# Patient Record
Sex: FEMALE | Race: WHITE | NOT HISPANIC OR LATINO | Employment: OTHER | URBAN - NONMETROPOLITAN AREA
[De-identification: names, ages, dates, MRNs, and addresses within clinical notes are randomized per-mention and may not be internally consistent; named-entity substitution may affect disease eponyms.]

---

## 2017-05-05 ENCOUNTER — HOSPITAL ENCOUNTER (EMERGENCY)
Facility: HOSPITAL | Age: 72
Discharge: ANOTHER HOSPITAL | End: 2017-05-06
Attending: INTERNAL MEDICINE | Admitting: INTERNAL MEDICINE
Payer: MEDICARE

## 2017-05-05 DIAGNOSIS — G45.9 TRANSIENT CEREBRAL ISCHEMIA, UNSPECIFIED TYPE: ICD-10-CM

## 2017-05-05 DIAGNOSIS — I67.1 CEREBRAL ANEURYSM, NONRUPTURED: ICD-10-CM

## 2017-05-05 DIAGNOSIS — I65.21 CAROTID OCCLUSION, RIGHT: ICD-10-CM

## 2017-05-05 LAB
ALBUMIN SERPL-MCNC: 3.3 G/DL (ref 3.4–5)
ALP SERPL-CCNC: 91 U/L (ref 40–150)
ALT SERPL W P-5'-P-CCNC: 18 U/L (ref 0–50)
ANION GAP SERPL CALCULATED.3IONS-SCNC: 9 MMOL/L (ref 3–14)
AST SERPL W P-5'-P-CCNC: 8 U/L (ref 0–45)
BASOPHILS # BLD AUTO: 0.1 10E9/L (ref 0–0.2)
BASOPHILS NFR BLD AUTO: 1 %
BILIRUB SERPL-MCNC: 0.2 MG/DL (ref 0.2–1.3)
BUN SERPL-MCNC: 23 MG/DL (ref 7–30)
CALCIUM SERPL-MCNC: 9.4 MG/DL (ref 8.5–10.1)
CHLORIDE SERPL-SCNC: 104 MMOL/L (ref 94–109)
CO2 SERPL-SCNC: 25 MMOL/L (ref 20–32)
CREAT SERPL-MCNC: 0.8 MG/DL (ref 0.52–1.04)
CRP SERPL-MCNC: 6.4 MG/L (ref 0–8)
DIFFERENTIAL METHOD BLD: ABNORMAL
EOSINOPHIL # BLD AUTO: 0.2 10E9/L (ref 0–0.7)
EOSINOPHIL NFR BLD AUTO: 2.3 %
ERYTHROCYTE [DISTWIDTH] IN BLOOD BY AUTOMATED COUNT: 13.1 % (ref 10–15)
ERYTHROCYTE [SEDIMENTATION RATE] IN BLOOD BY WESTERGREN METHOD: 4 MM/H (ref 0–30)
GFR SERPL CREATININE-BSD FRML MDRD: 71 ML/MIN/1.7M2
GLUCOSE BLDC GLUCOMTR-MCNC: 245 MG/DL (ref 70–99)
GLUCOSE SERPL-MCNC: 244 MG/DL (ref 70–99)
HCT VFR BLD AUTO: 45.1 % (ref 35–47)
HGB BLD-MCNC: 14.8 G/DL (ref 11.7–15.7)
IMM GRANULOCYTES # BLD: 0 10E9/L (ref 0–0.4)
IMM GRANULOCYTES NFR BLD: 0.4 %
INR PPP: 0.89 (ref 0.8–1.2)
LYMPHOCYTES # BLD AUTO: 2.4 10E9/L (ref 0.8–5.3)
LYMPHOCYTES NFR BLD AUTO: 29 %
MCH RBC QN AUTO: 27.3 PG (ref 26.5–33)
MCHC RBC AUTO-ENTMCNC: 32.8 G/DL (ref 31.5–36.5)
MCV RBC AUTO: 83 FL (ref 78–100)
MONOCYTES # BLD AUTO: 0.8 10E9/L (ref 0–1.3)
MONOCYTES NFR BLD AUTO: 10 %
NEUTROPHILS # BLD AUTO: 4.8 10E9/L (ref 1.6–8.3)
NEUTROPHILS NFR BLD AUTO: 57.3 %
NRBC # BLD AUTO: 0 10*3/UL
NRBC BLD AUTO-RTO: 0 /100
PLATELET # BLD AUTO: 275 10E9/L (ref 150–450)
POTASSIUM SERPL-SCNC: 4.4 MMOL/L (ref 3.4–5.3)
PROT SERPL-MCNC: 6.6 G/DL (ref 6.8–8.8)
RBC # BLD AUTO: 5.42 10E12/L (ref 3.8–5.2)
SODIUM SERPL-SCNC: 138 MMOL/L (ref 133–144)
TROPONIN I SERPL-MCNC: NORMAL UG/L (ref 0–0.04)
WBC # BLD AUTO: 8.4 10E9/L (ref 4–11)

## 2017-05-05 PROCEDURE — 93005 ELECTROCARDIOGRAM TRACING: CPT

## 2017-05-05 PROCEDURE — 96374 THER/PROPH/DIAG INJ IV PUSH: CPT | Mod: 59

## 2017-05-05 PROCEDURE — 71010 ZZHC CHEST ONE VIEW: CPT | Mod: TC

## 2017-05-05 PROCEDURE — 36415 COLL VENOUS BLD VENIPUNCTURE: CPT | Performed by: INTERNAL MEDICINE

## 2017-05-05 PROCEDURE — 70498 CT ANGIOGRAPHY NECK: CPT | Mod: TC

## 2017-05-05 PROCEDURE — 85610 PROTHROMBIN TIME: CPT | Performed by: INTERNAL MEDICINE

## 2017-05-05 PROCEDURE — 86140 C-REACTIVE PROTEIN: CPT | Performed by: INTERNAL MEDICINE

## 2017-05-05 PROCEDURE — 96375 TX/PRO/DX INJ NEW DRUG ADDON: CPT | Mod: 59

## 2017-05-05 PROCEDURE — 80053 COMPREHEN METABOLIC PANEL: CPT | Performed by: INTERNAL MEDICINE

## 2017-05-05 PROCEDURE — 70496 CT ANGIOGRAPHY HEAD: CPT | Mod: TC

## 2017-05-05 PROCEDURE — 25000128 H RX IP 250 OP 636: Performed by: INTERNAL MEDICINE

## 2017-05-05 PROCEDURE — 00000146 ZZHCL STATISTIC GLUCOSE BY METER IP

## 2017-05-05 PROCEDURE — 84484 ASSAY OF TROPONIN QUANT: CPT | Performed by: INTERNAL MEDICINE

## 2017-05-05 PROCEDURE — 85025 COMPLETE CBC W/AUTO DIFF WBC: CPT | Performed by: INTERNAL MEDICINE

## 2017-05-05 PROCEDURE — 93010 ELECTROCARDIOGRAM REPORT: CPT | Performed by: INTERNAL MEDICINE

## 2017-05-05 PROCEDURE — 85652 RBC SED RATE AUTOMATED: CPT | Performed by: INTERNAL MEDICINE

## 2017-05-05 PROCEDURE — 99285 EMERGENCY DEPT VISIT HI MDM: CPT | Performed by: INTERNAL MEDICINE

## 2017-05-05 PROCEDURE — 40000275 ZZH STATISTIC RCP TIME EA 10 MIN

## 2017-05-05 PROCEDURE — 99285 EMERGENCY DEPT VISIT HI MDM: CPT | Mod: 25

## 2017-05-05 RX ORDER — LABETALOL HYDROCHLORIDE 5 MG/ML
20 INJECTION, SOLUTION INTRAVENOUS ONCE
Status: COMPLETED | OUTPATIENT
Start: 2017-05-05 | End: 2017-05-05

## 2017-05-05 RX ORDER — LORAZEPAM 2 MG/ML
1 INJECTION INTRAMUSCULAR ONCE
Status: COMPLETED | OUTPATIENT
Start: 2017-05-05 | End: 2017-05-05

## 2017-05-05 RX ORDER — IOPAMIDOL 612 MG/ML
100 INJECTION, SOLUTION INTRAVASCULAR ONCE
Status: COMPLETED | OUTPATIENT
Start: 2017-05-05 | End: 2017-05-05

## 2017-05-05 RX ADMIN — IOPAMIDOL 100 ML: 612 INJECTION, SOLUTION INTRAVASCULAR at 22:33

## 2017-05-05 RX ADMIN — LABETALOL HYDROCHLORIDE 20 MG: 5 INJECTION, SOLUTION INTRAVENOUS at 23:21

## 2017-05-05 RX ADMIN — LORAZEPAM 1 MG: 2 INJECTION, SOLUTION INTRAMUSCULAR; INTRAVENOUS at 22:15

## 2017-05-05 ASSESSMENT — VISUAL ACUITY
OS: 20/200
OD: 20/100
OS: 20/70

## 2017-05-06 ENCOUNTER — TRANSFERRED RECORDS (OUTPATIENT)
Dept: HEALTH INFORMATION MANAGEMENT | Facility: HOSPITAL | Age: 72
End: 2017-05-06

## 2017-05-06 VITALS
OXYGEN SATURATION: 94 % | RESPIRATION RATE: 21 BRPM | WEIGHT: 260 LBS | SYSTOLIC BLOOD PRESSURE: 129 MMHG | DIASTOLIC BLOOD PRESSURE: 67 MMHG | BODY MASS INDEX: 43.32 KG/M2 | HEIGHT: 65 IN | TEMPERATURE: 96.9 F

## 2017-05-06 LAB
CHOLEST SERPL-MCNC: 164 MG/DL (ref 114–200)
HBA1C MFR BLD: 10.7 % (ref 4–6)
HDLC SERPL-MCNC: 52 MG/DL (ref 40–60)
LDLC SERPL CALC-MCNC: 99 MG/DL
TRIGL SERPL-MCNC: 65 MG/DL (ref 10–200)

## 2017-05-06 ASSESSMENT — ENCOUNTER SYMPTOMS
NECK PAIN: 0
NECK STIFFNESS: 0
COUGH: 0
PALPITATIONS: 0
EYE REDNESS: 0
WEAKNESS: 1
HEMATURIA: 0
BLOOD IN STOOL: 0
SPEECH DIFFICULTY: 0
SHORTNESS OF BREATH: 0
LIGHT-HEADEDNESS: 0
SEIZURES: 0
EYE ITCHING: 0
FEVER: 0
BACK PAIN: 0
DIAPHORESIS: 0
EYE DISCHARGE: 0
EYE PAIN: 0
CONFUSION: 0
FACIAL ASYMMETRY: 0
ABDOMINAL PAIN: 0
ARTHRALGIAS: 0
NUMBNESS: 0
CHEST TIGHTNESS: 0
COLOR CHANGE: 0
VOMITING: 0
DIZZINESS: 0
CHILLS: 0
ABDOMINAL DISTENTION: 0
NAUSEA: 0
FLANK PAIN: 0
ANAL BLEEDING: 0
MYALGIAS: 0
PHOTOPHOBIA: 0
DYSURIA: 0
WHEEZING: 0
VOICE CHANGE: 0
HEADACHES: 1
WOUND: 0

## 2017-05-06 NOTE — ED NOTES
Face to face report given with opportunity to observe patient.    Report given to Jessi CLARK   5/5/2017  11:29 PM

## 2017-05-06 NOTE — DISCHARGE INSTRUCTIONS
What to expect when you have contrast    During your exam, we will inject  contrast  into your vein or artery. (Contrast is a clear liquid with iodine in it. It shows up on X-rays.)    You may feel warm or hot. You may have a metal taste in your mouth and a slight upset stomach. You may also feel pressure near the kidneys and bladder. These effects will last about 1 to 3 minutes.    Please tell us if you have:    Sneezing     Itching    Hives     Swelling in the face    A hoarse voice    Breathing problems    Other new symptoms    Serious problems are rare.  They may include:    Irregular heartbeat     Seizures    Kidney failure              Tissue damage    Shock      Death    If you have any problems during the exam, we  will treat them right away.    When you get home    Call your hospital if you have any new symptoms in the next 2 days, like hives or swelling. (Phone numbers are at the bottom of this page.) Or call your family doctor.     If you have wheezing or trouble breathing, call 911.    Self-care  -Drink at least 4 extra glasses of water today.   This reduces the stress on your kidneys.  -Keep taking your regular medicines.    The contrast will pass out of your body in your  Urine(pee). This will happen in the next 24 hours. You  will not feel this. Your urine will not  change color.    If you have kidney problems or take metformin    Drink 4 to 8 large glasses of water for the next  2 days, if you are not on a fluid restriction.    ?If you take metformin (Glucophage or Glucovance) for diabetes, keep taking it.      ?Your kidney function tests are abnormal.  If you take Metformin, do not take it for 48 hours. Please go to your clinic for a blood test within 3 days after your exam before the restarting this medicine.     (Note to provider:please give patient prescription for lab tests.)    ?Special instructions: Drink at least 4 extra glasses of water today.   This reduces the stress on your kidneys.    I  have read and understand the above information.    Patient Sign Here:______________________________________Date:________Time:______    Staff Sign Here:________________________________________Date:_______Time:______      Radiology Departments:     ?[facility]: [phone] ?[place]: [phone]     ?[place]: [phone] ?[facility]:[phone]      ?[place]: [phone]  ?[place]: [phone]  ?[place]:[phone]    ?[facility] [place]:[phone]  ?[facility] [place]:[phone]

## 2017-05-06 NOTE — ED NOTES
"Patient presents to emergency room with complaints of headache and visual disturbance in right eye. Last known well time was 8:40pm. \"it started with a headache over right eye and then I noticed gray spots/images when I was trying to see with my right eye. \"my right eye is my good eye.\" negative fast exam. Dr. Dela Cruz into see pt. Denies headache at this time. \"I took ibuprofen and called my daughter to bring me in.\" states on her way to the hospital stop lights looked like fireworks. Awake alert GCS 15.   "

## 2017-05-06 NOTE — ED NOTES
Repeat BP after labetalol 160/87 at 2330, and current /67. Awaiting acceptance at [facility]. When pt looks straight ahead her vision is okay. When she looks to the right she sees yellow and purple spots. Family at bedside. GCS 15. Denies headache. On [facility] monitors. Dr. Dela Cruz updated on last BP and is okay with it.

## 2017-05-06 NOTE — ED NOTES
"Pt states today around 4:30pm her left arm felt weaker \"wasn't working right\" for approximately 10 minutes and then went back to normal.   "

## 2017-05-06 NOTE — ED NOTES
"Attempted multiple times to have pt \" enter Ct scanner, continued to resist allowing staff to place in CT-scanner.\" attempted multiple times, refuses to allow staff to place wash cloth over eyes. Encouraged deep breathing.  called, made aware, see orders.   "

## 2017-05-06 NOTE — ED PROVIDER NOTES
History     Chief Complaint   Patient presents with     Eye Problem     notes rt eye visual disturbance for approx 1 hr, notes can only see partial objects. c/o h/a earlier denies at present     Patient is a 72 year old female presenting with eye pain. The history is provided by the patient.   Eye Problem   This is a new problem. The current episode started 1 to 2 hours ago. The problem occurs constantly. The problem has not changed since onset.Associated symptoms include headaches. Pertinent negatives include no chest pain, no abdominal pain and no shortness of breath.     Rosalie JANE Friend is a 72 year old female who came for vision problem for about 1 hour, she can only see half of objects. This happened about between 8-9 pm and and started with severe above the eye headache. Headache already resolved.  She also had left arm weakness at 4pm  That already has resolved.     I have reviewed the Medications, Allergies, Past Medical and Surgical History, and Social History in the Epic system.    Review of Systems   Constitutional: Negative for chills, diaphoresis and fever.   HENT: Negative for voice change.    Eyes: Positive for visual disturbance. Negative for photophobia, pain, discharge, redness and itching.   Respiratory: Negative for cough, chest tightness, shortness of breath and wheezing.    Cardiovascular: Negative for chest pain, palpitations and leg swelling.   Gastrointestinal: Negative for abdominal distention, abdominal pain, anal bleeding, blood in stool, nausea and vomiting.   Genitourinary: Negative for decreased urine volume, dysuria, flank pain and hematuria.   Musculoskeletal: Negative for arthralgias, back pain, gait problem, myalgias, neck pain and neck stiffness.   Skin: Negative for color change, pallor, rash and wound.   Neurological: Positive for weakness and headaches. Negative for dizziness, seizures, syncope, facial asymmetry, speech difficulty, light-headedness and numbness.  "  Psychiatric/Behavioral: Negative for confusion and suicidal ideas.       Physical Exam   BP: 171/82  Heart Rate: 91  Temp: 96.9  F (36.1  C)  Resp: 16  Height: 165.1 cm (5' 5\")  Weight: 117.9 kg (260 lb)  SpO2: 97 %  Physical Exam   Constitutional: She is oriented to person, place, and time. She appears well-developed and well-nourished.   HENT:   Head: Normocephalic and atraumatic.   Mouth/Throat: No oropharyngeal exudate.   Eyes: Conjunctivae are normal. Pupils are equal, round, and reactive to light. Right conjunctiva is not injected. No scleral icterus. Right eye exhibits normal extraocular motion. Right pupil is reactive.   Fundoscopic exam:       The right eye shows no hemorrhage and no papilledema. The right eye shows red reflex.   Neck: Normal range of motion. Neck supple. No JVD present. No tracheal deviation present. No thyromegaly present.   Cardiovascular: Normal rate, regular rhythm, normal heart sounds and intact distal pulses.  Exam reveals no gallop and no friction rub.    No murmur heard.  Pulmonary/Chest: Effort normal and breath sounds normal. No stridor. No respiratory distress. She has no wheezes. She has no rales. She exhibits no tenderness.   Abdominal: Soft. Bowel sounds are normal. She exhibits no distension and no mass. There is no tenderness. There is no rebound and no guarding.   Musculoskeletal: Normal range of motion. She exhibits no edema or tenderness.   Lymphadenopathy:     She has no cervical adenopathy.   Neurological: She is alert and oriented to person, place, and time. She displays no atrophy and no tremor. No cranial nerve deficit or sensory deficit. She exhibits normal muscle tone. She displays no seizure activity. Coordination and gait normal. GCS eye subscore is 4. GCS verbal subscore is 5. GCS motor subscore is 6. She displays no Babinski's sign on the right side. She displays no Babinski's sign on the left side.   Skin: Skin is warm and dry. No rash noted. No erythema. " No pallor.   Psychiatric: Her behavior is normal.   Nursing note and vitals reviewed.      ED Course     ED Course     Procedures        [employer] Stroke Scale  Exam Interval: Baseline   Score    Level of consciousness: (0)   Alert, keenly responsive    LOC questions: (0)   Answers both questions correctly    LOC commands: (0)   Performs both tasks correctly    Best gaze: (0)   Normal    Visual: (1)   Partial hemianopia    Facial palsy: (0)   Normal symmetrical movements    Motor arm (left): (0)   No drift    Motor arm (right): (0)   No drift    Motor leg (left): (0)   No drift    Motor leg (right): (0)   No drift    Limb ataxia: (0)   Absent    Sensory: (0)   Normal- no sensory loss    Best language: (0)   Normal- no aphasia    Dysarthria: (0)   Normal    Extinction and inattention: (0)   No abnormality        Total Score:  1          Labs Ordered and Resulted from Time of ED Arrival Up to the Time of Departure from the ED   CBC WITH PLATELETS DIFFERENTIAL - Abnormal; Notable for the following:        Result Value    RBC Count 5.42 (*)     All other components within normal limits   COMPREHENSIVE METABOLIC PANEL - Abnormal; Notable for the following:     Glucose 244 (*)     Albumin 3.3 (*)     Protein Total 6.6 (*)     All other components within normal limits   GLUCOSE BY METER - Abnormal; Notable for the following:     Glucose 245 (*)     All other components within normal limits   TROPONIN I   CRP INFLAMMATION   ERYTHROCYTE SEDIMENTATION RATE AUTO   INR   CARDIAC CONTINUOUS MONITORING       Assessments & Plan (with Medical Decision Making)   Came for for loss of R eye vision, she sees thing only partially  R sided headache, already resolved  Left arm weakness, already resolved  Code stroke activated , I spoke to Dr Bangura at 10:20 PM, stroke neurologist in [facility], she did not recommend TPA as symptoms started at 4 Pm and is already out of window for TPA, especially Pt with R hemisphere  stroke have hemineglegt and she probabley negleted her left sided weakness , she advised CT head and CTA head/neck  CT reported: occlusion of R internal carotid artery, 9mm left middle cerebral and 7 mm Right middle cerebral anuerism  I spoke to Dr gomez , she recommended on intervention at this time but transfer to neurology ICU, Dr Rivas accepted the patient    I have reviewed the nursing notes.    I have reviewed the findings, diagnosis, plan and need for follow up with the patient.    Discharge Medication List as of 5/6/2017 12:42 AM          Final diagnoses:   Cerebral aneurysm, nonruptured   Carotid occlusion, right   Transient cerebral ischemia, unspecified type       5/5/2017   [facility]     Ollie Dela Cruz MD  05/06/17 0615

## 2017-05-06 NOTE — ED NOTES
CT results given to Dr. Dela Cruz via [employer]. Report given to [employer].

## 2017-05-06 NOTE — ED NOTES
Pt transferred to [facility] via [facility] at this time to [facility]. Personal belongings sent with family.

## 2017-05-06 NOTE — ED NOTES
"Reports she is no longer \"seeing purple spots, vision is improving, able to see writer's face.\"   "

## 2017-05-08 ENCOUNTER — TRANSFERRED RECORDS (OUTPATIENT)
Dept: HEALTH INFORMATION MANAGEMENT | Facility: HOSPITAL | Age: 72
End: 2017-05-08

## 2017-05-09 ENCOUNTER — OFFICE VISIT (OUTPATIENT)
Dept: INTERNAL MEDICINE | Facility: OTHER | Age: 72
End: 2017-05-09
Attending: INTERNAL MEDICINE
Payer: MEDICARE

## 2017-05-09 VITALS
WEIGHT: 256 LBS | DIASTOLIC BLOOD PRESSURE: 68 MMHG | OXYGEN SATURATION: 92 % | HEIGHT: 64 IN | SYSTOLIC BLOOD PRESSURE: 134 MMHG | TEMPERATURE: 98.6 F | HEART RATE: 88 BPM | BODY MASS INDEX: 43.71 KG/M2

## 2017-05-09 DIAGNOSIS — Z71.89 ACP (ADVANCE CARE PLANNING): Chronic | ICD-10-CM

## 2017-05-09 DIAGNOSIS — I65.21 CAROTID OCCLUSION, RIGHT: ICD-10-CM

## 2017-05-09 DIAGNOSIS — E11.59 TYPE 2 DIABETES MELLITUS WITH OTHER CIRCULATORY COMPLICATION, WITHOUT LONG-TERM CURRENT USE OF INSULIN (H): ICD-10-CM

## 2017-05-09 DIAGNOSIS — Z86.73 HISTORY OF CVA (CEREBROVASCULAR ACCIDENT): Primary | ICD-10-CM

## 2017-05-09 DIAGNOSIS — I67.1 CEREBRAL ANEURYSM, NONRUPTURED: ICD-10-CM

## 2017-05-09 DIAGNOSIS — I10 BENIGN ESSENTIAL HYPERTENSION: ICD-10-CM

## 2017-05-09 DIAGNOSIS — E78.5 HYPERLIPIDEMIA WITH TARGET LDL LESS THAN 70: ICD-10-CM

## 2017-05-09 PROCEDURE — 99213 OFFICE O/P EST LOW 20 MIN: CPT

## 2017-05-09 PROCEDURE — 93005 ELECTROCARDIOGRAM TRACING: CPT

## 2017-05-09 PROCEDURE — 93010 ELECTROCARDIOGRAM REPORT: CPT | Performed by: INTERNAL MEDICINE

## 2017-05-09 PROCEDURE — 99205 OFFICE O/P NEW HI 60 MIN: CPT | Performed by: INTERNAL MEDICINE

## 2017-05-09 RX ORDER — GLUCOSAMINE HCL/CHONDROITIN SU 500-400 MG
CAPSULE ORAL
COMMUNITY
Start: 2017-05-07 | End: 2017-05-30

## 2017-05-09 RX ORDER — HYDROCHLOROTHIAZIDE 12.5 MG/1
12.5 TABLET ORAL
COMMUNITY
Start: 2017-05-08 | End: 2017-05-30

## 2017-05-09 RX ORDER — ATORVASTATIN CALCIUM 80 MG/1
80 TABLET, FILM COATED ORAL
COMMUNITY
Start: 2017-05-08 | End: 2017-05-30

## 2017-05-09 RX ORDER — AMOXICILLIN 250 MG
CAPSULE ORAL
COMMUNITY
Start: 2017-05-08 | End: 2017-05-30

## 2017-05-09 RX ORDER — LANCETS 33 GAUGE
EACH MISCELLANEOUS
COMMUNITY
Start: 2017-05-07 | End: 2021-05-31

## 2017-05-09 RX ORDER — ASPIRIN 325 MG
325 TABLET ORAL
COMMUNITY
Start: 2017-05-08 | End: 2017-05-30

## 2017-05-09 RX ORDER — PEN NEEDLE, DIABETIC 30 GX3/16"
NEEDLE, DISPOSABLE MISCELLANEOUS
COMMUNITY
Start: 2017-05-07 | End: 2017-05-30

## 2017-05-09 ASSESSMENT — PAIN SCALES - GENERAL: PAINLEVEL: NO PAIN (0)

## 2017-05-09 NOTE — PROGRESS NOTES
"SUBJECTIVE:    Chief Complaint   Patient presents with     Hospital F/U     5/6/17 pt states had a stroke- was transferred to [facility]- pt states she feels \"tired\" no other side effects     Establish Care     pt would like to establish care with you today- pt saw Dr. Baker and has not been seen since he has retired        Rosalie JANE Friend is a 72 year old female with a recent hx of right sided CVA with left sided deficits/visual loss(all resolved) along with a hx of Tobacco use, HTN and a new diagnosis of DM type 2.  On 5/6/17 Rosalie presents to the ER with left eye visual loss and blurring.  Stroke team was activated and it was noted she had considerable carotid occlusion on the right with b/l carotic aneurysms.  She was outside of the window for TPA and was transferred to [facility] where she was hospitalized and discharged on 5/8/17.  Currently she is without any symptoms regarding her CVA.  She denies any chest pain or SOB.  She denies any weakness or visual changes currently.  She was a smoker but states she now quit. She smoked about 1PPD. Her new diagnosis of DM was with an A1C of 10.7.  She is on 12 units of Lantus and her lowest BG values have been in the low 200s.  No previous DM diagnosis.  She remains on HCTZ, Statin and Asa also since discharge.  She is awaiting word as to [facility] regarding her Carotid disease and aneurysms.          Past Medical History:   Diagnosis Date     Cerebral infarction (H)     05/06/2017         Past Surgical History:   Procedure Laterality Date     ABDOMEN SURGERY      bladder repair     GYN SURGERY      hysterectomy         Allergies   Allergen Reactions     Penicillins        Medications:  Current Outpatient Prescriptions   Medication Sig Dispense Refill     aspirin 325 MG tablet Take 325 mg by mouth       atorvastatin (LIPITOR) 80 MG tablet Take 80 mg by mouth       Glucose Blood (BLOOD GLUCOSE TEST STRIPS) STRP four times a " "day.       hydrochlorothiazide 12.5 MG TABS tablet Take 12.5 mg by mouth       insulin glargine (LANTUS) 100 UNIT/ML injection Inject 12 Units Subcutaneous       Insulin Pen Needle (PEN NEEDLES) 32G X 4 MM MISC        metFORMIN (GLUCOPHAGE) 500 MG tablet Take 500 mg by mouth       ONETOUCH DELICA LANCETS 33G MISC four times a day.       senna-docusate (SENOKOT-S;PERICOLACE) 8.6-50 MG per tablet        IBUPROFEN PO          Family History   Problem Relation Age of Onset     Aneurysm Mother      Other Cancer Father      DIABETES Brother      Hypertension Brother      Other Cancer Brother      Other Cancer Sister      Aneurysm Sister        Social History   Substance Use Topics     Smoking status: Former Smoker     Packs/day: 1.00     Quit date: 5/5/2017     Smokeless tobacco: Not on file     Alcohol use Yes      Comment: occasional      Social History     Social History Narrative        Review Of Systems  Constitutional: Negative  Eyes: as above, left eye vision loss resolved   Ears/Nose/Throat: negative  Cardiovascular: negative  Respiratory: No shortness of breath, dyspnea on exertion, cough, or hemoptysis  Gastrointestinal: negative  Genitourinary: negative  Musculoskeletal: negative  Skin: negative  Neurologic: as above  Endocrine: as above  Hematologic/Lymphatic/Immunologic: negative  Psychiatric: negative    OBJECTIVE:  /68 (BP Location: Right arm, Patient Position: Chair, Cuff Size: Adult Large)  Pulse 88  Temp 98.6  F (37  C) (Tympanic)  Ht 5' 4\" (1.626 m)  Wt 256 lb (116.1 kg)  SpO2 92%  Breastfeeding? No  BMI 43.94 kg/m2  Body mass index is 43.94 kg/(m^2).   Gen: Well-developed, well-nourished and in no apparent distress  HEENT:  Normocephalic, atraumatic, PERRL, no scleral icterus, TMs visualized bilaterally without effusion/erythema, external auditory canals clear.  Cranial nerves grossly intact.  Neck: supple, no LAD, no thyromegaly  CV: regular rate and rhythm, normal S1 S2, no S3 or S4 and " no murmur, click, or rub  Resp: Clear to ausculation bilaterally, normal respiratory effort  Abd: Bowel sounds present, soft NT/ND,  no masses or hepatosplenomegaly  Ext: Trace LE  edema.  5/5 strength in upper and LEs.    Neuro:  No focal deficits noted, however amblyopia is noted in left eye.    Skin: warm and dry  Psych: normal mood/affect, appropriately oriented    Results for orders placed or performed during the hospital encounter of 05/05/17   XR Chest Port 1 View    Narrative    CHEST FRONTAL VIEW    HISTORY:  Weakness.    COMPARISON:  Today's study is compared to a prior examination which is  dated January 16, 2016.    FINDINGS:  Frontal view of the chest demonstrates the cardiac  silhouette is stable in size.  The pulmonary vasculature is normal and  the lungs are clear.  Prominence of the right gio is likely  accentuated due to rotation.    IMPRESSION:  NO ACUTE CARDIOPULMONARY DISEASE.  Exam Date: May 05, 2017 11:01:00 PM  Author: DAMIEN FERNANDO  This report is final and signed     CTA Angiogram Head    Narrative    CT HEAD AND NECK ANGIOGRAM    HISTORY:  Left hand and arm weakness, headache, visual changes.    COMPARISON:  None.    TECHNIQUE:  Noncontrast CT images of the head were obtained.  CT  angiogram images of the head and neck were obtained with sagittal and  coronal and volume-rendered reconstructed images reviewed.  Additional  post contrast images of the brain were also obtained.    FINDINGS:  Head; there is no evidence of acute intracranial hemorrhage  or abnormal extraaxial fluid.  The ventricles are normal in size and  configuration.  There are no findings of acute ischemia.  No mass  effect or midline shift.  A left MCA aneurysm with calcification is  noted.  The bones are intact.  On the 5-minute delayed post contrast  images, there is no enhancing intracranial mass.    CT angiogram head; there is an aneurysm arising at the junction of the  left middle cerebral and internal carotid  artery, measuring 10 mm AP  by 8 mm transverse by 10 mm craniocaudal.  This is partially  calcified.  There is a second aneurysm of the right middle cerebral  artery, measuring approximately 6 mm in diameter.  This arises from  the bifurcation at the origin of the M:2 segment.  The anterior  cerebral arteries are normal in course and caliber.  There is no  stenosis or occlusion in the middle cerebral arteries.  The posterior  cerebral arteries are normal in course and caliber.  Basilar and  distal vertebral arteries are normal in course and caliber.    CT angiogram neck; the right internal carotid artery is occluded at  its origin.  There is reconstitution of flow in the brain to the  anterior and middle cerebral artery branches.  There is calcified  plaque at the origin of the left internal carotid artery, but no  significant stenosis.  Vertebral arteries are patent in the neck.  Continued on page 2...        IMPRESSION:  1.  BILATERAL MIDDLE CEREBRAL ARTERY ANEURYSMS, LEFT GREATER THAN  RIGHT.  NO STENOSIS OR OCCLUSION IN THE HEAD.    2.  OCCLUDED RIGHT COMMON INTERNAL CAROTID ARTERY JUST BEYOND ITS  ORIGIN.  THERE IS RECONSTITUTION OF FLOW IN THE BRAIN.    This is in agreement with the preliminary report provided by [facility].  Exam Date: May 05, 2017 10:48:55 PM  Author: DAMIEN FERNANDO  This report is final and signed     CTA Angiogram Neck    Narrative    CT HEAD AND NECK ANGIOGRAM    HISTORY:  Left hand and arm weakness, headache, visual changes.    COMPARISON:  None.    TECHNIQUE:  Noncontrast CT images of the head were obtained.  CT  angiogram images of the head and neck were obtained with sagittal and  coronal and volume-rendered reconstructed images reviewed.  Additional  post contrast images of the brain were also obtained.    FINDINGS:  Head; there is no evidence of acute intracranial hemorrhage  or abnormal extraaxial fluid.  The ventricles are normal in size and  configuration.  There are no  findings of acute ischemia.  No mass  effect or midline shift.  A left MCA aneurysm with calcification is  noted.  The bones are intact.  On the 5-minute delayed post contrast  images, there is no enhancing intracranial mass.    CT angiogram head; there is an aneurysm arising at the junction of the  left middle cerebral and internal carotid artery, measuring 10 mm AP  by 8 mm transverse by 10 mm craniocaudal.  This is partially  calcified.  There is a second aneurysm of the right middle cerebral  artery, measuring approximately 6 mm in diameter.  This arises from  the bifurcation at the origin of the M:2 segment.  The anterior  cerebral arteries are normal in course and caliber.  There is no  stenosis or occlusion in the middle cerebral arteries.  The posterior  cerebral arteries are normal in course and caliber.  Basilar and  distal vertebral arteries are normal in course and caliber.    CT angiogram neck; the right internal carotid artery is occluded at  its origin.  There is reconstitution of flow in the brain to the  anterior and middle cerebral artery branches.  There is calcified  plaque at the origin of the left internal carotid artery, but no  significant stenosis.  Vertebral arteries are patent in the neck.  Continued on page 2...        IMPRESSION:  1.  BILATERAL MIDDLE CEREBRAL ARTERY ANEURYSMS, LEFT GREATER THAN  RIGHT.  NO STENOSIS OR OCCLUSION IN THE HEAD.    2.  OCCLUDED RIGHT COMMON INTERNAL CAROTID ARTERY JUST BEYOND ITS  ORIGIN.  THERE IS RECONSTITUTION OF FLOW IN THE BRAIN.    This is in agreement with the preliminary report provided by [facility].  Exam Date: May 05, 2017 10:48:53 PM  Author: DAMIEN FERNANDO  This report is final and signed     CBC with platelets differential   Result Value Ref Range    WBC 8.4 4.0 - 11.0 10e9/L    RBC Count 5.42 (H) 3.8 - 5.2 10e12/L    Hemoglobin 14.8 11.7 - 15.7 g/dL    Hematocrit 45.1 35.0 - 47.0 %    MCV 83 78 - 100 fl    MCH 27.3 26.5 - 33.0 pg     MCHC 32.8 31.5 - 36.5 g/dL    RDW 13.1 10.0 - 15.0 %    Platelet Count 275 150 - 450 10e9/L    Diff Method Automated Method     % Neutrophils 57.3 %    % Lymphocytes 29.0 %    % Monocytes 10.0 %    % Eosinophils 2.3 %    % Basophils 1.0 %    % Immature Granulocytes 0.4 %    Nucleated RBCs 0 0 /100    Absolute Neutrophil 4.8 1.6 - 8.3 10e9/L    Absolute Lymphocytes 2.4 0.8 - 5.3 10e9/L    Absolute Monocytes 0.8 0.0 - 1.3 10e9/L    Absolute Eosinophils 0.2 0.0 - 0.7 10e9/L    Absolute Basophils 0.1 0.0 - 0.2 10e9/L    Abs Immature Granulocytes 0.0 0 - 0.4 10e9/L    Absolute Nucleated RBC 0.0    Comprehensive metabolic panel   Result Value Ref Range    Sodium 138 133 - 144 mmol/L    Potassium 4.4 3.4 - 5.3 mmol/L    Chloride 104 94 - 109 mmol/L    Carbon Dioxide 25 20 - 32 mmol/L    Anion Gap 9 3 - 14 mmol/L    Glucose 244 (H) 70 - 99 mg/dL    Urea Nitrogen 23 7 - 30 mg/dL    Creatinine 0.80 0.52 - 1.04 mg/dL    GFR Estimate 71 >60 mL/min/1.7m2    GFR Estimate If Black 85 >60 mL/min/1.7m2    Calcium 9.4 8.5 - 10.1 mg/dL    Bilirubin Total 0.2 0.2 - 1.3 mg/dL    Albumin 3.3 (L) 3.4 - 5.0 g/dL    Protein Total 6.6 (L) 6.8 - 8.8 g/dL    Alkaline Phosphatase 91 40 - 150 U/L    ALT 18 0 - 50 U/L    AST 8 0 - 45 U/L   Troponin I   Result Value Ref Range    Troponin I ES  0.000 - 0.045 ug/L     <0.015  The 99th percentile for upper reference range is 0.045 ug/L.  Troponin values in   the range of 0.045 - 0.120 ug/L may be associated with risks of adverse   clinical events.     CRP inflammation   Result Value Ref Range    CRP Inflammation 6.4 0.0 - 8.0 mg/L   Erythrocyte sedimentation rate auto   Result Value Ref Range    Sed Rate 4 0 - 30 mm/h   Glucose by meter   Result Value Ref Range    Glucose 245 (H) 70 - 99 mg/dL   INR   Result Value Ref Range    INR 0.89 0.80 - 1.20     EKG today indicates sinus rhythm      ASSESSMENT/PLAN:  Pt is a 72 year old female here to establish care    Rosalie was seen today for hospital f/u  and establish care.    Diagnoses and all orders for this visit:    History of CVA (cerebrovascular accident)  -     EKG 12-lead complete w/read - (Clinic Performed)  -     DIABETES EDUCATION REFERRAL ([place])  -     NEUROSURGERY REFERRAL  -     NEUROLOGY ADULT REFERRAL    ACP (advance care planning)    Type 2 diabetes mellitus with other circulatory complication, without long-term current use of insulin (H)  -     EKG 12-lead complete w/read - (Clinic Performed)  -     DIABETES EDUCATION REFERRAL ([place])  -     NEUROSURGERY REFERRAL    Benign essential hypertension  -     EKG 12-lead complete w/read - (Clinic Performed)    Hyperlipidemia with target LDL less than 70    Carotid occlusion, right  -     NEUROLOGY ADULT REFERRAL    Cerebral aneurysm, nonruptured  -     NEUROSURGERY REFERRAL  -     NEUROLOGY ADULT REFERRAL      60 minutes was spenton this patient's care today.  Greater than 50% of the time was spent face to face with the patient.  Patient was educated as to returning ot the ED if symptoms recur.  In addition she was educated on her DM, and her current medications and as to why she is on these medications.  Questions were answered and patient agrees to the plan as above. Additional time was spent reviewing extensive records and tests from ER and [facility].       Return to clinic in 3 weeks          Yash Cuellar D.O.

## 2017-05-09 NOTE — PATIENT INSTRUCTIONS
Neurology and Neurosurgeon [facility]    Follow up 3 weeks with me   Continue all medications  Diabetic Education referral   Increase morning Lantus to 15 units SQ    Clinic and Lab Hours:    I have office hours on Monday's and Wednesday's at the [facility].  I have office hours Tuesdays and Fridays at the [facility] in [facility] site.    OfficeHours:  8:00am- 4:00 pm    Following your visit, when your labs and diagnostic testing have returned and I have reviewed them, you will be contacted by my nurse.  If you are on My Chart on Epic, you can also view results there, and call or message me with questions and or concerns.    For refills, notify your pharmacy regarding what you need and the pharmacy will generate a refill request. Please plan ahead and allow 3-5 days for refill requests.    You will generally receive a reminder call the day prior to your appointment.  If you cannot attend your appointment, please cancel within days prior.  If there is a pattern of failure to present for your appointments, I cannot provide consistent, meaningful, ongoing care for you. It is very important to me that you come in for your care, so we can best assist you with your health care needs.    IMPORTANT:  Please note that it is my standard of practice to NOT participate in prescribing ongoing requested Narcotic Analgesic therapy, and/or participate in the prescribing of other controlled substances.  My nurse and I am happy to assist you with the process of referral for alternative pain management as needed, and other treatment modalities including but not limited to:  Physical Therapy, Physical Medicine and Rehab, Counseling, Chiropractic Care, Orthopedic Care, and non-narcotic medication management.     I am out of the office on Thursday's. If you have labs that return while I am out,   our office will contact you when I return on my next scheduled clinic day.  If there is a concerning lab, you  will be contacted.  Medication refills will generally be addressed upon my return the next day.     In the event that you need to be seen for emergent concerns and I am out of office,  please see one of my colleagues for acute concerns.  You may also present to UC or ER.    Thank you for allowing me to participate in your care.  I look forward to addressing your  Internal Medicine Level health care needs, and assisting you to achieve optimal health.     Your note and results will be copied to your referral source, and any other specialists involved in your care, as well as any providers you are referred to for additional care.        Sincerely,  Dr. Yash Cuellar

## 2017-05-09 NOTE — NURSING NOTE
"Chief Complaint   Patient presents with     Hospital F/U     5/6/17 pt states had a stroke- was transferred to [facility]- pt states she feels \"tired\" no other side effects     Establish Care     pt would like to establish care with you today- pt saw Dr. Baker and has not been seen since he has retired        Initial /68 (BP Location: Right arm, Patient Position: Chair, Cuff Size: Adult Large)  Pulse 88  Temp 98.6  F (37  C) (Tympanic)  Ht 5' 4\" (1.626 m)  Wt 256 lb (116.1 kg)  SpO2 92%  Breastfeeding? No  BMI 43.94 kg/m2 Estimated body mass index is 43.94 kg/(m^2) as calculated from the following:    Height as of this encounter: 5' 4\" (1.626 m).    Weight as of this encounter: 256 lb (116.1 kg).  Medication Reconciliation: complete      Kalee Busby LPN    "

## 2017-05-09 NOTE — MR AVS SNAPSHOT
After Visit Summary   5/9/2017    Rosalie Seals    MRN: [id number]           Patient Information     Date Of Birth          1945        Visit Information        Provider Department      5/9/2017 2:00 PM Yash Cuellar, DO [facility]        Today's Diagnoses     History of CVA (cerebrovascular accident)    -  1    ACP (advance care planning)        Type 2 diabetes mellitus with other circulatory complication, without long-term current use of insulin (H)        Benign essential hypertension        Hyperlipidemia with target LDL less than 70        Carotid occlusion, right        Cerebral aneurysm, nonruptured          Care Instructions    Neurology and Neurosurgeon [facility]    Follow up 3 weeks with me   Continue all medications  Diabetic Education referral   Increase morning Lantus to 15 units SQ    Clinic and Lab Hours:    I have office hours on Monday's and Wednesday's at the [facility] and the [facility].  I have office hours Tuesdays and Fridays at the [facility] in [facility] site.    OfficeHours:  8:00am- 4:00 pm    Following your visit, when your labs and diagnostic testing have returned and I have reviewed them, you will be contacted by my nurse.  If you are on My Chart on Epic, you can also view results there, and call or message me with questions and or concerns.    For refills, notify your pharmacy regarding what you need and the pharmacy will generate a refill request. Please plan ahead and allow 3-5 days for refill requests.    You will generally receive a reminder call the day prior to your appointment.  If you cannot attend your appointment, please cancel within days prior.  If there is a pattern of failure to present for your appointments, I cannot provide consistent, meaningful, ongoing care for you. It is very important to me that you come in for your care, so we can best assist you with your health care needs.    IMPORTANT:  Please note that  it is my standard of practice to NOT participate in prescribing ongoing requested Narcotic Analgesic therapy, and/or participate in the prescribing of other controlled substances.  My nurse and I am happy to assist you with the process of referral for alternative pain management as needed, and other treatment modalities including but not limited to:  Physical Therapy, Physical Medicine and Rehab, Counseling, Chiropractic Care, Orthopedic Care, and non-narcotic medication management.     I am out of the office on Thursday's. If you have labs that return while I am out,   our office will contact you when I return on my next scheduled clinic day.  If there is a concerning lab, you will be contacted.  Medication refills will generally be addressed upon my return the next day.     In the event that you need to be seen for emergent concerns and I am out of office,  please see one of my colleagues for acute concerns.  You may also present to  or ER.    Thank you for allowing me to participate in your care.  I look forward to addressing your  Internal Medicine Level health care needs, and assisting you to achieve optimal health.     Your note and results will be copied to your referral source, and any other specialists involved in your care, as well as any providers you are referred to for additional care.        Sincerely,  Dr. Yash Cuellar            Follow-ups after your visit        Additional Services     DIABETES EDUCATION REFERRAL ([place])       DIABETES SELF-MANAGEMENT TRAINING (DSMT)  Type of training and number of hours requested:  Initial Group DMST; 10    (Medicare will cover:10 hours initial DSMT in 12-month period, plus 2 hours follow-up DSMT annually)    Please add if the patient has special educational need: Vision - Degree of Deficit: Mild  (Patients with special needs requiring individual DSMT)    Please include the following DMST content: All ten content areas, as appropriate, Insulin  start/management, Medications, Monitoring diabetes and Nutritional management                Patient has the following:Hypertension and Stroke    Please begin Medical Nutritional Therapy.  Initial Medical Nutritional Therapy (MNT)  ([employer] allows 3 hours initial MNT in the first calendar year, plus two hours follow up MNT annually.  Additional MNT hours are available for change in medical condition treatment and/or diagnosis)    Additional Services Provided:  >>A1c will be completed upon referral and completion of program unless completed in clinic.  >>Influenza vaccination assessment (form #N228) as applicable.  >>Order for diabetes supplies will be faxed to patient's pharmacy.  >>If on insulin: Insulin dose adjustment per staged Diabetes Mgmt. Protocols    DIABETES RESOURCE CENTER  [facility]  Telephone:  [phone]   Fax:  [phone]            NEUROLOGY ADULT REFERRAL       Your provider has referred you to: Dr Mcgraw      Reason for Referral: Consult    Please be aware that coverage of these services is subject to the terms and limitations of your health insurance plan.  Call member services at your health plan with any benefit or coverage questions.      Please bring the following with you to your appointment:    (1) Any X-Rays, CTs or MRIs which have been performed.  Contact the facility where they were done to arrange for  prior to your scheduled appointment.    (2) List of current medications  (3) This referral request   (4) Any documents/labs given to you for this referral            NEUROSURGERY REFERRAL       Your provider has referred you to: [facility]   Dr Sorenson       Please be aware that coverage of these services is subject to the terms and limitations of your health insurance plan.  Call member services at your health plan with any benefit or coverage questions.      Please bring the following with you to your appointment:    (1) Any X-Rays, CTs or MRIs which  "have been performed.  Contact the facility where they were done to arrange for  prior to your scheduled appointment.   (2) List of current medications  (3) This referral request   (4) Any documents/labs given to you for this referral                  Your next 10 appointments already scheduled     May 30, 2017  1:00 PM CDT   (Arrive by 12:45 PM)   Office Visit with Yash Cuellar,    [facility])    [address]  [facility] [address]   [phone]           Bring a current list of meds and any records pertaining to this visit.  For Physicals, please bring immunization records and any forms needing to be filled out.  Please arrive 10 minutes early to complete paperwork.              Who to contact     If you have questions or need follow up information about today's clinic visit or your schedule please contact [facility] directly at [phone].  Normal or non-critical lab and imaging results will be communicated to you by MyChart, letter or phone within 4 business days after the clinic has received the results. If you do not hear from us within 7 days, please contact the clinic through [employer] or phone. If you have a critical or abnormal lab result, we will notify you by phone as soon as possible.  Submit refill requests through [employer] or call your pharmacy and they will forward the refill request to us. Please allow 3 business days for your refill to be completed.          Additional Information About Your Visit        [employer] Information     [employer] lets you send messages to your doctor, view your test results, renew your prescriptions, schedule appointments and more. To sign up, go to [email or web address] . Click on \"Log in\" on the left side of the screen, which will take you to the Welcome page. Then click on \"Sign up Now\" on the right side of the page.     You will be asked to enter the access code listed below, as well as some personal " "information. Please follow the directions to create your username and password.     Your access code is: [email or web address]  Expires: 2017  2:56 PM     Your access code will  in 90 days. If you need help or a new code, please call your [facility] clinic or [phone].        Care EveryWhere ID     This is your Care EveryWhere ID. This could be used by other organizations to access your [facility] medical records  [id number]        Your Vitals Were     Pulse Temperature Height Pulse Oximetry Breastfeeding? BMI (Body Mass Index)    88 98.6  F (37  C) (Tympanic) 1.626 m (5' 4\") 92% No 43.94 kg/m2       Blood Pressure from Last 3 Encounters:   17 134/68   17 129/67   10/09/16 180/87    Weight from Last 3 Encounters:   17 116.1 kg (256 lb)   17 117.9 kg (260 lb)              We Performed the Following     DIABETES EDUCATION REFERRAL ([place])     EKG 12-lead complete w/read - (Clinic Performed)     NEUROLOGY ADULT REFERRAL     NEUROSURGERY REFERRAL          Today's Medication Changes          These changes are accurate as of: 17  3:03 PM.  If you have any questions, ask your nurse or doctor.               Stop taking these medicines if you haven't already. Please contact your care team if you have questions.     albuterol 108 (90 BASE) MCG/ACT Inhaler   Commonly known as:  albuterol   Stopped by:  Yash Cuellar, DO                    Primary Care Provider    None       No address on file        Thank you!     [facility]. Our goal is always to provide you with excellent care. Hearing back from our patients is one way we can continue to improve our services. Please take a few minutes to complete the written survey that you may receive in the mail after your visit with us. Thank you!             Your Updated Medication List - Protect others around you: Learn how to safely use, store and throw away your medicines at " www.disposemymeds.org.          This list is accurate as of: 5/9/17  3:03 PM.  Always use your most recent med list.                   Brand Name Dispense Instructions for use    aspirin 325 MG tablet      Take 325 mg by mouth       atorvastatin 80 MG tablet    LIPITOR     Take 80 mg by mouth       BLOOD GLUCOSE TEST STRIPS Strp      four times a day.       hydrochlorothiazide 12.5 MG Tabs tablet      Take 12.5 mg by mouth       IBUPROFEN PO          insulin glargine 100 UNIT/ML injection    LANTUS     Inject 12 Units Subcutaneous       metFORMIN 500 MG tablet    GLUCOPHAGE     Take 500 mg by mouth       ONETOUCH DELICA LANCETS 33G Misc      four times a day.       Pen Needles 32G X 4 MM Misc          senna-docusate 8.6-50 MG per tablet    SENOKOT-S;PERICOLACE

## 2017-05-18 ENCOUNTER — TELEPHONE (OUTPATIENT)
Dept: EDUCATION SERVICES | Facility: HOSPITAL | Age: 72
End: 2017-05-18

## 2017-05-18 ENCOUNTER — HOSPITAL ENCOUNTER (OUTPATIENT)
Dept: EDUCATION SERVICES | Facility: HOSPITAL | Age: 72
Discharge: HOME | End: 2017-05-18
Attending: INTERNAL MEDICINE | Admitting: INTERNAL MEDICINE
Payer: MEDICARE

## 2017-05-18 VITALS
BODY MASS INDEX: 43.17 KG/M2 | HEIGHT: 64 IN | WEIGHT: 252.9 LBS | SYSTOLIC BLOOD PRESSURE: 135 MMHG | OXYGEN SATURATION: 93 % | DIASTOLIC BLOOD PRESSURE: 63 MMHG | HEART RATE: 76 BPM

## 2017-05-18 DIAGNOSIS — E11.65 TYPE 2 DIABETES MELLITUS WITH HYPERGLYCEMIA, WITH LONG-TERM CURRENT USE OF INSULIN (H): Primary | ICD-10-CM

## 2017-05-18 DIAGNOSIS — Z79.4 TYPE 2 DIABETES MELLITUS WITH HYPERGLYCEMIA, WITH LONG-TERM CURRENT USE OF INSULIN (H): Primary | ICD-10-CM

## 2017-05-18 PROCEDURE — 97802 MEDICAL NUTRITION INDIV IN: CPT | Performed by: DIETITIAN, REGISTERED

## 2017-05-18 ASSESSMENT — ANXIETY QUESTIONNAIRES
6. BECOMING EASILY ANNOYED OR IRRITABLE: NOT AT ALL
GAD7 TOTAL SCORE: 0
2. NOT BEING ABLE TO STOP OR CONTROL WORRYING: NOT AT ALL
5. BEING SO RESTLESS THAT IT IS HARD TO SIT STILL: NOT AT ALL
2. NOT BEING ABLE TO STOP OR CONTROL WORRYING: NOT AT ALL
1. FEELING NERVOUS, ANXIOUS, OR ON EDGE: NOT AT ALL
3. WORRYING TOO MUCH ABOUT DIFFERENT THINGS: NOT AT ALL
3. WORRYING TOO MUCH ABOUT DIFFERENT THINGS: NOT AT ALL
7. FEELING AFRAID AS IF SOMETHING AWFUL MIGHT HAPPEN: NOT AT ALL
6. BECOMING EASILY ANNOYED OR IRRITABLE: NOT AT ALL
GAD7 TOTAL SCORE: 0
5. BEING SO RESTLESS THAT IT IS HARD TO SIT STILL: NOT AT ALL
1. FEELING NERVOUS, ANXIOUS, OR ON EDGE: NOT AT ALL
7. FEELING AFRAID AS IF SOMETHING AWFUL MIGHT HAPPEN: NOT AT ALL

## 2017-05-18 ASSESSMENT — PATIENT HEALTH QUESTIONNAIRE - PHQ9
5. POOR APPETITE OR OVEREATING: NOT AT ALL
5. POOR APPETITE OR OVEREATING: NOT AT ALL

## 2017-05-18 ASSESSMENT — PAIN SCALES - GENERAL: PAINLEVEL: NO PAIN (0)

## 2017-05-18 NOTE — NURSING NOTE
"Chief Complaint   Patient presents with     Diabetes     new       Initial /63 (BP Location: Right arm, Patient Position: Chair, Cuff Size: Adult Regular)  Pulse 76  Ht 1.626 m (5' 4\")  Wt 114.7 kg (252 lb 14.4 oz)  SpO2 93%  BMI 43.41 kg/m2 Estimated body mass index is 43.41 kg/(m^2) as calculated from the following:    Height as of this encounter: 1.626 m (5' 4\").    Weight as of this encounter: 114.7 kg (252 lb 14.4 oz).  Medication Reconciliation: complete   Giovanna Milner      "

## 2017-05-18 NOTE — PROGRESS NOTES
"Pt here for Session 1.  She is here with her daughter who appears very supportive.  Pt was dx during recent hospitalization for CVA.  Fasting glucose 248.  A1c 10.7%.      BG readings as follows:   Xqlzipa-104-340  Before qfhgi-382-714  Before zgcmrv-219-007  Before bed-130-261  Levels do seem to be trending down per review.      Blood pressure 135/63, pulse 76, height 1.626 m (5' 4\"), weight 114.7 kg (252 lb 14.4 oz), SpO2 93 %, not currently breastfeeding.  No weight change reported.      Current diabetes medications: Lantus 15 units am, Metformin 500 mg bid.      Readiness to learn: willing.     Barriers to learning: none.      Daughter is preparing meals for patient and pt directs carbohydrate counting to her.  Pt eats three meals/day with occasional hs snack.  She has been trying to limit carbohydrate and sodium since being d/c'd from the hospital but is confused about limits and both had many questions.  Diet does not appear to be excessive in carbohydrates.  No high sugar drinks.  Dines out 1x/week.  Pt was told not to exercise at this time.      Topics covered: diabetes pathophysiology, symptoms, diagnosis, treatments, medication actions, BG self-monitoring, HgbA1c, targets (blood sugar/A1c), sharps disposal, complications and risk factors,  food planning/carbohydrate counting meal plan, portion control, label reading and benefits of physical activity.    Pt actively participate and demonstrated adequate understanding of topics discussed.    Pt has One Touch Verio meter.  She did not bring it today but denies any issues with use.      Plan: Follow carbohydrate counting meal plan provided.  Continue to make efforts to limit sodium also.  Okay to test 3x/day - fasting, before supper and 2 hours after supper.  Message sent to provider to request Metformin dose be titrated to maximum dose.      Follow up: Session 2.     Total time spent with patient: 80 minutes.     VALENTINE Merino, CDE  "

## 2017-05-18 NOTE — IP AVS SNAPSHOT
[facility]    [address] [place] [address]    Phone:  [phone]    Fax:  [phone]                                       After Visit Summary   5/18/2017    Rosalie Seals    MRN: [id number]           After Visit Summary Signature Page     I have received my discharge instructions, and my questions have been answered. I have discussed any challenges I see with this plan with the nurse or doctor.    ..........................................................................................................................................  Patient/Patient Representative Signature      ..........................................................................................................................................  Patient Representative Print Name and Relationship to Patient    ..................................................               ................................................  Date                                            Time    ..........................................................................................................................................  Reviewed by Signature/Title    ...................................................              ..............................................  Date                                                            Time

## 2017-05-18 NOTE — IP AVS SNAPSHOT
MRN:[id number]                      After Visit Summary   5/18/2017    Rosalie JANE Friend    MRN: [id number]           Thank you!     Thank you for choosing [facility] for your care. Our goal is always to provide you with excellent care. Hearing back from our patients is one way we can continue to improve our services. Please take a few minutes to complete the written survey that you may receive in the mail after you visit with us. Thank you!        Patient Information     Date Of Birth          1945        About your hospital stay     You were admitted on:  May 18, 2017 You last received care in the:  [facility]    You were discharged on:  May 18, 2017       Who to Call     For medical emergencies, please call 911.  For non-urgent questions about your medical care, please call your primary care provider or clinic, [phone]          Attending Provider     Provider Specialty    Yash Cuellar DO Internal Medicine       Primary Care Provider Office Phone # Fax #    Yash Cuellar [phone]       [facility] [address]  [place] [address]        Your next 10 appointments already scheduled     May 30, 2017  1:00 PM CDT   (Arrive by 12:45 PM)   Office Visit with Yash Cuellar DO   [facility] ([facility])    [address]  [place] [address]   [phone]           Bring a current list of meds and any records pertaining to this visit.  For Physicals, please bring immunization records and any forms needing to be filled out.  Please arrive 10 minutes early to complete paperwork.              Further instructions from your care team       -Follow carbohydrate counting meal provided.   -Continue current glucose testing schedule.   -Target levels are fasting and before meals , 2 hours after meal less than 180.   -Continue current insulin dose.    -Increase Metformin to 500 mg in am and 1000 mg in  "evening.  If after one week you have no side effects then increase to 1000 mg am and evening.   -Bring your book and glucose log to follow up visit.   -Follow up in 1 month.   -Call with any questions - Xochitl VALENTINE Daniels, [phone]    Pending Results     No orders found from 2017 to 2017.            Admission Information     Date & Time Provider Department Dept. Phone    2017 Yash Cuellar, DO [facility] [phone]      Your Vitals Were     Blood Pressure Pulse Height Weight Pulse Oximetry BMI (Body Mass Index)    135/63 (BP Location: Right arm, Patient Position: Chair, Cuff Size: Adult Regular) 76 1.626 m (5' 4\") 114.7 kg (252 lb 14.4 oz) 93% 43.41 kg/m2      [employer] Information     [employer] lets you send messages to your doctor, view your test results, renew your prescriptions, schedule appointments and more. To sign up, go to [email or web address] . Click on \"Log in\" on the left side of the screen, which will take you to the Welcome page. Then click on \"Sign up Now\" on the right side of the page.     You will be asked to enter the access code listed below, as well as some personal information. Please follow the directions to create your username and password.     Your access code is: [email or web address]  Expires: 2017  2:56 PM     Your access code will  in 90 days. If you need help or a new code, please call your [facility] clinic or [phone].        Care EveryWhere ID     This is your Care EveryWhere ID. This could be used by other organizations to access your [facility] medical records  [id number]           Review of your medicines      UNREVIEWED medicines. Ask your doctor about these medicines        Dose / Directions    aspirin 325 MG tablet        Dose:  325 mg   Take 325 mg by mouth   Refills:  0       atorvastatin 80 MG tablet   Commonly known as:  LIPITOR        Dose:  80 mg   Take 80 mg by mouth   Refills:  0       hydrochlorothiazide 12.5 MG Tabs " tablet        Dose:  12.5 mg   Take 12.5 mg by mouth   Refills:  0       IBUPROFEN PO        Refills:  0       insulin glargine 100 UNIT/ML injection   Commonly known as:  LANTUS        Dose:  12 Units   Inject 12 Units Subcutaneous   Refills:  0       metFORMIN 500 MG tablet   Commonly known as:  GLUCOPHAGE        Dose:  500 mg   Take 500 mg by mouth   Refills:  0       senna-docusate 8.6-50 MG per tablet   Commonly known as:  SENOKOT-S;PERICOLACE        Refills:  0         CONTINUE these medicines which have NOT CHANGED        Dose / Directions    BLOOD GLUCOSE TEST STRIPS Strp        four times a day.   Refills:  0       ONETOUCH DELICA LANCETS 33G Misc        four times a day.   Refills:  0       Pen Needles 32G X 4 MM Misc        Refills:  0                Protect others around you: Learn how to safely use, store and throw away your medicines at www.disposemymeds.org.             Medication List: This is a list of all your medications and when to take them. Check marks below indicate your daily home schedule. Keep this list as a reference.      Medications           Morning Afternoon Evening Bedtime As Needed    aspirin 325 MG tablet   Take 325 mg by mouth                                atorvastatin 80 MG tablet   Commonly known as:  LIPITOR   Take 80 mg by mouth                                BLOOD GLUCOSE TEST STRIPS Strp   four times a day.                                hydrochlorothiazide 12.5 MG Tabs tablet   Take 12.5 mg by mouth                                IBUPROFEN PO                                insulin glargine 100 UNIT/ML injection   Commonly known as:  LANTUS   Inject 12 Units Subcutaneous                                metFORMIN 500 MG tablet   Commonly known as:  GLUCOPHAGE   Take 500 mg by mouth                                ONETOUCH DELICA LANCETS 33G Misc   four times a day.                                Pen Needles 32G X 4 MM Misc                                senna-docusate 8.6-50 MG  per tablet   Commonly known as:  SENOKOT-S;PERICOLACE

## 2017-05-18 NOTE — DISCHARGE INSTRUCTIONS
-Follow carbohydrate counting meal provided.   -Continue current glucose testing schedule.   -Target levels are fasting and before meals , 2 hours after meal less than 180.   -Continue current insulin dose.    -Increase Metformin to 500 mg in am and 1000 mg in evening.  If after one week you have no side effects then increase to 1000 mg am and evening.   -Bring your book and glucose log to follow up visit.   -Follow up in 1 month.   -Call with any questions - VALENTINE Merino, E [phone]

## 2017-05-19 ASSESSMENT — ANXIETY QUESTIONNAIRES: GAD7 TOTAL SCORE: 0

## 2017-05-22 ENCOUNTER — TELEPHONE (OUTPATIENT)
Dept: INTERNAL MEDICINE | Facility: OTHER | Age: 72
End: 2017-05-22

## 2017-05-22 NOTE — TELEPHONE ENCOUNTER
Called patient back and offered her appointment times and told her we ware in [facility] today, she said she did not want to drive over to [facility] and will just to go Urgent care. Kalee Busby LPN

## 2017-05-22 NOTE — TELEPHONE ENCOUNTER
8:06 AM    Reason for Call: OVERBOOK    Patient is having the following symptoms: coughing for 3 days.    The patient is requesting an appointment for TODAY with Dr. Miramontes.    Was an appointment offered for this call? Yes    Preferred method for responding to this message: Telephone Call    If we cannot reach you directly, may we leave a detailed response at the number you provided? Yes    Can this message wait until your PCP/provider returns, if unavailable today? Not applicable, provider is in today    Santa Villatoro

## 2017-05-23 ENCOUNTER — HOSPITAL ENCOUNTER (EMERGENCY)
Facility: HOSPITAL | Age: 72
Discharge: HOME | End: 2017-05-23
Attending: PHYSICIAN ASSISTANT | Admitting: PHYSICIAN ASSISTANT
Payer: MEDICARE

## 2017-05-23 VITALS — HEART RATE: 78 BPM | OXYGEN SATURATION: 96 % | DIASTOLIC BLOOD PRESSURE: 72 MMHG | SYSTOLIC BLOOD PRESSURE: 161 MMHG

## 2017-05-23 DIAGNOSIS — J06.9 UPPER RESPIRATORY TRACT INFECTION, UNSPECIFIED TYPE: ICD-10-CM

## 2017-05-23 PROCEDURE — 99213 OFFICE O/P EST LOW 20 MIN: CPT | Performed by: PHYSICIAN ASSISTANT

## 2017-05-23 PROCEDURE — 99213 OFFICE O/P EST LOW 20 MIN: CPT

## 2017-05-23 RX ORDER — CODEINE PHOSPHATE AND GUAIFENESIN 10; 100 MG/5ML; MG/5ML
1 SOLUTION ORAL EVERY 4 HOURS PRN
Qty: 120 ML | Refills: 0 | Status: SHIPPED | OUTPATIENT
Start: 2017-05-23 | End: 2017-05-30

## 2017-05-23 ASSESSMENT — ENCOUNTER SYMPTOMS
NAUSEA: 0
COUGH: 1
EYE REDNESS: 0
NECK PAIN: 0
TROUBLE SWALLOWING: 0
FEVER: 0
FATIGUE: 0
VOMITING: 0
HEADACHES: 0
DIZZINESS: 0
SORE THROAT: 0
EYE DISCHARGE: 0
APPETITE CHANGE: 0
LIGHT-HEADEDNESS: 0
VOICE CHANGE: 0
NECK STIFFNESS: 0
ABDOMINAL PAIN: 0
PSYCHIATRIC NEGATIVE: 1
CARDIOVASCULAR NEGATIVE: 1
SINUS PRESSURE: 0
DIARRHEA: 0

## 2017-05-23 NOTE — ED AVS SNAPSHOT
[facility]    [address] [address]    Phone:  [phone]                                       Rosalie JANE Friend   MRN: [id number]    Department:  [facility]   Date of Visit:  5/23/2017           After Visit Summary Signature Page     I have received my discharge instructions, and my questions have been answered. I have discussed any challenges I see with this plan with the nurse or doctor.    ..........................................................................................................................................  Patient/Patient Representative Signature      ..........................................................................................................................................  Patient Representative Print Name and Relationship to Patient    ..................................................               ................................................  Date                                            Time    ..........................................................................................................................................  Reviewed by Signature/Title    ...................................................              ..............................................  Date                                                            Time

## 2017-05-23 NOTE — ED AVS SNAPSHOT
[facility]    [address] [address]    Phone:  [phone]                                       Rosalie JANE Friend   MRN: [id number]    Department:  [facility]   Date of Visit:  5/23/2017           Patient Information     Date Of Birth          1945        Your diagnoses for this visit were:     Upper respiratory tract infection, unspecified type        You were seen by Meron Petty PA.      Follow-up Information     Follow up with Yash Cuellar DO.    Specialty:  Internal Medicine    Why:  If symptoms worsen    Contact information:    [facility]  [address]  [place] [address] [phone]          Follow up with [facility].    Specialty:  EMERGENCY MEDICINE    Why:  If further concerns develop    Contact information:    [address] [address] [phone]    Additional information:    From [facility]: Take [address]. Turn left at [address]. Turn left at the first stoplight on [address] [place] [address]. At the first stop sign, take a right onto [address]. Take a left into the parking lot and continue through until you reach the North enterance of the building.       From [place]: Take [address]. Take the [address] ramp towards [place]. Turn left onto [address]. Take a slight right onto [address]. Turn left at the first stoplight on [address]. At the first stop sign, take a right onto [address]. Take a left into the parking lot and continue through until you reach the North enterance of the building.       From [place]: Take [address]. Take a right at [address]. At the first stop sign, take a right onto [address]. Take a left into the parking lot and continue through until you reach the North enterance of the building.       Discharge References/Attachments     URI, VIRAL, NO ABX (ADULT) (ENGLISH)       Future Appointments        Provider Department Dept Phone Center    5/30/2017 1:00 PM Yash Cuellar DO [facility] [phone] [facility] [place]    6/15/2017 11:30 AM X[address]itl Daniels RD [facility] [phone] [facility]         Review of your medicines      START taking        Dose / Directions Last dose taken    guaiFENesin-codeine 100-10 MG/5ML Soln solution   Commonly known as:  ROBITUSSIN AC   Dose:  1 tsp.   Quantity:  120 mL        Take 5 mLs by mouth every 4 hours as needed for cough   Refills:  0          Our records show that you are taking the medicines listed below. If these are incorrect, please call your family doctor or clinic.        Dose / Directions Last dose taken    aspirin 325 MG tablet   Dose:  325 mg        Take 325 mg by mouth   Refills:  0        atorvastatin 80 MG tablet   Commonly known as:  LIPITOR   Dose:  80 mg        Take 80 mg by mouth   Refills:  0        * BLOOD GLUCOSE TEST STRIPS Strp        four times a day.   Refills:  0        * blood glucose monitoring test strip   Commonly known as:  ONE TOUCH VERIO IQ   Quantity:  100 each        Use to test blood sugar 3 times daily.   Refills:  11        hydrochlorothiazide 12.5 MG Tabs tablet   Dose:  12.5 mg        Take 12.5 mg by mouth   Refills:  0        IBUPROFEN PO        Refills:  0        insulin glargine 100 UNIT/ML injection   Commonly known as:  LANTUS   Dose:  12 Units        Inject 12 Units Subcutaneous   Refills:  0        metFORMIN 500 MG tablet   Commonly known as:  GLUCOPHAGE   Dose:  500 mg        Take 500 mg by mouth   Refills:  0        * ONETOUCH DELICA LANCETS 33G Misc        four times a day.   Refills:  0        * blood glucose monitoring lancets   Quantity:  1 Box        Use to test blood sugar 3 times daily.   Refills:  11        Pen Needles 32G X 4 MM Misc        Refills:  0        senna-docusate 8.6-50 MG per tablet   Commonly known as:  SENOKOT-S;PERICOLACE        Refills:  0     "    * Notice:  This list has 4 medication(s) that are the same as other medications prescribed for you. Read the directions carefully, and ask your doctor or other care provider to review them with you.      STOP taking        Dose Reason for stopping Comments    DELSYM COUGH/CHEST CONGEST DM PO                      Prescriptions were sent or printed at these locations (1 Prescription)                   [facility] [address], [place] - [address]    [address] , [place] [address]    Telephone:  [phone]   Fax:  [phone]   Hours:                  Printed at Department/Unit printer (1 of 1)         guaiFENesin-codeine (ROBITUSSIN AC) 100-10 MG/5ML SOLN solution                Orders Needing Specimen Collection     None      Pending Results     No orders found from 2017 to 2017.            Pending Culture Results     No orders found from 2017 to 2017.            Thank you for choosing [facility]       Thank you for choosing [facility] for your care. Our goal is always to provide you with excellent care. Hearing back from our patients is one way we can continue to improve our services. Please take a few minutes to complete the written survey that you may receive in the mail after you visit with us. Thank you!        [employer] Information     [employer] lets you send messages to your doctor, view your test results, renew your prescriptions, schedule appointments and more. To sign up, go to [email or web address] . Click on \"Log in\" on the left side of the screen, which will take you to the Welcome page. Then click on \"Sign up Now\" on the right side of the page.     You will be asked to enter the access code listed below, as well as some personal information. Please follow the directions to create your username and password.     Your access code is: [email or web address]  Expires: 2017  2:56 PM     Your access code will  in 90 days. If you need help or a new code, please call your [facility] " [facility] or [phone].        Care EveryWhere ID     This is your Care EveryWhere ID. This could be used by other organizations to access your [facility] medical records  [id number]        After Visit Summary       This is your record. Keep this with you and show to your community pharmacist(s) and doctor(s) at your next visit.

## 2017-05-24 DIAGNOSIS — E11.65 TYPE 2 DIABETES MELLITUS WITH HYPERGLYCEMIA, WITH LONG-TERM CURRENT USE OF INSULIN (H): Primary | ICD-10-CM

## 2017-05-24 DIAGNOSIS — Z79.4 TYPE 2 DIABETES MELLITUS WITH HYPERGLYCEMIA, WITH LONG-TERM CURRENT USE OF INSULIN (H): Primary | ICD-10-CM

## 2017-05-24 NOTE — ED NOTES
Pt presents today with her daughter for c/o a cough for about 4 days and has an itchy throat, she has 2 aneurysms and wants it taken care of.

## 2017-05-24 NOTE — TELEPHONE ENCOUNTER
Yash Cuellar, DO   Kalee Busby LPN 9 minutes ago (11:07 AM)                   She can just start 1000mg BID.     John (Routing comment)

## 2017-05-24 NOTE — TELEPHONE ENCOUNTER
Reason for call:  Medication    1. Medication Name? Metformin 1000mg / 1x morning / 1x evening  2. Is this request for a refill? Yes  3. What Pharmacy do you use? [facility]  4. Have you contacted your pharmacy? Yes    5. If yes, when? Pharmacy has no record of medications, due to [facility] filled them before discharge from hospital  (Please note that the turn-around-time for prescriptions is 72 business hours; I am sending your request at this time. SEND TO  [facility]  )  Description: Patient was prescribed 500mg but DM had increased it to 1000mg at night for a week and then to start on 5-25-17, 1000mg in morning also.  Was an appointment offered for this a call? No   Preferred method for responding to this messageTelephone Call [phone], daughter, Lakeisha, calling for patient  If we cannot reach you directly, may we leave a detailed response at the number you provided? Yes  Can this message wait until your PCP/Provider returns if not available today? Yes

## 2017-05-24 NOTE — TELEPHONE ENCOUNTER
Vinnie see below. Last office visit 05/09/17.  Telephone call to you on 05/18/17 asks if it is okay for patient to titrate up to Metformin 1000mg BID. Please advise.

## 2017-05-24 NOTE — TELEPHONE ENCOUNTER
Yash Cuellar, Kalee Garcia, LPN 58 minutes ago (10:01 AM)                   Yes, that is fine     John (Routing comment)

## 2017-05-24 NOTE — ED PROVIDER NOTES
History     Chief Complaint   Patient presents with     Cough     x4 days. states she has tried otc cough medication without relief.      The history is provided by the patient. No  was used.     Rosalie JANE Friend is a 72 year old female who has 4 days of cough/congestion. OTC cough meds have not been helping. Pt and her daughter are worried because pt was just dx with cerebral aneurisms earlier this month.  They are worried the coughing could increase the pressure.     Allergies as of 05/23/2017 - Hao as Reviewed 05/23/2017   Allergen Reaction Noted     Penicillins  12/24/2013     Discharge Medication List as of 5/23/2017  8:05 PM      START taking these medications    Details   guaiFENesin-codeine (ROBITUSSIN AC) 100-10 MG/5ML SOLN solution Take 5 mLs by mouth every 4 hours as needed for cough, Disp-120 mL, R-0, Local Print         CONTINUE these medications which have NOT CHANGED    Details   aspirin 325 MG tablet Take 325 mg by mouth, Historical      atorvastatin (LIPITOR) 80 MG tablet Take 80 mg by mouth, Historical      hydrochlorothiazide 12.5 MG TABS tablet Take 12.5 mg by mouth, Historical      insulin glargine (LANTUS) 100 UNIT/ML injection Inject 12 Units Subcutaneous, Historical      metFORMIN (GLUCOPHAGE) 500 MG tablet Take 500 mg by mouth, Historical      senna-docusate (SENOKOT-S;PERICOLACE) 8.6-50 MG per tablet Historical      !! blood glucose monitoring (ONE TOUCH DELICA) lancets Use to test blood sugar 3 times daily., Disp-1 Box, R-11, E-Prescribe      !! blood glucose monitoring (ONE TOUCH VERIO IQ) test strip Use to test blood sugar 3 times daily., Disp-100 each, R-11, E-Prescribe      !! Glucose Blood (BLOOD GLUCOSE TEST STRIPS) STRP four times a day., Historical      Insulin Pen Needle (PEN NEEDLES) 32G X 4 MM MISC Historical      !! ONETOUCH DELICA LANCETS 33G MISC four times a day., Historical      IBUPROFEN PO Historical       !! - Potential duplicate medications found.  Please discuss with provider.      STOP taking these medications       Dextromethorphan-Guaifenesin (DELSYM COUGH/CHEST CONGEST DM PO) Comments:   Reason for Stopping:             Past Medical History:   Diagnosis Date     Cerebral infarction (H)     05/06/2017     Past Surgical History:   Procedure Laterality Date     ABDOMEN SURGERY      bladder repair     GYN SURGERY      hysterectomy      Family History   Problem Relation Age of Onset     Aneurysm Mother      Other Cancer Father      DIABETES Brother      Hypertension Brother      Other Cancer Brother      Other Cancer Sister      Aneurysm Sister      Social History     Social History     Marital status:      Spouse name: N/A     Number of children: N/A     Years of education: N/A     Social History Main Topics     Smoking status: Former Smoker     Packs/day: 1.00     Quit date: 5/5/2017     Smokeless tobacco: None     Alcohol use Yes      Comment: occasional      Drug use: No     Sexual activity: Not Asked     Other Topics Concern     None     Social History Narrative         I have reviewed the Medications, Allergies, Past Medical and Surgical History, and Social History in the Epic system.    Review of Systems   Constitutional: Negative for appetite change, fatigue and fever.   HENT: Positive for congestion. Negative for ear pain, sinus pressure, sore throat, trouble swallowing and voice change.    Eyes: Negative for discharge and redness.   Respiratory: Positive for cough.    Cardiovascular: Negative.    Gastrointestinal: Negative for abdominal pain, diarrhea, nausea and vomiting.   Genitourinary: Negative.    Musculoskeletal: Negative for neck pain and neck stiffness.   Skin: Negative for rash.   Neurological: Negative for dizziness, light-headedness and headaches.   Psychiatric/Behavioral: Negative.        Physical Exam   BP: 161/72  Pulse: 78  SpO2: 96 %  Physical Exam   Constitutional: She is oriented to person, place, and time. She appears  well-developed and well-nourished. No distress.   HENT:   Head: Normocephalic and atraumatic.   Right Ear: External ear normal.   Left Ear: External ear normal.   Mouth/Throat: Oropharynx is clear and moist.   Bilateral TMs/canals clear/wnl  No sinus TTP     Eyes: Conjunctivae and EOM are normal. Right eye exhibits no discharge. Left eye exhibits no discharge.   Neck: Normal range of motion. Neck supple.   Cardiovascular: Normal rate, regular rhythm and normal heart sounds.    Pulmonary/Chest: Effort normal and breath sounds normal. No respiratory distress.   Abdominal: Soft. Bowel sounds are normal. She exhibits no distension. There is no tenderness.   Neurological: She is alert and oriented to person, place, and time.   Skin: Skin is warm and dry. No rash noted. She is not diaphoretic.   Psychiatric: She has a normal mood and affect.   Nursing note and vitals reviewed.      ED Course     ED Course     Procedures        Assessments & Plan (with Medical Decision Making)     I have reviewed the nursing notes.    I have reviewed the findings, diagnosis, plan and need for follow up with the patient.    Discharge Medication List as of 5/23/2017  8:05 PM      START taking these medications    Details   guaiFENesin-codeine (ROBITUSSIN AC) 100-10 MG/5ML SOLN solution Take 5 mLs by mouth every 4 hours as needed for cough, Disp-120 mL, R-0, Local Print             Final diagnoses:   Upper respiratory tract infection, unspecified type           Patient verbally educated and given appropriate education sheets for each of the diagnoses and has no questions.  Take OTC motrin or tylenol as directed on the bottle as needed.  Take prescription medications as directed.  Increase fluids, wash hands often.  Sleep in a recliner or with multiple pillows until this has resolved.  Follow up with your provider if symptoms increase or if concerns develop, return to the ER.  Meron Petty Certified   Physician Assistant  5/23/2017  10:59  PM  URGENT CARE CLINIC    5/23/2017   [facility]     Meron Petty PA  05/23/17 1711

## 2017-05-30 ENCOUNTER — OFFICE VISIT (OUTPATIENT)
Dept: INTERNAL MEDICINE | Facility: OTHER | Age: 72
End: 2017-05-30
Attending: INTERNAL MEDICINE
Payer: MEDICARE

## 2017-05-30 VITALS
WEIGHT: 252 LBS | BODY MASS INDEX: 43.02 KG/M2 | HEIGHT: 64 IN | TEMPERATURE: 97.4 F | OXYGEN SATURATION: 95 % | HEART RATE: 76 BPM | SYSTOLIC BLOOD PRESSURE: 126 MMHG | DIASTOLIC BLOOD PRESSURE: 70 MMHG

## 2017-05-30 DIAGNOSIS — I10 BENIGN ESSENTIAL HYPERTENSION: ICD-10-CM

## 2017-05-30 DIAGNOSIS — E78.5 HYPERLIPIDEMIA LDL GOAL <100: ICD-10-CM

## 2017-05-30 DIAGNOSIS — Z79.4 TYPE 2 DIABETES MELLITUS WITH HYPERGLYCEMIA, WITH LONG-TERM CURRENT USE OF INSULIN (H): ICD-10-CM

## 2017-05-30 DIAGNOSIS — K59.00 CONSTIPATION, UNSPECIFIED CONSTIPATION TYPE: ICD-10-CM

## 2017-05-30 DIAGNOSIS — E11.65 TYPE 2 DIABETES MELLITUS WITH HYPERGLYCEMIA, WITH LONG-TERM CURRENT USE OF INSULIN (H): ICD-10-CM

## 2017-05-30 DIAGNOSIS — Z86.73 HISTORY OF CVA (CEREBROVASCULAR ACCIDENT): ICD-10-CM

## 2017-05-30 DIAGNOSIS — Z12.11 SPECIAL SCREENING FOR MALIGNANT NEOPLASMS, COLON: ICD-10-CM

## 2017-05-30 DIAGNOSIS — Z13.9 SCREENING FOR CONDITION: ICD-10-CM

## 2017-05-30 DIAGNOSIS — E11.8 TYPE 2 DIABETES MELLITUS WITH COMPLICATION, WITHOUT LONG-TERM CURRENT USE OF INSULIN (H): Primary | ICD-10-CM

## 2017-05-30 PROCEDURE — 99212 OFFICE O/P EST SF 10 MIN: CPT

## 2017-05-30 PROCEDURE — 99214 OFFICE O/P EST MOD 30 MIN: CPT | Performed by: INTERNAL MEDICINE

## 2017-05-30 RX ORDER — ASPIRIN 325 MG
325 TABLET ORAL DAILY
Qty: 120 TABLET | Refills: 3 | Status: SHIPPED | OUTPATIENT
Start: 2017-05-30 | End: 2017-06-21

## 2017-05-30 RX ORDER — AMOXICILLIN 250 MG
2 CAPSULE ORAL DAILY PRN
Qty: 100 TABLET | Refills: 11 | Status: SHIPPED | OUTPATIENT
Start: 2017-05-30 | End: 2017-06-21

## 2017-05-30 RX ORDER — GLUCOSAMINE HCL/CHONDROITIN SU 500-400 MG
CAPSULE ORAL
Qty: 100 EACH | Refills: 11 | Status: SHIPPED | OUTPATIENT
Start: 2017-05-30 | End: 2017-06-21

## 2017-05-30 RX ORDER — GLUCOSAMINE HCL/CHONDROITIN SU 500-400 MG
CAPSULE ORAL
Qty: 1 EACH | Refills: 11 | Status: SHIPPED | OUTPATIENT
Start: 2017-05-30 | End: 2017-05-30

## 2017-05-30 RX ORDER — ATORVASTATIN CALCIUM 80 MG/1
80 TABLET, FILM COATED ORAL DAILY
Qty: 90 TABLET | Refills: 3 | Status: SHIPPED | OUTPATIENT
Start: 2017-05-30 | End: 2017-06-21

## 2017-05-30 RX ORDER — HYDROCHLOROTHIAZIDE 12.5 MG/1
12.5 TABLET ORAL DAILY
Qty: 90 TABLET | Refills: 3 | Status: SHIPPED | OUTPATIENT
Start: 2017-05-30 | End: 2017-06-21

## 2017-05-30 RX ORDER — PEN NEEDLE, DIABETIC 30 GX3/16"
1 NEEDLE, DISPOSABLE MISCELLANEOUS 4 TIMES DAILY
Qty: 100 EACH | Refills: 11 | Status: SHIPPED | OUTPATIENT
Start: 2017-05-30 | End: 2017-06-21

## 2017-05-30 ASSESSMENT — PAIN SCALES - GENERAL: PAINLEVEL: NO PAIN (0)

## 2017-05-30 NOTE — PROGRESS NOTES
Internal Medicine:    Chief Complaint   Patient presents with     RECHECK     pt was in ER 5/23 cough- pt states since stroke she has quit smoking- was coughing up green flem- no longer cough medication- feeling good but tired - pt states blood sugar low as well      Rosalie presents today for follow up.  She was recently seen in Urgent care for a cough and at this time cough has almost completely resolved.  She has quit smoking.  She also has upcoming Neurology and I believe Neurosurgery appointments at [facility] for her Carotid occlusion on the right and cerebral aneurysms.  Rosalie otherwise is doing well.  She is watching her diet.  Her daughter does report one glucose measurement of 97 after eating.  She does report feeling weak and tired when sugars get near 100.  She denies any chest pain or SOB.  She denies any recurrent CVA symptoms.            Patient Active Problem List   Diagnosis     ACP (advance care planning)            Past Medical History:   Diagnosis Date     Cerebral infarction (H)     05/06/2017     Diabetes type 2, uncontrolled (H)      Hyperlipemia             Past Surgical History:   Procedure Laterality Date     ABDOMEN SURGERY      bladder repair     GYN SURGERY      hysterectomy             Social History   Substance Use Topics     Smoking status: Former Smoker     Packs/day: 1.00     Quit date: 5/5/2017     Smokeless tobacco: Not on file     Alcohol use Yes      Comment: occasional             Family History   Problem Relation Age of Onset     Aneurysm Mother      Other Cancer Father      DIABETES Brother      Hypertension Brother      Other Cancer Brother      Other Cancer Sister      Aneurysm Sister                Allergies   Allergen Reactions     Penicillins             Current Outpatient Prescriptions   Medication Sig Dispense Refill     aspirin 325 MG tablet Take 1 tablet (325 mg) by mouth daily 120 tablet 3     atorvastatin (LIPITOR) 80 MG tablet Take 1 tablet (80 mg) by mouth daily 90  "tablet 3     hydrochlorothiazide 12.5 MG TABS tablet Take 1 tablet (12.5 mg) by mouth daily 90 tablet 3     insulin glargine (LANTUS) 100 UNIT/ML injection Inject 12 Units Subcutaneous every morning 12 mL 11     Insulin Pen Needle (PEN NEEDLES) 32G X 4 MM MISC 1 Box 4 times daily 100 each 11     senna-docusate (SENOKOT-S;PERICOLACE) 8.6-50 MG per tablet Take 2 tablets by mouth daily as needed for constipation 100 tablet 11     Glucose Blood (BLOOD GLUCOSE TEST STRIPS) STRP four times a day. 100 each 11     blood glucose monitoring (ONE TOUCH DELICA) lancets Use to test blood sugar 3 times daily. 1 Box 11     metFORMIN (GLUCOPHAGE) 500 MG tablet Take 2 tablets (1,000 mg) by mouth 2 times daily (with meals) 120 tablet 1     blood glucose monitoring (ONE TOUCH VERIO IQ) test strip Use to test blood sugar 3 times daily. 100 each 11     ONETOUCH DELICA LANCETS 33G MISC four times a day.       IBUPROFEN PO        [DISCONTINUED] atorvastatin (LIPITOR) 80 MG tablet Take 80 mg by mouth       [DISCONTINUED] hydrochlorothiazide 12.5 MG TABS tablet Take 12.5 mg by mouth       [DISCONTINUED] insulin glargine (LANTUS) 100 UNIT/ML injection Inject 12 Units Subcutaneous         Review Of Systems:    Skin: negative  Eyes: negative  Ears/Nose/Throat: negative  Respiratory:  Cough- resolving   Cardiovascular: negative  Gastrointestinal: negative  Genitourinary: negative  Musculoskeletal: negative  Neurologic: as above-no recurrent symptoms of CVA.    Psychiatric: negative  Hematologic/Lymphatic/Immunologic: negative  Endocrine: diabetes    Objective:   /70 (BP Location: Right arm, Patient Position: Supine, Cuff Size: Adult Regular)  Pulse 76  Temp 97.4  F (36.3  C) (Tympanic)  Ht 5' 4\" (1.626 m)  Wt 252 lb (114.3 kg)  SpO2 95%  BMI 43.26 kg/m2  EXAM:  Constitutional: alert and no distress   Cardiovascular:RRR. No murmurs, clicks gallops or rub  Respiratory:  Lungs clear  Psychiatric: mentation appears normal and affect " normal/bright  Head: Normocephalic. No masses, lesions, tenderness or abnormalities  Abdomen: Obese-Abdomen soft, non-tender. BS normal. No masses, organomegaly  NEURO: No focal deficits noted.    SKIN: no suspicious lesions or rashes  LYMPH: No LE edema.    JOINT/EXTREMITIES: No LE edema.  Sensation intact in both feet bilaterally with monofilament testing.         Orders placed or performed in visit on 05/30/17     aspirin 325 MG tablet     atorvastatin (LIPITOR) 80 MG tablet     hydrochlorothiazide 12.5 MG TABS tablet     insulin glargine (LANTUS) 100 UNIT/ML injection     Glucose Blood (BLOOD GLUCOSE TEST STRIPS) STRP *Discontinued*     Insulin Pen Needle (PEN NEEDLES) 32G X 4 MM MISC     senna-docusate (SENOKOT-S;PERICOLACE) 8.6-50 MG per tablet     Glucose Blood (BLOOD GLUCOSE TEST STRIPS) STRP     blood glucose monitoring (ONE TOUCH DELICA) lancets       Assessment and Plan:    (E11.8) Type 2 diabetes mellitus with complication, without long-term current use of insulin (H)  (primary encounter diagnosis)  Comment:  BG 97 after eating.  Some fatigue.  Question some hypoglycemia.    Plan: Hemoglobin A1c, Comprehensive metabolic panel,         Albumin Random Urine Quantitative, insulin         glargine (LANTUS) 100 UNIT/ML injection,         Insulin Pen Needle (PEN NEEDLES) 32G X 4 MM         MISC, Glucose Blood (BLOOD GLUCOSE TEST STRIPS)        STRP, Lipid Profile (Chol, Trig, HDL, LDL         calc), DISCONTINUED: Glucose Blood (BLOOD         GLUCOSE TEST STRIPS) STRP        - reduce Lantus to 12 units SQ every morning.          -  Follow up with Diabetic education      (E78.5) Hyperlipidemia LDL goal <100  Comment:   Plan: CBC with platelets and differential,         Comprehensive metabolic panel, TSH with free T4        reflex, atorvastatin (LIPITOR) 80 MG tablet,         Lipid Profile (Chol, Trig, HDL, LDL calc)    (Z86.73) History of CVA (cerebrovascular accident)  Comment: No recurrent symptoms secondary  to CVA.  Will follow up at [facility] due to right carotid occlusion and aneurysms.    Plan: Comprehensive metabolic panel, aspirin 325 MG         tablet    (Z12.11) Special screening for malignant neoplasms, colon  Comment: Colon cancer screening    Plan: Immunos occult blood    (Z13.9) Screening for condition  Comment:  Plan: Hepatitis C Screen Reflex to HCV RNA Quant and         Genotype    (I10) Benign essential hypertension  Comment: BP stable today    Plan: hydrochlorothiazide 12.5 MG TABS tablet    (K59.00) Constipation, unspecified constipation type  Comment:  Plan: senna-docusate (SENOKOT-S;PERICOLACE) 8.6-50 MG        per tablet    (E11.65,  Z79.4) Type 2 diabetes mellitus with hyperglycemia, with long-term current use of insulin (H)  Comment:  Last A1C was 10.7   Repeat in mid July.    Plan: blood glucose monitoring (ONE TOUCH DELICA)         lancets    Patient is scheduled to see Neurology and Neurosurgery at [facility] regarding two cerebral artery aneurysms and occlusion of right carotid artery.           Yash Cuellar, DO

## 2017-05-30 NOTE — NURSING NOTE
"Chief Complaint   Patient presents with     RECHECK     pt was in ER 5/23 cough- pt states since stroke she has quit smoking- was coughing up green flem- no longer cough medication- feeling good but tired - pt states blood sugar low as well        Initial /70 (BP Location: Right arm, Patient Position: Supine, Cuff Size: Adult Regular)  Pulse 76  Temp 97.4  F (36.3  C) (Tympanic)  Ht 5' 4\" (1.626 m)  Wt 252 lb (114.3 kg)  SpO2 95%  BMI 43.26 kg/m2 Estimated body mass index is 43.26 kg/(m^2) as calculated from the following:    Height as of this encounter: 5' 4\" (1.626 m).    Weight as of this encounter: 252 lb (114.3 kg).  Medication Reconciliation: complete   Kalee Busby LPN      "

## 2017-05-30 NOTE — MR AVS SNAPSHOT
After Visit Summary   5/30/2017    Rosalie Seals    MRN: [id number]           Patient Information     Date Of Birth          1945        Visit Information        Provider Department      5/30/2017 1:00 PM Yash Cuellar,  [facility]        Today's Diagnoses     Type 2 diabetes mellitus with complication, without long-term current use of insulin (H)    -  1    Hyperlipidemia LDL goal <100        History of CVA (cerebrovascular accident)        Special screening for malignant neoplasms, colon        Screening for condition        Benign essential hypertension        Constipation, unspecified constipation type        Type 2 diabetes mellitus with hyperglycemia, with long-term current use of insulin (H)          Care Instructions    Decrease Lantus to 12 units SQ every morning  Labs in Mid July 2017              Follow-ups after your visit        Your next 10 appointments already scheduled     Shilo 15, 2017 11:30 AM CDT   (Arrive by 11:15 AM)   Return Visit with Xochitl Daniels RD   [facility] ([facility] )    [address] [place] [id number] [phone]              Future tests that were ordered for you today     Open Future Orders        Priority Expected Expires Ordered    Albumin Random Urine Quantitative Routine  5/30/2018 5/30/2017    TSH with free T4 reflex Routine  5/30/2018 5/30/2017    Comprehensive metabolic panel Routine  5/30/2018 5/30/2017    Hepatitis C Screen Reflex to HCV RNA Quant and Genotype Routine  5/30/2018 5/30/2017    CBC with platelets and differential Routine  5/30/2018 5/30/2017    Hemoglobin A1c Routine  5/30/2018 5/30/2017    Immunos occult blood Routine  5/30/2018 5/30/2017            Who to contact     If you have questions or need follow up information about today's clinic visit or your schedule please contact [facility] directly at [phone].  Normal or non-critical lab and imaging results  "will be communicated to you by MyChart, letter or phone within 4 business days after the clinic has received the results. If you do not hear from us within 7 days, please contact the clinic through [employer] or phone. If you have a critical or abnormal lab result, we will notify you by phone as soon as possible.  Submit refill requests through [employer] or call your pharmacy and they will forward the refill request to us. Please allow 3 business days for your refill to be completed.          Additional Information About Your Visit        [employer] Information     [employer] lets you send messages to your doctor, view your test results, renew your prescriptions, schedule appointments and more. To sign up, go to [email or web address] . Click on \"Log in\" on the left side of the screen, which will take you to the Welcome page. Then click on \"Sign up Now\" on the right side of the page.     You will be asked to enter the access code listed below, as well as some personal information. Please follow the directions to create your username and password.     Your access code is: [email or web address]  Expires: 2017  2:56 PM     Your access code will  in 90 days. If you need help or a new code, please call your [place] clinic or [phone].        Care EveryWhere ID     This is your Care EveryWhere ID. This could be used by other organizations to access your [place] medical records  [id number]        Your Vitals Were     Pulse Temperature Height Pulse Oximetry BMI (Body Mass Index)       76 97.4  F (36.3  C) (Tympanic) 5' 4\" (1.626 m) 95% 43.26 kg/m2        Blood Pressure from Last 3 Encounters:   17 126/70   17 161/72   17 135/63    Weight from Last 3 Encounters:   17 252 lb (114.3 kg)   17 252 lb 14.4 oz (114.7 kg)   17 256 lb (116.1 kg)                 Today's Medication Changes          These changes are accurate as of: 17  1:41 PM.  If you have any questions, ask your nurse or " doctor.               These medicines have changed or have updated prescriptions.        Dose/Directions    aspirin 325 MG tablet   This may have changed:  when to take this   Used for:  History of CVA (cerebrovascular accident)   Changed by:  Yash Cuellar DO        Dose:  325 mg   Take 1 tablet (325 mg) by mouth daily   Quantity:  120 tablet   Refills:  3       atorvastatin 80 MG tablet   Commonly known as:  LIPITOR   This may have changed:  when to take this   Used for:  Hyperlipidemia LDL goal <100   Changed by:  Yash Cuellar DO        Dose:  80 mg   Take 1 tablet (80 mg) by mouth daily   Quantity:  90 tablet   Refills:  3       * blood glucose monitoring test strip   Commonly known as:  ONE TOUCH VERIO IQ   This may have changed:  Another medication with the same name was added. Make sure you understand how and when to take each.   Used for:  Type 2 diabetes mellitus with hyperglycemia, with long-term current use of insulin (H)   Changed by:  Xochitl Daniels RD        Use to test blood sugar 3 times daily.   Quantity:  100 each   Refills:  11       * BLOOD GLUCOSE TEST STRIPS Strp   This may have changed:  You were already taking a medication with the same name, and this prescription was added. Make sure you understand how and when to take each.   Used for:  Type 2 diabetes mellitus with complication, without long-term current use of insulin (H)   Changed by:  Yash Cuellar DO        four times a day.   Quantity:  100 each   Refills:  11       hydrochlorothiazide 12.5 MG Tabs tablet   This may have changed:  when to take this   Used for:  Benign essential hypertension   Changed by:  Yash Cuellar DO        Dose:  12.5 mg   Take 1 tablet (12.5 mg) by mouth daily   Quantity:  90 tablet   Refills:  3       insulin glargine 100 UNIT/ML injection   Commonly known as:  LANTUS   This may have changed:  when to take this   Used for:  Type 2 diabetes mellitus with complication,  without long-term current use of insulin (H)   Changed by:  Yash Cuellar DO        Dose:  12 Units   Inject 12 Units Subcutaneous every morning   Quantity:  12 mL   Refills:  11       Pen Needles 32G X 4 MM Misc   This may have changed:    - how much to take  - when to take this   Used for:  Type 2 diabetes mellitus with complication, without long-term current use of insulin (H)   Changed by:  Yash Ceullar DO        Dose:  1 Box   1 Box 4 times daily   Quantity:  100 each   Refills:  11       senna-docusate 8.6-50 MG per tablet   Commonly known as:  SENOKOT-S;PERICOLACE   This may have changed:    - how much to take  - when to take this  - reasons to take this   Used for:  Constipation, unspecified constipation type   Changed by:  Yash Cuellar DO        Dose:  2 tablet   Take 2 tablets by mouth daily as needed for constipation   Quantity:  100 tablet   Refills:  11       * Notice:  This list has 2 medication(s) that are the same as other medications prescribed for you. Read the directions carefully, and ask your doctor or other care provider to review them with you.         Where to get your medicines      These medications were sent to [id number] - [place], [place] - [address]  [address] [facility] [address], [place] [address]     Phone:  [phone]     aspirin 325 MG tablet    atorvastatin 80 MG tablet    blood glucose monitoring lancets    BLOOD GLUCOSE TEST STRIPS Strp    hydrochlorothiazide 12.5 MG Tabs tablet    insulin glargine 100 UNIT/ML injection    Pen Needles 32G X 4 MM Misc    senna-docusate 8.6-50 MG per tablet                Primary Care Provider Office Phone # Fax #    Yash Cuellar [phone]       [facility] [address] [facility] [facility] [address] [facility]  [place] [address]        Thank you!     Thank you for choosing [facility]  for your care. Our goal is always to provide you with excellent care. Hearing back from our patients is one way we can  continue to improve our services. Please take a few minutes to complete the written survey that you may receive in the mail after your visit with us. Thank you!             Your Updated Medication List - Protect others around you: Learn how to safely use, store and throw away your medicines at www.disposemymeds.org.          This list is accurate as of: 5/30/17  1:41 PM.  Always use your most recent med list.                   Brand Name Dispense Instructions for use    aspirin 325 MG tablet     120 tablet    Take 1 tablet (325 mg) by mouth daily       atorvastatin 80 MG tablet    LIPITOR    90 tablet    Take 1 tablet (80 mg) by mouth daily       * blood glucose monitoring test strip    ONE TOUCH VERIO IQ    100 each    Use to test blood sugar 3 times daily.       * BLOOD GLUCOSE TEST STRIPS Strp     100 each    four times a day.       hydrochlorothiazide 12.5 MG Tabs tablet     90 tablet    Take 1 tablet (12.5 mg) by mouth daily       IBUPROFEN PO          insulin glargine 100 UNIT/ML injection    LANTUS    12 mL    Inject 12 Units Subcutaneous every morning       metFORMIN 500 MG tablet    GLUCOPHAGE    120 tablet    Take 2 tablets (1,000 mg) by mouth 2 times daily (with meals)       * ONETOUCH DELICA LANCETS 33G Misc      four times a day.       * blood glucose monitoring lancets     1 Box    Use to test blood sugar 3 times daily.       Pen Needles 32G X 4 MM Misc     100 each    1 Box 4 times daily       senna-docusate 8.6-50 MG per tablet    SENOKOT-S;PERICOLACE    100 tablet    Take 2 tablets by mouth daily as needed for constipation       * Notice:  This list has 4 medication(s) that are the same as other medications prescribed for you. Read the directions carefully, and ask your doctor or other care provider to review them with you.

## 2017-06-13 ENCOUNTER — OFFICE VISIT (OUTPATIENT)
Dept: INTERNAL MEDICINE | Facility: OTHER | Age: 72
End: 2017-06-13
Attending: INTERNAL MEDICINE
Payer: MEDICARE

## 2017-06-13 VITALS
TEMPERATURE: 98.1 F | HEIGHT: 64 IN | WEIGHT: 240 LBS | DIASTOLIC BLOOD PRESSURE: 62 MMHG | OXYGEN SATURATION: 92 % | HEART RATE: 89 BPM | BODY MASS INDEX: 40.97 KG/M2 | SYSTOLIC BLOOD PRESSURE: 124 MMHG

## 2017-06-13 DIAGNOSIS — Z01.818 PREOP GENERAL PHYSICAL EXAM: ICD-10-CM

## 2017-06-13 DIAGNOSIS — Z01.818 PRE-OP EXAM: Primary | ICD-10-CM

## 2017-06-13 DIAGNOSIS — Z86.73 HISTORY OF CVA (CEREBROVASCULAR ACCIDENT): ICD-10-CM

## 2017-06-13 DIAGNOSIS — E78.5 HYPERLIPIDEMIA LDL GOAL <100: ICD-10-CM

## 2017-06-13 DIAGNOSIS — E11.8 TYPE 2 DIABETES MELLITUS WITH COMPLICATION, WITHOUT LONG-TERM CURRENT USE OF INSULIN (H): ICD-10-CM

## 2017-06-13 DIAGNOSIS — Z13.9 SCREENING FOR CONDITION: ICD-10-CM

## 2017-06-13 LAB
ALBUMIN SERPL-MCNC: 3.4 G/DL (ref 3.4–5)
ALP SERPL-CCNC: 71 U/L (ref 40–150)
ALT SERPL W P-5'-P-CCNC: 30 U/L (ref 0–50)
ANION GAP SERPL CALCULATED.3IONS-SCNC: 8 MMOL/L (ref 3–14)
AST SERPL W P-5'-P-CCNC: 17 U/L (ref 0–45)
BASOPHILS # BLD AUTO: 0.1 10E9/L (ref 0–0.2)
BASOPHILS NFR BLD AUTO: 1.2 %
BILIRUB SERPL-MCNC: 0.5 MG/DL (ref 0.2–1.3)
BUN SERPL-MCNC: 15 MG/DL (ref 7–30)
CALCIUM SERPL-MCNC: 9.4 MG/DL (ref 8.5–10.1)
CHLORIDE SERPL-SCNC: 103 MMOL/L (ref 94–109)
CHOLEST SERPL-MCNC: 115 MG/DL
CO2 SERPL-SCNC: 27 MMOL/L (ref 20–32)
CREAT SERPL-MCNC: 0.68 MG/DL (ref 0.52–1.04)
CREAT UR-MCNC: 100 MG/DL
DIFFERENTIAL METHOD BLD: ABNORMAL
EOSINOPHIL # BLD AUTO: 0.4 10E9/L (ref 0–0.7)
EOSINOPHIL NFR BLD AUTO: 5.4 %
ERYTHROCYTE [DISTWIDTH] IN BLOOD BY AUTOMATED COUNT: 12.9 % (ref 10–15)
EST. AVERAGE GLUCOSE BLD GHB EST-MCNC: 194 MG/DL
GFR SERPL CREATININE-BSD FRML MDRD: 86 ML/MIN/1.7M2
GLUCOSE SERPL-MCNC: 135 MG/DL (ref 70–99)
HBA1C MFR BLD: 8.4 % (ref 4.3–6)
HCT VFR BLD AUTO: 43.9 % (ref 35–47)
HCV AB SERPL QL IA: NORMAL
HDLC SERPL-MCNC: 55 MG/DL
HGB BLD-MCNC: 14.6 G/DL (ref 11.7–15.7)
IMM GRANULOCYTES # BLD: 0 10E9/L (ref 0–0.4)
IMM GRANULOCYTES NFR BLD: 0.3 %
LDLC SERPL CALC-MCNC: 41 MG/DL
LYMPHOCYTES # BLD AUTO: 1.6 10E9/L (ref 0.8–5.3)
LYMPHOCYTES NFR BLD AUTO: 23.2 %
MCH RBC QN AUTO: 27.3 PG (ref 26.5–33)
MCHC RBC AUTO-ENTMCNC: 33.3 G/DL (ref 31.5–36.5)
MCV RBC AUTO: 82 FL (ref 78–100)
MICROALBUMIN UR-MCNC: 14 MG/L
MICROALBUMIN/CREAT UR: 14.11 MG/G CR (ref 0–25)
MONOCYTES # BLD AUTO: 0.5 10E9/L (ref 0–1.3)
MONOCYTES NFR BLD AUTO: 7.9 %
NEUTROPHILS # BLD AUTO: 4.2 10E9/L (ref 1.6–8.3)
NEUTROPHILS NFR BLD AUTO: 62 %
NONHDLC SERPL-MCNC: 60 MG/DL
NRBC # BLD AUTO: 0 10*3/UL
NRBC BLD AUTO-RTO: 0 /100
PLATELET # BLD AUTO: 289 10E9/L (ref 150–450)
POTASSIUM SERPL-SCNC: 4.1 MMOL/L (ref 3.4–5.3)
PROT SERPL-MCNC: 7.1 G/DL (ref 6.8–8.8)
RBC # BLD AUTO: 5.34 10E12/L (ref 3.8–5.2)
SODIUM SERPL-SCNC: 138 MMOL/L (ref 133–144)
TRIGL SERPL-MCNC: 93 MG/DL
TSH SERPL DL<=0.005 MIU/L-ACNC: 1.3 MU/L (ref 0.4–4)
WBC # BLD AUTO: 6.7 10E9/L (ref 4–11)

## 2017-06-13 PROCEDURE — 80061 LIPID PANEL: CPT | Mod: ZL | Performed by: INTERNAL MEDICINE

## 2017-06-13 PROCEDURE — 99213 OFFICE O/P EST LOW 20 MIN: CPT | Mod: 25

## 2017-06-13 PROCEDURE — 93005 ELECTROCARDIOGRAM TRACING: CPT | Mod: GZ

## 2017-06-13 PROCEDURE — 84443 ASSAY THYROID STIM HORMONE: CPT | Mod: ZL | Performed by: INTERNAL MEDICINE

## 2017-06-13 PROCEDURE — 36415 COLL VENOUS BLD VENIPUNCTURE: CPT | Mod: ZL | Performed by: INTERNAL MEDICINE

## 2017-06-13 PROCEDURE — 71020 ZZHC CHEST TWO VIEWS, FRONT/LAT: CPT | Mod: TC

## 2017-06-13 PROCEDURE — 80053 COMPREHEN METABOLIC PANEL: CPT | Mod: ZL | Performed by: INTERNAL MEDICINE

## 2017-06-13 PROCEDURE — 40000788 ZZHCL STATISTIC ESTIMATED AVERAGE GLUCOSE: Mod: ZL | Performed by: INTERNAL MEDICINE

## 2017-06-13 PROCEDURE — 86803 HEPATITIS C AB TEST: CPT | Mod: ZL | Performed by: INTERNAL MEDICINE

## 2017-06-13 PROCEDURE — 93010 ELECTROCARDIOGRAM REPORT: CPT | Performed by: INTERNAL MEDICINE

## 2017-06-13 PROCEDURE — 99215 OFFICE O/P EST HI 40 MIN: CPT | Mod: 25 | Performed by: INTERNAL MEDICINE

## 2017-06-13 PROCEDURE — 83036 HEMOGLOBIN GLYCOSYLATED A1C: CPT | Mod: ZL | Performed by: INTERNAL MEDICINE

## 2017-06-13 PROCEDURE — 85025 COMPLETE CBC W/AUTO DIFF WBC: CPT | Mod: ZL | Performed by: INTERNAL MEDICINE

## 2017-06-13 PROCEDURE — 82043 UR ALBUMIN QUANTITATIVE: CPT | Mod: ZL | Performed by: INTERNAL MEDICINE

## 2017-06-13 ASSESSMENT — PAIN SCALES - GENERAL: PAINLEVEL: NO PAIN (0)

## 2017-06-13 NOTE — MR AVS SNAPSHOT
After Visit Summary   6/13/2017    Rosalie Seals    MRN: [id number]           Patient Information     Date Of Birth          1945        Visit Information        Provider Department      6/13/2017 8:15 AM Yash Cuellar, DO [facility] [place]        Today's Diagnoses     Pre-op exam    -  1    Preop general physical exam        Type 2 diabetes mellitus with complication, without long-term current use of insulin (H)        Hyperlipidemia LDL goal <100        Screening for condition        History of CVA (cerebrovascular accident)          Care Instructions    Hold Lantus  Hold Metformin day of and two days after  Asa 81 mg              Before Your Surgery      Call your surgeon if there is any change in your health. This includes signs of a cold or flu (such as a sore throat, runny nose, cough, rash or fever).    Do not smoke, drink alcohol or take over the counter medicine (unless your surgeon or primary care doctor tells you to) for the 24 hours before and after surgery.    If you take prescribed drugs: Follow your doctor s orders about which medicines to take and which to stop until after surgery.    Eating and drinking prior to surgery: follow the instructions from your surgeon    Take a shower or bath the night before surgery. Use the soap your surgeon gave you to gently clean your skin. If you do not have soap from your surgeon, use your regular soap. Do not shave or scrub the surgery site.  Wear clean pajamas and have clean sheets on your bed.           Follow-ups after your visit        Who to contact     If you have questions or need follow up information about today's clinic visit or your schedule please contact [facility] [place] directly at [phone].  Normal or non-critical lab and imaging results will be communicated to you by MyChart, letter or phone within 4 business days after the clinic has received the results. If you do not hear from us within 7 days,  "please contact the clinic through [employer] or phone. If you have a critical or abnormal lab result, we will notify you by phone as soon as possible.  Submit refill requests through [employer] or call your pharmacy and they will forward the refill request to us. Please allow 3 business days for your refill to be completed.          Additional Information About Your Visit        [employer] Information     [employer] lets you send messages to your doctor, view your test results, renew your prescriptions, schedule appointments and more. To sign up, go to [email or web address] . Click on \"Log in\" on the left side of the screen, which will take you to the Welcome page. Then click on \"Sign up Now\" on the right side of the page.     You will be asked to enter the access code listed below, as well as some personal information. Please follow the directions to create your username and password.     Your access code is: [email or web address]  Expires: 2017  2:56 PM     Your access code will  in 90 days. If you need help or a new code, please call your [place] clinic or [phone].        Care EveryWhere ID     This is your Care EveryWhere ID. This could be used by other organizations to access your [place] medical records  [id number]        Your Vitals Were     Pulse Temperature Height Pulse Oximetry BMI (Body Mass Index)       89 98.1  F (36.7  C) (Tympanic) 5' 4\" (1.626 m) 92% 41.2 kg/m2        Blood Pressure from Last 3 Encounters:   17 124/62   17 126/70   17 161/72    Weight from Last 3 Encounters:   17 240 lb (108.9 kg)   17 252 lb (114.3 kg)   17 252 lb 14.4 oz (114.7 kg)              We Performed the Following     Albumin Random Urine Quantitative     CBC with platelets and differential     Comprehensive metabolic panel     EKG 12-lead complete w/read - (Clinic Performed)     Hemoglobin A1c     Hepatitis C Screen Reflex to HCV RNA Quant and Genotype     Lipid Profile (Chol, Trig, HDL, " LDL calc)     TSH with free T4 reflex     XR CHEST 2 VW (Clinic Performed)        Primary Care Provider Office Phone # Fax #    Yash BOOGIE CarenDO walter [phone]       [facility] [address] [place] [address]        Thank you!     Thank you for choosing [facility] [place]  for your care. Our goal is always to provide you with excellent care. Hearing back from our patients is one way we can continue to improve our services. Please take a few minutes to complete the written survey that you may receive in the mail after your visit with us. Thank you!             Your Updated Medication List - Protect others around you: Learn how to safely use, store and throw away your medicines at www.disposemymeds.org.          This list is accurate as of: 6/13/17  8:52 AM.  Always use your most recent med list.                   Brand Name Dispense Instructions for use    aspirin 325 MG tablet     120 tablet    Take 1 tablet (325 mg) by mouth daily       atorvastatin 80 MG tablet    LIPITOR    90 tablet    Take 1 tablet (80 mg) by mouth daily       * blood glucose monitoring test strip    ONE TOUCH VERIO IQ    100 each    Use to test blood sugar 3 times daily.       * BLOOD GLUCOSE TEST STRIPS Strp     100 each    four times a day.       hydrochlorothiazide 12.5 MG Tabs tablet     90 tablet    Take 1 tablet (12.5 mg) by mouth daily       IBUPROFEN PO          insulin glargine 100 UNIT/ML injection    LANTUS    12 mL    Inject 12 Units Subcutaneous every morning       metFORMIN 500 MG tablet    GLUCOPHAGE    120 tablet    Take 2 tablets (1,000 mg) by mouth 2 times daily (with meals)       * ONETOUCH DELICA LANCETS 33G Misc      four times a day.       * blood glucose monitoring lancets     1 Box    Use to test blood sugar 3 times daily.       Pen Needles 32G X 4 MM Misc     100 each    1 Box 4 times daily       senna-docusate 8.6-50 MG per tablet    SENOKOT-S;PERICOLACE    100 tablet    Take  2 tablets by mouth daily as needed for constipation       * Notice:  This list has 4 medication(s) that are the same as other medications prescribed for you. Read the directions carefully, and ask your doctor or other care provider to review them with you.

## 2017-06-13 NOTE — PATIENT INSTRUCTIONS
Hold Lantus  Hold Metformin day of and two days after  Asa 81 mg              Before Your Surgery      Call your surgeon if there is any change in your health. This includes signs of a cold or flu (such as a sore throat, runny nose, cough, rash or fever).    Do not smoke, drink alcohol or take over the counter medicine (unless your surgeon or primary care doctor tells you to) for the 24 hours before and after surgery.    If you take prescribed drugs: Follow your doctor s orders about which medicines to take and which to stop until after surgery.    Eating and drinking prior to surgery: follow the instructions from your surgeon    Take a shower or bath the night before surgery. Use the soap your surgeon gave you to gently clean your skin. If you do not have soap from your surgeon, use your regular soap. Do not shave or scrub the surgery site.  Wear clean pajamas and have clean sheets on your bed.

## 2017-06-13 NOTE — NURSING NOTE
"Chief Complaint   Patient presents with     Pre-Op Exam     6/15/17 coil embolization, Dr. Noriega, [facility]        Initial /62 (BP Location: Right arm, Patient Position: Supine, Cuff Size: Adult Large)  Pulse 89  Temp 98.1  F (36.7  C) (Tympanic)  Ht 5' 4\" (1.626 m)  Wt 240 lb (108.9 kg)  SpO2 92%  BMI 41.2 kg/m2 Estimated body mass index is 41.2 kg/(m^2) as calculated from the following:    Height as of this encounter: 5' 4\" (1.626 m).    Weight as of this encounter: 240 lb (108.9 kg).  Medication Reconciliation: complete   Kalee Busby LPN      "

## 2017-06-13 NOTE — PROGRESS NOTES
"  [facility] [place]  [address]  [place] [address]  [phone]  Dept: [phone]    PRE-OP EVALUATION:  Today's date: 2017    Rosalie Seals (: 1945) presents for pre-operative evaluation assessment as requested by Dr. Noriega.  She requires evaluation and anesthesia risk assessment prior to undergoing surgery/procedure for treatment of coil embolization .  Proposed procedure: coil embolization    Date of Surgery/ Procedure: 6/15/17  Time of Surgery/ Procedure: [facility]/Surgical Facility: [facility]   Fax number for surgical facility: [phone]  Primary Physician: Yash Cuellar  Type of Anesthesia Anticipated: to be determined    Patient has a Health Care Directive or Living Will:  NO    1. YES - DO YOU HAVE A HISTORY OF HEART ATTACK, STROKE, STENT, BYPASS OR SURGERY ON AN ARTERY IN THE HEAD, NECK, HEART OR LEG? Stroke- 17  2. NO - Do you ever have any pain or discomfort in your chest?  3. NO - Do you have a history of  Heart Failure?  4. NO - Are you troubled by shortness of breath when: walking on the level, up a slight hill or at night?  5. NO - Do you currently have a cold, bronchitis or other respiratory infection?  6. NO - Do you have a cough, shortness of breath or wheezing?  7. YES - DO YOU SOMETIMES GET PAINS IN THE CALVES OF YOUR LEGS WHEN YOU WALK? \"not since the stroke\"   8. NO - Do you or anyone in your family have previous history of blood clots?  9. NO - Do you or does anyone in your family have a serious bleeding problem such as prolonged bleeding following surgeries or cuts?  10. NO - Have you ever had problems with anemia or been told to take iron pills?  11. NO - Have you had any abnormal blood loss such as black, tarry or bloody stools, or abnormal vaginal bleeding?  12. NO - Have you ever had a blood transfusion?  13. NO - Have you or any of your relatives ever had problems with anesthesia?  14. NO - Do you have sleep apnea, " excessive snoring or daytime drowsiness?  15. NO - Do you have any prosthetic heart valves?  16. NO - Do you have prosthetic joints?  17. NO - Is there any chance that you may be pregnant?      HPI:                                                      Brief HPI related to upcoming procedure: Coil embolization-aneurysm        DIABETES - Patient has a longstanding history of DiabetesType Type II . Patient is being treated with diet, oral agents and insulin injections and denies significant side effects. Control has been fair. Complicating factors include but are not limited to: high cholesterol and cerebrovascular disease.                                                                                                             .  HYPERTENSION - Patient has longstanding history of mod-severe HTN , currently denies any symptoms referable to elevated blood pressure. Specifically denies chest pain, palpitations, dyspnea, orthopnea, PND or peripheral edema. Blood pressure readings have been in normal range. Current medication regimen is as listed below. Patient denies any side effects of medication.                                                                                                                                                                                          .  HYPERLIPIDEMIA - Patient has a long history of significant Hyperlipidemia requiring medication for treatment with recent fair control. Patient reports no problems or side effects with the medication.                                                                                                                                                       .    MEDICAL HISTORY:                                                      Patient Active Problem List    Diagnosis Date Noted     ACP (advance care planning) 05/09/2017     Priority: Medium     Advance Care Planning 5/9/2017: ACP Review of Chart / Resources Provided:  Reviewed chart for advance  care plan.  Rosalie JANE Friend has been provided information and resources to begin or update their advance care plan.  Added by Kalee Busby              Past Medical History:   Diagnosis Date     Cerebral infarction (H)     05/06/2017     Diabetes type 2, uncontrolled (H)      Hyperlipemia      Past Surgical History:   Procedure Laterality Date     ABDOMEN SURGERY      bladder repair     GYN SURGERY      hysterectomy      Current Outpatient Prescriptions   Medication Sig Dispense Refill     aspirin 325 MG tablet Take 1 tablet (325 mg) by mouth daily 120 tablet 3     atorvastatin (LIPITOR) 80 MG tablet Take 1 tablet (80 mg) by mouth daily 90 tablet 3     hydrochlorothiazide 12.5 MG TABS tablet Take 1 tablet (12.5 mg) by mouth daily 90 tablet 3     insulin glargine (LANTUS) 100 UNIT/ML injection Inject 12 Units Subcutaneous every morning 12 mL 11     Insulin Pen Needle (PEN NEEDLES) 32G X 4 MM MISC 1 Box 4 times daily 100 each 11     senna-docusate (SENOKOT-S;PERICOLACE) 8.6-50 MG per tablet Take 2 tablets by mouth daily as needed for constipation 100 tablet 11     Glucose Blood (BLOOD GLUCOSE TEST STRIPS) STRP four times a day. 100 each 11     blood glucose monitoring (ONE TOUCH DELICA) lancets Use to test blood sugar 3 times daily. 1 Box 11     metFORMIN (GLUCOPHAGE) 500 MG tablet Take 2 tablets (1,000 mg) by mouth 2 times daily (with meals) 120 tablet 1     blood glucose monitoring (ONE TOUCH VERIO IQ) test strip Use to test blood sugar 3 times daily. 100 each 11     ONETOUCH DELICA LANCETS 33G MISC four times a day.       IBUPROFEN PO        OTC products: None, except as noted above    Allergies   Allergen Reactions     Penicillins       Latex Allergy: NO    Social History   Substance Use Topics     Smoking status: Former Smoker     Packs/day: 1.00     Quit date: 5/5/2017     Smokeless tobacco: Not on file     Alcohol use No     History   Drug Use No       REVIEW OF SYSTEMS:                                        "             Constitutional, neuro, ENT, endocrine, pulmonary, cardiac, gastrointestinal, genitourinary, musculoskeletal, integument and psychiatric systems are negative, except as otherwise noted.    EXAM:                                                    /62 (BP Location: Right arm, Patient Position: Supine, Cuff Size: Adult Large)  Pulse 89  Temp 98.1  F (36.7  C) (Tympanic)  Ht 5' 4\" (1.626 m)  Wt 240 lb (108.9 kg)  SpO2 92%  BMI 41.2 kg/m2    GENERAL APPEARANCE: healthy, alert and no distress     EYES: EOMI, PERRL     HENT: ear canals and TM's normal and nose and mouth without ulcers or lesions     NECK: no adenopathy, no asymmetry, masses, or scars and thyroid normal to palpation     RESP: lungs clear to auscultation - no rales, rhonchi or wheezes     CV: regular rates and rhythm, normal S1 S2, no S3 or S4 and no murmur, click or rub     ABDOMEN:  soft, nontender, no HSM or masses and bowel sounds normal     MS: extremities normal- no gross deformities noted, no evidence of inflammation in joints, FROM in all extremities.     SKIN: no suspicious lesions or rashes     NEURO: Normal strength and tone, sensory exam grossly normal, mentation intact and speech normal     PSYCH: mentation appears normal. and affect normal/bright     LYMPHATICS: No axillary, cervical, or supraclavicular nodes    DIAGNOSTICS:                                                      EKG: appears normal, NSR, unchanged from previous tracings  Chest XRay:  Clear per my read   Labs Resulted Today:   Results for orders placed or performed in visit on 06/13/17   CBC with platelets and differential   Result Value Ref Range    WBC 6.7 4.0 - 11.0 10e9/L    RBC Count 5.34 (H) 3.8 - 5.2 10e12/L    Hemoglobin 14.6 11.7 - 15.7 g/dL    Hematocrit 43.9 35.0 - 47.0 %    MCV 82 78 - 100 fl    MCH 27.3 26.5 - 33.0 pg    MCHC 33.3 31.5 - 36.5 g/dL    RDW 12.9 10.0 - 15.0 %    Platelet Count 289 150 - 450 10e9/L    Diff Method Automated Method  "    % Neutrophils 62.0 %    % Lymphocytes 23.2 %    % Monocytes 7.9 %    % Eosinophils 5.4 %    % Basophils 1.2 %    % Immature Granulocytes 0.3 %    Nucleated RBCs 0 0 /100    Absolute Neutrophil 4.2 1.6 - 8.3 10e9/L    Absolute Lymphocytes 1.6 0.8 - 5.3 10e9/L    Absolute Monocytes 0.5 0.0 - 1.3 10e9/L    Absolute Eosinophils 0.4 0.0 - 0.7 10e9/L    Absolute Basophils 0.1 0.0 - 0.2 10e9/L    Abs Immature Granulocytes 0.0 0 - 0.4 10e9/L    Absolute Nucleated RBC 0.0        Recent Labs   Lab Test  05/05/17   2201  02/28/16   1415   HGB  14.8  15.2   PLT  275  314   INR  0.89  0.97   NA  138  138   POTASSIUM  4.4  4.3   CR  0.80  0.75      Results for orders placed or performed in visit on 06/13/17   Hemoglobin A1c   Result Value Ref Range    Hemoglobin A1C 8.4 (H) 4.3 - 6.0 %   CBC with platelets and differential   Result Value Ref Range    WBC 6.7 4.0 - 11.0 10e9/L    RBC Count 5.34 (H) 3.8 - 5.2 10e12/L    Hemoglobin 14.6 11.7 - 15.7 g/dL    Hematocrit 43.9 35.0 - 47.0 %    MCV 82 78 - 100 fl    MCH 27.3 26.5 - 33.0 pg    MCHC 33.3 31.5 - 36.5 g/dL    RDW 12.9 10.0 - 15.0 %    Platelet Count 289 150 - 450 10e9/L    Diff Method Automated Method     % Neutrophils 62.0 %    % Lymphocytes 23.2 %    % Monocytes 7.9 %    % Eosinophils 5.4 %    % Basophils 1.2 %    % Immature Granulocytes 0.3 %    Nucleated RBCs 0 0 /100    Absolute Neutrophil 4.2 1.6 - 8.3 10e9/L    Absolute Lymphocytes 1.6 0.8 - 5.3 10e9/L    Absolute Monocytes 0.5 0.0 - 1.3 10e9/L    Absolute Eosinophils 0.4 0.0 - 0.7 10e9/L    Absolute Basophils 0.1 0.0 - 0.2 10e9/L    Abs Immature Granulocytes 0.0 0 - 0.4 10e9/L    Absolute Nucleated RBC 0.0    Comprehensive metabolic panel   Result Value Ref Range    Sodium 138 133 - 144 mmol/L    Potassium 4.1 3.4 - 5.3 mmol/L    Chloride 103 94 - 109 mmol/L    Carbon Dioxide 27 20 - 32 mmol/L    Anion Gap 8 3 - 14 mmol/L    Glucose 135 (H) 70 - 99 mg/dL    Urea Nitrogen 15 7 - 30 mg/dL    Creatinine 0.68 0.52 -  1.04 mg/dL    GFR Estimate 86 >60 mL/min/1.7m2    GFR Estimate If Black >90   GFR Calc   >60 mL/min/1.7m2    Calcium 9.4 8.5 - 10.1 mg/dL    Bilirubin Total 0.5 0.2 - 1.3 mg/dL    Albumin 3.4 3.4 - 5.0 g/dL    Protein Total 7.1 6.8 - 8.8 g/dL    Alkaline Phosphatase 71 40 - 150 U/L    ALT 30 0 - 50 U/L    AST 17 0 - 45 U/L   TSH with free T4 reflex   Result Value Ref Range    TSH 1.30 0.40 - 4.00 mU/L   Lipid Profile (Chol, Trig, HDL, LDL calc)   Result Value Ref Range    Cholesterol 115 <200 mg/dL    Triglycerides 93 <150 mg/dL    HDL Cholesterol 55 >49 mg/dL    LDL Cholesterol Calculated 41 <100 mg/dL    Non HDL Cholesterol 60 <130 mg/dL     IMPRESSION:                                                    Reason for surgery/procedure: aneurysms      The proposed surgical procedure is considered LOW risk.    REVISED CARDIAC RISK INDEX  The patient has the following serious cardiovascular risks for perioperative complications such as (MI, PE, VFib and 3  AV Block):  Cerebrovascular Disease (TIA or CVA)  Diabetes Mellitus (on Insulin)  INTERPRETATION: 1 risks: Class II (low risk - 0.9% complication rate)    The patient has the following additional risks for perioperative complications:  No identified additional risks  The ASCVD Risk score (Islandia DELBERT Jr, et al., 2013) failed to calculate for the following reasons:    The patient has a prior MCI or stroke diagnosis      ICD-10-CM    1. Pre-op exam Z01.818 XR CHEST 2 VW (Clinic Performed)     EKG 12-lead complete w/read - (Clinic Performed)     XR CHEST 2 VW (Clinic Performed)       RECOMMENDATIONS:                                                      --Consult hospital rounder / IM to assist post-op medical management    --Patient instructions:  Hold Lantus  Hold Metformin day of and two days after  Asa 81 mg      APPROVAL GIVEN to proceed with proposed procedure, without further diagnostic evaluation       Signed Electronically by: Yash Cuellar,  DO    Copy of this evaluation report is provided to requesting physician.    [place] Preop Guidelines

## 2017-06-14 ENCOUNTER — TELEPHONE (OUTPATIENT)
Dept: INTERNAL MEDICINE | Facility: OTHER | Age: 72
End: 2017-06-14

## 2017-06-14 NOTE — TELEPHONE ENCOUNTER
Faxed H&P, labs, chest x-ray and EKG to [facility] by Dr Noriega for a procedure on 6/15/17.  Fax [phone]

## 2017-06-15 ENCOUNTER — TRANSFERRED RECORDS (OUTPATIENT)
Dept: HEALTH INFORMATION MANAGEMENT | Facility: HOSPITAL | Age: 72
End: 2017-06-15

## 2017-06-18 ENCOUNTER — HOSPITAL ENCOUNTER (EMERGENCY)
Facility: HOSPITAL | Age: 72
Discharge: HOME | End: 2017-06-18
Payer: MEDICARE

## 2017-06-18 VITALS
OXYGEN SATURATION: 96 % | SYSTOLIC BLOOD PRESSURE: 147 MMHG | TEMPERATURE: 97.9 F | DIASTOLIC BLOOD PRESSURE: 70 MMHG | RESPIRATION RATE: 20 BRPM

## 2017-06-18 DIAGNOSIS — R06.00 DYSPNEA, UNSPECIFIED TYPE: ICD-10-CM

## 2017-06-18 LAB
ALBUMIN SERPL-MCNC: 3.1 G/DL (ref 3.4–5)
ALP SERPL-CCNC: 72 U/L (ref 40–150)
ALT SERPL W P-5'-P-CCNC: 20 U/L (ref 0–50)
ANION GAP SERPL CALCULATED.3IONS-SCNC: 8 MMOL/L (ref 3–14)
AST SERPL W P-5'-P-CCNC: 13 U/L (ref 0–45)
BASOPHILS # BLD AUTO: 0.1 10E9/L (ref 0–0.2)
BASOPHILS NFR BLD AUTO: 0.9 %
BILIRUB SERPL-MCNC: 0.5 MG/DL (ref 0.2–1.3)
BUN SERPL-MCNC: 10 MG/DL (ref 7–30)
CALCIUM SERPL-MCNC: 8.7 MG/DL (ref 8.5–10.1)
CHLORIDE SERPL-SCNC: 102 MMOL/L (ref 94–109)
CO2 SERPL-SCNC: 26 MMOL/L (ref 20–32)
CREAT SERPL-MCNC: 0.67 MG/DL (ref 0.52–1.04)
CRP SERPL-MCNC: 41.1 MG/L (ref 0–8)
D DIMER PPP DDU-MCNC: <200 NG/ML D-DU (ref 0–300)
DIFFERENTIAL METHOD BLD: NORMAL
EOSINOPHIL # BLD AUTO: 0.3 10E9/L (ref 0–0.7)
EOSINOPHIL NFR BLD AUTO: 4.2 %
ERYTHROCYTE [DISTWIDTH] IN BLOOD BY AUTOMATED COUNT: 12.9 % (ref 10–15)
GFR SERPL CREATININE-BSD FRML MDRD: 87 ML/MIN/1.7M2
GLUCOSE SERPL-MCNC: 148 MG/DL (ref 70–99)
HCT VFR BLD AUTO: 35.8 % (ref 35–47)
HGB BLD-MCNC: 11.9 G/DL (ref 11.7–15.7)
IMM GRANULOCYTES # BLD: 0 10E9/L (ref 0–0.4)
IMM GRANULOCYTES NFR BLD: 0.5 %
LYMPHOCYTES # BLD AUTO: 1.5 10E9/L (ref 0.8–5.3)
LYMPHOCYTES NFR BLD AUTO: 18.7 %
MCH RBC QN AUTO: 27.2 PG (ref 26.5–33)
MCHC RBC AUTO-ENTMCNC: 33.2 G/DL (ref 31.5–36.5)
MCV RBC AUTO: 82 FL (ref 78–100)
MONOCYTES # BLD AUTO: 0.7 10E9/L (ref 0–1.3)
MONOCYTES NFR BLD AUTO: 8.7 %
NEUTROPHILS # BLD AUTO: 5.5 10E9/L (ref 1.6–8.3)
NEUTROPHILS NFR BLD AUTO: 67 %
NRBC # BLD AUTO: 0 10*3/UL
NRBC BLD AUTO-RTO: 0 /100
PLATELET # BLD AUTO: 279 10E9/L (ref 150–450)
POTASSIUM SERPL-SCNC: 3.9 MMOL/L (ref 3.4–5.3)
PROT SERPL-MCNC: 6.7 G/DL (ref 6.8–8.8)
RBC # BLD AUTO: 4.37 10E12/L (ref 3.8–5.2)
SODIUM SERPL-SCNC: 136 MMOL/L (ref 133–144)
TROPONIN I SERPL-MCNC: NORMAL UG/L (ref 0–0.04)
WBC # BLD AUTO: 8.2 10E9/L (ref 4–11)

## 2017-06-18 PROCEDURE — 36415 COLL VENOUS BLD VENIPUNCTURE: CPT

## 2017-06-18 PROCEDURE — 86140 C-REACTIVE PROTEIN: CPT

## 2017-06-18 PROCEDURE — 99285 EMERGENCY DEPT VISIT HI MDM: CPT | Mod: 25

## 2017-06-18 PROCEDURE — 85379 FIBRIN DEGRADATION QUANT: CPT

## 2017-06-18 PROCEDURE — 71020 ZZHC CHEST TWO VIEWS, FRONT/LAT: CPT | Mod: TC

## 2017-06-18 PROCEDURE — 84484 ASSAY OF TROPONIN QUANT: CPT

## 2017-06-18 PROCEDURE — 99285 EMERGENCY DEPT VISIT HI MDM: CPT

## 2017-06-18 PROCEDURE — 85025 COMPLETE CBC W/AUTO DIFF WBC: CPT

## 2017-06-18 PROCEDURE — 93005 ELECTROCARDIOGRAM TRACING: CPT

## 2017-06-18 PROCEDURE — 80053 COMPREHEN METABOLIC PANEL: CPT

## 2017-06-18 PROCEDURE — 93010 ELECTROCARDIOGRAM REPORT: CPT | Performed by: INTERNAL MEDICINE

## 2017-06-18 ASSESSMENT — ENCOUNTER SYMPTOMS
CONFUSION: 0
EYE REDNESS: 0
HEADACHES: 0
COLOR CHANGE: 0
WOUND: 1
NECK STIFFNESS: 0
DIFFICULTY URINATING: 0
CHEST TIGHTNESS: 1
ABDOMINAL PAIN: 0
FEVER: 0
COUGH: 1
SHORTNESS OF BREATH: 1
ARTHRALGIAS: 0

## 2017-06-18 NOTE — ED PROVIDER NOTES
"  History     Chief Complaint   Patient presents with     Shortness of Breath     pt states \"all I want is a cxr if anything else is wrong I am going to [facility] where I am safe\".     HPI  Rosalie JANE Friend is a 72 year old female, 1 month s/p CVA, 3 days s/p brain coil embolization in [place], presents to ED via private car for evaluation of shortness of breath. Onset of sx 2 days ago, progressing today. No fever, mild non productive cough, denies cp, no n/v/d.     I have reviewed the Medications, Allergies, Past Medical and Surgical History, and Social History in the Epic system.    Allergies:   Allergies   Allergen Reactions     Penicillins          No current facility-administered medications on file prior to encounter.   Current Outpatient Prescriptions on File Prior to Encounter:  aspirin 325 MG tablet Take 1 tablet (325 mg) by mouth daily (Patient taking differently: Take 81 mg by mouth daily )   atorvastatin (LIPITOR) 80 MG tablet Take 1 tablet (80 mg) by mouth daily   hydrochlorothiazide 12.5 MG TABS tablet Take 1 tablet (12.5 mg) by mouth daily   insulin glargine (LANTUS) 100 UNIT/ML injection Inject 12 Units Subcutaneous every morning   Insulin Pen Needle (PEN NEEDLES) 32G X 4 MM MISC 1 Box 4 times daily   senna-docusate (SENOKOT-S;PERICOLACE) 8.6-50 MG per tablet Take 2 tablets by mouth daily as needed for constipation   blood glucose monitoring (ONE TOUCH DELICA) lancets Use to test blood sugar 3 times daily.   metFORMIN (GLUCOPHAGE) 500 MG tablet Take 2 tablets (1,000 mg) by mouth 2 times daily (with meals)   Glucose Blood (BLOOD GLUCOSE TEST STRIPS) STRP four times a day.   ONETOUCH DELICA LANCETS 33G MISC four times a day.   IBUPROFEN PO        Patient Active Problem List   Diagnosis     ACP (advance care planning)       Past Surgical History:   Procedure Laterality Date     ABDOMEN SURGERY      bladder repair     GYN SURGERY      hysterectomy        Social History   Substance Use Topics     Smoking " "status: Former Smoker     Packs/day: 1.00     Quit date: 5/5/2017     Smokeless tobacco: Not on file     Alcohol use No         There is no immunization history on file for this patient.    BMI: Estimated body mass index is 41.2 kg/(m^2) as calculated from the following:    Height as of 6/13/17: 1.626 m (5' 4\").    Weight as of 6/13/17: 108.9 kg (240 lb).      Review of Systems   Constitutional: Negative for fever.   HENT: Negative for congestion.    Eyes: Negative for redness.   Respiratory: Positive for cough, chest tightness and shortness of breath.    Cardiovascular: Negative for chest pain.   Gastrointestinal: Negative for abdominal pain.   Genitourinary: Negative for difficulty urinating.   Musculoskeletal: Negative for arthralgias and neck stiffness.   Skin: Positive for wound. Negative for color change.        Ecchymosis right groin s/p cath   Neurological: Negative for headaches.   Psychiatric/Behavioral: Negative for confusion.       Physical Exam   BP: 155/69  Heart Rate: 85  Temp: 97.9  F (36.6  C)  SpO2: 97 %  Physical Exam   Constitutional: She is oriented to person, place, and time. No distress.   HENT:   Head: Normocephalic and atraumatic.   Eyes: Conjunctivae are normal.   Neck: Normal range of motion. No thyromegaly present.   Cardiovascular: Normal rate and regular rhythm.    No murmur heard.  Pulmonary/Chest: Effort normal and breath sounds normal. No respiratory distress.   Abdominal: Soft. There is no tenderness.   Musculoskeletal: Normal range of motion.   Neurological: She is alert and oriented to person, place, and time.   Skin: She is not diaphoretic.   Nursing note and vitals reviewed.      ED Course   Procedures             EKG Interpretation:      Interpreted by Amie Lomas  Time reviewed: 1500  Symptoms at time of EKG: dyspnea   Rhythm: normal sinus   Rate: 90  Axis: Normal  Ectopy: premature ventricular contractions (infrequent)  Conduction: normal  ST Segments/ T Waves: No ST-T " wave changes  Q Waves: none  Comparison to prior: Unchanged from 6/13/17    Clinical Impression: no acute changes      Labs Ordered and Resulted from Time of ED Arrival Up to the Time of Departure from the ED   COMPREHENSIVE METABOLIC PANEL - Abnormal; Notable for the following:        Result Value    Glucose 148 (*)     Albumin 3.1 (*)     Protein Total 6.7 (*)     All other components within normal limits   CRP INFLAMMATION - Abnormal; Notable for the following:     CRP Inflammation 41.1 (*)     All other components within normal limits   CBC WITH PLATELETS DIFFERENTIAL   D-DIMER (FV RANGE)   TROPONIN I     FINDINGS:  There are some linear opacities at the left lung base  representing atelectasis or scarring.  The heart and pulmonary vessels  are within normal limits.  Pulmonary vasculature is normal in  distribution.    IMPRESSION:  LEFT BASILAR ATELECTASIS OR SCARRING.  Exam Date: Jun 18, 2017 03:20:00 PM  Author: ISH KILLIAN    Assessments & Plan (with Medical Decision Making)   Pt presents with sensation of shortness of breath 3 days s/p brain coil. VSS, NAD, sao2 97% on RA. ECG no acute changes. Labs with normal troponin, d dimer. CXR with scarring.   Unsure of dyspnea sensation, no acute or emergent issues identified. Pt reassured feels comfortable with discharge, continue care. Pt verbalizes understanding and agrees with plan.  Epic discharge instructions given. Pt discharged in stable condition.     I have reviewed the nursing notes.    I have reviewed the findings, diagnosis, plan and need for follow up with the patient.    Discharge Medication List as of 6/18/2017  3:57 PM          Final diagnoses:   Dyspnea, unspecified type     See attached for home care  Your labs, xray and ecg look good today  Try prilosec as discussed  F/U with PCP if not improving or back to [place] line in 1 week  Return to ED/UC with worsening symptoms.       6/18/2017   [facility]     Amie Lomas APRN  [facility]  06/19/17 1434

## 2017-06-18 NOTE — ED AVS SNAPSHOT
[facility]    [address] [address]    Phone:  [phone]                                       Rosalie JANE Friend   MRN: [id number]    Department:  [facility]   Date of Visit:  6/18/2017           After Visit Summary Signature Page     I have received my discharge instructions, and my questions have been answered. I have discussed any challenges I see with this plan with the nurse or doctor.    ..........................................................................................................................................  Patient/Patient Representative Signature      ..........................................................................................................................................  Patient Representative Print Name and Relationship to Patient    ..................................................               ................................................  Date                                            Time    ..........................................................................................................................................  Reviewed by Signature/Title    ...................................................              ..............................................  Date                                                            Time

## 2017-06-18 NOTE — ED AVS SNAPSHOT
[facility]    [address] [address]    Phone:  [phone]                                       Rosalie JANE Friend   MRN: [id number]    Department:  [facility]   Date of Visit:  6/18/2017           Patient Information     Date Of Birth          1945        Your diagnoses for this visit were:     Dyspnea, unspecified type        You were seen by Amie Lomas APRN FNP.      Follow-up Information     Follow up with Yash Cuellar DO In 1 week.    Specialty:  Internal Medicine    Why:  if not improving or back to baseline    Contact information:    [facility]  [address]  [place] [address] [phone]          Follow up with [facility].    Specialty:  EMERGENCY MEDICINE    Why:  As needed, If symptoms worsen    Contact information:    [address] [address] [phone]    Additional information:    From [facility]: Take [address]. Turn left at [address]. Turn left at the first stoplight on [address]. At the first stop sign, take a right onto [address]. Take a left into the parking lot and continue through until you reach the North enterance of the building.       From [place]: Take [address]. Take the [address] ramp towards [place]. Turn left onto [address]. Take a slight right onto [address]. Turn left at the first stoplight on [address]. At the first stop sign, take a right onto [address]. Take a left into the parking lot and continue through until you reach the North enterance of the building.       From [place]: Take [address]. Take a right at [address]. At the first stop sign, take a right onto [address]. Take a left into the parking lot and continue through until you reach the North enterance of the building.         Discharge Instructions         See attached for home care  Your labs,  xray and ecg look good today  Try prilosec as discussed  F/U with PCP if not improving or back to base line in 1 week  Return to ED/UC with worsening symptoms.             Shortness of Breath (Dyspnea)  Shortness of breath is the feeling that you can't catch your breath or get enough air. It is also known as dyspnea.  Dyspnea can be caused by many different conditions. They include:    Acute asthma attack.    Worsening of chronic lung diseases such as chronic bronchitis and emphysema.    Heart failure. This is when weak heart muscle allows extra fluid to collect in the lungs.    Panic attacks or anxiety. Fear can cause rapid breathing (hyperventilation).    Pneumonia, or an infection in the lung tissue.    Exposure to toxic substances, fumes, smoke, or certain medicines.    Blood clot in the lung (pulmonary embolism). This is often from a piece of blood clot in a deep vein of the leg (deep vein thrombosis) that breaks off and travels to the lungs.    Heart attack or heart-related chest pain (angina).    Anemia.    Collapsed lung (pneumothorax).    Dehydration.    Pregnancy.  Based on your visit today, the exact cause of your shortness of breath is not certain. Your tests don t show any of the serious causes of dyspnea. You may need other tests to find out if you have a serious problem. It s important to watch for any new symptoms or symptoms that get worse. Follow up with your healthcare provider as directed.  Home care  Follow these tips to take care of yourself at home:    When your symptoms are better, go back to your usual activities.    If you smoke, you should stop. Join a quit-smoking program or ask your healthcare provider for help.    Eat a healthy diet and get plenty of sleep.    Get regular exercise. Talk with your healthcare provider before starting to exercise, especially if you have other medical problems.    Cut down on the amount of caffeine and stimulants you consume.  Follow-up care  Follow up with  your healthcare provider, or as advised.  If tests were done, you will be told if your treatment needs to be changed. You can call as directed for the results.  (Note: If an X-ray was taken, a specialist will review it. You will be notified of any new findings that may affect your care.)  Call 911 or get immediate medical care  Shortness of breath may be a sign of a serious medical problem. For example, it may be a problem with your heart or lungs. Call 911 if you have worsening shortness of breath or trouble breathing, especially with any of the symptoms below:    You are confused or it s difficult to wake you.    You faint or lose consciousness.    You have a fast heartbeat, or your heartbeat is irregular.    You are coughing up blood.    You have pain in your chest, arm, shoulder, neck, or upper back.    You break out in a sweat.  When to seek medical advice  Call your healthcare provider right away if any of these occur:    Slight shortness of breath or wheezing    Redness, pain or swelling in your leg, arm, or other body area    Swelling in both legs or ankles    Fast weight gain    Dizziness or weakness    Fever of 100.4 F (38 C) or higher, or as directed by your healthcare provider  Date Last Reviewed: 9/13/2015 2000-2017 [employer]. [address] [place] [address], [place] [address]. All rights reserved. This information is not intended as a substitute for professional medical care. Always follow your healthcare professional's instructions.             Review of your medicines      Our records show that you are taking the medicines listed below. If these are incorrect, please call your family doctor or clinic.        Dose / Directions Last dose taken    aspirin 325 MG tablet   Dose:  325 mg   Quantity:  120 tablet        Take 1 tablet (325 mg) by mouth daily   Refills:  3        atorvastatin 80 MG tablet   Commonly known as:  LIPITOR   Dose:  80 mg   Quantity:  90 tablet        Take 1 tablet (80 mg)  by mouth daily   Refills:  3        BLOOD GLUCOSE TEST STRIPS Strp   Quantity:  100 each        four times a day.   Refills:  11        hydrochlorothiazide 12.5 MG Tabs tablet   Dose:  12.5 mg   Quantity:  90 tablet        Take 1 tablet (12.5 mg) by mouth daily   Refills:  3        IBUPROFEN PO        Refills:  0        insulin glargine 100 UNIT/ML injection   Commonly known as:  LANTUS   Dose:  12 Units   Quantity:  12 mL        Inject 12 Units Subcutaneous every morning   Refills:  11        metFORMIN 500 MG tablet   Commonly known as:  GLUCOPHAGE   Dose:  1000 mg   Quantity:  120 tablet        Take 2 tablets (1,000 mg) by mouth 2 times daily (with meals)   Refills:  1        * ONETOUCH DELICA LANCETS 33G Misc        four times a day.   Refills:  0        * blood glucose monitoring lancets   Quantity:  1 Box        Use to test blood sugar 3 times daily.   Refills:  11        OXYCODONE HCL PO   Dose:  5 mg        Take 5 mg by mouth   Refills:  0        Pen Needles 32G X 4 MM Misc   Dose:  1 Box   Quantity:  100 each        1 Box 4 times daily   Refills:  11        senna-docusate 8.6-50 MG per tablet   Commonly known as:  SENOKOT-S;PERICOLACE   Dose:  2 tablet   Quantity:  100 tablet        Take 2 tablets by mouth daily as needed for constipation   Refills:  11        * Notice:  This list has 2 medication(s) that are the same as other medications prescribed for you. Read the directions carefully, and ask your doctor or other care provider to review them with you.            Procedures and tests performed during your visit     CBC with platelets differential    CRP inflammation    Comprehensive metabolic panel    D-Dimer (FV Range)    EKG 12-lead, tracing only    Troponin I    XR Chest 2 Views      Orders Needing Specimen Collection     Ordered          06/18/17 1435  UA reflex to Microscopic and Culture - STAT, [facility]: STAT, Needs to be Collected     Scheduled Task Status   06/18/17 1436 Collect UA reflex to  "Microscopic and Culture Open   Order Class:  PCU Collect                  Pending Results     Date and Time Order Name Status Description    2017 1428 XR Chest 2 Views In process             Pending Culture Results     No orders found from 2017 to 2017.            Thank you for choosing [facility]       Thank you for choosing [facility] for your care. Our goal is always to provide you with excellent care. Hearing back from our patients is one way we can continue to improve our services. Please take a few minutes to complete the written survey that you may receive in the mail after you visit with us. Thank you!        [employer] Information     [employer] lets you send messages to your doctor, view your test results, renew your prescriptions, schedule appointments and more. To sign up, go to [email or web address] . Click on \"Log in\" on the left side of the screen, which will take you to the Welcome page. Then click on \"Sign up Now\" on the right side of the page.     You will be asked to enter the access code listed below, as well as some personal information. Please follow the directions to create your username and password.     Your access code is: [email or web address]  Expires: 2017  2:56 PM     Your access code will  in 90 days. If you need help or a new code, please call your [facility] clinic or [phone].        Care EveryWhere ID     This is your Care EveryWhere ID. This could be used by other organizations to access your [facility] medical records  [id number]        After Visit Summary       This is your record. Keep this with you and show to your community pharmacist(s) and doctor(s) at your next visit.                  "

## 2017-06-18 NOTE — DISCHARGE INSTRUCTIONS
See attached for home care  Your labs, xray and ecg look good today  Try prilosec as discussed  F/U with PCP if not improving or back to base line in 1 week  Return to ED/UC with worsening symptoms.             Shortness of Breath (Dyspnea)  Shortness of breath is the feeling that you can't catch your breath or get enough air. It is also known as dyspnea.  Dyspnea can be caused by many different conditions. They include:    Acute asthma attack.    Worsening of chronic lung diseases such as chronic bronchitis and emphysema.    Heart failure. This is when weak heart muscle allows extra fluid to collect in the lungs.    Panic attacks or anxiety. Fear can cause rapid breathing (hyperventilation).    Pneumonia, or an infection in the lung tissue.    Exposure to toxic substances, fumes, smoke, or certain medicines.    Blood clot in the lung (pulmonary embolism). This is often from a piece of blood clot in a deep vein of the leg (deep vein thrombosis) that breaks off and travels to the lungs.    Heart attack or heart-related chest pain (angina).    Anemia.    Collapsed lung (pneumothorax).    Dehydration.    Pregnancy.  Based on your visit today, the exact cause of your shortness of breath is not certain. Your tests don t show any of the serious causes of dyspnea. You may need other tests to find out if you have a serious problem. It s important to watch for any new symptoms or symptoms that get worse. Follow up with your healthcare provider as directed.  Home care  Follow these tips to take care of yourself at home:    When your symptoms are better, go back to your usual activities.    If you smoke, you should stop. Join a quit-smoking program or ask your healthcare provider for help.    Eat a healthy diet and get plenty of sleep.    Get regular exercise. Talk with your healthcare provider before starting to exercise, especially if you have other medical problems.    Cut down on the amount of caffeine and stimulants you  consume.  Follow-up care  Follow up with your healthcare provider, or as advised.  If tests were done, you will be told if your treatment needs to be changed. You can call as directed for the results.  (Note: If an X-ray was taken, a specialist will review it. You will be notified of any new findings that may affect your care.)  Call 911 or get immediate medical care  Shortness of breath may be a sign of a serious medical problem. For example, it may be a problem with your heart or lungs. Call 911 if you have worsening shortness of breath or trouble breathing, especially with any of the symptoms below:    You are confused or it s difficult to wake you.    You faint or lose consciousness.    You have a fast heartbeat, or your heartbeat is irregular.    You are coughing up blood.    You have pain in your chest, arm, shoulder, neck, or upper back.    You break out in a sweat.  When to seek medical advice  Call your healthcare provider right away if any of these occur:    Slight shortness of breath or wheezing    Redness, pain or swelling in your leg, arm, or other body area    Swelling in both legs or ankles    Fast weight gain    Dizziness or weakness    Fever of 100.4 F (38 C) or higher, or as directed by your healthcare provider  Date Last Reviewed: 9/13/2015 2000-2017 [employer]. [address] [place] [address], [place] [address]. All rights reserved. This information is not intended as a substitute for professional medical care. Always follow your healthcare professional's instructions.

## 2017-06-19 NOTE — PROGRESS NOTES
Chest XR report routed to PCP, Dr. DAKSHA Cuellar.  Impression - left basilar atelectasis or scarring. Advised to F/U with PCP if not improving or back to base line in 1 week.

## 2017-06-21 DIAGNOSIS — K59.00 CONSTIPATION, UNSPECIFIED CONSTIPATION TYPE: ICD-10-CM

## 2017-06-21 DIAGNOSIS — E11.65 TYPE 2 DIABETES MELLITUS WITH HYPERGLYCEMIA, WITH LONG-TERM CURRENT USE OF INSULIN (H): ICD-10-CM

## 2017-06-21 DIAGNOSIS — Z86.73 HISTORY OF CVA (CEREBROVASCULAR ACCIDENT): ICD-10-CM

## 2017-06-21 DIAGNOSIS — E11.8 TYPE 2 DIABETES MELLITUS WITH COMPLICATION, WITHOUT LONG-TERM CURRENT USE OF INSULIN (H): ICD-10-CM

## 2017-06-21 DIAGNOSIS — I10 BENIGN ESSENTIAL HYPERTENSION: ICD-10-CM

## 2017-06-21 DIAGNOSIS — Z79.4 TYPE 2 DIABETES MELLITUS WITH HYPERGLYCEMIA, WITH LONG-TERM CURRENT USE OF INSULIN (H): ICD-10-CM

## 2017-06-21 DIAGNOSIS — E78.5 HYPERLIPIDEMIA LDL GOAL <100: ICD-10-CM

## 2017-06-21 RX ORDER — PEN NEEDLE, DIABETIC 30 GX3/16"
1 NEEDLE, DISPOSABLE MISCELLANEOUS 4 TIMES DAILY
Qty: 400 EACH | Refills: 3 | Status: SHIPPED | OUTPATIENT
Start: 2017-06-21 | End: 2017-12-30

## 2017-06-21 RX ORDER — ATORVASTATIN CALCIUM 80 MG/1
80 TABLET, FILM COATED ORAL DAILY
Qty: 90 TABLET | Refills: 3 | Status: SHIPPED | OUTPATIENT
Start: 2017-06-21 | End: 2018-03-13

## 2017-06-21 RX ORDER — ASPIRIN 325 MG
325 TABLET ORAL DAILY
Qty: 120 TABLET | Refills: 3 | Status: SHIPPED | OUTPATIENT
Start: 2017-06-21 | End: 2017-11-20

## 2017-06-21 RX ORDER — GLUCOSAMINE HCL/CHONDROITIN SU 500-400 MG
CAPSULE ORAL
Qty: 360 EACH | Refills: 3 | Status: SHIPPED | OUTPATIENT
Start: 2017-06-21 | End: 2018-05-06

## 2017-06-21 RX ORDER — AMOXICILLIN 250 MG
2 CAPSULE ORAL DAILY PRN
Qty: 180 TABLET | Refills: 3 | Status: SHIPPED | OUTPATIENT
Start: 2017-06-21 | End: 2017-11-20

## 2017-06-21 RX ORDER — HYDROCHLOROTHIAZIDE 12.5 MG/1
12.5 TABLET ORAL DAILY
Qty: 90 TABLET | Refills: 3 | Status: SHIPPED | OUTPATIENT
Start: 2017-06-21 | End: 2017-11-21

## 2017-06-21 NOTE — TELEPHONE ENCOUNTER
Patient would like 90 day supply of all medications please sign them. Thank you. Kalee Busby LPN

## 2017-06-27 ENCOUNTER — APPOINTMENT (OUTPATIENT)
Dept: LAB | Facility: OTHER | Age: 72
End: 2017-06-27
Attending: INTERNAL MEDICINE
Payer: MEDICARE

## 2017-06-27 ENCOUNTER — OFFICE VISIT (OUTPATIENT)
Dept: SLEEP MEDICINE | Facility: HOSPITAL | Age: 72
End: 2017-06-27
Attending: INTERNAL MEDICINE
Payer: MEDICARE

## 2017-06-27 VITALS
OXYGEN SATURATION: 96 % | HEART RATE: 88 BPM | BODY MASS INDEX: 40.5 KG/M2 | HEIGHT: 66 IN | WEIGHT: 252 LBS | DIASTOLIC BLOOD PRESSURE: 68 MMHG | RESPIRATION RATE: 12 BRPM | SYSTOLIC BLOOD PRESSURE: 140 MMHG

## 2017-06-27 DIAGNOSIS — R06.00 DYSPNEA, UNSPECIFIED TYPE: Primary | ICD-10-CM

## 2017-06-27 PROCEDURE — 99212 OFFICE O/P EST SF 10 MIN: CPT | Performed by: INTERNAL MEDICINE

## 2017-06-27 PROCEDURE — 99211 OFF/OP EST MAY X REQ PHY/QHP: CPT

## 2017-06-27 NOTE — PROGRESS NOTES
"73 y/o referred for dyspnea. Pt smoked for 61 y, quit a month ago after a mild CVA. No hx of chronic dyspnea, but some recurrent 'bronchitis' episodes. After a CVA a mo ago which briefly affected her vision, she had a coil for aneurysm in [place]. A few weeks later developed subacute dyspnea without cough fever of chest pain. Even in ER here neg signs of CAD, Cxr showed some mild atelectasis, she was given and incentive spirometer. She gradually improved and no longer feels dyspneic.     PMH as above, basically has been quite healthy    SH here with her daughter who I saw last week for SOFIE    PE  /68  Pulse 88  Resp 12  Ht 5' 6\" (1.676 m)  Wt 252 lb (114.3 kg)  SpO2 96%  BMI 40.67 kg/m2             Resp clear                       Cor rrr o mgr    A/ Poss mild COPD with intermittent exacerbations, now asymptomatic and don't feel we need to further work up at this point.Follow up prn.  "

## 2017-06-27 NOTE — MR AVS SNAPSHOT
"              After Visit Summary   6/27/2017    Rosalie Seals    MRN: [id number]           Patient Information     Date Of Birth          1945        Visit Information        Provider Department      6/27/2017 9:30 AM Loy Covarrubias MD [facility]        Today's Diagnoses     Dyspnea, unspecified type    -  1       Follow-ups after your visit        Your next 10 appointments already scheduled     Jul 11, 2017  1:00 PM CDT   (Arrive by 12:45 PM)   Office Visit with Yash Cuellar,    [facility] [place] ([facility] [place] )    [address]  [place] [address]   [phone]           Bring a current list of meds and any records pertaining to this visit.  For Physicals, please bring immunization records and any forms needing to be filled out.  Please arrive 10 minutes early to complete paperwork.              Who to contact     If you have questions or need follow up information about today's clinic visit or your schedule please contact [facility] directly at [phone].  Normal or non-critical lab and imaging results will be communicated to you by [employer], letter or phone within 4 business days after the clinic has received the results. If you do not hear from us within 7 days, please contact the clinic through [employer] or phone. If you have a critical or abnormal lab result, we will notify you by phone as soon as possible.  Submit refill requests through [facility] or call your pharmacy and they will forward the refill request to us. Please allow 3 business days for your refill to be completed.          Additional Information About Your Visit        [employer] Information     [facility] lets you send messages to your doctor, view your test results, renew your prescriptions, schedule appointments and more. To sign up, go to [email or web address] . Click on \"Log in\" on the left side of the screen, which will take you to the Welcome page. Then click on \"Sign up Now\" on the " "right side of the page.     You will be asked to enter the access code listed below, as well as some personal information. Please follow the directions to create your username and password.     Your access code is: [email or web address]  Expires: 2017  2:56 PM     Your access code will  in 90 days. If you need help or a new code, please call your [facility] clinic or [phone].        Care EveryWhere ID     This is your Care EveryWhere ID. This could be used by other organizations to access your [facility] medical records  [id number]        Your Vitals Were     Pulse Respirations Height Pulse Oximetry BMI (Body Mass Index)       88 12 5' 6\" (1.676 m) 96% 40.67 kg/m2        Blood Pressure from Last 3 Encounters:   17 140/68   17 147/70   17 124/62    Weight from Last 3 Encounters:   17 252 lb (114.3 kg)   17 240 lb (108.9 kg)   17 252 lb (114.3 kg)              Today, you had the following     No orders found for display       Primary Care Provider Office Phone # Fax #    Yash Cuellar,  [phone]       [facility] [address] [facility] [address]        Equal Access to Services     MICHELLE BOWEN : Hadii aad ku hadasho Soomaali, waaxda luqadaha, qaybta kaalmada adeegyada, nery ontiveros . So [facility] [phone].    ATENCIÓN: Si habla español, tiene a blackmon disposición servicios gratuitos de asistencia lingüística. Llame al [phone].    We comply with applicable federal civil rights laws and [place] laws. We do not discriminate on the basis of race, color, national origin, age, disability sex, sexual orientation or gender identity.            Thank you!     Thank you for choosing [facility]  for your care. Our goal is always to provide you with excellent care. Hearing back from our patients is one way we can continue to improve our services. Please take a few minutes to complete the written survey that you may " receive in the mail after your visit with us. Thank you!             Your Updated Medication List - Protect others around you: Learn how to safely use, store and throw away your medicines at www.disposemymeds.org.          This list is accurate as of: 6/27/17  3:35 PM.  Always use your most recent med list.                   Brand Name Dispense Instructions for use Diagnosis    aspirin 325 MG tablet     120 tablet    Take 1 tablet (325 mg) by mouth daily    History of CVA (cerebrovascular accident)       atorvastatin 80 MG tablet    LIPITOR    90 tablet    Take 1 tablet (80 mg) by mouth daily    Hyperlipidemia LDL goal <100       BLOOD GLUCOSE TEST STRIPS Strp     360 each    four times a day.    Type 2 diabetes mellitus with complication, without long-term current use of insulin (H)       hydrochlorothiazide 12.5 MG Tabs tablet     90 tablet    Take 1 tablet (12.5 mg) by mouth daily    Benign essential hypertension       IBUPROFEN PO           insulin glargine 100 UNIT/ML injection    LANTUS    12 mL    Inject 12 Units Subcutaneous every morning    Type 2 diabetes mellitus with complication, without long-term current use of insulin (H)       metFORMIN 500 MG tablet    GLUCOPHAGE    180 tablet    Take 2 tablets (1,000 mg) by mouth 2 times daily (with meals)    Type 2 diabetes mellitus with hyperglycemia, with long-term current use of insulin (H)       * ONETOUCH DELICA LANCETS 33G Misc      four times a day.        * blood glucose monitoring lancets     360 each    Use to test blood sugar 3 times daily.    Type 2 diabetes mellitus with hyperglycemia, with long-term current use of insulin (H)       OXYCODONE HCL PO      Take 5 mg by mouth        Pen Needles 32G X 4 MM Misc     400 each    1 Box 4 times daily    Type 2 diabetes mellitus with complication, without long-term current use of insulin (H)       senna-docusate 8.6-50 MG per tablet    SENOKOT-S;PERICOLACE    180 tablet    Take 2 tablets by mouth daily as  needed for constipation    Constipation, unspecified constipation type       * Notice:  This list has 2 medication(s) that are the same as other medications prescribed for you. Read the directions carefully, and ask your doctor or other care provider to review them with you.

## 2017-06-28 ENCOUNTER — TRANSFERRED RECORDS (OUTPATIENT)
Dept: HEALTH INFORMATION MANAGEMENT | Facility: HOSPITAL | Age: 72
End: 2017-06-28

## 2017-07-03 DIAGNOSIS — Z12.11 SPECIAL SCREENING FOR MALIGNANT NEOPLASMS, COLON: ICD-10-CM

## 2017-07-03 LAB — HEMOCCULT SP1 STL QL: NEGATIVE

## 2017-07-03 PROCEDURE — 82274 ASSAY TEST FOR BLOOD FECAL: CPT | Mod: ZL | Performed by: INTERNAL MEDICINE

## 2017-07-11 ENCOUNTER — OFFICE VISIT (OUTPATIENT)
Dept: INTERNAL MEDICINE | Facility: OTHER | Age: 72
End: 2017-07-11
Attending: INTERNAL MEDICINE
Payer: MEDICARE

## 2017-07-11 VITALS
OXYGEN SATURATION: 96 % | WEIGHT: 239 LBS | HEART RATE: 77 BPM | HEIGHT: 66 IN | DIASTOLIC BLOOD PRESSURE: 60 MMHG | TEMPERATURE: 98.5 F | BODY MASS INDEX: 38.41 KG/M2 | SYSTOLIC BLOOD PRESSURE: 120 MMHG

## 2017-07-11 DIAGNOSIS — E11.59 TYPE 2 DIABETES MELLITUS WITH OTHER CIRCULATORY COMPLICATION: ICD-10-CM

## 2017-07-11 DIAGNOSIS — I67.1 CEREBRAL ANEURYSM, NONRUPTURED: Primary | ICD-10-CM

## 2017-07-11 DIAGNOSIS — Z72.0 TOBACCO ABUSE DISORDER: ICD-10-CM

## 2017-07-11 PROCEDURE — 99212 OFFICE O/P EST SF 10 MIN: CPT

## 2017-07-11 PROCEDURE — 99214 OFFICE O/P EST MOD 30 MIN: CPT | Performed by: INTERNAL MEDICINE

## 2017-07-11 ASSESSMENT — PAIN SCALES - GENERAL: PAINLEVEL: NO PAIN (0)

## 2017-07-11 NOTE — PROGRESS NOTES
Internal Medicine:    Chief Complaint   Patient presents with     RECHECK     pt here for a follow up today      Recheck Medication     pt feels she does not need alot of her medications. - does not take oxycodone and not testing blood sugars 4 times daily      Rosalie presents today for follow up after her recent interventional coiling procedure for aneurysms.  Procedure went well without complication.  She did have a bit of SOB a few days following the procedure but that has resolved.  She is feeling well now with no complaints other than the fact she wants to stop all her medications.  Time was taken to educate and explain why they were all necessary-but we did elect to d/c HCTZ.  She denies any chest pain or SOB.  She denies any recurrent CVA symptoms.  She did quit smoking.  She requests referral for genetic testing.           Patient Active Problem List   Diagnosis     ACP (advance care planning)            Past Medical History:   Diagnosis Date     Cerebral infarction (H)     05/06/2017     Diabetes type 2, uncontrolled (H)      Hyperlipemia             Past Surgical History:   Procedure Laterality Date     ABDOMEN SURGERY      bladder repair     GYN SURGERY      hysterectomy             Social History   Substance Use Topics     Smoking status: Former Smoker     Packs/day: 1.00     Quit date: 5/5/2017     Smokeless tobacco: Not on file     Alcohol use No            Family History   Problem Relation Age of Onset     Aneurysm Mother      Other Cancer Father      DIABETES Brother      Hypertension Brother      Other Cancer Brother      Other Cancer Sister      Aneurysm Sister                Allergies   Allergen Reactions     Penicillins             Current Outpatient Prescriptions   Medication Sig Dispense Refill     aspirin 325 MG tablet Take 1 tablet (325 mg) by mouth daily 120 tablet 3     atorvastatin (LIPITOR) 80 MG tablet Take 1 tablet (80 mg) by mouth daily 90 tablet 3     blood glucose monitoring (ONE  "TOUCH DELICA) lancets Use to test blood sugar 3 times daily. 360 each 3     Glucose Blood (BLOOD GLUCOSE TEST STRIPS) STRP four times a day. 360 each 3     hydrochlorothiazide 12.5 MG TABS tablet Take 1 tablet (12.5 mg) by mouth daily 90 tablet 3     insulin glargine (LANTUS) 100 UNIT/ML injection Inject 12 Units Subcutaneous every morning 12 mL 11     Insulin Pen Needle (PEN NEEDLES) 32G X 4 MM MISC 1 Box 4 times daily 400 each 3     metFORMIN (GLUCOPHAGE) 500 MG tablet Take 2 tablets (1,000 mg) by mouth 2 times daily (with meals) 180 tablet 3     senna-docusate (SENOKOT-S;PERICOLACE) 8.6-50 MG per tablet Take 2 tablets by mouth daily as needed for constipation 180 tablet 3     ONETOUCH DELICA LANCETS 33G MISC four times a day.       IBUPROFEN PO          Review Of Systems:    Skin: negative  Respiratory: No shortness of breath, dyspnea on exertion, cough, or hemoptysis  Cardiovascular: negative  Gastrointestinal: negative  Genitourinary: negative  Musculoskeletal: negative  Neurologic: negative  Psychiatric: negative  Hematologic/Lymphatic/Immunologic: negative  Endocrine: Dm    Objective:   /60 (BP Location: Left arm, Patient Position: Supine, Cuff Size: Adult Large)  Pulse 77  Temp 98.5  F (36.9  C) (Tympanic)  Ht 5' 6\" (1.676 m)  Wt 239 lb (108.4 kg)  SpO2 96%  BMI 38.58 kg/m2  EXAM:  Constitutional: alert and no distress   Cardiovascular:  RRR. No murmurs, clicks gallops or rub  Respiratory:  Lungs clear  Psychiatric: mentation appears normal and affect normal/bright  Head/Neuro: Ptosis of left eye with ectropion noted and is baseline.         Orders placed or performed in visit on 06/21/17     aspirin 325 MG tablet     atorvastatin (LIPITOR) 80 MG tablet     blood glucose monitoring (ONE TOUCH DELICA) lancets     Glucose Blood (BLOOD GLUCOSE TEST STRIPS) STRP     hydrochlorothiazide 12.5 MG TABS tablet     insulin glargine (LANTUS) 100 UNIT/ML injection     Insulin Pen Needle (PEN NEEDLES) 32G X 4 " MM MISC     metFORMIN (GLUCOPHAGE) 500 MG tablet     senna-docusate (SENOKOT-S;PERICOLACE) 8.6-50 MG per tablet       Assessment and Plan:    (I67.1) Cerebral aneurysm, nonruptured  (primary encounter diagnosis)  Comment:  Daughter will go through screening as previously recommended for aneurysms per her neurosurgeon-otherwise after lengthy conversation no further genetic testing will be done.    Plan: CANCELED: GENETICS REFERRAL    (Z72.0) Tobacco abuse disorder  Comment: Patient has successfully quit smoking   Plan: As above     (E11.59) Type 2 diabetes mellitus with other circulatory complication (H)  Comment: Repeat A1C in 2-3 months.      Instructions:  Plan: Discussed ongoing need for diabetic medications at length.    Ok to stop HCTZ    Follow in 2-3 Months and labs will be done at that time  30 minutes was spent with the patient and her daughter today.  Greater than 50% of the time was spent face to face with the patient and discussing her concerns.  Patient was educated on need for most of her medications however I agreed to discontinuing HCTZ.  In addition patient wanted to have genetic testing done-we discussed the pros and cons in doing this.  After long discussion she elected for forgo the consult.  She will return in 3 months for follow up-LABs will be done after the visit and not prior.        Yash Cuellar, DO

## 2017-07-11 NOTE — MR AVS SNAPSHOT
"              After Visit Summary   2017    Rosalie JANE Friend    MRN: [id number]           Patient Information     Date Of Birth          1945        Visit Information        Provider Department      2017 1:00 PM Yash Cuellar, DO [facility]        Today's Diagnoses     Cerebral aneurysm, nonruptured    -  1      Care Instructions    Ok to stop HCTZ  Follow in 2-3 Months and labs will be done at that time            Follow-ups after your visit        Who to contact     If you have questions or need follow up information about today's clinic visit or your schedule please contact [facility] directly at [phone].  Normal or non-critical lab and imaging results will be communicated to you by MyChart, letter or phone within 4 business days after the clinic has received the results. If you do not hear from us within 7 days, please contact the clinic through [employer] or phone. If you have a critical or abnormal lab result, we will notify you by phone as soon as possible.  Submit refill requests through [employer] or call your pharmacy and they will forward the refill request to us. Please allow 3 business days for your refill to be completed.          Additional Information About Your Visit        MyChart Information     [employer] lets you send messages to your doctor, view your test results, renew your prescriptions, schedule appointments and more. To sign up, go to [email or web address] . Click on \"Log in\" on the left side of the screen, which will take you to the Welcome page. Then click on \"Sign up Now\" on the right side of the page.     You will be asked to enter the access code listed below, as well as some personal information. Please follow the directions to create your username and password.     Your access code is: [email or web address]  Expires: 2017  2:56 PM     Your access code will  in 90 days. If you need help or a new code, please call your [place] clinic " "or [phone].        Care EveryWhere ID     This is your Care EveryWhere ID. This could be used by other organizations to access your [facility] medical records  [id number]        Your Vitals Were     Pulse Temperature Height Pulse Oximetry BMI (Body Mass Index)       77 98.5  F (36.9  C) (Tympanic) 5' 6\" (1.676 m) 96% 38.58 kg/m2        Blood Pressure from Last 3 Encounters:   07/11/17 120/60   06/27/17 140/68   06/18/17 147/70    Weight from Last 3 Encounters:   07/11/17 239 lb (108.4 kg)   06/27/17 252 lb (114.3 kg)   06/13/17 240 lb (108.9 kg)              Today, you had the following     No orders found for display       Primary Care Provider Office Phone # Fax #    Yash Cuellar [phone]       [facility] [place] [address]  [facility] [address]        Equal Access to Services     GRICELDA BOWEN : Hadii aad ku hadasho Soomaali, waaxda luqadaha, qaybta kaalmada adeegyada, waxay idiin haychuckn keith ontiveros . So [facility] [phone].    ATENCIÓN: Si habla español, tiene a blackmon disposición servicios gratuitos de asistencia lingüística. Llame al [phone].    We comply with applicable federal civil rights laws and [place] laws. We do not discriminate on the basis of race, color, national origin, age, disability sex, sexual orientation or gender identity.            Thank you!     Thank you for choosing [facility] [place]  for your care. Our goal is always to provide you with excellent care. Hearing back from our patients is one way we can continue to improve our services. Please take a few minutes to complete the written survey that you may receive in the mail after your visit with us. Thank you!             Your Updated Medication List - Protect others around you: Learn how to safely use, store and throw away your medicines at www.disposemymeds.org.          This list is accurate as of: 7/11/17  1:37 PM.  Always use your most recent med list.                   " Brand Name Dispense Instructions for use Diagnosis    aspirin 325 MG tablet     120 tablet    Take 1 tablet (325 mg) by mouth daily    History of CVA (cerebrovascular accident)       atorvastatin 80 MG tablet    LIPITOR    90 tablet    Take 1 tablet (80 mg) by mouth daily    Hyperlipidemia LDL goal <100       BLOOD GLUCOSE TEST STRIPS Strp     360 each    four times a day.    Type 2 diabetes mellitus with complication, without long-term current use of insulin (H)       hydrochlorothiazide 12.5 MG Tabs tablet     90 tablet    Take 1 tablet (12.5 mg) by mouth daily    Benign essential hypertension       IBUPROFEN PO           insulin glargine 100 UNIT/ML injection    LANTUS    12 mL    Inject 12 Units Subcutaneous every morning    Type 2 diabetes mellitus with complication, without long-term current use of insulin (H)       metFORMIN 500 MG tablet    GLUCOPHAGE    180 tablet    Take 2 tablets (1,000 mg) by mouth 2 times daily (with meals)    Type 2 diabetes mellitus with hyperglycemia, with long-term current use of insulin (H)       * ONETOUCH DELICA LANCETS 33G Misc      four times a day.        * blood glucose monitoring lancets     360 each    Use to test blood sugar 3 times daily.    Type 2 diabetes mellitus with hyperglycemia, with long-term current use of insulin (H)       Pen Needles 32G X 4 MM Misc     400 each    1 Box 4 times daily    Type 2 diabetes mellitus with complication, without long-term current use of insulin (H)       senna-docusate 8.6-50 MG per tablet    SENOKOT-S;PERICOLACE    180 tablet    Take 2 tablets by mouth daily as needed for constipation    Constipation, unspecified constipation type       * Notice:  This list has 2 medication(s) that are the same as other medications prescribed for you. Read the directions carefully, and ask your doctor or other care provider to review them with you.

## 2017-07-12 ENCOUNTER — TRANSFERRED RECORDS (OUTPATIENT)
Dept: HEALTH INFORMATION MANAGEMENT | Facility: HOSPITAL | Age: 72
End: 2017-07-12

## 2017-07-18 ENCOUNTER — TELEPHONE (OUTPATIENT)
Dept: INTERNAL MEDICINE | Facility: OTHER | Age: 72
End: 2017-07-18

## 2017-07-18 NOTE — TELEPHONE ENCOUNTER
"Reason for call:  Medication    1. Medication Name? metFORMIN (GLUCOPHAGE) 500 MG tablet  2. Is this request for a refill? No  3. What Pharmacy do you use? [facility]  4. Have you contacted your pharmacy? No    5. If yes, when?  (Please note that the turn-around-time for prescriptions is 72 business hours; I am sending your request at this time. SEND TO  [facility]  )  Description: Pt states she has a piece of paper that says to take \"1 tablet by mouth twice daily\", but the bottle says take 2 tablets by mouth twice daily\". Patient states she has been taking them as listed on the bottle (2 x 2 daily). She would like call back to make sure that is the correct dosage.  Was an appointment offered for this a call? No   Preferred method for responding to this messageTelephone Call -[phone]   If we cannot reach you directly, may we leave a detailed response at the number you provided? Yes  Can this message wait until your PCP/Provider returns if not available today? N/a provider is in today  "

## 2017-08-08 ENCOUNTER — TELEPHONE (OUTPATIENT)
Dept: INTERNAL MEDICINE | Facility: OTHER | Age: 72
End: 2017-08-08

## 2017-08-08 ENCOUNTER — OFFICE VISIT (OUTPATIENT)
Dept: INTERNAL MEDICINE | Facility: OTHER | Age: 72
End: 2017-08-08
Attending: INTERNAL MEDICINE
Payer: MEDICARE

## 2017-08-08 VITALS
HEART RATE: 66 BPM | WEIGHT: 239 LBS | DIASTOLIC BLOOD PRESSURE: 72 MMHG | SYSTOLIC BLOOD PRESSURE: 122 MMHG | BODY MASS INDEX: 38.41 KG/M2 | TEMPERATURE: 97.5 F | OXYGEN SATURATION: 95 % | HEIGHT: 66 IN

## 2017-08-08 DIAGNOSIS — K02.9 INFECTED DENTAL CARRIES: ICD-10-CM

## 2017-08-08 DIAGNOSIS — M79.622 PAIN OF LEFT UPPER ARM: ICD-10-CM

## 2017-08-08 DIAGNOSIS — K04.7 INFECTED DENTAL CARRIES: ICD-10-CM

## 2017-08-08 DIAGNOSIS — R68.84 JAW PAIN: Primary | ICD-10-CM

## 2017-08-08 DIAGNOSIS — R51.9 ACUTE NONINTRACTABLE HEADACHE, UNSPECIFIED HEADACHE TYPE: ICD-10-CM

## 2017-08-08 PROCEDURE — 99214 OFFICE O/P EST MOD 30 MIN: CPT | Performed by: INTERNAL MEDICINE

## 2017-08-08 PROCEDURE — 99212 OFFICE O/P EST SF 10 MIN: CPT

## 2017-08-08 PROCEDURE — 93010 ELECTROCARDIOGRAM REPORT: CPT | Performed by: INTERNAL MEDICINE

## 2017-08-08 PROCEDURE — 93005 ELECTROCARDIOGRAM TRACING: CPT

## 2017-08-08 RX ORDER — CLINDAMYCIN HCL 150 MG
150 CAPSULE ORAL 3 TIMES DAILY
Qty: 30 CAPSULE | Refills: 0 | Status: SHIPPED | OUTPATIENT
Start: 2017-08-08 | End: 2017-10-06

## 2017-08-08 RX ORDER — HYDROCODONE BITARTRATE AND ACETAMINOPHEN 5; 325 MG/1; MG/1
1 TABLET ORAL 2 TIMES DAILY PRN
Qty: 20 TABLET | Refills: 0 | Status: SHIPPED | OUTPATIENT
Start: 2017-08-08 | End: 2017-11-07

## 2017-08-08 RX ORDER — CLINDAMYCIN HCL 150 MG
150 CAPSULE ORAL 3 TIMES DAILY
Qty: 30 CAPSULE | Refills: 0 | Status: SHIPPED | OUTPATIENT
Start: 2017-08-08 | End: 2017-08-08

## 2017-08-08 NOTE — NURSING NOTE
"Chief Complaint   Patient presents with     Dental Pain     both sides tooth pain- having pain both sides      Musculoskeletal Problem     left arm feels \"anxious\"      Headache     naggin headaches for over a week        Initial /72 (BP Location: Right arm, Patient Position: Supine, Cuff Size: Adult Large)  Pulse 66  Temp 97.5  F (36.4  C) (Tympanic)  Ht 5' 6\" (1.676 m)  Wt 239 lb (108.4 kg)  SpO2 95%  BMI 38.58 kg/m2 Estimated body mass index is 38.58 kg/(m^2) as calculated from the following:    Height as of this encounter: 5' 6\" (1.676 m).    Weight as of this encounter: 239 lb (108.4 kg).  Medication Reconciliation: complete   Kalee Busby LPN      "

## 2017-08-08 NOTE — PROGRESS NOTES
"Internal Medicine:    Chief Complaint   Patient presents with     Dental Pain     both sides tooth pain- having pain both sides      Musculoskeletal Problem     left arm feels \"anxious\"      Headache     naggin headaches for over a week      Rosalie presents today with several complaints.  First she states she is having pain in her teeth, approximately tooth #21 and 28 on he bottom.  She does have an appointment in [place] at the end of the month.  She denies any drainage or fevers.  Dentition is poor.  Associated with this she reports some relatively mild headaches as of late.  It is felt these are related to her dental pain but she does describe some striking pain at times.  No report of weakness or visual changes.  She does report left arm feeling \"anxious\" but denies arm pain and denies any weakness in the left arm. Denies any similar sensation in her arm compared to previous CVA.  She denies any chest pain or SOB/DONATO.           Patient Active Problem List   Diagnosis     ACP (advance care planning)            Past Medical History:   Diagnosis Date     Cerebral infarction (H)     05/06/2017     Diabetes type 2, uncontrolled (H)      Hyperlipemia             Past Surgical History:   Procedure Laterality Date     ABDOMEN SURGERY      bladder repair     GYN SURGERY      hysterectomy             Social History   Substance Use Topics     Smoking status: Former Smoker     Packs/day: 1.00     Quit date: 5/5/2017     Smokeless tobacco: Never Used     Alcohol use No            Family History   Problem Relation Age of Onset     Aneurysm Mother      Other Cancer Father      DIABETES Brother      Hypertension Brother      Other Cancer Brother      Other Cancer Sister      Aneurysm Sister                Allergies   Allergen Reactions     Penicillins             Current Outpatient Prescriptions   Medication Sig Dispense Refill     clindamycin (CLEOCIN) 150 MG capsule Take 1 capsule (150 mg) by mouth 3 times daily 30 capsule 0 " "    HYDROcodone-acetaminophen (NORCO) 5-325 MG per tablet Take 1 tablet by mouth 2 times daily as needed for moderate to severe pain maximum 2 tablet(s) per day 20 tablet 0     aspirin 325 MG tablet Take 1 tablet (325 mg) by mouth daily 120 tablet 3     atorvastatin (LIPITOR) 80 MG tablet Take 1 tablet (80 mg) by mouth daily 90 tablet 3     blood glucose monitoring (ONE TOUCH DELICA) lancets Use to test blood sugar 3 times daily. 360 each 3     Glucose Blood (BLOOD GLUCOSE TEST STRIPS) STRP four times a day. 360 each 3     hydrochlorothiazide 12.5 MG TABS tablet Take 1 tablet (12.5 mg) by mouth daily 90 tablet 3     insulin glargine (LANTUS) 100 UNIT/ML injection Inject 12 Units Subcutaneous every morning 12 mL 11     Insulin Pen Needle (PEN NEEDLES) 32G X 4 MM MISC 1 Box 4 times daily 400 each 3     metFORMIN (GLUCOPHAGE) 500 MG tablet Take 2 tablets (1,000 mg) by mouth 2 times daily (with meals) 180 tablet 3     senna-docusate (SENOKOT-S;PERICOLACE) 8.6-50 MG per tablet Take 2 tablets by mouth daily as needed for constipation 180 tablet 3     ONETOUCH DELICA LANCETS 33G MISC four times a day.       IBUPROFEN PO          Review Of Systems:    Skin: tight sensation in left arm and leg   Eyes: negative  Ears/Nose/Throat: As above   Respiratory: No shortness of breath, dyspnea on exertion, cough, or hemoptysis  Cardiovascular: negative  Genitourinary: negative  Musculoskeletal: as above  Neurologic: as above  Psychiatric: negative  Endocrine: BG has been a bit elevated lately     Objective:   /72 (BP Location: Right arm, Patient Position: Supine, Cuff Size: Adult Large)  Pulse 66  Temp 97.5  F (36.4  C) (Tympanic)  Ht 5' 6\" (1.676 m)  Wt 239 lb (108.4 kg)  SpO2 95%  BMI 38.58 kg/m2  EXAM:  Constitutional: alert and no distress   Cardiovascular:  RRR. No murmurs, clicks gallops or rub  Respiratory: Percussion normal. Good diaphragmatic excursion. Lungs clear  Psychiatric: mentation appears normal and affect " "normal/bright  ENT: Poor dentition throughout.  Most of the posterior lower teeth are missing.  No abscess identitified with palpation of gum line.    Abdomen: Abdomen soft, non-tender. BS normal. No masses, organomegaly  NEURO: NO weakness was demonstrated in upper extremities with hand  abduction and adduction of both arms.  Strength was equal and symmetric on exam.  Extro pion of left eye is baseline.  Cranial nerves 2-12 were intact bilaterally.  Ambulation was normal.         Orders placed or performed in visit on 08/08/17     clindamycin (CLEOCIN) 150 MG capsule     HYDROcodone-acetaminophen (NORCO) 5-325 MG per tablet     EKG: NSR.    Assessment and Plan:    (R68.84) Jaw pain  (primary encounter diagnosis)  Comment: Appears consistent with dental pain and poor dentition and possibly new dental carries at #21 and #28.    Plan: EKG 12-lead complete w/read - (Clinic         Performed).  Will prescribe Clindamycin in addition to some prn Lortab      (R51) Acute nonintractable headache, unspecified headache type  Comment: Recent history of CVA and noted to have aneurysms s/p [place].  Now with variable mild headaches over the last week or so.  No neurological deficits.    Plan: CT Head w/o Contrast    (K02.9,  K04.7) Infected dental carries  Comment: As above.  Dentist appointment not until end of this month.   Plan: clindamycin (CLEOCIN) 150 MG capsule,         HYDROcodone-acetaminophen (NORCO) 5-325 MG per         tablet    (M79.622) Pain of left upper arm  Comment: Patient doesn't describe pain but describes left arm as being \"anxious\".  Not necessarily weak nor pain.    Plan: EKG NSR.  NO indication as to ACS.      Patient and daughter instructed to present to ED if any symptoms worsen in next 24 hours.  Also patient was scheduled for CT head today scheduled for tomorrow at 1:30 pm.        Yash Cuellar, DO  "

## 2017-08-08 NOTE — MR AVS SNAPSHOT
"              After Visit Summary   8/8/2017    Rosalie JANE Friend    MRN: [id number]           Patient Information     Date Of Birth          1945        Visit Information        Provider Department      8/8/2017 10:45 AM Yash Cuellar, DO [facility]        Today's Diagnoses     Jaw pain    -  1    Acute nonintractable headache, unspecified headache type        Infected dental carries        Pain of left upper arm           Follow-ups after your visit        Your next 10 appointments already scheduled     Aug 09, 2017  1:30 PM CDT   Radiology with HI CT SCAN   HI CT Scan ([facility] )    [address] [place] [id number] [phone]              Who to contact     If you have questions or need follow up information about today's clinic visit or your schedule please contact [facility] directly at [phone].  Normal or non-critical lab and imaging results will be communicated to you by uVorehart, letter or phone within 4 business days after the clinic has received the results. If you do not hear from us within 7 days, please contact the clinic through uVorehart or phone. If you have a critical or abnormal lab result, we will notify you by phone as soon as possible.  Submit refill requests through [employer] or call your pharmacy and they will forward the refill request to us. Please allow 3 business days for your refill to be completed.          Additional Information About Your Visit        MyChart Information     [employer] lets you send messages to your doctor, view your test results, renew your prescriptions, schedule appointments and more. To sign up, go to [email or web address] . Click on \"Log in\" on the left side of the screen, which will take you to the Welcome page. Then click on \"Sign up Now\" on the right side of the page.     You will be asked to enter the access code listed below, as well as some personal information. Please follow the " "directions to create your username and password.     Your access code is: [email or web address]  Expires: 2017 11:13 AM     Your access code will  in 90 days. If you need help or a new code, please call your [facility] or [phone].        Care EveryWhere ID     This is your Care EveryWhere ID. This could be used by other organizations to access your [place] medical records  [id number]        Your Vitals Were     Pulse Temperature Height Pulse Oximetry BMI (Body Mass Index)       66 97.5  F (36.4  C) (Tympanic) 5' 6\" (1.676 m) 95% 38.58 kg/m2        Blood Pressure from Last 3 Encounters:   17 122/72   17 120/60   17 140/68    Weight from Last 3 Encounters:   17 239 lb (108.4 kg)   17 239 lb (108.4 kg)   17 252 lb (114.3 kg)                 Today's Medication Changes          These changes are accurate as of: 17 11:13 AM.  If you have any questions, ask your nurse or doctor.               Start taking these medicines.        Dose/Directions    clindamycin 150 MG capsule   Commonly known as:  CLEOCIN   Used for:  Infected dental carries   Started by:  Yash Cuellar DO        Dose:  150 mg   Take 1 capsule (150 mg) by mouth 3 times daily   Quantity:  30 capsule   Refills:  0       HYDROcodone-acetaminophen 5-325 MG per tablet   Commonly known as:  NORCO   Used for:  Infected dental carries   Started by:  Yash Cuellar DO        Dose:  1 tablet   Take 1 tablet by mouth 2 times daily as needed for moderate to severe pain maximum 2 tablet(s) per day   Quantity:  20 tablet   Refills:  0            Where to get your medicines      These medications were sent to [id number] [place] [address] [address]     Phone:  [phone]     clindamycin 150 MG capsule         Some of these will need a paper prescription and others can be bought over the counter.  Ask your nurse if you have questions.     Bring a paper " prescription for each of these medications     HYDROcodone-acetaminophen 5-325 MG per tablet                Primary Care Provider Office Phone # Fax #    Yash BOOGIE Kehindeandrew  [phone]       [facility] [address] [place] [address]        Equal Access to Services     MICHELLE BOWEN : Hadii aad ku hadasho Soomaali, waaxda luqadaha, qaybta kaalmada adeegyada, waxay idiin hayaan adeezekiel swartzignaciojose laharriet neri. So [facility] [phone].    ATENCIÓN: Si habla español, tiene a blackmon disposición servicios gratuitos de asistencia lingüística. Hu al [phone].    We comply with applicable federal civil rights laws and [place] laws. We do not discriminate on the basis of race, color, national origin, age, disability sex, sexual orientation or gender identity.            Thank you!     Thank you for choosing [facility]  for your care. Our goal is always to provide you with excellent care. Hearing back from our patients is one way we can continue to improve our services. Please take a few minutes to complete the written survey that you may receive in the mail after your visit with us. Thank you!             Your Updated Medication List - Protect others around you: Learn how to safely use, store and throw away your medicines at www.disposemymeds.org.          This list is accurate as of: 8/8/17 11:13 AM.  Always use your most recent med list.                   Brand Name Dispense Instructions for use Diagnosis    aspirin 325 MG tablet     120 tablet    Take 1 tablet (325 mg) by mouth daily    History of CVA (cerebrovascular accident)       atorvastatin 80 MG tablet    LIPITOR    90 tablet    Take 1 tablet (80 mg) by mouth daily    Hyperlipidemia LDL goal <100       BLOOD GLUCOSE TEST STRIPS Strp     360 each    four times a day.    Type 2 diabetes mellitus with complication, without long-term current use of insulin (H)       clindamycin 150 MG capsule    CLEOCIN    30 capsule    Take  1 capsule (150 mg) by mouth 3 times daily    Infected dental carries       hydrochlorothiazide 12.5 MG Tabs tablet     90 tablet    Take 1 tablet (12.5 mg) by mouth daily    Benign essential hypertension       HYDROcodone-acetaminophen 5-325 MG per tablet    NORCO    20 tablet    Take 1 tablet by mouth 2 times daily as needed for moderate to severe pain maximum 2 tablet(s) per day    Infected dental carries       IBUPROFEN PO           insulin glargine 100 UNIT/ML injection    LANTUS    12 mL    Inject 12 Units Subcutaneous every morning    Type 2 diabetes mellitus with complication, without long-term current use of insulin (H)       metFORMIN 500 MG tablet    GLUCOPHAGE    180 tablet    Take 2 tablets (1,000 mg) by mouth 2 times daily (with meals)    Type 2 diabetes mellitus with hyperglycemia, with long-term current use of insulin (H)       * ONETOUCH DELICA LANCETS 33G Misc      four times a day.        * blood glucose monitoring lancets     360 each    Use to test blood sugar 3 times daily.    Type 2 diabetes mellitus with hyperglycemia, with long-term current use of insulin (H)       Pen Needles 32G X 4 MM Misc     400 each    1 Box 4 times daily    Type 2 diabetes mellitus with complication, without long-term current use of insulin (H)       senna-docusate 8.6-50 MG per tablet    SENOKOT-S;PERICOLACE    180 tablet    Take 2 tablets by mouth daily as needed for constipation    Constipation, unspecified constipation type       * Notice:  This list has 2 medication(s) that are the same as other medications prescribed for you. Read the directions carefully, and ask your doctor or other care provider to review them with you.

## 2017-08-09 ENCOUNTER — HOSPITAL ENCOUNTER (OUTPATIENT)
Dept: CT IMAGING | Facility: HOSPITAL | Age: 72
Discharge: HOME | End: 2017-08-09
Attending: INTERNAL MEDICINE | Admitting: INTERNAL MEDICINE
Payer: MEDICARE

## 2017-08-09 PROCEDURE — 70450 CT HEAD/BRAIN W/O DYE: CPT | Mod: TC

## 2017-08-11 ENCOUNTER — TELEPHONE (OUTPATIENT)
Dept: INTERNAL MEDICINE | Facility: OTHER | Age: 72
End: 2017-08-11

## 2017-08-11 NOTE — TELEPHONE ENCOUNTER
Pt states the medication that was used in her eyes twice is tropicamide phenylephrine - she would like to add this to her allergy list. Thanks. Kalee Busby LPN

## 2017-08-11 NOTE — TELEPHONE ENCOUNTER
----- Message from Yash Cuellar DO sent at 8/11/2017  7:43 AM CDT -----  CT of head appears stable.  Old stroke and coils visualized.  How are her symptoms?    John

## 2017-08-11 NOTE — TELEPHONE ENCOUNTER
Pt states he feels better- states stroke doctor from [place] called for a follow up appointment- asked if she should still keep the appointment- advised her she should. She will call to set up that appointment again. Also states when she got here eye exam last she states the drop they put in her eyes gave her headaches for a while and that happened before- I advised her to call the eye doctor and asked what drug was put in her eyes and to bring that info to the stroke doctor. Pt states she will call [facility] and ask. Kalee Busby LPN

## 2017-08-14 ENCOUNTER — TELEPHONE (OUTPATIENT)
Dept: INTERNAL MEDICINE | Facility: OTHER | Age: 72
End: 2017-08-14

## 2017-08-14 DIAGNOSIS — Z53.9 ERRONEOUS ENCOUNTER--DISREGARD: Primary | ICD-10-CM

## 2017-08-16 ENCOUNTER — TELEPHONE (OUTPATIENT)
Dept: INTERNAL MEDICINE | Facility: OTHER | Age: 72
End: 2017-08-16

## 2017-08-16 NOTE — TELEPHONE ENCOUNTER
Pt states not sure if she took her insulin today or not- was advised to test blood sugars more frequently today and be more aware of symptoms. If blood sugars go above 200 remain regimen. Kalee Busby LPN

## 2017-08-16 NOTE — TELEPHONE ENCOUNTER
Who turned it up?  She does not appear to have seen Xochitl in awhile.  I do not recall doing anything with her insulin either.  We can ask Xochitl but not sure what Shonda is referring to.

## 2017-08-31 ENCOUNTER — TELEPHONE (OUTPATIENT)
Dept: EDUCATION SERVICES | Facility: HOSPITAL | Age: 72
End: 2017-08-31

## 2017-08-31 NOTE — TELEPHONE ENCOUNTER
Called patient to schedule her appointment in diabetes with Xochitl Daniels patient said she has her numbers down low and she is doing just find and she doesn't want to come in.

## 2017-10-05 ENCOUNTER — HOSPITAL ENCOUNTER (EMERGENCY)
Facility: HOSPITAL | Age: 72
Discharge: LEFT BEFORE DISCHARGE | End: 2017-10-05
Admitting: NURSE PRACTITIONER
Payer: MEDICARE

## 2017-10-05 ENCOUNTER — TELEPHONE (OUTPATIENT)
Dept: INTERNAL MEDICINE | Facility: OTHER | Age: 72
End: 2017-10-05

## 2017-10-05 VITALS
OXYGEN SATURATION: 96 % | RESPIRATION RATE: 16 BRPM | SYSTOLIC BLOOD PRESSURE: 189 MMHG | DIASTOLIC BLOOD PRESSURE: 91 MMHG | TEMPERATURE: 98 F

## 2017-10-05 LAB — GLUCOSE BLDC GLUCOMTR-MCNC: 150 MG/DL (ref 70–99)

## 2017-10-05 PROCEDURE — 00000146 ZZHCL STATISTIC GLUCOSE BY METER IP

## 2017-10-05 PROCEDURE — 40000268 ZZH STATISTIC NO CHARGES

## 2017-10-05 NOTE — TELEPHONE ENCOUNTER
Called pt back- states her meter was reading high and she was concerned so she went to the ER. Offered apt tomorrow to get meter calibrated. Scheduled with Xochitl on 10/6/17. Kalee Busby LPN

## 2017-10-05 NOTE — TELEPHONE ENCOUNTER
Patient is having trouble with her blood glucose meter she is getting high readings and the patient [facility] and she is willing to wait until Friday when Dr Cuellar and his nurse are back in the office.

## 2017-10-06 ENCOUNTER — HOSPITAL ENCOUNTER (OUTPATIENT)
Dept: EDUCATION SERVICES | Facility: HOSPITAL | Age: 72
Discharge: HOME | End: 2017-10-06
Attending: INTERNAL MEDICINE | Admitting: INTERNAL MEDICINE
Payer: MEDICARE

## 2017-10-06 VITALS
BODY MASS INDEX: 40.85 KG/M2 | HEIGHT: 65 IN | SYSTOLIC BLOOD PRESSURE: 143 MMHG | HEART RATE: 105 BPM | OXYGEN SATURATION: 92 % | WEIGHT: 245.2 LBS | DIASTOLIC BLOOD PRESSURE: 78 MMHG

## 2017-10-06 DIAGNOSIS — E11.9 TYPE 2 DIABETES MELLITUS WITHOUT COMPLICATION, WITH LONG-TERM CURRENT USE OF INSULIN (H): Primary | ICD-10-CM

## 2017-10-06 DIAGNOSIS — Z79.4 TYPE 2 DIABETES MELLITUS WITHOUT COMPLICATION, WITH LONG-TERM CURRENT USE OF INSULIN (H): Primary | ICD-10-CM

## 2017-10-06 LAB — HBA1C MFR BLD: 6.2 % (ref 0–5.7)

## 2017-10-06 PROCEDURE — G0108 DIAB MANAGE TRN  PER INDIV: HCPCS | Performed by: DIETITIAN, REGISTERED

## 2017-10-06 PROCEDURE — 83036 HEMOGLOBIN GLYCOSYLATED A1C: CPT | Performed by: DIETITIAN, REGISTERED

## 2017-10-06 PROCEDURE — 36416 COLLJ CAPILLARY BLOOD SPEC: CPT | Performed by: DIETITIAN, REGISTERED

## 2017-10-06 ASSESSMENT — PAIN SCALES - GENERAL: PAINLEVEL: NO PAIN (0)

## 2017-10-06 NOTE — IP AVS SNAPSHOT
[facility]    [address] [place] [id number]    Phone:  [phone]    Fax:  [phone]                                       After Visit Summary   10/6/2017    Rosalie Seals    MRN: [id number]           After Visit Summary Signature Page     I have received my discharge instructions, and my questions have been answered. I have discussed any challenges I see with this plan with the nurse or doctor.    ..........................................................................................................................................  Patient/Patient Representative Signature      ..........................................................................................................................................  Patient Representative Print Name and Relationship to Patient    ..................................................               ................................................  Date                                            Time    ..........................................................................................................................................  Reviewed by Signature/Title    ...................................................              ..............................................  Date                                                            Time

## 2017-10-06 NOTE — IP AVS SNAPSHOT
MRN:[id number]                      After Visit Summary   10/6/2017    Rosalie JANE Friend    MRN: [id number]           Thank you!     Thank you for choosing [place] for your care. Our goal is always to provide you with excellent care. Hearing back from our patients is one way we can continue to improve our services. Please take a few minutes to complete the written survey that you may receive in the mail after you visit with us. Thank you!        Patient Information     Date Of Birth          1945        About your hospital stay     You were admitted on:  October 6, 2017 You last received care in the:  [facility]    You were discharged on:  October 6, 2017       Who to Call     For medical emergencies, please call 911.  For non-urgent questions about your medical care, please call your primary care provider or clinic, [phone]          Attending Provider     Provider Specialty    Yash Cuellar, DO Internal Medicine       Primary Care Provider Office Phone # Fax #    Yash Cuellar [phone]      Further instructions from your care team       -Try to limit carbohydrates in your diet.  Eat smaller portions.  Limit sweets.    -Try to get some exercise on most days of the week as able.   -Test your glucose 1-2x/day - fasting and 2 hours after supper.   -Target levels are fasting , 2 hours after meals less than 180.   -A1c today was 6.2% which is down from 8.4% - great job!!  -Weight today was 245.2# - down 8# since May 2017 - great job!!  -Call with any questions - VALENTINE Merino, [phone].     Pending Results     No orders found from 10/4/2017 to 10/7/2017.            Admission Information     Date & Time Provider Department Dept. Phone    10/6/2017 Yash Cuellar, DO [facility] [phone]      Your Vitals Were     Blood Pressure Pulse Height Weight Pulse Oximetry BMI (Body Mass Index)    143/78 (BP  "Location: Right arm, Patient Position: Chair, Cuff Size: Adult Large) 105 1.651 m (5' 5\") 111.2 kg (245 lb 3.2 oz) 92% 40.8 kg/m2      [employer] Information     [employer] lets you send messages to your doctor, view your test results, renew your prescriptions, schedule appointments and more. To sign up, go to [email or web address] . Click on \"Log in\" on the left side of the screen, which will take you to the Welcome page. Then click on \"Sign up Now\" on the right side of the page.     You will be asked to enter the access code listed below, as well as some personal information. Please follow the directions to create your username and password.     Your access code is: [email or web address]  Expires: 2017 11:13 AM     Your access code will  in 90 days. If you need help or a new code, please call your [place] clinic or [phone].        Care EveryWhere ID     This is your Care EveryWhere ID. This could be used by other organizations to access your [place] medical records  [id number]        Equal Access to Services     MICHELLE BOWEN AH: Hadii amos Lemus, waoanhda carlyle, qaybta kaalmada luciana, nery neri. So [facility] [phone].    ATENCIÓN: Si habla español, tiene a blackmon disposición servicios gratuitos de asistencia lingüística. Hu al [phone].    We comply with applicable federal civil rights laws and [place] laws. We do not discriminate on the basis of race, color, national origin, age, disability, sex, sexual orientation, or gender identity.               Review of your medicines      UNREVIEWED medicines. Ask your doctor about these medicines        Dose / Directions    aspirin 325 MG tablet   Used for:  History of CVA (cerebrovascular accident)        Dose:  325 mg   Take 1 tablet (325 mg) by mouth daily   Quantity:  120 tablet   Refills:  3       atorvastatin 80 MG tablet   Commonly known as:  LIPITOR   Used for:  Hyperlipidemia LDL goal <100        Dose:  " 80 mg   Take 1 tablet (80 mg) by mouth daily   Quantity:  90 tablet   Refills:  3       hydrochlorothiazide 12.5 MG Tabs tablet   Used for:  Benign essential hypertension        Dose:  12.5 mg   Take 1 tablet (12.5 mg) by mouth daily   Quantity:  90 tablet   Refills:  3       HYDROcodone-acetaminophen 5-325 MG per tablet   Commonly known as:  NORCO   Used for:  Infected dental carries        Dose:  1 tablet   Take 1 tablet by mouth 2 times daily as needed for moderate to severe pain maximum 2 tablet(s) per day   Quantity:  20 tablet   Refills:  0       IBUPROFEN PO        Refills:  0       insulin glargine 100 UNIT/ML injection   Commonly known as:  LANTUS   Used for:  Type 2 diabetes mellitus with complication, without long-term current use of insulin (H)        Dose:  12 Units   Inject 12 Units Subcutaneous every morning   Quantity:  12 mL   Refills:  11       metFORMIN 500 MG tablet   Commonly known as:  GLUCOPHAGE   Used for:  Type 2 diabetes mellitus with hyperglycemia, with long-term current use of insulin (H)        Dose:  1000 mg   Take 2 tablets (1,000 mg) by mouth 2 times daily (with meals)   Quantity:  180 tablet   Refills:  3       senna-docusate 8.6-50 MG per tablet   Commonly known as:  SENOKOT-S;PERICOLACE   Used for:  Constipation, unspecified constipation type        Dose:  2 tablet   Take 2 tablets by mouth daily as needed for constipation   Quantity:  180 tablet   Refills:  3         CONTINUE these medicines which have NOT CHANGED        Dose / Directions    BLOOD GLUCOSE TEST STRIPS Strp   Used for:  Type 2 diabetes mellitus with complication, without long-term current use of insulin (H)        four times a day.   Quantity:  360 each   Refills:  3       * ONETOUCH DELICA LANCETS 33G Misc        four times a day.   Refills:  0       * blood glucose monitoring lancets   Used for:  Type 2 diabetes mellitus with hyperglycemia, with long-term current use of insulin (H)        Use to test blood sugar 3  times daily.   Quantity:  360 each   Refills:  3       Pen Needles 32G X 4 MM Misc   Used for:  Type 2 diabetes mellitus with complication, without long-term current use of insulin (H)        Dose:  1 Box   1 Box 4 times daily   Quantity:  400 each   Refills:  3       * Notice:  This list has 2 medication(s) that are the same as other medications prescribed for you. Read the directions carefully, and ask your doctor or other care provider to review them with you.             Protect others around you: Learn how to safely use, store and throw away your medicines at www.disposemymeds.org.             Medication List: This is a list of all your medications and when to take them. Check marks below indicate your daily home schedule. Keep this list as a reference.      Medications           Morning Afternoon Evening Bedtime As Needed    aspirin 325 MG tablet   Take 1 tablet (325 mg) by mouth daily                                atorvastatin 80 MG tablet   Commonly known as:  LIPITOR   Take 1 tablet (80 mg) by mouth daily                                BLOOD GLUCOSE TEST STRIPS Strp   four times a day.                                hydrochlorothiazide 12.5 MG Tabs tablet   Take 1 tablet (12.5 mg) by mouth daily                                HYDROcodone-acetaminophen 5-325 MG per tablet   Commonly known as:  NORCO   Take 1 tablet by mouth 2 times daily as needed for moderate to severe pain maximum 2 tablet(s) per day                                IBUPROFEN PO                                insulin glargine 100 UNIT/ML injection   Commonly known as:  LANTUS   Inject 12 Units Subcutaneous every morning                                metFORMIN 500 MG tablet   Commonly known as:  GLUCOPHAGE   Take 2 tablets (1,000 mg) by mouth 2 times daily (with meals)                                * ONETOUCH DELICA LANCETS 33G Misc   four times a day.                                * blood glucose monitoring lancets   Use to test blood  sugar 3 times daily.                                Pen Needles 32G X 4 MM Misc   1 Box 4 times daily                                senna-docusate 8.6-50 MG per tablet   Commonly known as:  SENOKOT-S;PERICOLACE   Take 2 tablets by mouth daily as needed for constipation                                * Notice:  This list has 2 medication(s) that are the same as other medications prescribed for you. Read the directions carefully, and ask your doctor or other care provider to review them with you.

## 2017-10-06 NOTE — PROGRESS NOTES
"Pt is here today because she feels her meter is not working correctly.  Pt came to session 1 in May and did not return for follow up.      /78 (BP Location: Right arm, Patient Position: Chair, Cuff Size: Adult Large)  Pulse 105  Ht 1.651 m (5' 5\")  Wt 111.2 kg (245 lb 3.2 oz)  SpO2 92%  BMI 40.8 kg/m2  Weight is down 7.7# since visit in May.     Current diabetes medications:  Lantus 12 units am, Metformin 1000 mg bid.      Lab Results   Component Value Date    A1C 6.2 10/06/2017    A1C 8.4 06/13/2017    A1C 10.7 05/06/2017     A1c has trended down nicely and is now in target.  Pt states she is working hard as she wants to \"get off of insulin.\"      Tested pt's One Touch Verio using control solution.  Control was not in target range indicating that meter was not working correctly.  Replaced pt's meter with a new One Touch Verio.  Reviewed testing times with pt along with target glucose levels.      Follow up: as needed.      Total time spent with pt was 30 minutes.    "

## 2017-10-06 NOTE — NURSING NOTE
"Chief Complaint   Patient presents with     Diabetes     meter download.        Initial /78 (BP Location: Right arm, Patient Position: Chair, Cuff Size: Adult Large)  Pulse 105  Ht 1.651 m (5' 5\")  Wt 111.2 kg (245 lb 3.2 oz)  SpO2 92%  BMI 40.8 kg/m2 Estimated body mass index is 40.8 kg/(m^2) as calculated from the following:    Height as of this encounter: 1.651 m (5' 5\").    Weight as of this encounter: 111.2 kg (245 lb 3.2 oz).  Medication Reconciliation: complete   Nuha Mistry LPN      "

## 2017-10-06 NOTE — DISCHARGE INSTRUCTIONS
-Try to limit carbohydrates in your diet.  Eat smaller portions.  Limit sweets.    -Try to get some exercise on most days of the week as able.   -Test your glucose 1-2x/day - fasting and 2 hours after supper.   -Target levels are fasting , 2 hours after meals less than 180.   -A1c today was 6.2% which is down from 8.4% - great job!!  -Weight today was 245.2# - down 8# since May 2017 - great job!!  -Call with any questions - VALENTINE Merino, [phone].

## 2017-11-07 ENCOUNTER — OFFICE VISIT (OUTPATIENT)
Dept: INTERNAL MEDICINE | Facility: OTHER | Age: 72
End: 2017-11-07
Attending: INTERNAL MEDICINE
Payer: MEDICARE

## 2017-11-07 ENCOUNTER — RADIANT APPOINTMENT (OUTPATIENT)
Dept: GENERAL RADIOLOGY | Facility: OTHER | Age: 72
End: 2017-11-07
Attending: INTERNAL MEDICINE
Payer: MEDICARE

## 2017-11-07 VITALS
OXYGEN SATURATION: 95 % | BODY MASS INDEX: 41.32 KG/M2 | WEIGHT: 248 LBS | HEIGHT: 65 IN | DIASTOLIC BLOOD PRESSURE: 70 MMHG | TEMPERATURE: 97.5 F | HEART RATE: 92 BPM | SYSTOLIC BLOOD PRESSURE: 138 MMHG

## 2017-11-07 DIAGNOSIS — I63.039 CEREBROVASCULAR ACCIDENT (CVA) DUE TO THROMBOSIS OF CAROTID ARTERY, UNSPECIFIED BLOOD VESSEL LATERALITY (H): Primary | ICD-10-CM

## 2017-11-07 DIAGNOSIS — E11.8 DM TYPE 2, CONTROLLED, WITH COMPLICATION (H): ICD-10-CM

## 2017-11-07 DIAGNOSIS — M25.521 PAIN IN JOINT INVOLVING UPPER ARM, RIGHT: Primary | ICD-10-CM

## 2017-11-07 DIAGNOSIS — H93.13 TINNITUS, BILATERAL: ICD-10-CM

## 2017-11-07 DIAGNOSIS — M25.521 RIGHT ELBOW PAIN: ICD-10-CM

## 2017-11-07 DIAGNOSIS — I10 ESSENTIAL HYPERTENSION: ICD-10-CM

## 2017-11-07 LAB
ANION GAP SERPL CALCULATED.3IONS-SCNC: 4 MMOL/L (ref 3–14)
BUN SERPL-MCNC: 9 MG/DL (ref 7–30)
CALCIUM SERPL-MCNC: 9.4 MG/DL (ref 8.5–10.1)
CHLORIDE SERPL-SCNC: 104 MMOL/L (ref 94–109)
CO2 SERPL-SCNC: 29 MMOL/L (ref 20–32)
CREAT SERPL-MCNC: 0.74 MG/DL (ref 0.52–1.04)
GFR SERPL CREATININE-BSD FRML MDRD: 78 ML/MIN/1.7M2
GLUCOSE SERPL-MCNC: 105 MG/DL (ref 70–99)
POTASSIUM SERPL-SCNC: 4.8 MMOL/L (ref 3.4–5.3)
SODIUM SERPL-SCNC: 137 MMOL/L (ref 133–144)

## 2017-11-07 PROCEDURE — 80048 BASIC METABOLIC PNL TOTAL CA: CPT | Mod: ZL | Performed by: INTERNAL MEDICINE

## 2017-11-07 PROCEDURE — 36415 COLL VENOUS BLD VENIPUNCTURE: CPT | Mod: ZL | Performed by: INTERNAL MEDICINE

## 2017-11-07 PROCEDURE — 99214 OFFICE O/P EST MOD 30 MIN: CPT | Performed by: INTERNAL MEDICINE

## 2017-11-07 PROCEDURE — 99212 OFFICE O/P EST SF 10 MIN: CPT

## 2017-11-07 PROCEDURE — 73070 X-RAY EXAM OF ELBOW: CPT | Mod: TC,RT

## 2017-11-07 ASSESSMENT — PAIN SCALES - GENERAL: PAINLEVEL: NO PAIN (0)

## 2017-11-07 ASSESSMENT — ANXIETY QUESTIONNAIRES
3. WORRYING TOO MUCH ABOUT DIFFERENT THINGS: NOT AT ALL
5. BEING SO RESTLESS THAT IT IS HARD TO SIT STILL: NOT AT ALL
7. FEELING AFRAID AS IF SOMETHING AWFUL MIGHT HAPPEN: NOT AT ALL
GAD7 TOTAL SCORE: 0
6. BECOMING EASILY ANNOYED OR IRRITABLE: NOT AT ALL
2. NOT BEING ABLE TO STOP OR CONTROL WORRYING: NOT AT ALL
1. FEELING NERVOUS, ANXIOUS, OR ON EDGE: NOT AT ALL

## 2017-11-07 ASSESSMENT — PATIENT HEALTH QUESTIONNAIRE - PHQ9
5. POOR APPETITE OR OVEREATING: NOT AT ALL
SUM OF ALL RESPONSES TO PHQ QUESTIONS 1-9: 0

## 2017-11-07 NOTE — MR AVS SNAPSHOT
"              After Visit Summary   11/7/2017    Rosalie JANE Friend    MRN: [id number]           Patient Information     Date Of Birth          1945        Visit Information        Provider Department      11/7/2017 10:15 AM Yash Cuellar DO [facility]        Today's Diagnoses     Cerebrovascular accident (CVA) due to thrombosis of carotid artery, unspecified blood vessel laterality (H)    -  1    Right elbow pain        Tinnitus, bilateral        Essential hypertension        DM type 2, controlled, with complication (H)           Follow-ups after your visit        Your next 10 appointments already scheduled     Feb 06, 2018 10:15 AM CST   (Arrive by 10:00 AM)   SHORT with Yash Cuellar DO   [facility] ([facility]    [address]  [place] [address]   [phone]              Who to contact     If you have questions or need follow up information about today's clinic visit or your schedule please contact [facility] directly at [phone].  Normal or non-critical lab and imaging results will be communicated to you by Negotianthart, letter or phone within 4 business days after the clinic has received the results. If you do not hear from us within 7 days, please contact the clinic through amaysimt or phone. If you have a critical or abnormal lab result, we will notify you by phone as soon as possible.  Submit refill requests through iMOSPHERE or call your pharmacy and they will forward the refill request to us. Please allow 3 business days for your refill to be completed.          Additional Information About Your Visit        [employer] Information     iMOSPHERE lets you send messages to your doctor, view your test results, renew your prescriptions, schedule appointments and more. To sign up, go to [email or web address] . Click on \"Log in\" on the left side of the screen, which will take you to the Welcome page. Then click on \"Sign up " "Now\" on the right side of the page.     You will be asked to enter the access code listed below, as well as some personal information. Please follow the directions to create your username and password.     Your access code is: [email or web address]  Expires: 2018 11:20 AM     Your access code will  in 90 days. If you need help or a new code, please call your [place] clinic or [phone].        Care EveryWhere ID     This is your Care EveryWhere ID. This could be used by other organizations to access your [place] medical records  [id number]        Your Vitals Were     Pulse Temperature Height Pulse Oximetry BMI (Body Mass Index)       92 97.5  F (36.4  C) (Tympanic) 5' 5\" (1.651 m) 95% 41.27 kg/m2        Blood Pressure from Last 3 Encounters:   17 138/70   10/06/17 143/78   10/05/17 (!) 189/91    Weight from Last 3 Encounters:   17 248 lb (112.5 kg)   10/06/17 245 lb 3.2 oz (111.2 kg)   17 239 lb (108.4 kg)              We Performed the Following     Basic metabolic panel          Today's Medication Changes          These changes are accurate as of: 17 11:20 AM.  If you have any questions, ask your nurse or doctor.               These medicines have changed or have updated prescriptions.        Dose/Directions    * aspirin 325 MG tablet   This may have changed:  Another medication with the same name was added. Make sure you understand how and when to take each.   Used for:  History of CVA (cerebrovascular accident)   Changed by:  Yash Cuellar DO        Dose:  325 mg   Take 1 tablet (325 mg) by mouth daily   Quantity:  120 tablet   Refills:  3       * aspirin 81 MG tablet   This may have changed:  You were already taking a medication with the same name, and this prescription was added. Make sure you understand how and when to take each.   Used for:  Cerebrovascular accident (CVA) due to thrombosis of carotid artery, unspecified blood vessel laterality (H)   Changed by:  " Yash Cuellar DO        Dose:  162 mg   Take 2 tablets (162 mg) by mouth daily   Quantity:  180 tablet   Refills:  3       senna-docusate 8.6-50 MG per tablet   Commonly known as:  SENOKOT-S;PERICOLACE   This may have changed:  additional instructions   Used for:  Constipation, unspecified constipation type        Dose:  2 tablet   Take 2 tablets by mouth daily as needed for constipation   Quantity:  180 tablet   Refills:  3       * Notice:  This list has 2 medication(s) that are the same as other medications prescribed for you. Read the directions carefully, and ask your doctor or other care provider to review them with you.         Where to get your medicines      These medications were sent to [facility] - [address] [address]     Phone:  [phone]     aspirin 81 MG tablet                Primary Care Provider Office Phone # Fax #    Yash Cuellar [phone]       [address] [address]        Equal Access to Services     [facility]: Hadii amos ku hadasho Soomaali, waaxda luqadaha, qaybta kaalmada adeegyada, waxay idiin haychuckn keith ontiveros . So [facility] [phone].    ATENCIÓN: Si habla español, tiene a blackmon disposición servicios gratuitos de asistencia lingüística. [facility] [phone].    We comply with applicable federal civil rights laws and [place] laws. We do not discriminate on the basis of race, color, national origin, age, disability, sex, sexual orientation, or gender identity.            Thank you!     Thank you for choosing [facility]  for your care. Our goal is always to provide you with excellent care. Hearing back from our patients is one way we can continue to improve our services. Please take a few minutes to complete the written survey that you may receive in the mail after your visit with us. Thank you!              Your Updated Medication List - Protect others around you: Learn how to safely use, store and throw away your medicines at www.disposemymeds.org.          This list is accurate as of: 11/7/17 11:20 AM.  Always use your most recent med list.                   Brand Name Dispense Instructions for use Diagnosis    * aspirin 325 MG tablet     120 tablet    Take 1 tablet (325 mg) by mouth daily    History of CVA (cerebrovascular accident)       * aspirin 81 MG tablet     180 tablet    Take 2 tablets (162 mg) by mouth daily    Cerebrovascular accident (CVA) due to thrombosis of carotid artery, unspecified blood vessel laterality (H)       atorvastatin 80 MG tablet    LIPITOR    90 tablet    Take 1 tablet (80 mg) by mouth daily    Hyperlipidemia LDL goal <100       BLOOD GLUCOSE TEST STRIPS Strp     360 each    four times a day.    Type 2 diabetes mellitus with complication, without long-term current use of insulin (H)       hydrochlorothiazide 12.5 MG Tabs tablet     90 tablet    Take 1 tablet (12.5 mg) by mouth daily    Benign essential hypertension       IBUPROFEN PO           insulin glargine 100 UNIT/ML injection    LANTUS    12 mL    Inject 12 Units Subcutaneous every morning    Type 2 diabetes mellitus with complication, without long-term current use of insulin (H)       metFORMIN 500 MG tablet    GLUCOPHAGE    180 tablet    Take 2 tablets (1,000 mg) by mouth 2 times daily (with meals)    Type 2 diabetes mellitus with hyperglycemia, with long-term current use of insulin (H)       * ONETOUCH DELICA LANCETS 33G Misc      four times a day.        * blood glucose monitoring lancets     360 each    Use to test blood sugar 3 times daily.    Type 2 diabetes mellitus with hyperglycemia, with long-term current use of insulin (H)       Pen Needles 32G X 4 MM Misc     400 each    1 Box 4 times daily    Type 2 diabetes mellitus with complication, without long-term current use of insulin (H)       senna-docusate 8.6-50 MG per  tablet    SENOKOT-S;PERICOLACE    180 tablet    Take 2 tablets by mouth daily as needed for constipation    Constipation, unspecified constipation type       * Notice:  This list has 4 medication(s) that are the same as other medications prescribed for you. Read the directions carefully, and ask your doctor or other care provider to review them with you.

## 2017-11-07 NOTE — PROGRESS NOTES
Internal Medicine:    Chief Complaint   Patient presents with     Diabetes     checking b/s bid - no readings under 70 a couple over 200- no complaints of shakey, dizzy,confused,lethargic- no open sores or frequent infections left ball of foot burning sensation every once in a while - pt is fasting today      Derm Problem     pt had anirysm coil and states top of head is tender - wondering if normal      Ear Problem     ringing in ears      Recheck Medication     pt would like to lower her does of aspirin to 81mg daily      Musculoskeletal Problem     pt fell on right arm - pt states was seen at [facility] was told to get MRI- was not able to get once she drove down to [place]      Rosalie presents today for routine follow up for her history of CVA and Diabetes.  He most recent A1C was 6.2 and she reports no highs and no lows.  She denies any recurrent symptoms of CVA-NO weakness, no Numbness.  She did report a fall awhile back while traveling.  She states her right elbow and forearm are tender but no loss of strength.  She remarks that there may be slight swelling of the elbow also.  She also reports tinnitus today and asks what could be causing that for her.           Patient Active Problem List   Diagnosis     ACP (advance care planning)            Past Medical History:   Diagnosis Date     Cerebral infarction (H)     05/06/2017     Diabetes type 2, uncontrolled (H)      Hyperlipemia             Past Surgical History:   Procedure Laterality Date     ABDOMEN SURGERY      bladder repair     GYN SURGERY      hysterectomy             Social History   Substance Use Topics     Smoking status: Current Every Day Smoker     Packs/day: 1.00     Types: Cigarettes     Last attempt to quit: 5/5/2017     Smokeless tobacco: Never Used      Comment: limits self to about 4-5 daily      Alcohol use No            Family History   Problem Relation Age of Onset     Aneurysm Mother      Other Cancer Father       DIABETES Brother      Hypertension Brother      Other Cancer Brother      Other Cancer Sister      Aneurysm Sister                Allergies   Allergen Reactions     Penicillins      Phenylephrine      Head aches     Tropicamide      Head aches              Current Outpatient Prescriptions   Medication Sig Dispense Refill     aspirin 81 MG tablet Take 2 tablets (162 mg) by mouth daily 180 tablet 3     aspirin 325 MG tablet Take 1 tablet (325 mg) by mouth daily 120 tablet 3     atorvastatin (LIPITOR) 80 MG tablet Take 1 tablet (80 mg) by mouth daily 90 tablet 3     blood glucose monitoring (ONE TOUCH DELICA) lancets Use to test blood sugar 3 times daily. 360 each 3     Glucose Blood (BLOOD GLUCOSE TEST STRIPS) STRP four times a day. 360 each 3     hydrochlorothiazide 12.5 MG TABS tablet Take 1 tablet (12.5 mg) by mouth daily 90 tablet 3     insulin glargine (LANTUS) 100 UNIT/ML injection Inject 12 Units Subcutaneous every morning 12 mL 11     Insulin Pen Needle (PEN NEEDLES) 32G X 4 MM MISC 1 Box 4 times daily 400 each 3     metFORMIN (GLUCOPHAGE) 500 MG tablet Take 2 tablets (1,000 mg) by mouth 2 times daily (with meals) 180 tablet 3     senna-docusate (SENOKOT-S;PERICOLACE) 8.6-50 MG per tablet Take 2 tablets by mouth daily as needed for constipation (Patient taking differently: Take 2 tablets by mouth daily as needed for constipation Pt states increased dose- unsure of dosage) 180 tablet 3     ONETOUCH DELICA LANCETS 33G MISC four times a day.       IBUPROFEN PO          Review Of Systems:    Skin: negative  Eyes: negative  Ears/Nose/Throat: negative  Respiratory: No shortness of breath, dyspnea on exertion, cough, or hemoptysis  Cardiovascular: negative  Gastrointestinal: negative  Genitourinary: negative  Musculoskeletal: as above  Neurologic: as above  Psychiatric: negative  Hematologic/Lymphatic/Immunologic: negative  Endocrine: diabetes    Objective:   /70 (BP Location: Right arm, Patient Position:  "Supine, Cuff Size: Adult Large)  Pulse 92  Temp 97.5  F (36.4  C) (Tympanic)  Ht 5' 5\" (1.651 m)  Wt 248 lb (112.5 kg)  SpO2 95%  BMI 41.27 kg/m2  EXAM:  Constitutional: alert and no distress   Cardiovascular:  RRR. No murmurs, clicks gallops or rub  Respiratory: Lungs clear  Psychiatric: mentation appears normal and affect normal/bright  Abdomen: Abdomen soft, non-tender. BS normal. No masses, organomegaly  NEURO: Ptosis of left eye--baseline.  Otherwise no focal deficits.    SKIN: no suspicious lesions or rashes  JOINT/EXTREMITIES: NO LE edema. Right forearm possibly with some fulness, otherwise full ROM and 5/5 right hand  strenght.          Orders placed or performed in visit on 11/07/17     aspirin 81 MG tablet     Results for orders placed or performed during the hospital encounter of 10/06/17   Hemoglobin A1c   Result Value Ref Range    Hemoglobin A1C 6.2 4.3 - 6.0 %       Assessment and Plan:    (I63.039) Cerebrovascular accident (CVA) due to thrombosis of carotid artery, unspecified blood vessel laterality (H)  (primary encounter diagnosis)  Comment: Remains on Asa 325 mg PO daily but complains of severe tinnitis.  Plan: Will reduce to 162 mg PO daily as 325 mg PO daily unlikely has significant benefit over 162 mg PO daily.      (M25.521) Right elbow pain  Comment:   Plan: XR ELBOW RT 2 VW (Clinic Performed)    (H93.13) Tinnitus, bilateral  Comment: Due to tinnitus will reduce Asa to 162 mg PO daily.    Plan: As above      (I10) Essential hypertension  Comment: BP at goal    Plan: Basic metabolic panel    (E11.8) DM type 2, controlled, with complication (H)  Comment: A1C 6.2  Plan:  Follow up in 3-4 months for repeat A1C          Yash Cuellar DO  "

## 2017-11-07 NOTE — NURSING NOTE
"Chief Complaint   Patient presents with     Diabetes     checking b/s bid - no readings under 70 a couple over 200- no complaints of shakey, dizzy,confused,lethargic- no open sores or frequent infections left ball of foot burning sensation every once in a while - pt is fasting today      Derm Problem     pt had anirysm coil and states top of head is tender - wondering if normal      Ear Problem     ringing in ears      Recheck Medication     pt would like to lower her does of aspirin to 81mg daily        Initial /70 (BP Location: Right arm, Patient Position: Supine, Cuff Size: Adult Large)  Pulse 92  Temp 97.5  F (36.4  C) (Tympanic)  Ht 5' 5\" (1.651 m)  Wt 248 lb (112.5 kg)  SpO2 95%  BMI 41.27 kg/m2 Estimated body mass index is 41.27 kg/(m^2) as calculated from the following:    Height as of this encounter: 5' 5\" (1.651 m).    Weight as of this encounter: 248 lb (112.5 kg).  Medication Reconciliation: complete   Kalee Busby LPN      "

## 2017-11-08 ASSESSMENT — ANXIETY QUESTIONNAIRES: GAD7 TOTAL SCORE: 0

## 2017-11-19 DIAGNOSIS — E11.65 TYPE 2 DIABETES MELLITUS WITH HYPERGLYCEMIA, WITH LONG-TERM CURRENT USE OF INSULIN (H): ICD-10-CM

## 2017-11-19 DIAGNOSIS — Z79.4 TYPE 2 DIABETES MELLITUS WITH HYPERGLYCEMIA, WITH LONG-TERM CURRENT USE OF INSULIN (H): ICD-10-CM

## 2017-11-20 ENCOUNTER — OFFICE VISIT (OUTPATIENT)
Dept: ORTHOPEDICS | Facility: OTHER | Age: 72
End: 2017-11-20
Attending: INTERNAL MEDICINE
Payer: MEDICARE

## 2017-11-20 VITALS
BODY MASS INDEX: 41.27 KG/M2 | HEART RATE: 80 BPM | OXYGEN SATURATION: 97 % | DIASTOLIC BLOOD PRESSURE: 60 MMHG | WEIGHT: 248 LBS | SYSTOLIC BLOOD PRESSURE: 140 MMHG | TEMPERATURE: 98.1 F

## 2017-11-20 DIAGNOSIS — M25.521 PAIN IN JOINT INVOLVING UPPER ARM, RIGHT: ICD-10-CM

## 2017-11-20 PROCEDURE — 99203 OFFICE O/P NEW LOW 30 MIN: CPT | Performed by: ORTHOPAEDIC SURGERY

## 2017-11-20 PROCEDURE — 99213 OFFICE O/P EST LOW 20 MIN: CPT

## 2017-11-20 ASSESSMENT — PAIN SCALES - GENERAL: PAINLEVEL: NO PAIN (0)

## 2017-11-20 NOTE — PROGRESS NOTES
Patient presents today with chief complaint of pain and swelling over the volar aspect of her right forearm, this been present for approximately 3 weeks now it is almost completely resolved.  She had fallen hard against the door jam and it caught herself and sustained a direct blow to the antecubital fossa.  There was some concern that she may have ruptured her biceps tendon however she never lost the ability to flex her elbow or supinate her forearm, she states her discomfort now is minimal, and she is having no problems working around it.    Past medical history is significant for recent CVA in May, and surgery for repair of the intracranial aneurysm in June.  She has done extremely well following these procedures.  She's also on anticoagulant.  Patient has history of insulin-dependent diabetes that is well controlled as well.    Review of systems: Patient has had no recent changes in her health, has been improving steadily since her aneurysm operation.  HEENT: Patient has always had difficulty with vision in her left eye since she was small child.  Cranial nerves II through XII are otherwise intact.  Midline trachea, full range of motion cervical spine.  Chest: No recent changes.  Cardiovascular: History of carotid artery stenosis.  Abdomen: No changes in bowel or bladder habits neurologic: No changes.    Physical exam: Patient is an alert, happy cheerful elderly female in no obvious distress.    HEENT: Full range of motion cervical spine, midline trachea    Chest: Normal excursion and symmetry    Abdomen: Appears normal.    Cardiovascular: Regular rate and rhythm, brisk peripheral pulses    Extremity exam: Examination of her right upper extremity demonstrates intact skin, no obvious swelling or deformity.  She has full range of motion at the elbow, and 5 over 5 motor strength is supination and pronation of the forearm, as well as flexion.  The parents of the biceps appears normal there is some mild to moderate  tenderness to deep palpation over the biceps tendon.  X-rays are reviewed and these demonstrate some degenerative changes but no obvious other abnormalities.    Assessment and plan: Patient had a deep contusion and probably some tenosynovitis of her biceps tendon and is now in advanced stages of resolving.  She should continue to monitor the area, use as tolerated, and follow-up if her symptoms worsen./60 (BP Location: Left arm, Patient Position: Sitting, Cuff Size: Adult Large)  Pulse 80  Temp 98.1  F (36.7  C) (Tympanic)  Wt 248 lb (112.5 kg)  SpO2 97%  BMI 41.27 kg/m2

## 2017-11-20 NOTE — NURSING NOTE
"Chief Complaint   Patient presents with     Musculoskeletal Problem     NPT Right Arm pain x2 months       Initial /60 (BP Location: Left arm, Patient Position: Sitting, Cuff Size: Adult Large)  Pulse 80  Temp 98.1  F (36.7  C) (Tympanic)  Wt 248 lb (112.5 kg)  SpO2 97%  BMI 41.27 kg/m2 Estimated body mass index is 41.27 kg/(m^2) as calculated from the following:    Height as of 11/7/17: 5' 5\" (1.651 m).    Weight as of this encounter: 248 lb (112.5 kg).  Medication Reconciliation: complete   Love Cowart      "

## 2017-11-20 NOTE — MR AVS SNAPSHOT
"              After Visit Summary   11/20/2017    Rosalie Seals    MRN: [id number]           Patient Information     Date Of Birth          1945        Visit Information        Provider Department      11/20/2017 1:00 PM Raheem Contreras, DO  ORTHOPEDICS        Today's Diagnoses     Pain in joint involving upper arm, right           Follow-ups after your visit        Your next 10 appointments already scheduled     Nov 21, 2017 10:00 AM CST   (Arrive by 9:45 AM)   Return Visit with Xochitl Daniels RD   [facility] ([facility] )    [address] [place] [id number] [phone]            Feb 06, 2018 10:15 AM CST   (Arrive by 10:00 AM)   SHORT with Yash Cuellar,    [facility] ([facility] )    [address] [place] [address] [phone]              Who to contact     If you have questions or need follow up information about today's clinic visit or your schedule please contact  ORTHOPEDICS directly at [phone].  Normal or non-critical lab and imaging results will be communicated to you by Aquafadashart, letter or phone within 4 business days after the clinic has received the results. If you do not hear from us within 7 days, please contact the clinic through Aquafadashart or phone. If you have a critical or abnormal lab result, we will notify you by phone as soon as possible.  Submit refill requests through Yandex or call your pharmacy and they will forward the refill request to us. Please allow 3 business days for your refill to be completed.          Additional Information About Your Visit        MyChart Information     Yandex lets you send messages to your doctor, view your test results, renew your prescriptions, schedule appointments and more. To sign up, go to [email or web address] . Click on \"Log in\" on the left side of the screen, which will take you to the Welcome page. Then click on \"Sign up Now\" on the right side of " the page.     You will be asked to enter the access code listed below, as well as some personal information. Please follow the directions to create your username and password.     Your access code is: [email or web address]  Expires: 2018 11:20 AM     Your access code will  in 90 days. If you need help or a new code, please call your [facility] clinic or [phone].        Care EveryWhere ID     This is your Care EveryWhere ID. This could be used by other organizations to access your [facility] medical records  [id number]        Your Vitals Were     Pulse Temperature Pulse Oximetry BMI (Body Mass Index)          80 98.1  F (36.7  C) (Tympanic) 97% 41.27 kg/m2         Blood Pressure from Last 3 Encounters:   17 140/60   17 138/70   10/06/17 143/78    Weight from Last 3 Encounters:   17 248 lb (112.5 kg)   17 248 lb (112.5 kg)   10/06/17 245 lb 3.2 oz (111.2 kg)              Today, you had the following     No orders found for display         Today's Medication Changes          These changes are accurate as of: 17  1:37 PM.  If you have any questions, ask your nurse or doctor.               These medicines have changed or have updated prescriptions.        Dose/Directions    aspirin 81 MG tablet   This may have changed:  Another medication with the same name was removed. Continue taking this medication, and follow the directions you see here.   Used for:  Cerebrovascular accident (CVA) due to thrombosis of carotid artery, unspecified blood vessel laterality (H)   Changed by:  Yash Cuellar DO        Dose:  162 mg   Take 2 tablets (162 mg) by mouth daily   Quantity:  180 tablet   Refills:  3         Stop taking these medicines if you haven't already. Please contact your care team if you have questions.     IBUPROFEN PO   Stopped by:  Raheem Contreras DO           senna-docusate 8.6-50 MG per tablet   Commonly known as:  SENOKOT-S;PERICOLACE   Stopped by:  Raheem Contreras DO                     Primary Care Provider Office Phone # Fax #    Yash Cuellar [phone]       [facility] [address] [place] [address]        Equal Access to Services     MICHELLE BOWEN : Richard hernandez deepao Soglenali, waaxda luqadaha, qaybta kaalmada adeegyada, nery bairesn keith queen weston neri. So [facility] [phone].    ATENCIÓN: Si habla español, tiene a blackmon disposición servicios gratuitos de asistencia lingüística. Llame al [phone].    We comply with applicable federal civil rights laws and [place] laws. We do not discriminate on the basis of race, color, national origin, age, disability, sex, sexual orientation, or gender identity.            Thank you!     Thank you for choosing  ORTHOPEDICS  for your care. Our goal is always to provide you with excellent care. Hearing back from our patients is one way we can continue to improve our services. Please take a few minutes to complete the written survey that you may receive in the mail after your visit with us. Thank you!             Your Updated Medication List - Protect others around you: Learn how to safely use, store and throw away your medicines at www.disposemymeds.org.          This list is accurate as of: 11/20/17  1:37 PM.  Always use your most recent med list.                   Brand Name Dispense Instructions for use Diagnosis    aspirin 81 MG tablet     180 tablet    Take 2 tablets (162 mg) by mouth daily    Cerebrovascular accident (CVA) due to thrombosis of carotid artery, unspecified blood vessel laterality (H)       atorvastatin 80 MG tablet    LIPITOR    90 tablet    Take 1 tablet (80 mg) by mouth daily    Hyperlipidemia LDL goal <100       BLOOD GLUCOSE TEST STRIPS Strp     360 each    four times a day.    Type 2 diabetes mellitus with complication, without long-term current use of insulin (H)       hydrochlorothiazide 12.5 MG Tabs tablet     90 tablet    Take 1 tablet (12.5 mg) by mouth  daily    Benign essential hypertension       insulin glargine 100 UNIT/ML injection    LANTUS    12 mL    Inject 12 Units Subcutaneous every morning    Type 2 diabetes mellitus with complication, without long-term current use of insulin (H)       metFORMIN 500 MG tablet    GLUCOPHAGE    180 tablet    TAKE 2 TABLETS TWICE A DAY WITH MEALS    Type 2 diabetes mellitus with hyperglycemia, with long-term current use of insulin (H)       * ONETOUCH DELICA LANCETS 33G Misc      four times a day.        * blood glucose monitoring lancets     360 each    Use to test blood sugar 3 times daily.    Type 2 diabetes mellitus with hyperglycemia, with long-term current use of insulin (H)       Pen Needles 32G X 4 MM Misc     400 each    1 Box 4 times daily    Type 2 diabetes mellitus with complication, without long-term current use of insulin (H)       * Notice:  This list has 2 medication(s) that are the same as other medications prescribed for you. Read the directions carefully, and ask your doctor or other care provider to review them with you.

## 2017-11-21 ENCOUNTER — HOSPITAL ENCOUNTER (OUTPATIENT)
Dept: EDUCATION SERVICES | Facility: HOSPITAL | Age: 72
Discharge: HOME | End: 2017-11-21
Attending: INTERNAL MEDICINE | Admitting: INTERNAL MEDICINE
Payer: MEDICARE

## 2017-11-21 VITALS
WEIGHT: 241.1 LBS | DIASTOLIC BLOOD PRESSURE: 82 MMHG | SYSTOLIC BLOOD PRESSURE: 136 MMHG | HEART RATE: 85 BPM | BODY MASS INDEX: 40.17 KG/M2 | OXYGEN SATURATION: 96 % | HEIGHT: 65 IN

## 2017-11-21 DIAGNOSIS — Z79.4 TYPE 2 DIABETES MELLITUS WITHOUT COMPLICATION, WITH LONG-TERM CURRENT USE OF INSULIN (H): Primary | ICD-10-CM

## 2017-11-21 DIAGNOSIS — E11.9 TYPE 2 DIABETES MELLITUS WITHOUT COMPLICATION, WITH LONG-TERM CURRENT USE OF INSULIN (H): Primary | ICD-10-CM

## 2017-11-21 PROCEDURE — G0108 DIAB MANAGE TRN  PER INDIV: HCPCS | Performed by: DIETITIAN, REGISTERED

## 2017-11-21 ASSESSMENT — PAIN SCALES - GENERAL: PAINLEVEL: NO PAIN (0)

## 2017-11-21 NOTE — IP AVS SNAPSHOT
MRN:[id number]                      After Visit Summary   11/21/2017    Rosalie JANE Friend    MRN: [id number]           Thank you!     Thank you for choosing [facility] for your care. Our goal is always to provide you with excellent care. Hearing back from our patients is one way we can continue to improve our services. Please take a few minutes to complete the written survey that you may receive in the mail after you visit with us. Thank you!        Patient Information     Date Of Birth          1945        About your hospital stay     You were admitted on:  November 21, 2017 You last received care in the:  [facility]    You were discharged on:  November 21, 2017       Who to Call     For medical emergencies, please call 911.  For non-urgent questions about your medical care, please call your primary care provider or clinic, [phone]          Attending Provider     Provider Specialty    Yash Cuellar,  Internal Medicine       Primary Care Provider Office Phone # Fax #    Yash Cuellar [phone]      Your next 10 appointments already scheduled     Feb 06, 2018 10:15 AM CST   (Arrive by 10:00 AM)   SHORT with Yash Cuellar DO   [facility] [place] ([facility] [place] )    [address]  [place] [address]   [phone]              Further instructions from your care team       -Continue to try to limit foods high in carbohydrates in your diet.    -Be as active as you can.    -Test your glucose 2x/day - fasting and 2 hours after supper.   -Target levels are fasting  and 2 hours after supper less than 180.    -Stop taking your Lantus.  -Take your Metformin 1000 mg with breakfast and 1000 mg with supper.   -Call me if your glucose levels are consistently above target.  -Follow up in 2 weeks.   -Bring your glucose meter.   -VALENTINE Merino, [phone].     Pending Results     No orders found from  "2017 to 2017.            Admission Information     Date & Time Provider Department Dept. Phone    2017 Yash Cuellar,  [facility] [phone]      Your Vitals Were     Blood Pressure Pulse Height Weight Pulse Oximetry BMI (Body Mass Index)    136/82 85 1.651 m (5' 5\") 109.4 kg (241 lb 1.6 oz) 96% 40.12 kg/m2      [employer] MobileharPagerDuty Information     Bloomspot lets you send messages to your doctor, view your test results, renew your prescriptions, schedule appointments and more. To sign up, go to [email or web address] . Click on \"Log in\" on the left side of the screen, which will take you to the Welcome page. Then click on \"Sign up Now\" on the right side of the page.     You will be asked to enter the access code listed below, as well as some personal information. Please follow the directions to create your username and password.     Your access code is: [email or web address]  Expires: 2018 11:20 AM     Your access code will  in 90 days. If you need help or a new code, please call your [facility] clinic or [phone].        Care EveryWhere ID     This is your Care EveryWhere ID. This could be used by other organizations to access your [facility] medical records  [id number]        Equal Access to Services     MICHELLE BOWEN AH: Hadii amos hernandez hadasho Soleonid, waaxda luqadaha, qaybta kaalmada xeniada, nery ontiveros . So [facility] [phone].    ATENCIÓN: Si habla español, tiene a blackmon disposición servicios gratuitos de asistencia lingüística. Hu al [phone].    We comply with applicable federal civil rights laws and [place] laws. We do not discriminate on the basis of race, color, national origin, age, disability, sex, sexual orientation, or gender identity.               Review of your medicines      UNREVIEWED medicines. Ask your doctor about these medicines        Dose / Directions    aspirin 81 MG tablet   Used for:  Cerebrovascular accident (CVA) due to " thrombosis of carotid artery, unspecified blood vessel laterality (H)        Dose:  162 mg   Take 2 tablets (162 mg) by mouth daily   Quantity:  180 tablet   Refills:  3       atorvastatin 80 MG tablet   Commonly known as:  LIPITOR   Used for:  Hyperlipidemia LDL goal <100        Dose:  80 mg   Take 1 tablet (80 mg) by mouth daily   Quantity:  90 tablet   Refills:  3       insulin glargine 100 UNIT/ML injection   Commonly known as:  LANTUS   Used for:  Type 2 diabetes mellitus with complication, without long-term current use of insulin (H)        Dose:  12 Units   Inject 12 Units Subcutaneous every morning   Quantity:  12 mL   Refills:  11       metFORMIN 500 MG tablet   Commonly known as:  GLUCOPHAGE   Used for:  Type 2 diabetes mellitus with hyperglycemia, with long-term current use of insulin (H)        TAKE 2 TABLETS TWICE A DAY WITH MEALS   Quantity:  180 tablet   Refills:  1         CONTINUE these medicines which have NOT CHANGED        Dose / Directions    BLOOD GLUCOSE TEST STRIPS Strp   Used for:  Type 2 diabetes mellitus with complication, without long-term current use of insulin (H)        four times a day.   Quantity:  360 each   Refills:  3       * ONETOUCH DELICA LANCETS 33G Misc        four times a day.   Refills:  0       * blood glucose monitoring lancets   Used for:  Type 2 diabetes mellitus with hyperglycemia, with long-term current use of insulin (H)        Use to test blood sugar 3 times daily.   Quantity:  360 each   Refills:  3       Pen Needles 32G X 4 MM Misc   Used for:  Type 2 diabetes mellitus with complication, without long-term current use of insulin (H)        Dose:  1 Box   1 Box 4 times daily   Quantity:  400 each   Refills:  3       * Notice:  This list has 2 medication(s) that are the same as other medications prescribed for you. Read the directions carefully, and ask your doctor or other care provider to review them with you.             Protect others around you: Learn how to  safely use, store and throw away your medicines at www.disposemymeds.org.             Medication List: This is a list of all your medications and when to take them. Check marks below indicate your daily home schedule. Keep this list as a reference.      Medications           Morning Afternoon Evening Bedtime As Needed    aspirin 81 MG tablet   Take 2 tablets (162 mg) by mouth daily                                atorvastatin 80 MG tablet   Commonly known as:  LIPITOR   Take 1 tablet (80 mg) by mouth daily                                BLOOD GLUCOSE TEST STRIPS Strp   four times a day.                                insulin glargine 100 UNIT/ML injection   Commonly known as:  LANTUS   Inject 12 Units Subcutaneous every morning                                metFORMIN 500 MG tablet   Commonly known as:  GLUCOPHAGE   TAKE 2 TABLETS TWICE A DAY WITH MEALS                                * ONETOUCH DELICA LANCETS 33G Misc   four times a day.                                * blood glucose monitoring lancets   Use to test blood sugar 3 times daily.                                Pen Needles 32G X 4 MM Misc   1 Box 4 times daily                                * Notice:  This list has 2 medication(s) that are the same as other medications prescribed for you. Read the directions carefully, and ask your doctor or other care provider to review them with you.

## 2017-11-21 NOTE — IP AVS SNAPSHOT
[facility]    [address] [place] [id number]    Phone:  [phone]    Fax:  [phone]                                       After Visit Summary   11/21/2017    Rosalie Seals    MRN: [id number]           After Visit Summary Signature Page     I have received my discharge instructions, and my questions have been answered. I have discussed any challenges I see with this plan with the nurse or doctor.    ..........................................................................................................................................  Patient/Patient Representative Signature      ..........................................................................................................................................  Patient Representative Print Name and Relationship to Patient    ..................................................               ................................................  Date                                            Time    ..........................................................................................................................................  Reviewed by Signature/Title    ...................................................              ..............................................  Date                                                            Time

## 2017-11-21 NOTE — DISCHARGE INSTRUCTIONS
-Continue to try to limit foods high in carbohydrates in your diet.    -Be as active as you can.    -Test your glucose 2x/day - fasting and 2 hours after supper.   -Target levels are fasting  and 2 hours after supper less than 180.    -Stop taking your Lantus.  -Take your Metformin 1000 mg with breakfast and 1000 mg with supper.   -Call me if your glucose levels are consistently above target.  -Follow up in 2 weeks.   -Bring your glucose meter.   -VALENTINE Merino, -[phone].

## 2017-11-21 NOTE — NURSING NOTE
"Chief Complaint   Patient presents with     Diabetes     wants to cut back on medication       Initial /82  Pulse 85  Ht 1.651 m (5' 5\")  Wt 109.4 kg (241 lb 1.6 oz)  SpO2 96%  BMI 40.12 kg/m2 Estimated body mass index is 40.12 kg/(m^2) as calculated from the following:    Height as of this encounter: 1.651 m (5' 5\").    Weight as of this encounter: 109.4 kg (241 lb 1.6 oz).  Medication Reconciliation: complete   Giovanna Milner      "

## 2017-11-21 NOTE — PROGRESS NOTES
"Pt is here today for diabetes visit.  She would like to cut back on her diabetes medication if possible.     /82  Pulse 85  Ht 1.651 m (5' 5\")  Wt 109.4 kg (241 lb 1.6 oz)  SpO2 96%  BMI 40.12 kg/m2  Weight is down 4# since the beginning of October and down 12# from initial visit in May 2017.     Current diabetes medications:  Metformin 1000 mg bid, Lantus 12 units daily.     Current glucose levels:  Pt did not bring her meter but she reports fasting levels  and levels 2 hours post supper are less than 150.      She does no scheduled exercise and states she has one bad knee.  She does do her own household chores.  Verbal diet recall obtained.  Pt feels her appetite is less than in the past.  Breakfast-2 toast with butter and cinnamon or peanut butter, coffee and water  Lunch-sandwich or 2 slices thin crust pizza, coffee and water  Supper-meat, potatoes, vegi, coffee and water  Pt eats 10 atomic fireballs daily which is 60 grams of but it is spread out throughout the day.      Spoke with provider and he is willing to allow pt to try off of Lantus.  Discussed this with pt and she is aware that if glucose levels cannot remain in target then she will need to resume Lantus or alternate medication.     PLAN:  Test glucose 2x/day - fasting and 2 hours post supper.  Target levels discussed and pt will call if levels are consistently above targets.  Bring meter to follow up session.      Follow up:  2 weeks.     Total time spent with pt was 35 minutes.        "

## 2017-12-04 ENCOUNTER — HOSPITAL ENCOUNTER (OUTPATIENT)
Dept: EDUCATION SERVICES | Facility: HOSPITAL | Age: 72
Discharge: HOME | End: 2017-12-04
Attending: NURSE PRACTITIONER | Admitting: NURSE PRACTITIONER
Payer: MEDICARE

## 2017-12-04 VITALS
BODY MASS INDEX: 39.77 KG/M2 | SYSTOLIC BLOOD PRESSURE: 144 MMHG | OXYGEN SATURATION: 93 % | HEART RATE: 92 BPM | WEIGHT: 238.7 LBS | HEIGHT: 65 IN | DIASTOLIC BLOOD PRESSURE: 80 MMHG

## 2017-12-04 DIAGNOSIS — E11.9 TYPE 2 DIABETES MELLITUS WITHOUT COMPLICATION, WITHOUT LONG-TERM CURRENT USE OF INSULIN (H): Primary | ICD-10-CM

## 2017-12-04 PROCEDURE — G0108 DIAB MANAGE TRN  PER INDIV: HCPCS | Performed by: DIETITIAN, REGISTERED

## 2017-12-04 ASSESSMENT — PAIN SCALES - GENERAL: PAINLEVEL: NO PAIN (0)

## 2017-12-04 NOTE — NURSING NOTE
"Chief Complaint   Patient presents with     Diabetes     follow up Blood Glucose       Initial /90  Pulse 92  Ht 1.651 m (5' 5\")  Wt 108.3 kg (238 lb 11.2 oz)  SpO2 93%  BMI 39.72 kg/m2 Estimated body mass index is 39.72 kg/(m^2) as calculated from the following:    Height as of this encounter: 1.651 m (5' 5\").    Weight as of this encounter: 108.3 kg (238 lb 11.2 oz).  Medication Reconciliation: complete   Giovanna Milner      "

## 2017-12-04 NOTE — DISCHARGE INSTRUCTIONS
-Continue to limit foods high in carbohydrates in your diet.    -Try eating a snack of just protein before bed to see if it helps you have a lower fasting level.   -Test your glucose levels fasting and 2 hours after supper meal.   -Target levels are fasting , 2 hours after supper less than 180.   -Keep taking your Metformin 1000 mg 2x/day.   -Come in for download of your meter on December 21st - afternoon.   -I will review it and call you.  -Call me if you see your levels are trending high - VALENTINE Merino, -[phone].

## 2017-12-04 NOTE — PROGRESS NOTES
"Pt is here today for diabetes follow up visit - glucose review.       /80  Pulse 92  Ht 1.651 m (5' 5\")  Wt 108.3 kg (238 lb 11.2 oz)  SpO2 93%  BMI 39.72 kg/m2  Weight is down 2.4# from our last visit 2 weeks ago and down 14.4# since May.     Current diabetes medications:  Metformin 1000 mg bid.  Pt stopped taking her 12 units Lantus 2 weeks ago and would like to be able to remain off of it.      Current glucose levels:  [id number]  2 hours post pfycoy- with a 230/233 on Thanksgiving  Overall average is 135.     Pt states she is keeping close watch on her food intake and limiting foods high in carbohydrates.  No routine exercise.  Pt would like to remain off of Lantus if possible.  We reviewed target levels again as currently levels are acceptable.      PLAN:  Pt will continue to take 1000 mg Metformin 2x/day and remain off of Lantus.  She will bring meter for download on December 21st to make sure levels do not trend up.      Follow up: after review of meter download.      Total time spent with pt was 30 minutes.        "

## 2017-12-04 NOTE — IP AVS SNAPSHOT
MRN:[id number]                      After Visit Summary   12/4/2017    Rosalie JANE Friend    MRN: [id number]           Thank you!     Thank you for choosing [facility] for your care. Our goal is always to provide you with excellent care. Hearing back from our patients is one way we can continue to improve our services. Please take a few minutes to complete the written survey that you may receive in the mail after you visit with us. Thank you!        Patient Information     Date Of Birth          1945        About your hospital stay     You were admitted on:  December 4, 2017 You last received care in the:  [facility]    You were discharged on:  December 4, 2017       Who to Call     For medical emergencies, please call 911.  For non-urgent questions about your medical care, please call your primary care provider or clinic, [phone]          Attending Provider     Provider Specialty    Sun Schreiber NP Nurse Practitioner - Family       Primary Care Provider Office Phone # Fax #    Yash Cuellar [phone]      Your next 10 appointments already scheduled     Feb 06, 2018 10:15 AM CST   (Arrive by 10:00 AM)   SHORT with Yash Cuellar DO   [facility] [place] ([facility] [place] )    [address]  [place] [address]   [phone]              Further instructions from your care team       -Continue to limit foods high in carbohydrates in your diet.    -Try eating a snack of just protein before bed to see if it helps you have a lower fasting level.   -Test your glucose levels fasting and 2 hours after supper meal.   -Target levels are fasting , 2 hours after supper less than 180.   -Keep taking your Metformin 1000 mg 2x/day.   -Come in for download of your meter on December 21st - afternoon.   -I will review it and call you.  -Call me if you see your levels are trending high - VALENTINE Merino, [phone].  "    Pending Results     No orders found from 2017 to 2017.            Admission Information     Date & Time Provider Department Dept. Phone    2017 Sun Schreiber NP [facility] [phone]      Your Vitals Were     Blood Pressure Pulse Height Weight Pulse Oximetry BMI (Body Mass Index)    159/90 92 1.651 m (5' 5\") 108.3 kg (238 lb 11.2 oz) 93% 39.72 kg/m2      [employer] Information     [employer] lets you send messages to your doctor, view your test results, renew your prescriptions, schedule appointments and more. To sign up, go to [email or web address] . Click on \"Log in\" on the left side of the screen, which will take you to the Welcome page. Then click on \"Sign up Now\" on the right side of the page.     You will be asked to enter the access code listed below, as well as some personal information. Please follow the directions to create your username and password.     Your access code is: [email or web address]  Expires: 2018 11:20 AM     Your access code will  in 90 days. If you need help or a new code, please call your [facility] clinic or [phone].        Care EveryWhere ID     This is your Care EveryWhere ID. This could be used by other organizations to access your [facility] medical records  [id number]        Equal Access to Services     [facility] AH: Hadii amos ku hadasho Soglenali, waaxda luqadaha, qaybta kaalmada adeegyada, nery ontiveros . So [facility] [phone].    ATENCIÓN: Si habla español, tiene a blackmon disposición servicios gratuitos de asistencia lingüística. Pradeepame al [phone].    We comply with applicable federal civil rights laws and [place] laws. We do not discriminate on the basis of race, color, national origin, age, disability, sex, sexual orientation, or gender identity.               Review of your medicines      UNREVIEWED medicines. Ask your doctor about these medicines        Dose / Directions    aspirin 81 MG tablet   Used for:  " Cerebrovascular accident (CVA) due to thrombosis of carotid artery, unspecified blood vessel laterality (H)        Dose:  162 mg   Take 2 tablets (162 mg) by mouth daily   Quantity:  180 tablet   Refills:  3       atorvastatin 80 MG tablet   Commonly known as:  LIPITOR   Used for:  Hyperlipidemia LDL goal <100        Dose:  80 mg   Take 1 tablet (80 mg) by mouth daily   Quantity:  90 tablet   Refills:  3       insulin glargine 100 UNIT/ML injection   Commonly known as:  LANTUS   Used for:  Type 2 diabetes mellitus with complication, without long-term current use of insulin (H)        Dose:  12 Units   Inject 12 Units Subcutaneous every morning   Quantity:  12 mL   Refills:  11       metFORMIN 500 MG tablet   Commonly known as:  GLUCOPHAGE   Used for:  Type 2 diabetes mellitus with hyperglycemia, with long-term current use of insulin (H)        TAKE 2 TABLETS TWICE A DAY WITH MEALS   Quantity:  180 tablet   Refills:  1         CONTINUE these medicines which have NOT CHANGED        Dose / Directions    BLOOD GLUCOSE TEST STRIPS Strp   Used for:  Type 2 diabetes mellitus with complication, without long-term current use of insulin (H)        four times a day.   Quantity:  360 each   Refills:  3       * ONETOUCH DELICA LANCETS 33G Misc        four times a day.   Refills:  0       * blood glucose monitoring lancets   Used for:  Type 2 diabetes mellitus with hyperglycemia, with long-term current use of insulin (H)        Use to test blood sugar 3 times daily.   Quantity:  360 each   Refills:  3       Pen Needles 32G X 4 MM Misc   Used for:  Type 2 diabetes mellitus with complication, without long-term current use of insulin (H)        Dose:  1 Box   1 Box 4 times daily   Quantity:  400 each   Refills:  3       * Notice:  This list has 2 medication(s) that are the same as other medications prescribed for you. Read the directions carefully, and ask your doctor or other care provider to review them with you.              Protect others around you: Learn how to safely use, store and throw away your medicines at www.disposemymeds.org.             Medication List: This is a list of all your medications and when to take them. Check marks below indicate your daily home schedule. Keep this list as a reference.      Medications           Morning Afternoon Evening Bedtime As Needed    aspirin 81 MG tablet   Take 2 tablets (162 mg) by mouth daily                                atorvastatin 80 MG tablet   Commonly known as:  LIPITOR   Take 1 tablet (80 mg) by mouth daily                                BLOOD GLUCOSE TEST STRIPS Strp   four times a day.                                insulin glargine 100 UNIT/ML injection   Commonly known as:  LANTUS   Inject 12 Units Subcutaneous every morning                                metFORMIN 500 MG tablet   Commonly known as:  GLUCOPHAGE   TAKE 2 TABLETS TWICE A DAY WITH MEALS                                * ONETOUCH DELICA LANCETS 33G Misc   four times a day.                                * blood glucose monitoring lancets   Use to test blood sugar 3 times daily.                                Pen Needles 32G X 4 MM Misc   1 Box 4 times daily                                * Notice:  This list has 2 medication(s) that are the same as other medications prescribed for you. Read the directions carefully, and ask your doctor or other care provider to review them with you.

## 2017-12-04 NOTE — IP AVS SNAPSHOT
[facility]    [address] [place] [address]    Phone:  [phone]    Fax:  [phone]                                       After Visit Summary   12/4/2017    Rosalie Seals    MRN: [id number]           After Visit Summary Signature Page     I have received my discharge instructions, and my questions have been answered. I have discussed any challenges I see with this plan with the nurse or doctor.    ..........................................................................................................................................  Patient/Patient Representative Signature      ..........................................................................................................................................  Patient Representative Print Name and Relationship to Patient    ..................................................               ................................................  Date                                            Time    ..........................................................................................................................................  Reviewed by Signature/Title    ...................................................              ..............................................  Date                                                            Time

## 2017-12-22 ENCOUNTER — ALLIED HEALTH/NURSE VISIT (OUTPATIENT)
Dept: WOUND CARE | Facility: OTHER | Age: 72
End: 2017-12-22
Attending: INTERNAL MEDICINE
Payer: MEDICARE

## 2017-12-22 ENCOUNTER — TELEPHONE (OUTPATIENT)
Dept: EDUCATION SERVICES | Facility: HOSPITAL | Age: 72
End: 2017-12-22

## 2017-12-22 DIAGNOSIS — E11.9 DIABETES MELLITUS (H): Primary | ICD-10-CM

## 2017-12-22 NOTE — TELEPHONE ENCOUNTER
----- Message from Xochitl Daniels RD sent at 12/22/2017  1:26 PM CST -----  Please schedule follow up for glucose review and A1c check in February.   Thanks!

## 2017-12-22 NOTE — TELEPHONE ENCOUNTER
Pt stopped for meter download today.  Current diabetes medications:  Metformin 1000 mg bid.  Pt has been off of her Lantus x 4 + weeks now.  Current glucose levels:  Reruydb-118-174 with a 152 and a 159  2 hours post ziilof- with one at 207  Overall average is 136.    Glucose levels remain controlled off of Lantus.  Will schedule follow up in February for A1c check and glucose review.

## 2017-12-30 ENCOUNTER — APPOINTMENT (OUTPATIENT)
Dept: CT IMAGING | Facility: HOSPITAL | Age: 72
End: 2017-12-30
Attending: FAMILY MEDICINE
Payer: MEDICARE

## 2017-12-30 ENCOUNTER — HOSPITAL ENCOUNTER (EMERGENCY)
Facility: HOSPITAL | Age: 72
Discharge: HOME | End: 2017-12-30
Attending: FAMILY MEDICINE | Admitting: FAMILY MEDICINE
Payer: MEDICARE

## 2017-12-30 VITALS
TEMPERATURE: 97.7 F | RESPIRATION RATE: 18 BRPM | DIASTOLIC BLOOD PRESSURE: 81 MMHG | OXYGEN SATURATION: 96 % | HEART RATE: 77 BPM | SYSTOLIC BLOOD PRESSURE: 174 MMHG

## 2017-12-30 DIAGNOSIS — R51.9 SCALP PAIN: ICD-10-CM

## 2017-12-30 LAB
ALBUMIN SERPL-MCNC: 3.5 G/DL (ref 3.4–5)
ALBUMIN UR-MCNC: NEGATIVE MG/DL
ALP SERPL-CCNC: 70 U/L (ref 40–150)
ALT SERPL W P-5'-P-CCNC: 22 U/L (ref 0–50)
ANION GAP SERPL CALCULATED.3IONS-SCNC: 6 MMOL/L (ref 3–14)
APPEARANCE UR: CLEAR
AST SERPL W P-5'-P-CCNC: 14 U/L (ref 0–45)
BASOPHILS # BLD AUTO: 0.1 10E9/L (ref 0–0.2)
BASOPHILS NFR BLD AUTO: 0.8 %
BILIRUB SERPL-MCNC: 0.3 MG/DL (ref 0.2–1.3)
BILIRUB UR QL STRIP: NEGATIVE
BUN SERPL-MCNC: 15 MG/DL (ref 7–30)
CALCIUM SERPL-MCNC: 9 MG/DL (ref 8.5–10.1)
CHLORIDE SERPL-SCNC: 105 MMOL/L (ref 94–109)
CO2 SERPL-SCNC: 26 MMOL/L (ref 20–32)
COLOR UR AUTO: NORMAL
CREAT SERPL-MCNC: 0.87 MG/DL (ref 0.52–1.04)
DIFFERENTIAL METHOD BLD: NORMAL
EOSINOPHIL # BLD AUTO: 0.1 10E9/L (ref 0–0.7)
EOSINOPHIL NFR BLD AUTO: 1.3 %
ERYTHROCYTE [DISTWIDTH] IN BLOOD BY AUTOMATED COUNT: 14.3 % (ref 10–15)
GFR SERPL CREATININE-BSD FRML MDRD: 64 ML/MIN/1.7M2
GLUCOSE BLDC GLUCOMTR-MCNC: 99 MG/DL (ref 70–99)
GLUCOSE SERPL-MCNC: 103 MG/DL (ref 70–99)
GLUCOSE UR STRIP-MCNC: NEGATIVE MG/DL
HCT VFR BLD AUTO: 42.4 % (ref 35–47)
HGB BLD-MCNC: 13.8 G/DL (ref 11.7–15.7)
HGB UR QL STRIP: NEGATIVE
IMM GRANULOCYTES # BLD: 0 10E9/L (ref 0–0.4)
IMM GRANULOCYTES NFR BLD: 0.4 %
KETONES UR STRIP-MCNC: NEGATIVE MG/DL
LEUKOCYTE ESTERASE UR QL STRIP: NEGATIVE
LYMPHOCYTES # BLD AUTO: 1.8 10E9/L (ref 0.8–5.3)
LYMPHOCYTES NFR BLD AUTO: 22.5 %
MCH RBC QN AUTO: 26.8 PG (ref 26.5–33)
MCHC RBC AUTO-ENTMCNC: 32.5 G/DL (ref 31.5–36.5)
MCV RBC AUTO: 83 FL (ref 78–100)
MONOCYTES # BLD AUTO: 0.6 10E9/L (ref 0–1.3)
MONOCYTES NFR BLD AUTO: 7.8 %
NEUTROPHILS # BLD AUTO: 5.4 10E9/L (ref 1.6–8.3)
NEUTROPHILS NFR BLD AUTO: 67.2 %
NITRATE UR QL: NEGATIVE
NRBC # BLD AUTO: 0 10*3/UL
NRBC BLD AUTO-RTO: 0 /100
PH UR STRIP: 5.5 PH (ref 4.7–8)
PLATELET # BLD AUTO: 313 10E9/L (ref 150–450)
POTASSIUM SERPL-SCNC: 4.4 MMOL/L (ref 3.4–5.3)
PROT SERPL-MCNC: 7.2 G/DL (ref 6.8–8.8)
RBC # BLD AUTO: 5.14 10E12/L (ref 3.8–5.2)
SODIUM SERPL-SCNC: 137 MMOL/L (ref 133–144)
SOURCE: NORMAL
SP GR UR STRIP: 1.01 (ref 1–1.03)
TROPONIN I SERPL-MCNC: <0.015 UG/L (ref 0–0.04)
UROBILINOGEN UR STRIP-MCNC: NORMAL MG/DL (ref 0–2)
WBC # BLD AUTO: 8 10E9/L (ref 4–11)

## 2017-12-30 PROCEDURE — 00000146 ZZHCL STATISTIC GLUCOSE BY METER IP

## 2017-12-30 PROCEDURE — 81003 URINALYSIS AUTO W/O SCOPE: CPT | Performed by: FAMILY MEDICINE

## 2017-12-30 PROCEDURE — 84484 ASSAY OF TROPONIN QUANT: CPT | Performed by: FAMILY MEDICINE

## 2017-12-30 PROCEDURE — 36415 COLL VENOUS BLD VENIPUNCTURE: CPT | Performed by: FAMILY MEDICINE

## 2017-12-30 PROCEDURE — 93010 ELECTROCARDIOGRAM REPORT: CPT | Performed by: INTERNAL MEDICINE

## 2017-12-30 PROCEDURE — 80053 COMPREHEN METABOLIC PANEL: CPT | Performed by: FAMILY MEDICINE

## 2017-12-30 PROCEDURE — 70450 CT HEAD/BRAIN W/O DYE: CPT | Mod: TC

## 2017-12-30 PROCEDURE — 93005 ELECTROCARDIOGRAM TRACING: CPT

## 2017-12-30 PROCEDURE — 99285 EMERGENCY DEPT VISIT HI MDM: CPT | Mod: 25

## 2017-12-30 PROCEDURE — 85025 COMPLETE CBC W/AUTO DIFF WBC: CPT | Performed by: FAMILY MEDICINE

## 2017-12-30 PROCEDURE — 99285 EMERGENCY DEPT VISIT HI MDM: CPT | Performed by: FAMILY MEDICINE

## 2017-12-30 NOTE — ED NOTES
Patient brought directly to the ED for stroke like symptoms and and a neuro evaluation called. Dr. Jennings at the bedside and EKG in progress.

## 2017-12-30 NOTE — ED PROVIDER NOTES
eMERGENCY dEPARTMENT eNCOUnter        CHIEF COMPLAINT    Chief Complaint   Patient presents with     Dizziness     Numbness       HPI    Rosalie JANE Friend is a 72 year old female who presents with a 2-day history of pain in her left scalp and some chest pain today.  Rosalie has a history of cerebral aneurysm with coiling.  She had a lot of stress today, her grandson was being brought in by police after overdosing on vivance, violating parole and would likely be going back to juvenile nursing home.  She has had intermittant pain in her scalp, left side.  No other neurologic symptoms.  Then today had some tightness in her chest when she moved her arm, she arrived here by private vehicle.  No headache      REVIEW OF SYSTEMS    Neurologic: no LOC, No hearing loss  Cardiac: No Chest Pain, no syncope  Respiratory: No cough or difficulty breathing  GI: No Bloody Stool or Diarrhea  : No Dysuria or Hematuria  General: No Fever  All other systems reviewed and are negative.    PAST MEDICAL & SURGICAL HISTORY    Past Medical History:   Diagnosis Date     Cerebral infarction (H)     05/06/2017     Diabetes type 2, uncontrolled (H)      Hyperlipemia      Past Surgical History:   Procedure Laterality Date     ABDOMEN SURGERY      bladder repair     GYN SURGERY      hysterectomy        CURRENT MEDICATIONS    Current Outpatient Rx   Medication Sig Dispense Refill     metFORMIN (GLUCOPHAGE) 500 MG tablet TAKE 2 TABLETS TWICE A DAY WITH MEALS 180 tablet 1     aspirin 81 MG tablet Take 2 tablets (162 mg) by mouth daily 180 tablet 3     atorvastatin (LIPITOR) 80 MG tablet Take 1 tablet (80 mg) by mouth daily 90 tablet 3     blood glucose monitoring (ONE TOUCH DELICA) lancets Use to test blood sugar 3 times daily. 360 each 3     Glucose Blood (BLOOD GLUCOSE TEST STRIPS) STRP four times a day. 360 each 3     ONETOUCH DELICA LANCETS 33G MISC four times a day.         ALLERGIES    Allergies   Allergen Reactions     Penicillins       Phenylephrine      Head aches     Tropicamide      Head aches         SOCIAL & FAMILY HISTORY    Social History     Social History     Marital status:      Spouse name: N/A     Number of children: N/A     Years of education: N/A     Social History Main Topics     Smoking status: Light Tobacco Smoker     Types: Cigarettes     Last attempt to quit: 5/5/2017     Smokeless tobacco: Never Used      Comment: limits self to about 4-5 daily , she is quitting on her own     Alcohol use No     Drug use: No     Sexual activity: Not on file     Other Topics Concern     Not on file     Social History Narrative     Family History   Problem Relation Age of Onset     Aneurysm Mother      Other Cancer Father      DIABETES Brother      Hypertension Brother      Other Cancer Brother      Other Cancer Sister      Aneurysm Sister        PHYSICAL EXAM    VITAL SIGNS: /81  Pulse 84  Temp 97.5  F (36.4  C) (Oral)  Resp 23  SpO2 97%   Constitutional:  Well developed, well nourished, no acute distress   Eyes: Pupils equally round and react to light, sclera nonicteric  HENT:  Atraumatic, no trismus  Neck: supple, no JVD, no posterior neck tenderness  Respiratory:  Lungs Clear, no retractions   Cardiovascular:  regular rate, no murmurs  GI:  Soft, nontender, normal bowel sounds  Musculoskeletal:  No edema, no bruising  Vascular:  all pulses are 2+ equal bilaterally  Integument:  Well hydrated, no petechiae   Neurologic:  Alert & oriented, no slurred speech  Psych: Pleasant affect, no hallucinations    EKG    NSR, no ischemic changes    RADIOLOGY  (read by the radiologist)  Results for orders placed or performed during the hospital encounter of 12/30/17   CT Head w/o Contrast    Narrative    PROCEDURE: CT HEAD W/O CONTRAST 12/30/2017 3:05 PM    HISTORY: headache, history aneurysm;     COMPARISONS: August 9, 2017    Meds/Dose Given:    TECHNIQUE: CT scan of the brain without contrast    FINDINGS: The ventricular system is  normal in size. There is an old  infarcts seen in the centrum semiovale on the right adjacent to the  frontal horn of the right lateral ventricle there is an old posterior  temporal infarct on the right. There is been a coiling of an aneurysm  on the left. There are no masses ventricular shifts or extracerebral  collections. The brainstem and cerebellum appear normal. Cranial vault  is intact. The paranasal sinuses are clear.         Impression    IMPRESSION: No acute brain abnormality.    ISH KILLIAN MD       ED COURSE & MEDICAL DECISION MAKING    Pertinent Labs & Imaging studies reviewed and interpreted. (See chart for details)    See chart for details of medications given during the ED stay.    Vitals:    12/30/17 1530 12/30/17 1545 12/30/17 1600 12/30/17 1615   BP: (!) 134/117 158/88 174/81    Pulse:       Resp: 17 13 23 23   Temp:       TempSrc:       SpO2:               FINAL IMPRESSION    1. Scalp pain        PLAN  Her pain has completely resolved, both in her scalp and left arm.  Does not sound cardiac.  Blood pressure has come down.  Will discharge to home.        Rupinder Jennings MD  12/30/17 1617

## 2017-12-30 NOTE — ED AVS SNAPSHOT
[facility]    [address] [place] [address]    Phone:  [phone]                                       Rosalie JANE Friend   MRN: [id number]    Department:  [facility]   Date of Visit:  12/30/2017           After Visit Summary Signature Page     I have received my discharge instructions, and my questions have been answered. I have discussed any challenges I see with this plan with the nurse or doctor.    ..........................................................................................................................................  Patient/Patient Representative Signature      ..........................................................................................................................................  Patient Representative Print Name and Relationship to Patient    ..................................................               ................................................  Date                                            Time    ..........................................................................................................................................  Reviewed by Signature/Title    ...................................................              ..............................................  Date                                                            Time

## 2017-12-30 NOTE — ED AVS SNAPSHOT
[facility]    [address]    [place] [address]    Phone:  [phone]                                       Rosalie JANE Friend   MRN: [id number]    Department:  [facility]   Date of Visit:  12/30/2017           Patient Information     Date Of Birth          1945        Your diagnoses for this visit were:     Scalp pain        You were seen by Rupinder Jennings MD.        Discharge Instructions          * HEADACHE [unspecified]    The cause of your headache today is not clear, but it does not appear to be the sign of any serious illness.  Under stress, some people tense the muscles of their shoulder, neck and scalp without knowing it. If this condition lasts long enough, a TENSION HEADACHE can occur.  A MIGRAINE HEADACHE is caused by changes in blood flow to the brain. It can be mild or severe. A migraine attack may be triggered by emotional stress, hormone changes during the menstrual cycle, oral contraceptives, alcohol use, certain foods containing tyramine, eye strain, weather changes, missing meals, lack of sleep or oversleeping.  Other causes of headache include a viral illness, sinus, ear or throat infection, dental pain and TMJ (jaw joint) pain.  HOME CARE:    If you were given pain medicine for this headache, do not drive yourself home. Arrange for a ride, instead. When you get home, try to sleep. You should feel much better when you wake up.    If you are having nausea or vomiting, follow a light diet until your headache is relieved.    If you have a migraine type headache, use sunglasses when in the daylight or around bright indoor lighting until symptoms improve. Bright glaring light can worsen this kind of headache.  FOLLOW UP with your doctor if the headache is not better within the next 24 hours. If you have frequent headaches you should discuss a treatment plan with your primary care doctor. By being aware of the earliest signs of headache, and starting  treatment right away, you may be able to stop the pain yourself.  GET PROMPT MEDICAL ATTENTION if any of the following occur:    Worsening of your head pain or no improvement within 24 hours    Repeated vomiting (unable to keep liquids down)    Fever over 101 F (38.3 C)    Stiff neck    Extreme drowsiness, confusion or fainting    Weakness of an arm or leg or one side of the face    Difficulty with speech or vision    9085-3601 [employer] [address] [place] [address], [place] [address]. All rights reserved. This information is not intended as a substitute for professional medical care. Always follow your healthcare professional's instructions.  This information has been modified by your health care provider with permission from the publisher.      Future Appointments        Provider Department Dept Phone Center    2/6/2018 10:15 AM Yash Cuellar, [facility] [phone] Jt Fernandez         Review of your medicines      Our records show that you are taking the medicines listed below. If these are incorrect, please call your family doctor or clinic.        Dose / Directions Last dose taken    aspirin 81 MG tablet   Dose:  162 mg   Quantity:  180 tablet        Take 2 tablets (162 mg) by mouth daily   Refills:  3        atorvastatin 80 MG tablet   Commonly known as:  LIPITOR   Dose:  80 mg   Quantity:  90 tablet        Take 1 tablet (80 mg) by mouth daily   Refills:  3        BLOOD GLUCOSE TEST STRIPS Strp   Quantity:  360 each        four times a day.   Refills:  3        metFORMIN 500 MG tablet   Commonly known as:  GLUCOPHAGE   Quantity:  180 tablet        TAKE 2 TABLETS TWICE A DAY WITH MEALS   Refills:  1        * ONETOUCH DELICA LANCETS 33G Misc        four times a day.   Refills:  0        * blood glucose monitoring lancets   Quantity:  360 each        Use to test blood sugar 3 times daily.   Refills:  3        * Notice:  This list has 2 medication(s) that are the same as  "other medications prescribed for you. Read the directions carefully, and ask your doctor or other care provider to review them with you.            Procedures and tests performed during your visit     CBC with platelets differential    CT Head w/o Contrast    Comprehensive metabolic panel    EKG 12-lead, tracing only    Glucose by meter    Troponin I    UA reflex to Microscopic and Culture      Orders Needing Specimen Collection     None      Pending Results     No orders found from 2017 to 2017.            Pending Culture Results     No orders found from 2017 to 2017.            Thank you for choosing [facility]       Thank you for choosing [facility] for your care. Our goal is always to provide you with excellent care. Hearing back from our patients is one way we can continue to improve our services. Please take a few minutes to complete the written survey that you may receive in the mail after you visit with us. Thank you!        [employer] Information     [employer] lets you send messages to your doctor, view your test results, renew your prescriptions, schedule appointments and more. To sign up, go to [email or web address] . Click on \"Log in\" on the left side of the screen, which will take you to the Welcome page. Then click on \"Sign up Now\" on the right side of the page.     You will be asked to enter the access code listed below, as well as some personal information. Please follow the directions to create your username and password.     Your access code is: [email or web address]  Expires: 2018 11:20 AM     Your access code will  in 90 days. If you need help or a new code, please call your [facility] clinic or [phone].        Care EveryWhere ID     This is your Care EveryWhere ID. This could be used by other organizations to access your [facility] medical records  [id number]        Equal Access to Services     MICHELLE BOWEN : nahed Nicole, elizabeth caceres " nery chowdhury ah. So [facility] [phone].    ATENCIÓN: Si habla español, tiene a blackmon disposición servicios gratuitos de asistencia lingüística. Llame al [phone].    We comply with applicable federal civil rights laws and [place] laws. We do not discriminate on the basis of race, color, national origin, age, disability, sex, sexual orientation, or gender identity.            After Visit Summary       This is your record. Keep this with you and show to your community pharmacist(s) and doctor(s) at your next visit.

## 2017-12-30 NOTE — DISCHARGE INSTRUCTIONS
* HEADACHE [unspecified]    The cause of your headache today is not clear, but it does not appear to be the sign of any serious illness.  Under stress, some people tense the muscles of their shoulder, neck and scalp without knowing it. If this condition lasts long enough, a TENSION HEADACHE can occur.  A MIGRAINE HEADACHE is caused by changes in blood flow to the brain. It can be mild or severe. A migraine attack may be triggered by emotional stress, hormone changes during the menstrual cycle, oral contraceptives, alcohol use, certain foods containing tyramine, eye strain, weather changes, missing meals, lack of sleep or oversleeping.  Other causes of headache include a viral illness, sinus, ear or throat infection, dental pain and TMJ (jaw joint) pain.  HOME CARE:    If you were given pain medicine for this headache, do not drive yourself home. Arrange for a ride, instead. When you get home, try to sleep. You should feel much better when you wake up.    If you are having nausea or vomiting, follow a light diet until your headache is relieved.    If you have a migraine type headache, use sunglasses when in the daylight or around bright indoor lighting until symptoms improve. Bright glaring light can worsen this kind of headache.  FOLLOW UP with your doctor if the headache is not better within the next 24 hours. If you have frequent headaches you should discuss a treatment plan with your primary care doctor. By being aware of the earliest signs of headache, and starting treatment right away, you may be able to stop the pain yourself.  GET PROMPT MEDICAL ATTENTION if any of the following occur:    Worsening of your head pain or no improvement within 24 hours    Repeated vomiting (unable to keep liquids down)    Fever over 101 F (38.3 C)    Stiff neck    Extreme drowsiness, confusion or fainting    Weakness of an arm or leg or one side of the face    Difficulty with speech or vision    8229-9856 [facility]  [employer], [employer]. [address] [place] [address]. All rights reserved. This information is not intended as a substitute for professional medical care. Always follow your healthcare professional's instructions.  This information has been modified by your health care provider with permission from the publisher.

## 2018-01-02 ENCOUNTER — TELEPHONE (OUTPATIENT)
Dept: INTERNAL MEDICINE | Facility: OTHER | Age: 73
End: 2018-01-02

## 2018-01-02 NOTE — TELEPHONE ENCOUNTER
9:35 AM    Reason for Call: Phone Call    Description: Pt would like  to look at her Cat scan from when she was in the ER and if there is any questions or concerns  please give her a call     Was an appointment offered for this call? No  If yes : Appointment type              Date    Preferred method for responding to this message: Telephone Call  What is your phone number ?    If we cannot reach you directly, may we leave a detailed response at the number you provided? Yes    Can this message wait until your PCP/provider returns, if available today? Not applicable, provider is in     Tonya Pereira

## 2018-02-09 ENCOUNTER — OFFICE VISIT (OUTPATIENT)
Dept: INTERNAL MEDICINE | Facility: OTHER | Age: 73
End: 2018-02-09
Attending: INTERNAL MEDICINE
Payer: MEDICARE

## 2018-02-09 VITALS
HEIGHT: 65 IN | SYSTOLIC BLOOD PRESSURE: 124 MMHG | OXYGEN SATURATION: 97 % | DIASTOLIC BLOOD PRESSURE: 84 MMHG | TEMPERATURE: 97.4 F | HEART RATE: 86 BPM | WEIGHT: 230 LBS | BODY MASS INDEX: 38.32 KG/M2

## 2018-02-09 DIAGNOSIS — M79.604 LOWER EXTREMITY PAIN, BILATERAL: ICD-10-CM

## 2018-02-09 DIAGNOSIS — E11.9 WELL CONTROLLED TYPE 2 DIABETES MELLITUS (H): Primary | ICD-10-CM

## 2018-02-09 DIAGNOSIS — R29.898 LEFT HAND WEAKNESS: ICD-10-CM

## 2018-02-09 DIAGNOSIS — I73.9 CLAUDICATION (H): ICD-10-CM

## 2018-02-09 DIAGNOSIS — M79.605 LOWER EXTREMITY PAIN, BILATERAL: ICD-10-CM

## 2018-02-09 LAB
EST. AVERAGE GLUCOSE BLD GHB EST-MCNC: 137 MG/DL
HBA1C MFR BLD: 6.4 % (ref 4.3–6)

## 2018-02-09 PROCEDURE — 40000788 ZZHCL STATISTIC ESTIMATED AVERAGE GLUCOSE: Mod: ZL | Performed by: INTERNAL MEDICINE

## 2018-02-09 PROCEDURE — 36415 COLL VENOUS BLD VENIPUNCTURE: CPT | Mod: ZL | Performed by: INTERNAL MEDICINE

## 2018-02-09 PROCEDURE — G0463 HOSPITAL OUTPT CLINIC VISIT: HCPCS

## 2018-02-09 PROCEDURE — 99214 OFFICE O/P EST MOD 30 MIN: CPT | Performed by: INTERNAL MEDICINE

## 2018-02-09 PROCEDURE — 83036 HEMOGLOBIN GLYCOSYLATED A1C: CPT | Mod: ZL | Performed by: INTERNAL MEDICINE

## 2018-02-09 ASSESSMENT — PAIN SCALES - GENERAL: PAINLEVEL: NO PAIN (0)

## 2018-02-09 NOTE — PROGRESS NOTES
"Internal Medicine:    Chief Complaint   Patient presents with     Musculoskeletal Problem     little over a month- left leg and hand \"loosing control of\"      Diabetes     testing blood sugars twice daily - no readings under 70 or over 200- states feet get cold and then at night they get hot      Rosalie is a 72 year old female with a history of right sided CVA with left sided deficits all of which resolved along with a history of coil embolization to the noted aneurysms.  Since that time she has been stable but presents today due to left hand weakness.  She reports she is afraid to hold things in her left hand due to some vague reports of it \"feeling funny\" and weakness.  She denies any tremors and denies any associated pain in hand or pain in hand at night.  She also reports some vague weakness in her left lower extremity.  He daughter states she was dragging her left foot.  No trauma reported.  No other deficits reported.  These symptoms have been present for weeks or long.  CT head in late December was unchanged.  She also reports her legs and feet being hot during the night and burning and cold during the day.           Patient Active Problem List   Diagnosis     ACP (advance care planning)            Past Medical History:   Diagnosis Date     Cerebral infarction (H)     05/06/2017     Diabetes type 2, uncontrolled (H)      Hyperlipemia             Past Surgical History:   Procedure Laterality Date     ABDOMEN SURGERY      bladder repair     GYN SURGERY      hysterectomy             Social History   Substance Use Topics     Smoking status: Light Tobacco Smoker     Types: Cigarettes     Last attempt to quit: 5/5/2017     Smokeless tobacco: Never Used      Comment: limits self to about 4-5 daily , she is quitting on her own     Alcohol use No            Family History   Problem Relation Age of Onset     Aneurysm Mother      Other Cancer Father      DIABETES Brother      Hypertension Brother      Other Cancer " "Brother      Other Cancer Sister      Aneurysm Sister                Allergies   Allergen Reactions     Penicillins      Phenylephrine      Head aches     Tropicamide      Head aches              Current Outpatient Prescriptions   Medication Sig Dispense Refill     metFORMIN (GLUCOPHAGE) 500 MG tablet TAKE 2 TABLETS TWICE A DAY WITH MEALS 180 tablet 1     aspirin 81 MG tablet Take 2 tablets (162 mg) by mouth daily 180 tablet 3     atorvastatin (LIPITOR) 80 MG tablet Take 1 tablet (80 mg) by mouth daily 90 tablet 3     blood glucose monitoring (ONE TOUCH DELICA) lancets Use to test blood sugar 3 times daily. 360 each 3     Glucose Blood (BLOOD GLUCOSE TEST STRIPS) STRP four times a day. 360 each 3     ONETOUCH DELICA LANCETS 33G MISC four times a day.         Review Of Systems:    Skin: negative  Eyes: negative  Ears/Nose/Throat: negative  Respiratory: No shortness of breath, dyspnea on exertion, cough, or hemoptysis  Cardiovascular: negative  Musculoskeletal: negative  Neurologic: as above   Psychiatric: negative  Hematologic/Lymphatic/Immunologic: negative  Endocrine: diabetes    Objective:   /84 (BP Location: Left arm, Patient Position: Supine, Cuff Size: Adult Large)  Pulse 86  Temp 97.4  F (36.3  C) (Tympanic)  Ht 5' 5\" (1.651 m)  Wt 230 lb (104.3 kg)  SpO2 97%  BMI 38.27 kg/m2  EXAM:  Constitutional: alert and no distress   Cardiovascular: RRR. No murmurs, clicks gallops or rub  Respiratory: Lungs clear  Psychiatric: mentation appears normal and affect normal/bright  NEURO: CN intact but does have amblyopia and exotropia of the left eye which is lifelong. Toe walk was fine.  Ambulation down hallway was without obvious toe drag but she was aware I was watching.  Reflexes in left forearm and patella bilaterally seemed intact.  Grasp strength in left hand same as right.  Dorsi and plantar flexion similar in both feet but may be subtly impaired in left floor.     SKIN: no suspicious lesions or " rashes  JOINT/EXTREMITIES: as above        Orders placed or performed in visit on 11/19/17     metFORMIN (GLUCOPHAGE) 500 MG tablet       Assessment and Plan:    (E11.9) Well controlled type 2 diabetes mellitus (H)  (primary encounter diagnosis)  Comment:   Plan: Hemoglobin A1c    (M79.604,  M79.605) Lower extremity pain, bilateral  Comment: As below      (I73.9) Claudication (H)  Comment:  Neuropathy versus claudication?  Plan: US MICAH Doppler No Exercise    (R29.898) Left hand weakness  Comment: EMG  Plan: NEUROLOGY ADULT REFERRAL          Yash Cuellar, DO

## 2018-02-09 NOTE — NURSING NOTE
"Chief Complaint   Patient presents with     Musculoskeletal Problem     little over a month- left leg and hand \"loosing control of\"      Diabetes     testing blood sugars twice daily - no readings under 70 or over 200- states feet get cold and then at night they get hot        Initial /84 (BP Location: Left arm, Patient Position: Supine, Cuff Size: Adult Large)  Pulse 86  Temp 97.4  F (36.3  C) (Tympanic)  Ht 5' 5\" (1.651 m)  Wt 230 lb (104.3 kg)  SpO2 97%  BMI 38.27 kg/m2 Estimated body mass index is 38.27 kg/(m^2) as calculated from the following:    Height as of this encounter: 5' 5\" (1.651 m).    Weight as of this encounter: 230 lb (104.3 kg).  Medication Reconciliation: complete   Kalee Busby LPN      "

## 2018-02-09 NOTE — MR AVS SNAPSHOT
After Visit Summary   2/9/2018    Rosalie Seals    MRN: [id number]           Patient Information     Date Of Birth          1945        Visit Information        Provider Department      2/9/2018 10:45 AM Yash Cuellar,  [facility]        Today's Diagnoses     Well controlled type 2 diabetes mellitus (H)    -  1    Lower extremity pain, bilateral        Claudication (H)        Left hand weakness          Care Instructions    MICAH for legs  EMG for left hand weakness  A1C today            Follow-ups after your visit        Additional Services     NEUROLOGY ADULT REFERRAL       Your provider has referred you for the following:   EMG   Dr Max      Please be aware that coverage of these services is subject to the terms and limitations of your health insurance plan.  Call member services at your health plan with any benefit or coverage questions.      Please bring the following with you to your appointment:    (1) Any X-Rays, CTs or MRIs which have been performed.  Contact the facility where they were done to arrange for  prior to your scheduled appointment.    (2) List of current medications  (3) This referral request   (4) Any documents/labs given to you for this referral                  Your next 10 appointments already scheduled     Feb 16, 2018  1:00 PM CST   US MICAH DOPPLER NO EXERCISE 1-2 LVLS BILAT with HIUS3   [facility] ULTRASOUND ([facility] )    [address] [place] [address] [phone]           Please bring a list of your medicines (including vitamins, minerals and over-the-counter drugs). Also, tell your doctor about any allergies you may have. Wear comfortable clothes and leave your valuables at home.  No caffeine or tobacco for 1 hour prior to exam.  Please call the Imaging Department at your exam site with any questions.            Mar 06, 2018 10:30 AM CST   (Arrive by 10:15 AM)   Return Visit with Xochitl Daniels RD   [facility] Diabetes  "Education ([facility] )    [address] [place] [address] [id number] [phone]              Future tests that were ordered for you today     Open Future Orders        Priority Expected Expires Ordered    US MICAH Doppler No Exercise Routine  2019            Who to contact     If you have questions or need follow up information about today's clinic visit or your schedule please contact [facility] directly at [phone].  Normal or non-critical lab and imaging results will be communicated to you by [employer], letter or phone within 4 business days after the clinic has received the results. If you do not hear from us within 7 days, please contact the clinic through [employer] or phone. If you have a critical or abnormal lab result, we will notify you by phone as soon as possible.  Submit refill requests through [employer] or call your pharmacy and they will forward the refill request to us. Please allow 3 business days for your refill to be completed.          Additional Information About Your Visit        [employer] Information     [employer] lets you send messages to your doctor, view your test results, renew your prescriptions, schedule appointments and more. To sign up, go to [email or web address] . Click on \"Log in\" on the left side of the screen, which will take you to the Welcome page. Then click on \"Sign up Now\" on the right side of the page.     You will be asked to enter the access code listed below, as well as some personal information. Please follow the directions to create your username and password.     Your access code is: [email or web address]  Expires: 5/10/2018 10:46 AM     Your access code will  in 90 days. If you need help or a new code, please call your [facility] or [phone].        Care EveryWhere ID     This is your Care EveryWhere ID. This could be used by other organizations to access your [facility] medical records  [id number]        Your Vitals Were     " "Pulse Temperature Height Pulse Oximetry BMI (Body Mass Index)       86 97.4  F (36.3  C) (Tympanic) 5' 5\" (1.651 m) 97% 38.27 kg/m2        Blood Pressure from Last 3 Encounters:   02/09/18 124/84   12/30/17 174/81   12/04/17 144/80    Weight from Last 3 Encounters:   02/09/18 230 lb (104.3 kg)   12/04/17 238 lb 11.2 oz (108.3 kg)   11/21/17 241 lb 1.6 oz (109.4 kg)              We Performed the Following     Hemoglobin A1c     NEUROLOGY ADULT REFERRAL        Primary Care Provider Office Phone # Fax #    Yash Cuellar,  [phone]       [facility] [address]  [facility] [address]        Equal Access to Services     MICHELLE BOWEN : Hadii amos hernandez hadasho Soleonid, waaxda luqadaha, qaybta kaalmada adeegyada, nery ontiveros . [facility] [phone].    ATENCIÓN: Si habla español, tiene a blackmon disposición servicios gratuitos de asistencia lingüística. Hu al [phone].    We comply with applicable federal civil rights laws and [place] laws. We do not discriminate on the basis of race, color, national origin, age, disability, sex, sexual orientation, or gender identity.            Thank you!     Thank you for choosing [facility]  for your care. Our goal is always to provide you with excellent care. Hearing back from our patients is one way we can continue to improve our services. Please take a few minutes to complete the written survey that you may receive in the mail after your visit with us. Thank you!             Your Updated Medication List - Protect others around you: Learn how to safely use, store and throw away your medicines at www.disposemymeds.org.          This list is accurate as of 2/9/18 11:01 AM.  Always use your most recent med list.                   Brand Name Dispense Instructions for use Diagnosis    aspirin 81 MG tablet     180 tablet    Take 2 tablets (162 mg) by mouth daily    Cerebrovascular accident (CVA) due to " thrombosis of carotid artery, unspecified blood vessel laterality (H)       atorvastatin 80 MG tablet    LIPITOR    90 tablet    Take 1 tablet (80 mg) by mouth daily    Hyperlipidemia LDL goal <100       BLOOD GLUCOSE TEST STRIPS Strp     360 each    four times a day.    Type 2 diabetes mellitus with complication, without long-term current use of insulin (H)       metFORMIN 500 MG tablet    GLUCOPHAGE    180 tablet    TAKE 2 TABLETS TWICE A DAY WITH MEALS    Type 2 diabetes mellitus with hyperglycemia, with long-term current use of insulin (H)       * ONETOUCH DELICA LANCETS 33G Misc      four times a day.        * blood glucose monitoring lancets     360 each    Use to test blood sugar 3 times daily.    Type 2 diabetes mellitus with hyperglycemia, with long-term current use of insulin (H)       * Notice:  This list has 2 medication(s) that are the same as other medications prescribed for you. Read the directions carefully, and ask your doctor or other care provider to review them with you.

## 2018-02-14 ENCOUNTER — HOSPITAL ENCOUNTER (EMERGENCY)
Facility: HOSPITAL | Age: 73
Discharge: HOME | End: 2018-02-15
Attending: EMERGENCY MEDICINE | Admitting: EMERGENCY MEDICINE
Payer: MEDICARE

## 2018-02-14 ENCOUNTER — APPOINTMENT (OUTPATIENT)
Dept: CT IMAGING | Facility: HOSPITAL | Age: 73
End: 2018-02-14
Attending: EMERGENCY MEDICINE
Payer: MEDICARE

## 2018-02-14 ENCOUNTER — TELEPHONE (OUTPATIENT)
Dept: FAMILY MEDICINE | Facility: OTHER | Age: 73
End: 2018-02-14

## 2018-02-14 DIAGNOSIS — R29.898 LEFT ARM WEAKNESS: ICD-10-CM

## 2018-02-14 DIAGNOSIS — I16.0 HYPERTENSIVE URGENCY: Primary | ICD-10-CM

## 2018-02-14 LAB
ANION GAP SERPL CALCULATED.3IONS-SCNC: 6 MMOL/L (ref 3–14)
APTT PPP: 28 SEC (ref 24–37)
BASOPHILS # BLD AUTO: 0.1 10E9/L (ref 0–0.2)
BASOPHILS NFR BLD AUTO: 0.7 %
BUN SERPL-MCNC: 17 MG/DL (ref 7–30)
CALCIUM SERPL-MCNC: 9.6 MG/DL (ref 8.5–10.1)
CHLORIDE SERPL-SCNC: 105 MMOL/L (ref 94–109)
CO2 SERPL-SCNC: 27 MMOL/L (ref 20–32)
CREAT SERPL-MCNC: 0.71 MG/DL (ref 0.52–1.04)
DIFFERENTIAL METHOD BLD: NORMAL
EOSINOPHIL # BLD AUTO: 0.2 10E9/L (ref 0–0.7)
EOSINOPHIL NFR BLD AUTO: 2.2 %
ERYTHROCYTE [DISTWIDTH] IN BLOOD BY AUTOMATED COUNT: 13.9 % (ref 10–15)
GFR SERPL CREATININE-BSD FRML MDRD: 81 ML/MIN/1.7M2
GLUCOSE SERPL-MCNC: 88 MG/DL (ref 70–99)
HCT VFR BLD AUTO: 42.8 % (ref 35–47)
HGB BLD-MCNC: 13.8 G/DL (ref 11.7–15.7)
IMM GRANULOCYTES # BLD: 0 10E9/L (ref 0–0.4)
IMM GRANULOCYTES NFR BLD: 0.2 %
INR PPP: 0.9 (ref 0.8–1.2)
LYMPHOCYTES # BLD AUTO: 2.6 10E9/L (ref 0.8–5.3)
LYMPHOCYTES NFR BLD AUTO: 31.8 %
MCH RBC QN AUTO: 26.6 PG (ref 26.5–33)
MCHC RBC AUTO-ENTMCNC: 32.2 G/DL (ref 31.5–36.5)
MCV RBC AUTO: 83 FL (ref 78–100)
MONOCYTES # BLD AUTO: 0.7 10E9/L (ref 0–1.3)
MONOCYTES NFR BLD AUTO: 8 %
NEUTROPHILS # BLD AUTO: 4.7 10E9/L (ref 1.6–8.3)
NEUTROPHILS NFR BLD AUTO: 57.1 %
NRBC # BLD AUTO: 0 10*3/UL
NRBC BLD AUTO-RTO: 0 /100
PLATELET # BLD AUTO: 280 10E9/L (ref 150–450)
POTASSIUM SERPL-SCNC: 4.5 MMOL/L (ref 3.4–5.3)
RBC # BLD AUTO: 5.19 10E12/L (ref 3.8–5.2)
SODIUM SERPL-SCNC: 138 MMOL/L (ref 133–144)
TROPONIN I SERPL-MCNC: <0.015 UG/L (ref 0–0.04)
WBC # BLD AUTO: 8.2 10E9/L (ref 4–11)

## 2018-02-14 PROCEDURE — 99285 EMERGENCY DEPT VISIT HI MDM: CPT | Mod: 25

## 2018-02-14 PROCEDURE — 84484 ASSAY OF TROPONIN QUANT: CPT | Performed by: EMERGENCY MEDICINE

## 2018-02-14 PROCEDURE — 85025 COMPLETE CBC W/AUTO DIFF WBC: CPT | Performed by: EMERGENCY MEDICINE

## 2018-02-14 PROCEDURE — 80048 BASIC METABOLIC PNL TOTAL CA: CPT | Performed by: EMERGENCY MEDICINE

## 2018-02-14 PROCEDURE — 25000128 H RX IP 250 OP 636: Performed by: EMERGENCY MEDICINE

## 2018-02-14 PROCEDURE — 96374 THER/PROPH/DIAG INJ IV PUSH: CPT

## 2018-02-14 PROCEDURE — 85610 PROTHROMBIN TIME: CPT | Performed by: EMERGENCY MEDICINE

## 2018-02-14 PROCEDURE — 99285 EMERGENCY DEPT VISIT HI MDM: CPT | Performed by: EMERGENCY MEDICINE

## 2018-02-14 PROCEDURE — 93010 ELECTROCARDIOGRAM REPORT: CPT | Performed by: INTERNAL MEDICINE

## 2018-02-14 PROCEDURE — 36415 COLL VENOUS BLD VENIPUNCTURE: CPT | Performed by: EMERGENCY MEDICINE

## 2018-02-14 PROCEDURE — 93005 ELECTROCARDIOGRAM TRACING: CPT

## 2018-02-14 PROCEDURE — 85730 THROMBOPLASTIN TIME PARTIAL: CPT | Performed by: EMERGENCY MEDICINE

## 2018-02-14 PROCEDURE — 70498 CT ANGIOGRAPHY NECK: CPT | Mod: TC

## 2018-02-14 RX ORDER — LABETALOL HYDROCHLORIDE 5 MG/ML
20 INJECTION, SOLUTION INTRAVENOUS ONCE
Status: COMPLETED | OUTPATIENT
Start: 2018-02-14 | End: 2018-02-14

## 2018-02-14 RX ORDER — IOPAMIDOL 755 MG/ML
75 INJECTION, SOLUTION INTRAVASCULAR ONCE
Status: COMPLETED | OUTPATIENT
Start: 2018-02-14 | End: 2018-02-14

## 2018-02-14 RX ADMIN — LABETALOL HYDROCHLORIDE 20 MG: 5 INJECTION, SOLUTION INTRAVENOUS at 21:53

## 2018-02-14 RX ADMIN — IOPAMIDOL 75 ML: 755 INJECTION, SOLUTION INTRAVENOUS at 22:19

## 2018-02-14 NOTE — TELEPHONE ENCOUNTER
Ok. Thank you. We will have to wait for the PA to come through to know which formularies have changed. Nuha Mistry LPN

## 2018-02-14 NOTE — ED AVS SNAPSHOT
[facility]    [address] [address]    Phone:  [phone]                                       Rosalie JANE Friend   MRN: [id number]    Department:  [facility]   Date of Visit:  2/14/2018           After Visit Summary Signature Page     I have received my discharge instructions, and my questions have been answered. I have discussed any challenges I see with this plan with the nurse or doctor.    ..........................................................................................................................................  Patient/Patient Representative Signature      ..........................................................................................................................................  Patient Representative Print Name and Relationship to Patient    ..................................................               ................................................  Date                                            Time    ..........................................................................................................................................  Reviewed by Signature/Title    ...................................................              ..............................................  Date                                                            Time

## 2018-02-14 NOTE — TELEPHONE ENCOUNTER
Patient called and stated that her insurance company is not covering the most recent order that Dr. Cuellar put in for her diabetic supplies. When looking back I noticed she has been getting the same ones for the past 6 months. Did we maybe order a different one on accident? If not, we'll have to wait for the PA to come in. Just let me know if you see a change in the orders. Thank you. Nuha Mistry LPN

## 2018-02-14 NOTE — ED AVS SNAPSHOT
[facility]    [address] [address]    Phone:  [phone]                                       Rosalie JANE Friend   MRN: [id number]    Department:  [facility]   Date of Visit:  2/14/2018           Patient Information     Date Of Birth          1945        Your diagnoses for this visit were:     Left arm weakness     Hypertensive urgency        You were seen by Dav Sinclair MD.      Discharge References/Attachments     BRAIN ANEURYSM, WHAT IS (ENGLISH)    HIGH BLOOD PRESSURE, YOUR RISK FACTORS (ENGLISH)      Future Appointments        Provider Department Dept Phone Center    2/16/2018 1:00 PM [facility] Ultrasound [facility] ULTR[facility]OUND [phone] [facility]    3/6/2018 10:30 AM Xochitl Daniels RD [facility] Diabetes Education [phone] [facility]         Review of your medicines      START taking        Dose / Directions Last dose taken    lisinopril-hydrochlorothiazide 10-12.5 MG per tablet   Commonly known as:  PRINZIDE/ZESTORETIC   Dose:  1 tablet   Quantity:  90 tablet        Take 1 tablet by mouth daily   Refills:  3          Our records show that you are taking the medicines listed below. If these are incorrect, please call your family doctor or clinic.        Dose / Directions Last dose taken    aspirin 81 MG tablet   Dose:  162 mg   Quantity:  180 tablet        Take 2 tablets (162 mg) by mouth daily   Refills:  3        atorvastatin 80 MG tablet   Commonly known as:  LIPITOR   Dose:  80 mg   Quantity:  90 tablet        Take 1 tablet (80 mg) by mouth daily   Refills:  3        BLOOD GLUCOSE TEST STRIPS Strp   Quantity:  360 each        four times a day.   Refills:  3        metFORMIN 500 MG tablet   Commonly known as:  GLUCOPHAGE   Quantity:  180 tablet        TAKE 2 TABLETS TWICE A DAY WITH MEALS   Refills:  1        * ONETOUCH DELICA LANCETS 33G Misc        four times a day.   Refills:  0        * blood glucose monitoring lancets   Quantity:  360  each        Use to test blood sugar 3 times daily.   Refills:  3        order for DME   Quantity:  1 Device        Equipment being ordered: Glucometer and test strips   Refills:  0        * Notice:  This list has 2 medication(s) that are the same as other medications prescribed for you. Read the directions carefully, and ask your doctor or other care provider to review them with you.            Prescriptions were sent or printed at these locations (1 Prescription)                   [id number] [place], [place] - [address]   [address], [place] [address]    Telephone:  [phone]   Fax:  [phone]   Hours:                  E-Prescribed (1 of 1)         lisinopril-hydrochlorothiazide (PRINZIDE/ZESTORETIC) 10-12.5 MG per tablet                Procedures and tests performed during your visit     Activity: Bedrest    Basic metabolic panel    CBC with platelets differential    CTA Angiogram Head Neck    Dysphagia Screen    EKG 12-lead, tracing only    Glucose monitor nursing POCT    INR    Notify CT that Stroke patient is in ED    Partial thromboplastin time    Pulse oximetry nursing    Troponin I      Orders Needing Specimen Collection     None      Pending Results     Date and Time Order Name Status Description    2/14/2018 2137 CTA Angiogram Head Neck In process             Pending Culture Results     No orders found for last 3 day(s).            Thank you for choosing [facility]       Thank you for choosing [facility] for your care. Our goal is always to provide you with excellent care. Hearing back from our patients is one way we can continue to improve our services. Please take a few minutes to complete the written survey that you may receive in the mail after you visit with us. Thank you!        [employer] Information     [employer] lets you send messages to your doctor, view your test results, renew your prescriptions, schedule appointments and more. To sign up, go to [email or web address] . Click on  "\"Log in\" on the left side of the screen, which will take you to the Welcome page. Then click on \"Sign up Now\" on the right side of the page.     You will be asked to enter the access code listed below, as well as some personal information. Please follow the directions to create your username and password.     Your access code is: [email or web address]  Expires: 5/10/2018 10:46 AM     Your access code will  in 90 days. If you need help or a new code, please call your [place] clinic or [phone].        Care EveryWhere ID     This is your Care EveryWhere ID. This could be used by other organizations to access your [facility] medical records  [id number]        Equal Access to Services     MICHELLE BOWEN : Richard Lemus, wajose tadeo, qabecca kaalmichelle chowdhury, nery neri. [facility] [phone].    ATENCIÓN: Si habla español, tiene a blackmon disposición servicios gratuitos de asistencia lingüística. Llame al [phone].    We comply with applicable federal civil rights laws and [place] laws. We do not discriminate on the basis of race, color, national origin, age, disability, sex, sexual orientation, or gender identity.            After Visit Summary       This is your record. Keep this with you and show to your community pharmacist(s) and doctor(s) at your next visit.                  "

## 2018-02-15 ENCOUNTER — TELEPHONE (OUTPATIENT)
Dept: INTERNAL MEDICINE | Facility: OTHER | Age: 73
End: 2018-02-15

## 2018-02-15 ENCOUNTER — HOSPITAL ENCOUNTER (EMERGENCY)
Facility: HOSPITAL | Age: 73
Discharge: LEFT BEFORE DISCHARGE | End: 2018-02-15
Attending: FAMILY MEDICINE | Admitting: FAMILY MEDICINE
Payer: MEDICARE

## 2018-02-15 VITALS
OXYGEN SATURATION: 96 % | TEMPERATURE: 96.2 F | RESPIRATION RATE: 16 BRPM | DIASTOLIC BLOOD PRESSURE: 68 MMHG | SYSTOLIC BLOOD PRESSURE: 154 MMHG

## 2018-02-15 VITALS
DIASTOLIC BLOOD PRESSURE: 88 MMHG | SYSTOLIC BLOOD PRESSURE: 147 MMHG | RESPIRATION RATE: 11 BRPM | HEART RATE: 78 BPM | OXYGEN SATURATION: 93 % | TEMPERATURE: 97.5 F

## 2018-02-15 DIAGNOSIS — I67.1 CEREBRAL ANEURYSM, NONRUPTURED: ICD-10-CM

## 2018-02-15 PROCEDURE — 99283 EMERGENCY DEPT VISIT LOW MDM: CPT | Performed by: FAMILY MEDICINE

## 2018-02-15 PROCEDURE — 99283 EMERGENCY DEPT VISIT LOW MDM: CPT | Mod: 27

## 2018-02-15 RX ORDER — LISINOPRIL/HYDROCHLOROTHIAZIDE 10-12.5 MG
1 TABLET ORAL DAILY
Qty: 90 TABLET | Refills: 3 | Status: ON HOLD | OUTPATIENT
Start: 2018-02-15 | End: 2018-02-22

## 2018-02-15 NOTE — ED NOTES
Family is upset with the delay.  Attempted to explain that per [facility]'s policy we can not transfer pt until we have a bed assignment.  They said we should've just drove her to [place] ourselves would've been quicker.

## 2018-02-15 NOTE — PROGRESS NOTES
CTA head and neck report routed to Dr Cuellar.   IMPRESSION:   1.  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     2.  Unchanged appearance of 4.5 x 6 mm aneurysm involving the right MCA trifurcation.    3.  Hypoplastic right vertebral artery.    4.  Calcified noncalcified plaque within the left carotid bifurcation. No evidence of significant stenosis.

## 2018-02-15 NOTE — ED NOTES
Spoke with everton at [facility]- she sated that they still do not have a room available for the patient and will call us when one becomes available, they still want us to wait to call for an ambulance until they have a room ready.

## 2018-02-15 NOTE — ED NOTES
Pt given discharge papers.  Verbalizes understanding of d/c dx, follow up as needed and keep scheduled appt, and to  new Rx for BP.  IV out and site is WDL.  Denies pain.  VS as charted. Discharged to home with dtr here to transport.

## 2018-02-15 NOTE — ED NOTES
The patient presents with left arm weakness for over a week, it is worse today than it has ever been, she states she saw her provider on Friday for this and has an EMG scheduled. She is now having some left leg weakness. She fell and hit her head due to her left leg giving out one week ago. Hx of CVA and cerebral aneurysms. Daughter accompanies the patient.

## 2018-02-15 NOTE — TELEPHONE ENCOUNTER
Called patient back.  She would like a call on Friday regarding her very elevated Bp and discuss her current blood pressure medication.

## 2018-02-15 NOTE — ED NOTES
Presents to the ED with dtr with c/o left arm weakness and numbness for one week and left arm weaker tonight.  S/SX for one week.  States she also fell one week ago.  EMG scheduled for Friday.  Hx CVA and aneurysm.  Assessment is complete.  Call light is given.  Monitors on pt.

## 2018-02-15 NOTE — ED NOTES
Overheard daughter calling [facility] they told her it will be at least another 2 hrs b4 they can assign a bed.  Family is going to transfer her down there themselves.  Agreed to sign AMA papers.

## 2018-02-15 NOTE — ED PROVIDER NOTES
History     Chief Complaint   Patient presents with     arm numbness     lt arm numbness and states lt leg feeling screwy, notes hx of stroke in may, notes has been losing control of her lt arm for a long time and scheduled us on friday     HPI  Rosalie JANE Friend is a 72 year old female who over 10 days history of weakness in the left upper limb.  The weakness has progressively worsened.  Patient was seen by PCP on Friday last week with the same complaint of laboratory tests done was reported to be normal.  Patient has significant history of diabetes mellitus type 2, hyperlipidemia, cerebral aneurysms, complete stenosis of the right carotid and prior history of cerebrovascular accident.  The weakness in the left upper limb is mild and patient is able to use that hand.  There is mild a general decline in the weakness over the last 1 week.  She denies any history of trauma, headache, nausea or vomiting.    Problem List:    Patient Active Problem List    Diagnosis Date Noted     ACP (advance care planning) 05/09/2017     Priority: Medium     Advance Care Planning 5/9/2017: ACP Review of Chart / Resources Provided:  Reviewed chart for advance care plan.  Rosalie Seals has been provided information and resources to begin or update their advance care plan.  Added by Kalee Busby                Past Medical History:    Past Medical History:   Diagnosis Date     Cerebral infarction (H)      Diabetes type 2, uncontrolled (H)      Hyperlipemia        Past Surgical History:    Past Surgical History:   Procedure Laterality Date     ABDOMEN SURGERY      bladder repair     GYN SURGERY      hysterectomy        Family History:    Family History   Problem Relation Age of Onset     Aneurysm Mother      Other Cancer Father      DIABETES Brother      Hypertension Brother      Other Cancer Brother      Other Cancer Sister      Aneurysm Sister        Social History:  Marital Status:   [4]  Social History   Substance Use  Topics     Smoking status: Light Tobacco Smoker     Types: Cigarettes     Last attempt to quit: 5/5/2017     Smokeless tobacco: Never Used      Comment: limits self to about 4-5 daily , she is quitting on her own     Alcohol use No        Medications:      lisinopril-hydrochlorothiazide (PRINZIDE/ZESTORETIC) 10-12.5 MG per tablet   metFORMIN (GLUCOPHAGE) 500 MG tablet   aspirin 81 MG tablet   atorvastatin (LIPITOR) 80 MG tablet   blood glucose monitoring (ONE TOUCH DELICA) lancets   Glucose Blood (BLOOD GLUCOSE TEST STRIPS) STRP   order for DME   ONETOUCH DELICA LANCETS 33G MISC         Review of Systems   All other systems reviewed and are negative.      Physical Exam   BP: (!) 249/94  Pulse: 78  Heart Rate: 90  Temp: 96.9  F (36.1  C)  Resp: 18  SpO2: 98 %      Physical Exam   Constitutional: She is oriented to person, place, and time. She appears well-developed and well-nourished. No distress.   HENT:   Head: Atraumatic.   Mouth/Throat: Oropharynx is clear and moist. No oropharyngeal exudate.   Eyes: EOM are normal. Pupils are equal, round, and reactive to light. No scleral icterus.   Neck: Neck supple.   Cardiovascular: Normal rate, regular rhythm, normal heart sounds and intact distal pulses.    Pulmonary/Chest: Breath sounds normal. No respiratory distress.   Abdominal: Soft. Bowel sounds are normal. There is no tenderness.   Musculoskeletal: She exhibits no edema, tenderness or deformity.   Neurological: She is alert and oriented to person, place, and time. No cranial nerve deficit.   Power grade 4/5 in the left upper limb   Skin: Skin is warm. No rash noted. She is not diaphoretic.   Nursing note and vitals reviewed.      ED Course   Labetalol 20 mg IV given for the hypertensive urgency.  Patient evaluated and laboratory tests ordered.  CT scan of the head ordered.    ED Course     Procedures               EKG Interpretation:      Interpreted by Dav Sinclair  Time reviewed: 10:05 PM  Symptoms at time of  EKG: Left arm weakness  Rhythm: normal sinus   Rate: normal  Axis: normal  Ectopy: none  Conduction: normal  ST Segments/ T Waves: No ST-T wave changes  Q Waves: none  Comparison to prior: No old EKG available patient evaluated and laboratory tests ordered.      Clinical Impression: normal EKG               Labs Ordered and Resulted from Time of ED Arrival Up to the Time of Departure from the ED   CBC WITH PLATELETS DIFFERENTIAL   BASIC METABOLIC PANEL   INR   PARTIAL THROMBOPLASTIN TIME   TROPONIN I   ACTIVITY   PULSE OXIMETRY NURSING   GLUCOSE MONITOR NURSING POCT   ASSESSMENT   NOTIFY       Assessments & Plan (with Medical Decision Making)   Hypertensive urgency: Blood pressure improved after labetalol 20 mg IV.  Blood pressure improved from 249/104 mmHg to 165/75 mmHg..  Discharge home on lisinopril with hydrochlorothiazide.  Follow-up with PCP tomorrow.  Left arm weakness: This is a chronic problem that has been there for more than 2 weeks.  Normal CBC, CMP, INR, APTT.  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  Neurosurgeon on-call at [facility] in [place] Dr. Donnelly was consulted.  Dr. Donnelly suggested that patient can be safely discharged home and advised to follow-up with PCP so that patient can be referred for aneurysm clipping at [facility] or [facility].    I have reviewed the nursing notes.    I have reviewed the findings, diagnosis, plan and need for follow up with the patient.  Presents to the emergency department with    Discharge Medication List as of 2/15/2018 12:08 AM      START taking these medications    Details   lisinopril-hydrochlorothiazide (PRINZIDE/ZESTORETIC) 10-12.5 MG per tablet Take 1 tablet by mouth daily, Disp-90 tablet, R-3, E-Prescribe             Final diagnoses:   Left arm weakness   Hypertensive urgency       2/14/2018   [facility]     Dav Sinclair MD  02/15/18 0033

## 2018-02-15 NOTE — ED PROVIDER NOTES
History     Chief Complaint   Patient presents with     Extremity Weakness     lt arm and leg weakness, notes evaluated last night and symptoms worse today     HPI  Rosalie JANE Friend is a 72 year old female who presents with complaint of increasing left sided weakness. Patient denies headache. Patient states increased leg and arm weakness today. Patient had been seen in the ER last night for arm weakness . Patient with a known history of aneurysm . Patient care was discussed with neurosurgery last evening who felt that patient could be discharged and have referral to neurosurgery as outpatient . Patient presents today stating that her left  Leg has started to become weak today as well  Family states that they had talked to neurology at  today and was told to be evaluated in the ER in [place] . Patient has not had slurred speech. NO facial weakness . NO swallowing difficulties     Problem List:    Patient Active Problem List    Diagnosis Date Noted     ACP (advance care planning) 05/09/2017     Priority: Medium     Advance Care Planning 5/9/2017: ACP Review of Chart / Resources Provided:  Reviewed chart for advance care plan.  Rosalie Seals has been provided information and resources to begin or update their advance care plan.  Added by Kalee Busby                Past Medical History:    Past Medical History:   Diagnosis Date     Cerebral infarction (H)      Diabetes type 2, uncontrolled (H)      Hyperlipemia        Past Surgical History:    Past Surgical History:   Procedure Laterality Date     ABDOMEN SURGERY      bladder repair     GYN SURGERY      hysterectomy        Family History:    Family History   Problem Relation Age of Onset     Aneurysm Mother      Other Cancer Father      DIABETES Brother      Hypertension Brother      Other Cancer Brother      Other Cancer Sister      Aneurysm Sister        Social History:  Marital Status:   [4]  Social History   Substance Use Topics     Smoking  status: Light Tobacco Smoker     Types: Cigarettes     Last attempt to quit: 5/5/2017     Smokeless tobacco: Never Used      Comment: limits self to about 4-5 daily , she is quitting on her own     Alcohol use No        Medications:      lisinopril-hydrochlorothiazide (PRINZIDE/ZESTORETIC) 10-12.5 MG per tablet   order for DME   metFORMIN (GLUCOPHAGE) 500 MG tablet   aspirin 81 MG tablet   atorvastatin (LIPITOR) 80 MG tablet   blood glucose monitoring (ONE TOUCH DELICA) lancets   Glucose Blood (BLOOD GLUCOSE TEST STRIPS) STRP   ONETOUCH DELICA LANCETS 33G MISC         Review of Systems   HENT: Negative.    Eyes: Negative.    Respiratory: Negative.    Cardiovascular: Negative.    Gastrointestinal: Negative.    Endocrine: Negative.    Genitourinary: Negative.    Musculoskeletal: Positive for gait problem. Negative for arthralgias, back pain, joint swelling, myalgias, neck pain and neck stiffness.   Neurological: Positive for weakness. Negative for dizziness, tremors, seizures, syncope, facial asymmetry, speech difficulty, light-headedness, numbness and headaches.   Psychiatric/Behavioral: Negative for agitation, behavioral problems, confusion, decreased concentration, dysphoric mood, hallucinations, self-injury, sleep disturbance and suicidal ideas. The patient is not nervous/anxious and is not hyperactive.    All other systems reviewed and are negative.      Physical Exam   BP: 170/81  Heart Rate: 71  Temp: 96.2  F (35.7  C)  Resp: 16  SpO2: 96 %      Physical Exam   Constitutional: She is oriented to person, place, and time. She appears well-developed and well-nourished. No distress.   HENT:   Head: Normocephalic and atraumatic.   Right Ear: External ear normal.   Left Ear: External ear normal.   Mouth/Throat: Oropharynx is clear and moist. No oropharyngeal exudate.   Eyes: Pupils are equal, round, and reactive to light.   Neck: Normal range of motion.   Cardiovascular: Normal rate.    Pulmonary/Chest: Effort  normal.   Abdominal: Soft.   Musculoskeletal:    strength 4/5 left 5/5 right . Lower extremity strength equal bilaterally . Fine finger coordination less precise left . Left heel to shin also less precise than opposite side. Patient also with difficulty drawing left side of face of clock    Neurological: She is alert and oriented to person, place, and time. She has normal reflexes. She displays normal reflexes. No cranial nerve deficit. She exhibits normal muscle tone. Coordination abnormal.   Skin: Skin is warm and dry. She is not diaphoretic.   Psychiatric: She has a normal mood and affect. Her behavior is normal. Judgment and thought content normal.   Nursing note and vitals reviewed.      ED Course     ED Course     Procedures          Patient arrived to ED ambulatory accompanied by family with complaint of increased weakness and gait instability since evaluated in ED last night. Family stated had contacted neurology at [facility] and directed to present to the nearest ER . ON exam patient does exhibit poor coordination on left side compared to the right upper extremity affected  More than the lower. Discussed patient care with neurology at [facility] who did accept patient for further evaluation if accepted by hospitalist DR Ge. Discussed patients case with DR Ge who is willing to accept patient in transfer. Transport by ground is appropriate as patient does not have headache or rapidly changing neurologic symptoms. Prolonged time in getting room assignment from [facility] . Family became frustrated and left facility AMA .        Labs Ordered and Resulted from Time of ED Arrival Up to the Time of Departure from the ED - No data to display    Assessments & Plan (with Medical Decision Making)     I have reviewed the nursing notes.    I have reviewed the findings, diagnosis, plan and need for follow up with the patient.      New Prescriptions    No medications on file       Final diagnoses:   Cerebral  aneurysm, nonruptured       2/15/2018   [facility]     Lissette Owens MD  02/19/18 0906

## 2018-02-15 NOTE — TELEPHONE ENCOUNTER
8:09 AM    Reason for Call: Phone Call    Description: She was at the ER last night and she needs her BP pills checked     Was an appointment offered for this call? No  If yes : Appointment type              Date    Preferred method for responding to this message: Telephone Call  What is your phone number ?    If we cannot reach you directly, may we leave a detailed response at the number you provided? Yes    Can this message wait until your PCP/provider returns, if available today? No, PCP is in     Noemi Acevedo

## 2018-02-16 ASSESSMENT — ENCOUNTER SYMPTOMS
HYPERACTIVE: 0
DYSPHORIC MOOD: 0
NECK PAIN: 0
BACK PAIN: 0
SLEEP DISTURBANCE: 0
JOINT SWELLING: 0
DIZZINESS: 0
ARTHRALGIAS: 0
HALLUCINATIONS: 0
NECK STIFFNESS: 0
ENDOCRINE NEGATIVE: 1
RESPIRATORY NEGATIVE: 1
AGITATION: 0
GASTROINTESTINAL NEGATIVE: 1
LIGHT-HEADEDNESS: 0
NERVOUS/ANXIOUS: 0
FACIAL ASYMMETRY: 0
SPEECH DIFFICULTY: 0
SEIZURES: 0
NUMBNESS: 0
MYALGIAS: 0
EYES NEGATIVE: 1
WEAKNESS: 1
CONFUSION: 0
HEADACHES: 0
DECREASED CONCENTRATION: 0
CARDIOVASCULAR NEGATIVE: 1
TREMORS: 0

## 2018-02-16 NOTE — ED NOTES
Stat Doc called me again and questioned why I hadn't called report. Advised no room assignment had no idea who I should call.  I was to call [address] as pt would be getting a room on that unit.  Previous pt had just left and room would still need to be cleaned.  I told her she needed to call the ER and update them.  Called and spoke to JANIS Tam on [address].

## 2018-02-16 NOTE — ED NOTES
Called [facility] and spoke to Gerhard as pt's daughter stated that is where they were told to go when she called [facility] herself.  Explained situation and they were on there way down via private vehicle and signed out AMA.

## 2018-02-16 NOTE — ED NOTES
Received call from Stat doc on [facility] in [place].  They said they are on divert and do not have a bed available.  Pt signed out of this facility AMA.  Explained that to Stat Doc.  Family refused to wait any longer for a bed.  Primary nurse spoke to [facility] ED after pt left to let them know that pt would be likely be arriving to their ED via private vehicle.

## 2018-02-16 NOTE — ED NOTES
Attempted to explain to family again the process and we just can not send her down there without a bed assignment.  Pt said my family will bring me down.  AMA papers signed.  TIKA left in and coban applied.  To private vehicle via w/c.

## 2018-02-17 ENCOUNTER — HOSPITAL ENCOUNTER (EMERGENCY)
Facility: HOSPITAL | Age: 73
Discharge: ANOTHER HOSPITAL | End: 2018-02-17
Attending: EMERGENCY MEDICINE | Admitting: EMERGENCY MEDICINE
Payer: MEDICARE

## 2018-02-17 ENCOUNTER — APPOINTMENT (OUTPATIENT)
Dept: GENERAL RADIOLOGY | Facility: HOSPITAL | Age: 73
End: 2018-02-17
Attending: EMERGENCY MEDICINE
Payer: MEDICARE

## 2018-02-17 ENCOUNTER — APPOINTMENT (OUTPATIENT)
Dept: CT IMAGING | Facility: HOSPITAL | Age: 73
End: 2018-02-17
Attending: EMERGENCY MEDICINE
Payer: MEDICARE

## 2018-02-17 VITALS
SYSTOLIC BLOOD PRESSURE: 187 MMHG | DIASTOLIC BLOOD PRESSURE: 77 MMHG | TEMPERATURE: 98.2 F | OXYGEN SATURATION: 98 % | RESPIRATION RATE: 18 BRPM

## 2018-02-17 DIAGNOSIS — I67.1 ANEURYSM OF MIDDLE CEREBRAL ARTERY: ICD-10-CM

## 2018-02-17 DIAGNOSIS — R29.898 WEAKNESS OF LEFT UPPER EXTREMITY: Primary | ICD-10-CM

## 2018-02-17 DIAGNOSIS — I65.21 RIGHT INTERNAL CAROTID OCCLUSION: ICD-10-CM

## 2018-02-17 LAB
ALBUMIN SERPL-MCNC: 3.6 G/DL (ref 3.4–5)
ALP SERPL-CCNC: 64 U/L (ref 40–150)
ALT SERPL W P-5'-P-CCNC: 24 U/L (ref 0–50)
ANION GAP SERPL CALCULATED.3IONS-SCNC: 7 MMOL/L (ref 3–14)
APTT PPP: 27 SEC (ref 24–37)
AST SERPL W P-5'-P-CCNC: 10 U/L (ref 0–45)
BASOPHILS # BLD AUTO: 0.1 10E9/L (ref 0–0.2)
BASOPHILS NFR BLD AUTO: 0.8 %
BILIRUB SERPL-MCNC: 0.3 MG/DL (ref 0.2–1.3)
BUN SERPL-MCNC: 15 MG/DL (ref 7–30)
CALCIUM SERPL-MCNC: 9.1 MG/DL (ref 8.5–10.1)
CHLORIDE SERPL-SCNC: 104 MMOL/L (ref 94–109)
CO2 SERPL-SCNC: 26 MMOL/L (ref 20–32)
CREAT SERPL-MCNC: 0.76 MG/DL (ref 0.52–1.04)
DIFFERENTIAL METHOD BLD: ABNORMAL
EOSINOPHIL # BLD AUTO: 0.2 10E9/L (ref 0–0.7)
EOSINOPHIL NFR BLD AUTO: 1.5 %
ERYTHROCYTE [DISTWIDTH] IN BLOOD BY AUTOMATED COUNT: 14 % (ref 10–15)
GFR SERPL CREATININE-BSD FRML MDRD: 74 ML/MIN/1.7M2
GLUCOSE BLDC GLUCOMTR-MCNC: 120 MG/DL (ref 70–99)
GLUCOSE SERPL-MCNC: 122 MG/DL (ref 70–99)
HCT VFR BLD AUTO: 43.9 % (ref 35–47)
HGB BLD-MCNC: 14.3 G/DL (ref 11.7–15.7)
IMM GRANULOCYTES # BLD: 0 10E9/L (ref 0–0.4)
IMM GRANULOCYTES NFR BLD: 0.4 %
INR PPP: 1.04 (ref 0.8–1.2)
LYMPHOCYTES # BLD AUTO: 1.9 10E9/L (ref 0.8–5.3)
LYMPHOCYTES NFR BLD AUTO: 18 %
MCH RBC QN AUTO: 26.7 PG (ref 26.5–33)
MCHC RBC AUTO-ENTMCNC: 32.6 G/DL (ref 31.5–36.5)
MCV RBC AUTO: 82 FL (ref 78–100)
MONOCYTES # BLD AUTO: 0.8 10E9/L (ref 0–1.3)
MONOCYTES NFR BLD AUTO: 7.8 %
NEUTROPHILS # BLD AUTO: 7.5 10E9/L (ref 1.6–8.3)
NEUTROPHILS NFR BLD AUTO: 71.5 %
NRBC # BLD AUTO: 0 10*3/UL
NRBC BLD AUTO-RTO: 0 /100
PLATELET # BLD AUTO: 297 10E9/L (ref 150–450)
POTASSIUM SERPL-SCNC: 4.3 MMOL/L (ref 3.4–5.3)
PROT SERPL-MCNC: 7.1 G/DL (ref 6.8–8.8)
RBC # BLD AUTO: 5.35 10E12/L (ref 3.8–5.2)
SODIUM SERPL-SCNC: 137 MMOL/L (ref 133–144)
TROPONIN I SERPL-MCNC: <0.015 UG/L (ref 0–0.04)
WBC # BLD AUTO: 10.4 10E9/L (ref 4–11)

## 2018-02-17 PROCEDURE — 40000275 ZZH STATISTIC RCP TIME EA 10 MIN

## 2018-02-17 PROCEDURE — 71045 X-RAY EXAM CHEST 1 VIEW: CPT | Mod: TC

## 2018-02-17 PROCEDURE — 99285 EMERGENCY DEPT VISIT HI MDM: CPT | Performed by: EMERGENCY MEDICINE

## 2018-02-17 PROCEDURE — 84484 ASSAY OF TROPONIN QUANT: CPT | Performed by: EMERGENCY MEDICINE

## 2018-02-17 PROCEDURE — 70498 CT ANGIOGRAPHY NECK: CPT | Mod: TC

## 2018-02-17 PROCEDURE — 93010 ELECTROCARDIOGRAM REPORT: CPT | Performed by: INTERNAL MEDICINE

## 2018-02-17 PROCEDURE — 70450 CT HEAD/BRAIN W/O DYE: CPT | Mod: TC,59

## 2018-02-17 PROCEDURE — 99291 CRITICAL CARE FIRST HOUR: CPT | Mod: 25

## 2018-02-17 PROCEDURE — 00000146 ZZHCL STATISTIC GLUCOSE BY METER IP

## 2018-02-17 PROCEDURE — 80053 COMPREHEN METABOLIC PANEL: CPT | Performed by: EMERGENCY MEDICINE

## 2018-02-17 PROCEDURE — 93005 ELECTROCARDIOGRAM TRACING: CPT

## 2018-02-17 PROCEDURE — 85025 COMPLETE CBC W/AUTO DIFF WBC: CPT | Performed by: EMERGENCY MEDICINE

## 2018-02-17 PROCEDURE — 25000128 H RX IP 250 OP 636: Performed by: EMERGENCY MEDICINE

## 2018-02-17 PROCEDURE — 85730 THROMBOPLASTIN TIME PARTIAL: CPT | Performed by: EMERGENCY MEDICINE

## 2018-02-17 PROCEDURE — 85610 PROTHROMBIN TIME: CPT | Performed by: EMERGENCY MEDICINE

## 2018-02-17 PROCEDURE — 36415 COLL VENOUS BLD VENIPUNCTURE: CPT | Performed by: EMERGENCY MEDICINE

## 2018-02-17 RX ORDER — IOPAMIDOL 755 MG/ML
75 INJECTION, SOLUTION INTRAVASCULAR ONCE
Status: COMPLETED | OUTPATIENT
Start: 2018-02-17 | End: 2018-02-17

## 2018-02-17 RX ADMIN — IOPAMIDOL 75 ML: 755 INJECTION, SOLUTION INTRAVENOUS at 22:37

## 2018-02-17 RX ADMIN — SODIUM CHLORIDE 500 ML: 9 INJECTION, SOLUTION INTRAVENOUS at 21:13

## 2018-02-17 ASSESSMENT — ENCOUNTER SYMPTOMS: WEAKNESS: 1

## 2018-02-18 ENCOUNTER — APPOINTMENT (OUTPATIENT)
Dept: PHYSICAL THERAPY | Facility: CLINIC | Age: 73
DRG: 065 | End: 2018-02-18
Attending: PSYCHIATRY & NEUROLOGY
Payer: MEDICARE

## 2018-02-18 ENCOUNTER — HOSPITAL ENCOUNTER (INPATIENT)
Facility: CLINIC | Age: 73
LOS: 4 days | Discharge: NURSING, REHAB OR LONG-TERM CARE | DRG: 065 | End: 2018-02-22
Attending: PSYCHIATRY & NEUROLOGY | Admitting: PSYCHIATRY & NEUROLOGY
Payer: MEDICARE

## 2018-02-18 ENCOUNTER — APPOINTMENT (OUTPATIENT)
Dept: SPEECH THERAPY | Facility: CLINIC | Age: 73
DRG: 065 | End: 2018-02-18
Attending: PSYCHIATRY & NEUROLOGY
Payer: MEDICARE

## 2018-02-18 ENCOUNTER — APPOINTMENT (OUTPATIENT)
Dept: OCCUPATIONAL THERAPY | Facility: CLINIC | Age: 73
DRG: 065 | End: 2018-02-18
Attending: PSYCHIATRY & NEUROLOGY
Payer: MEDICARE

## 2018-02-18 DIAGNOSIS — I10 ESSENTIAL HYPERTENSION: ICD-10-CM

## 2018-02-18 DIAGNOSIS — I63.9 ACUTE ISCHEMIC STROKE (H): Primary | ICD-10-CM

## 2018-02-18 LAB
ANION GAP SERPL CALCULATED.3IONS-SCNC: 8 MMOL/L (ref 3–14)
BUN SERPL-MCNC: 12 MG/DL (ref 7–30)
CALCIUM SERPL-MCNC: 8.8 MG/DL (ref 8.5–10.1)
CHLORIDE SERPL-SCNC: 110 MMOL/L (ref 94–109)
CHOLEST SERPL-MCNC: 104 MG/DL
CO2 SERPL-SCNC: 21 MMOL/L (ref 20–32)
CREAT SERPL-MCNC: 0.66 MG/DL (ref 0.52–1.04)
ERYTHROCYTE [DISTWIDTH] IN BLOOD BY AUTOMATED COUNT: 14.4 % (ref 10–15)
GFR SERPL CREATININE-BSD FRML MDRD: 88 ML/MIN/1.7M2
GLUCOSE BLDC GLUCOMTR-MCNC: 108 MG/DL (ref 70–99)
GLUCOSE BLDC GLUCOMTR-MCNC: 110 MG/DL (ref 70–99)
GLUCOSE BLDC GLUCOMTR-MCNC: 111 MG/DL (ref 70–99)
GLUCOSE BLDC GLUCOMTR-MCNC: 113 MG/DL (ref 70–99)
GLUCOSE BLDC GLUCOMTR-MCNC: 113 MG/DL (ref 70–99)
GLUCOSE BLDC GLUCOMTR-MCNC: 119 MG/DL (ref 70–99)
GLUCOSE BLDC GLUCOMTR-MCNC: 128 MG/DL (ref 70–99)
GLUCOSE SERPL-MCNC: 124 MG/DL (ref 70–99)
HBA1C MFR BLD: 6.7 % (ref 4.3–6)
HCT VFR BLD AUTO: 43 % (ref 35–47)
HDLC SERPL-MCNC: 54 MG/DL
HGB BLD-MCNC: 13.5 G/DL (ref 11.7–15.7)
LDLC SERPL CALC-MCNC: 35 MG/DL
MCH RBC QN AUTO: 26.7 PG (ref 26.5–33)
MCHC RBC AUTO-ENTMCNC: 31.4 G/DL (ref 31.5–36.5)
MCV RBC AUTO: 85 FL (ref 78–100)
NONHDLC SERPL-MCNC: 50 MG/DL
PLATELET # BLD AUTO: 259 10E9/L (ref 150–450)
POTASSIUM SERPL-SCNC: 4 MMOL/L (ref 3.4–5.3)
RBC # BLD AUTO: 5.05 10E12/L (ref 3.8–5.2)
SODIUM SERPL-SCNC: 139 MMOL/L (ref 133–144)
TRIGL SERPL-MCNC: 78 MG/DL
WBC # BLD AUTO: 6.9 10E9/L (ref 4–11)

## 2018-02-18 PROCEDURE — 97530 THERAPEUTIC ACTIVITIES: CPT | Mod: GO | Performed by: OCCUPATIONAL THERAPIST

## 2018-02-18 PROCEDURE — 97535 SELF CARE MNGMENT TRAINING: CPT | Mod: GO | Performed by: OCCUPATIONAL THERAPIST

## 2018-02-18 PROCEDURE — 25000125 ZZHC RX 250: Performed by: STUDENT IN AN ORGANIZED HEALTH CARE EDUCATION/TRAINING PROGRAM

## 2018-02-18 PROCEDURE — 40000225 ZZH STATISTIC SLP WARD VISIT

## 2018-02-18 PROCEDURE — 92610 EVALUATE SWALLOWING FUNCTION: CPT | Mod: GN

## 2018-02-18 PROCEDURE — 40000133 ZZH STATISTIC OT WARD VISIT: Performed by: OCCUPATIONAL THERAPIST

## 2018-02-18 PROCEDURE — 40000193 ZZH STATISTIC PT WARD VISIT

## 2018-02-18 PROCEDURE — 12000008 ZZH R&B INTERMEDIATE UMMC

## 2018-02-18 PROCEDURE — 25000132 ZZH RX MED GY IP 250 OP 250 PS 637: Performed by: STUDENT IN AN ORGANIZED HEALTH CARE EDUCATION/TRAINING PROGRAM

## 2018-02-18 PROCEDURE — 97116 GAIT TRAINING THERAPY: CPT | Mod: GP

## 2018-02-18 PROCEDURE — 85027 COMPLETE CBC AUTOMATED: CPT | Performed by: PSYCHIATRY & NEUROLOGY

## 2018-02-18 PROCEDURE — 97530 THERAPEUTIC ACTIVITIES: CPT | Mod: GP

## 2018-02-18 PROCEDURE — 80048 BASIC METABOLIC PNL TOTAL CA: CPT | Performed by: PSYCHIATRY & NEUROLOGY

## 2018-02-18 PROCEDURE — 40000141 ZZH STATISTIC PERIPHERAL IV START W/O US GUIDANCE

## 2018-02-18 PROCEDURE — 80061 LIPID PANEL: CPT | Performed by: PSYCHIATRY & NEUROLOGY

## 2018-02-18 PROCEDURE — 25000128 H RX IP 250 OP 636: Performed by: STUDENT IN AN ORGANIZED HEALTH CARE EDUCATION/TRAINING PROGRAM

## 2018-02-18 PROCEDURE — 97165 OT EVAL LOW COMPLEX 30 MIN: CPT | Mod: GO | Performed by: OCCUPATIONAL THERAPIST

## 2018-02-18 PROCEDURE — 36415 COLL VENOUS BLD VENIPUNCTURE: CPT | Performed by: PSYCHIATRY & NEUROLOGY

## 2018-02-18 PROCEDURE — 83036 HEMOGLOBIN GLYCOSYLATED A1C: CPT | Performed by: PSYCHIATRY & NEUROLOGY

## 2018-02-18 PROCEDURE — 00000146 ZZHCL STATISTIC GLUCOSE BY METER IP

## 2018-02-18 PROCEDURE — 97161 PT EVAL LOW COMPLEX 20 MIN: CPT | Mod: GP

## 2018-02-18 RX ORDER — CLOPIDOGREL BISULFATE 75 MG/1
300 TABLET ORAL DAILY
Status: DISCONTINUED | OUTPATIENT
Start: 2018-02-18 | End: 2018-02-18

## 2018-02-18 RX ORDER — ASPIRIN 325 MG
325 TABLET ORAL DAILY
Status: DISCONTINUED | OUTPATIENT
Start: 2018-02-19 | End: 2018-02-22 | Stop reason: HOSPADM

## 2018-02-18 RX ORDER — NICOTINE POLACRILEX 4 MG
15-30 LOZENGE BUCCAL
Status: DISCONTINUED | OUTPATIENT
Start: 2018-02-18 | End: 2018-02-22 | Stop reason: HOSPADM

## 2018-02-18 RX ORDER — ASPIRIN 300 MG/1
300 SUPPOSITORY RECTAL ONCE
Status: COMPLETED | OUTPATIENT
Start: 2018-02-18 | End: 2018-02-18

## 2018-02-18 RX ORDER — HYDRALAZINE HYDROCHLORIDE 20 MG/ML
10-20 INJECTION INTRAMUSCULAR; INTRAVENOUS
Status: DISCONTINUED | OUTPATIENT
Start: 2018-02-18 | End: 2018-02-19

## 2018-02-18 RX ORDER — CLOPIDOGREL BISULFATE 75 MG/1
300 TABLET ORAL ONCE
Status: DISCONTINUED | OUTPATIENT
Start: 2018-02-18 | End: 2018-02-18

## 2018-02-18 RX ORDER — CLOPIDOGREL BISULFATE 75 MG/1
75 TABLET ORAL DAILY
Status: DISCONTINUED | OUTPATIENT
Start: 2018-02-19 | End: 2018-02-22 | Stop reason: HOSPADM

## 2018-02-18 RX ORDER — ASPIRIN 81 MG/1
81 TABLET, CHEWABLE ORAL DAILY
Status: DISCONTINUED | OUTPATIENT
Start: 2018-02-18 | End: 2018-02-18

## 2018-02-18 RX ORDER — DEXTROSE MONOHYDRATE 25 G/50ML
25-50 INJECTION, SOLUTION INTRAVENOUS
Status: DISCONTINUED | OUTPATIENT
Start: 2018-02-18 | End: 2018-02-22 | Stop reason: HOSPADM

## 2018-02-18 RX ORDER — LABETALOL HYDROCHLORIDE 5 MG/ML
10-20 INJECTION, SOLUTION INTRAVENOUS EVERY 10 MIN PRN
Status: DISCONTINUED | OUTPATIENT
Start: 2018-02-18 | End: 2018-02-19

## 2018-02-18 RX ORDER — CLOPIDOGREL BISULFATE 75 MG/1
75 TABLET ORAL DAILY
Status: DISCONTINUED | OUTPATIENT
Start: 2018-02-19 | End: 2018-02-18

## 2018-02-18 RX ORDER — SODIUM CHLORIDE 9 MG/ML
INJECTION, SOLUTION INTRAVENOUS CONTINUOUS
Status: DISCONTINUED | OUTPATIENT
Start: 2018-02-18 | End: 2018-02-20

## 2018-02-18 RX ORDER — ATORVASTATIN CALCIUM 80 MG/1
80 TABLET, FILM COATED ORAL
Status: DISCONTINUED | OUTPATIENT
Start: 2018-02-18 | End: 2018-02-22 | Stop reason: HOSPADM

## 2018-02-18 RX ADMIN — SODIUM CHLORIDE: 9 INJECTION, SOLUTION INTRAVENOUS at 10:19

## 2018-02-18 RX ADMIN — SODIUM CHLORIDE: 9 INJECTION, SOLUTION INTRAVENOUS at 20:11

## 2018-02-18 RX ADMIN — SODIUM CHLORIDE: 9 INJECTION, SOLUTION INTRAVENOUS at 01:04

## 2018-02-18 RX ADMIN — PANTOPRAZOLE SODIUM 40 MG: 40 INJECTION, POWDER, FOR SOLUTION INTRAVENOUS at 09:00

## 2018-02-18 RX ADMIN — ATORVASTATIN CALCIUM 80 MG: 80 TABLET, FILM COATED ORAL at 20:08

## 2018-02-18 RX ADMIN — ASPIRIN 300 MG: 300 SUPPOSITORY RECTAL at 02:26

## 2018-02-18 ASSESSMENT — VISUAL ACUITY
OU: NORMAL ACUITY;GLASSES

## 2018-02-18 ASSESSMENT — ACTIVITIES OF DAILY LIVING (ADL)
FALL_HISTORY_WITHIN_LAST_SIX_MONTHS: YES
SWALLOWING: 0-->SWALLOWS FOODS/LIQUIDS WITHOUT DIFFICULTY
RETIRED_EATING: 0-->INDEPENDENT
BATHING: 0-->INDEPENDENT
DRESS: 0-->INDEPENDENT
NUMBER_OF_TIMES_PATIENT_HAS_FALLEN_WITHIN_LAST_SIX_MONTHS: 2
TOILETING: 0-->INDEPENDENT
COGNITION: 0 - NO COGNITION ISSUES REPORTED
PREVIOUS_RESPONSIBILITIES: MEAL PREP;HOUSEKEEPING;LAUNDRY;SHOPPING;YARDWORK;MEDICATION MANAGEMENT;DRIVING;FINANCES
AMBULATION: 0-->INDEPENDENT
TRANSFERRING: 0-->INDEPENDENT
RETIRED_COMMUNICATION: 0-->UNDERSTANDS/COMMUNICATES WITHOUT DIFFICULTY

## 2018-02-18 NOTE — PLAN OF CARE
Problem: Patient Care Overview  Goal: Plan of Care/Patient Progress Review  Outcome: No Change   02/18/18 1456   OTHER   Plan Of Care Reviewed With patient;daughter   Plan of Care Review   Progress no change     /69 (BP Location: Left arm)  Pulse 71  Temp 97.5  F (36.4  C) (Oral)  Resp 16  Wt 101.2 kg (223 lb 1.6 oz)  SpO2 95%  BMI 37.13 kg/m2    A&Ox4. A/VSS on RA. Tele NSR. Neuros intact ex. L facial droop, LUE 2/5, LLE 4/5. SLP completed swallow study, pt cleared for diet. Pt moves with assist of 2 and gaitbelt. Commode at bedside. OT recommending rehab therapy. Plan for pt to have CTA tomorrow am. Continue to monitor and with POC.      Problem: Stroke (Ischemic) (Adult)  Goal: Signs and Symptoms of Listed Potential Problems Will be Absent, Minimized or Managed (Stroke)  Signs and symptoms of listed potential problems will be absent, minimized or managed by discharge/transition of care (reference Stroke (Ischemic) (Adult) CPG).    02/18/18 0830   Stroke (Ischemic)   Problems Assessed (Stroke (Ischemic)/TIA) all   Problems Present (Stroke Ischemic/TIA) muscle tone abnormal;motor/sensory impairment

## 2018-02-18 NOTE — IP AVS SNAPSHOT
"    [address] [facility] [facility]: [phone]                                              INTERAGENCY TRANSFER FORM - PHYSICIAN ORDERS   2018                    Hospital Admission Date: 2018  NADIA JANE FRIEND   : 1945  Sex: Female        Attending Provider: Dwight Miguel MD     Allergies:  Penicillins, Phenylephrine, Tropicamide    Infection:  None   Service:  NEUROLOGY    Ht:  1.651 m (5' 5\")   Wt:  101.2 kg (223 lb 1.6 oz)   Admission Wt:  101.2 kg (223 lb 1.6 oz)    BMI:  37.13 kg/m 2   BSA:  2.15 m 2            Patient PCP Information     Provider PCP Type    Yash Cuellar DO General      ED Clinical Impression     Diagnosis Description Comment Added By Time Added    Acute ischemic stroke (H) [I63.9] Acute ischemic stroke (H) [I63.9]  Leora Roque MD 2018  4:26 PM    Essential hypertension [I10] Essential hypertension [I10]  Leora Roque MD 2018  8:16 AM      Hospital Problems as of 2018              Priority Class Noted POA    Acute ischemic stroke (H) Medium  2018 Yes      Non-Hospital Problems as of 2018              Priority Class Noted    ACP (advance care planning) Medium  2017      Code Status History     Date Active Date Inactive Code Status Order ID Comments User Context    2018  4:36 PM  Full Code [id number]  Leora Roque MD Outpatient    2018 12:27 AM 2018  4:36 PM Full Code [id number]  Jose Garcia MD Inpatient         Medication Review      START taking        Dose / Directions Comments    bisacodyl 10 MG Suppository   Commonly known as:  DULCOLAX        Dose:  10 mg   Place 1 suppository (10 mg) rectally daily as needed for constipation   Quantity:  30 suppository   Refills:  0        magnesium hydroxide 400 MG/5ML suspension   Commonly known as:  MILK OF MAGNESIA        Dose:  30 mL   Take 30 mLs by mouth daily as needed for constipation   Quantity:  105 mL   Refills:  0        " senna-docusate 8.6-50 MG per tablet   Commonly known as:  SENOKOT-S;PERICOLACE        Dose:  2 tablet   Take 2 tablets by mouth At Bedtime   Quantity:  100 tablet   Refills:  0    PRN for constipation         CONTINUE these medications which may have CHANGED, or have new prescriptions. If we are uncertain of the size of tablets/capsules you have at home, strength may be listed as something that might have changed.        Dose / Directions Comments    aspirin 325 MG tablet   This may have changed:    - medication strength  - how much to take        Dose:  325 mg   Take 1 tablet (325 mg) by mouth daily   Quantity:  120 tablet   Refills:  0    For stroke secondary prevention and IC stenosis       metoprolol tartrate 100 MG tablet   Commonly known as:  LOPRESSOR   This may have changed:  medication strength   Used for:  Essential hypertension        Dose:  50 mg   Take 0.5 tablets (50 mg) by mouth 2 times daily   Quantity:  60 tablet   Refills:  0    For hypertension         CONTINUE these medications which have NOT CHANGED        Dose / Directions Comments    atorvastatin 80 MG tablet   Commonly known as:  LIPITOR   Used for:  Hyperlipidemia LDL goal <100        Dose:  80 mg   Take 1 tablet (80 mg) by mouth daily   Quantity:  90 tablet   Refills:  3        BLOOD GLUCOSE TEST STRIPS Strp   Used for:  Type 2 diabetes mellitus with complication, without long-term current use of insulin (H)        four times a day.   Quantity:  360 each   Refills:  3    Verio strips       clopidogrel 75 MG tablet   Commonly known as:  PLAVIX        Dose:  75 mg   Take 1 tablet (75 mg) by mouth daily   Quantity:  30 tablet   Refills:  0    For stroke secondary prevention; carotid artery stenosis       lisinopril-hydrochlorothiazide 10-12.5 MG per tablet   Commonly known as:  PRINZIDE/ZESTORETIC   Used for:  Essential hypertension        Dose:  1 tablet   Take 1 tablet by mouth daily   Quantity:  90 tablet   Refills:  3    For hypertension        metFORMIN 500 MG tablet   Commonly known as:  GLUCOPHAGE   Used for:  Type 2 diabetes mellitus with hyperglycemia, with long-term current use of insulin (H)        TAKE 2 TABLETS TWICE A DAY WITH MEALS   Quantity:  180 tablet   Refills:  1        * ONETOUCH DELICA LANCETS 33G Misc        four times a day.   Refills:  0        * blood glucose monitoring lancets   Used for:  Type 2 diabetes mellitus with hyperglycemia, with long-term current use of insulin (H)        Use to test blood sugar 3 times daily.   Quantity:  360 each   Refills:  3        order for DME   Used for:  Well controlled type 2 diabetes mellitus (H)        Equipment being ordered: Glucometer and test strips   Quantity:  1 Device   Refills:  0        * Notice:  This list has 2 medication(s) that are the same as other medications prescribed for you. Read the directions carefully, and ask your doctor or other care provider to review them with you.            Summary of Visit     Reason for your hospital stay       You were hospitalized for stroke causing weakness on the left side of your face and your left arm and leg. 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 In order to reduce risk for future strokes it is important to take all your medications, quit smoking and work towards goal blood pressure < 140/90.             After Care     Activity - Up ad lonny       Requiring assistance for walking, can advance as appropriate.       Advance Diet as Tolerated       Follow this diet upon discharge: Orders Placed This Encounter      Low Consistent CHO Diet       General info for SNF       Length of Stay Estimate: Short Term Care: Estimated # of Days <30  Condition at Discharge: Improving  Level of care:skilled   Rehabilitation Potential: Excellent  Admission H&P remains valid and up-to-date: Yes  Recent Chemotherapy: N/A  Use Nursing Home Standing Orders: Yes       Mantoux instructions       Give two-step  Krista (PPD) Per Facility Policy Yes             Referrals     Occupational Therapy Adult Consult       Evaluate and treat as clinically indicated.    Reason: Stroke with left sided weakness       Physical Therapy Adult Consult       Evaluate and treat as clinically indicated.    Reason:  Stroke with left sided weakness       Speech Language Path Adult Consult       Evaluate and treat as clinically indicated.    Reason:  Stroke with left sided weakness             Your next 10 appointments already scheduled     Mar 06, 2018 10:30 AM CST   (Arrive by 10:15 AM)   Return Visit with Xochitl Daniels RD   [facility] ([facility] )    [address] [place] [id number] [phone]              Follow-Up Appointment Instructions     Future Labs/Procedures    Follow Up and recommended labs and tests     Comments:    Follow up with primary care provider in 1-2 weeks after discharge from rehab.    Follow-up with Stroke Clinic in 3-6 months.   You will be contacted to schedule a Stroke Clinic appointment. If you have not been contacted to schedule a stroke follow-up appointment within 7 days of discharge, please contact Stroke Neurology Clinic at [phone], pick option #1.   If you have other stroke related questions, please call [phone], pick option #3, and ask to speak to Tomas Remy RN Stroke/Endovascular Care Coordinator.  If you have any urgent stroke related questions after hours, please contact the hospital  at [phone] and ask to be connected to a stroke provider.     Follow-up with your neurosurgeon as scheduled for management of your aneurysm.      Follow-Up Appointment Instructions     Follow Up and recommended labs and tests       Follow up with primary care provider in 1-2 weeks after discharge from rehab.    Follow-up with Stroke Clinic in 3-6 months.   You will be contacted to schedule a Stroke Clinic appointment. If you have not been contacted to schedule a  stroke follow-up appointment within 7 days of discharge, please contact Stroke Neurology Clinic at [phone], pick option #1.   If you have other stroke related questions, please call [phone], pick option #3, and ask to speak to Tomas Remy RN Stroke/Endovascular Care Coordinator.  If you have any urgent stroke related questions after hours, please contact the hospital  at [phone] and ask to be connected to a stroke provider.     Follow-up with your neurosurgeon as scheduled for management of your aneurysm.             Statement of Approval     Ordered          02/22/18 0808  I have reviewed and agree with all the recommendations and orders detailed in this document.  EFFECTIVE NOW     Approved and electronically signed by:  Leora Roque MD

## 2018-02-18 NOTE — PLAN OF CARE
Problem: Patient Care Overview  Goal: Plan of Care/Patient Progress Review  Discharge Planner SLP   Patient plan for discharge: didn't state  Current status: Clinical dysphagia examination completed per MD order. The patient has a mild left facial droop, but lingual/labial strength and coordination were noted to be good. Pharyngeal swallow response strong and timely. The patient consumed PO trials with good swallowing safety and is appropriate to resume a regular diet and thin liquids from an oropharyngeal swallowing standpoint, when indicated per MD. SLP is signing off as swallowing safety is good. Speech is noted to be clear and fluent so formal eval not completed.  Barriers to return to prior living situation: none per SLP  Recommendations for discharge: defer to OT/PT  Rationale for recommendations: WNL swallowing & speech       Entered by: Natalia Dodge 02/18/2018 11:34 AM

## 2018-02-18 NOTE — PROGRESS NOTES
" 02/18/18 1615   Quick Adds   Type of Visit Initial PT Evaluation       Present no   Living Environment   Lives With sibling(s)   Living Arrangements house   Home Accessibility bed and bath on same level;stairs (1 railing present);stairs to enter home   Number of Stairs to Enter Home 4   Number of Stairs Within Home 0   Stair Railings at Home outside, present on left side   Transportation Available family or friend will provide   Living Environment Comment Lives with brother who is able to assist.  Reports daughter can also stay with pt. IND with all ADL's and mobility.  Currently having ramp and walking tub installed in home.  Is from [employer].    Self-Care   Usual Activity Tolerance moderate   Current Activity Tolerance fair   Regular Exercise no   Equipment Currently Used at Home none   Activity/Exercise/Self-Care Comment Pt reports \"I don't do anything more than I absolutely have to\" when asked about current activity level    Functional Level Prior   Ambulation 0-->independent   Transferring 0-->independent   Toileting 0-->independent   Bathing 0-->independent   Dressing 0-->independent   Eating 0-->independent   Communication 0-->understands/communicates without difficulty   Swallowing 0-->swallows foods/liquids without difficulty   Cognition 0 - no cognition issues reported   Fall history within last six months yes   Number of times patient has fallen within last six months 2   Which of the above functional risks had a recent onset or change? ambulation;transferring;toileting;bathing;dressing   Prior Functional Level Comment Previously IND w/ all self cares and mobility    General Information   Onset of Illness/Injury or Date of Surgery - Date 02/18/18   Referring Physician Dwight Miguel MD   Patient/Family Goals Statement Wish to return home ASAP    Pertinent History of Current Problem (include personal factors and/or comorbidities that impact the POC) Rosalie JANE Friend is a " 72 year old female with a history of stroke, type 2 diabetes, hypertension, and hyperlipidemia who is presenting with waxing and waning left upper extremity weakness.  The patient noted for the last 2 days she has had left upper extremity weakness that will worsen to the point where her limb is flaccid and improve to the point where she can move it antigravity.  She initially had left lower leg weakness as well however this has resolved.  She has noted no change in vision, changes speech, difficulty swallowing or any numbness.  The patient was initially evaluated outside hospital and was sent to [place] for further evaluation   Precautions/Limitations fall precautions   Heart Disease Risk Factors Dislipidemia;Diabetes;Age;Overweight   General Info Comments Pt impulsive. Very vocal about wants/needs. Does not like hospital and wishes to go home ASAP.   Cognitive Status Examination   Orientation orientation to person, place and time   Level of Consciousness alert   Follows Commands and Answers Questions 100% of the time   Personal Safety and Judgment intact   Memory intact   Posture    Posture Forward head position;Protracted shoulders;Kyphosis   Range of Motion (ROM)   ROM Comment WFL   Strength   Strength Comments MMT: R hip flexion 5/5, L hip flexion 4/5, R knee extension 5/5, L knee extesnion 4/5, R knee flexion 5/5, L knee flexion 4+/5, B DF 5/5, B PF 5/5, R abd 5/5, L abd 4/5, B add 5/5   Bed Mobility   Bed Mobility Comments min-modAx2 supine>sit, sit>supine IND, CGA EOB    Transfer Skills   Transfer Comments CGAx2 STS    Gait   Gait Comments min-modAx2 w/ FWW    Balance   Balance Comments Defecits notes, further assessment needed    Sensory Examination   Sensory Perception Comments light touch sensation intact    General Therapy Interventions   Planned Therapy Interventions ADL retraining;neuromuscular re-education;gait training;bed mobility training;strengthening;home program guidelines;progressive  "activity/exercise;risk factor education;transfer training   Clinical Impression   Criteria for Skilled Therapeutic Intervention yes, treatment indicated   PT Diagnosis Impaired functional mobility    Influenced by the following impairments balance deficits, LE/UE weakness    Functional limitations due to impairments general mobility, ambulation    Clinical Presentation Stable/Uncomplicated   Clinical Presentation Rationale Medically stable   Clinical Decision Making (Complexity) Low complexity   Therapy Frequency` daily   Predicted Duration of Therapy Intervention (days/wks) 2/25/18   Anticipated Equipment Needs at Discharge bathing equipment;walker;platform walker   Anticipated Discharge Disposition Acute Rehabilitation Facility   Risk & Benefits of therapy have been explained Yes   Patient, Family & other staff in agreement with plan of care Yes   [facility] TM \"6 Clicks\"   2016, Trustees of [facility], under license to [employer].  All rights reserved.   6 Clicks Short Forms Basic Mobility Inpatient Short Form   [facility]  \"6 Clicks\" V.2 Basic Mobility Inpatient Short Form   1. Turning from your back to your side while in a flat bed without using bedrails? 2 - A Lot   2. Moving from lying on your back to sitting on the side of a flat bed without using bedrails? 2 - A Lot   3. Moving to and from a bed to a chair (including a wheelchair)? 2 - A Lot   4. Standing up from a chair using your arms (e.g., wheelchair, or bedside chair)? 3 - A Little   5. To walk in hospital room? 2 - A Lot   6. Climbing 3-5 steps with a railing? 2 - A Lot   Basic Mobility Raw Score (Score out of 24.Lower scores equate to lower levels of function) 13   Total Evaluation Time   Total Evaluation Time (Minutes) 10     "

## 2018-02-18 NOTE — IP AVS SNAPSHOT
"` `           [address] [facility]: [phone]                                              INTERAGENCY TRANSFER FORM - NURSING   2018                    Hospital Admission Date: 2018  NADIA JANE FRIEND   : 1945  Sex: Female        Attending Provider: Dwight Miguel MD     Allergies:  Penicillins, Phenylephrine, Tropicamide    Infection:  None   Service:  NEUROLOGY    Ht:  1.651 m (5' 5\")   Wt:  101.2 kg (223 lb 1.6 oz)   Admission Wt:  101.2 kg (223 lb 1.6 oz)    BMI:  37.13 kg/m 2   BSA:  2.15 m 2            Patient PCP Information     Provider PCP Type    Yash Cuellar DO General      Current Code Status     Date Active Code Status Order ID Comments User Context       Prior      Code Status History     Date Active Date Inactive Code Status Order ID Comments User Context    2018  4:36 PM  Full Code [id number]  Leora Roque MD Outpatient    2018 12:27 AM 2018  4:36 PM Full Code [id number]  Jose Garcia MD Inpatient      Advance Directives        Scanned docmt in ACP Activity?           No scanned doc        Hospital Problems as of 2018              Priority Class Noted POA    Acute ischemic stroke (H) Medium  2018 Yes      Non-Hospital Problems as of 2018              Priority Class Noted    ACP (advance care planning) Medium  2017      Immunizations     None         END      ASSESSMENT     Discharge Profile Flowsheet     EXPECTED DISCHARGE     SKIN      Expected Discharge Date  18 1037   Inspection of bony prominences  Full 18 0112    DISCHARGE NEEDS ASSESSMENT     Full except areas not inspected   Buttock, left;Buttock, right;Sacrum;Coccyx;Hip, right;Hip, left 18 1708    Equipment Currently Used at Home  none 18 1650   Inspection under devices  Full 18 0115    Transportation Available  family or friend will provide 18 1733   Skin WDL  ex 18 0112    GASTROINTESTINAL " "(ADULT,PEDIATRIC,OB)     Skin Temperature  warm 02/22/18 0112    GI WDL  WDL 02/22/18 0112   Skin Moisture  dry 02/22/18 0112    Last Bowel Movement  02/21/18 02/22/18 0112   Skin Integrity  bruise(s) 02/22/18 0112    GI Signs/Symptoms  constipation 02/21/18 1702   SAFETY      Passing flatus  yes 02/22/18 0112   Safety WDL  WDL 02/22/18 0112    COMMUNICATION ASSESSMENT     All Alarms  alarm(s) activated and audible 02/21/18 1021    Patient's communication style  spoken language (English or Bilingual) 02/17/18 2051                      Assessment WDL (Within Defined Limits) Definitions           Safety WDL     Effective: 09/28/15    Row Information: <b>WDL Definition:</b> Bed in low position, wheels locked; call light in reach; upper side rails up x 2; ID band on<br> <font color=\"gray\"><i>Item=AS safety wdl>>List=AS safety wdl>>Version=F14</i></font>      Skin WDL     Effective: 09/28/15    Row Information: <b>WDL Definition:</b> Warm; dry; intact; elastic; without discoloration; pressure points without redness<br> <font color=\"gray\"><i>Item=AS skin wdl>>List=AS skin wdl>>Version=F14</i></font>      Vitals     Vital Signs Flowsheet     COMMENTS     Side Effects Monitoring: Respiratory Depth  N 02/20/18 1149    Comments  PT 02/19/18 1330   Side Effects Monitoring: Sedation Level  2 02/20/18 1149    VITAL SIGNS     HEIGHT AND WEIGHT      Temp  97.7  F (36.5  C) 02/22/18 0752   Weight  101.2 kg (223 lb 1.6 oz) 02/18/18 0024    Temp src  Oral 02/22/18 0752   EKG MONITORING      Resp  18 02/22/18 0752   Cardiac Regularity  Regular 02/17/18 2148    Pulse  84 02/19/18 2017   Cardiac Rhythm  SB 02/17/18 2148    Heart Rate  68 02/22/18 0752   JURGEN COMA SCALE      Pulse/Heart Rate Source  Monitor 02/1945   Best Eye Response  4-->(E4) spontaneous 02/20/18 0131    BP  134/64 02/22/18 0752   Best Motor Response  6-->(M6) obeys commands 02/20/18 0131    BP Location  Right arm 02/1945   Best Verbal Response  5-->(V5) " oriented 02/20/18 0131    OXYGEN THERAPY     Nemesio Coma Scale Score  15 02/20/18 0131    SpO2  98 % 02/22/18 0752   POSITIONING      O2 Device  None (Room air) 02/22/18 0752   Body Position  independently positioning 02/22/18 0112    PAIN/COMFORT     Head of Bed (HOB)  HOB at 20-30 degrees 02/22/18 0112    Patient Currently in Pain  denies 02/22/18 0752   Chair  Upright in chair 02/20/18 0008    Preferred Pain Scale  CAPA (Clinically Aligned Pain Assessment) ([facility] Adults Only) 02/22/18 0114   DAILY CARE      CLINICALLY ALIGNED PAIN ASSESSMENT (CAPA) ([facility] ADULTS ONLY)     Activity Management  activity adjusted per tolerance 02/22/18 0112    Comfort  negligible pain 02/20/18 0009   Activity Assistance Provided  assistance, 1 person;assistance, 2 people 02/22/18 0112    ANALGESIA SIDE EFFECTS MONITORING     Assistive Device Utilized  gait belt;walker 02/22/18 0112    Side Effects Monitoring: Respiratory Quality  R 02/20/18 1149                 Patient Lines/Drains/Airways Status    Active LINES/DRAINS/AIRWAYS     None            Patient Lines/Drains/Airways Status    Active PICC/CVC     None            Intake/Output Detail Report     Date Intake     Output Net    Shift P.O. I.V. IV Piggyback Total Urine Total       Day 02/21/18 0000 - 02/21/18 0659 -- -- -- -- 1000 1000 -1000    Cassy 02/21/18 0700 - 02/21/18 1459 -- -- -- -- 350 350 -350    Noc 02/21/18 1500 - 02/21/18 2359 -- -- -- -- -- -- 0    Day 02/22/18 0000 - 02/22/18 0659 -- -- -- -- 800 800 -800    Cassy 02/22/18 0700 - 02/22/18 1459 240 -- -- 240 -- -- 240      Last Void/BM       Most Recent Value    Urine Occurrence 1 at 02/21/2018 1600    Stool Occurrence       Case Management/Discharge Planning     Case Management/Discharge Planning Flowsheet     REFERRAL INFORMATION     FINAL RESOURCES      Admission Type  inpatient 02/19/18 1646   Equipment Currently Used at Home  none 02/18/18 1650    LIVING ENVIRONMENT      ABUSE RISK SCREEN      Lives With  sibling(s) 02/18/18 1650   QUESTION TO PATIENT:  Has a member of your family or a partner(now or in the past) intimidated, hurt, manipulated, or controlled you in any way?  no 02/18/18 0033    Living Arrangements  house 02/18/18 1650   QUESTION TO PATIENT: Do you feel safe going back to the place where you are living?  yes 02/18/18 0033    COPING/STRESS     OBSERVATION: Is there reason to believe there has been maltreatment of a vulnerable adult (ie. Physical/Sexual/Emotional abuse, self neglect, lack of adequate food, shelter, medical care, or financial exploitation)?  no 02/18/18 0033    Major Change/Loss/Stressor  denies;none 02/18/18 0034   OTHER      EXPECTED DISCHARGE     Are you depressed or being treated for depression?  No 02/18/18 0032    Expected Discharge Date  02/22/18 02/20/18 1037   / CAREGIVER      DISCHARGE PLANNING     Filed Complexity Screen Score  6 02/19/18 1647    Transportation Available  family or friend will provide 02/18/18 8758

## 2018-02-18 NOTE — IP AVS SNAPSHOT
` `     [address] [facility] BANK: [phone]            Medication Administration Report for FriendRosalie as of 02/22/18 0843   Legend:    Given Hold Not Given Due Canceled Entry Other Actions    Time Time (Time) Time  Time-Action       Inactive    Active    Linked        Medications 02/16/18 02/17/18 02/18/18 02/19/18 02/20/18 02/21/18 02/22/18    aspirin tablet 325 mg  Dose: 325 mg  Freq: DAILY Route: PO  Start: 02/19/18 0800       0757 (325 mg)-Given        0808 (325 mg)-Given        0802 (325 mg)-Given        0749 (325 mg)-Given           atorvastatin (LIPITOR) tablet 80 mg  Dose: 80 mg  Freq: DAILY AT 8PM Route: PO  Start: 02/18/18 2000 2008 (80 mg)-Given        2049 (80 mg)-Given        1953 (80 mg)-Given        1940 (80 mg)-Given        [ ] 2000           bisacodyl (DULCOLAX) Suppository 10 mg  Dose: 10 mg  Freq: DAILY PRN Route: RE  PRN Reason: constipation  Start: 02/19/18 1715              clopidogrel (PLAVIX) tablet 75 mg  Dose: 75 mg  Freq: DAILY Route: PO  Start: 02/19/18 0800       0757 (75 mg)-Given        0808 (75 mg)-Given        0802 (75 mg)-Given        0749 (75 mg)-Given           glucose 40 % gel 15-30 g  Dose: 15-30 g  Freq: EVERY 15 MIN PRN Route: PO  PRN Reason: low blood sugar  Start: 02/18/18 0055   Admin Instructions: Give 15 g for BG 51 to 69 mg/dL IF patient is conscious and able to swallow. Give 30 g for BG less than or equal to 50 mg/dL IF patient is conscious and able to swallow. Do NOT give glucose gel via enteral tube.  IF patient has enteral tube: give apple juice 120 mL (4 oz or 15 g of CHO) via enteral tube for BG 51 to 69 mg/dL.  Give apple juice 240 mL (8 oz or 30 g of CHO) via enteral tube for BG less than or equal to 50 mg/dL.    ~Oral gel is preferable for conscious and able to swallow patient.   ~IF gel unavailable or patient refuses may provide apple juice 120 mL (4 oz or 15 g of CHO). Document juice on I and O flowsheet.              Or  dextrose 50 % injection  25-50 mL  Dose: 25-50 mL  Freq: EVERY 15 MIN PRN Route: IV  PRN Reason: low blood sugar  Start: 02/18/18 0055   Admin Instructions: Use if have IV access, BG less than 70 mg/dL and meet dose criteria below:  Dose if conscious and alert (or disorientated) and NPO = 25 mL  Dose if unconscious / not alert = 50 mL  Vesicant. For ordered doses up to 25 mg, give IV Push undiluted. Give each 5g over 1 minute.              Or  glucagon injection 1 mg  Dose: 1 mg  Freq: EVERY 15 MIN PRN Route: SC  PRN Reason: low blood sugar  PRN Comment: May repeat x 1 only  Start: 02/18/18 0055   Admin Instructions: May give SQ or IM. ONLY use glucagon IF patient has NO IV access AND is UNABLE to swallow AND blood glucose is LESS than or EQUAL to 50 mg/dL.  Give IV Push over 1 minute. Reconstitute with 1mL sterile water.               hydrALAZINE (APRESOLINE) injection 10-20 mg  Dose: 10-20 mg  Freq: EVERY 1 HOUR PRN Route: IV  PRN Reason: high blood pressure  PRN Comment: For Systolic Blood Pressure greater than 200 mmHg or Diastolic Blood Pressure greater than 110 mmHg.  Start: 02/19/18 1011   Admin Instructions: Start with a 10 mg dose and increase dose to 20 mg if Blood Pressure response does not meet parameters. Notify provider within 2 hours if Blood Pressure parameters are not met. If heart rate greater than or equal to 60 bpm use labetalol first, if heart rate is less than 60 bpm use hydralazine first  For ordered doses up to 40 mg, give IV Push undiluted over 1 minute.               hydrochlorothiazide (MICROZIDE) capsule 12.5 mg  Dose: 12.5 mg  Freq: DAILY Route: PO  Start: 02/20/18 1315        1635 (12.5 mg)-Given        0802 (12.5 mg)-Given        0749 (12.5 mg)-Given           insulin aspart (NovoLOG) inj (RAPID ACTING)  Dose: 1-6 Units  Freq: 4 TIMES DAILY BEFORE MEALS & NIGHTLY Route: SC  Start: 02/19/18 2200   Admin Instructions: Correction Scale - MEDIUM INSULIN RESISTANCE DOSING     Do Not give Correction Insulin if BG  less than 140.  For  - 189 give 1 unit.  For  - 239 give 2 units.  For  - 289 give 3 units.  For  - 339 give 4 units.  For  - 399 give 5 units.  For BG greater than or equal to 400 give 6 units.  Check blood glucose Q4H and administer based on blood glucose.  Notify provider if glucose greater than or equal to 350 mg/dL after administration of correction dose.  If given at mealtime, must be administered 5 min before meal or immediately after.        (2225)-Not Given [C]        (0826)-Not Given       (1311)-Not Given       (1832)-Not Given       (2204)-Not Given [C]        (0854)-Not Given       (1355)-Not Given       (1813)-Not Given [C]       (2230)-Not Given [C]        (0751)-Not Given       [ ] 1130       [ ] 1630       [ ] 2200           labetalol (NORMODYNE/TRANDATE) injection 10-20 mg  Dose: 10-20 mg  Freq: EVERY 10 MIN PRN Route: IV  PRN Reason: high blood pressure  PRN Comment: For Systolic Blood Pressure greater than 200 mmHg or Diastolic Blood Pressure greater than 110 mmHg.  Start: 02/19/18 1010   Admin Instructions: Give dose over 1-2 minutes. Start with a 10 mg dose, then repeat or double dose if needed (MAX daily dose: 300 mg / 24 hrs) Hold if Heart Rate less than 60 bpm. Notify provider within 2 hours if Blood Pressure parameters are not met. If heart rate greater than or equal to 60 bpm use labetalol first, if heart rate is less than 60 bpm use hydralazine first  For ordered doses up to 80 mg, give IV Push undiluted. Give each 20 mg over 2 minutes.               lisinopril (PRINIVIL/ZESTRIL) tablet 10 mg  Dose: 10 mg  Freq: DAILY Route: PO  Start: 02/19/18 1530       2049 (10 mg)-Given        0809 (10 mg)-Given        0802 (10 mg)-Given        0749 (10 mg)-Given           magnesium hydroxide (MILK OF MAGNESIA) suspension 30 mL  Dose: 30 mL  Freq: DAILY PRN Route: PO  PRN Reason: constipation  Start: 02/19/18 1707   Admin Instructions: Shake well.         1304 (30  mL)-Given        1011 (30 mL)-Given            medication instruction  Freq: CONTINUOUS PRN Route: XX  Start: 02/18/18 0023   Admin Instructions: No oral medications if patient did not pass the swallow screen. Contact provider for alternative routes or NG placement.               metoprolol tartrate (LOPRESSOR) tablet 50 mg  Dose: 50 mg  Freq: 2 TIMES DAILY Route: PO  Start: 02/20/18 2000   Admin Instructions: Hold for HR <60         1953 (50 mg)-Given        0802 (50 mg)-Given       1941 (50 mg)-Given        0749 (50 mg)-Given       [ ] 2000           pantoprazole (PROTONIX) EC tablet 40 mg  Dose: 40 mg  Freq: DAILY WITH BREAKFAST Route: PO  Start: 02/19/18 0800   Admin Instructions: DO NOT CRUSH.        0757 (40 mg)-Given        0809 (40 mg)-Given        0802 (40 mg)-Given        0749 (40 mg)-Given           senna-docusate (SENOKOT-S;PERICOLACE) 8.6-50 MG per tablet 2 tablet  Dose: 2 tablet  Freq: AT BEDTIME Route: PO  Start: 02/19/18 2200       2228 (2 tablet)-Given        2204 (2 tablet)-Given        (2230)-Not Given        [ ] 2200           sodium chloride bacteriostatic 0.9 % flush 12 mL  Dose: 12 mL  Freq: ONCE Route: IV  Start: 02/19/18 1245       (1317)-Not Given             Completed Medications  Medications 02/16/18 02/17/18 02/18/18 02/19/18 02/20/18 02/21/18 02/22/18         Dose: 1,000 mg  Freq: ONCE Route: IV  Start: 02/20/18 1030   End: 02/20/18 1134   Admin Instructions: Give IV Push at a concentration of 100 mg/mL.  Give each 500mg over 1 minute.         1134 (1,000 mg)-Given [C]               Dose: 80 mL  Freq: ONCE Route: IV  Start: 02/20/18 1115   End: 02/20/18 1132        1132 (80 mL)-Given            Discontinued Medications  Medications 02/16/18 02/17/18 02/18/18 02/19/18 02/20/18 02/21/18 02/22/18         Dose: 1,000 mg  Freq: ONCE Route: IV  Start: 02/19/18 1100   End: 02/19/18 1716   Admin Instructions: Give IV Push at a concentration of 100 mg/mL.  Give each 500mg over 1 minute.         1716-Med Discontinued    [ ] 0800               Dose: 5,000 Units  Freq: EVERY 12 HOURS Route: SC  Start: 02/19/18 1200   End: 02/19/18 1707   Admin Instructions: High concentration HEParin. Not for line flush or cath care.  High concentration heparin. Not for line flush or cath care.        (1600)-Not Given [C]       1707-Med Discontinued            Dose: 10-20 mg  Freq: EVERY 1 HOUR PRN Route: IV  PRN Reason: high blood pressure  PRN Comment: For Systolic Blood Pressure greater than 220 mmHg or Diastolic Blood Pressure greater than 120 mmHg.  Start: 02/18/18 0026   End: 02/19/18 1011   Admin Instructions: Start with a 10 mg dose and increase dose to 20 mg if Blood Pressure response does not meet parameters. Notify provider within 2 hours if Blood Pressure parameters are not met.  For ordered doses up to 40 mg, give IV Push undiluted over 1 minute.        1011-Med Discontinued            Dose: 1-6 Units  Freq: EVERY 4 HOURS Route: SC  Start: 02/18/18 0200   End: 02/19/18 1946   Admin Instructions: Correction Scale - MEDIUM INSULIN RESISTANCE DOSING     Do Not give Correction Insulin if BG less than 140.  For  - 189 give 1 unit.  For  - 239 give 2 units.  For  - 289 give 3 units.  For  - 339 give 4 units.  For  - 399 give 5 units.  For BG greater than or equal to 400 give 6 units.  Check blood glucose Q4H and administer based on blood glucose.  Notify provider if glucose greater than or equal to 350 mg/dL after administration of correction dose.  If given at mealtime, must be administered 5 min before meal or immediately after.       (0106)-Not Given [C]       (0430)-Not Given [C]       (0911)-Not Given       (1248)-Not Given       (1831)-Not Given       (2011)-Not Given        (0151)-Not Given       (0530)-Not Given       (0757)-Not Given       (1209)-Not Given       (1600)-Not Given       1946-Med Discontinued            Dose: 10-20 mg  Freq: EVERY 10 MIN PRN Route: IV  PRN Reason:  high blood pressure  PRN Comment: For Systolic Blood Pressure greater than 220 mmHg or Diastolic Blood Pressure greater than 120 mmHg.  Start: 02/18/18 0026   End: 02/19/18 1011   Admin Instructions: Give dose over 1-2 minutes. Start with a 10 mg dose, then repeat or double dose if needed (MAX daily dose: 300 mg / 24 hrs) Hold if Heart Rate less than 60 bpm. Notify provider within 2 hours if Blood Pressure parameters are not met.  For ordered doses up to 80 mg, give IV Push undiluted. Give each 20 mg over 2 minutes.        1011-Med Discontinued            Dose: 1 tablet  Freq: DAILY Route: PO  Start: 02/19/18 1500   End: 02/19/18 1526       1526-Med Discontinued                  Dose: 160 mL  Freq: EVERY 1 HOUR PRN Route: AS INSTRUCTE  PRN Reason: line flush  Start: 02/20/18 1102   End: 02/20/18 2301   Admin Instructions: This entry is for use by Radiology to intermittently used as a flush in patients receiving a CT scan.         1132 (160 mL)-Given       2301-Med Discontinued           Rate: 100 mL/hr   Freq: CONTINUOUS Route: IV  Start: 02/18/18 0100   End: 02/20/18 0922   Admin Instructions: Minimum total IV fluid 30ml/hr       0104 ( )-New Bag       1019 ( )-New Bag       2011 ( )-New Bag        0758 ( )-Rate/Dose Verify       1432 ( )-New Bag        0922-Med Discontinued      Medications 02/16/18 02/17/18 02/18/18 02/19/18 02/20/18 02/21/18 02/22/18

## 2018-02-18 NOTE — ED NOTES
[facility] staff present at this time. Report given to RN and Paramedic. Family remains in room. Awaiting patient return from CT.

## 2018-02-18 NOTE — PROGRESS NOTES
02/18/18 1100   General Information   Onset Date 02/18/18   Start of Care Date 02/18/18   Referring Physician Jose Garcia MD   Patient Profile Review/OT: Additional Occupational Profile Info See Profile for full history and prior level of function   Patient/Family Goals Statement patient hopes to drink H20 ASAP   Swallowing Evaluation Bedside swallow evaluation   Behaviorial Observations WNL (within normal limits)   Mode of current nutrition NPO   Respiratory Status Room air   Comments Rosalie JANE Friend is a 72 year old female with a history of stroke, type 2 diabetes, hypertension, and hyperlipidemia who is presenting with waxing and waning left upper extremity weakness.  The patient noted for the last 2 days she has had left upper extremity weakness that will worsen to the point where her limb is flaccid and improve to the point where she can move it antigravity.  She initially had left lower leg weakness as well however this has resolved.  She has noted no change in vision, changes speech, difficulty swallowing or any numbness.  The patient was initially evaluated outside hospital and was sent to [place] for further evaluation Bedside swallow eval completed by SLP at 10:40 AM on 2/18/18.   Clinical Swallow Evaluation   Oral Musculature anomalies present  (mild left facial droop)   Structural Abnormalities none present   Dentition present and adequate   Secretion Management other (see comments)  (WNL)   Mucosal Quality good   Mandibular Strength and Mobility intact   Oral Labial Strength and Mobility WFL   Lingual Strength and Mobility WFL   Laryngeal Function Cough;Throat clear;Swallow;Voicing initiated;Dry swallow palpated   Oral Musculature Comments mild left facial droop but adequate coordination & strength   Clinical Swallow Eval: Thin Liquid Texture Trial   Mode of Presentation, Thin Liquids spoon;cup;straw;fed by clinician;self-fed   Volume of Liquid or Food Presented 6 oz thin H20    Oral Phase of Swallow WFL   Pharyngeal Phase of Swallow intact   Diagnostic Statement WNL with thin liquid consistency   Clinical Swallow Eval: Puree Solid Texture Trial   Mode of Presentation, Puree spoon;fed by clinician   Volume of Puree Presented 2 bites of pudding   Oral Phase, Puree WFL   Oral Residue, Puree other (see comments)  (none)   Pharyngeal Phase, Puree intact   Diagnostic Statement WNL with puree texture   Clinical Swallow Eval: Solid Food Texture Trial   Mode of Presentation, Solid self-fed   Volume of Solid Food Presented tod crackers   Oral Phase, Solid WFL   Oral Residue, Solid other (see comments)  (none)   Pharyngeal Phase, Solid intact   Diagnostic Statement WNL with regular texture solid   Esophageal Phase of Swallow   Patient reports or presents with symptoms of esophageal dysphagia No   Swallow Eval: Clinical Impressions   Skilled Criteria for Therapy Intervention No problems identified which require skilled intervention   Functional Assessment Scale (FAS) 7   Treatment Diagnosis WFL oropharyngeal swallowing function   Diet texture recommendations Regular diet;Thin liquids   Demonstrates Need for Referral to Another Service occupational therapy;physical therapy   Therapy Frequency other (see comments)  (evaluation only)   Anticipated Discharge Disposition other (see comments)  (defer to OT/PT)   Risks and Benefits of Treatment have been explained. Yes   Patient, family and/or staff in agreement with Plan of Care Yes   Clinical Impression Comments Clinical dysphagia examination completed per MD order. The patient has a mild left facial droop, but lingual/labial strength and coordination were noted to be good. Pharyngeal swallow response strong and timely. The patient consumed PO trials with good swallowing safety and is appropriate to resume a regular diet and thin liquids from an oropharyngeal swallowing standpoint, when indicated per MD. SLP is signing off as swallowing safety is  good. Speech is noted to be clear and fluent so formal eval not completed.   Total Evaluation Time   Total Evaluation Time (Minutes) 16

## 2018-02-18 NOTE — ED PROVIDER NOTES
History     Chief Complaint   Patient presents with     Cerebrovascular Accident     Stroke 2 days ago - was sent to [facility] to L side since 3pm today     HPI  Rosalie Seals is a 72 year old female who presents to the ED accompanied by the daughter.  Patient is complaining of complete weakness of the left upper extremity.  The weakness was noted 2 days ago and patient had CT scan done at the ED.  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  She has transferred to [place] and was discharged from the today at 3 PM.  On getting home heart 5 PM the daughter noted that she could not move the left upper extremity.  This was a complete change from the time she was discharged at the hospital in [place] just 2 hours earlier.  Patient has had weakness in the left upper extremity for a long time but she was able to move it around and the daughter is reporting that now she cannot move it completely.  This is worsening of the weakness.  Patient denies any headache, nausea, vomiting, leg weakness, fever, neck pains.  Significant medical history includes prior CVA, diabetes mellitus type 2, hypertension and hyperlipidemia.  Patient denies any urine or stool incontinence.  No slurring of speech or facial droop.    Problem List:    Patient Active Problem List    Diagnosis Date Noted     ACP (advance care planning) 05/09/2017     Priority: Medium     Advance Care Planning 5/9/2017: ACP Review of Chart / Resources Provided:  Reviewed chart for advance care plan.  Rosalie Seals has been provided information and resources to begin or update their advance care plan.  Added by Kalee Busby                Past Medical History:    Past Medical History:   Diagnosis Date     Cerebral infarction (H)      Diabetes type 2, uncontrolled (H)      Hyperlipemia        Past Surgical History:    Past Surgical History:   Procedure Laterality Date     ABDOMEN SURGERY       bladder repair     GYN SURGERY      hysterectomy        Family History:    Family History   Problem Relation Age of Onset     Aneurysm Mother      Other Cancer Father      DIABETES Brother      Hypertension Brother      Other Cancer Brother      Other Cancer Sister      Aneurysm Sister        Social History:  Marital Status:   [4]  Social History   Substance Use Topics     Smoking status: Light Tobacco Smoker     Types: Cigarettes     Last attempt to quit: 5/5/2017     Smokeless tobacco: Never Used      Comment: limits self to about 4-5 daily , she is quitting on her own     Alcohol use No        Medications:      Clopidogrel Bisulfate (PLAVIX PO)   METOPROLOL TARTRATE PO   lisinopril-hydrochlorothiazide (PRINZIDE/ZESTORETIC) 10-12.5 MG per tablet   order for DME   metFORMIN (GLUCOPHAGE) 500 MG tablet   aspirin 81 MG tablet   atorvastatin (LIPITOR) 80 MG tablet   blood glucose monitoring (ONE TOUCH DELICA) lancets   Glucose Blood (BLOOD GLUCOSE TEST STRIPS) STRP   ONETOUCH DELICA LANCETS 33G MISC         Review of Systems   Neurological: Positive for weakness.        Left upper extremity weakness   All other systems reviewed and are negative.      Physical Exam   BP: 142/82  Heart Rate: 67  Temp: 98.1  F (36.7  C)  Resp: 18  SpO2: 95 %      Physical Exam   Constitutional: She is oriented to person, place, and time. She appears well-developed and well-nourished. No distress.   HENT:   Head: Atraumatic.   Mouth/Throat: Oropharynx is clear and moist. No oropharyngeal exudate.   Eyes: Pupils are equal, round, and reactive to light. No scleral icterus.   Cardiovascular: Normal rate, regular rhythm, normal heart sounds and intact distal pulses.    Pulmonary/Chest: Effort normal and breath sounds normal. No respiratory distress. She has no wheezes. She has no rales.   Abdominal: Soft. Bowel sounds are normal. There is no tenderness.   Musculoskeletal: She exhibits no edema or tenderness.   Neurological: She is  alert and oriented to person, place, and time. No cranial nerve deficit. She exhibits abnormal muscle tone.   Power glide 1/5 in the left upper extremity.  Normal power, reflexes, tone, muscle bulk and sensation in the right upper extremity and both lower extremities.  No cranial nerve deficits.   Skin: Skin is warm. No rash noted. She is not diaphoretic.   Nursing note and vitals reviewed.      ED Course   Patient evaluated and laboratory tests ordered.  Code stroke announced overhead.    CT scan done shows no acute changes compared to CT done 2 days ago.  Consulted the stroke neurologist on-call at [facility] Dr. Russo who suggested to transfer the patient to the higher level of care in the [facility].      ED Course     Procedures         EKG Interpretation:      Interpreted by Dav Sinclair  Time reviewed: 09:10 PM  Symptoms at time of EKG: Left upper extremity weakness  Rhythm: normal sinus   Rate: normal  Axis: normal  Ectopy: none  Conduction: normal  ST Segments/ T Waves: No ST-T wave changes  Q Waves: none  Comparison to prior: No old EKG available    Clinical Impression: normal EKG               Labs Ordered and Resulted from Time of ED Arrival Up to the Time of Departure from the ED   CBC WITH PLATELETS DIFFERENTIAL - Abnormal; Notable for the following:        Result Value    RBC Count 5.35 (*)     All other components within normal limits   COMPREHENSIVE METABOLIC PANEL - Abnormal; Notable for the following:     Glucose 122 (*)     All other components within normal limits   GLUCOSE BY METER - Abnormal; Notable for the following:     Glucose 120 (*)     All other components within normal limits   INR   PARTIAL THROMBOPLASTIN TIME   TROPONIN I   VITAL SIGNS AND NEURO CHECKS   ACTIVITY   PULSE OXIMETRY NURSING   GLUCOSE MONITOR NURSING POCT   ASSESSMENT   NOTIFY       Assessments & Plan (with Medical Decision Making)   Left upper extremity weakness  Aneurysm of the middle cerebral  artery  Right internal carotid artery occlusion  Patient presents to the emergency department with weakness of the left upper limb.  She was discharged from [facility] having been there for 2 days for evaluation for stroke.  MR angiogram of the head and neck done in the ER earlier today showed right internal carotid artery occlusion and aneurysm of the middle cerebral artery size has not changed.  She was discharged home at 3:00 and about getting home at 5 this evening she noted that her left upper extremity was completely weak and she could not move it.  No headache or leg weakness.  CT scan done on arrival at the ED was negative for any acute changes.  Dr. Russo the neurologist on-call for stroke at [facility] in [place] was consulted on the phone and he suggested that patient be transferred to a hospital with higher level of care like [facility].  Dr. Dwight Miguel of [facility] was consulted on the phone and he graciously accepted the patient to be transferred to his care.  Patient will be transferred to [facility] by helicopter.    I have reviewed the nursing notes.    I have reviewed the findings, diagnosis, plan and need for follow up with the patient.    Discharge Medication List as of 2/17/2018 11:02 PM          Final diagnoses:   Weakness of left upper extremity   Aneurysm of middle cerebral artery   Right internal carotid occlusion       2/17/2018   [facility] EMERGENCY [facility]PART[facility]     Dav Sinclair MD  02/17/18 3128

## 2018-02-18 NOTE — ED NOTES
"71 y/o female presents with family with reports of weakness to L side. Patient's family states she was seen in ED 2 days ago and sent to [facility]. Family states she was diagnosed with a growing aneurysm \"which grew from 2 to 7.\" Patient's family states she had an MRI done this morning which showed a stroke. Family reports \"they read the CT wrong here, she didn't have a growing aneurysm.\" Family states patient had active ROM in upper and lower extremities when she discharged from [facility] at 3pm. Weakness to L side new since discharging. L sided droop \"is the same as when she was here 2 days ago\" per family.   Code Stroke called at 2047. Dr Sinclair present on arrival.  "

## 2018-02-18 NOTE — IP AVS SNAPSHOT
` `     [facility]: [phone]                 INTERAGENCY TRANSFER FORM - NOTES (H&P, Discharge Summary, Consults, Procedures, Therapies)   2018                    Hospital Admission Date: 2018  NADIA SMYTH   : 1945  Sex: Female        Patient PCP Information     Provider PCP Type    Yash Cuellar DO General         History & Physicals      H&P by Leora Roque MD at 2018 12:32 AM     Author:  Leora Roque MD Service:  Neuro ICU Author Type:  Resident    Filed:  2018  2:02 PM Date of Service:  2018 12:32 AM Creation Time:  2018 12:31 AM    Status:  Attested :  Leora Roque MD (Resident)    Cosigner:  Dwight Miguel MD at 2018  6:37 AM        Attestation signed by Dwight Miguel MD at 2018  6:37 AM        Attestation:  Physician Attestation   I, Dwight Miguel, saw this patient with the resident and agree with the resident s findings and plan of care as documented in the resident s note.      I personally reviewed vital signs, medications, labs and imaging.    Key findings: 72 f with hx of DMII, HTN, dlp, smoking, RT ICA occlusion + MCA strokes May 2017, aneurysm s/p colining. Presented with recurrent stroke symptoms over last 2 weeks. CTA: Rt ICA occlusion, Rt MCA stenosis. MRI multiple strokes at Rt MCA + watershed areas.   Dx:   1-Recurrent Rt MCA/watershed strokes probably blood flow hemodynamics vs thrombo-embolic.   2- Rt MCA aneurysm ~7mm  Plan:  Optimize medical program (, plavix load then 75)  Atorva 80mg  Bp to drive for now  Diamox challenge CTP  PT/OT    Dwight Miguel  Date of Service (when I saw the patient): 18                               Addendum 18 8:00 AM   No significant events overnight. Some improvement in strength in left arm shoulder 4/5, biceps 2/5, triceps 2/5, wrist flexion 2/5, wrist extension 2/5; 5/5 elsewhere; left facial droop with  remainder of examination normal. Continues to be NIHSS 3 for flattening of nasolabial fold and some effort against gravity in left arm.  Passed speech/swallow evaluation and restarted home medications except for hypertensive medications.     Update to plan:   - Continue permissive hypertension; labetalol PRN for SBP > 220 until perfusion scan completed.  - Passed speech, diet ordered    - Plavix load 300 mg today  - Plavix 75 mg daily   - Resumed Atrovastatin 80 mg daily   - No MRI brain due to patient claustrophobia   - Images from outside hospital to be uploaded   - CT perfusion with diamox to be completed tomorrow   - TTE pending  - PT/OT   - Stroke education   - Depression and apnea screen     # type 2 DM  - Continue to hold PTA metformin   - ISS, hypoglycemia protocol     # HTN:   - Continue to hold PTA metoprolol and lisinopril-HCTZ for permissive hypertension and concern for pressure dependent lesion.     Leora Roque   Neurology PGY 1[SR1.1]     [facility] [place]      Neurology Stroke Admission    Patient Name: Rosalie Seals  : 1945 MRN#: [id number]    STROKE DATA    Stroke Code:[JR1.1]  Stroke code not activated.  Time patient seen:[JR1.2]  2018[JR1.3] 0130  Last known normal (pt's baseline):  2 days ago[JR1.2]     TPA treatment:[JR1.1]  Not given due to outside the time window.[JR1.2]      Stroke Scales[JR1.1]      National Institutes of Health Stroke Scale (at presentation)   NIHSS done at:  time patient seen      Score    Level of consciousness:  (0)   Alert, keenly responsive    LOC questions:  (0)   Answers both questions correctly    LOC commands:  (0)   Performs both tasks correctly    Best gaze:  (0)   Normal    Visual:  (0)   No visual loss    Facial palsy:  (1)   Minor paralysis (flat nasolabial fold, smile asymmetry)    Motor arm (left):  (2)   Some effort against gravity    Motor arm (right):  (0)   No drift    Motor leg (left):  (0)   No drift     Motor leg (right):  (0)   No drift    Limb ataxia:  (0)   Absent    Sensory:  (0)   Normal- no sensory loss    Best language:  (0)   Normal- no aphasia    Dysarthria:  (0)   Normal    Extinction and inattention:  (0)   No abnormality        NIHSS Total Score:  3[JR1.2]          Dysphagia Screen  Time of screening:[JR1.1] 02/18/2018[JR1.2]  Screening results:[JR1.1] Failed due to presence of subtle left facial droop.  Speech to evaluate in the morning[JR1.2]     ASSESSMENT & PLAN BY PROBLEM[JR1.1]     #Symptomatic right MCA stenosis: The patient has had waxing and waning left-sided weakness appears to be perfusion dependent.  Initial plan to start dual antiplatelet therapy however due to subtle left facial droop patient is an automatic failure until speech evaluation and will need to be kept n.p.o. In the meantime will start rectal aspirin.  Will also allow for permissive hypertension despite symptoms lasting more than 24 hours.  Will admit to neurology for stroke workup.     Plan  - Admit to Neurology  - Neurochecks per protocol  - Permissive HTN; labetalol PRN for SBP > 220  - Euthermia, Euglycemia  -Continue PTA dual antiplatelet therapy after SLP evaluation  - Continue PTA atorvastatin after speech evaluation  - MRI brain without contrast  - TTE with Bubble Study  - Telemetry, EKG  - Bedside Glucose Monitoring  - A1c, Lipid Panel  - PT/OT/SLP  - PM&R  - Stroke Education  - Depression Screen  - Apnea Screen[JR1.2]    During initial physical assessment, the plan of care[JR1.1] was discussed and developed with patient.  Plan of care includes: Stroke evaluation[JR1.2].    Patient was admitted via[JR1.1] Transfer from outside hospital[JR1.2].    The patient[JR1.1] will be admitted to the Neuro Critical Care/Stroke team.[JR1.2].     Other Medical Problems:[JR1.1]    #Type 2 diabetes: Hold PTA metformin.  -Sliding scale insulin  -Hypoglycemia protocol    #HTN: Hold PTA metoprolol and lisinopril hydrochlorothiazide in  the setting of permissive hypertension[JR1.2]    Fluids/Electrolytes/Nutrition  0.9% sodium chloride @[JR1.1]100[JR1.2] mL/hr  Avoid hypotonic fluids.    Nutrition:[JR1.1]   Active Diet Order      NPO for Medical/Clinical Reasons Except for: No Exceptions[JR1.4]    Prophylaxis[JR1.1]            For VTE Prevention:  - pneumatic compression device    For Acid Suppression:  - pantoprazole[JR1.2]    Code Status[JR1.1]  Full Code[JR1.2]    HPI  Rosalie JANE Friend is a 72 year old female with[JR1.1] a history of stroke, type 2 diabetes, hypertension, and hyperlipidemia who is presenting with waxing and waning left upper extremity weakness.  The patient noted for the last 2 days she has had left upper extremity weakness that will worsen to the point where her limb is flaccid and improve to the point where she can move it antigravity.  She initially had left lower leg weakness as well however this has resolved.  She has noted no change in vision, changes speech, difficulty swallowing or any numbness.  The patient was initially evaluated outside hospital and was sent to [facility] for further evaluation[JR1.2]    Pertinent Past Medical/Surgical History[JR1.1]  Past Medical History:   Diagnosis Date     Cerebral infarction (H)     05/06/2017     Diabetes type 2, uncontrolled (H)      Hyperlipemia        Past Surgical History:   Procedure Laterality Date     ABDOMEN SURGERY      bladder repair     GYN SURGERY      hysterectomy[JR1.4]        Medications:[JR1.1] I have reviewed this patient's current medications[JR1.2].    Allergies:[JR1.1] All allergies reviewed and addressed[JR1.2].    Family History:[JR1.1] I have reviewed this patient's family history[JR1.2].    Social History:[JR1.1] I have reviewed this patient's social history[JR1.2].    Tobacco use:[JR1.1] Quit 2 weeks ago[JR1.2]    ROS:[JR1.1]  The 10 point Review of Systems is negative other than noted in the HPI or here.[JR1.2]     PHYSICAL EXAMINATION  Vital Signs:  B/P:  127/77,  T: Data Unavailable,  P: Data Unavailable,  R: 18    General:[JR1.1]  patient lying in bed without any acute distress[JR1.2]    HEENT:[JR1.1]  normocephalic/atraumatic[JR1.2]  Cardio:[JR1.1]  RRR[JR1.2]  Pulmonary:[JR1.1]  no respiratory distress[JR1.2]  Abdomen:[JR1.1]  non-distended[JR1.2]  Extremities:[JR1.1]  no edema[JR1.2]  Skin:[JR1.1]  intact[JR1.2]     Neurologic  Mental Status:[JR1.1]  fully alert, attentive and oriented, follows commands, speech clear and fluent[JR1.2]  Cranial Nerves:[JR1.1]  visual fields intact, PERRL, EOMI with normal smooth pursuit, facial sensation intact and symmetric, subtle left facial droop, hearing not formally tested but intact to conversation, palate elevation symmetric and uvula midline, no dysarthria, shoulder shrug strong bilaterally, tongue protrusion midline[JR1.2]  Motor:[JR1.1] Strength 5/5 throughout except only able to left left upper extremity antigravity.[JR1.2]  Sensory:[JR1.1]  intact/symmetric to light touch throughout upper and lower extremities[JR1.2]  Coordination:[JR1.1]  FNF and HS intact without dysmetria[JR1.2]    Labs  Labs and Imaging reviewed and used in developing the plan; pertinent results included.[JR1.1]     Lab Results   Component Value Date     (H) 02/17/2018[JR1.4]       The patient was discussed with the fellow, [JR1.1] Sowmya[JR1.2].  The staff is [JR1.1] [place][JR1.2]    Jose Garcia MD[JR1.4]  Neurology PGY 2[JR1.2]                       Revision History        User Key Date/Time User Provider Type Action    > SR1.1 2/18/2018  2:02 PM Leora Roque MD Resident Sign     JR1.3 2/18/2018  2:59 AM Jose Garcia MD Resident Sign     JR1.2 2/18/2018  2:42 AM Jose Garcia MD Resident      JR1.4 2/18/2018 12:32 AM Jose Garcia MD Resident      JR1.1 2/18/2018 12:31 AM Jose Garcia MD Resident                      Discharge Summaries      Discharge  Summaries by Leora Roque MD at 2/21/2018  2:55 PM     Author:  Leora Roque MD Service:  Neuro ICU Author Type:  Resident    Filed:  2/22/2018  8:06 AM Date of Service:  2/21/2018  2:55 PM Creation Time:  2/21/2018  2:55 PM    Status:  Cosign Needed :  Leora Roque MD (Resident)    Cosign Required:  Yes             [facility], [place]    Neurology Stroke Discharge Summary    Date of Admission: 2/18/2018  Date of Discharge: 02/21/2018    Disposition:[SR1.1] Discharged to rehabilitation facility[SR1.2]  Primary Care Physician: Yash Cuellar      Admission Diagnosis:[SR1.1]   Right side ischemic stroke in watershed distribution[SR1.2]     Discharge Diagnosis:[SR1.1]   Right side ischemic stroke in watershed distribution[SR1.2]     Problem Leading to Hospitalization[SR1.1] and Hospice course[SR1.2]:[SR1.1]   Ms. Seals is a 72 y.o. 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 MRI obtained at OSH found right side stroke in MCA/MISHA watershed territory. She was sent to [place] for further evaluation. Initial concern was for pressure dependent stroke symptoms with occlusion of R IC and blood pressure was allowed to stay elevated early in admission. Initially we tolerated SBP < 220 with PRN treatment >220, which was further restricted to <200 on hospital day 2. Home hypertension medications were resumed gradually with full resumption by 2/20/18. Blood pressure on discharge ranged SBP mid-130s to low 150s with stable/improving neurological examination.[SR1.2] With resumption of metoprolol pulse dropped to mid 40-50s on final day of admission; no change was made, but informed family there may  be additional considerations re: metoprolol versus other hypertensive medications.[SR1.3] She continued to gain strength in her left arm and left leg throughout hospitalization[SR1.2], but she still requires[SR1.4] 2 person assist and walker.[SR1.3]     To further investigation perfusion dependence of stroke symptoms, we obtained a CT perfusion scan demonstrating reasonable cerebral reserve on diamox challenge. At discharge, we recommend long term goal of normotension, which can be further managed at rehab/primary care. Other stroke work-up found LDL 35, A1c 6.7, TTE with EF 60-65%, no PFO and mild left atrial enlargement. She was continued on her atorvastatin 80 mg daily. She received a plavix load of 300 mg, followed by 75 mg daily and her aspirin was increased from 81 mg daily to 325 mg daily. Additionally, we strongly encouraged cessation of smoking.[SR1.2]     Found to have[SR1.1] right sided stroke in watershed distribution[SR1.2]     IV tPA was[SR1.1] not administered, seen at outside hospital.[SR1.2]      Work-up as stated below under Pertinent Investigations.    Etiology is thought to be[SR1.1] due to large vessel thrombus versus hemodynamic.[SR1.2]     Rehab evaluation:[SR1.1] OT, PT and SLP[SR1.2].     Smoking Cessation:[SR1.1] education provided[SR1.2]    BP Long-term Goal:[SR1.1] 140/90 or less[SR1.2]    Antithrombotic/Anticoagulant Agent:[SR1.1] aspirin 325 mg and clopidogrel (Plavix) 75 mg[SR1.2]    Statins:[SR1.1] Continued on atorvasatin 80 mg daily[SR1.2]        Hgb A1C Goal: < 7.0    Complications:[SR1.1] None[SR1.2].     Other problems addressed during this hospitalization:[SR1.1]  #Smoking cessation with smoking education.   # Diabetes mellitus type 2: Held metformin, used sliding scale insulin. Resume metformin on discharge.   # Hypertension as above: Continue lisinopril-HCTZ 10-12.5 mg daily; metoprolol 50 mg BID.   # Right MCA aneurysm: Will follow-up with neurosurgery, Dr. Ramsey.[SR1.2]      PERTINENT INVESTIGATIONS    Labs  Lipid Panel:   Recent Labs   Lab Test  02/18/18   0905   CHOL  104   HDL  54   LDL  35   TRIG  78     A1C:   Lab Results   Component Value Date    A1C 6.7 02/18/2018     INR:   Recent Labs  Lab 02/17/18  2109 02/14/18  2145   INR 1.04 0.90      Coag Panel / Hypercoag Workup:[SR1.1] Not indicated[SR1.2]     Pending test results:[SR1.1] Final results of CT perfusion scan[SR1.2]     Echo:[SR1.1]   Interpretation Summary  No cardiac source for embolus identified. The atrial septum is intact as  assessed by color Doppler and agitated saline bubble study.[SR1.2]    Imaging:[SR1.1]   CTA   IMPRESSION:   1. Occluded right internal carotid artery just distal to the carotid  bulb.  2. Aneurysm coils at the junction of the left anterior and middle  cerebral arteries.  3. Diminutive right vertebral artery.  4. Mild atheromatous calcification without significant stenosis of the  left internal carotid artery in the neck and carotid siphon.    CT head   IMPRESSION:   1. No acute findings.  2. Left MCA aneurysm clip.  3. Areas of old infarction in the periventricular white matter of the  right frontoparietal region and right occipital lobe.    Review of MRI from OSH:   Watershed distribution strokes in the   Endovascular procedure: None     Cardiac Monitoring: Patient had > 24 hrs of cardiac monitor while in hospital.    Findings: No atrial fibrillation was found.    Sleep Apnea Screen:[SR1.2] Positive apnea screen, but she had sleep study after stroke in May 2017. It was negative for sleep apnea.[SR1.5]     PHQ-9 Depression Screen Score:[SR1.2] 3[SR1.5]    Education discussed with: patient, child and family on blood pressure management, cholesterol management, medical management, smoking cessation plan, follow-up recommendations/plan, 911 call if warning signs of stroke and results (perfusion imaging).    During daily rounds, the plan of care was discussed and developed with patient and  family.  Plan of care includes: secondary stroke prevention.    PHYSICAL EXAMINATION  Vital Signs:  B/P: 151/67, T: 98.3, P: 84, R: 16    General:[SR1.2]  patient lying in bed without any acute distress[SR1.4]     HEENT:[SR1.2]  normocephalic/atraumatic[SR1.4]  Cardio:[SR1.2]  RRR[SR1.4]  Pulmonary:[SR1.2]  no respiratory distress[SR1.4]  Abdomen:[SR1.2]  soft[SR1.4]  Extremities:[SR1.2]  no edema[SR1.4]  Skin:[SR1.2]  intact, warm/dry[SR1.4]     Neurologic  Mental Status:[SR1.2]  fully alert, attentive and oriented, follows commands, speech clear and fluent  Cranial nerves: Eyes mildly disconjugate with history of left eye exotropia chronic, PERRL, EOMI, visual fields full, mild flattening left nasolabial fold, facial sensation intact, shoulder shrug strong, palate rise symmetric, tongue/uvula midline, hearing intact to conversation.  Motor: Tone normal. LUE weakness: left arm drift, biceps 4/5, triceps 4/5, wrist flexor 4/5 and extensor 3/5, finger  3/5 and fine finger movements minimal. Left leg drift, right hip flexor 4/5; 5/5 elsewhere. RUE, RLE 55/. No atrophy or twitches.   Sensory: Intact to light touch.   Coordination: FNF no dysmetria that is out of proportion to L arm weakness, left HS mildly slow, clumsy.   Gait: Not assessed due to concern for safety. Requiring 2 person assist at discharge.[SR1.4]     National Institutes of Health Stroke Scale (on day of discharge)  NIHSS Total Score:[SR1.2] 3[SR1.4]    Modified Oleksandr Scale (on day of discharge):[SR1.2] 3-Moderate disability; requiring some help, but able to walk without assistance[SR1.4]    Medications[SR1.2]    Current Discharge Medication List      START taking these medications    Details   bisacodyl (DULCOLAX) 10 MG Suppository Place 1 suppository (10 mg) rectally daily as needed for constipation  Qty: 30 suppository    Associated Diagnoses: Acute ischemic stroke (H)      magnesium hydroxide (MILK OF MAGNESIA) 400 MG/5ML suspension Take 30 mLs  by mouth daily as needed for constipation  Qty: 105 mL    Associated Diagnoses: Acute ischemic stroke (H)      senna-docusate (SENOKOT-S;PERICOLACE) 8.6-50 MG per tablet Take 2 tablets by mouth At Bedtime  Qty: 100 tablet    Associated Diagnoses: Acute ischemic stroke (H)         CONTINUE these medications which have CHANGED    Details   aspirin 325 MG tablet Take 1 tablet (325 mg) by mouth daily  Qty: 120 tablet    Associated Diagnoses: Acute ischemic stroke (H)         CONTINUE these medications which have NOT CHANGED    Details   Clopidogrel Bisulfate (PLAVIX PO) Take 75 mg by mouth daily      METOPROLOL TARTRATE PO Take 50 mg by mouth 2 times daily      lisinopril-hydrochlorothiazide (PRINZIDE/ZESTORETIC) 10-12.5 MG per tablet Take 1 tablet by mouth daily  Qty: 90 tablet, Refills: 3      order for DME Equipment being ordered: Glucometer and test strips  Qty: 1 Device, Refills: 0    Associated Diagnoses: Well controlled type 2 diabetes mellitus (H)      metFORMIN (GLUCOPHAGE) 500 MG tablet TAKE 2 TABLETS TWICE A DAY WITH MEALS  Qty: 180 tablet, Refills: 1    Associated Diagnoses: Type 2 diabetes mellitus with hyperglycemia, with long-term current use of insulin (H)      atorvastatin (LIPITOR) 80 MG tablet Take 1 tablet (80 mg) by mouth daily  Qty: 90 tablet, Refills: 3    Associated Diagnoses: Hyperlipidemia LDL goal <100      !! blood glucose monitoring (ONE TOUCH DELICA) lancets Use to test blood sugar 3 times daily.  Qty: 360 each, Refills: 3    Associated Diagnoses: Type 2 diabetes mellitus with hyperglycemia, with long-term current use of insulin (H)      Glucose Blood (BLOOD GLUCOSE TEST STRIPS) STRP four times a day.  Qty: 360 each, Refills: 3    Comments: Verio strips  Associated Diagnoses: Type 2 diabetes mellitus with complication, without long-term current use of insulin (H)      !! ONETOUCH DELICA LANCETS 33G MISC four times a day.       !! - Potential duplicate medications found. Please discuss with  provider.[SR1.6]        Additional recommendations and follow up:[SR1.1]    General info for SNF   Length of Stay Estimate: Short Term Care: Estimated # of Days <30  Condition at Discharge: Improving  Level of care:skilled   Rehabilitation Potential: Excellent  Admission H&P remains valid and up-to-date: Yes  Recent Chemotherapy: N/A  Use Nursing Home Standing Orders: Yes     Mantoux instructions   Give two-step Mantoux (PPD) Per Facility Policy Yes     Reason for your hospital stay   You were hospitalized for stroke causing weakness on the left side of your face and your left arm and leg. 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 In order to reduce risk for future strokes it is important to take all your medications, quit smoking and work towards goal blood pressure < 140/90.     Follow Up and recommended labs and tests   Follow up with primary care provider in 1-2 weeks after discharge from rehab.    Follow-up with Stroke Clinic in 3-6 months.   You will be contacted to schedule a Stroke Clinic appointment. If you have not been contacted to schedule a stroke follow-up appointment within 7 days of discharge, please contact Stroke Neurology Clinic at [phone], pick option #1.   If you have other stroke related questions, please call [phone], pick option #3, and ask to speak to Tomas Remy RN Stroke/Endovascular Care Coordinator.  If you have any urgent stroke related questions after hours, please contact the hospital  at [phone] and ask to be connected to a stroke provider.     Follow-up with your neurosurgeon as scheduled for management of your aneurysm.     Activity - Up ad lonny   Requiring assistance for walking, can advance as appropriate.     Full Code     Physical Therapy Adult Consult   Evaluate and treat as clinically indicated.    Reason:  Stroke with left sided weakness     Occupational Therapy Adult Consult   Evaluate and treat as  clinically indicated.    Reason: Stroke with left sided weakness     Speech Language Path Adult Consult   Evaluate and treat as clinically indicated.    Reason:  Stroke with left sided weakness     Advance Diet as Tolerated   Follow this diet upon discharge: Orders Placed This Encounter     Low Consistent CHO Diet[SR1.6]       Patient was seen and discussed with the Attending, [SR1.1] Karsten.[SR1.2]     Leora Roque  Pager:[SR1.1] [phone][SR1.2]     Revision History        User Key Date/Time User Provider Type Action    > [N/A] 2/22/2018  8:06 AM Leora Roque MD Resident Sign     SR1.3 2/22/2018  8:05 AM Leora Roque MD Resident Sign     SR1.6 2/22/2018  8:04 AM Leora Roque MD Resident      SR1.4 2/22/2018  7:58 AM Leora Roque MD Resident      SR1.5 2/21/2018  5:31 PM Leora Roque MD Resident      SR1.2 2/21/2018  4:36 PM Leora Roque MD Resident      SR1.1 2/21/2018  2:55 PM Leora Roque MD Resident                      Consult Notes      Consults by Melissa Culver MD at 2/19/2018 11:43 AM     Author:  Melissa Culver MD Service:  Physical Medicine and Rehabilitation Author Type:  Physician    Filed:  2/20/2018  3:08 PM Date of Service:  2/19/2018 11:43 AM Creation Time:  2/19/2018 11:06 AM    Status:  Addendum :  Melissa Culver MD (Physician)         [facility]   PM&R CONSULT     Consulting Provider: Dr Gilliam  Reason for Consult: Assessment of rehabilitation   Location of Patient: 6211  Date of Encounter: 2/19/2018   Date of Admission: 2/18/2018           ASSESSMENT/PLAN:    Ms. Rosalie JANE Friend is a Right handed 72 year old yo female PMH[LM1.1] of DM[FI1.1]2, HTN, HLD, h/o smoking (stopped since admission) h/o right ICA and MCA strokes (5/2017) and right MCA aneurysm s/p coiling with recurrent stroke in right MCA/watershed area thought to be related to blood flow hemodynamics  or thromboembolic with resultant left sided weakness greater in upper extremities than lower extremities, impaired motor planning, delayed processing, fatigue, decreased endurance, impaired mobility, impaired ADL/IADLs.    Patient would benefit from acute inpatient rehabilitation requiring PT and OT for 90 minutes daily for 7 days a week.    Patient needs physical therapy due to decreased mobility, impaired transfers currently at  Mod assist of 1-2 with the goal of modified independence. Currently able to ambulate 25 ft with fww and mod assist of 2 with the expected goal of modified independence needing max assist for lower body dressing and impaired balance with the goal of modified indepdnece.    Estimated length of stays is 10-14 days.    Patient needs occupational therapy due to impaired ADL/IADLs       Patient to be staffed with attending, Dr. Culver.    Amos Cates D.O., PGY-3[LM1.1] 02/19/2018 11:43 AM[LM1.2]     Attending's note   Thank you for allowing us to participate in the care of this patient.   We were asked to see this patient by Dr Miguel on 6A to evaluate rehabilitation needs.   Patient has been seen, examined and evaluated by me independently. I reviewed today's vitals signs, lab values, imaging, medications, and current issues including medical, functional and social history significant to this admission.      I agree with the above note which reflects my direct input and interpretation of progress.     Primary issues/problems today are as follows[FI1.1]  Rosalie[FI1.2] is a right handed, previously independent female who presents with several deficits including left hemiparesis, UE greater then the LE, left facial droop, impaired balance, impulsive, post stroke fatigue secondary to right MCA stroke in the setting of HTN, DM, HL, previous strokes and smoking   She has impaired balance, impaired ability to mobilize and perform ADL's.   She has good support in her daughter who at at her  bedside and was counseled regarding recovery, timeline and function.     My care coordination and counseling activities include counseling patient and family regarding functional prognosis and possible support needed. They were also counseled on rehabilitation needs, and effort needed on patient's part as well as risk factors, post stroke fatigue and depression     Melissa Culver MD[FI1.1]            HPI:[LM1.1]    Patient is a[FI1.1] 72 f with hx of DMII, HTN, dlp, smoking, RT ICA occlusion + MCA strokes May 2017, aneurysm s/p colining. Presented with recurrent stroke symptoms over last 2 weeks. CTA: Rt ICA occlusion, Rt MCA stenosis. MRI multiple strokes at Rt MCA + watershed areas. She was diagnosed with recurrent Rt MCA/watershed strokes probably blood flow hemodynamics vs thrombo-embolic and Rt MCA aneurysm ~7mm.          PREVIOUS LEVEL OF FUNCTION   She was previoulsy indepependent for mobility, ADL/IADL's and swallowing/speech.         CURRENT FUNCTION   She has left upper extreity weakness an dmild dysmetria. She is mod assist x1 for bed mobility. She is able to transfer from bed to chair with mod assist of 1.      LIVING SITUATION/SUPPORT   Patient lived at home with main living areas all on one floor. There are two steps to enter at the front of the house and 4 steps to get up the back. She is having a relative build a ramp for her.    Her daughter lives a couples blocks from the patient's house. The patient would like to go to an ARU in [place].     SOCIAL HISTORY  Social History            Social History     Marital status:        Spouse name: N/A     Number of children: N/A     Years of education: N/A             Social History Main Topics     Smoking status: Light Tobacco Smoker       Types: Cigarettes       Last attempt to quit: 5/5/2017     Smokeless tobacco: Never Used         Comment: limits self to about 4-5 daily , she is quitting on her own     Alcohol use No     Drug use: No     Sexual  activity: Not on file           Other Topics Concern     Not on file      Social History Narrative     Past Medical History:   Past Medical History         Past Medical History:   Diagnosis Date     Cerebral infarction (H)       05/06/2017     Diabetes type 2, uncontrolled (H)       Hyperlipemia            Current Medications:      Current Facility-Administered Medications   Medication     medication instruction     sodium chloride 0.9% infusion     labetalol (NORMODYNE/TRANDATE) injection 10-20 mg     hydrALAZINE (APRESOLINE) injection 10-20 mg     glucose 40 % gel 15-30 g     Or     dextrose 50 % injection 25-50 mL     Or     glucagon injection 1 mg     insulin aspart (NovoLOG) inj (RAPID ACTING)     pantoprazole (PROTONIX) 40 mg IV push injection     aspirin tablet 325 mg     clopidogrel (PLAVIX) tablet 75 mg     atorvastatin (LIPITOR) tablet 80 mg            Review of Systems:  Total of ten systems reviewed, pertinent positives and negatives as follows  Instability with standing and walking.    Feels generally fatigued  Reports weakness in left upper extremity  Denies any tingling or numbness  No problems with bladder.   LBM reportedly 1 week ago (per patient).  No chest pain. No cough or SOB.  No visual changes.   No headache or photophobia.   No nausea, or abdominal pain.  Slept poorly last night  No joint pain, muscle pain or swelling.  Mood is good, denies any symptoms of depression.   Remainder of the review of the systems was negative.              Labs         Lab Results   Component Value Date     WBC 6.9 02/18/2018     HGB 13.5 02/18/2018     HCT 43.0 02/18/2018     MCV 85 02/18/2018      02/18/2018            Lab Results   Component Value Date      02/18/2018     POTASSIUM 4.0 02/18/2018     CHLORIDE 110 (H) 02/18/2018     CO2 21 02/18/2018      (H) 02/18/2018            Lab Results   Component Value Date     GFRESTIMATED 88 02/18/2018     GFRESTBLACK >90 02/18/2018            Lab  Results   Component Value Date     AST 10 02/17/2018     ALT 24 02/17/2018     ALKPHOS 64 02/17/2018     BILITOTAL 0.3 02/17/2018            Lab Results   Component Value Date     INR 1.04 02/17/2018            Lab Results   Component Value Date     BUN 12 02/18/2018     CR 0.66 02/18/2018            ON EXAMINATION:   Vitals           Vitals:     02/18/18 2009 02/18/18 2213 02/19/18 0024 02/19/18 0350   BP: 197/42 191/88 175/70 (!) 186/91   BP Location: Left arm Right arm Right arm Right arm   Pulse:           Resp: 16 16 16 16   Temp: 97.6  F (36.4  C) 97.9  F (36.6  C) 97.5  F (36.4  C) 97.7  F (36.5  C)   TempSrc: Oral Oral Oral Oral   SpO2: 95% 96% 96% 95%   Weight:                    Physical Exam:  Blood pressure (!) 186/91, pulse 70, temperature 97.7  F (36.5  C), temperature source Oral, resp. rate 16, weight 101.2 kg (223 lb 1.6 oz), SpO2 95 %, not currently breastfeeding.     GEN: NAD, seated/lying comfortably in bed  She/He is alert, appropriate, cooperative  Speech is linear, logical, intelligible.  Comprehension is intact to person, place and date  HEENT: NCAT  RESPIRATORY: CTAB, no wheezes/crackles/rales  CARDIAC: RRR, no m/g/r  MSK: full active and passive ROM at all major joints of the bilaterally upper and lower extremities  No muscle atrophy noted  ABD: soft, non tender, pos BS  NEURO:                        CRANIAL NERVES: Discs flat. Pupils equal, round and reactive to light.              Extraocular movements full. Visual fields full. Mild left facial weakness.                   Tongue midline. Hearing intact to finger rubbing.                         Sensation: decreased sensation on left side.                        Strength: 3/5 left shoulder abduction, elbow flexion, 4/5 left elbow extension, 3/5 left wrist extension, long finger flexion and finger abduction, LLE 4/5, 5/5 RUE/RLE. Decreased motor planning, unable to dress self, ambulate, transfer without assistance.                         Gait: normal reciprocal gait                        Cognition: fund of knowledge and train of thought appropriate  SKIN: no rashes or lesions noted.  Patient has IV line.   EXT: edema bilaterally, chronic stasis changes, no ulcers.   PSYCH: normal affect.  Mood is baseline without signs of depression        Thank you for the consult. Please call for any questions. Pager number [phone]      Melissa Culver         Total of 70 min spent in this encounter, more than 50% in counseling and coordination.    [LM1.1]       Revision History        User Key Date/Time User Provider Type Action    > [N/A] 2/20/2018  3:08 PM Melissa Culver MD Physician Addend     [N/A] 2/20/2018  9:22 AM Melissa Culver MD Physician Addend     FI1.2 2/20/2018  9:22 AM Melissa Culver MD Physician Sign     FI1.1 2/20/2018  9:14 AM Melissa Culver MD Physician      LM1.2 2/19/2018 11:43 AM Amos Cates MD Resident Share     LM1.1 2/19/2018 11:06 AM Amos Cates MD Resident                      Progress Notes - Physician (Notes from 02/19/18 through 02/22/18)      Progress Notes by Leora Roque MD at 2/21/2018  7:57 AM     Author:  Leora Roque MD Service:  Neuro ICU Author Type:  Resident    Filed:  2/21/2018  2:51 PM Date of Service:  2/21/2018  7:57 AM Creation Time:  2/21/2018  7:57 AM    Status:  Attested :  Leora Roque MD (Resident)    Cosigner:  Migel Shelley MD at 2/21/2018  6:15 PM        Attestation signed by Migel Shelley MD at 2/21/2018  6:15 PM        Attestation:  Physician Attestation   I, Migel Shelley, saw this patient with the resident and agree with the resident s findings and plan of care as documented in the resident s note.      I personally reviewed vital signs, medications, labs and imaging.    Key findings: stable exam. Planned discharge to ARU.     Migel Grantdine  Date of Service (when I saw the  patient): 18                               NEUROLOGY STROKE PROGRESS NOTE   2018  Rosalie Seals  : 1945 MRN# [id number]      Subjective :     Ms. Seals is a 72 y.o. Woman current smoker (quit 1 wk ago), DM type 2, HTN,  HLD, right ICA occlusion and R MCA strokes 2017, bifurcation ofleft anterior and middle cerebral artery aneurysm s/p coiling presented via OSH with recurrent strokes symptoms over past 2 weeks.       24 hrs:[SR1.1] No significant events overnight; overall she feels stronger in her left arm/hand. Blood pressure management improved with restarting of her home medications.[SR1.2]          Medications:[SR1.1]     Current Facility-Administered Medications:      hydrochlorothiazide (MICROZIDE) capsule 12.5 mg, 12.5 mg, Oral, Daily, Leora Roque MD, 12.5 mg at 18 0802     metoprolol tartrate (LOPRESSOR) tablet 50 mg, 50 mg, Oral, BID, Leora Roque MD, 50 mg at 18 0802     pantoprazole (PROTONIX) EC tablet 40 mg, 40 mg, Oral, Daily with breakfast, Jose Garcia MD, 40 mg at 18 0802     labetalol (NORMODYNE/TRANDATE) injection 10-20 mg, 10-20 mg, Intravenous, Q10 Min PRN, Leora Roque MD     hydrALAZINE (APRESOLINE) injection 10-20 mg, 10-20 mg, Intravenous, Q1H PRN, Leora Roque MD     sodium chloride bacteriostatic 0.9 % flush 12 mL, 12 mL, Intravenous, Once, Dwight Miguel MD     lisinopril (PRINIVIL/ZESTRIL) tablet 10 mg, 10 mg, Oral, Daily, Leora Roque MD, 10 mg at 18 0802     magnesium hydroxide (MILK OF MAGNESIA) suspension 30 mL, 30 mL, Oral, Daily PRN, Leora Roque MD, 30 mL at 18 1011     bisacodyl (DULCOLAX) Suppository 10 mg, 10 mg, Rectal, Daily PRN, Leora Roque MD     senna-docusate (SENOKOT-S;PERICOLACE) 8.6-50 MG per tablet 2 tablet, 2 tablet, Oral, At Bedtime, Leora Roque MD, 2 tablet at 18     insulin aspart (NovoLOG) inj (RAPID ACTING), 1-6 Units,  Subcutaneous, 4x Daily AC & HS, Pawan Berry MD     medication instruction, , Does not apply, Continuous PRN, Jose Garcia MD     glucose 40 % gel 15-30 g, 15-30 g, Oral, Q15 Min PRN **OR** dextrose 50 % injection 25-50 mL, 25-50 mL, Intravenous, Q15 Min PRN **OR** glucagon injection 1 mg, 1 mg, Subcutaneous, Q15 Min PRN, Jose Garcia MD     aspirin tablet 325 mg, 325 mg, Oral, Daily, Leora Roque MD, 325 mg at 02/21/18 0802     clopidogrel (PLAVIX) tablet 75 mg, 75 mg, Oral, Daily, Leora Roque MD, 75 mg at 02/21/18 0802     atorvastatin (LIPITOR) tablet 80 mg, 80 mg, Oral, Daily at 8 pm, Leora Roque MD, 80 mg at 02/20/18 1953     Prescriptions Prior to Admission   Medication Sig Dispense Refill Last Dose     Clopidogrel Bisulfate (PLAVIX PO) Take 75 mg by mouth daily   2/17/2018 at Unknown time     METOPROLOL TARTRATE PO Take 50 mg by mouth 2 times daily   2/17/2018 at Unknown time     lisinopril-hydrochlorothiazide (PRINZIDE/ZESTORETIC) 10-12.5 MG per tablet Take 1 tablet by mouth daily 90 tablet 3 2/17/2018 at Unknown time     order for DME Equipment being ordered: Glucometer and test strips 1 Device 0 2/17/2018 at Unknown time     metFORMIN (GLUCOPHAGE) 500 MG tablet TAKE 2 TABLETS TWICE A DAY WITH MEALS 180 tablet 1 2/17/2018 at Unknown time     aspirin 81 MG tablet Take 2 tablets (162 mg) by mouth daily 180 tablet 3 2/17/2018 at Unknown time     atorvastatin (LIPITOR) 80 MG tablet Take 1 tablet (80 mg) by mouth daily 90 tablet 3 2/17/2018 at Unknown time     blood glucose monitoring (ONE TOUCH DELICA) lancets Use to test blood sugar 3 times daily. 360 each 3 2/17/2018 at Unknown time     Glucose Blood (BLOOD GLUCOSE TEST STRIPS) STRP four times a day. 360 each 3 2/17/2018 at Unknown time     ONETOUCH DELICA LANCETS 33G MISC four times a day.   2/17/2018 at Unknown time[SR1.3]             Physical Exam:   /55 (BP Location: Right arm)  Pulse 84   Temp 98.3  F (36.8  C) (Oral)  Resp 15  Wt 101.2 kg (223 lb 1.6 oz)  SpO2 97%  BMI 37.13 kg/m2   Physical Exam:   General: N[SR1.1]o acute distress.[SR1.2]   HEENT:[SR1.1] Normocephalic atruamatic[SR1.2]   Resp: Breathing comfortably, no respiratory distress[SR1.1].[SR1.2]  Skin: warm and dry  Extremities: No edema    Mental status: Awake, alert, attentive,[SR1.1] and[SR1.2] oriented. Speech is fluent, comprehension and repetition intact. No dysarthria.  Cranial nerves: Eyes[SR1.1] mildly dis[SR1.2]conjugate[SR1.1] with history of left eye exotropia[SR1.2], PERRL, EOMI, visual fields full,[SR1.1] mild flattening left nasolabial fold,[SR1.2] facial sensation intact, shoulder shrug strong, palate rise symmetric, tongue/uvula midline, hearing intact to conversation.  Motor: Tone normal.[SR1.1] RUE weakness: right arm drift, biceps 4/5, triceps 4+/5, wrist flexor and extensor 4/5, finger  3/5 and fine finger movements 0/5. RLE, LUE, LLE 5/5[SR1.2]. No atrophy or twitches.   Sensory: Intact to[SR1.1] light touch.[SR1.2]   Coordination: FNF no dysmetria, HTS normal  Gait:[SR1.1] Not assessed.[SR1.2]          Data:   CBC:  Lab Results   Component Value Date    WBC 6.7 02/20/2018     Lab Results   Component Value Date    HGB 13.5 02/20/2018     Lab Results   Component Value Date    HCT 42.5 02/20/2018     Lab Results   Component Value Date     02/20/2018       Last Basic Metabolic Panel:  Lab Results   Component Value Date     02/20/2018      Lab Results   Component Value Date    POTASSIUM 4.1 02/20/2018     Lab Results   Component Value Date    CHLORIDE 113 02/20/2018     Lab Results   Component Value Date    VIOLA 8.8 02/20/2018     Lab Results   Component Value Date    CO2 21 02/20/2018     Lab Results   Component Value Date    BUN 11 02/20/2018     Lab Results   Component Value Date    CR 0.61 02/20/2018     Lab Results   Component Value Date     02/20/2018     ASSESSMENT/PLAN  MsSingh Bozena is a  72 y.o. Woman current smoker (quit 1 wk ago), DM type 2, HTN,  HLD, right ICA occlusion and R MCA strokes 2017, bifurcation ofleft anterior and middle cerebral artery aneurysm s/p coiling presented via OSH with recurrent strokes symptoms over past 2 weeks. MRI head with MCA territory infarction along with watershed with concern for perfusion dependent ischemia versus embolic phenomena.       # Recurrent strokes in right MCA/watershed territory   Concern for low flow versus thromboembolic etiology.   - CT[SR1.1] perfusion showing reasonable cerebral reserve, awaiting final report.[SR1.2]   -[SR1.1] Resumed home[SR1.2] lisinopril 10 mg daily, HCTZ 12.5 mg daily and metoprolol 50 mg BID[SR1.1] with improvement in BP sys mid 130s -150s.   - BP goal long term is normotension[SR1.2]  - Plavix load 300 completed   - Plavix 75 mg daily   - Continue  mg daily   - Continue atorvastatin 80 mg daily   - No repeat MRI brain due to severe claustrophobia   - TTE: EF 60-65%, no PFO, mild left atrial enlargement   - Stroke education   - Depression and apnea screen       # Right MCA aneurysm   Noted to be 3 mm right MCA bifurcation aneurysm in 6/2017; 6 mm in 10/2017 seen by provider Marisabel.    - Will follow with her outpatient neurosurgereon[SR1.1], Dr. Ramsey[SR1.2]       # Diabetes type II:   - Holding PTA metformin   - ISS, hypoglycemia protocol       # Hypertension   -[SR1.1] Continue[SR1.2] lisinopril-HCTZ 10-12.5 mg daily.   -[SR1.1] Continue[SR1.2] metoprolol 50 mg BID   - Labetalol/hydralazine for SBP > 200       # Smoker, recently quit 1 week ago.   Able to quit for a few months after[SR1.1] first[SR1.2] stroke.   - Smoking cessation consult       Patient discussed with Dr. Shelley, who agrees with the plan.      # FEN    - Diet: Consistent carbohydrate diet   # PPX: PPI, d'cd heparin DVT per patient request and agreement to sit in chair regularly.   # Code Status: Full   #Dispo: PT and OT recommend ARU. Family  would like to go to[SR1.1] close to home[SR1.2] rehab[SR1.1], dispo tomorrow.[SR1.2]      Leora Roque  PGY-1  P: [phone][SR1.1]     Revision History        User Key Date/Time User Provider Type Action    > SR1.3 2018  2:51 PM Leora Roque MD Resident Sign     SR1.2 2018  2:36 PM Leora Roque MD Resident      SR1.1 2018  7:57 AM Leora Roque MD Resident             Progress Notes by Leora Roque MD at 2018  2:40 PM     Author:  Leora Roque MD Service:  Neuro ICU Author Type:  Resident    Filed:  2018  2:50 PM Date of Service:  2018  2:40 PM Creation Time:  2018  2:40 PM    Status:  Attested :  Leora Roque MD (Resident)    Cosigner:  Migel Shelley MD at 2018  6:14 PM        Attestation signed by Migel Shelley MD at 2018  6:14 PM        Attestation:  Physician Attestation   I, Migel Shelley, saw this patient with the resident and agree with the resident s findings and plan of care as documented in the resident s note.      I personally reviewed vital signs, medications, labs and imaging.    Key findings: stable exam, home antihypertensives restarted.     Migel Shelley  Date of Service (when I saw the patient): 18                               NEUROLOGY STROKE PROGRESS NOTE   2018  Rosalie Seals  : 1945 MRN# [id number]      Subjective :        Ms. Seals is a 72 y.o. Woman current smoker (quit 1 wk ago), DM type 2, HTN,  HLD, right ICA occlusion and R MCA strokes 2017, bifurcation ofleft anterior and middle cerebral artery aneurysm s/p coiling presented via OSH with recurrent strokes symptoms over past 2 weeks.      24 hrs: No acute events. Improvement in BP with restarting lisinopril. Plan for CT perfusion with diamox today.           Medications:     Current Facility-Administered Medications:      sodium chloride 0.9 % bag 500mL for CT scan flush use,  160 mL, As instructed, Q1H PRN, Hao Cortez MD, 160 mL at 02/20/18 1132     hydrochlorothiazide (MICROZIDE) capsule 12.5 mg, 12.5 mg, Oral, Daily, Leora Roque MD     metoprolol tartrate (LOPRESSOR) tablet 50 mg, 50 mg, Oral, BID, Leora Roque MD     pantoprazole (PROTONIX) EC tablet 40 mg, 40 mg, Oral, Daily with breakfast, Jose Garcia MD, 40 mg at 02/20/18 0809     labetalol (NORMODYNE/TRANDATE) injection 10-20 mg, 10-20 mg, Intravenous, Q10 Min PRN, Leora Roque MD     hydrALAZINE (APRESOLINE) injection 10-20 mg, 10-20 mg, Intravenous, Q1H PRN, Leora Roque MD     sodium chloride bacteriostatic 0.9 % flush 12 mL, 12 mL, Intravenous, Once, Dwight Miguel MD     lisinopril (PRINIVIL/ZESTRIL) tablet 10 mg, 10 mg, Oral, Daily, Leora Roque MD, 10 mg at 02/20/18 0809     magnesium hydroxide (MILK OF MAGNESIA) suspension 30 mL, 30 mL, Oral, Daily PRN, Leora Roque MD, 30 mL at 02/20/18 1304     bisacodyl (DULCOLAX) Suppository 10 mg, 10 mg, Rectal, Daily PRN, Leora Roque MD     senna-docusate (SENOKOT-S;PERICOLACE) 8.6-50 MG per tablet 2 tablet, 2 tablet, Oral, At Bedtime, Leora Roque MD, 2 tablet at 02/19/18 2228     insulin aspart (NovoLOG) inj (RAPID ACTING), 1-6 Units, Subcutaneous, 4x Daily AC & HS, Pawan Berry MD     medication instruction, , Does not apply, Continuous PRN, Jose Garcia MD     glucose 40 % gel 15-30 g, 15-30 g, Oral, Q15 Min PRN **OR** dextrose 50 % injection 25-50 mL, 25-50 mL, Intravenous, Q15 Min PRN **OR** glucagon injection 1 mg, 1 mg, Subcutaneous, Q15 Min PRN, Jose Garcia MD     aspirin tablet 325 mg, 325 mg, Oral, Daily, Leora Roque MD, 325 mg at 02/20/18 0808     clopidogrel (PLAVIX) tablet 75 mg, 75 mg, Oral, Daily, Leora Roque MD, 75 mg at 02/20/18 0808     atorvastatin (LIPITOR) tablet 80 mg, 80 mg, Oral, Daily at 8 pm, Leora Roque,  MD, 80 mg at 02/19/18 2049     Prescriptions Prior to Admission   Medication Sig Dispense Refill Last Dose     Clopidogrel Bisulfate (PLAVIX PO) Take 75 mg by mouth daily   2/17/2018 at Unknown time     METOPROLOL TARTRATE PO Take 50 mg by mouth 2 times daily   2/17/2018 at Unknown time     lisinopril-hydrochlorothiazide (PRINZIDE/ZESTORETIC) 10-12.5 MG per tablet Take 1 tablet by mouth daily 90 tablet 3 2/17/2018 at Unknown time     order for DME Equipment being ordered: Glucometer and test strips 1 Device 0 2/17/2018 at Unknown time     metFORMIN (GLUCOPHAGE) 500 MG tablet TAKE 2 TABLETS TWICE A DAY WITH MEALS 180 tablet 1 2/17/2018 at Unknown time     aspirin 81 MG tablet Take 2 tablets (162 mg) by mouth daily 180 tablet 3 2/17/2018 at Unknown time     atorvastatin (LIPITOR) 80 MG tablet Take 1 tablet (80 mg) by mouth daily 90 tablet 3 2/17/2018 at Unknown time     blood glucose monitoring (ONE TOUCH DELICA) lancets Use to test blood sugar 3 times daily. 360 each 3 2/17/2018 at Unknown time     Glucose Blood (BLOOD GLUCOSE TEST STRIPS) STRP four times a day. 360 each 3 2/17/2018 at Unknown time     ONETOUCH DELICA LANCETS 33G MISC four times a day.   2/17/2018 at Unknown time             Physical Exam:   /85 (BP Location: Right arm)  Pulse 84  Temp 97.5  F (36.4  C) (Oral)  Resp 14  Wt 101.2 kg (223 lb 1.6 oz)  SpO2 95%  BMI 37.13 kg/m2   Physical Exam:   General: NAD  HEENT: head normocephalic   Resp: Breathing comfortably, no respiratory distress  CVS: RRR  Skin: warm and dry  Extremities: No edema    Mental status: Awake, alert, attentive, oriented. Speech is fluent, comprehension and repetition intact. No dysarthria.  Cranial nerves: Eyes disconjugate with left eye exotropia since childhood, PERRLA, EOMI, visual fields full, mild flattening left nasolabial fold, facial sensation intact, shoulder shrug strong, palate rise symmetric, tongue/uvula midline, hearing intact to conversation.  Motor: Tone  normal. RUE weakness: right arm drift, biceps 4/5, triceps 4+/5, wrist flexor/extensor 4+/5, finger  3/5, and fine finger movement 0/5. RLE, LUE, LLE 5/5.  No atrophy or twitches.   Sensory: Intact to light touch.   Coordination: FNF no dysmetria, HTS normal  Gait: Stance stable. Steps with right foot, but appears unstable with lagging left leg/foot. Stopped further assessment.          Data:   CBC:  Lab Results   Component Value Date    WBC 6.7 02/20/2018     Lab Results   Component Value Date    HGB 13.5 02/20/2018     Lab Results   Component Value Date    HCT 42.5 02/20/2018     Lab Results   Component Value Date     02/20/2018       Last Basic Metabolic Panel:  Lab Results   Component Value Date     02/20/2018      Lab Results   Component Value Date    POTASSIUM 4.1 02/20/2018     Lab Results   Component Value Date    CHLORIDE 113 02/20/2018     Lab Results   Component Value Date    VIOLA 8.8 02/20/2018     Lab Results   Component Value Date    CO2 21 02/20/2018     Lab Results   Component Value Date    BUN 11 02/20/2018     Lab Results   Component Value Date    CR 0.61 02/20/2018     Lab Results   Component Value Date     02/20/2018     TTE 2/19  Interpretation Summary  No cardiac source for embolus identified. The atrial septum is intact as  assessed by color Doppler and agitated saline bubble study. Mild left atrial enlargement. EF 60-65%     ASSESSMENT/PLAN  Ms. Friend is a 72 y.o. Woman current smoker (quit 1 wk ago), DM type 2, HTN,   HLD, right ICA occlusion and R MCA strokes 2017, bifurcation ofleft anterior and middle cerebral artery aneurysm s/p coiling presented via OSH with recurrent strokes symptoms over past 2 weeks. MRI head with MCA territory infarction along with watershed with concern for perfusion dependent ischemia versus embolic phenomena.       # Recurrent strokes in right MCA/watershed territory   Concern for low flow versus thromboembolic etiology.   - CT head  perfusion with diamox today   - Will resume hypertension medications: lisinopril 10 mg daily, HCTZ 12.5 mg daily and metoprolol 50 mg BID   - Plavix load 300 completed   - Plavix 75 mg daily   - Continue  mg daily   - Continue atorvastatin 80 mg daily   - No repeat MRI brain due to severe claustrophobia   - TTE: EF 60-65%, no PFO, mild left atrial enlargement   - Stroke education   - Depression and apnea screen      # Right MCA aneurysm   Noted to be 3 mm right MCA bifurcation aneurysm in 6/2017; 6 mm in 10/2017 seen by provider Marisabel.    - Will follow with her outpatient neurosurgereon      # Diabetes type II:   - Holding PTA metformin   - ISS, hypoglycemia protocol      # Hypertension   - Restart lisinopril-HCTZ 10-12.5 mg daily.   - Restart metoprolol 50 mg BID   - Labetalol/hydralazine for SBP > 200      # Smoker, recently quit 1 week ago.   Able to quit for a few months after initial stroke.   - Smoking cessation consult      Patient discussed with Dr. Shelley, who agrees with the plan.     # FEN    - Diet: Consistent carbohydrate diet   # PPX: PPI, d'cd heparin DVT per patient request and agreement to sit in chair regularly.   # Code Status: Full   #Dispo: PT and OT recommend ARU. Family would like to go to [facility] for rehab, but anticipate 2-3 more days.      Leora Roque  PGY-1  P: [phone][SR1.1]     Revision History        User Key Date/Time User Provider Type Action    > SR1.1 2/20/2018  2:50 PM Leora Roque MD Resident Sign            Progress Notes by Mary Jane Bryan BSW at 2/21/2018  3:59 PM     Author:  Mary Jane Bryan BSW Service:  (none) Author Type:      Filed:  2/21/2018  4:05 PM Date of Service:  2/21/2018  3:59 PM Creation Time:  2/21/2018  3:59 PM    Status:  Signed :  Mary Jane Bryan BSW ()         Social Work Services Discharge Note      Patient Name:  Rosalie Seals     Anticipated Discharge Date:  2/22/18    Discharge  Disposition:   [facility] ARU ([phone])    Following MD:  Facility Assignment     Pre-Admission Screening (PAS) online form has been completed.  The Level of Care (LOC) is:  Determined  Confirmation Code is:  Not required as pt is being admitted to ARU.  Patient/caregiver informed of referral to [facility] Line for Pre-Admission Screening for skilled nursing facility (SNF) placement and to expect a phone call post discharge from SNF.     Additional Services/Equipment Arranged:  Confirmed readiness for discharge (as the Medical Director/Neurologist at the receiving ARU will manage pt's BP) with Dr. Roque.  Confirmed acceptance to [facility] ARU with Admissions (Lizette).  Pt's daughter, Renetta has requested to provide pt's transport and per discussion with pt's floor nurse (Bobbi), pt can be transported by car.  Discharge is anticipated at 9am tomorrow.     Patient / Family response to discharge plan:  Pt and daughter voice understanding of the discharge plan and agreement with the discharge plan.     Persons notified of above discharge plan:  Pt, daughter, 6A nursing and Dr. Roque.    Staff Discharge Instructions:  Please fax discharge orders and signed hard scripts for any controlled substances (SW will complete this task).  Please print a packet and send with patient.     CTS Handoff completed:  YES    Medicare Notice of Rights provided to the patient/family:  YES    CARMELA Conti  Social Work, 6A  Phone:  [phone]  Pager:  [phone]  2/21/2018[TK1.1]           Revision History        User Key Date/Time User Provider Type Action    > TK1.1 2/21/2018  4:05 PM Mary Jane Bryan BSW  Sign            Progress Notes by Mary Jane Bryan BSW at 2/21/2018  3:39 PM     Author:  Mary Jane Bryan BSW Service:  (none) Author Type:      Filed:  2/21/2018  3:59 PM Date of Service:  2/21/2018  3:39 PM Creation Time:  2/21/2018  3:39  PM    Status:  Signed :  Mary Jane Bryan BSW ()         Social Work Services Progress Note    Hospital Day: 4  Date of Initial Social Work Evaluation:  2/19/18  Collaborated with:  Admissions at [facility] ARU (Lizette)[TK1.1], Dr Roque[TK1.2]    Data:[TK1.1]   ARU placement is anticipated upon discharge from acute hospitalization.[TK1.2]      Intervention:[TK1.1]  SW received a call from  Admissions (Lizette) at [facility] ARU indicating that pt had been assessed and approved for admit.  Lizette indicated that their Medical Director is a Neurologist and would be comfortable managing pt's blood pressure and thus, they would be agreeable to accepting pt for admit now.  KEISHA discussed this with Dr. Roque who indicates that pt can discharge tomorrow am based on the fact that the Medical Director at [facility] who is a neurologist is willing to manage pt's blood pressure needs.  Dr. Roque confirms that she can have discharge orders and summary completed by 9am tomorrow.  SW discussed this plan with pt and daughter and both voice satisfaction with plan for pt to discharge tomorrow to [facility].[TK1.2]    Assessment:[TK1.1]  Pt has been assessed and approved for admit to [facility].[TK1.2]    Plan:    Anticipated Disposition:[TK1.1]  ARU placement[TK1.2]    Barriers to d/c plan:[TK1.1]  None identified[TK1.2]    Follow Up:[TK1.1]  SW will coordinate discharge.    CARMELA Conti  Social Work, 6A  Phone:  [phone]  Pager:  [phone]  2/21/2018[TK1.2]           Revision History        User Key Date/Time User Provider Type Action    > TK1.2 2/21/2018  3:59 PM Mary Jane Bryan BSW  Sign     TK1.1 2/21/2018  3:39 PM Mary Jane Bryan BSW              Progress Notes by Mary Jane Bryan BSW at 2/21/2018 10:18 AM     Author:  Mary Jane Bryan BSW Service:  (none) Author Type:       Filed:  2/21/2018 10:39 AM Date of Service:  2/21/2018 10:18 AM Creation Time:  2/21/2018 10:18 AM    Status:  Signed :  Mary Jane Bryan BSW ()         Social Work Services Progress Note[TK1.1]    Hospital Day: 4[TK1.2]  Date of Initial Social Work Evaluation:  2/19/18  Collaborated with:  Dr. Roque, Admissions at [facility] (Rosa Bangura [phone]) and [facility]  (Lizette [phone]), pt, daughter (Renetta)    Data:  Acute Rehab placement is recommended upon discharge from acute hospitalization.    Intervention:  KESIHA spoke with Dr. Roque who continues to anticipate discharge at the end of the week (Friday or Saturday is likely).  KEISHA phoned Admissions at [facility]  (Lizette [phone]).  Lizette indicates that her MD has reviewed faxed assessment materials and she is awaiting a decision from him regarding acceptance.  KEISHA returned a call to Admissions at [facility] (Rosa Bangura [phone])[TK1.1].  Per Rosa, pt is still being assessed.  Rosa has requested that KEISHA fax updated notes on 2/22/18.  Rosa states that they will want to know the results of the CT scan  for a diamox injection completed on 2/20/18.  KEISHA met with pt and daughter and updated.  Pt is having a difficult time being so far away from home.  Pt is anxious to leave the [facility] area.  Pt is having difficulty understanding why continued hospitalization is needed.  SW spoke with pt about the need to remain hospitalized while MD's address her blood pressure. KEISHA spoke with pt about the importance of acute rehab placement following a stroke.[TK1.3]      Assessment:[TK1.1]  Pt is reluctantly agreeable to continued hospitalization and acute rehab placement.  The 6A Charge Nurse will attempt to secure a private room for pt today.  Daughter Renetta is staying with pt.  Daughter Renetta is supportive and supports ARU placement.[TK1.3]    Plan:    Anticipated  Disposition:[TK1.1]  Acute rehab placement[TK1.3]    Barriers to d/c plan:[TK1.1]  Medical readiness[TK1.3]    Follow Up:[TK1.1]  SW will continue to follow for discharge planning.    CARMELA Conti  Social Work, 6A  Phone:  [phone]  Pager:  [phone]  2/21/2018[TK1.3]           Revision History        User Key Date/Time User Provider Type Action    > TK1.3 2/21/2018 10:39 AM Mary Jane Bryan BSW  Sign     TK1.2 2/21/2018 10:21 AM Mary Jane Bryan BSW       TK1.1 2/21/2018 10:18 AM Mary Jane Bryan BSW              Progress Notes by Nikolas Rodriguez RN at 2/20/2018 11:38 AM     Author:  Michael, Nikolas, RN Service:  Interventional Radiology Author Type:  Registered Nurse    Filed:  2/20/2018 11:53 AM Date of Service:  2/20/2018 11:38 AM Creation Time:  2/20/2018 11:38 AM    Status:  Signed :  Nikolas Rodriguez RN (Registered Nurse)         Patient Name: Rosalie Seals  Medical Record Number: [id number]  Today's Date: 2/20/2018    Patient arrives to CT scan unit for a diamox injection at 1115.  Upon assessment by RT kentrell Salamanca, patients peripheral IV line was non-functional[SD1.1], right forearm PIC dc'd[SD1.2].  Call placed to IV team who arrived shortly and placed a PIV in patients left arm.  Patient BP systolic 180's pre procedure with current PRN medication orders to treat systolic blood pressure if it is over 200.    Diamox injection at 7275-7305.  Patient blood pressure steady at systolics 180's.  Patient reports no symptoms or changes since diamox injection.[SD1.1]  CT scan completed 10 minutes after injection at 1145.    Patient transported back to nursing unit via wheelchair post scan.[SD1.3]       Revision History        User Key Date/Time User Provider Type Action    > SD1.2 2/20/2018 11:53 AM Nikolas Rodriguez RN Registered Nurse Sign     SD1.3 2/20/2018 11:47 AM Nikolas Rodriguez, RN Registered Nurse      SD1.1 2/20/2018 11:38 AM Nikolas Rodriguez, RN  Registered Nurse             Progress Notes by Mary Jane Bryan BSW at 2/20/2018 10:37 AM     Author:  Mary Jane Bryan BSW Service:  (none) Author Type:      Filed:  2/20/2018 10:41 AM Date of Service:  2/20/2018 10:37 AM Creation Time:  2/20/2018 10:37 AM    Status:  Signed :  Mary Jane Bryan BSW ()         Social Work Services Progress Note[TK1.1]    Hospital Day: 3[TK1.2]  Date of Initial Social Work Evaluation:  2/19/18  Collaborated with:  ARU Admissions Coordinator's    Data:  Acute rehab placement is anticipated upon discharge from acute hospitalization.    Intervention:  SW phoned Admissions (Rosa [phone]) at [facility] and confirmed receipt of 2/19/18 referral.  SW received a return call from Admissions at [facility] (Amrita [phone]) and they anticipate openings the end of the week.  Amrita indicates that they will only consider a Friday admit if the patient leaves early in the am.  Amrita indicates that they only consider Saturday admit's if they have a MD who is willing to come in on the weekend.  SW referred pt and faxed assessment materials.    Assessment:  Pt is agreeable to acute rehab placement.    Plan:    Anticipated Disposition: Acute rehab placement    Barriers to d/c plan:  Medical readiness    Follow Up:  SW will continue to follow for discharge planning.    CARMELA Conti  Social Work, 6A  Phone:  [phone]  Pager:  [phone]  2/20/2018[TK1.1]           Revision History        User Key Date/Time User Provider Type Action    > TK1.2 2/20/2018 10:41 AM Mary Jane Bryan BSW  Sign     TK1.1 2/20/2018 10:37 AM Mary Jane Bryan BSW              Progress Notes by Addie Smith, RN at 2/19/2018  4:24 PM     Author:  Addie Smith, RN Service:  Care Coordinator Author Type:  Care Coordinator    Filed:  2/19/2018  4:46 PM Date of Service:  2/19/2018  4:24 PM Creation Time:  2/19/2018  4:24 PM     Status:  Addendum :  Addie Smith RN (Care Coordinator)           Care Coordinator Progress Note     Admission Date/Time:  2/18/2018  Attending MD:  Dwight Miguel*     Data[SJ1.1]  Received Care Coordinator consult for stroke--discharge planning.[SJ1.2] Chart reviewed, discussed with interdisciplinary team. In 6A Discharge Rounds Dr Roque reported plan is a perfusion scan; pt may be ready for discharge in 1-2 days. PT & OT recommending acute rehab.     Patient was admitted for:  Recurrent right MCA/watershed strokes; right MCA aneurysm.  Pt transferred from [place] [facility] to [facility] for further evaluation and treatment. Pt was seen in the [place] ER on 2-14 & 2-15. On 2-15 it was recommended she be admitted to [facility] in [place]; pt became frustrated waiting for a bed & left the ER AMA.      Past history includes:  Diabetes type II; HTN; right ICA occlusion; strokes May, 2017; aneurysm s/p coiling; hyperlipidemia.    Coordination of Care and Referrals  Chart reviewed; discussed in 6A Discharge Rounds with interdisciplinary team.         Assessment  Pt s/p stroke; inpatient rehab stay is recommended.      Plan  SW for ARU placement.         Olga Smith RN Care Coordinator  Unit 6A, [facility][SJ1.1]               Revision History        User Key Date/Time User Provider Type Action    > [N/A] 2/19/2018  4:46 PM Addie Smith RN Care Coordinator Addend     SJ1.2 2/19/2018  4:46 PM Addie Smith RN Care Coordinator Sign     SJ1.1 2/19/2018  4:24 PM Addie Smith, RN Care Coordinator             Progress Notes by Leora Roque MD at 2/19/2018 10:32 AM     Author:  Leora Roque MD Service:  Neuro ICU Author Type:  Resident    Filed:  2/19/2018  3:51 PM Date of Service:  2/19/2018 10:32 AM Creation Time:  2/19/2018 10:32 AM    Status:  Attested :  Leora Roque MD (Resident)    Cosigner:  Migel Shelley MD at 2/19/2018  4:18 PM         Attestation signed by Migel Shelley MD at 2018  4:18 PM        Attestation:  Physician Attestation   I, Migel Shelley, saw this patient with the resident and agree with the resident s findings and plan of care as documented in the resident s note.      I personally reviewed vital signs, medications, labs and imaging.    Key findings: Stable arm strength, slowly improving. CT perfusion pending.     Migel Shelley  Date of Service (when I saw the patient): 18                               NEUROLOGY STROKE PROGRESS NOTE   2018  Rosalie Seals  : 1945 MRN# [id number]      Subjective :     Ms. Seals is a 72 y.o. Woman current smoker (quit 1 wk ago), DM type 2, HTN,  HLD, right ICA occlusion and R MCA strokes 2017, bifurcation ofleft anterior and middle cerebral artery aneurysm s/p coiling presented via OSH with recurrent strokes symptoms over past 2 weeks.     24 hrs: No acute events. Improved strength in left arm and less apparent left facial droop. Plan for CT perfusion with diamox[SR1.1] delayed till tomorrow.[SR1.2]          Medications:     Current Facility-Administered Medications:      pantoprazole (PROTONIX) EC tablet 40 mg, 40 mg, Oral, Daily with breakfast, Jose Garcia MD, 40 mg at 18 0757     labetalol (NORMODYNE/TRANDATE) injection 10-20 mg, 10-20 mg, Intravenous, Q10 Min PRN, Leora Roque MD     hydrALAZINE (APRESOLINE) injection 10-20 mg, 10-20 mg, Intravenous, Q1H PRN, Leora Roque MD     medication instruction, , Does not apply, Continuous PRN, Jose Garcia MD     sodium chloride 0.9% infusion, , Intravenous, Continuous, Jose Garcia MD, Last Rate: 100 mL/hr at 18 0758     glucose 40 % gel 15-30 g, 15-30 g, Oral, Q15 Min PRN **OR** dextrose 50 % injection 25-50 mL, 25-50 mL, Intravenous, Q15 Min PRN **OR** glucagon injection 1 mg, 1 mg, Subcutaneous, Q15 Min PRN, Radha  Jose Salas MD     insulin aspart (NovoLOG) inj (RAPID ACTING), 1-6 Units, Subcutaneous, Q4H, Jose Garcia MD     aspirin tablet 325 mg, 325 mg, Oral, Daily, Leora Roque MD, 325 mg at 02/19/18 0757     clopidogrel (PLAVIX) tablet 75 mg, 75 mg, Oral, Daily, Leora Roque MD, 75 mg at 02/19/18 0757     atorvastatin (LIPITOR) tablet 80 mg, 80 mg, Oral, Daily at 8 pm, Leora Roque MD, 80 mg at 02/18/18 2008     Prescriptions Prior to Admission   Medication Sig Dispense Refill Last Dose     Clopidogrel Bisulfate (PLAVIX PO) Take 75 mg by mouth daily   2/17/2018 at Unknown time     METOPROLOL TARTRATE PO Take 50 mg by mouth 2 times daily   2/17/2018 at Unknown time     lisinopril-hydrochlorothiazide (PRINZIDE/ZESTORETIC) 10-12.5 MG per tablet Take 1 tablet by mouth daily 90 tablet 3 2/17/2018 at Unknown time     order for DME Equipment being ordered: Glucometer and test strips 1 Device 0 2/17/2018 at Unknown time     metFORMIN (GLUCOPHAGE) 500 MG tablet TAKE 2 TABLETS TWICE A DAY WITH MEALS 180 tablet 1 2/17/2018 at Unknown time     aspirin 81 MG tablet Take 2 tablets (162 mg) by mouth daily 180 tablet 3 2/17/2018 at Unknown time     atorvastatin (LIPITOR) 80 MG tablet Take 1 tablet (80 mg) by mouth daily 90 tablet 3 2/17/2018 at Unknown time     blood glucose monitoring (ONE TOUCH DELICA) lancets Use to test blood sugar 3 times daily. 360 each 3 2/17/2018 at Unknown time     Glucose Blood (BLOOD GLUCOSE TEST STRIPS) STRP four times a day. 360 each 3 2/17/2018 at Unknown time     ONETOUCH DELICA LANCETS 33G MISC four times a day.   2/17/2018 at Unknown time             Physical Exam:   BP (!) 204/82  Pulse 70  Temp 97.6  F (36.4  C) (Oral)  Resp 16  Wt 101.2 kg (223 lb 1.6 oz)  SpO2 95%  BMI 37.13 kg/m2   Physical Exam:   General: NAD  HEENT: Normocephalic atraumatic   Resp: Breathing comfortably, no respiratory distress  CVS: RRR  Skin: warm and dry  Extremities: No  edema  Psych: Pleasant, cooperative.     Mental status: Awake, alert, attentive, oriented. Speech is fluent, comprehension and repetition intact. No dysarthria.  Cranial nerves: Eyes disconjugate with left eye exotropia since childhood, PERRLA, EOMI, visual fields full, mild flattening left nasolabial fold, facial sensation intact, shoulder shrug strong, palate rise symmetric, tongue/uvula midline, hearing intact to conversation.  Motor: Tone normal. RUE weakness: right arm drift, biceps 4/5, triceps 4+/5, wrist flexor/extensor 4+/5, finger  3/5, and fine finger movement[SR1.1] 0/5[SR1.2]. RLE, LUE, LLE 5/5.  No atrophy or twitches.   Sensory: Intact to light touch.   Coordination: FNF no dysmetria, HTS normal  Gait: Deferred.          Data:   CBC:  Lab Results   Component Value Date    WBC 6.9 02/18/2018     Lab Results   Component Value Date    HGB 13.5 02/18/2018     Lab Results   Component Value Date    HCT 43.0 02/18/2018     Lab Results   Component Value Date     02/18/2018       Last Basic Metabolic Panel:  Lab Results   Component Value Date     02/18/2018      Lab Results   Component Value Date    POTASSIUM 4.0 02/18/2018     Lab Results   Component Value Date    CHLORIDE 110 02/18/2018     Lab Results   Component Value Date    VIOLA 8.8 02/18/2018     Lab Results   Component Value Date    CO2 21 02/18/2018     Lab Results   Component Value Date    BUN 12 02/18/2018     Lab Results   Component Value Date    CR 0.66 02/18/2018     Lab Results   Component Value Date     02/18/2018     Head CT head 2/17/18   IMPRESSION:   1. No acute findings.  2. Left MCA aneurysm clip.  3. Areas of old infarction in the periventricular white matter of the  right frontoparietal region and right occipital lobe.    Head CTA head 2/17/18   IMPRESSION:   1. Occluded right internal carotid artery just distal to the carotid  bulb.  2. Aneurysm coils at the junction of the left anterior and middle  cerebral  arteries.  3. Diminutive right vertebral artery.  4. Mild atheromatous calcification without significant stenosis of the  left internal carotid artery in the neck and carotid siphon.    MRI: reviewed   Outside MRI shows multiple strokes right MCA and watershed areas.     ASSESSMENT/PLAN  Ms. Seals is a 72 y.o. Woman current smoker (quit 1 wk ago), DM type 2, HTN,   HLD, right ICA occlusion and R MCA strokes 2017, bifurcation ofleft anterior and middle cerebral artery aneurysm s/p coiling presented via OSH with recurrent strokes symptoms over past 2 weeks. MRI head with MCA territory infarction along with watershed with concern for perfusion dependent ischemia.      # Recurrent strokes in right MCA/watershed territory   Concern for low flow versus thromboembolic etiology.   - CT head perfusion with diamox.   -[SR1.1] Will resume hypertension medications slow: lisinopril 10 mg daily today[SR1.2]   - Plavix load 300 yesterday  - Plavix 75 mg daily   - Continue  mg daily   - Continue atorvastatin 80 mg daily   - No repeat MRI brain due to severe claustrophobia   - TTE pending   - Stroke education   - Depression and apnea screen     # Right MCA aneurysm[SR1.1]   Noted to be 3 mm right MCA bifurcation aneurysm in 6/2017; 6 mm in 10/2017 seen by provider Marisabel.    -[SR1.3] Will follow with her outpatient neurosurgereon[SR1.2]     # Diabetes type II:   - Holding PTA metformin   - ISS, hypoglycemia protocol     # Hypertension   - Continue to hold PTA metoprolol and HCTZ[SR1.3] in setting of concern for pressure dependent perfusion.   - Restarting lisinopril 10 mg daily[SR1.2]   - Labetalol/hydralazine for SBP > 200[SR1.3]     # Smoker, recently quit 1 week ago.   Able to quit for a few months after initial stroke.   - Smoking cessation consult[SR1.4]     Patient discussed with Dr. Shelley, who agrees with the plan.    # FEN    - Diet:[SR1.1] Consistent carbohydrate diet[SR1.3]   # PPX:[SR1.1] PPI, heparin  "DVT[SR1.3]   # Code Status:[SR1.1] Full[SR1.3]   #Dispo:[SR1.1] PT and OT recommend ARU. Family would like to go to [place] for rehab, but[SR1.3] 3-5 days to slowly restart antihypertensives and monitor for stroke symptoms.[SR1.2]     Leora Roque  PGY-1  P: [phone][SR1.1]       Revision History        User Key Date/Time User Provider Type Action    > SR1.2 2018  3:51 PM Leora Roque MD Resident Sign     SR1.4 2018 12:02 PM Leora Roque MD Resident      SR1.3 2018 11:50 AM Leora Roque MD Resident      SR1.1 2018 10:32 AM Leora Roque MD Resident             Progress Notes by Mary Jane Bryan BSW at 2018  2:50 PM     Author:  Mary Jane Bryan BSW Service:  (none) Author Type:      Filed:  2018  3:10 PM Date of Service:  2018  2:50 PM Creation Time:  2018  2:50 PM    Status:  Signed :  Mary Jane Bryan BSW ()         Social Work: Assessment with Discharge Plan    Patient Name:  Rosalie JANE Friend  :  1945  Age:  72 year old  MRN:  [id number]  Risk/Complexity Score:   4  Completed assessment with:  Pt and daughter (Renetta)    Presenting Information   Reason for Referral:  Assessment and Discharge Planning  Date of Intake:  2018  Referral Source:  Case Finding  Decision Maker:  Patient  Alternate Decision Maker:  Daughter (Renetta)  Health Care Directive:  Provided education  Living Situation:  Pt lives in a rambler style home.    Pt's brother (Jeff) lives with pt.  There are 4 steps to enter the home from the rear entrance and 3 from the front (with handrails).  Pt states that a ramp is being built onto her home this next weekend.  Pt's bed and bath (step in shower) are on the main floor. Pt states that a walk in tub will be installed in her home in the \"very\" near future.  Laundry is on the main floor.  Previous Functional Status:  Prior to hospitalization, pt was indep with all mobility (no " "AD, 2 falls in the past year), adl's, IADL's, driving, medication administration and with financial mgnt..  Pt has a cane, heightened toilet, and a hh shower nozzle.  Pt has a handicapped parking certificate.  Pt was not utilizing add'l community resources.    Patient and family understanding of hospitalization:  CVA  Cultural/Language/Spiritual Considerations:  None per pt[TK1.1].  Pt states that she is part \"[place]\" [place]que.[TK1.2]  Adjustment to Illness:  Appropriate for situation    Physical Health  Reason for Admission:  No diagnosis found.  Services Needed/Recommended:  ARU    Mental Health/Chemical Dependency  Diagnosis:[TK1.1]  None[TK1.2]  Support/Services in Place:[TK1.1]  Not applicable[TK1.2]  Services Needed/Recommended:[TK1.1]  Not applicable[TK1.2]    Support System  Significant relationship at present time:[TK1.1]  Pt has just 1 child, her adult daughter Renetta who lives in [place] (6 blocks from pt).[TK1.2]  Family of origin is available for support:[TK1.1]  Daughter, brother (Jeff), 4 adult grandchildren and 9 adult great grandchildren all of who reside in [place] and assist as needed[TK1.2]  Other support available:[TK1.1]  Friends and Family[TK1.2]  Gaps in support system:[TK1.1]  None identified[TK1.2]  Patient is caregiver to:[TK1.1]  None[TK1.2]     Provider Information   Primary Care Physician:  Yash Cuellar   [phone]   Clinic:  [facility] [address] [facility] [address]      :[TK1.1]  None[TK1.2]    Financial   Income Source:[TK1.1]  Social Security and Pension (pt worked in the [employer])[TK1.2]  Financial Concerns:[TK1.1]  None indicated[TK1.2]  Insurance:    Payor/Plan Subscriber Name Rel Member # Group #   MEDICARE - MEDICARE NADIA SMYTH  [id number]       ATTN CLAIMS, [address]   BCBS - BCBS OUT OF * LIBRANADIA JANE  [id number]       [address] [id number]       Discharge Plan   Patient and family discharge goal:[TK1.1]  Rehab " placement near to home followed by return to home[TK1.2]  Provided education on discharge plan:[TK1.1]  YES[TK1.2]  Patient agreeable to discharge plan:[TK1.1]  YES[TK1.2]  A list of Medicare Certified Facilities was provided to the patient and/or family to encourage patient choice. Patient's choices for facility are:[TK1.1]  Therapy's are recommending acute rehab placement.  Pt's preferences (in order of preference) are:  [facility] ARU, [facility] ARU and [facility] ARU.[TK1.2]  Will NH provide Skilled rehabilitation or complex medical:[TK1.1]  YES[TK1.2]  General information regarding anticipated insurance coverage and possible out of pocket cost was discussed. Patient and patient's family are aware patient may incur the cost of transportation to the facility, pending insurance payment:[TK1.1] YES[TK1.2]  Barriers to discharge:[TK1.1]  Per Dr. Roque, discharge is not anticipated until the end of the week as MD's are working on bringing down pt's BP.[TK1.2]    Discharge Recommendations   Anticipated Disposition:[TK1.1]  ARU Placement[TK1.2]  Transportation Needs:[TK1.1]  Family has requested to provide transport.[TK1.2]        Additional comments[TK1.1]   KEISHA phoned Admissions at the following ARU's:    1.  [facility] and [facility] ([phone]) - the acute rehab unit closed roughly two years ago.    2.   [facility] ([phone]), left a message for Amrita CABRERA To call    3.  [facility] ([phone]), spoke with Rosa in Admissions.  Rosa anticipates openings and will accept Friday and Saturday admits from the [facility].  Referred and faxed (fax [phone]).      KEISHA will follow for discharge planning.    CARMELA Conti  Social Work, 6A  Phone:  [phone]  Pager:  [phone]  2/19/2018[TK1.2]                   Revision History        User Key Date/Time User Provider Type Action    > TK1.2 2/19/2018  3:10 PM Irvin  MAN Castillo  Sign     TK1.1 2/19/2018  2:50 PM Mary Jane Bryan BSW                    Procedure Notes     No notes of this type exist for this encounter.      Progress Notes - Therapies (Notes from 02/19/18 through 02/22/18)     No notes of this type exist for this encounter.

## 2018-02-18 NOTE — IP AVS SNAPSHOT
"    UNIT [address] [facility] BANK: [phone]                                              INTERAGENCY TRANSFER FORM - LAB / IMAGING / EKG / EMG RESULTS   2018                    Hospital Admission Date: 2018  NADIA SMYTH   : 1945  Sex: Female        Attending Provider: Dwight Miguel MD     Allergies:  Penicillins, Phenylephrine, Tropicamide    Infection:  None   Service:  NEUROLOGY    Ht:  1.651 m (5' 5\")   Wt:  101.2 kg (223 lb 1.6 oz)   Admission Wt:  101.2 kg (223 lb 1.6 oz)    BMI:  37.13 kg/m 2   BSA:  2.15 m 2            Patient PCP Information     Provider PCP Type    Yash Cuellar,  General         Lab Results - 3 Days      Glucose by meter [[id number]] (Abnormal)  Resulted: 18 0800, Result status: Final result    Ordering provider: Dwight Miguel MD  18 0708 Resulting lab: POINT OF CARE TEST, GLUCOSE    Specimen Information    Type Source Collected On     18 0708          Components       Value Reference Range Flag Lab   Glucose 118 70 - 99 mg/dL H 170   Comment:  Dr/RN Notified            Glucose by meter [[id number]] (Abnormal)  Resulted: 18 0412, Result status: Final result    Ordering provider: Dwight Miguel MD  18 Resulting lab: POINT OF CARE TEST, GLUCOSE    Specimen Information    Type Source Collected On     18          Components       Value Reference Range Flag Lab   Glucose 107 70 - 99 mg/dL H 170   Comment:  Dr/RN Notified            Glucose by meter [[id number]] (Abnormal)  Resulted: 18 1753, Result status: Final result    Ordering provider: Dwight Miguel MD  18 1740 Resulting lab: POINT OF CARE TEST, GLUCOSE    Specimen Information    Type Source Collected On     18 174          Components       Value Reference Range Flag Lab   Glucose 215 70 - 99 mg/dL H 170            Glucose by meter [[id number]] (Abnormal)  Resulted: 18 1219, Result status: " Final result    Ordering provider: Dwight Miguel MD  02/21/18 1208 Resulting lab: POINT OF CARE TEST, GLUCOSE    Specimen Information    Type Source Collected On     02/21/18 1208          Components       Value Reference Range Flag Lab   Glucose 119 70 - 99 mg/dL H 170            Glucose by meter [[id number]] (Abnormal)  Resulted: 02/21/18 0843, Result status: Final result    Ordering provider: Dwight Miguel MD  02/21/18 0729 Resulting lab: POINT OF CARE TEST, GLUCOSE    Specimen Information    Type Source Collected On     02/21/18 0729          Components       Value Reference Range Flag Lab   Glucose 113 70 - 99 mg/dL H 170   Comment:  /RN Notified            Glucose by meter [[id number]] (Abnormal)  Resulted: 02/20/18 2331, Result status: Final result    Ordering provider: Dwight Miguel MD  02/20/18 2204 Resulting lab: POINT OF CARE TEST, GLUCOSE    Specimen Information    Type Source Collected On     02/20/18 2204          Components       Value Reference Range Flag Lab   Glucose 112 70 - 99 mg/dL H 170            Glucose by meter [[id number]] (Abnormal)  Resulted: 02/20/18 1717, Result status: Final result    Ordering provider: Dwight Miguel MD  02/20/18 1659 Resulting lab: POINT OF CARE TEST, GLUCOSE    Specimen Information    Type Source Collected On     02/20/18 1659          Components       Value Reference Range Flag Lab   Glucose 118 70 - 99 mg/dL H 170            Glucose by meter [[id number]] (Abnormal)  Resulted: 02/20/18 1626, Result status: Final result    Ordering provider: Dwight Miguel MD  02/20/18 1211 Resulting lab: POINT OF CARE TEST, GLUCOSE    Specimen Information    Type Source Collected On     02/20/18 1211          Components       Value Reference Range Flag Lab   Glucose 102 70 - 99 mg/dL H 170            Glucose by meter [[id number]] (Abnormal)  Resulted: 02/20/18 0820, Result status: Final result    Ordering provider:  Dwight Miguel MD  02/20/18 0812 Resulting lab: POINT OF CARE TEST, GLUCOSE    Specimen Information    Type Source Collected On     02/20/18 0812          Components       Value Reference Range Flag Lab   Glucose 112 70 - 99 mg/dL H 170            Basic metabolic panel [[id number]] (Abnormal)  Resulted: 02/20/18 0811, Result status: Final result    Ordering provider: Leora Roque MD  02/20/18 0001 Resulting lab: [facility]    Specimen Information    Type Source Collected On   Blood  02/20/18 0644          Components       Value Reference Range Flag Lab   Sodium 142 133 - 144 mmol/L  51   Potassium 4.1 3.4 - 5.3 mmol/L  51   Chloride 113 94 - 109 mmol/L H 51   Carbon Dioxide 21 20 - 32 mmol/L  51   Anion Gap 8 3 - 14 mmol/L  51   Glucose 116 70 - 99 mg/dL H 51   Urea Nitrogen 11 7 - 30 mg/dL  51   Creatinine 0.61 0.52 - 1.04 mg/dL  51   GFR Estimate >90 >60 mL/min/1.7m2  51   Comment:  Non  GFR Calc   GFR Estimate If Black >90 >60 mL/min/1.7m2  51   Comment:  African American GFR Calc   Calcium 8.8 8.5 - 10.1 mg/dL  51            CBC with platelets [[id number]]  Resulted: 02/20/18 0737, Result status: Final result    Ordering provider: Leora Roque MD  02/20/18 0001 Resulting lab: [facility]    Specimen Information    Type Source Collected On   Blood  02/20/18 0644          Components       Value Reference Range Flag Lab   WBC 6.7 4.0 - 11.0 10e9/L  51   RBC Count 5.03 3.8 - 5.2 10e12/L  51   Hemoglobin 13.5 11.7 - 15.7 g/dL  51   Hematocrit 42.5 35.0 - 47.0 %  51   MCV 85 78 - 100 fl  51   MCH 26.8 26.5 - 33.0 pg  51   MCHC 31.8 31.5 - 36.5 g/dL  51   RDW 14.3 10.0 - 15.0 %  51   Platelet Count 251 150 - 450 10e9/L  51            Glucose by meter [[id number]] (Abnormal)  Resulted: 02/19/18 2221, Result status: Final result    Ordering provider: Dwight Miguel MD  02/19/18 3204 Resulting lab: POINT OF  CARE TEST, GLUCOSE    Specimen Information    Type Source Collected On     02/19/18 2206          Components       Value Reference Range Flag Lab   Glucose 132 70 - 99 mg/dL H 170            Glucose by meter [[id number]]  Resulted: 02/19/18 1510, Result status: Final result    Ordering provider: Dwight Miguel MD  02/19/18 1500 Resulting lab: POINT OF CARE TEST, GLUCOSE    Specimen Information    Type Source Collected On     02/19/18 1500          Components       Value Reference Range Flag Lab   Glucose 92 70 - 99 mg/dL  170            Glucose by meter [[id number]] (Abnormal)  Resulted: 02/19/18 1200, Result status: Final result    Ordering provider: Dwight Miguel MD  02/19/18 1154 Resulting lab: POINT OF CARE TEST, GLUCOSE    Specimen Information    Type Source Collected On     02/19/18 1154          Components       Value Reference Range Flag Lab   Glucose 114 70 - 99 mg/dL H 170            Glucose by meter [[id number]]  Resulted: 02/19/18 0805, Result status: Final result    Ordering provider: Dwight Miguel MD  02/19/18 0754 Resulting lab: POINT OF CARE TEST, GLUCOSE    Specimen Information    Type Source Collected On     02/19/18 0754          Components       Value Reference Range Flag Lab   Glucose 95 70 - 99 mg/dL  170            Glucose by meter [[id number]]  Resulted: 02/19/18 0400, Result status: Final result    Ordering provider: Dwight Miguel MD  02/19/18 0346 Resulting lab: POINT OF CARE TEST, GLUCOSE    Specimen Information    Type Source Collected On     02/19/18 0346          Components       Value Reference Range Flag Lab   Glucose 89 70 - 99 mg/dL  170            Glucose by meter [[id number]] (Abnormal)  Resulted: 02/19/18 0030, Result status: Final result    Ordering provider: Dwight Miguel MD  02/19/18 0021 Resulting lab: POINT OF CARE TEST, GLUCOSE    Specimen Information    Type Source Collected On     02/19/18 0021          Components        Value Reference Range Flag Lab   Glucose 112 70 - 99 mg/dL H 170            Testing Performed By     Lab - Abbreviation Name Director Address Valid Date Range    51 - Unknown [facility] [place] Unknown [address] [facility] [address] 14 1010 - Present    170 - Unknown POINT OF CARE TEST, GLUCOSE Unknown Unknown 10/31/11 1114 - Present            Unresulted Labs     None         ECG/EMG Results      Echo Complete Bubble [[id number]]  Resulted: 18 1214, Result status: Edited Result - FINAL    Ordering provider: Rhona Garcia MD  18 0633 Resulted by: HELENE Mello MD    Performed: 18 1242 - 18 1243 Resulting lab: RADIOLOGY RESULTS    Narrative:       [id number]  [id number]  [id number]  [id number]^KARTHIK^RHONA^UMU           [facility]  Echocardiography Laboratory  [address] [place] [address]     Name: NADIA SMYTH  MRN: [id number]  : 1945  Study Date: 2018 12:14 PM  Age: 72 yrs  Gender: Female  Patient Location: [facility]  Reason For Study: CVA  Ordering Physician: RHONA GARCIA  Referring Physician: SELF, REFERRED  Performed By: Brannon Castañeda RDCS     BSA: 2.1 m2  Height: 66 in  Weight: 223 lb  BP: 179/75 mmHg  _____________________________________________________________________________  __        Procedure  Bubble Echocardiogram with two-dimensional, color and spectral Doppler  performed.  _____________________________________________________________________________  __        Interpretation Summary  No cardiac source for embolus identified. The atrial septum is intact as  assessed by color Doppler and agitated saline bubble study .     Previous study not available for comparison.  _____________________________________________________________________________  __        Left Ventricle  Global and regional left ventricular function is normal with an EF of 60-65%.  Left  ventricular size is normal. Thickening of the anterobasal septum is  present. Left ventricular diastolic function is indeterminate. No regional  wall motion abnormalities are seen.     Right Ventricle  Right ventricular function, chamber size, wall motion, and thickness are  normal.     Atria  The right atria appears normal. Mild left atrial enlargement is present. The  atrial septum is intact as assessed by color Doppler and agitated saline  bubble study .     Mitral Valve  The mitral valve is normal.        Aortic Valve  Aortic valve is normal in structure and function. The aortic valve is  tricuspid.     Tricuspid Valve  The tricuspid valve is normal. The peak velocity of the tricuspid regurgitant  jet is not obtainable.     Pulmonic Valve  The pulmonic valve is normal.     Vessels  The aorta root is normal. The inferior vena cava was normal in size with  preserved respiratory variability.     Pericardium  No pericardial effusion is present.        Compared to Previous Study  Previous study not available for comparison.     Attestation  I have personally viewed the imaging and agree with the interpretation and  report as documented by the fellow, Sha Way, and/or edited by me.  _____________________________________________________________________________  __     MMode/2D Measurements & Calculations  RVDd: 3.9 cm  IVSd: 1.1 cm  LVIDd: 4.8 cm  LVIDs: 3.5 cm  LVPWd: 0.70 cm  FS: 28.3 %  EDV(Teich): 108.2 ml  ESV(Teich): 49.2 ml  LV mass(C)d: 143.9 grams  LV mass(C)dI: 68.7 grams/m2  asc Aorta Diam: 3.5 cm  LVOT diam: 2.0 cm  LVOT area: 3.0 cm2  LA Volume (BP): 84.4 ml  RWT: 0.29        Doppler Measurements & Calculations  MV E max luis miguel: 67.1 cm/sec  MV A max luis miguel: 74.8 cm/sec  MV E/A: 0.90  MV dec slope: 269.3 cm/sec2  MV dec time: 0.25 sec  E/E' av.9     Lateral E/e': 6.7  Medial E/e': 11.1     _____________________________________________________________________________  __           Report approved by: MICHELLE  Scout Albright 02/19/2018 01:08 PM       1    Type Source Collected On     02/19/18 1214          View Image (below)        Echocardiogram Complete [[id number]]  Resulted: 02/19/18 1242, Result status: In process    Ordering provider: Jose Garcia MD  02/18/18 0633 Performed: 02/19/18 1242 - 02/19/18 1242    Resulting lab: [facility]IANT                   Encounter-Level Documents:     There are no encounter-level documents.      Order-Level Documents:     There are no order-level documents.

## 2018-02-18 NOTE — LETTER
Transition Communication Hand-off for Care Transitions to Next Level of Care Provider    Name: Rosalie Seals  MRN #: [id number]  Primary Care Provider: Yash Cuellar     Primary Clinic: [facility] [address] [place] [address]     Reason for Hospitalization:  stroke  Acute ischemic stroke (H)  Admit Date/Time: 2/18/2018 12:18 AM  Discharge Date: 2/22/18  Payor Source: Payor: MEDICARE / Plan: MEDICARE / Product Type: Medicare /              Reason for Communication Hand-off Referral:     Discharge Plan:     Social Work Services Discharge Note     Patient Name:  Rosalie Seals     Anticipated Discharge Date:  2/22/18     Discharge Disposition:   [facility] ARU ([phone])     Following MD:  Facility Assignment     Pre-Admission Screening (PAS) online form has been completed.  The Level of Care (LOC) is:  Determined  Confirmation Code is:  Not required as pt is being admitted to ARU.  Patient/caregiver informed of referral to [facility] Line for Pre-Admission Screening for skilled nursing facility (SNF) placement and to expect a phone call post discharge from SNF.     Additional Services/Equipment Arranged:  Confirmed readiness for discharge (as the Medical Director/Neurologist at the receiving ARU will manage pt's BP) with Dr. Roque.  Confirmed acceptance to [facility] ARU with Admissions (Lizette).  Pt's daughterRenetta has requested to provide pt's transport and per discussion with pt's floor nurse (Bobbi), pt can be transported by car.  Discharge is anticipated at 9am tomorrow.     Patient / Family response to discharge plan:  Pt and daughter voice understanding of the discharge plan and agreement with the discharge plan.     Persons notified of above discharge plan:  Pt, daughter, 6A nursing and Dr. Roque.     Staff Discharge Instructions:  Please fax discharge orders and signed hard scripts for any controlled substances (SW will  complete this task).  Please print a packet and send with patient.      CTS Handoff completed:  YES     Medicare Notice of Rights provided to the patient/family:  YES     CARMELA Conti  Social Work, 6A  Phone:  [phone]  Pager:  [phone]  2/21/2018

## 2018-02-18 NOTE — H&P
Addendum 18 8:00 AM   No significant events overnight. Some improvement in strength in left arm shoulder 4/5, biceps 2/5, triceps 2/5, wrist flexion 2/5, wrist extension 2/5; 5/5 elsewhere; left facial droop with remainder of examination normal. Continues to be NIHSS 3 for flattening of nasolabial fold and some effort against gravity in left arm.  Passed speech/swallow evaluation and restarted home medications except for hypertensive medications.     Update to plan:   - Continue permissive hypertension; labetalol PRN for SBP > 220 until perfusion scan completed.  - Passed speech, diet ordered    - Plavix load 300 mg today  - Plavix 75 mg daily   - Resumed Atrovastatin 80 mg daily   - No MRI brain due to patient claustrophobia   - Images from outside hospital to be uploaded   - CT perfusion with diamox to be completed tomorrow   - TTE pending  - PT/OT   - Stroke education   - Depression and apnea screen     # type 2 DM  - Continue to hold PTA metformin   - ISS, hypoglycemia protocol     # HTN:   - Continue to hold PTA metoprolol and lisinopril-HCTZ for permissive hypertension and concern for pressure dependent lesion.     Leora Roque   Neurology PGY 1     [facility] acute [facility], [place]      Neurology Stroke Admission    Patient Name: Rosalie Seals  : 1945 MRN#: [id number]    STROKE DATA    Stroke Code:  Stroke code not activated.  Time patient seen:  2018 0130  Last known normal (pt's baseline):  2 days ago     TPA treatment:  Not given due to outside the time window.      Stroke Scales      National Institutes of Health Stroke Scale (at presentation)   NIHSS done at:  time patient seen      Score    Level of consciousness:  (0)   Alert, keenly responsive    LOC questions:  (0)   Answers both questions correctly    LOC commands:  (0)   Performs both tasks correctly    Best gaze:  (0)   Normal    Visual:  (0)   No visual loss    Facial palsy:  (1)   Minor paralysis (flat  nasolabial fold, smile asymmetry)    Motor arm (left):  (2)   Some effort against gravity    Motor arm (right):  (0)   No drift    Motor leg (left):  (0)   No drift    Motor leg (right):  (0)   No drift    Limb ataxia:  (0)   Absent    Sensory:  (0)   Normal- no sensory loss    Best language:  (0)   Normal- no aphasia    Dysarthria:  (0)   Normal    Extinction and inattention:  (0)   No abnormality        NIHSS Total Score:  3          Dysphagia Screen  Time of screenin2018  Screening results: Failed due to presence of subtle left facial droop.  Speech to evaluate in the morning     ASSESSMENT & PLAN BY PROBLEM     #Symptomatic right MCA stenosis: The patient has had waxing and waning left-sided weakness appears to be perfusion dependent.  Initial plan to start dual antiplatelet therapy however due to subtle left facial droop patient is an automatic failure until speech evaluation and will need to be kept n.p.o. In the meantime will start rectal aspirin.  Will also allow for permissive hypertension despite symptoms lasting more than 24 hours.  Will admit to neurology for stroke workup.     Plan  - Admit to Neurology  - Neurochecks per protocol  - Permissive HTN; labetalol PRN for SBP > 220  - Euthermia, Euglycemia  -Continue PTA dual antiplatelet therapy after SLP evaluation  - Continue PTA atorvastatin after speech evaluation  - MRI brain without contrast  - TTE with Bubble Study  - Telemetry, EKG  - Bedside Glucose Monitoring  - A1c, Lipid Panel  - PT/OT/SLP  - PM&R  - Stroke Education  - Depression Screen  - Apnea Screen    During initial physical assessment, the plan of care was discussed and developed with patient.  Plan of care includes: Stroke evaluation.    Patient was admitted via Transfer from outside hospital.    The patient will be admitted to the Neuro Critical Care/Stroke team..     Other Medical Problems:    #Type 2 diabetes: Hold PTA metformin.  -Sliding scale insulin  -Hypoglycemia  protocol    #HTN: Hold PTA metoprolol and lisinopril hydrochlorothiazide in the setting of permissive hypertension    Fluids/Electrolytes/Nutrition  0.9% sodium chloride @100 mL/hr  Avoid hypotonic fluids.    Nutrition:   Active Diet Order      NPO for Medical/Clinical Reasons Except for: No Exceptions    Prophylaxis            For VTE Prevention:  - pneumatic compression device    For Acid Suppression:  - pantoprazole    Code Status  Full Code    HPI  Rosalie JANE Friend is a 72 year old female with a history of stroke, type 2 diabetes, hypertension, and hyperlipidemia who is presenting with waxing and waning left upper extremity weakness.  The patient noted for the last 2 days she has had left upper extremity weakness that will worsen to the point where her limb is flaccid and improve to the point where she can move it antigravity.  She initially had left lower leg weakness as well however this has resolved.  She has noted no change in vision, changes speech, difficulty swallowing or any numbness.  The patient was initially evaluated outside hospital and was sent to [place] for further evaluation    Pertinent Past Medical/Surgical History  Past Medical History:   Diagnosis Date     Cerebral infarction (H)     05/06/2017     Diabetes type 2, uncontrolled (H)      Hyperlipemia        Past Surgical History:   Procedure Laterality Date     ABDOMEN SURGERY      bladder repair     GYN SURGERY      hysterectomy        Medications: I have reviewed this patient's current medications.    Allergies: All allergies reviewed and addressed.    Family History: I have reviewed this patient's family history.    Social History: I have reviewed this patient's social history.    Tobacco use: Quit 2 weeks ago    ROS:  The 10 point Review of Systems is negative other than noted in the HPI or here.     PHYSICAL EXAMINATION  Vital Signs:  B/P: 127/77,  T: Data Unavailable,  P: Data Unavailable,  R: 18    General:  patient lying in bed  without any acute distress    HEENT:  normocephalic/atraumatic  Cardio:  RRR  Pulmonary:  no respiratory distress  Abdomen:  non-distended  Extremities:  no edema  Skin:  intact     Neurologic  Mental Status:  fully alert, attentive and oriented, follows commands, speech clear and fluent  Cranial Nerves:  visual fields intact, PERRL, EOMI with normal smooth pursuit, facial sensation intact and symmetric, subtle left facial droop, hearing not formally tested but intact to conversation, palate elevation symmetric and uvula midline, no dysarthria, shoulder shrug strong bilaterally, tongue protrusion midline  Motor: Strength 5/5 throughout except only able to left left upper extremity antigravity.  Sensory:  intact/symmetric to light touch throughout upper and lower extremities  Coordination:  FNF and HS intact without dysmetria    Labs  Labs and Imaging reviewed and used in developing the plan; pertinent results included.     Lab Results   Component Value Date     (H) 02/17/2018       The patient was discussed with the fellow, Dr. Deng.  The staff is Dr. Lamont Garcia MD  Neurology PGY 2

## 2018-02-18 NOTE — PROGRESS NOTES
Code stroke called. Assisted with pt transfer to CT scan. No further RT intervention or assistance needed at this time

## 2018-02-18 NOTE — PLAN OF CARE
Problem: Patient Care Overview  Goal: Plan of Care/Patient Progress Review  Outcome: No Change  Pt new admit around midnight from [facility] with stroke s/s. Pt is A&Ox4. VSS; allowing permissive HTN. RUE/RLE 5/5. L facial droop present. LUE 1-2/5 with absent-weak grasp. LLE 4/5 with moderate dorsi/plantar. Smooth finger to nose with RUE, unable to perform with LUE. PERRLA. Denies N/T. Given rectal 300mg aspirin; unable to give PO aspirin and Plavix d/t facial droop. BG checks q4 hrs, no SS insulin needed. Kept NPO d/t L facial droop. PIV infusing at 100 ml/hr. Up with assist of 2 and GB to pivot. Voiding via bedside commode. Daughter at bedside and supportive in cares. Stroke booklet given at bedside. Plan for stroke work up today. Pt and daughter state that pt s primary neurologist is located at [facility]. They would like pt to be transferred there if possible; neuro resident notified. Continue to monitor and follow POC.

## 2018-02-18 NOTE — IP AVS SNAPSHOT
` ` Patient Information     Patient Name Sex     Friend, Rosalie JANE ([id number]) Female 1945       Room Bed    11 6211-02      Patient Demographics     Address Phone    [address] [place] [address] [phone] (Home)  [phone] (Mobile)      Patient Ethnicity & Race     Ethnic Group Patient Race    American White      Emergency Contact(s)     Name Relation Home Work Mobile    JEANNIE NATHAN Daughter [phone]    NO SECONDARY CONTACT Other none        Documents on File        Status Date Received Description       Documents for the Patient    Consent for Services - Hospital/Clinic-ESign Received () 13     [facility] Privacy Notice-ESign Received 13     Insurance Card Received 13     Patient ID Received () 13     Consent for EHR Access-Received-ESign Received 13     Consent for EHR Access-Decline-ESign       HIM BRIJESH Authorization  14 [facility]    Consent for Services - Hospital/Clinic-ESign Received () 16     Consent for Services/Privacy Notice - Hospital/Clinic-Esign Received () 16     Privacy Notice - [facility] Received 16     Patient ID Received 10/09/16     Consent for Services/Privacy Notice - Hospital/Clinic Received 17     HIM BRIJESH Authorization - File Only  05/10/17 AUTHORIZATION FOR RELEASE OF PHI    HIM BRIJESH Authorization  17     HIM BRIJESH Authorization  17     HIM BRIJESH Authorization  10/10/17 [facility]    Care Everywhere Prospective Auth Received 17     Consent for EHR Access       External Medication Information Consent       [facility] Specified Other       Insurance Card Received 18 BCBS    Patient Photo   Photo of Patient       Documents for the Encounter    CMS IM for Patient Signature Received 18       Admission Information     Attending Provider Admitting Provider Admission Type Admission Date/Time    Dwight Miguel MD Alkuwaiti, Mohammed Hamad, MD Urgent  02/18/18  0018    Discharge Date Hospital Service Auth/Cert Status Service Area     Neurology Incomplete [facility]    Unit Room/Bed Admission Status       UU U6A 6211/6211-02 Admission (Confirmed)       Admission     Complaint    stroke, Acute ischemic stroke (H)      Hospital Account     Name Acct ID Class Status Primary Coverage    BozenaRosalie [id number] Inpatient Open MEDICARE - MEDICARE            Guarantor Account (for Hospital Account #[id number])     Name Relation to Pt Service Area Active? Acct Type    Rosalie Seals Self FCS Yes Personal/Family    Address Phone          [address] [place] [address] [phone](H)              Coverage Information (for Hospital Account #[id number])     1. MEDICARE/MEDICARE     F/O Payor/Plan Precert #    MEDICARE/MEDICARE     Subscriber Subscriber #    Rosalie Seals [id number]    Address Phone    [employer]  [address]  [place] [address] [phone]          2. BCBS/BCBS OUT OF STATE     F/O Payor/Plan Precert #    BCBS/BCBS OUT OF STATE     Subscriber Subscriber #    Rosalie Seals [id number]    Address Phone    [address] [address]  [place] [address] [phone]

## 2018-02-18 NOTE — PLAN OF CARE
Problem: Patient Care Overview  Goal: Plan of Care/Patient Progress Review  Discharge Planner OT  6A Stroke Evaluation  Patient plan for discharge: Firmly stating she will go home on Tuesday. Refusing any inpatient rehab and doesn't want to remain in the cities beyond Tuesday.  Refusing TCU at home.  Feels she can drive into outpatient therapies every day.  Current status: Pt currently needing assist for bed mobility. Poor balance while seated, tipping to the left, needing assist to keep from falling back or to the L.  Min/mod A x1 STS with gait belt and Min A transfer to chair with many cues for safety.  Educated family and pt re dc rec for IP rehab and stroke recovery.  Pt feels she will rehab at home just fine with daily outpatient therapies and that  family can help carry her around (as they did the day prior to seeking medical care). Pt needing max A for lower body dressing.  Pt self feeding using R dominant hand, noted that pt was using fingers vs utensil for messy non finger foods.  Scored BNL on STM screen with recall of 2/5 items on SBT.  Ox3.  Barriers to return to prior living situation: high fall risk, L UE LE weakness and sensory changes, cognitive deficits  Recommendations for discharge: ARU  Rationale for recommendations: Pt presents with new deficits including L UE and LE weakness, cognitive deficits (unsure of baseline), sensory deficits on L, facial droop, decreased motor planning leading to decreased function and safety. Pt unable to dress, transfer or ambulate with out Ax1-2. Needs to achieve Mod I with cane and 4 stairs to return home. Pt will benefit from intensive, interdisciplinary ARU to address these deficits and to provide caregiver training to facilitate a safe dc to home.            Entered by: Swetha Laguerre 02/18/2018 3:41 PM

## 2018-02-18 NOTE — IP AVS SNAPSHOT
MRN:[id number]                      After Visit Summary   2/18/2018    Rosalie JANE Friend    MRN: [id number]           Thank you!     Thank you for choosing [facility] for your care. Our goal is always to provide you with excellent care. Hearing back from our patients is one way we can continue to improve our services. Please take a few minutes to complete the written survey that you may receive in the mail after you visit with us. Thank you!        Patient Information     Date Of Birth          1945        Designated Caregiver       Most Recent Value    Caregiver    Will someone help with your care after discharge? yes    Name of designated caregiver Renetta Leggett    Phone number of caregiver [phone]    Caregiver address [place]      About your hospital stay     You were admitted on:  February 18, 2018 You last received care in the:  [address] [facility]    You were discharged on:  February 22, 2018        Reason for your hospital stay       You were hospitalized for stroke causing weakness on the left side of your face and your left arm and leg. 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 In order to reduce risk for future strokes it is important to take all your medications, quit smoking and work towards goal blood pressure < 140/90.                  Who to Call     For medical emergencies, please call 911.  For non-urgent questions about your medical care, please call your primary care provider or clinic, [phone]          Attending Provider     Provider Specialty    Dwight Miguel MD Neurology       Primary Care Provider Office Phone # Fax #    Yash Cuellar [phone]      After Care Instructions     Activity - Up ad lonny       Requiring assistance for walking, can advance as appropriate.            Advance Diet as Tolerated       Follow this diet upon discharge: Orders Placed This Encounter       Low Consistent CHO Diet            General info for SNF       Length of Stay Estimate: Short Term Care: Estimated # of Days <30  Condition at Discharge: Improving  Level of care:skilled   Rehabilitation Potential: Excellent  Admission H&P remains valid and up-to-date: Yes  Recent Chemotherapy: N/A  Use Nursing Home Standing Orders: Yes            Mantoux instructions       Give two-step Mantoux (PPD) Per Facility Policy Yes                  Follow-up Appointments     Follow Up and recommended labs and tests       Follow up with primary care provider in 1-2 weeks after discharge from rehab.    Follow-up with Stroke Clinic in 3-6 months.   You will be contacted to schedule a Stroke Clinic appointment. If you have not been contacted to schedule a stroke follow-up appointment within 7 days of discharge, please contact Stroke Neurology Clinic at [phone], pick option #1.   If you have other stroke related questions, please call [phone], pick option #3, and ask to speak to Tomas Remy RN Stroke/Endovascular Care Coordinator.  If you have any urgent stroke related questions after hours, please contact the hospital  at [phone] and ask to be connected to a stroke provider.     Follow-up with your neurosurgeon as scheduled for management of your aneurysm.                  Your next 10 appointments already scheduled     Mar 06, 2018 10:30 AM CST   (Arrive by 10:15 AM)   Return Visit with Xochitl Daniels RD   [facility] ([facility] )    [address] [place] [address] [phone]              Additional Services     Occupational Therapy Adult Consult       Evaluate and treat as clinically indicated.    Reason: Stroke with left sided weakness            Physical Therapy Adult Consult       Evaluate and treat as clinically indicated.    Reason:  Stroke with left sided weakness            Speech Language Path Adult Consult       Evaluate and treat as clinically  "indicated.    Reason:  Stroke with left sided weakness                  Pending Results     Date and Time Order Name Status Description    2018 0948 CTA Angiogram Head Perfusion w [facility] In process     2018 210 EKG CARDIAC - HIM SCAN Preliminary             Statement of Approval     Ordered          18 0808  I have reviewed and agree with all the recommendations and orders detailed in this document.  EFFECTIVE NOW     Approved and electronically signed by:  Leora Roque MD             Admission Information     Date & Time Department Dept. Phone    2018 [address] [facility] [phone]      Your Vitals Were     Blood Pressure Pulse Temperature Respirations Weight Pulse Oximetry    134/64 84 97.7  F (36.5  C) (Oral) 18 101.2 kg (223 lb 1.6 oz) 98%    BMI (Body Mass Index)                   37.13 kg/m2           MyChart Information     [employer] lets you send messages to your doctor, view your test results, renew your prescriptions, schedule appointments and more. To sign up, go to [email or web address] . Click on \"Log in\" on the left side of the screen, which will take you to the Welcome page. Then click on \"Sign up Now\" on the right side of the page.     You will be asked to enter the access code listed below, as well as some personal information. Please follow the directions to create your username and password.     Your access code is: [email or web address]  Expires: 5/10/2018 10:46 AM     Your access code will  in 90 days. If you need help or a new code, please call your [facility] clinic or [phone].        Care EveryWhere ID     This is your Care EveryWhere ID. This could be used by other organizations to access your [facility] medical records  [id number]        Equal Access to Services     [facility] ANDRÉS : Hadii amos Lemus, waoanhda carlyle, qaybta kaalmichelle chowdhury, nery neri. So [facility] [phone].    ATENCIÓN: Si martha white " blackmon disposición servicios gratuitos de asistencia lingüística. Hu gallegos [phone].    We comply with applicable federal civil rights laws and [place] laws. We do not discriminate on the basis of race, color, national origin, age, disability, sex, sexual orientation, or gender identity.               Review of your medicines      START taking        Dose / Directions    bisacodyl 10 MG Suppository   Commonly known as:  DULCOLAX   Used for:  Acute ischemic stroke (H)        Dose:  10 mg   Place 1 suppository (10 mg) rectally daily as needed for constipation   Quantity:  30 suppository   Refills:  0       magnesium hydroxide 400 MG/5ML suspension   Commonly known as:  MILK OF MAGNESIA   Used for:  Acute ischemic stroke (H)        Dose:  30 mL   Take 30 mLs by mouth daily as needed for constipation   Quantity:  105 mL   Refills:  0       senna-docusate 8.6-50 MG per tablet   Commonly known as:  SENOKOT-S;PERICOLACE   Used for:  Acute ischemic stroke (H)        Dose:  2 tablet   Take 2 tablets by mouth At Bedtime   Quantity:  100 tablet   Refills:  0         CONTINUE these medicines which may have CHANGED, or have new prescriptions. If we are uncertain of the size of tablets/capsules you have at home, strength may be listed as something that might have changed.        Dose / Directions    aspirin 325 MG tablet   This may have changed:    - medication strength  - how much to take   Used for:  Acute ischemic stroke (H)        Dose:  325 mg   Take 1 tablet (325 mg) by mouth daily   Quantity:  120 tablet   Refills:  0       metoprolol tartrate 100 MG tablet   Commonly known as:  LOPRESSOR   This may have changed:  medication strength   Used for:  Essential hypertension        Dose:  50 mg   Take 0.5 tablets (50 mg) by mouth 2 times daily   Quantity:  60 tablet   Refills:  0         CONTINUE these medicines which have NOT CHANGED        Dose / Directions    atorvastatin 80 MG tablet   Commonly known as:  LIPITOR   Used  for:  Hyperlipidemia LDL goal <100        Dose:  80 mg   Take 1 tablet (80 mg) by mouth daily   Quantity:  90 tablet   Refills:  3       BLOOD GLUCOSE TEST STRIPS Strp   Used for:  Type 2 diabetes mellitus with complication, without long-term current use of insulin (H)        four times a day.   Quantity:  360 each   Refills:  3       clopidogrel 75 MG tablet   Commonly known as:  PLAVIX   Used for:  Acute ischemic stroke (H)        Dose:  75 mg   Take 1 tablet (75 mg) by mouth daily   Quantity:  30 tablet   Refills:  0       lisinopril-hydrochlorothiazide 10-12.5 MG per tablet   Commonly known as:  PRINZIDE/ZESTORETIC   Used for:  Acute ischemic stroke (H), Essential hypertension        Dose:  1 tablet   Take 1 tablet by mouth daily   Quantity:  90 tablet   Refills:  3       metFORMIN 500 MG tablet   Commonly known as:  GLUCOPHAGE   Used for:  Type 2 diabetes mellitus with hyperglycemia, with long-term current use of insulin (H)        TAKE 2 TABLETS TWICE A DAY WITH MEALS   Quantity:  180 tablet   Refills:  1       * ONETOUCH DELICA LANCETS 33G Misc        four times a day.   Refills:  0       * blood glucose monitoring lancets   Used for:  Type 2 diabetes mellitus with hyperglycemia, with long-term current use of insulin (H)        Use to test blood sugar 3 times daily.   Quantity:  360 each   Refills:  3       order for DME   Used for:  Well controlled type 2 diabetes mellitus (H)        Equipment being ordered: Glucometer and test strips   Quantity:  1 Device   Refills:  0       * Notice:  This list has 2 medication(s) that are the same as other medications prescribed for you. Read the directions carefully, and ask your doctor or other care provider to review them with you.         Where to get your medicines      Some of these will need a paper prescription and others can be bought over the counter. Ask your nurse if you have questions.     You don't need a prescription for these medications     aspirin 325 MG  tablet    bisacodyl 10 MG Suppository    clopidogrel 75 MG tablet    lisinopril-hydrochlorothiazide 10-12.5 MG per tablet    magnesium hydroxide 400 MG/5ML suspension    metoprolol tartrate 100 MG tablet    senna-docusate 8.6-50 MG per tablet                Protect others around you: Learn how to safely use, store and throw away your medicines at www.disposemymeds.org.             Medication List: This is a list of all your medications and when to take them. Check marks below indicate your daily home schedule. Keep this list as a reference.      Medications           Morning Afternoon Evening Bedtime As Needed    aspirin 325 MG tablet   Take 1 tablet (325 mg) by mouth daily   Last time this was given:  325 mg on 2/22/2018  7:49 AM                                atorvastatin 80 MG tablet   Commonly known as:  LIPITOR   Take 1 tablet (80 mg) by mouth daily   Last time this was given:  80 mg on 2/21/2018  7:40 PM                                bisacodyl 10 MG Suppository   Commonly known as:  DULCOLAX   Place 1 suppository (10 mg) rectally daily as needed for constipation                                BLOOD GLUCOSE TEST STRIPS Strp   four times a day.                                clopidogrel 75 MG tablet   Commonly known as:  PLAVIX   Take 1 tablet (75 mg) by mouth daily   Last time this was given:  75 mg on 2/22/2018  7:49 AM                                lisinopril-hydrochlorothiazide 10-12.5 MG per tablet   Commonly known as:  PRINZIDE/ZESTORETIC   Take 1 tablet by mouth daily                                magnesium hydroxide 400 MG/5ML suspension   Commonly known as:  MILK OF MAGNESIA   Take 30 mLs by mouth daily as needed for constipation   Last time this was given:  30 mLs on 2/21/2018 10:11 AM                                metFORMIN 500 MG tablet   Commonly known as:  GLUCOPHAGE   TAKE 2 TABLETS TWICE A DAY WITH MEALS                                metoprolol tartrate 100 MG tablet   Commonly known as:   LOPRESSOR   Take 0.5 tablets (50 mg) by mouth 2 times daily   Last time this was given:  50 mg on 2/22/2018  7:49 AM                                * ONETOUCH DELICA LANCETS 33G Misc   four times a day.                                * blood glucose monitoring lancets   Use to test blood sugar 3 times daily.                                order for DME   Equipment being ordered: Glucometer and test strips                                senna-docusate 8.6-50 MG per tablet   Commonly known as:  SENOKOT-S;PERICOLACE   Take 2 tablets by mouth At Bedtime   Last time this was given:  2 tablets on 2/20/2018 10:04 PM                                * Notice:  This list has 2 medication(s) that are the same as other medications prescribed for you. Read the directions carefully, and ask your doctor or other care provider to review them with you.

## 2018-02-18 NOTE — DISCHARGE INSTRUCTIONS
What to expect when you have contrast    During your exam, we will inject  contrast  into your vein or artery. (Contrast is a clear liquid with iodine in it. It shows up on X-rays.)    You may feel warm or hot. You may have a metal taste in your mouth and a slight upset stomach. You may also feel pressure near the kidneys and bladder. These effects will last about 1 to 3 minutes.    Please tell us if you have:    Sneezing     Itching    Hives     Swelling in the face    A hoarse voice    Breathing problems    Other new symptoms    Serious problems are rare.  They may include:    Irregular heartbeat     Seizures    Kidney failure              Tissue damage    Shock      Death    If you have any problems during the exam, we  will treat them right away.    When you get home    Call your hospital if you have any new symptoms in the next 2 days, like hives or swelling. (Phone numbers are at the bottom of this page.) Or call your family doctor.     If you have wheezing or trouble breathing, call 911.    Self-care  -Drink at least 4 extra glasses of water today.   This reduces the stress on your kidneys.  -Keep taking your regular medicines.    The contrast will pass out of your body in your  Urine(pee). This will happen in the next 24 hours. You  will not feel this. Your urine will not  change color.    If you have kidney problems or take metformin    Drink 4 to 8 large glasses of water for the next  2 days, if you are not on a fluid restriction.    ?If you take metformin (Glucophage or Glucovance) for diabetes, keep taking it.      ?Your kidney function tests are abnormal.  If you take Metformin, do not take it for 48 hours. Please go to your clinic for a blood test within 3 days after your exam before the restarting this medicine.     (Note to provider:please give patient prescription for lab tests.)    ?Special instructions: Drink an extra 4 glasses of water to flush out the contrast.     I have read and understand the  above information.    Patient Sign Here:______________________________________Date:________Time:______    Staff Sign Here:________________________________________Date:_______Time:______      Radiology Departments:     ?[facility]: [phone] ?[facility]: [phone]     ?[facility]: [phone] ?[facility]:[phone]      ?[facility]: [phone]  ?[facility]: [phone]  ?[facility]:[phone]    ?[facility]:[phone]  ?[facility]:[phone]

## 2018-02-18 NOTE — PROGRESS NOTES
02/18/18 1339   Quick Adds   Type of Visit Initial Occupational Therapy Evaluation   Living Environment   Lives With sibling(s)   Number of Stairs to Enter Home 4   Number of Stairs Within Home 0   Living Environment Comment Lives with brother, but has famiy near that can stay with her.  Laundry on main floor.     Self-Care   Dominant Hand right   Usual Activity Tolerance good   Current Activity Tolerance fair   Regular Exercise no   Activity/Exercise/Self-Care Comment Social activites, no formal exercise.    Functional Level Prior   Ambulation 0-->independent   Transferring 0-->independent   Toileting 1-->assistive equipment   Bathing 0-->independent   Dressing 0-->independent   Eating 0-->independent   Communication 0-->understands/communicates without difficulty   Cognition 0 - no cognition issues reported   Fall history within last six months yes   Number of times patient has fallen within last six months 2   Prior Functional Level Comment IND prior   General Information   Onset of Illness/Injury or Date of Surgery - Date 02/18/18   Patient/Family Goals Statement firmly refusing inpatient rehab   Additional Occupational Profile Info/Pertinent History of Current Problem 72 year old female with a history of stroke, type 2 diabetes, hypertension, and hyperlipidemia who is presenting with waxing and waning left upper extremity weakness.  The patient noted for the last 2 days she has had left upper extremity weakness that will worsen to the point where her limb is flaccid and improve to the point where she can move it antigravity.  She initially had left lower leg weakness which was reported to be resolved although not resolved at eval improvement reported.    Precautions/Limitations fall precautions   General Observations pt and daughter in room, firmly stating she is leaving the [facility] by Tuesday and refuses inpatient rehab. Will have her family help her walk.   General Info Comments activity: up w A   Cognitive  Status Examination   Orientation orientation to person, place and time   Level of Consciousness alert   Able to Follow Commands mild impairment   Personal Safety (Cognitive) mild impairment   Memory impaired   Cognitive Comment recall 2/5 STM delayed recall, unsure where baseline is for judgement and insight but deficits noted today   Visual Perception   Visual Perception Comments reports intact, not formaly assessed   Sensory Examination   Sensory Comments decreased sensation on the L   Pain Assessment   Patient Currently in Pain No   Integumentary/Edema   Integumentary/Edema no deficits were identifed   Posture   Posture Comments due to weakness poor posture sitting EOB   Range of Motion (ROM)   ROM Comment deficits with AROM on the L, R UE WFL   Strength   Strength Comments R UE intact, L UE  2-/5   Hand Strength   Hand Strength Comments intact R hand, L poor , not full ROM finger flexion   Coordination   Fine Motor Coordination poor L hand   Mobility   Bed Mobility Comments min to mod A to EOB, poor balance tipping to the left due to poor awareness   Transfer Skills   Transfer Comments Min to mod A x1   Transfer Skill: Bed to Chair/Chair to Bed   Level of [place]: Bed to Chair moderate assist (50% patients effort)   Physical Assist/Nonphysical Assist: Bed to Chair 1 person assist   Transfer Skill: Sit to Stand   Level of [place]: Sit/Stand minimum assist (75% patients effort)   Physical Assist/Nonphysical Assist: Sit/Stand 1 person assist   Toilet Transfer   Toilet Transfer Comments OG, using BSC pivot   Balance   Balance Comments poor sitting balance at EOB, tipping to the L, poor standing balance due to LLE weakness and poor awareness   Lower Body Dressing   Level of [place]: Dress Lower Body maximum assist (25% patients effort)   Grooming   Level of [place]: Grooming minimum assist (75% patients effort)   Eating/Self Feeding   Level of [place]: Eating minimum assist (75% patients  "effort)  (unsure of baseline, pt using fingers to eat lunch)   Physical Assist/Nonphysical Assist: Eating (food was not finger food)   Instrumental Activities of Daily Living (IADL)   Previous Responsibilities meal prep;housekeeping;laundry;shopping;yardwork;medication management;driving;finances   IADL Comments pt insistant she is IND   Activities of Daily Living Analysis   Impairments Contributing to Impaired Activities of Daily Living balance impaired;cognition impaired;fear and anxiety;ROM decreased;strength decreased;sensation decreased;motor control impaired;muscle tone abnormal   General Therapy Interventions   Planned Therapy Interventions ADL retraining;IADL retraining;strengthening;ROM;neuromuscular re-education   Clinical Impression   Criteria for Skilled Therapeutic Interventions Met yes, treatment indicated   OT Diagnosis stroke   Influenced by the following impairments L UE LE weakness, cognitive decline (STM insight judgement)   Assessment of Occupational Performance 5 or more Performance Deficits   Identified Performance Deficits all ADLs and IADls are impacted   Clinical Decision Making (Complexity) Low complexity   Therapy Frequency daily   Predicted Duration of Therapy Intervention (days/wks) 3 weeks   Anticipated Discharge Disposition Acute Rehabilitation Facility   Risks and Benefits of Treatment have been explained. Yes   Patient, Family & other staff in agreement with plan of care Yes   Clinical Impression Comments Pt firmly refusing ARU, pt unsafe with transfers, poor balance, tipping over while sitting EOB. Reports during the 2 days prior to seeking medical care she had her family help her walk due to LLE weakness and plans to do the safe after dc from the hospital    [facility] AM-PAC TM \"6 Clicks\"   2016, Trustees of [facility], under license to [employer].  All rights reserved.   6 Clicks Short Forms Daily Activity Inpatient Short Form   [facility] AM-PAC  \"6 " "Clicks\" Daily Activity Inpatient Short Form   1. Putting on and taking off regular lower body clothing? 2 - A Lot   2. Bathing (including washing, rinsing, drying)? 3 - A Little   3. Toileting, which includes using toilet, bedpan or urinal? 3 - A Little   4. Putting on and taking off regular upper body clothing? 3 - A Little   5. Taking care of personal grooming such as brushing teeth? 3 - A Little   6. Eating meals? 3 - A Little   Daily Activity Raw Score (Score out of 24.Lower scores equate to lower levels of function) 17   Total Evaluation Time   Total Evaluation Time (Minutes) 5     "

## 2018-02-19 ENCOUNTER — APPOINTMENT (OUTPATIENT)
Dept: CARDIOLOGY | Facility: CLINIC | Age: 73
DRG: 065 | End: 2018-02-19
Attending: STUDENT IN AN ORGANIZED HEALTH CARE EDUCATION/TRAINING PROGRAM
Payer: MEDICARE

## 2018-02-19 ENCOUNTER — APPOINTMENT (OUTPATIENT)
Dept: PHYSICAL THERAPY | Facility: CLINIC | Age: 73
DRG: 065 | End: 2018-02-19
Attending: PSYCHIATRY & NEUROLOGY
Payer: MEDICARE

## 2018-02-19 ENCOUNTER — APPOINTMENT (OUTPATIENT)
Dept: OCCUPATIONAL THERAPY | Facility: CLINIC | Age: 73
DRG: 065 | End: 2018-02-19
Attending: PSYCHIATRY & NEUROLOGY
Payer: MEDICARE

## 2018-02-19 LAB
GLUCOSE BLDC GLUCOMTR-MCNC: 112 MG/DL (ref 70–99)
GLUCOSE BLDC GLUCOMTR-MCNC: 114 MG/DL (ref 70–99)
GLUCOSE BLDC GLUCOMTR-MCNC: 132 MG/DL (ref 70–99)
GLUCOSE BLDC GLUCOMTR-MCNC: 89 MG/DL (ref 70–99)
GLUCOSE BLDC GLUCOMTR-MCNC: 92 MG/DL (ref 70–99)
GLUCOSE BLDC GLUCOMTR-MCNC: 95 MG/DL (ref 70–99)

## 2018-02-19 PROCEDURE — A9270 NON-COVERED ITEM OR SERVICE: HCPCS | Mod: GY | Performed by: STUDENT IN AN ORGANIZED HEALTH CARE EDUCATION/TRAINING PROGRAM

## 2018-02-19 PROCEDURE — 25000132 ZZH RX MED GY IP 250 OP 250 PS 637: Performed by: STUDENT IN AN ORGANIZED HEALTH CARE EDUCATION/TRAINING PROGRAM

## 2018-02-19 PROCEDURE — 00000146 ZZHCL STATISTIC GLUCOSE BY METER IP

## 2018-02-19 PROCEDURE — 40000133 ZZH STATISTIC OT WARD VISIT

## 2018-02-19 PROCEDURE — 97112 NEUROMUSCULAR REEDUCATION: CPT | Mod: GO

## 2018-02-19 PROCEDURE — 40000556 ZZH STATISTIC PERIPHERAL IV START W US GUIDANCE

## 2018-02-19 PROCEDURE — 25000132 ZZH RX MED GY IP 250 OP 250 PS 637: Mod: GY | Performed by: STUDENT IN AN ORGANIZED HEALTH CARE EDUCATION/TRAINING PROGRAM

## 2018-02-19 PROCEDURE — 93306 TTE W/DOPPLER COMPLETE: CPT | Mod: 26 | Performed by: INTERNAL MEDICINE

## 2018-02-19 PROCEDURE — 97116 GAIT TRAINING THERAPY: CPT | Mod: GP

## 2018-02-19 PROCEDURE — 97112 NEUROMUSCULAR REEDUCATION: CPT | Mod: GP

## 2018-02-19 PROCEDURE — 25000128 H RX IP 250 OP 636: Performed by: STUDENT IN AN ORGANIZED HEALTH CARE EDUCATION/TRAINING PROGRAM

## 2018-02-19 PROCEDURE — 40000264 ECHO COMPLETE BUBBLE

## 2018-02-19 PROCEDURE — 12000008 ZZH R&B INTERMEDIATE UMMC

## 2018-02-19 PROCEDURE — 97530 THERAPEUTIC ACTIVITIES: CPT | Mod: GP

## 2018-02-19 PROCEDURE — 40000193 ZZH STATISTIC PT WARD VISIT

## 2018-02-19 RX ORDER — LISINOPRIL 10 MG/1
10 TABLET ORAL DAILY
Status: DISCONTINUED | OUTPATIENT
Start: 2018-02-19 | End: 2018-02-22 | Stop reason: HOSPADM

## 2018-02-19 RX ORDER — HEPARIN SODIUM 5000 [USP'U]/.5ML
5000 INJECTION, SOLUTION INTRAVENOUS; SUBCUTANEOUS EVERY 12 HOURS
Status: DISCONTINUED | OUTPATIENT
Start: 2018-02-19 | End: 2018-02-19

## 2018-02-19 RX ORDER — LISINOPRIL/HYDROCHLOROTHIAZIDE 10-12.5 MG
1 TABLET ORAL DAILY
Status: DISCONTINUED | OUTPATIENT
Start: 2018-02-19 | End: 2018-02-19

## 2018-02-19 RX ORDER — ACYCLOVIR 200 MG/1
12 CAPSULE ORAL ONCE
Status: DISCONTINUED | OUTPATIENT
Start: 2018-02-19 | End: 2018-02-22 | Stop reason: HOSPADM

## 2018-02-19 RX ORDER — LABETALOL HYDROCHLORIDE 5 MG/ML
10-20 INJECTION, SOLUTION INTRAVENOUS EVERY 10 MIN PRN
Status: DISCONTINUED | OUTPATIENT
Start: 2018-02-19 | End: 2018-02-22 | Stop reason: HOSPADM

## 2018-02-19 RX ORDER — AMOXICILLIN 250 MG
2 CAPSULE ORAL AT BEDTIME
Status: DISCONTINUED | OUTPATIENT
Start: 2018-02-19 | End: 2018-02-22 | Stop reason: HOSPADM

## 2018-02-19 RX ORDER — BISACODYL 10 MG
10 SUPPOSITORY, RECTAL RECTAL DAILY PRN
Status: DISCONTINUED | OUTPATIENT
Start: 2018-02-19 | End: 2018-02-22 | Stop reason: HOSPADM

## 2018-02-19 RX ORDER — PANTOPRAZOLE SODIUM 40 MG/1
40 TABLET, DELAYED RELEASE ORAL
Status: DISCONTINUED | OUTPATIENT
Start: 2018-02-19 | End: 2018-02-22 | Stop reason: HOSPADM

## 2018-02-19 RX ORDER — ACETAZOLAMIDE 500 MG/5ML
1000 INJECTION, POWDER, LYOPHILIZED, FOR SOLUTION INTRAVENOUS ONCE
Status: DISCONTINUED | OUTPATIENT
Start: 2018-02-19 | End: 2018-02-19

## 2018-02-19 RX ORDER — HYDRALAZINE HYDROCHLORIDE 20 MG/ML
10-20 INJECTION INTRAMUSCULAR; INTRAVENOUS
Status: DISCONTINUED | OUTPATIENT
Start: 2018-02-19 | End: 2018-02-22 | Stop reason: HOSPADM

## 2018-02-19 RX ADMIN — ATORVASTATIN CALCIUM 80 MG: 80 TABLET, FILM COATED ORAL at 20:49

## 2018-02-19 RX ADMIN — SENNOSIDES AND DOCUSATE SODIUM 2 TABLET: 8.6; 5 TABLET ORAL at 22:28

## 2018-02-19 RX ADMIN — SODIUM CHLORIDE: 9 INJECTION, SOLUTION INTRAVENOUS at 14:32

## 2018-02-19 RX ADMIN — PANTOPRAZOLE SODIUM 40 MG: 40 TABLET, DELAYED RELEASE ORAL at 07:57

## 2018-02-19 RX ADMIN — CLOPIDOGREL 75 MG: 75 TABLET, FILM COATED ORAL at 07:57

## 2018-02-19 RX ADMIN — LISINOPRIL 10 MG: 10 TABLET ORAL at 20:49

## 2018-02-19 RX ADMIN — ASPIRIN 325 MG ORAL TABLET 325 MG: 325 PILL ORAL at 07:57

## 2018-02-19 ASSESSMENT — VISUAL ACUITY
OU: NORMAL ACUITY;GLASSES

## 2018-02-19 NOTE — PLAN OF CARE
Problem: Patient Care Overview  Goal: Plan of Care/Patient Progress Review  Discharge Planner OT   Patient plan for discharge: ARU  Current status: Patient seen for AAROM. Patient demos improved shoulder activation, also demo compensatory use of accessory muscles to bring arm overhead. OT providing education on normal pattern of movement and encouraged patient to slow down in attempts to isolate correct muscles. Patient tolerates [id number] reps in all planes to promote neuromuscular re-education. Session ended early with other providers.  Barriers to return to prior living situation: LUE strength/grasp deficits, altered balance, impulsivity  Recommendations for discharge: ARU  Rationale for recommendations: Patient would benefit from intensive rehab to address neurologic deficits impacting safety and IND in ADLs and IADLs.       Entered by: Barbara Welch 02/19/2018 2:49 PM

## 2018-02-19 NOTE — PLAN OF CARE
Problem: Patient Care Overview  Goal: Plan of Care/Patient Progress Review  Outcome: Improving  A&Ox4. AVSS on RA with permissive HTTN. Neuros intact except L facial droop, LUE 2/5 and LLE 4/5. Regular diet good PO. Up to the commode overnight with assist of 1 and gaitbelt. Commode at bedside. Plan for pt to have CTA today and D/C to TCU when medically stable. Will continue to monitor and follow with POC.

## 2018-02-19 NOTE — PLAN OF CARE
Problem: Patient Care Overview  Goal: Plan of Care/Patient Progress Review  Discharge Planner PT   Patient plan for discharge: AR[place] in [place]   Current status: Ambulated 15, 150, and 165 ft w/ FWW, CGA-minAx1 and wc follow. Highly impulsive, frequent cues for safety to decrease speed and obstacle avoidance. IND rolling in bed, CGA-minAx1 sidelying > sit, IND sit > supine. STS CGAx1 w/ FWW. Static standing balance activities.   Barriers to return to prior living situation: balance, decreased safety awareness, L UE/LE weakness  Recommendations for discharge: ARU  Rationale for recommendations: Pt motivated to return to PLOF w/ great familial support. Would benefit from intensive therapy to address deficits in mobility, transfers, balance, and ambulation.        Entered by: Gemini Vera 02/19/2018 3:22 PM

## 2018-02-19 NOTE — PROGRESS NOTES
NEUROLOGY STROKE PROGRESS NOTE   2018  Rosalie Seals  : 1945 MRN# [id number]      Subjective :     Ms. Seals is a 72 y.o. Woman current smoker (quit 1 wk ago), DM type 2, HTN,  HLD, right ICA occlusion and R MCA strokes 2017, bifurcation ofleft anterior and middle cerebral artery aneurysm s/p coiling presented via OSH with recurrent strokes symptoms over past 2 weeks.     24 hrs: No acute events. Improved strength in left arm and less apparent left facial droop. Plan for CT perfusion with diamox delayed till tomorrow.          Medications:     Current Facility-Administered Medications:      pantoprazole (PROTONIX) EC tablet 40 mg, 40 mg, Oral, Daily with breakfast, Jose Garcia MD, 40 mg at 18 075     labetalol (NORMODYNE/TRANDATE) injection 10-20 mg, 10-20 mg, Intravenous, Q10 Min PRN, Leora Roque MD     hydrALAZINE (APRESOLINE) injection 10-20 mg, 10-20 mg, Intravenous, Q1H PRN, Leora Roque MD     medication instruction, , Does not apply, Continuous PRN, Jose Garcia MD     sodium chloride 0.9% infusion, , Intravenous, Continuous, Jose Garcia MD, Last Rate: 100 mL/hr at 18 0758     glucose 40 % gel 15-30 g, 15-30 g, Oral, Q15 Min PRN **OR** dextrose 50 % injection 25-50 mL, 25-50 mL, Intravenous, Q15 Min PRN **OR** glucagon injection 1 mg, 1 mg, Subcutaneous, Q15 Min PRN, Jose Garcia MD     insulin aspart (NovoLOG) inj (RAPID ACTING), 1-6 Units, Subcutaneous, Q4H, Jose Garcia MD     aspirin tablet 325 mg, 325 mg, Oral, Daily, Leora Roque MD, 325 mg at 18 0757     clopidogrel (PLAVIX) tablet 75 mg, 75 mg, Oral, Daily, Leora Roque MD, 75 mg at 18 075     atorvastatin (LIPITOR) tablet 80 mg, 80 mg, Oral, Daily at 8 pm, Leora Roque MD, 80 mg at 18     Prescriptions Prior to Admission   Medication Sig Dispense Refill Last Dose     Clopidogrel  Bisulfate (PLAVIX PO) Take 75 mg by mouth daily   2/17/2018 at Unknown time     METOPROLOL TARTRATE PO Take 50 mg by mouth 2 times daily   2/17/2018 at Unknown time     lisinopril-hydrochlorothiazide (PRINZIDE/ZESTORETIC) 10-12.5 MG per tablet Take 1 tablet by mouth daily 90 tablet 3 2/17/2018 at Unknown time     order for DME Equipment being ordered: Glucometer and test strips 1 Device 0 2/17/2018 at Unknown time     metFORMIN (GLUCOPHAGE) 500 MG tablet TAKE 2 TABLETS TWICE A DAY WITH MEALS 180 tablet 1 2/17/2018 at Unknown time     aspirin 81 MG tablet Take 2 tablets (162 mg) by mouth daily 180 tablet 3 2/17/2018 at Unknown time     atorvastatin (LIPITOR) 80 MG tablet Take 1 tablet (80 mg) by mouth daily 90 tablet 3 2/17/2018 at Unknown time     blood glucose monitoring (ONE TOUCH DELICA) lancets Use to test blood sugar 3 times daily. 360 each 3 2/17/2018 at Unknown time     Glucose Blood (BLOOD GLUCOSE TEST STRIPS) STRP four times a day. 360 each 3 2/17/2018 at Unknown time     ONETOUCH DELICA LANCETS 33G MISC four times a day.   2/17/2018 at Unknown time             Physical Exam:   BP (!) 204/82  Pulse 70  Temp 97.6  F (36.4  C) (Oral)  Resp 16  Wt 101.2 kg (223 lb 1.6 oz)  SpO2 95%  BMI 37.13 kg/m2   Physical Exam:   General: NAD  HEENT: Normocephalic atraumatic   Resp: Breathing comfortably, no respiratory distress  CVS: RRR  Skin: warm and dry  Extremities: No edema  Psych: Pleasant, cooperative.     Mental status: Awake, alert, attentive, oriented. Speech is fluent, comprehension and repetition intact. No dysarthria.  Cranial nerves: Eyes disconjugate with left eye exotropia since childhood, PERRLA, EOMI, visual fields full, mild flattening left nasolabial fold, facial sensation intact, shoulder shrug strong, palate rise symmetric, tongue/uvula midline, hearing intact to conversation.  Motor: Tone normal. RUE weakness: right arm drift, biceps 4/5, triceps 4+/5, wrist flexor/extensor 4+/5, finger   3/5, and fine finger movement 0/5. RLE, LUE, LLE 5/5.  No atrophy or twitches.   Sensory: Intact to light touch.   Coordination: FNF no dysmetria, HTS normal  Gait: Deferred.          Data:   CBC:  Lab Results   Component Value Date    WBC 6.9 02/18/2018     Lab Results   Component Value Date    HGB 13.5 02/18/2018     Lab Results   Component Value Date    HCT 43.0 02/18/2018     Lab Results   Component Value Date     02/18/2018       Last Basic Metabolic Panel:  Lab Results   Component Value Date     02/18/2018      Lab Results   Component Value Date    POTASSIUM 4.0 02/18/2018     Lab Results   Component Value Date    CHLORIDE 110 02/18/2018     Lab Results   Component Value Date    VIOLA 8.8 02/18/2018     Lab Results   Component Value Date    CO2 21 02/18/2018     Lab Results   Component Value Date    BUN 12 02/18/2018     Lab Results   Component Value Date    CR 0.66 02/18/2018     Lab Results   Component Value Date     02/18/2018     Head CT head 2/17/18   IMPRESSION:   1. No acute findings.  2. Left MCA aneurysm clip.  3. Areas of old infarction in the periventricular white matter of the  right frontoparietal region and right occipital lobe.    Head CTA head 2/17/18   IMPRESSION:   1. Occluded right internal carotid artery just distal to the carotid  bulb.  2. Aneurysm coils at the junction of the left anterior and middle  cerebral arteries.  3. Diminutive right vertebral artery.  4. Mild atheromatous calcification without significant stenosis of the  left internal carotid artery in the neck and carotid siphon.    MRI: reviewed   Outside MRI shows multiple strokes right MCA and watershed areas.     ASSESSMENT/PLAN  Ms. Friend is a 72 y.o. Woman current smoker (quit 1 wk ago), DM type 2, HTN,   HLD, right ICA occlusion and R MCA strokes 2017, bifurcation ofleft anterior and middle cerebral artery aneurysm s/p coiling presented via OSH with recurrent strokes symptoms over past 2 weeks. MRI  head with MCA territory infarction along with watershed with concern for perfusion dependent ischemia.      # Recurrent strokes in right MCA/watershed territory   Concern for low flow versus thromboembolic etiology.   - CT head perfusion with diamox.   - Will resume hypertension medications slow: lisinopril 10 mg daily today   - Plavix load 300 yesterday  - Plavix 75 mg daily   - Continue  mg daily   - Continue atorvastatin 80 mg daily   - No repeat MRI brain due to severe claustrophobia   - TTE pending   - Stroke education   - Depression and apnea screen     # Right MCA aneurysm   Noted to be 3 mm right MCA bifurcation aneurysm in 6/2017; 6 mm in 10/2017 seen by provider Marisabel.    - Will follow with her outpatient neurosurgereon     # Diabetes type II:   - Holding PTA metformin   - ISS, hypoglycemia protocol     # Hypertension   - Continue to hold PTA metoprolol and HCTZ in setting of concern for pressure dependent perfusion.   - Restarting lisinopril 10 mg daily   - Labetalol/hydralazine for SBP > 200     # Smoker, recently quit 1 week ago.   Able to quit for a few months after initial stroke.   - Smoking cessation consult     Patient discussed with Dr. Shelley, who agrees with the plan.    # FEN    - Diet: Consistent carbohydrate diet   # PPX: PPI, heparin DVT   # Code Status: Full   #Dispo: PT and OT recommend ARU. Family would like to go to [place] for rehab, but 3-5 days to slowly restart antihypertensives and monitor for stroke symptoms.     Leora Roque  PGY-1  P: [phone]

## 2018-02-19 NOTE — PROGRESS NOTES
Care Coordinator Progress Note     Admission Date/Time:  2/18/2018  Attending MD:  Dwight Miguel*     Data  Received Care Coordinator consult for stroke--discharge planning. Chart reviewed, discussed with interdisciplinary team. In 6A Discharge Rounds Dr Roque reported plan is a perfusion scan; pt may be ready for discharge in 1-2 days. PT & OT recommending acute rehab.     Patient was admitted for:  Recurrent right MCA/watershed strokes; right MCA aneurysm.  Pt transferred from [place] [facility] to [facility] for further evaluation and treatment. Pt was seen in the [place] ER on 2-14 & 2-15. On 2-15 it was recommended she be admitted to [facility] in [place]; pt became frustrated waiting for a bed & left the ER AMA.      Past history includes:  Diabetes type II; HTN; right ICA occlusion; strokes May, 2017; aneurysm s/p coiling; hyperlipidemia.    Coordination of Care and Referrals  Chart reviewed; discussed in 6A Discharge Rounds with interdisciplinary team.         Assessment  Pt s/p stroke; inpatient rehab stay is recommended.      Plan  SW for ARU placement.         Olga Smith RN Care Coordinator  Unit 6A, [facility]

## 2018-02-19 NOTE — CONSULTS
[facility]   PM&R CONSULT     Consulting Provider: Dr Gilliam  Reason for Consult: Assessment of rehabilitation   Location of Patient: 6211  Date of Encounter: 2/19/2018   Date of Admission: 2/18/2018           ASSESSMENT/PLAN:    Ms. Rosalie JANE Friend is a Right handed 72 year old yo female PMH of DM2, HTN, HLD, h/o smoking (stopped since admission) h/o right ICA and MCA strokes (5/2017) and right MCA aneurysm s/p coiling with recurrent stroke in right MCA/watershed area thought to be related to blood flow hemodynamics or thromboembolic with resultant left sided weakness greater in upper extremities than lower extremities, impaired motor planning, delayed processing, fatigue, decreased endurance, impaired mobility, impaired ADL/IADLs.    Patient would benefit from acute inpatient rehabilitation requiring PT and OT for 90 minutes daily for 7 days a week.    Patient needs physical therapy due to decreased mobility, impaired transfers currently at  Mod assist of 1-2 with the goal of modified independence. Currently able to ambulate 25 ft with fww and mod assist of 2 with the expected goal of modified independence needing max assist for lower body dressing and impaired balance with the goal of modified indepdnece.    Estimated length of stays is 10-14 days.    Patient needs occupational therapy due to impaired ADL/IADLs       Patient to be staffed with attending, Dr. Culver.    Amso Cates D.O., PGY-3 02/19/2018 11:43 AM     Attending's note   Thank you for allowing us to participate in the care of this patient.   We were asked to see this patient by Dr Miguel on 6A to evaluate rehabilitation needs.   Patient has been seen, examined and evaluated by me independently. I reviewed today's vitals signs, lab values, imaging, medications, and current issues including medical, functional and social history significant to this admission.      I agree with the above note which  reflects my direct input and interpretation of progress.     Primary issues/problems today are as follows  Rosalie is a right handed, previously independent female who presents with several deficits including left hemiparesis, UE greater then the LE, left facial droop, impaired balance, impulsive, post stroke fatigue secondary to right MCA stroke in the setting of HTN, DM, HL, previous strokes and smoking   She has impaired balance, impaired ability to mobilize and perform ADL's.   She has good support in her daughter who at at her bedside and was counseled regarding recovery, timeline and function.     My care coordination and counseling activities include counseling patient and family regarding functional prognosis and possible support needed. They were also counseled on rehabilitation needs, and effort needed on patient's part as well as risk factors, post stroke fatigue and depression     Melissa Culver MD            HPI:    Patient is a 72 f with hx of DMII, HTN, dlp, smoking, RT ICA occlusion + MCA strokes May 2017, aneurysm s/p colining. Presented with recurrent stroke symptoms over last 2 weeks. CTA: Rt ICA occlusion, Rt MCA stenosis. MRI multiple strokes at Rt MCA + watershed areas. She was diagnosed with recurrent Rt MCA/watershed strokes probably blood flow hemodynamics vs thrombo-embolic and Rt MCA aneurysm ~7mm.          PREVIOUS LEVEL OF FUNCTION   She was previoulsy indepependent for mobility, ADL/IADL's and swallowing/speech.         CURRENT FUNCTION   She has left upper extreity weakness an dmild dysmetria. She is mod assist x1 for bed mobility. She is able to transfer from bed to chair with mod assist of 1.      LIVING SITUATION/SUPPORT   Patient lived at home with main living areas all on one floor. There are two steps to enter at the front of the house and 4 steps to get up the back. She is having a relative build a ramp for her.    Her daughter lives a couples blocks from the patient's house.  The patient would like to go to an ARU in [place].     SOCIAL HISTORY  Social History            Social History     Marital status:        Spouse name: N/A     Number of children: N/A     Years of education: N/A             Social History Main Topics     Smoking status: Light Tobacco Smoker       Types: Cigarettes       Last attempt to quit: 5/5/2017     Smokeless tobacco: Never Used         Comment: limits self to about 4-5 daily , she is quitting on her own     Alcohol use No     Drug use: No     Sexual activity: Not on file           Other Topics Concern     Not on file      Social History Narrative     Past Medical History:   Past Medical History         Past Medical History:   Diagnosis Date     Cerebral infarction (H)       05/06/2017     Diabetes type 2, uncontrolled (H)       Hyperlipemia            Current Medications:      Current Facility-Administered Medications   Medication     medication instruction     sodium chloride 0.9% infusion     labetalol (NORMODYNE/TRANDATE) injection 10-20 mg     hydrALAZINE (APRESOLINE) injection 10-20 mg     glucose 40 % gel 15-30 g     Or     dextrose 50 % injection 25-50 mL     Or     glucagon injection 1 mg     insulin aspart (NovoLOG) inj (RAPID ACTING)     pantoprazole (PROTONIX) 40 mg IV push injection     aspirin tablet 325 mg     clopidogrel (PLAVIX) tablet 75 mg     atorvastatin (LIPITOR) tablet 80 mg            Review of Systems:  Total of ten systems reviewed, pertinent positives and negatives as follows  Instability with standing and walking.    Feels generally fatigued  Reports weakness in left upper extremity  Denies any tingling or numbness  No problems with bladder.   LBM reportedly 1 week ago (per patient).  No chest pain. No cough or SOB.  No visual changes.   No headache or photophobia.   No nausea, or abdominal pain.  Slept poorly last night  No joint pain, muscle pain or swelling.  Mood is good, denies any symptoms of depression.   Remainder of  the review of the systems was negative.              Labs         Lab Results   Component Value Date     WBC 6.9 02/18/2018     HGB 13.5 02/18/2018     HCT 43.0 02/18/2018     MCV 85 02/18/2018      02/18/2018            Lab Results   Component Value Date      02/18/2018     POTASSIUM 4.0 02/18/2018     CHLORIDE 110 (H) 02/18/2018     CO2 21 02/18/2018      (H) 02/18/2018            Lab Results   Component Value Date     GFRESTIMATED 88 02/18/2018     GFRESTBLACK >90 02/18/2018            Lab Results   Component Value Date     AST 10 02/17/2018     ALT 24 02/17/2018     ALKPHOS 64 02/17/2018     BILITOTAL 0.3 02/17/2018            Lab Results   Component Value Date     INR 1.04 02/17/2018            Lab Results   Component Value Date     BUN 12 02/18/2018     CR 0.66 02/18/2018            ON EXAMINATION:   Vitals           Vitals:     02/18/18 2009 02/18/18 2213 02/19/18 0024 02/19/18 0350   BP: 197/42 191/88 175/70 (!) 186/91   BP Location: Left arm Right arm Right arm Right arm   Pulse:           Resp: 16 16 16 16   Temp: 97.6  F (36.4  C) 97.9  F (36.6  C) 97.5  F (36.4  C) 97.7  F (36.5  C)   TempSrc: Oral Oral Oral Oral   SpO2: 95% 96% 96% 95%   Weight:                    Physical Exam:  Blood pressure (!) 186/91, pulse 70, temperature 97.7  F (36.5  C), temperature source Oral, resp. rate 16, weight 101.2 kg (223 lb 1.6 oz), SpO2 95 %, not currently breastfeeding.     GEN: NAD, seated/lying comfortably in bed  She/He is alert, appropriate, cooperative  Speech is linear, logical, intelligible.  Comprehension is intact to person, place and date  HEENT: NCAT  RESPIRATORY: CTAB, no wheezes/crackles/rales  CARDIAC: RRR, no m/g/r  MSK: full active and passive ROM at all major joints of the bilaterally upper and lower extremities  No muscle atrophy noted  ABD: soft, non tender, pos BS  NEURO:                        CRANIAL NERVES: Discs flat. Pupils equal, round and reactive to light.               Extraocular movements full. Visual fields full. Mild left facial weakness.                   Tongue midline. Hearing intact to finger rubbing.                         Sensation: decreased sensation on left side.                        Strength: 3/5 left shoulder abduction, elbow flexion, 4/5 left elbow extension, 3/5 left wrist extension, long finger flexion and finger abduction, LLE 4/5, 5/5 RUE/RLE. Decreased motor planning, unable to dress self, ambulate, transfer without assistance.                        Gait: normal reciprocal gait                        Cognition: fund of knowledge and train of thought appropriate  SKIN: no rashes or lesions noted.  Patient has IV line.   EXT: edema bilaterally, chronic stasis changes, no ulcers.   PSYCH: normal affect.  Mood is baseline without signs of depression        Thank you for the consult. Please call for any questions. Pager number [phone]      Melissa Culver         Total of 70 min spent in this encounter, more than 50% in counseling and coordination.

## 2018-02-19 NOTE — PROGRESS NOTES
"Social Work: Assessment with Discharge Plan    Patient Name:  Rosalie JANE Friend  :  1945  Age:  72 year old  MRN:  [id number]  Risk/Complexity Score:   4  Completed assessment with:  Pt and daughter (Renetta)    Presenting Information   Reason for Referral:  Assessment and Discharge Planning  Date of Intake:  2018  Referral Source:  Case Finding  Decision Maker:  Patient  Alternate Decision Maker:  Daughter (Renetta)  Health Care Directive:  Provided education  Living Situation:  Pt lives in a rambler style home.    Pt's brother (Jeff) lives with pt.  There are 4 steps to enter the home from the rear entrance and 3 from the front (with handrails).  Pt states that a ramp is being built onto her home this next weekend.  Pt's bed and bath (step in shower) are on the main floor. Pt states that a walk in tub will be installed in her home in the \"very\" near future.  Laundry is on the main floor.  Previous Functional Status:  Prior to hospitalization, pt was indep with all mobility (no AD, 2 falls in the past year), adl's, IADL's, driving, medication administration and with financial mgnt..  Pt has a cane, heightened toilet, and a hh shower nozzle.  Pt has a handicapped parking certificate.  Pt was not utilizing add'l community resources.    Patient and family understanding of hospitalization:  CVA  Cultural/Language/Spiritual Considerations:  None per pt.  Pt states that she is part \"[place]\" [place].  Adjustment to Illness:  Appropriate for situation    Physical Health  Reason for Admission:  No diagnosis found.  Services Needed/Recommended:  ARU    Mental Health/Chemical Dependency  Diagnosis:  None  Support/Services in Place:  Not applicable  Services Needed/Recommended:  Not applicable    Support System  Significant relationship at present time:  Pt has just 1 child, her adult daughter Renetta who lives in [place] (6 blocks from pt).  Family of origin is available for support:  Daughter, brother (Jeff), " 4 adult grandchildren and 9 adult great grandchildren all of who reside in [place] and assist as needed  Other support available:  Friends and Family  Gaps in support system:  None identified  Patient is caregiver to:  None     Provider Information   Primary Care Physician:  Yash Cuellar   [phone]   Clinic:  [facility] [address] / [place] [address]      :  None    Financial   Income Source:  Social Security and Pension ([place]  Financial Concerns:  None indicated  Insurance:    Payor/Plan Subscriber Name Rel Member # Group #   MEDICARE - MEDICARE NADIA SMYTH  [id number]       ATTN CLAIMS, [address]   BCBS - BCBS OUT OF ST* NADIA SMYTH  [id number]      [address] [address]       Discharge Plan   Patient and family discharge goal:  Rehab placement near to home followed by return to home  Provided education on discharge plan:  YES  Patient agreeable to discharge plan:  YES  A list of Medicare Certified Facilities was provided to the patient and/or family to encourage patient choice. Patient's choices for facility are:  Therapy's are recommending acute rehab placement.  Pt's preferences (in order of preference) are:  [facility] ARU, [facility] ARU and [facility] ARU.  Will NH provide Skilled rehabilitation or complex medical:  YES  General information regarding anticipated insurance coverage and possible out of pocket cost was discussed. Patient and patient's family are aware patient may incur the cost of transportation to the facility, pending insurance payment: YES  Barriers to discharge:  Per Dr. Roque, discharge is not anticipated until the end of the week as MD's are working on bringing down pt's BP.    Discharge Recommendations   Anticipated Disposition:  ARU Placement  Transportation Needs:  Family has requested to provide transport.        Additional comments   SW phoned  Admissions at the following ARU's:    1.  [facility] ([phone]) - the acute rehab unit closed roughly two years ago.    2.   [facility] ([phone]), left a message for Amrita CABRERA To call    3.  [facility] ([phone]), spoke with Rosa in Admissions.  Rosa anticipates openings and will accept Friday and Saturday admits from the [facility].  Referred and faxed (fax [phone]).      KEISHA will follow for discharge planning.    CARMELA Conti  Social Work, 6A  Phone:  [phone]  Pager:  [phone]  2/19/2018

## 2018-02-19 NOTE — PLAN OF CARE
Problem: Stroke (Ischemic) (Adult)  Goal: Signs and Symptoms of Listed Potential Problems Will be Absent, Minimized or Managed (Stroke)  Signs and symptoms of listed potential problems will be absent, minimized or managed by discharge/transition of care (reference Stroke (Ischemic) (Adult) CPG).   Outcome: Improving  Htn, within parameters. Denies pain. Pt has slight left facial droop. LUE stronger compared to yesterday 3/5. LLE 4/5. Good po intake. Voiding spontaneously, pt feels constopated (MDs aware to order meds). Pt had echo done today, will have CTA done tomorrow (should be NPO in the am). Worked with physical therapy, up with assist of 1-2 and walker. Can pivot with 1. Daughter at bedside and helpful with cares. Plan is for stroke teaching at 1100 tomorrow.     Please give 1500 BP med when comes from pharmacy.

## 2018-02-19 NOTE — PLAN OF CARE
Problem: Patient Care Overview  Goal: Plan of Care/Patient Progress Review  Outcome: No Change  Cared for 1500 - 1900.   No major neuro changes from previous shift report. PT and OT recommending ARU for L hemiplegia. Med rec completed. BG WNL. Continue to monitor and follow POC.

## 2018-02-20 ENCOUNTER — APPOINTMENT (OUTPATIENT)
Dept: OCCUPATIONAL THERAPY | Facility: CLINIC | Age: 73
DRG: 065 | End: 2018-02-20
Attending: PSYCHIATRY & NEUROLOGY
Payer: MEDICARE

## 2018-02-20 ENCOUNTER — APPOINTMENT (OUTPATIENT)
Dept: CT IMAGING | Facility: CLINIC | Age: 73
DRG: 065 | End: 2018-02-20
Attending: STUDENT IN AN ORGANIZED HEALTH CARE EDUCATION/TRAINING PROGRAM
Payer: MEDICARE

## 2018-02-20 LAB
ANION GAP SERPL CALCULATED.3IONS-SCNC: 8 MMOL/L (ref 3–14)
BUN SERPL-MCNC: 11 MG/DL (ref 7–30)
CALCIUM SERPL-MCNC: 8.8 MG/DL (ref 8.5–10.1)
CHLORIDE SERPL-SCNC: 113 MMOL/L (ref 94–109)
CO2 SERPL-SCNC: 21 MMOL/L (ref 20–32)
CREAT SERPL-MCNC: 0.61 MG/DL (ref 0.52–1.04)
ERYTHROCYTE [DISTWIDTH] IN BLOOD BY AUTOMATED COUNT: 14.3 % (ref 10–15)
GFR SERPL CREATININE-BSD FRML MDRD: >90 ML/MIN/1.7M2
GLUCOSE BLDC GLUCOMTR-MCNC: 102 MG/DL (ref 70–99)
GLUCOSE BLDC GLUCOMTR-MCNC: 112 MG/DL (ref 70–99)
GLUCOSE BLDC GLUCOMTR-MCNC: 112 MG/DL (ref 70–99)
GLUCOSE BLDC GLUCOMTR-MCNC: 118 MG/DL (ref 70–99)
GLUCOSE SERPL-MCNC: 116 MG/DL (ref 70–99)
HCT VFR BLD AUTO: 42.5 % (ref 35–47)
HGB BLD-MCNC: 13.5 G/DL (ref 11.7–15.7)
MCH RBC QN AUTO: 26.8 PG (ref 26.5–33)
MCHC RBC AUTO-ENTMCNC: 31.8 G/DL (ref 31.5–36.5)
MCV RBC AUTO: 85 FL (ref 78–100)
PLATELET # BLD AUTO: 251 10E9/L (ref 150–450)
POTASSIUM SERPL-SCNC: 4.1 MMOL/L (ref 3.4–5.3)
RBC # BLD AUTO: 5.03 10E12/L (ref 3.8–5.2)
SODIUM SERPL-SCNC: 142 MMOL/L (ref 133–144)
WBC # BLD AUTO: 6.7 10E9/L (ref 4–11)

## 2018-02-20 PROCEDURE — 70496 CT ANGIOGRAPHY HEAD: CPT

## 2018-02-20 PROCEDURE — 25000128 H RX IP 250 OP 636: Performed by: PSYCHIATRY & NEUROLOGY

## 2018-02-20 PROCEDURE — 40000141 ZZH STATISTIC PERIPHERAL IV START W/O US GUIDANCE

## 2018-02-20 PROCEDURE — 80048 BASIC METABOLIC PNL TOTAL CA: CPT | Performed by: STUDENT IN AN ORGANIZED HEALTH CARE EDUCATION/TRAINING PROGRAM

## 2018-02-20 PROCEDURE — 40000133 ZZH STATISTIC OT WARD VISIT

## 2018-02-20 PROCEDURE — 25000132 ZZH RX MED GY IP 250 OP 250 PS 637: Mod: GY | Performed by: STUDENT IN AN ORGANIZED HEALTH CARE EDUCATION/TRAINING PROGRAM

## 2018-02-20 PROCEDURE — A9270 NON-COVERED ITEM OR SERVICE: HCPCS | Mod: GY | Performed by: STUDENT IN AN ORGANIZED HEALTH CARE EDUCATION/TRAINING PROGRAM

## 2018-02-20 PROCEDURE — 85027 COMPLETE CBC AUTOMATED: CPT | Performed by: STUDENT IN AN ORGANIZED HEALTH CARE EDUCATION/TRAINING PROGRAM

## 2018-02-20 PROCEDURE — 25000128 H RX IP 250 OP 636: Performed by: RADIOLOGY

## 2018-02-20 PROCEDURE — 00000146 ZZHCL STATISTIC GLUCOSE BY METER IP

## 2018-02-20 PROCEDURE — 97535 SELF CARE MNGMENT TRAINING: CPT | Mod: GO

## 2018-02-20 PROCEDURE — 36415 COLL VENOUS BLD VENIPUNCTURE: CPT | Performed by: STUDENT IN AN ORGANIZED HEALTH CARE EDUCATION/TRAINING PROGRAM

## 2018-02-20 PROCEDURE — 25000125 ZZHC RX 250: Performed by: RADIOLOGY

## 2018-02-20 PROCEDURE — 12000001 ZZH R&B MED SURG/OB UMMC

## 2018-02-20 RX ORDER — HYDROCHLOROTHIAZIDE 12.5 MG/1
12.5 CAPSULE ORAL DAILY
Status: DISCONTINUED | OUTPATIENT
Start: 2018-02-20 | End: 2018-02-22 | Stop reason: HOSPADM

## 2018-02-20 RX ORDER — METOPROLOL TARTRATE 50 MG
50 TABLET ORAL 2 TIMES DAILY
Status: DISCONTINUED | OUTPATIENT
Start: 2018-02-20 | End: 2018-02-22 | Stop reason: HOSPADM

## 2018-02-20 RX ORDER — ACETAZOLAMIDE 500 MG/5ML
1000 INJECTION, POWDER, LYOPHILIZED, FOR SOLUTION INTRAVENOUS ONCE
Status: COMPLETED | OUTPATIENT
Start: 2018-02-20 | End: 2018-02-20

## 2018-02-20 RX ORDER — IOPAMIDOL 755 MG/ML
80 INJECTION, SOLUTION INTRAVASCULAR ONCE
Status: COMPLETED | OUTPATIENT
Start: 2018-02-20 | End: 2018-02-20

## 2018-02-20 RX ADMIN — CLOPIDOGREL 75 MG: 75 TABLET, FILM COATED ORAL at 08:08

## 2018-02-20 RX ADMIN — LISINOPRIL 10 MG: 10 TABLET ORAL at 08:09

## 2018-02-20 RX ADMIN — METOPROLOL TARTRATE 50 MG: 50 TABLET ORAL at 19:53

## 2018-02-20 RX ADMIN — HYDROCHLOROTHIAZIDE 12.5 MG: 12.5 CAPSULE ORAL at 16:35

## 2018-02-20 RX ADMIN — SODIUM CHLORIDE, PRESERVATIVE FREE 160 ML: 5 INJECTION INTRAVENOUS at 11:32

## 2018-02-20 RX ADMIN — MAGNESIUM HYDROXIDE 30 ML: 400 SUSPENSION ORAL at 13:04

## 2018-02-20 RX ADMIN — ATORVASTATIN CALCIUM 80 MG: 80 TABLET, FILM COATED ORAL at 19:53

## 2018-02-20 RX ADMIN — SENNOSIDES AND DOCUSATE SODIUM 2 TABLET: 8.6; 5 TABLET ORAL at 22:04

## 2018-02-20 RX ADMIN — PANTOPRAZOLE SODIUM 40 MG: 40 TABLET, DELAYED RELEASE ORAL at 08:09

## 2018-02-20 RX ADMIN — IOPAMIDOL 80 ML: 755 INJECTION, SOLUTION INTRAVENOUS at 11:32

## 2018-02-20 RX ADMIN — ACETAZOLAMIDE 1000 MG: 500 INJECTION, POWDER, LYOPHILIZED, FOR SOLUTION INTRAVENOUS at 11:34

## 2018-02-20 RX ADMIN — ASPIRIN 325 MG ORAL TABLET 325 MG: 325 PILL ORAL at 08:08

## 2018-02-20 ASSESSMENT — VISUAL ACUITY
OU: NORMAL ACUITY;GLASSES

## 2018-02-20 NOTE — PROGRESS NOTES
NEUROLOGY STROKE PROGRESS NOTE   2018  Rosalie Seals  : 1945 MRN# [id number]      Subjective :        Ms. Seals is a 72 y.o. Woman current smoker (quit 1 wk ago), DM type 2, HTN,  HLD, right ICA occlusion and R MCA strokes 2017, bifurcation ofleft anterior and middle cerebral artery aneurysm s/p coiling presented via OSH with recurrent strokes symptoms over past 2 weeks.      24 hrs: No acute events. Improvement in BP with restarting lisinopril. Plan for CT perfusion with diamox today.           Medications:     Current Facility-Administered Medications:      sodium chloride 0.9 % bag 500mL for CT scan flush use, 160 mL, As instructed, Q1H PRN, Hao Cortez MD, 160 mL at 18 1132     hydrochlorothiazide (MICROZIDE) capsule 12.5 mg, 12.5 mg, Oral, Daily, Leora Roque MD     metoprolol tartrate (LOPRESSOR) tablet 50 mg, 50 mg, Oral, BID, Leora Roque MD     pantoprazole (PROTONIX) EC tablet 40 mg, 40 mg, Oral, Daily with breakfast, Jose Garcia MD, 40 mg at 18 0809     labetalol (NORMODYNE/TRANDATE) injection 10-20 mg, 10-20 mg, Intravenous, Q10 Min PRN, Leora Roque MD     hydrALAZINE (APRESOLINE) injection 10-20 mg, 10-20 mg, Intravenous, Q1H PRN, Leora Roque MD     sodium chloride bacteriostatic 0.9 % flush 12 mL, 12 mL, Intravenous, Once, Dwight Miguel MD     lisinopril (PRINIVIL/ZESTRIL) tablet 10 mg, 10 mg, Oral, Daily, Leora Roque MD, 10 mg at 18 0809     magnesium hydroxide (MILK OF MAGNESIA) suspension 30 mL, 30 mL, Oral, Daily PRN, Leora Roque MD, 30 mL at 18 1304     bisacodyl (DULCOLAX) Suppository 10 mg, 10 mg, Rectal, Daily PRN, Leora Roque MD     senna-docusate (SENOKOT-S;PERICOLACE) 8.6-50 MG per tablet 2 tablet, 2 tablet, Oral, At Bedtime, Leora Roque MD, 2 tablet at 18 2228     insulin aspart (NovoLOG) inj (RAPID ACTING), 1-6 Units, Subcutaneous, 4x Daily AC &  HS, Pawan Berry MD     medication instruction, , Does not apply, Continuous PRN, Jose Garcia MD     glucose 40 % gel 15-30 g, 15-30 g, Oral, Q15 Min PRN **OR** dextrose 50 % injection 25-50 mL, 25-50 mL, Intravenous, Q15 Min PRN **OR** glucagon injection 1 mg, 1 mg, Subcutaneous, Q15 Min PRN, Jose Garcia MD     aspirin tablet 325 mg, 325 mg, Oral, Daily, Leora Roque MD, 325 mg at 02/20/18 0808     clopidogrel (PLAVIX) tablet 75 mg, 75 mg, Oral, Daily, Leora Roque MD, 75 mg at 02/20/18 0808     atorvastatin (LIPITOR) tablet 80 mg, 80 mg, Oral, Daily at 8 pm, Leora Roque MD, 80 mg at 02/19/18 2049     Prescriptions Prior to Admission   Medication Sig Dispense Refill Last Dose     Clopidogrel Bisulfate (PLAVIX PO) Take 75 mg by mouth daily   2/17/2018 at Unknown time     METOPROLOL TARTRATE PO Take 50 mg by mouth 2 times daily   2/17/2018 at Unknown time     lisinopril-hydrochlorothiazide (PRINZIDE/ZESTORETIC) 10-12.5 MG per tablet Take 1 tablet by mouth daily 90 tablet 3 2/17/2018 at Unknown time     order for DME Equipment being ordered: Glucometer and test strips 1 Device 0 2/17/2018 at Unknown time     metFORMIN (GLUCOPHAGE) 500 MG tablet TAKE 2 TABLETS TWICE A DAY WITH MEALS 180 tablet 1 2/17/2018 at Unknown time     aspirin 81 MG tablet Take 2 tablets (162 mg) by mouth daily 180 tablet 3 2/17/2018 at Unknown time     atorvastatin (LIPITOR) 80 MG tablet Take 1 tablet (80 mg) by mouth daily 90 tablet 3 2/17/2018 at Unknown time     blood glucose monitoring (ONE TOUCH DELICA) lancets Use to test blood sugar 3 times daily. 360 each 3 2/17/2018 at Unknown time     Glucose Blood (BLOOD GLUCOSE TEST STRIPS) STRP four times a day. 360 each 3 2/17/2018 at Unknown time     ONETOUCH DELICA LANCETS 33G MISC four times a day.   2/17/2018 at Unknown time             Physical Exam:   /85 (BP Location: Right arm)  Pulse 84  Temp 97.5  F (36.4  C) (Oral)   Resp 14  Wt 101.2 kg (223 lb 1.6 oz)  SpO2 95%  BMI 37.13 kg/m2   Physical Exam:   General: NAD  HEENT: head normocephalic   Resp: Breathing comfortably, no respiratory distress  CVS: RRR  Skin: warm and dry  Extremities: No edema    Mental status: Awake, alert, attentive, oriented. Speech is fluent, comprehension and repetition intact. No dysarthria.  Cranial nerves: Eyes disconjugate with left eye exotropia since childhood, PERRLA, EOMI, visual fields full, mild flattening left nasolabial fold, facial sensation intact, shoulder shrug strong, palate rise symmetric, tongue/uvula midline, hearing intact to conversation.  Motor: Tone normal. RUE weakness: right arm drift, biceps 4/5, triceps 4+/5, wrist flexor/extensor 4+/5, finger  3/5, and fine finger movement 0/5. RLE, LUE, LLE 5/5.  No atrophy or twitches.   Sensory: Intact to light touch.   Coordination: FNF no dysmetria, HTS normal  Gait: Stance stable. Steps with right foot, but appears unstable with lagging left leg/foot. Stopped further assessment.          Data:   CBC:  Lab Results   Component Value Date    WBC 6.7 02/20/2018     Lab Results   Component Value Date    HGB 13.5 02/20/2018     Lab Results   Component Value Date    HCT 42.5 02/20/2018     Lab Results   Component Value Date     02/20/2018       Last Basic Metabolic Panel:  Lab Results   Component Value Date     02/20/2018      Lab Results   Component Value Date    POTASSIUM 4.1 02/20/2018     Lab Results   Component Value Date    CHLORIDE 113 02/20/2018     Lab Results   Component Value Date    VIOLA 8.8 02/20/2018     Lab Results   Component Value Date    CO2 21 02/20/2018     Lab Results   Component Value Date    BUN 11 02/20/2018     Lab Results   Component Value Date    CR 0.61 02/20/2018     Lab Results   Component Value Date     02/20/2018     TTE 2/19  Interpretation Summary  No cardiac source for embolus identified. The atrial septum is intact as  assessed by  color Doppler and agitated saline bubble study. Mild left atrial enlargement. EF 60-65%     ASSESSMENT/PLAN  Ms. Friend is a 72 y.o. Woman current smoker (quit 1 wk ago), DM type 2, HTN,   HLD, right ICA occlusion and R MCA strokes 2017, bifurcation ofleft anterior and middle cerebral artery aneurysm s/p coiling presented via OSH with recurrent strokes symptoms over past 2 weeks. MRI head with MCA territory infarction along with watershed with concern for perfusion dependent ischemia versus embolic phenomena.       # Recurrent strokes in right MCA/watershed territory   Concern for low flow versus thromboembolic etiology.   - CT head perfusion with diamox today   - Will resume hypertension medications: lisinopril 10 mg daily, HCTZ 12.5 mg daily and metoprolol 50 mg BID   - Plavix load 300 completed   - Plavix 75 mg daily   - Continue  mg daily   - Continue atorvastatin 80 mg daily   - No repeat MRI brain due to severe claustrophobia   - TTE: EF 60-65%, no PFO, mild left atrial enlargement   - Stroke education   - Depression and apnea screen      # Right MCA aneurysm   Noted to be 3 mm right MCA bifurcation aneurysm in 6/2017; 6 mm in 10/2017 seen by provider Marisabel.    - Will follow with her outpatient neurosurgereon      # Diabetes type II:   - Holding PTA metformin   - ISS, hypoglycemia protocol      # Hypertension   - Restart lisinopril-HCTZ 10-12.5 mg daily.   - Restart metoprolol 50 mg BID   - Labetalol/hydralazine for SBP > 200      # Smoker, recently quit 1 week ago.   Able to quit for a few months after initial stroke.   - Smoking cessation consult      Patient discussed with Dr. Shelley, who agrees with the plan.     # FEN    - Diet: Consistent carbohydrate diet   # PPX: PPI, d'cd heparin DVT per patient request and agreement to sit in chair regularly.   # Code Status: Full   #Dispo: PT and OT recommend ARU. Family would like to go to [place] for rehab, but anticipate 2-3 more days.      Leora  Mya  PGY-1  P: [phone]

## 2018-02-20 NOTE — PLAN OF CARE
Problem: Patient Care Overview  Goal: Plan of Care/Patient Progress Review  PT/6A: cancel. Pt sleeping upon arrival and family in room requesting for pt to sleep. PT unable to check back later in day and rescheduled for 2/21

## 2018-02-20 NOTE — PLAN OF CARE
Problem: Patient Care Overview  Goal: Plan of Care/Patient Progress Review  Outcome: No Change  VSS ex HTN within parameters. A&Ox4. Neuros include LUE 3/5, LLE 4/5, L drift and slight L facial droop. Denies pain. Pt has been NPO for CTA this AM. Pt reports no BM for 5 days, BM med given yesterday. Per pt request, would like MOM, after CTA this AM. Voiding spont. MIVF infusing at 100ml/hr. up with assist of 1-2 and walker. Pivot with 1 to BSC. Daughter at bedside and supportive of cares. Plan for stroke teaching today at 1100. Continuous cardiac monitoring overnight, with Sinus krupa. CCM stopped this AM.   Continue to monitor and follow POC.

## 2018-02-20 NOTE — PROGRESS NOTES
Patient Name: Rosalie Seals  Medical Record Number: [id number]  Today's Date: 2/20/2018    Patient arrives to CT scan unit for a diamox injection at 1115.  Upon assessment by RT kentrell Salamanca, patients peripheral IV line was non-functional, right forearm PIC dc'd.  Call placed to IV team who arrived shortly and placed a PIV in patients left arm.  Patient BP systolic 180's pre procedure with current PRN medication orders to treat systolic blood pressure if it is over 200.    Diamox injection at 4532-2358.  Patient blood pressure steady at systolics 180's.  Patient reports no symptoms or changes since diamox injection.  CT scan completed 10 minutes after injection at 1145.    Patient transported back to nursing unit via wheelchair post scan.

## 2018-02-20 NOTE — PROGRESS NOTES
Social Work Services Progress Note    Hospital Day: 3  Date of Initial Social Work Evaluation:  2/19/18  Collaborated with:  ARU Admissions Coordinator's    Data:  Acute rehab placement is anticipated upon discharge from acute hospitalization.    Intervention:  KEISHA phoned Admissions (Rosa [phone]) at [facility] and confirmed receipt of 2/19/18 referral.  SW received a return call from Admissions at [facility] (Amrita [phone]) and they anticipate openings the end of the week.  Amrita indicates that they will only consider a Friday admit if the patient leaves early in the am.  Amrita indicates that they only consider Saturday admit's if they have a MD who is willing to come in on the weekend.  SW referred pt and faxed assessment materials.    Assessment:  Pt is agreeable to acute rehab placement.    Plan:    Anticipated Disposition: Acute rehab placement    Barriers to d/c plan:  Medical readiness    Follow Up:  SW will continue to follow for discharge planning.    CARMELA Conti  Social Work, 6A  Phone:  [phone]  Pager:  [phone]  2/20/2018

## 2018-02-20 NOTE — PLAN OF CARE
Problem: Patient Care Overview  Goal: Plan of Care/Patient Progress Review  Discharge Planner OT   Patient plan for discharge: ARU  Current status: Patient seen for showering task. Patient requires min A for ambulation and max A to maintain grasp with L hand on walker. Patient showers with mod A to incorporate LUE into functional task. Patient requires max A to wash back and feet. Patient requires mod A to transfer in<>out of shower. Patient performs grooming task with min A to incorporate into oral cares. Patient requires min A to vicki brief, max A to vicki socks. Patient fatigues with activity  Barriers to return to prior living situation: LUE/LLE hemiparesis, impulsivity  Recommendations for discharge: ARU  Rationale for recommendations: Intensive rehab to address neurologic deficits impacting performance in ADLs and IADLs.       Entered by: Barbara Welch 02/20/2018 9:36 AM

## 2018-02-20 NOTE — PLAN OF CARE
Problem: Patient Care Overview  Goal: Plan of Care/Patient Progress Review  AVSS except htn within parameters.  SaO2 96% on room air.  On continuous cardiac monitor.  Denied pain.  Neuros: A&O x 4, slight left facial droop, LUE 3/5, LLE 4/5, right side 5/5, left drift, left finger to nose ataxic but deliberate.  Good po intake.  Voided spontaneously.  Bowel meds ordered but patient wants to wait to take them until AM.  Up to chair and commode with assist of 1.  SCD's on when in bed.  Daughter at bedside and helpful with cares.  NPO after midnight for CTA tomorrow.  Plan for stroke teaching at 1100 tomorrow.  Continue to monitor as ordered and follow POC.

## 2018-02-20 NOTE — PLAN OF CARE
Last ov 1/23/18 Problem: Patient Care Overview  Goal: Plan of Care/Patient Progress Review  Discharge Planner PT   Patient plan for discharge: ARU  Current status: Ambulated 25 and 25 ft w/ FWW and min-modAx2. Min-modAx2 supine>sit, IND sit>supine. CGA-minAx2 STS. Impulsive and decreased safety awareness. Educated on importance and benefits of intensive rehab post-stroke, pt and family on board following extensive education from PT/OT.   Barriers to return to prior living situation: Balance, medical status, L UE/LE weakness   Recommendations for discharge: ARU   Rationale for recommendations: Pt well below baseline in terms of functional mobility and gait. Extensive loss of LUE function. Family and pt would like pt to be IND as possible return to home. Would benefit form intensive therapy to address deficits with balance, bed mobility, transfers, and gait.        Entered by: Cristian Zhang 02/18/2018 5:41 PM

## 2018-02-21 ENCOUNTER — APPOINTMENT (OUTPATIENT)
Dept: PHYSICAL THERAPY | Facility: CLINIC | Age: 73
DRG: 065 | End: 2018-02-21
Attending: PSYCHIATRY & NEUROLOGY
Payer: MEDICARE

## 2018-02-21 ENCOUNTER — APPOINTMENT (OUTPATIENT)
Dept: OCCUPATIONAL THERAPY | Facility: CLINIC | Age: 73
DRG: 065 | End: 2018-02-21
Attending: PSYCHIATRY & NEUROLOGY
Payer: MEDICARE

## 2018-02-21 LAB
GLUCOSE BLDC GLUCOMTR-MCNC: 113 MG/DL (ref 70–99)
GLUCOSE BLDC GLUCOMTR-MCNC: 119 MG/DL (ref 70–99)
GLUCOSE BLDC GLUCOMTR-MCNC: 215 MG/DL (ref 70–99)

## 2018-02-21 PROCEDURE — 40000133 ZZH STATISTIC OT WARD VISIT

## 2018-02-21 PROCEDURE — 97110 THERAPEUTIC EXERCISES: CPT | Mod: GO

## 2018-02-21 PROCEDURE — 25000132 ZZH RX MED GY IP 250 OP 250 PS 637: Mod: GY | Performed by: STUDENT IN AN ORGANIZED HEALTH CARE EDUCATION/TRAINING PROGRAM

## 2018-02-21 PROCEDURE — 40000193 ZZH STATISTIC PT WARD VISIT: Performed by: PHYSICAL THERAPIST

## 2018-02-21 PROCEDURE — A9270 NON-COVERED ITEM OR SERVICE: HCPCS | Mod: GY | Performed by: STUDENT IN AN ORGANIZED HEALTH CARE EDUCATION/TRAINING PROGRAM

## 2018-02-21 PROCEDURE — 97112 NEUROMUSCULAR REEDUCATION: CPT | Mod: GP | Performed by: PHYSICAL THERAPIST

## 2018-02-21 PROCEDURE — 97116 GAIT TRAINING THERAPY: CPT | Mod: GP | Performed by: PHYSICAL THERAPIST

## 2018-02-21 PROCEDURE — 97535 SELF CARE MNGMENT TRAINING: CPT | Mod: GO

## 2018-02-21 PROCEDURE — 12000001 ZZH R&B MED SURG/OB UMMC

## 2018-02-21 PROCEDURE — 00000146 ZZHCL STATISTIC GLUCOSE BY METER IP

## 2018-02-21 PROCEDURE — 97530 THERAPEUTIC ACTIVITIES: CPT | Mod: GP | Performed by: PHYSICAL THERAPIST

## 2018-02-21 RX ORDER — AMOXICILLIN 250 MG
2 CAPSULE ORAL AT BEDTIME
Qty: 100 TABLET | DISCHARGE
Start: 2018-02-21 | End: 2018-02-22

## 2018-02-21 RX ORDER — BISACODYL 10 MG
10 SUPPOSITORY, RECTAL RECTAL DAILY PRN
Qty: 30 SUPPOSITORY | DISCHARGE
Start: 2018-02-21 | End: 2018-03-13

## 2018-02-21 RX ORDER — ASPIRIN 325 MG
325 TABLET ORAL DAILY
Qty: 120 TABLET | DISCHARGE
Start: 2018-02-22 | End: 2018-02-22

## 2018-02-21 RX ADMIN — ATORVASTATIN CALCIUM 80 MG: 80 TABLET, FILM COATED ORAL at 19:40

## 2018-02-21 RX ADMIN — METOPROLOL TARTRATE 50 MG: 50 TABLET ORAL at 08:02

## 2018-02-21 RX ADMIN — PANTOPRAZOLE SODIUM 40 MG: 40 TABLET, DELAYED RELEASE ORAL at 08:02

## 2018-02-21 RX ADMIN — METOPROLOL TARTRATE 50 MG: 50 TABLET ORAL at 19:41

## 2018-02-21 RX ADMIN — ASPIRIN 325 MG ORAL TABLET 325 MG: 325 PILL ORAL at 08:02

## 2018-02-21 RX ADMIN — HYDROCHLOROTHIAZIDE 12.5 MG: 12.5 CAPSULE ORAL at 08:02

## 2018-02-21 RX ADMIN — MAGNESIUM HYDROXIDE 30 ML: 400 SUSPENSION ORAL at 10:11

## 2018-02-21 RX ADMIN — CLOPIDOGREL 75 MG: 75 TABLET, FILM COATED ORAL at 08:02

## 2018-02-21 RX ADMIN — LISINOPRIL 10 MG: 10 TABLET ORAL at 08:02

## 2018-02-21 ASSESSMENT — VISUAL ACUITY
OU: NORMAL ACUITY;GLASSES

## 2018-02-21 NOTE — PLAN OF CARE
Problem: Patient Care Overview  Goal: Plan of Care/Patient Progress Review  Outcome: Improving  Pt on 6A s/p ischemic stroke. VSS on RA; some bradycardia normal per pt and HTN within parameters. A&Ox4. Neuros unchanged with L sided weakness (LUE 3/5, LLE 4/5), L drift, and slight L facial droop. No c/o pain. Some bruises from blood draws. Low CHO diet with BG all below 140. PIV SL. Voiding spont via bedside commode. BS+ but no BM; MOM given today with no effect. 1A+GB/Walker pivot; 2A for walks. Daughter at bedside and supportive. CT completed this AM. Continue to monitor and follow POC.

## 2018-02-21 NOTE — PROGRESS NOTES
Social Work Services Progress Note    Hospital Day: 4  Date of Initial Social Work Evaluation:  2/19/18  Collaborated with:  Dr. Roque, Admissions at [facility] (Rosa Bangura [phone]) and [facility]  (Lizette [phone]), pt, daughter (Renetta)    Data:  Acute Rehab placement is recommended upon discharge from acute hospitalization.    Intervention:  KEISHA spoke with Dr. Roque who continues to anticipate discharge at the end of the week (Friday or Saturday is likely).  KEISHA phoned Admissions at [facility]  (Lizette [phone]).  Lziette indicates that her MD has reviewed faxed assessment materials and she is awaiting a decision from him regarding acceptance.  KEISHA returned a call to Admissions at [facility] (Rosa Bangura [phone]).  Per Rosa, pt is still being assessed.  Rosa has requested that KEISHA fax updated notes on 2/22/18.  Rosa states that they will want to know the results of the CT scan  for a diamox injection completed on 2/20/18.  KEISHA met with pt and daughter and updated.  Pt is having a difficult time being so far away from home.  Pt is anxious to leave the [facility] area.  Pt is having difficulty understanding why continued hospitalization is needed.  KEISHA spoke with pt about the need to remain hospitalized while MD's address her blood pressure. KEISHA spoke with pt about the importance of acute rehab placement following a stroke.      Assessment:  Pt is reluctantly agreeable to continued hospitalization and acute rehab placement.  The 6A Charge Nurse will attempt to secure a private room for pt today.  Daughter Renetta is staying with pt.  Daughter Renetta is supportive and supports ARU placement.    Plan:    Anticipated Disposition:  Acute rehab placement    Barriers to d/c plan:  Medical readiness    Follow Up:  SW will continue to follow for discharge planning.    CARMELA Conti  Social Work, 6A  Phone:  [phone]  Pager:   [phone]  2/21/2018

## 2018-02-21 NOTE — PROGRESS NOTES
Social Work Services Discharge Note      Patient Name:  Rosalie Seals     Anticipated Discharge Date:  2/22/18    Discharge Disposition:   [facility] ARU ([phone])    Following MD:  Facility Assignment     Pre-Admission Screening (PAS) online form has been completed.  The Level of Care (LOC) is:  Determined  Confirmation Code is:  Not required as pt is being admitted to ARU.  Patient/caregiver informed of referral to [facility] for Pre-Admission Screening for skilled nursing facility (SNF) placement and to expect a phone call post discharge from SNF.     Additional Services/Equipment Arranged:  Confirmed readiness for discharge (as the Medical Director/Neurologist at the receiving ARU will manage pt's BP) with Dr. Roque.  Confirmed acceptance to [facility] ARU with Admissions (Lizette).  Pt's daughterRenetta has requested to provide pt's transport and per discussion with pt's floor nurse (Bobbi), pt can be transported by car.  Discharge is anticipated at 9am tomorrow.     Patient / Family response to discharge plan:  Pt and daughter voice understanding of the discharge plan and agreement with the discharge plan.     Persons notified of above discharge plan:  Pt, daughter, 6A nursing and Dr. Roque.    Staff Discharge Instructions:  Please fax discharge orders and signed hard scripts for any controlled substances (SW will complete this task).  Please print a packet and send with patient.     CTS Handoff completed:  YES    Medicare Notice of Rights provided to the patient/family:  YES    CARMELA Conti  Social Work, 6A  Phone:  [phone]  Pager:  [phone]  2/21/2018

## 2018-02-21 NOTE — PROGRESS NOTES
NEUROLOGY STROKE PROGRESS NOTE   2018  Rosalie Seals  : 1945 MRN# [id number]      Subjective :     Ms. Seals is a 72 y.o. Woman current smoker (quit 1 wk ago), DM type 2, HTN,  HLD, right ICA occlusion and R MCA strokes 2017, bifurcation ofleft anterior and middle cerebral artery aneurysm s/p coiling presented via OSH with recurrent strokes symptoms over past 2 weeks.       24 hrs: No significant events overnight; overall she feels stronger in her left arm/hand. Blood pressure management improved with restarting of her home medications.          Medications:     Current Facility-Administered Medications:      hydrochlorothiazide (MICROZIDE) capsule 12.5 mg, 12.5 mg, Oral, Daily, Leora Roque MD, 12.5 mg at 18 0802     metoprolol tartrate (LOPRESSOR) tablet 50 mg, 50 mg, Oral, BID, Leora Roque MD, 50 mg at 18 0802     pantoprazole (PROTONIX) EC tablet 40 mg, 40 mg, Oral, Daily with breakfast, Jose Garcia MD, 40 mg at 18 0802     labetalol (NORMODYNE/TRANDATE) injection 10-20 mg, 10-20 mg, Intravenous, Q10 Min PRN, Leora Roque MD     hydrALAZINE (APRESOLINE) injection 10-20 mg, 10-20 mg, Intravenous, Q1H PRN, Leora Roque MD     sodium chloride bacteriostatic 0.9 % flush 12 mL, 12 mL, Intravenous, Once, Dwight Miguel MD     lisinopril (PRINIVIL/ZESTRIL) tablet 10 mg, 10 mg, Oral, Daily, Leora Roque MD, 10 mg at 18 0802     magnesium hydroxide (MILK OF MAGNESIA) suspension 30 mL, 30 mL, Oral, Daily PRN, Leora Roque MD, 30 mL at 18 1011     bisacodyl (DULCOLAX) Suppository 10 mg, 10 mg, Rectal, Daily PRN, Leora Roque MD     senna-docusate (SENOKOT-S;PERICOLACE) 8.6-50 MG per tablet 2 tablet, 2 tablet, Oral, At Bedtime, Leora Roque MD, 2 tablet at 18     insulin aspart (NovoLOG) inj (RAPID ACTING), 1-6 Units, Subcutaneous, 4x Daily AC & HS, Kristi, Pawan Gentile MD     medication  instruction, , Does not apply, Continuous PRN, Jose Garcia MD     glucose 40 % gel 15-30 g, 15-30 g, Oral, Q15 Min PRN **OR** dextrose 50 % injection 25-50 mL, 25-50 mL, Intravenous, Q15 Min PRN **OR** glucagon injection 1 mg, 1 mg, Subcutaneous, Q15 Min PRN, Jose Garcia MD     aspirin tablet 325 mg, 325 mg, Oral, Daily, Leora Roque MD, 325 mg at 02/21/18 0802     clopidogrel (PLAVIX) tablet 75 mg, 75 mg, Oral, Daily, Leora Roque MD, 75 mg at 02/21/18 0802     atorvastatin (LIPITOR) tablet 80 mg, 80 mg, Oral, Daily at 8 pm, Leora Roque MD, 80 mg at 02/20/18 1953     Prescriptions Prior to Admission   Medication Sig Dispense Refill Last Dose     Clopidogrel Bisulfate (PLAVIX PO) Take 75 mg by mouth daily   2/17/2018 at Unknown time     METOPROLOL TARTRATE PO Take 50 mg by mouth 2 times daily   2/17/2018 at Unknown time     lisinopril-hydrochlorothiazide (PRINZIDE/ZESTORETIC) 10-12.5 MG per tablet Take 1 tablet by mouth daily 90 tablet 3 2/17/2018 at Unknown time     order for DME Equipment being ordered: Glucometer and test strips 1 Device 0 2/17/2018 at Unknown time     metFORMIN (GLUCOPHAGE) 500 MG tablet TAKE 2 TABLETS TWICE A DAY WITH MEALS 180 tablet 1 2/17/2018 at Unknown time     aspirin 81 MG tablet Take 2 tablets (162 mg) by mouth daily 180 tablet 3 2/17/2018 at Unknown time     atorvastatin (LIPITOR) 80 MG tablet Take 1 tablet (80 mg) by mouth daily 90 tablet 3 2/17/2018 at Unknown time     blood glucose monitoring (ONE TOUCH DELICA) lancets Use to test blood sugar 3 times daily. 360 each 3 2/17/2018 at Unknown time     Glucose Blood (BLOOD GLUCOSE TEST STRIPS) STRP four times a day. 360 each 3 2/17/2018 at Unknown time     ONETOUCH DELICA LANCETS 33G MISC four times a day.   2/17/2018 at Unknown time             Physical Exam:   /55 (BP Location: Right arm)  Pulse 84  Temp 98.3  F (36.8  C) (Oral)  Resp 15  Wt 101.2 kg (223 lb 1.6 oz)  SpO2  97%  BMI 37.13 kg/m2   Physical Exam:   General: No acute distress.   HEENT: Normocephalic atruamatic   Resp: Breathing comfortably, no respiratory distress.  Skin: warm and dry  Extremities: No edema    Mental status: Awake, alert, attentive, and oriented. Speech is fluent, comprehension and repetition intact. No dysarthria.  Cranial nerves: Eyes mildly disconjugate with history of left eye exotropia, PERRL, EOMI, visual fields full, mild flattening left nasolabial fold, facial sensation intact, shoulder shrug strong, palate rise symmetric, tongue/uvula midline, hearing intact to conversation.  Motor: Tone normal. RUE weakness: right arm drift, biceps 4/5, triceps 4+/5, wrist flexor and extensor 4/5, finger  3/5 and fine finger movements 0/5. RLE, LUE, LLE 5/5. No atrophy or twitches.   Sensory: Intact to light touch.   Coordination: FNF no dysmetria, HTS normal  Gait: Not assessed.          Data:   CBC:  Lab Results   Component Value Date    WBC 6.7 02/20/2018     Lab Results   Component Value Date    HGB 13.5 02/20/2018     Lab Results   Component Value Date    HCT 42.5 02/20/2018     Lab Results   Component Value Date     02/20/2018       Last Basic Metabolic Panel:  Lab Results   Component Value Date     02/20/2018      Lab Results   Component Value Date    POTASSIUM 4.1 02/20/2018     Lab Results   Component Value Date    CHLORIDE 113 02/20/2018     Lab Results   Component Value Date    VIOLA 8.8 02/20/2018     Lab Results   Component Value Date    CO2 21 02/20/2018     Lab Results   Component Value Date    BUN 11 02/20/2018     Lab Results   Component Value Date    CR 0.61 02/20/2018     Lab Results   Component Value Date     02/20/2018     ASSESSMENT/PLAN  Ms. Seals is a 72 y.o. Woman current smoker (quit 1 wk ago), DM type 2, HTN,  HLD, right ICA occlusion and R MCA strokes 2017, bifurcation ofleft anterior and middle cerebral artery aneurysm s/p coiling presented via OSH with  recurrent strokes symptoms over past 2 weeks. MRI head with MCA territory infarction along with watershed with concern for perfusion dependent ischemia versus embolic phenomena.       # Recurrent strokes in right MCA/watershed territory   Concern for low flow versus thromboembolic etiology.   - CT perfusion showing reasonable cerebral reserve, awaiting final report.   - Resumed home lisinopril 10 mg daily, HCTZ 12.5 mg daily and metoprolol 50 mg BID with improvement in BP sys mid 130s -150s.   - BP goal long term is normotension  - Plavix load 300 completed   - Plavix 75 mg daily   - Continue  mg daily   - Continue atorvastatin 80 mg daily   - No repeat MRI brain due to severe claustrophobia   - TTE: EF 60-65%, no PFO, mild left atrial enlargement   - Stroke education   - Depression and apnea screen       # Right MCA aneurysm   Noted to be 3 mm right MCA bifurcation aneurysm in 6/2017; 6 mm in 10/2017 seen by provider Marisabel.    - Will follow with her outpatient neurosurgereon, Dr. Ramsey       # Diabetes type II:   - Holding PTA metformin   - ISS, hypoglycemia protocol       # Hypertension   - Continue lisinopril-HCTZ 10-12.5 mg daily.   - Continue metoprolol 50 mg BID   - Labetalol/hydralazine for SBP > 200       # Smoker, recently quit 1 week ago.   Able to quit for a few months after first stroke.   - Smoking cessation consult       Patient discussed with Dr. Shelley, who agrees with the plan.      # FEN    - Diet: Consistent carbohydrate diet   # PPX: PPI, d'cd heparin DVT per patient request and agreement to sit in chair regularly.   # Code Status: Full   #Dispo: PT and OT recommend ARU. Family would like to go to close to home rehab, dispo tomorrow.      Leora Roque  PGY-1  P: [phone]

## 2018-02-21 NOTE — PLAN OF CARE
Problem: Patient Care Overview  Goal: Plan of Care/Patient Progress Review  Outcome: No Change  VSS. A&Ox4. Neuros include LUE 3/5, LLE 4/5, L drift and slight L facial droop. Denies pain. Voiding spont. No BM, BS +. PIV SL. Up with assist of 1-2 and walker. Pivot with 1 to BSC. Daughter and son-in law at bedside and supportive of cares. Plan to d/c to to rehab once bed available. Continue to monitor and follow POC.

## 2018-02-21 NOTE — PROGRESS NOTES
Social Work Services Progress Note    Hospital Day: 4  Date of Initial Social Work Evaluation:  2/19/18  Collaborated with:  Admissions at [facility] ARU (Lizette), Dr Roque    Data:   ARU placement is anticipated upon discharge from acute hospitalization.      Intervention:  SW [facility]  [facility] and approved for [facility].  Lizette indicated that their Medical Director is a Neurologist and would be comfortable managing pt's blood pressure and thus, they would be agreeable to accepting pt for admit now.  KEISHA discussed this with Dr. Roque who indicates that pt can discharge tomorrow am based on the fact that the Medical Director at [facility] who is a neurologist is willing to manage pt's blood pressure needs.  Dr. Roque confirms that she can have discharge orders and summary completed by 9am tomorrow.  KEISHA discussed this plan with pt and daughter and both voice satisfaction with [facility]    Assessment:  [facility]    Plan:    Anticipated Disposition:  ARU placement    Barriers to d/c plan:  None identified    Follow Up:  SW will coordinate discharge.    CARMELA Conti  Social Work, 6A  Phone:  [phone]  Pager:  [phone]  2/21/2018

## 2018-02-21 NOTE — PLAN OF CARE
Problem: Patient Care Overview  Goal: Plan of Care/Patient Progress Review  Discharge Planner OT   Patient plan for discharge: ARU  Current status: Min A bed mobility supine > sit with HOB raised. CGA for sit <> stand with FWW, CGA/min A with FWW and mod A for assist with L hand grasp to walk to bathroom. CGA while standing at sink to complete grooming/hygiene tasks, x 1 minor LOB able to correct with CGA. Facilitated BUE exercises with dowel and foam block for increased strength/functional use of LUE to complete ADL tasks, pt able to complete with min/mod A and VCs.  Barriers to return to prior living situation: L sided weakness, increased dependence for ADLs, impaired mobility, decreased strength/activity tolerance, balance deficits  Recommendations for discharge: ARU  Rationale for recommendations: Pt is currently below functional baseline for ADLs, transfers, and functional mobility, would benefit from continued therapy to address above deficits and maximize strength/independence in order to return to PLOF.       Entered by: Otilia Goss 02/21/2018 11:32 AM

## 2018-02-21 NOTE — DISCHARGE SUMMARY
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 Seals is a 72 y.o. 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 MRI obtained at OSH found right side stroke in MCA/MISHA watershed territory. She was sent to [place] for further evaluation. Initial concern was for pressure dependent stroke symptoms with occlusion of R IC and blood pressure was allowed to stay elevated early in admission. Initially we tolerated SBP < 220 with PRN treatment >220, which was further restricted to <200 on hospital day 2. Home hypertension medications were resumed gradually with full resumption by 2/20/18. Blood pressure on discharge ranged SBP mid-130s to low 150s with stable/improving neurological examination. With resumption of metoprolol pulse dropped to mid 40-50s on final day of admission; no change was made, but informed family there may be additional considerations re: metoprolol versus other hypertensive medications. She continued to gain strength in her left arm and left leg throughout hospitalization, but she still requires 2 person assist and walker.     To further investigation perfusion dependence of stroke symptoms, we obtained a CT perfusion scan demonstrating reasonable cerebral reserve on diamox challenge. At discharge, we  recommend long term goal of normotension, which can be further managed at rehab/primary care. Other stroke work-up found LDL 35, A1c 6.7, TTE with EF 60-65%, no PFO and mild left atrial enlargement. She was continued on her atorvastatin 80 mg daily. She received a plavix load of 300 mg, followed by 75 mg daily and her aspirin was increased from 81 mg daily to 325 mg daily. Additionally, we strongly encouraged cessation of smoking.     Found to have right sided stroke in watershed distribution     IV tPA was not administered, seen at outside hospital.      Work-up as stated below under Pertinent Investigations.    Etiology is thought to be due to large vessel thrombus versus hemodynamic.     Rehab evaluation: OT, PT and SLP.     Smoking Cessation: education provided    BP Long-term Goal: 140/90 or less    Antithrombotic/Anticoagulant Agent: aspirin 325 mg and clopidogrel (Plavix) 75 mg    Statins: Continued on atorvasatin 80 mg daily        Hgb A1C Goal: < 7.0    Complications: None.     Other problems addressed during this hospitalization:  #Smoking cessation with smoking education.   # Diabetes mellitus type 2: Held metformin, used sliding scale insulin. Resume metformin on discharge.   # Hypertension as above: Continue lisinopril-HCTZ 10-12.5 mg daily; metoprolol 50 mg BID.   # Right MCA aneurysm: Will follow-up with neurosurgery, Dr. Ramsey.     PERTINENT INVESTIGATIONS    Labs  Lipid Panel:   Recent Labs   Lab Test  02/18/18   0905   CHOL  104   HDL  54   LDL  35   TRIG  78     A1C:   Lab Results   Component Value Date    A1C 6.7 02/18/2018     INR:   Recent Labs  Lab 02/17/18  2109 02/14/18  2145   INR 1.04 0.90      Coag Panel / Hypercoag Workup: Not indicated     Pending test results: Final results of CT perfusion scan     Echo:   Interpretation Summary  No cardiac source for embolus identified. The atrial septum is intact as  assessed by color Doppler and agitated saline bubble study.    Imaging:   CTA    IMPRESSION:   1. Occluded right internal carotid artery just distal to the carotid  bulb.  2. Aneurysm coils at the junction of the left anterior and middle  cerebral arteries.  3. Diminutive right vertebral artery.  4. Mild atheromatous calcification without significant stenosis of the  left internal carotid artery in the neck and carotid siphon.    CT head   IMPRESSION:   1. No acute findings.  2. Left MCA aneurysm clip.  3. Areas of old infarction in the periventricular white matter of the  right frontoparietal region and right occipital lobe.    Review of MRI from OSH:   Watershed distribution strokes in the   Endovascular procedure: None     Cardiac Monitoring: Patient had > 24 hrs of cardiac monitor while in hospital.    Findings: No atrial fibrillation was found.    Sleep Apnea Screen: Positive apnea screen, but she had sleep study after stroke in May 2017. It was negative for sleep apnea.     PHQ-9 Depression Screen Score: 3    Education discussed with: patient, child and family on blood pressure management, cholesterol management, medical management, smoking cessation plan, follow-up recommendations/plan, 911 call if warning signs of stroke and results (perfusion imaging).    During daily rounds, the plan of care was discussed and developed with patient and family.  Plan of care includes: secondary stroke prevention.    PHYSICAL EXAMINATION  Vital Signs:  B/P: 151/67, T: 98.3, P: 84, R: 16    General:  patient lying in bed without any acute distress     HEENT:  normocephalic/atraumatic  Cardio:  RRR  Pulmonary:  no respiratory distress  Abdomen:  soft  Extremities:  no edema  Skin:  intact, warm/dry     Neurologic  Mental Status:  fully alert, attentive and oriented, follows commands, speech clear and fluent  Cranial nerves: Eyes mildly disconjugate with history of left eye exotropia chronic, PERRL, EOMI, visual fields full, mild flattening left nasolabial fold, facial sensation intact, shoulder shrug  strong, palate rise symmetric, tongue/uvula midline, hearing intact to conversation.  Motor: Tone normal. LUE weakness: left arm drift, biceps 4/5, triceps 4/5, wrist flexor 4/5 and extensor 3/5, finger  3/5 and fine finger movements minimal. Left leg drift, right hip flexor 4/5; 5/5 elsewhere. RUE, RLE 55/. No atrophy or twitches.   Sensory: Intact to light touch.   Coordination: FNF no dysmetria that is out of proportion to L arm weakness, left HS mildly slow, clumsy.   Gait: Not assessed due to concern for safety. Requiring 2 person assist at discharge.     National Institutes of Health Stroke Scale (on day of discharge)  NIHSS Total Score: 3    Modified Cody Scale (on day of discharge): 3-Moderate disability; requiring some help, but able to walk without assistance    Medications    Current Discharge Medication List      START taking these medications    Details   bisacodyl (DULCOLAX) 10 MG Suppository Place 1 suppository (10 mg) rectally daily as needed for constipation  Qty: 30 suppository    Associated Diagnoses: Acute ischemic stroke (H)      magnesium hydroxide (MILK OF MAGNESIA) 400 MG/5ML suspension Take 30 mLs by mouth daily as needed for constipation  Qty: 105 mL    Associated Diagnoses: Acute ischemic stroke (H)      senna-docusate (SENOKOT-S;PERICOLACE) 8.6-50 MG per tablet Take 2 tablets by mouth At Bedtime  Qty: 100 tablet    Associated Diagnoses: Acute ischemic stroke (H)         CONTINUE these medications which have CHANGED    Details   aspirin 325 MG tablet Take 1 tablet (325 mg) by mouth daily  Qty: 120 tablet    Associated Diagnoses: Acute ischemic stroke (H)         CONTINUE these medications which have NOT CHANGED    Details   Clopidogrel Bisulfate (PLAVIX PO) Take 75 mg by mouth daily      METOPROLOL TARTRATE PO Take 50 mg by mouth 2 times daily      lisinopril-hydrochlorothiazide (PRINZIDE/ZESTORETIC) 10-12.5 MG per tablet Take 1 tablet by mouth daily  Qty: 90 tablet, Refills: 3       order for DME Equipment being ordered: Glucometer and test strips  Qty: 1 Device, Refills: 0    Associated Diagnoses: Well controlled type 2 diabetes mellitus (H)      metFORMIN (GLUCOPHAGE) 500 MG tablet TAKE 2 TABLETS TWICE A DAY WITH MEALS  Qty: 180 tablet, Refills: 1    Associated Diagnoses: Type 2 diabetes mellitus with hyperglycemia, with long-term current use of insulin (H)      atorvastatin (LIPITOR) 80 MG tablet Take 1 tablet (80 mg) by mouth daily  Qty: 90 tablet, Refills: 3    Associated Diagnoses: Hyperlipidemia LDL goal <100      !! blood glucose monitoring (ONE TOUCH DELICA) lancets Use to test blood sugar 3 times daily.  Qty: 360 each, Refills: 3    Associated Diagnoses: Type 2 diabetes mellitus with hyperglycemia, with long-term current use of insulin (H)      Glucose Blood (BLOOD GLUCOSE TEST STRIPS) STRP four times a day.  Qty: 360 each, Refills: 3    Comments: Verio strips  Associated Diagnoses: Type 2 diabetes mellitus with complication, without long-term current use of insulin (H)      !! ONETOUCH DELICA LANCETS 33G MISC four times a day.       !! - Potential duplicate medications found. Please discuss with provider.        Additional recommendations and follow up:    General info for SNF   Length of Stay Estimate: Short Term Care: Estimated # of Days <30  Condition at Discharge: Improving  Level of care:skilled   Rehabilitation Potential: Excellent  Admission H&P remains valid and up-to-date: Yes  Recent Chemotherapy: N/A  Use Nursing Home Standing Orders: Yes     Mantoux instructions   Give two-step Mantoux (PPD) Per Facility Policy Yes     Reason for your hospital stay   You were hospitalized for stroke causing weakness on the left side of your face and your left arm and leg. 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 In order to reduce risk for future strokes it is important to take all your medications, quit smoking and work towards goal  blood pressure < 140/90.     Follow Up and recommended labs and tests   Follow up with primary care provider in 1-2 weeks after discharge from rehab.    Follow-up with Stroke Clinic in 3-6 months.   You will be contacted to schedule a Stroke Clinic appointment. If you have not been contacted to schedule a stroke follow-up appointment within 7 days of discharge, please contact Stroke Neurology Clinic at [phone], pick option #1.   If you have other stroke related questions, please call [phone], pick option #3, and ask to speak to Tomas Remy RN Stroke/Endovascular Care Coordinator.  If you have any urgent stroke related questions after hours, please contact the hospital  at [phone] and ask to be connected to a stroke provider.     Follow-up with your neurosurgeon as scheduled for management of your aneurysm.     Activity - Up ad lonny   Requiring assistance for walking, can advance as appropriate.     Full Code     Physical Therapy Adult Consult   Evaluate and treat as clinically indicated.    Reason:  Stroke with left sided weakness     Occupational Therapy Adult Consult   Evaluate and treat as clinically indicated.    Reason: Stroke with left sided weakness     Speech Language Path Adult Consult   Evaluate and treat as clinically indicated.    Reason:  Stroke with left sided weakness     Advance Diet as Tolerated   Follow this diet upon discharge: Orders Placed This Encounter     Low Consistent CHO Diet       Patient was seen and discussed with the Attending, Dr. Shelley.     Leora Roque  Pager: [phone]

## 2018-02-22 ENCOUNTER — TRANSFERRED RECORDS (OUTPATIENT)
Dept: HEALTH INFORMATION MANAGEMENT | Facility: HOSPITAL | Age: 73
End: 2018-02-22

## 2018-02-22 VITALS
TEMPERATURE: 97.7 F | OXYGEN SATURATION: 98 % | WEIGHT: 223.1 LBS | BODY MASS INDEX: 37.13 KG/M2 | SYSTOLIC BLOOD PRESSURE: 134 MMHG | RESPIRATION RATE: 18 BRPM | DIASTOLIC BLOOD PRESSURE: 64 MMHG | HEART RATE: 84 BPM

## 2018-02-22 DIAGNOSIS — Z79.4 TYPE 2 DIABETES MELLITUS WITH HYPERGLYCEMIA, WITH LONG-TERM CURRENT USE OF INSULIN (H): ICD-10-CM

## 2018-02-22 DIAGNOSIS — E11.65 TYPE 2 DIABETES MELLITUS WITH HYPERGLYCEMIA, WITH LONG-TERM CURRENT USE OF INSULIN (H): ICD-10-CM

## 2018-02-22 LAB
GLUCOSE BLDC GLUCOMTR-MCNC: 107 MG/DL (ref 70–99)
GLUCOSE BLDC GLUCOMTR-MCNC: 118 MG/DL (ref 70–99)

## 2018-02-22 PROCEDURE — 00000146 ZZHCL STATISTIC GLUCOSE BY METER IP

## 2018-02-22 PROCEDURE — A9270 NON-COVERED ITEM OR SERVICE: HCPCS | Mod: GY | Performed by: STUDENT IN AN ORGANIZED HEALTH CARE EDUCATION/TRAINING PROGRAM

## 2018-02-22 PROCEDURE — 25000132 ZZH RX MED GY IP 250 OP 250 PS 637: Mod: GY | Performed by: STUDENT IN AN ORGANIZED HEALTH CARE EDUCATION/TRAINING PROGRAM

## 2018-02-22 RX ORDER — ASPIRIN 325 MG
325 TABLET ORAL DAILY
Qty: 120 TABLET | DISCHARGE
Start: 2018-02-22 | End: 2018-03-13

## 2018-02-22 RX ORDER — CLOPIDOGREL BISULFATE 75 MG/1
75 TABLET ORAL DAILY
Qty: 30 TABLET | DISCHARGE
Start: 2018-02-22 | End: 2018-03-13

## 2018-02-22 RX ORDER — LISINOPRIL/HYDROCHLOROTHIAZIDE 10-12.5 MG
1 TABLET ORAL DAILY
Qty: 90 TABLET | Refills: 3 | DISCHARGE
Start: 2018-02-22 | End: 2018-03-13

## 2018-02-22 RX ORDER — AMOXICILLIN 250 MG
2 CAPSULE ORAL AT BEDTIME
Qty: 100 TABLET | DISCHARGE
Start: 2018-02-22 | End: 2018-03-13

## 2018-02-22 RX ORDER — METOPROLOL TARTRATE 100 MG
50 TABLET ORAL 2 TIMES DAILY
Qty: 60 TABLET | DISCHARGE
Start: 2018-02-22 | End: 2018-03-13

## 2018-02-22 RX ADMIN — ASPIRIN 325 MG ORAL TABLET 325 MG: 325 PILL ORAL at 07:49

## 2018-02-22 RX ADMIN — CLOPIDOGREL 75 MG: 75 TABLET, FILM COATED ORAL at 07:49

## 2018-02-22 RX ADMIN — PANTOPRAZOLE SODIUM 40 MG: 40 TABLET, DELAYED RELEASE ORAL at 07:49

## 2018-02-22 RX ADMIN — LISINOPRIL 10 MG: 10 TABLET ORAL at 07:49

## 2018-02-22 RX ADMIN — METOPROLOL TARTRATE 50 MG: 50 TABLET ORAL at 07:49

## 2018-02-22 RX ADMIN — HYDROCHLOROTHIAZIDE 12.5 MG: 12.5 CAPSULE ORAL at 07:49

## 2018-02-22 ASSESSMENT — VISUAL ACUITY: OU: NORMAL ACUITY;GLASSES

## 2018-02-22 ASSESSMENT — PATIENT HEALTH QUESTIONNAIRE - PHQ9: SUM OF ALL RESPONSES TO PHQ QUESTIONS 1-9: 3

## 2018-02-22 NOTE — PLAN OF CARE
Problem: Patient Care Overview  Goal: Plan of Care/Patient Progress Review  Discharge Planner PT   Patient plan for discharge: ARU  Current status: Pt needs min A x 2 to stand, cues for safety and min A for LUE placement on FWW. Pt ambulated 20 ft x 1, 150 ft x 1 with FWW, min A x 2. Max cues for continuous stepping, slow joey, path navigation for safety. Completed standing balance activities with min A.   Barriers to return to prior living situation: impaired balance, UE/LE weakness, poor safety awareness  Recommendations for discharge: ARU  Rationale for recommendations: intensive therapy to improve strength, balance and independence with functional mobility.        Entered by: Sole Bland 02/21/2018 5:58 PM

## 2018-02-22 NOTE — PLAN OF CARE
Problem: Stroke (Ischemic) (Adult)  Goal: Signs and Symptoms of Listed Potential Problems Will be Absent, Minimized or Managed (Stroke)  Signs and symptoms of listed potential problems will be absent, minimized or managed by discharge/transition of care (reference Stroke (Ischemic) (Adult) CPG).   Outcome: Adequate for Discharge Date Met: 02/22/18  VSS. Denies pain. Neuros unchanged; slight left facial droop, LUE 3/5, LLE 4/5. Up with assist of 1-2 and walker. Good po intake. Voiding spontaneously. Had BM yesterday. Pt is being discharge to [facility] ARU, report given. Medication list was also faxed. Pt daughter is providing transportation at this time. Hospital transportation brought pt and family to front door.

## 2018-02-22 NOTE — PLAN OF CARE
Problem: Patient Care Overview  Goal: Plan of Care/Patient Progress Review  Outcome: No Change  VSS ex bradycardic, per pt this is baseline. A&Ox4. Neuros include LUE 3/5, LLE 4/5, L drift and slight L facial droop. Denies pain. Voiding spont. BM+. PIV SL. Low CHO diet. Daughter at bedside and supportive of cares. Up with assist of 1-2 GB and walker for ambulation. Pivot to [facility] with assist 1. Plan to d/c to [facility] ARU today at 9AM. Continue to monitor and follow POC.

## 2018-02-22 NOTE — PLAN OF CARE
Problem: Stroke (Ischemic) (Adult)  Goal: Signs and Symptoms of Listed Potential Problems Will be Absent, Minimized or Managed (Stroke)  Signs and symptoms of listed potential problems will be absent, minimized or managed by discharge/transition of care (reference Stroke (Ischemic) (Adult) CPG).   Outcome: No Change  Pt on 6A s/p ischemic stroke. VSS on RA; some bradycardia normal per pt and HTN within parameters. A&Ox4. Neuros unchanged with L sided weakness (LUE 3/5, LLE 4/5), L drift, and slight L facial droop. No c/o pain. Some bruises from blood draws. Low CHO diet. PIV SL. Voiding spont via bedside commode; ambulated to BR with 2A earlier today. BS+ with very small BM after MOM. 1A+GB/Walker pivot; 2A for walks. Daughter at bedside and supportive. Plan to D/C to ARU near [place] at 0900 tomorrow. Continue to monitor and follow POC.

## 2018-02-23 ENCOUNTER — CARE COORDINATION (OUTPATIENT)
Dept: CARE COORDINATION | Facility: CLINIC | Age: 73
End: 2018-02-23

## 2018-02-27 ENCOUNTER — TELEPHONE (OUTPATIENT)
Dept: INTERNAL MEDICINE | Facility: OTHER | Age: 73
End: 2018-02-27

## 2018-02-27 NOTE — TELEPHONE ENCOUNTER
11:38 AM    Reason for Call: Phone Call    Description: Renetta (daughter) called and state pt is at the [facility]/Rehab. Wondering if provider would be able to go see her there? Also has questions regarding meds. Please call her back at [phone]    Was an appointment offered for this call? No  If yes : Appointment type              Date    Preferred method for responding to this message: Telephone Call  What is your phone number ?    If we cannot reach you directly, may we leave a detailed response at the number you provided? Yes    Can this message wait until your PCP/provider returns, if available today? Yes, aware provider out today    Christine Cardenas

## 2018-02-27 NOTE — TELEPHONE ENCOUNTER
Contacted daughter- states mother had blood in depend and is worried about that as well as medication changes. Was notified to tell the nurse at [facility] where she is and to keep on them until they get answers. Daughter understood. Also notified them to make a hospital follow up with Dr. Cuellar when she gets out. Kalee Busby LPN

## 2018-03-01 DIAGNOSIS — E11.8 TYPE 2 DIABETES MELLITUS WITH COMPLICATION, WITHOUT LONG-TERM CURRENT USE OF INSULIN (H): ICD-10-CM

## 2018-03-01 RX ORDER — INSULIN GLARGINE 100 [IU]/ML
INJECTION, SOLUTION SUBCUTANEOUS
Qty: 15 ML | Refills: 8 | Status: SHIPPED | OUTPATIENT
Start: 2018-03-01 | End: 2018-04-09

## 2018-03-01 NOTE — TELEPHONE ENCOUNTER
Please advise on Lantus.  Not on current medication list.  Several recent hospitalizations.  Medication discontinued on 12/30/17 for reason none.  Please advise.  Thank you.

## 2018-03-02 ENCOUNTER — TELEPHONE (OUTPATIENT)
Dept: INTERNAL MEDICINE | Facility: OTHER | Age: 73
End: 2018-03-02

## 2018-03-02 NOTE — TELEPHONE ENCOUNTER
12:36 PM    Reason for Call: Phone Call    Description: Renetta paulino) called and stated pt is in inpatient rehab at [facility]. She is concerned that pt may have sleep apnea. Wondering if a sleep study could be done there while she is inpatient? Please call her back at [phone]    Was an appointment offered for this call? No  If yes : Appointment type              Date    Preferred method for responding to this message: Telephone Call  What is your phone number ?    If we cannot reach you directly, may we leave a detailed response at the number you provided? Yes    Can this message wait until your PCP/provider returns, if available today? Not applicable    Christine Cardenas

## 2018-03-02 NOTE — TELEPHONE ENCOUNTER
Daughter updated that MD can do this after she is out of rehab or they can ask th [facility]hab MD.

## 2018-03-02 NOTE — TELEPHONE ENCOUNTER
Called daughter Renetta.She has been staying with her mother at Inpatient Rehab. She is tired and she wakes up and takes a deep breath during the night.O2 95-96%. Request for sleep study while staying there.Please advise.Thank you

## 2018-03-05 NOTE — TELEPHONE ENCOUNTER
Patient states wants the take home sleep study that [facility] does not do. Would like this done at [place]. Was advised to let us know when her discharge is and we will set up a hospital f/u appointment. Pt understood. Kalee Busby LPN

## 2018-03-05 NOTE — TELEPHONE ENCOUNTER
Renetta (daughter) called about sleep study again. Wondering if you could print off referral and they could pick it up at ? Stated pt is still having trouble with breathing while sleeping. Please call her back at [phone]

## 2018-03-13 ENCOUNTER — RADIANT APPOINTMENT (OUTPATIENT)
Dept: GENERAL RADIOLOGY | Facility: OTHER | Age: 73
End: 2018-03-13
Attending: INTERNAL MEDICINE
Payer: MEDICARE

## 2018-03-13 ENCOUNTER — OFFICE VISIT (OUTPATIENT)
Dept: INTERNAL MEDICINE | Facility: OTHER | Age: 73
End: 2018-03-13
Attending: INTERNAL MEDICINE
Payer: MEDICARE

## 2018-03-13 VITALS
OXYGEN SATURATION: 98 % | DIASTOLIC BLOOD PRESSURE: 60 MMHG | TEMPERATURE: 98.2 F | SYSTOLIC BLOOD PRESSURE: 122 MMHG | HEART RATE: 50 BPM

## 2018-03-13 DIAGNOSIS — K59.00 CONSTIPATION, UNSPECIFIED CONSTIPATION TYPE: ICD-10-CM

## 2018-03-13 DIAGNOSIS — N17.9 ACUTE RENAL FAILURE, UNSPECIFIED ACUTE RENAL FAILURE TYPE (H): ICD-10-CM
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
## 2018-03-13 DIAGNOSIS — E11.8 TYPE 2 DIABETES MELLITUS WITH COMPLICATION, UNSPECIFIED LONG TERM INSULIN USE STATUS: ICD-10-CM

## 2018-03-13 DIAGNOSIS — J20.9 ACUTE BRONCHITIS, UNSPECIFIED ORGANISM: ICD-10-CM

## 2018-03-13 DIAGNOSIS — R05.9 COUGH: Primary | ICD-10-CM

## 2018-03-13 DIAGNOSIS — M25.512 ACUTE PAIN OF LEFT SHOULDER: ICD-10-CM

## 2018-03-13 DIAGNOSIS — R05.9 COUGH: ICD-10-CM

## 2018-03-13 DIAGNOSIS — I63.9 ACUTE ISCHEMIC STROKE (H): ICD-10-CM

## 2018-03-13 LAB
BASOPHILS # BLD AUTO: 0 10E9/L (ref 0–0.2)
BASOPHILS NFR BLD AUTO: 0.6 %
DIFFERENTIAL METHOD BLD: NORMAL
EOSINOPHIL # BLD AUTO: 0.2 10E9/L (ref 0–0.7)
EOSINOPHIL NFR BLD AUTO: 2.7 %
ERYTHROCYTE [DISTWIDTH] IN BLOOD BY AUTOMATED COUNT: 14.4 % (ref 10–15)
HCT VFR BLD AUTO: 40 % (ref 35–47)
HGB BLD-MCNC: 13.1 G/DL (ref 11.7–15.7)
LYMPHOCYTES # BLD AUTO: 1.4 10E9/L (ref 0.8–5.3)
LYMPHOCYTES NFR BLD AUTO: 20.5 %
MCH RBC QN AUTO: 27.2 PG (ref 26.5–33)
MCHC RBC AUTO-ENTMCNC: 32.8 G/DL (ref 31.5–36.5)
MCV RBC AUTO: 83 FL (ref 78–100)
MONOCYTES # BLD AUTO: 0.6 10E9/L (ref 0–1.3)
MONOCYTES NFR BLD AUTO: 9.4 %
NEUTROPHILS # BLD AUTO: 4.4 10E9/L (ref 1.6–8.3)
NEUTROPHILS NFR BLD AUTO: 66.8 %
PLATELET # BLD AUTO: 216 10E9/L (ref 150–450)
RBC # BLD AUTO: 4.82 10E12/L (ref 3.8–5.2)
WBC # BLD AUTO: 6.6 10E9/L (ref 4–11)

## 2018-03-13 PROCEDURE — G0463 HOSPITAL OUTPT CLINIC VISIT: HCPCS

## 2018-03-13 PROCEDURE — 80053 COMPREHEN METABOLIC PANEL: CPT | Mod: ZL | Performed by: INTERNAL MEDICINE

## 2018-03-13 PROCEDURE — 36415 COLL VENOUS BLD VENIPUNCTURE: CPT | Mod: ZL | Performed by: INTERNAL MEDICINE

## 2018-03-13 PROCEDURE — 73030 X-RAY EXAM OF SHOULDER: CPT | Mod: TC,LT,FY

## 2018-03-13 PROCEDURE — 85025 COMPLETE CBC W/AUTO DIFF WBC: CPT | Mod: ZL | Performed by: INTERNAL MEDICINE

## 2018-03-13 PROCEDURE — 99215 OFFICE O/P EST HI 40 MIN: CPT | Performed by: INTERNAL MEDICINE

## 2018-03-13 PROCEDURE — G0463 HOSPITAL OUTPT CLINIC VISIT: HCPCS | Mod: 25

## 2018-03-13 PROCEDURE — 71046 X-RAY EXAM CHEST 2 VIEWS: CPT | Mod: TC,FY

## 2018-03-13 RX ORDER — BISACODYL 10 MG
10 SUPPOSITORY, RECTAL RECTAL
COMMUNITY
End: 2018-04-03

## 2018-03-13 RX ORDER — METOPROLOL TARTRATE 50 MG
50 TABLET ORAL
COMMUNITY
Start: 2018-03-08 | End: 2018-04-03

## 2018-03-13 RX ORDER — PANTOPRAZOLE SODIUM 40 MG/1
40 TABLET, DELAYED RELEASE ORAL
COMMUNITY
Start: 2018-03-09 | End: 2018-04-04

## 2018-03-13 RX ORDER — POLYETHYLENE GLYCOL 3350 17 G/17G
1 POWDER, FOR SOLUTION ORAL DAILY
Qty: 510 G | Refills: 1 | Status: SHIPPED | OUTPATIENT
Start: 2018-03-13 | End: 2018-05-22

## 2018-03-13 RX ORDER — CLOPIDOGREL BISULFATE 75 MG/1
75 TABLET ORAL
COMMUNITY
Start: 2018-03-08 | End: 2018-04-26

## 2018-03-13 RX ORDER — ATORVASTATIN CALCIUM 80 MG/1
80 TABLET, FILM COATED ORAL
COMMUNITY
Start: 2018-03-08 | End: 2018-06-15

## 2018-03-13 RX ORDER — LISINOPRIL 10 MG/1
10 TABLET ORAL
COMMUNITY
Start: 2018-03-08 | End: 2018-04-03

## 2018-03-13 RX ORDER — ALBUTEROL SULFATE 90 UG/1
1-2 AEROSOL, METERED RESPIRATORY (INHALATION) EVERY 4 HOURS PRN
Qty: 1 INHALER | Refills: 3 | Status: SHIPPED | OUTPATIENT
Start: 2018-03-13 | End: 2018-05-02

## 2018-03-13 RX ORDER — ASPIRIN 325 MG
325 TABLET ORAL
COMMUNITY

## 2018-03-13 RX ORDER — AZITHROMYCIN 250 MG/1
TABLET, FILM COATED ORAL
Qty: 11 TABLET | Refills: 0 | Status: SHIPPED | OUTPATIENT
Start: 2018-03-13 | End: 2018-04-03

## 2018-03-13 ASSESSMENT — PAIN SCALES - GENERAL: PAINLEVEL: NO PAIN (0)

## 2018-03-13 NOTE — PATIENT INSTRUCTIONS
Start Lantus 5-6 units SQ daily-If glucose is not consistently in the  range increase by 5-6 additional units  Continue to hold Metformin

## 2018-03-13 NOTE — PROGRESS NOTES
Internal Medicine:    Chief Complaint   Patient presents with     Hospital F/U     [facility] and [facility]      Medication Request     mirolax     Recheck Medication     insulin and metformin      Breathing Problem     pt states she feels like she is not getting enough air- productive cough white/green mucus        Rosalie Friend is a 72 year old female with history of CVA in 5/17 followed by Aneurysm coiling who now presents with recurrent stuttering CVA symptoms resulting in in several ED visits and one hospitalization at [facility] and then again another hospitalization shortly after that [facility] stay at [facility].  She was discharged from [facility] and recently completed rehab In [facility].  She reports now having worsening weakness in Left extremities as compared to the start of rehab.  In addition she reports a fall on her left shoulder along with increasing cough with sputum production.  See additional details please refer to documentation below.        On May, 2017 after not seeing a doctor for a few years, presented to [place] ED for sudden onset right vision loss and left side weakness, which resolved. CT of head and CT angiogram showed R ICA occlusion. L distal ICA, and R MCA 8mm 6mm aneurysms. She was transferred to [facility] and placed on  mg and lipitor 80 mg. She was found to have elevated HgA1c at 10, lantus and metformin were started. She recovered nicely without residual deficit. She was referred to [facility] for her bilateral ICA aneurysms.  She had diagnostic cerebral angiogram with endovascular coil embolization of the larger left ICA aneurysm. The right MCA aneurysm will be monitored with serial imaging.     On January, 2018 she noted L hand clumsiness. This progressed and she noted some L leg symptoms. On 2/15//18 she went to ED [place]. It sounds like she had CTA of head, given lisinopril for high blood pressure and sent home. She returned the following day for persistent symptoms.  This time ED requested transfer. While waiting for a bed to become available the family became impatient and signed themselves out AMA from the ED and went to [facility]. BP was 160/72. She underwent an MR angiogram of the head and neck and MR of the brain (under anesthesia due to her claustrophobia), which I reviewed, revealed an acute infarct in the right periventricular centrum semiovale.  Plavix was added. She tolerated regular diet.      She was discharged from [facility] on 2/17/18. She got home at 5 pm and promptly noted that her left upper extremity was completely weak and she could not move it. She went to [facility] ED who contacted [facility] who in turn recommended that she be transferred to [facility], to where she was transported by helicopter. There she was noted to be impulsive and unsafe to go home. Blood pressure was noted to be labile. JUHI showed no embolic source. CT perfusion reportedly showed adequate cerebral reserve.         Patient Active Problem List   Diagnosis     ACP (advance care planning)     Acute ischemic stroke (H)            Past Medical History:   Diagnosis Date     Cerebral infarction (H)     05/06/2017     Diabetes type 2, uncontrolled (H)      Hyperlipemia             Past Surgical History:   Procedure Laterality Date     ABDOMEN SURGERY      bladder repair     GYN SURGERY      hysterectomy             Social History   Substance Use Topics     Smoking status: Former Smoker     Types: Cigarettes     Quit date: 5/5/2017     Smokeless tobacco: Never Used      Comment: limits self to about 4-5 daily , she is quitting on her own     Alcohol use No            Family History   Problem Relation Age of Onset     Aneurysm Mother      Other Cancer Father      DIABETES Brother      Hypertension Brother      Other Cancer Brother      Other Cancer Sister      Aneurysm Sister                Allergies   Allergen Reactions     Penicillins      Phenylephrine      Head aches     Tropicamide      Head  aches              Current Outpatient Prescriptions   Medication Sig Dispense Refill     lisinopril (PRINIVIL/ZESTRIL) 10 MG tablet Take 10 mg by mouth       magnesium oxide (MAG-OX) 400 (241.3 MG) MG tablet Take 400 mg by mouth       pantoprazole (PROTONIX) 40 MG EC tablet Take 40 mg by mouth       aspirin 325 MG tablet Take 325 mg by mouth       atorvastatin (LIPITOR) 80 MG tablet Take 80 mg by mouth       clopidogrel (PLAVIX) 75 MG tablet Take 75 mg by mouth       bisacodyl (DULCOLAX) 10 MG Suppository Place 10 mg rectally       metoprolol tartrate (LOPRESSOR) 50 MG tablet Take 50 mg by mouth       magnesium hydroxide (MILK OF MAGNESIA) 400 MG/5ML suspension Take 30 mLs by mouth       LANTUS SOLOSTAR 100 UNIT/ML soln INJECT 12 UNITS UNDER THE SKIN EVERY MORNING 15 mL 8     order for DME Equipment being ordered: Glucometer and test strips 1 Device 0     blood glucose monitoring (ONE TOUCH DELICA) lancets Use to test blood sugar 3 times daily. 360 each 3     Glucose Blood (BLOOD GLUCOSE TEST STRIPS) STRP four times a day. 360 each 3     ONETOUCH DELICA LANCETS 33G MISC four times a day.       [DISCONTINUED] metoprolol tartrate (LOPRESSOR) 100 MG tablet Take 0.5 tablets (50 mg) by mouth 2 times daily 60 tablet      [DISCONTINUED] clopidogrel (PLAVIX) 75 MG tablet Take 1 tablet (75 mg) by mouth daily 30 tablet      [DISCONTINUED] atorvastatin (LIPITOR) 80 MG tablet Take 1 tablet (80 mg) by mouth daily 90 tablet 3       Review Of Systems:    Skin: bruising  Eyes: negative  Ears/Nose/Throat: negative  Respiratory: Cough- with sputum production  Cardiovascular: negative  Gastrointestinal: negative  Genitourinary: negative  Musculoskeletal: as above  Neurologic: as above  Psychiatric: depression  Hematologic/Lymphatic/Immunologic: negative  Endocrine: diabetes    Objective:   /60 (BP Location: Right arm, Patient Position: Supine, Cuff Size: Adult Regular)  Pulse 50  Temp 98.2  F (36.8  C) (Tympanic)  SpO2  98%  EXAM:  Constitutional: alert and no distress   Cardiovascular:  RRR. No murmurs, clicks gallops or rub  Respiratory: Lungs clear  Psychiatric: mentation appears normal and affect normal/bright  Head: Normocephalic. No masses, lesions, tenderness or abnormalities  NEURO: 2/5 strength in Left upper and Lower extremity  Left eye droop amblyopia at baseline.    JOINT/EXTREMITIES: Bruising of left shoulder noted.         Orders placed or performed in visit on 03/13/18     lisinopril (PRINIVIL/ZESTRIL) 10 MG tablet     magnesium oxide (MAG-OX) 400 (241.3 MG) MG tablet     pantoprazole (PROTONIX) 40 MG EC tablet     aspirin 325 MG tablet     atorvastatin (LIPITOR) 80 MG tablet     clopidogrel (PLAVIX) 75 MG tablet     bisacodyl (DULCOLAX) 10 MG Suppository     metoprolol tartrate (LOPRESSOR) 50 MG tablet     magnesium hydroxide (MILK OF MAGNESIA) 400 MG/5ML suspension        On May, 2017 after not seeing a doctor for a few years, presented to [place] ED for sudden onset right vision loss and left side weakness, which resolved. CT of head and CT angiogram showed R ICA occlusion. L distal ICA, and R MCA 8mm 6mm aneurysms. She was transferred to [facility] and placed on  mg and lipitor 80 mg. She was found to have elevated HgA1c at 10, lantus and metformin were started. She recovered nicely without residual deficit. She was referred to [facility] for her bilateral ICA aneurysms.  She had diagnostic cerebral angiogram with endovascular coil embolization of the larger left ICA aneurysm. The right MCA aneurysm will be monitored with serial imaging.     On January, 2018 she noted L hand clumsiness. This progressed and she noted some L leg symptoms. On 2/15//18 she went to ED [place]. It sounds like she had CTA of head, given lisinopril for high blood pressure and sent home. She returned the following day for persistent symptoms. This time ED requested transfer. While waiting for a bed to become available the family  became impatient and signed themselves out AMA from the ED and went to [facility]. BP was 160/72. She underwent an MR angiogram of the head and neck and MR of the brain (under anesthesia due to her claustrophobia), which I reviewed, revealed an acute infarct in the right periventricular centrum semiovale.  Plavix was added. She tolerated regular diet.      She was discharged from [facility] on 2/17/18. She got home at 5 pm and promptly noted that her left upper extremity was completely weak and she could not move it. She went to [facility] ED who contacted [facility] who in turn recommended that she be transferred to [facility], to where she was transported by helicopter. There she was noted to be impulsive and unsafe to go home. Blood pressure was noted to be labile. JUHI showed no embolic source. CT perfusion reportedly showed adequate cerebral reserve.  XR SHOULDER LT G/E 3 VW     HISTORY: 72 yearsFemale ; Fall, subsequent encounter     TECHNIQUE: 4 views left shoulder     COMPARISON: None     FINDINGS: There is glenohumeral osteoarthritic change. There is  acromioclavicular osteoarthritic change. There is osteopenia. There is  no evidence of fracture or dislocation         IMPRESSION: Mild-to-moderate glenohumeral osteoarthritis and moderate  acromioclavicular osteoarthritis. There is no evidence of fracture or  dislocation of the left shoulder.     RENETTA HANSEN MD  XR CHEST 2 VW     HISTORY: 72 years Female ; Cough     COMPARISON: 2/17/2018     TECHNIQUE: 2 views of the chest were obtained.     FINDINGS: Two views of the chest were obtained. Heart size and  pulmonary vascularity are within normal limits, lungs are clear both  views. No consolidating air space opacities are present.               IMPRESSION: Clear chest.     RENETTA HANSEN MD    CTA ANGIOGRAM HEAD PERFUSION WITH DIAMOX 2/20/2018 12:08 PM     CT of the Head without contrast  CT Brain Perfusion Study pre and post Diamox  CT of the Head with  contrast     History:  R MCA stenosis with variable presentation over 2 weeks, now  with significant left arm weakness and facial droop. Concern for  pressure dependent lesion.;      Additional history from the EMR: DM type 2, HTN, ?HLD, right ICA  occlusion and R MCA strokes 2017, bifurcation of left anterior and  middle cerebral artery aneurysm s/p coiling presented via OSH with  recurrent strokes symptoms over past 2 weeks.       Comparison: Head CT/CTA 2/17/2018      Technique:   HEAD CT: Helically acquired axial thin section CT images of the head  were obtained from the base of the skull to the vertex without  intravenous contrast and reviewed in brain, bone, and subdural  windows.      CT BRAIN PERFUSION: Dynamic perfusion CT of the brain was performed at  multiple levels with 10 mm slice thickness; perfusion was measured and  imaged during 2 separate rapid bolus intravenous injections of  nonionic iodinated contrast medium, 1) centered at the level of  the  basal ganglia and 2) centered at the level of the top of the lateral  ventricles. Each injection required approximately 40 cc of nonionic  contrast.  Images were reconstructed and reviewed on the [employer] 3D  workstation. 1 gram of Diamox was then administered intravenously.  Thereafter, dynamic perfusion CT was again performed as described  above in order to evaluate response to Diamox.  Images were reviewed  on the [employer] 3D workstation.     Contrast: 80 cc of isovue 370     Findings:   Head CT: Redemonstration of left MCA aneurysm coils and associated  streak artifacts. Anterior border zone infarcts along the right  periventricular region unchanged compared to previous CT from  2/17/2018. Stable old infarct in the right  posterior temporal region.  No new loss of gray-white matter differentiation loss suggesting acute  infarct. No intracranial hemorrhage. No ventriculomegaly. Paranasal  sinuses are clear. No fracture identified. Orbital structures  are  unremarkable.      CT Perfusion pre-Diamox: In this patient with occluded right internal  carotid artery, there is diffusely decreased cerebral blood flow and  increased mean transient time throughout the right middle cerebral  artery territory. In addition the cerebral blood flow is slight the  decrease in the right anterior cerebral artery territory as well  especially in the orbital frontal regions. For example the cerebral  blood flow varies between 15-25 in selected regions while in the  normal left MCA territory, the cerebral blood flow varies between  30-40 (cc/100 g/minutes).      CT Perfusion post-Diamox: Following administration of Diamox, and in  normal left hemisphere, the CBF values increased while mean transit  time were relatively stable. The CBF values varied between 40 and 60  cc/100 g/minutes. On the other hand, the CBF values remained similar  to pre-Diamox study on the right side with minimal delay in the mean  transit time at both MCA and MISHA territories. The CBF values were  noted again between 15-25 cc/100 g/minutes.         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     I have personally reviewed the examination and initial interpretation  and I agree with the findings.     CAROLYN JORGE MD    Results for orders placed or performed in visit on 03/13/18   CBC with platelets and differential   Result Value Ref Range    WBC 6.6 4.0 - 11.0 10e9/L    RBC Count 4.82 3.8 - 5.2 10e12/L    Hemoglobin 13.1 11.7 - 15.7 g/dL    Hematocrit 40.0 35.0 - 47.0 %    MCV 83 78 - 100 fl    MCH 27.2 26.5 - 33.0 pg    MCHC 32.8 31.5 - 36.5 g/dL    RDW 14.4 10.0 - 15.0 %    Platelet Count 216 150 - 450 10e9/L    Diff Method Automated Method     % Neutrophils 66.8 %    % Lymphocytes 20.5 %    % Monocytes 9.4 %    %  Eosinophils 2.7 %    % Basophils 0.6 %    Absolute Neutrophil 4.4 1.6 - 8.3 10e9/L    Absolute Lymphocytes 1.4 0.8 - 5.3 10e9/L    Absolute Monocytes 0.6 0.0 - 1.3 10e9/L    Absolute Eosinophils 0.2 0.0 - 0.7 10e9/L    Absolute Basophils 0.0 0.0 - 0.2 10e9/L     Assessment and Plan:    (R05) Cough  (primary encounter diagnosis)  Comment: CXR clear but still with rhonchi on exam.  Possible bronchitis.    Plan: XR CHEST 2 VW (Clinic Performed), CBC with         platelets and differential    (W19.XXXD) Fall, subsequent encounter  Comment: No fracture identified.    Plan: XR Shoulder Left G/E 3 Views (Clinic Performed)    (E11.8) Type 2 diabetes mellitus with complication, unspecified long term insulin use status (H)  Comment: Most recent A1C was 6.7    Plan: Comprehensive metabolic panel (BMP + Alb, Alk         Phos, ALT, AST, Total. Bili, TP)    (I63.9) Acute ischemic stroke (H)  Comment: As above-we will continue to try and keep her BP in a moderate range as to avoid any highs or lows in the context of her watershed findings.    Plan: As above.      (N17.9) Acute renal failure, unspecified acute renal failure type (H)  Comment: I do not see mention of this but we will take a look as her Cr appears to have been stable during her stay at [facility].    Plan: Labs      Start Lantus 5-6 units SQ daily-If glucose is not consistently in the  range increase by 5-6 additional units  Continue to hold Metformin     A total of 50 minutes were spent face to face with the patient/daughter during this encounter and over half of that time was spent on counseling and coordination of care. We discussed the above disease process, side effects, recommendations and education. We also reviewed her clinical course and the imaging studies from today. Patient and her daughter were educated on the Insulin management also.    Coordination of care consisted of scheduling follow up appointments, scheduling of diagnostic tests, and establishing  a treatment plan.    Yash Cuellar, DO

## 2018-03-13 NOTE — MR AVS SNAPSHOT
"              After Visit Summary   3/13/2018    Rosalie Seals    MRN: [id number]           Patient Information     Date Of Birth          1945        Visit Information        Provider Department      3/13/2018 2:00 PM Yash Cuellar DO [facility]        Today's Diagnoses     Cough    -  1    Fall, subsequent encounter        Type 2 diabetes mellitus with complication, unspecified long term insulin use status (H)        Acute pain of left shoulder        Acute ischemic stroke (H)        Acute renal failure, unspecified acute renal failure type (H)        Acute bronchitis, unspecified organism        Constipation, unspecified constipation type          Care Instructions    Start Lantus 5-6 units SQ daily-If glucose is not consistently in the  range increase by 5-6 additional units  Continue to hold Metformin              Follow-ups after your visit        Who to contact     If you have questions or need follow up information about today's clinic visit or your schedule please contact [facility] directly at [phone].  Normal or non-critical lab and imaging results will be communicated to you by [employer], letter or phone within 4 business days after the clinic has received the results. If you do not hear from us within 7 days, please contact the clinic through [employer] or phone. If you have a critical or abnormal lab result, we will notify you by phone as soon as possible.  Submit refill requests through [employer] or call your pharmacy and they will forward the refill request to us. Please allow 3 business days for your refill to be completed.          Additional Information About Your Visit        Media [employer]unch! Information     [employer] lets you send messages to your doctor, view your test results, renew your prescriptions, schedule appointments and more. To sign up, go to [email or web address] . Click on \"Log in\" on the left side of the screen, which will take you to the " "Welcome page. Then click on \"Sign up Now\" on the right side of the page.     You will be asked to enter the access code listed below, as well as some personal information. Please follow the directions to create your username and password.     Your access code is: [email or web address]  Expires: 5/10/2018 11:46 AM     Your access code will  in 90 days. If you need help or a new code, please call your [place] clinic or [phone].        Care EveryWhere ID     This is your Care EveryWhere ID. This could be used by other organizations to access your [place] medical records  [id number]        Your Vitals Were     Pulse Temperature Pulse Oximetry             50 98.2  F (36.8  C) (Tympanic) 98%          Blood Pressure from Last 3 Encounters:   18 122/60   18 134/64   18 (!) 187/77    Weight from Last 3 Encounters:   18 223 lb 1.6 oz (101.2 kg)   18 230 lb (104.3 kg)   17 238 lb 11.2 oz (108.3 kg)              We Performed the Following     CBC with platelets and differential     Comprehensive metabolic panel (BMP + Alb, Alk Phos, ALT, AST, Total. Bili, TP)          Today's Medication Changes          These changes are accurate as of 3/13/18  3:02 PM.  If you have any questions, ask your nurse or doctor.               Start taking these medicines.        Dose/Directions    albuterol 108 (90 BASE) MCG/ACT Inhaler   Commonly known as:  PROAIR HFA/PROVENTIL HFA/VENTOLIN HFA   Used for:  Cough, Acute bronchitis, unspecified organism   Started by:  Yash Cuellar, DO        Dose:  1-2 puff   Inhale 1-2 puffs into the lungs every 4 hours as needed for shortness of breath / dyspnea or wheezing As needed.   Quantity:  1 Inhaler   Refills:  3       azithromycin 250 MG tablet   Commonly known as:  ZITHROMAX   Used for:  Acute bronchitis, unspecified organism   Started by:  Yash Cuellar DO        Two tablets first day, then one tablet daily for 9 days.   Quantity:  11 tablet "   Refills:  0       polyethylene glycol powder   Commonly known as:  MIRALAX   Used for:  Constipation, unspecified constipation type   Started by:  Yash Cuellar DO        Dose:  1 capful   Take 17 g (1 capful) by mouth daily   Quantity:  510 g   Refills:  1         These medicines have changed or have updated prescriptions.        Dose/Directions    aspirin 325 MG tablet   This may have changed:  Another medication with the same name was removed. Continue taking this medication, and follow the directions you see here.   Changed by:  Yash Cuellar DO        Dose:  325 mg   Take 325 mg by mouth   Refills:  0       atorvastatin 80 MG tablet   Commonly known as:  LIPITOR   This may have changed:  Another medication with the same name was removed. Continue taking this medication, and follow the directions you see here.   Changed by:  Yash Cuellar DO        Dose:  80 mg   Take 80 mg by mouth   Refills:  0       bisacodyl 10 MG Suppository   Commonly known as:  DULCOLAX   This may have changed:  Another medication with the same name was removed. Continue taking this medication, and follow the directions you see here.   Changed by:  Yash Cuellar DO        Dose:  10 mg   Place 10 mg rectally   Refills:  0       clopidogrel 75 MG tablet   Commonly known as:  PLAVIX   This may have changed:  Another medication with the same name was removed. Continue taking this medication, and follow the directions you see here.   Changed by:  Yash Cuellar DO        Dose:  75 mg   Take 75 mg by mouth   Refills:  0       magnesium hydroxide 400 MG/5ML suspension   Commonly known as:  MILK OF MAGNESIA   This may have changed:  Another medication with the same name was removed. Continue taking this medication, and follow the directions you see here.   Changed by:  Yash Cuellar DO        Dose:  30 mL   Take 30 mLs by mouth   Refills:  0       metoprolol tartrate 50 MG tablet    Commonly known as:  LOPRESSOR   This may have changed:  Another medication with the same name was removed. Continue taking this medication, and follow the directions you see here.   Changed by:  Yash Cuellar DO        Dose:  50 mg   Take 50 mg by mouth   Refills:  0         Stop taking these medicines if you haven't already. Please contact your care team if you have questions.     lisinopril-hydrochlorothiazide 10-12.5 MG per tablet   Commonly known as:  PRINZIDE/ZESTORETIC   Stopped by:  Yash Cuellar DO           metFORMIN 500 MG tablet   Commonly known as:  GLUCOPHAGE   Stopped by:  Yash Cuellar DO           senna-docusate 8.6-50 MG per tablet   Commonly known as:  SENOKOT-S;PERICOLACE   Stopped by:  Yash Cuellar DO                Where to get your medicines      These medications were sent to [facility] - [place], [place] - [address]   [address] , [place] [address]     Phone:  [phone]     albuterol 108 (90 BASE) MCG/ACT Inhaler    azithromycin 250 MG tablet    polyethylene glycol powder                Primary Care Provider Office Phone # Fax #    Yash Cuellar [phone]       [facility] [address] [facility]  [place] [address]        Equal Access to Services     [facility] ANDRÉS AH: Hadii amos ku hadasho Soomaali, waaxda luqadaha, qaybta kaalmada adeegyada, waxay idiin haychuckn keith ontiveros . So [facility] [phone].    ATENCIÓN: Si habla español, tiene a blackmon disposición servicios gratuitos de asistencia lingüística. Llame al [phone].    We comply with applicable federal civil rights laws and [place] laws. We do not discriminate on the basis of race, color, national origin, age, disability, sex, sexual orientation, or gender identity.            Thank you!     Thank you for choosing [facility] (formerly [facility]  for your care. Our goal is always to provide you with excellent care. Hearing back from our patients is one  way we can continue to improve our services. Please take a few minutes to complete the written survey that you may receive in the mail after your visit with us. Thank you!             Your Updated Medication List - Protect others around you: Learn how to safely use, store and throw away your medicines at www.disposemymeds.org.          This list is accurate as of 3/13/18  3:02 PM.  Always use your most recent med list.                   Brand Name Dispense Instructions for use Diagnosis    albuterol 108 (90 BASE) MCG/ACT Inhaler    PROAIR HFA/PROVENTIL HFA/VENTOLIN HFA    1 Inhaler    Inhale 1-2 puffs into the lungs every 4 hours as needed for shortness of breath / dyspnea or wheezing As needed.    Cough, Acute bronchitis, unspecified organism       aspirin 325 MG tablet      Take 325 mg by mouth        atorvastatin 80 MG tablet    LIPITOR     Take 80 mg by mouth        azithromycin 250 MG tablet    ZITHROMAX    11 tablet    Two tablets first day, then one tablet daily for 9 days.    Acute bronchitis, unspecified organism       bisacodyl 10 MG Suppository    DULCOLAX     Place 10 mg rectally        BLOOD GLUCOSE TEST STRIPS Strp     360 each    four times a day.    Type 2 diabetes mellitus with complication, without long-term current use of insulin (H)       clopidogrel 75 MG tablet    PLAVIX     Take 75 mg by mouth        LANTUS SOLOSTAR 100 UNIT/ML injection   Generic drug:  insulin glargine     15 mL    INJECT 12 UNITS UNDER THE SKIN EVERY MORNING    Type 2 diabetes mellitus with complication, without long-term current use of insulin (H)       lisinopril 10 MG tablet    PRINIVIL/ZESTRIL     Take 10 mg by mouth        magnesium hydroxide 400 MG/5ML suspension    MILK OF MAGNESIA     Take 30 mLs by mouth        magnesium oxide 400 (241.3 MG) MG tablet    MAG-OX     Take 400 mg by mouth        metoprolol tartrate 50 MG tablet    LOPRESSOR     Take 50 mg by mouth        * ONETOUCH DELICA LANCETS 33G [facility]      four  times a day.        * blood glucose monitoring lancets     360 each    Use to test blood sugar 3 times daily.    Type 2 diabetes mellitus with hyperglycemia, with long-term current use of insulin (H)       order for DME     1 Device    Equipment being ordered: Glucometer and test strips    Well controlled type 2 diabetes mellitus (H)       pantoprazole 40 MG EC tablet    PROTONIX     Take 40 mg by mouth        polyethylene glycol powder    MIRALAX    510 g    Take 17 g (1 capful) by mouth daily    Constipation, unspecified constipation type       * Notice:  This list has 2 medication(s) that are the same as other medications prescribed for you. Read the directions carefully, and ask your doctor or other care provider to review them with you.

## 2018-03-13 NOTE — NURSING NOTE
"Chief Complaint   Patient presents with     Hospital F/U     [facility]        Initial /60 (BP Location: Right arm, Patient Position: Supine, Cuff Size: Adult Regular)  Pulse 50  Temp 98.2  F (36.8  C) (Tympanic)  SpO2 98% Estimated body mass index is 37.13 kg/(m^2) as calculated from the following:    Height as of 2/9/18: 5' 5\" (1.651 m).    Weight as of 2/18/18: 223 lb 1.6 oz (101.2 kg).  Medication Reconciliation: complete   Kalee Busby LPN      "

## 2018-03-14 ENCOUNTER — TELEPHONE (OUTPATIENT)
Dept: INTERNAL MEDICINE | Facility: OTHER | Age: 73
End: 2018-03-14

## 2018-03-14 DIAGNOSIS — Z53.9 PERSONS ENCOUNTERING HEALTH SERVICES FOR SPECIFIC PROCEDURES, NOT CARRIED OUT: Primary | ICD-10-CM

## 2018-03-14 LAB
ALBUMIN SERPL-MCNC: 3.3 G/DL (ref 3.4–5)
ALP SERPL-CCNC: 69 U/L (ref 40–150)
ALT SERPL W P-5'-P-CCNC: 22 U/L (ref 0–50)
ANION GAP SERPL CALCULATED.3IONS-SCNC: 4 MMOL/L (ref 3–14)
AST SERPL W P-5'-P-CCNC: 14 U/L (ref 0–45)
BILIRUB SERPL-MCNC: 0.3 MG/DL (ref 0.2–1.3)
BUN SERPL-MCNC: 13 MG/DL (ref 7–30)
CALCIUM SERPL-MCNC: 9.3 MG/DL (ref 8.5–10.1)
CHLORIDE SERPL-SCNC: 110 MMOL/L (ref 94–109)
CO2 SERPL-SCNC: 27 MMOL/L (ref 20–32)
CREAT SERPL-MCNC: 0.87 MG/DL (ref 0.52–1.04)
GFR SERPL CREATININE-BSD FRML MDRD: 64 ML/MIN/1.7M2
GLUCOSE SERPL-MCNC: 134 MG/DL (ref 70–99)
POTASSIUM SERPL-SCNC: 4.1 MMOL/L (ref 3.4–5.3)
PROT SERPL-MCNC: 6.7 G/DL (ref 6.8–8.8)
SODIUM SERPL-SCNC: 141 MMOL/L (ref 133–144)

## 2018-03-14 PROCEDURE — G0180 MD CERTIFICATION HHA PATIENT: HCPCS | Performed by: INTERNAL MEDICINE

## 2018-03-14 NOTE — TELEPHONE ENCOUNTER
Jessi from [facility] calling to get a verbal order to start physical therapy this week with Rosalie. States all faxes went to [place] and have yet to receive them back. Was notified we faxed them this morning, gave verbal per Dr. Cuellar to start PT this week. Kalee Busby LPN

## 2018-03-20 ENCOUNTER — TELEPHONE (OUTPATIENT)
Dept: INTERNAL MEDICINE | Facility: OTHER | Age: 73
End: 2018-03-20

## 2018-03-20 DIAGNOSIS — T17.908A ASPIRATION INTO AIRWAY: ICD-10-CM

## 2018-03-20 DIAGNOSIS — Z86.73 HISTORY OF CVA (CEREBROVASCULAR ACCIDENT): ICD-10-CM

## 2018-03-20 DIAGNOSIS — J69.0 ASPIRATION PNEUMONITIS (H): Primary | ICD-10-CM

## 2018-03-20 NOTE — TELEPHONE ENCOUNTER
12:44 PM    Reason for Call: Phone Call    Description: Pts daughter called and states that she was put on zpac last week and she is not getting any better and she would like a call back regarding this.    Was an appointment offered for this call? No  If yes : Appointment type              Date    Preferred method for responding to this message: Telephone Call  What is your phone number ?[phone]    If we cannot reach you directly, may we leave a detailed response at the number you provided? Yes    Can this message wait until your PCP/provider returns, if available today? Not applicable[facility]

## 2018-03-20 NOTE — TELEPHONE ENCOUNTER
Called daughter back.She continues to be SOB and has had 7 doses of Azithromycin. She has been using her inhaler.Per daughter the therapist today at the house said that patient should not be SOB,like this.Is having SOB with activity. No worsening of SOB since last appointment and they deny that she needs to be seen in [facility].Move FU appointment forward?Please advise.

## 2018-03-20 NOTE — TELEPHONE ENCOUNTER
Pt has taken 7 doses of antibiotic, still feeling short of breath and has a little bit of a wheeze and a rattle to her breathing. Cough has gotten better. Is using inhaler with minor relief. No fever or chills. Please advise. Kalee Busby LPN

## 2018-03-20 NOTE — TELEPHONE ENCOUNTER
Pts daughter called again and states that she has not heard anything back from anyone yet. [phone]

## 2018-03-21 ENCOUNTER — TELEPHONE (OUTPATIENT)
Dept: GENERAL RADIOLOGY | Facility: HOSPITAL | Age: 73
End: 2018-03-21

## 2018-03-21 NOTE — TELEPHONE ENCOUNTER
[facility] sees a referral in for them, and is questioning if you are wanting a Modified Esophagram? If so, the imaging portion of the order will also need to be ordered. Please place [id number]. Thank you.

## 2018-03-22 ENCOUNTER — TELEPHONE (OUTPATIENT)
Dept: INTERNAL MEDICINE | Facility: OTHER | Age: 73
End: 2018-03-22

## 2018-03-22 DIAGNOSIS — E83.42 HYPOMAGNESEMIA: Primary | ICD-10-CM

## 2018-03-22 NOTE — TELEPHONE ENCOUNTER
10:49 AM    Reason for Call: Phone Call    Description: Rneetta (daughter) called and was wondering if pt is to continue with the magnesium? Please call her back at [phone]    Was an appointment offered for this call? No  If yes : Appointment type              Date    Preferred method for responding to this message: Telephone Call  What is your phone number ?    If we cannot reach you directly, may we leave a detailed response at the number you provided? Yes    Can this message wait until your PCP/provider returns, if available today? Not applicable    Christine Cardenas

## 2018-03-27 ENCOUNTER — TELEPHONE (OUTPATIENT)
Dept: INTERNAL MEDICINE | Facility: OTHER | Age: 73
End: 2018-03-27

## 2018-03-27 DIAGNOSIS — R13.19 OTHER DYSPHAGIA: Primary | ICD-10-CM

## 2018-03-27 DIAGNOSIS — M25.512 ACUTE PAIN OF LEFT SHOULDER: Primary | ICD-10-CM

## 2018-03-27 DIAGNOSIS — Z86.73 HISTORY OF CVA (CEREBROVASCULAR ACCIDENT): ICD-10-CM

## 2018-03-27 NOTE — TELEPHONE ENCOUNTER
Patient daughter called and left voicemail that patient has swallow study tomorrow and her shoulder is still hurting.Requests a CT of shoulder.Did not specify what shoulder.Left message for patient to call back.Will clarify.

## 2018-03-27 NOTE — TELEPHONE ENCOUNTER
Pt would like to see ortho in [place]- Left shoulder pain. Please sign referral.  Kalee Busby LPN

## 2018-03-28 ENCOUNTER — HOSPITAL ENCOUNTER (OUTPATIENT)
Dept: SPEECH THERAPY | Facility: HOSPITAL | Age: 73
Setting detail: THERAPIES SERIES
End: 2018-03-28
Attending: INTERNAL MEDICINE
Payer: MEDICARE

## 2018-03-28 ENCOUNTER — HOSPITAL ENCOUNTER (OUTPATIENT)
Dept: GENERAL RADIOLOGY | Facility: HOSPITAL | Age: 73
Discharge: HOME | End: 2018-03-28
Attending: INTERNAL MEDICINE | Admitting: INTERNAL MEDICINE
Payer: MEDICARE

## 2018-03-28 DIAGNOSIS — R13.19 OTHER DYSPHAGIA: ICD-10-CM

## 2018-03-28 DIAGNOSIS — Z86.73 HISTORY OF CVA (CEREBROVASCULAR ACCIDENT): ICD-10-CM

## 2018-03-28 PROCEDURE — 74230 X-RAY XM SWLNG FUNCJ C+: CPT | Mod: TC

## 2018-03-28 PROCEDURE — 40000211 ZZHC STATISTIC SLP  DEPARTMENT VISIT

## 2018-03-28 PROCEDURE — 40000268 ZZH STATISTIC NO CHARGES

## 2018-03-28 NOTE — PROGRESS NOTES
03/28/18 1000   General Information   Type Of Visit Initial   Start Of Care Date 03/28/18   Referring Physician Dr. Cuellar    Orders Evaluate And Treat   Orders Comment MBSS   Medical Diagnosis CVA   Onset Of Illness/injury Or Date Of Surgery 12/28/17   Precautions/limitations Fall Precautions   Hearing WFL   Respiratory Status Room air  (patient does become short of breath )   Patient Role/employment History Retired   [facility]   Home/community Accessibility Comments (flowsheet Row) Patient lives with daughter.    Patient/family Goals Patient wants to know the cause of her shortness of breath.    Fall Risk Screen   Fall screen completed by SLP   Have you fallen 2 or more times in the past year? Yes   Have you fallen and had an injury in the past year? Yes   Is patient a fall risk? Yes   Fall screen comments Patient is getting home care PT.    Clinical Swallow Evaluation   Oral Musculature generally intact   Structural Abnormalities none present   Dentition present and adequate   Mucosal Quality good   Mandibular Strength and Mobility intact   Oral Labial Strength and Mobility WFL   Lingual Strength and Mobility WFL   Velar Elevation intact   Buccal Strength and Mobility intact   Laryngeal Function Cough;Throat clear;Swallow;Voicing initiated   VFSS Evaluation   VFSS Additional Documentation Yes   VFSS Eval: Radiology   Radiologist Dr. Caldwell    Views Taken right lateral   VFSS Eval: Thin Liquid Texture Trial   Mode of Presentation, Thin Liquid cup   Order of Presentation 1, 2   Preparatory Phase WFL   Oral Phase, Thin Liquid WFL   Pharyngeal Phase, Thin Liquid WFL   Rosenbek's Penetration Aspiration Scale: Thin Liquid Trial Results 1 - no aspiration, contrast does not enter airway   VFSS Eval: Puree Solid Texture Trial   Mode of Presentation, Puree spoon   Order of Presentation 3   Preparatory Phase WFL   Oral Phase, Puree WFL   Pharyngeal Phase, Puree WFL   Rosenbek's  Penetration Aspiration Scale: Puree Food Trial Results 1 - no aspiration, contrast does not enter airway   VFSS Eval: Solid Food Texture Trial   Mode of Presentation, Solid spoon   Order of Presentation 4   Preparatory Phase WFL   Oral Phase, Solid WFL   Pharyngeal Phase, Solid WFL   Rosenbek's Penetration Aspiration Scale: Solid Food Trial Results 1 - no aspiration, contrast does not enter airway   Swallow Eval: Clinical Impressions   Skilled Criteria for Therapy Intervention No problems identified which require skilled intervention   Dysphagia Outcome Severity Scale (ZANA) Level 7 - ZANA   Demonstrates Need for Referral to Another Service other (see comments)  (pulmonologist)   Predicted Duration of Therapy Intervention (days/wks) eval only    Anticipated Discharge Disposition home w/ assist   Risks and Benefits of Treatment have been explained. Yes   Patient, family and/or staff in agreement with Plan of Care Yes   Total Session Time   Total Session Time 25   Total Evaluation Time 25   Therapy Certification   Certification date from 03/28/18   Certification date to 03/28/18

## 2018-04-02 ENCOUNTER — TELEPHONE (OUTPATIENT)
Dept: INTERNAL MEDICINE | Facility: OTHER | Age: 73
End: 2018-04-02

## 2018-04-02 NOTE — TELEPHONE ENCOUNTER
Pt notified to hold metoprolol due to SOB and active heart rates in 50's per Dr. Cuellar. Pt states she will take her blood pressure and pulse daily and report the results to us next week. Kalee Busby LPN

## 2018-04-02 NOTE — TELEPHONE ENCOUNTER
"Patient daughter called and updated that PT has been at house and patient's HR has been 49-51 even with exercise.BP \"good\".Patient takes Metoprolol 50mg daily and other hypertensives.They are aware that MD is out of office at this time.Please advise.   "

## 2018-04-03 ENCOUNTER — TELEPHONE (OUTPATIENT)
Dept: FAMILY MEDICINE | Facility: OTHER | Age: 73
End: 2018-04-03

## 2018-04-03 ENCOUNTER — HOSPITAL ENCOUNTER (EMERGENCY)
Facility: HOSPITAL | Age: 73
Discharge: HOME | End: 2018-04-03
Attending: FAMILY MEDICINE | Admitting: FAMILY MEDICINE
Payer: MEDICARE

## 2018-04-03 ENCOUNTER — TELEPHONE (OUTPATIENT)
Dept: INTERNAL MEDICINE | Facility: OTHER | Age: 73
End: 2018-04-03

## 2018-04-03 VITALS
BODY MASS INDEX: 35.11 KG/M2 | RESPIRATION RATE: 20 BRPM | TEMPERATURE: 97.9 F | SYSTOLIC BLOOD PRESSURE: 162 MMHG | WEIGHT: 211 LBS | DIASTOLIC BLOOD PRESSURE: 76 MMHG | OXYGEN SATURATION: 96 % | HEART RATE: 70 BPM

## 2018-04-03 DIAGNOSIS — I10 ESSENTIAL HYPERTENSION, BENIGN: ICD-10-CM

## 2018-04-03 PROCEDURE — 25000132 ZZH RX MED GY IP 250 OP 250 PS 637: Mod: GY | Performed by: FAMILY MEDICINE

## 2018-04-03 PROCEDURE — 99283 EMERGENCY DEPT VISIT LOW MDM: CPT

## 2018-04-03 PROCEDURE — 99284 EMERGENCY DEPT VISIT MOD MDM: CPT | Performed by: FAMILY MEDICINE

## 2018-04-03 PROCEDURE — A9270 NON-COVERED ITEM OR SERVICE: HCPCS | Mod: GY | Performed by: FAMILY MEDICINE

## 2018-04-03 RX ORDER — LISINOPRIL 5 MG/1
10 TABLET ORAL ONCE
Status: COMPLETED | OUTPATIENT
Start: 2018-04-03 | End: 2018-04-03

## 2018-04-03 RX ORDER — METOPROLOL TARTRATE 50 MG
25 TABLET ORAL 2 TIMES DAILY
Qty: 60 TABLET | Refills: 0 | Status: SHIPPED | OUTPATIENT
Start: 2018-04-03 | End: 2018-04-04

## 2018-04-03 RX ORDER — LISINOPRIL 10 MG/1
20 TABLET ORAL DAILY
Qty: 30 TABLET | Refills: 0 | Status: SHIPPED | OUTPATIENT
Start: 2018-04-03 | End: 2018-04-04

## 2018-04-03 RX ADMIN — LISINOPRIL 10 MG: 5 TABLET ORAL at 13:05

## 2018-04-03 ASSESSMENT — ENCOUNTER SYMPTOMS
FEVER: 0
ACTIVITY CHANGE: 0
ABDOMINAL PAIN: 0
SPEECH DIFFICULTY: 0
LIGHT-HEADEDNESS: 0
NUMBNESS: 0
COUGH: 1
DYSPHORIC MOOD: 1
BACK PAIN: 1
NERVOUS/ANXIOUS: 1
SHORTNESS OF BREATH: 1
FATIGUE: 0
WEAKNESS: 0

## 2018-04-03 NOTE — ED AVS SNAPSHOT
[facility]    [address] [address]    Phone:  [phone]                                       Rosalie JANE Friend   MRN: [id number]    Department:  [facility]   Date of Visit:  4/3/2018           Patient Information     Date Of Birth          1945        Your diagnoses for this visit were:     Essential hypertension, benign        You were seen by Paola Morales MD.      Follow-up Information     Follow up with Yash Cuellar DO In 1 week.    Specialty:  Internal Medicine    Why:  Follow up ED visit    Contact information:    [address] [address] [phone]        Discharge References/Attachments     HIGH BLOOD PRESSURE, CONTROLLING (ENGLISH)      Your next 10 appointments already scheduled     Apr 04, 2018  2:30 PM CDT   (Arrive by 2:15 PM)   Return Visit with Xochitl Daniels RD   [facility] ([facility] )    [address] [place] [address] [phone]            Apr 09, 2018  1:40 PM CDT   (Arrive by 1:20 PM)   New Visit with Raheem Contreras DO    ORTHOPEDICS ([facility] )    [address] [address] [phone]            Apr 12, 2018 11:15 AM CDT   (Arrive by 11:00 AM)   Office Visit with Yash Cuellar DO   [facility] ([facility] )    [address] [place] [address] [phone]           Bring a current list of meds and any records pertaining to this visit. For Physicals, please bring immunization records and any forms needing to be filled out. Please arrive 10 minutes early to complete paperwork.                 Review of your medicines      Our records show that you are taking the medicines listed below. If these are incorrect, please call your family doctor or clinic.        Dose / Directions Last dose taken    albuterol 108 (90 BASE) MCG/ACT Inhaler   Commonly known  as:  PROAIR HFA/PROVENTIL HFA/VENTOLIN HFA   Dose:  1-2 puff   Quantity:  1 Inhaler        Inhale 1-2 puffs into the lungs every 4 hours as needed for shortness of breath / dyspnea or wheezing As needed.   Refills:  3        aspirin 325 MG tablet   Dose:  325 mg        Take 325 mg by mouth   Refills:  0        atorvastatin 80 MG tablet   Commonly known as:  LIPITOR   Dose:  80 mg        Take 80 mg by mouth   Refills:  0        BLOOD GLUCOSE TEST STRIPS Strp   Quantity:  360 each        four times a day.   Refills:  3        clopidogrel 75 MG tablet   Commonly known as:  PLAVIX   Dose:  75 mg        Take 75 mg by mouth   Refills:  0        LANTUS SOLOSTAR 100 UNIT/ML injection   Quantity:  15 mL   Generic drug:  insulin glargine        INJECT 12 UNITS UNDER THE SKIN EVERY MORNING   Refills:  8        lisinopril 10 MG tablet   Commonly known as:  PRINIVIL/ZESTRIL   Dose:  10 mg        Take 10 mg by mouth   Refills:  0        magnesium hydroxide 400 MG/5ML suspension   Commonly known as:  MILK OF MAGNESIA   Dose:  30 mL        Take 30 mLs by mouth   Refills:  0        magnesium oxide 400 (241.3 MG) MG tablet   Commonly known as:  MAG-OX   Dose:  400 mg   Quantity:  90 tablet        Take 1 tablet (400 mg) by mouth 2 times daily (with meals)   Refills:  3        metoprolol tartrate 50 MG tablet   Commonly known as:  LOPRESSOR   Dose:  50 mg        Take 50 mg by mouth   Refills:  0        * ONETOUCH DELICA LANCETS 33G Misc        four times a day.   Refills:  0        * blood glucose monitoring lancets   Quantity:  360 each        Use to test blood sugar 3 times daily.   Refills:  3        order for DME   Quantity:  1 Device        Equipment being ordered: Glucometer and test strips   Refills:  0        pantoprazole 40 MG EC tablet   Commonly known as:  PROTONIX   Dose:  40 mg        Take 40 mg by mouth   Refills:  0        polyethylene glycol powder   Commonly known as:  MIRALAX   Dose:  1 capful   Quantity:  510 g      "   Take 17 g (1 capful) by mouth daily   Refills:  1        * Notice:  This list has 2 medication(s) that are the same as other medications prescribed for you. Read the directions carefully, and ask your doctor or other care provider to review them with you.            Orders Needing Specimen Collection     None      Pending Results     No orders found from 2018 to 2018.            Pending Culture Results     No orders found from 2018 to 2018.            Thank you for choosing [facility]       Thank you for choosing [facility] for your care. Our goal is always to provide you with excellent care. Hearing back from our patients is one way we can continue to improve our services. Please take a few minutes to complete the written survey that you may receive in the mail after you visit with us. Thank you!        [email or web address] Information     [employer] lets you send messages to your doctor, view your test results, renew your prescriptions, schedule appointments and more. To sign up, go to [email or web address] . Click on \"Log in\" on the left side of the screen, which will take you to the Welcome page. Then click on \"Sign up Now\" on the right side of the page.     You will be asked to enter the access code listed below, as well as some personal information. Please follow the directions to create your username and password.     Your access code is: [email or web address]  Expires: 5/10/2018 11:46 AM     Your access code will  in 90 days. If you need help or a new code, please call your [facility] clinic or [phone].        Care EveryWhere ID     This is your Care EveryWhere ID. This could be used by other organizations to access your [facility] medical records  [id number]        Equal Access to Services     MICHELLE BOWEN : nahed Nicole, nery best. So [facility] [phone].    ATENCIÓN: Si habla español, tiene a blackmon disposición " servicios gratuitos de asistencia lingüística. Hu gallegos [phone].    We comply with applicable federal civil rights laws and [place] laws. We do not discriminate on the basis of race, color, national origin, age, disability, sex, sexual orientation, or gender identity.            After Visit Summary       This is your record. Keep this with you and show to your community pharmacist(s) and doctor(s) at your next visit.

## 2018-04-03 NOTE — ED PROVIDER NOTES
"  History     Chief Complaint   Patient presents with     Hypertension     \"hx of strokes\"      HPI  Rosalie JANE Friend is a 72 year old female who comes to the ER with her daughter complaining of hypertension.  She has a history of watershed strokes, has been having issues with controlling her blood pressure, but also having trouble with her breathing.  Her daughter read that there was a connection between metoprolol and dyspnea, has been discussing this with Dr. Cuellar and his staff.  The main issue is that blood pressure should be kept at 150 or so due to the  previous strokes, and the daughter tends to get very excited about blood pressure.  She has been taking it 3 times a day and has been calling frequently to let the office know that there is an issue.  Blood pressure this morning was over 200 so they presented to the emergency room.    Problem List:    Patient Active Problem List    Diagnosis Date Noted     Acute ischemic stroke (H) 02/18/2018     Priority: Medium     ACP (advance care planning) 05/09/2017     Priority: Medium     Advance Care Planning 5/9/2017: ACP Review of Chart / Resources Provided:  Reviewed chart for advance care plan.  Rosalie JANE Friend has been provided information and resources to begin or update their advance care plan.  Added by Kalee Busby                Past Medical History:    Past Medical History:   Diagnosis Date     Cerebral infarction (H)      Diabetes type 2, uncontrolled (H)      Hyperlipemia        Past Surgical History:    Past Surgical History:   Procedure Laterality Date     ABDOMEN SURGERY      bladder repair     GYN SURGERY      hysterectomy        Family History:    Family History   Problem Relation Age of Onset     Aneurysm Mother      Other Cancer Father      DIABETES Brother      Hypertension Brother      Other Cancer Brother      Other Cancer Sister      Aneurysm Sister        Social History:  Marital Status:   [4]  Social History   Substance Use " Topics     Smoking status: Former Smoker     Types: Cigarettes     Quit date: 5/5/2017     Smokeless tobacco: Never Used      Comment: limits self to about 4-5 daily , she is quitting on her own     Alcohol use No        Medications:      magnesium oxide (MAG-OX) 400 (241.3 MG) MG tablet   lisinopril (PRINIVIL/ZESTRIL) 10 MG tablet   pantoprazole (PROTONIX) 40 MG EC tablet   aspirin 325 MG tablet   atorvastatin (LIPITOR) 80 MG tablet   clopidogrel (PLAVIX) 75 MG tablet   metoprolol tartrate (LOPRESSOR) 50 MG tablet   albuterol (PROAIR HFA/PROVENTIL HFA/VENTOLIN HFA) 108 (90 BASE) MCG/ACT Inhaler   polyethylene glycol (MIRALAX) powder   magnesium hydroxide (MILK OF MAGNESIA) 400 MG/5ML suspension   LANTUS SOLOSTAR 100 UNIT/ML soln   order for DME   blood glucose monitoring (ONE TOUCH DELICA) lancets   Glucose Blood (BLOOD GLUCOSE TEST STRIPS) STRP   ONETOUCH DELICA LANCETS 33G MISC         Review of Systems   Constitutional: Negative for activity change, fatigue and fever.   HENT: Negative.    Respiratory: Positive for cough and shortness of breath.         Chronic, did smoke for 60+ years.   Cardiovascular: Negative for chest pain.   Gastrointestinal: Negative for abdominal pain.   Genitourinary: Negative.    Musculoskeletal: Positive for back pain.        Chronic   Skin: Negative.    Neurological: Negative for speech difficulty, weakness, light-headedness and numbness.   Psychiatric/Behavioral: Positive for dysphoric mood. The patient is nervous/anxious.        Physical Exam   BP: (!) 205/58  Pulse: 62  Heart Rate: 63  Temp: 97.9  F (36.6  C)  Resp: 18  Weight: 95.7 kg (211 lb)  SpO2: 98 %      Physical Exam   Constitutional: She is oriented to person, place, and time. She appears well-developed and well-nourished. No distress.   HENT:   Head: Normocephalic and atraumatic.   Neck: Normal range of motion. Neck supple.   Cardiovascular: Normal rate, regular rhythm, normal heart sounds and intact distal pulses.    No  murmur heard.  Pulmonary/Chest: Effort normal and breath sounds normal. No respiratory distress. She has no wheezes.   Abdominal: Soft. Bowel sounds are normal. She exhibits no distension. There is no tenderness.   Musculoskeletal: Normal range of motion. She exhibits no edema.   Neurological: She is alert and oriented to person, place, and time.   Skin: Skin is warm and dry.   Psychiatric: She has a normal mood and affect.   Nursing note and vitals reviewed.      ED Course     ED Course     Procedures    No results found for this or any previous visit (from the past 24 hour(s)).    Medications   lisinopril (PRINIVIL/ZESTRIL) tablet 10 mg (10 mg Oral Given 4/3/18 9265)       Assessments & Plan (with Medical Decision Making)   Patient and daughter instructed on changes in blood pressure medication.  She will be taking lisinopril 20 mg in the morning with 25 of metoprolol twice daily.  Spoke with Dr. Cuellar, he will follow-up with her and see how this is going.  Daughter instructed to check blood pressure just once a day, random times in the day.  She should record them and bring the list of blood pressures to Dr. Cuellar at her next appointment.  Discussed the fact that taking the blood pressure frequently will often raise the blood pressure because people are worrying about it more.  Follow-up with Dr. Cuellar in 1 week.    I have reviewed the nursing notes.    I have reviewed the findings, diagnosis, plan and need for follow up with the patient.  Discharge Medication List as of 4/3/2018  1:08 PM          Final diagnoses:   Essential hypertension, benign       4/3/2018   [facility]     Paola Morales MD  04/03/18 [id number]

## 2018-04-03 NOTE — TELEPHONE ENCOUNTER
Called daughter back and updated her to resume Metoprolol.She states that patient's breathing is better and would like her to take Metoprolol 25 mg po BID.Please advise.

## 2018-04-03 NOTE — TELEPHONE ENCOUNTER
Called patient daughter to see if concerned they are concerned with BP or SOB.BP had gone down to 169/83 at 10AM and they just took it again and is now 197/79 and 197/110 and patient c/o of lightheadness. Was SOB earlier but is not now. Have not taken Metoprolol.Please advise.

## 2018-04-03 NOTE — ED NOTES
Pt to rm 5 in er via wheelchair, states she was taken off metoprolol bid yesterday because she had c/o sob for past month, this am had sob so daughter took he bp and was high 190's. Denies cp, breath sounds clear after cough, denies dyspnea and sats are 96% on room air. Daughter at bedside, pt lives with daughter at home as she has hx of strokes and doesn't have use of left side. Monitors on and call light in reach. Negative fast, denies headache or blurred vision.

## 2018-04-03 NOTE — TELEPHONE ENCOUNTER
Daughter called and order to hold Metoprolol yesterday. Did have AM dose. Last night /72 with HR of 57. This AM /87 with HR 66. No HA or dizziness.Dose of Metoprolol was 50 mg po BID.Resume at a decreased dose,satrt this AM? Please advise.Thank you.

## 2018-04-03 NOTE — ED AVS SNAPSHOT
[facility]    [address] [address]    Phone:  [phone]                                       Rosalie JANE Friend   MRN: [id number]    Department:  [facility]   Date of Visit:  4/3/2018           After Visit Summary Signature Page     I have received my discharge instructions, and my questions have been answered. I have discussed any challenges I see with this plan with the nurse or doctor.    ..........................................................................................................................................  Patient/Patient Representative Signature      ..........................................................................................................................................  Patient Representative Print Name and Relationship to Patient    ..................................................               ................................................  Date                                            Time    ..........................................................................................................................................  Reviewed by Signature/Title    ...................................................              ..............................................  Date                                                            Time

## 2018-04-04 ENCOUNTER — HOSPITAL ENCOUNTER (OUTPATIENT)
Dept: EDUCATION SERVICES | Facility: HOSPITAL | Age: 73
Discharge: HOME | End: 2018-04-04
Attending: INTERNAL MEDICINE | Admitting: INTERNAL MEDICINE
Payer: MEDICARE

## 2018-04-04 VITALS
DIASTOLIC BLOOD PRESSURE: 67 MMHG | HEART RATE: 67 BPM | OXYGEN SATURATION: 97 % | SYSTOLIC BLOOD PRESSURE: 157 MMHG | HEIGHT: 65 IN

## 2018-04-04 DIAGNOSIS — E11.9 TYPE 2 DIABETES MELLITUS WITHOUT COMPLICATION, WITH LONG-TERM CURRENT USE OF INSULIN (H): Primary | ICD-10-CM

## 2018-04-04 DIAGNOSIS — R69 DIAGNOSIS UNKNOWN: ICD-10-CM

## 2018-04-04 DIAGNOSIS — I10 HYPERTENSION, UNSPECIFIED TYPE: Primary | ICD-10-CM

## 2018-04-04 DIAGNOSIS — Z79.4 TYPE 2 DIABETES MELLITUS WITHOUT COMPLICATION, WITH LONG-TERM CURRENT USE OF INSULIN (H): Primary | ICD-10-CM

## 2018-04-04 PROCEDURE — G0108 DIAB MANAGE TRN  PER INDIV: HCPCS | Performed by: DIETITIAN, REGISTERED

## 2018-04-04 RX ORDER — LISINOPRIL 10 MG/1
20 TABLET ORAL DAILY
Qty: 60 TABLET | Refills: 0 | Status: SHIPPED | OUTPATIENT
Start: 2018-04-04 | End: 2018-05-21

## 2018-04-04 RX ORDER — PANTOPRAZOLE SODIUM 40 MG/1
40 TABLET, DELAYED RELEASE ORAL ONCE
Qty: 30 TABLET | Refills: 0 | Status: SHIPPED | OUTPATIENT
Start: 2018-04-04 | End: 2019-02-21

## 2018-04-04 RX ORDER — METOPROLOL TARTRATE 50 MG
25 TABLET ORAL 2 TIMES DAILY
Qty: 60 TABLET | Refills: 0 | Status: SHIPPED | OUTPATIENT
Start: 2018-04-04 | End: 2018-05-16

## 2018-04-04 ASSESSMENT — PAIN SCALES - GENERAL: PAINLEVEL: NO PAIN (0)

## 2018-04-04 NOTE — PROGRESS NOTES
"Pt is here today for diabetes follow up - glucose review.  Pt is here with her daughter today.  She is now living with her daughter as she recently was hospitalized with another CVA.      /67  Pulse 67  Ht 1.651 m (5' 5\")  SpO2 97%  Pt not weighed today as she was in a wheelchair.      Current diabetes medications:  Metformin 1000 mg bid, Lantus 5 units daily.      Current glucose levels:  Iugybxp-  Post supper 1-2 hours-  Overall average is 121.      Lab Results   Component Value Date    A1C 6.7 02/18/2018    A1C 6.4 02/09/2018    A1C 6.2 10/06/2017    A1C 8.4 06/13/2017    A1C 10.7 05/06/2017     Pt would like to stop taking her Lantus.  She had not been taking her Lantus since the end of November prior to her CVA in February and glucose levels were controlled.  Spoke with providers nurse and will hold Lantus until her appointment with him in one week.  We will download meter at that time to make sure glucose levels remain controlled without Lantus.      Discussed importance of limiting foods high in carbohydrates and target glucose levels with pt and daughter.      PLAN:  Hold Lantus x 1 week until appointment with provider.  Download meter at provider appointment.      Follow up: as needed.      Total time spent with pt was 30 minutes.        "

## 2018-04-04 NOTE — NURSING NOTE
"Chief Complaint   Patient presents with     Diabetes     glucose review       Initial /67  Pulse 67  Ht 1.651 m (5' 5\")  SpO2 97% Estimated body mass index is 35.11 kg/(m^2) as calculated from the following:    Height as of 2/9/18: 1.651 m (5' 5\").    Weight as of 4/3/18: 95.7 kg (211 lb).  Medication Reconciliation: complete   Giovanna Milner      "

## 2018-04-04 NOTE — IP AVS SNAPSHOT
[facility]    [address] [place] [address]    Phone:  [phone]    Fax:  [phone]                                       After Visit Summary   4/4/2018    Rosalie JANE Friend    MRN: [id number]           After Visit Summary Signature Page     I have received my discharge instructions, and my questions have been answered. I have discussed any challenges I see with this plan with the nurse or doctor.    ..........................................................................................................................................  Patient/Patient Representative Signature      ..........................................................................................................................................  Patient Representative Print Name and Relationship to Patient    ..................................................               ................................................  Date                                            Time    ..........................................................................................................................................  Reviewed by Signature/Title    ...................................................              ..............................................  Date                                                            Time

## 2018-04-04 NOTE — IP AVS SNAPSHOT
MRN:[id number]                      After Visit Summary   4/4/2018    Rosalie JANE Friend    MRN: [id number]           Thank you!     Thank you for choosing [facility] for your care. Our goal is always to provide you with excellent care. Hearing back from our patients is one way we can continue to improve our services. Please take a few minutes to complete the written survey that you may receive in the mail after you visit with us. Thank you!        Patient Information     Date Of Birth          1945        About your hospital stay     You were admitted on:  April 4, 2018 You last received care in the:  [facility]    You were discharged on:  April 4, 2018       Who to Call     For medical emergencies, please call 911.  For non-urgent questions about your medical care, please call your primary care provider or clinic, [phone]          Attending Provider     Provider Specialty    Yash Cuellar DO Internal Medicine       Primary Care Provider Office Phone # Fax #    Yash Cuellar [phone]      Your next 10 appointments already scheduled     Apr 09, 2018  1:40 PM CDT   (Arrive by 1:20 PM)   New Visit with Raheem Contreras DO    ORTHOPEDICS ([facility] )    [address]  [facility] [address] [phone]            Apr 12, 2018 11:15 AM CDT   (Arrive by 11:00 AM)   Office Visit with Yash Cuellar DO   [facility] )    [address] [facility] [address]   [phone]           Bring a current list of meds and any records pertaining to this visit. For Physicals, please bring immunization records and any forms needing to be filled out. Please arrive 10 minutes early to complete paperwork.              Further instructions from your care team       -Try to limit foods high in carbohydrates - bread products, potatoes, noodles, rice, crackers, chips, sweets, drinks  "containing sugar.    -Be as active as you are able.    -Test your glucose 2x/day - fasting and 2 hours after supper meal.   -Target levels are fasting , 2 hours after supper less than 180.   -Keep taking 1000 mg Metformin 2x/day.   -Hold Lantus until your appointment with Dr. SEGURA.    -Bring your meter to appointment so we can download it.    -Call with any concerns - VALENTINE Merino, [phone].     Pending Results     No orders found from 2018 to 2018.            Admission Information     Date & Time Provider Department Dept. Phone    2018 Yash Cuellar, DO [facility] [phone]      Your Vitals Were     Blood Pressure Pulse Height Pulse Oximetry          157/67 67 1.651 m (5' 5\") 97%        MyChart Information     [employer] lets you send messages to your doctor, view your test results, renew your prescriptions, schedule appointments and more. To sign up, go to [email or web address] . Click on \"Log in\" on the left side of the screen, which will take you to the Welcome page. Then click on \"Sign up Now\" on the right side of the page.     You will be asked to enter the access code listed below, as well as some personal information. Please follow the directions to create your username and password.     Your access code is: [email or web address]  Expires: 5/10/2018 11:46 AM     Your access code will  in 90 days. If you need help or a new code, please call your [place] clinic or [phone].        Care EveryWhere ID     This is your Care EveryWhere ID. This could be used by other organizations to access your [facility] medical records  [id number]        Equal Access to Services     GRICELDA BOWEN AH: Richard Lemus, nahed tadeo, elizabeth chowdhury, nery neri. So [facility] [phone].    ATENCIÓN: Si habla español, tiene a blackmon disposición servicios gratuitos de asistencia lingüística. Llame al [phone].    We comply with " applicable federal civil rights laws and [place] laws. We do not discriminate on the basis of race, color, national origin, age, disability, sex, sexual orientation, or gender identity.               Review of your medicines      UNREVIEWED medicines. Ask your doctor about these medicines        Dose / Directions    albuterol 108 (90 BASE) MCG/ACT Inhaler   Commonly known as:  PROAIR HFA/PROVENTIL HFA/VENTOLIN HFA   Used for:  Cough, Acute bronchitis, unspecified organism        Dose:  1-2 puff   Inhale 1-2 puffs into the lungs every 4 hours as needed for shortness of breath / dyspnea or wheezing As needed.   Quantity:  1 Inhaler   Refills:  3       aspirin 325 MG tablet        Dose:  325 mg   Take 325 mg by mouth   Refills:  0       atorvastatin 80 MG tablet   Commonly known as:  LIPITOR        Dose:  80 mg   Take 80 mg by mouth   Refills:  0       clopidogrel 75 MG tablet   Commonly known as:  PLAVIX        Dose:  75 mg   Take 75 mg by mouth   Refills:  0       LANTUS SOLOSTAR 100 UNIT/ML injection   Used for:  Type 2 diabetes mellitus with complication, without long-term current use of insulin (H)   Generic drug:  insulin glargine        INJECT 12 UNITS UNDER THE SKIN EVERY MORNING   Quantity:  15 mL   Refills:  8       lisinopril 10 MG tablet   Commonly known as:  PRINIVIL/ZESTRIL   Used for:  Hypertension, unspecified type        Dose:  20 mg   Take 2 tablets (20 mg) by mouth daily   Quantity:  60 tablet   Refills:  0       magnesium hydroxide 400 MG/5ML suspension   Commonly known as:  MILK OF MAGNESIA        Dose:  30 mL   Take 30 mLs by mouth   Refills:  0       magnesium oxide 400 (241.3 MG) MG tablet   Commonly known as:  MAG-OX   Used for:  Hypomagnesemia        Dose:  400 mg   Take 1 tablet (400 mg) by mouth 2 times daily (with meals)   Quantity:  90 tablet   Refills:  3       metoprolol tartrate 50 MG tablet   Commonly known as:  LOPRESSOR   Used for:  Hypertension, unspecified type        Dose:  25  mg   Take 0.5 tablets (25 mg) by mouth 2 times daily   Quantity:  60 tablet   Refills:  0       pantoprazole 40 MG EC tablet   Commonly known as:  PROTONIX   Used for:  Diagnosis unknown        Dose:  40 mg   Take 1 tablet (40 mg) by mouth once for 1 dose   Quantity:  30 tablet   Refills:  0       polyethylene glycol powder   Commonly known as:  MIRALAX   Used for:  Constipation, unspecified constipation type        Dose:  1 capful   Take 17 g (1 capful) by mouth daily   Quantity:  510 g   Refills:  1         CONTINUE these medicines which have NOT CHANGED        Dose / Directions    BLOOD GLUCOSE TEST STRIPS Strp   Used for:  Type 2 diabetes mellitus with complication, without long-term current use of insulin (H)        four times a day.   Quantity:  360 each   Refills:  3       * ONETOUCH DELICA LANCETS 33G Misc        four times a day.   Refills:  0       * blood glucose monitoring lancets   Used for:  Type 2 diabetes mellitus with hyperglycemia, with long-term current use of insulin (H)        Use to test blood sugar 3 times daily.   Quantity:  360 each   Refills:  3       order for DME   Used for:  Well controlled type 2 diabetes mellitus (H)        Equipment being ordered: Glucometer and test strips   Quantity:  1 Device   Refills:  0       * Notice:  This list has 2 medication(s) that are the same as other medications prescribed for you. Read the directions carefully, and ask your doctor or other care provider to review them with you.             Protect others around you: Learn how to safely use, store and throw away your medicines at www.disposemymeds.org.             Medication List: This is a list of all your medications and when to take them. Check marks below indicate your daily home schedule. Keep this list as a reference.      Medications           Morning Afternoon Evening Bedtime As Needed    albuterol 108 (90 BASE) MCG/ACT Inhaler   Commonly known as:  PROAIR HFA/PROVENTIL HFA/VENTOLIN HFA   Inhale  1-2 puffs into the lungs every 4 hours as needed for shortness of breath / dyspnea or wheezing As needed.                                aspirin 325 MG tablet   Take 325 mg by mouth                                atorvastatin 80 MG tablet   Commonly known as:  LIPITOR   Take 80 mg by mouth                                BLOOD GLUCOSE TEST STRIPS Strp   four times a day.                                clopidogrel 75 MG tablet   Commonly known as:  PLAVIX   Take 75 mg by mouth                                LANTUS SOLOSTAR 100 UNIT/ML injection   INJECT 12 UNITS UNDER THE SKIN EVERY MORNING   Generic drug:  insulin glargine                                lisinopril 10 MG tablet   Commonly known as:  PRINIVIL/ZESTRIL   Take 2 tablets (20 mg) by mouth daily                                magnesium hydroxide 400 MG/5ML suspension   Commonly known as:  MILK OF MAGNESIA   Take 30 mLs by mouth                                magnesium oxide 400 (241.3 MG) MG tablet   Commonly known as:  MAG-OX   Take 1 tablet (400 mg) by mouth 2 times daily (with meals)                                metoprolol tartrate 50 MG tablet   Commonly known as:  LOPRESSOR   Take 0.5 tablets (25 mg) by mouth 2 times daily                                * ONETOUCH DELICA LANCETS 33G Misc   four times a day.                                * blood glucose monitoring lancets   Use to test blood sugar 3 times daily.                                order for DME   Equipment being ordered: Glucometer and test strips                                pantoprazole 40 MG EC tablet   Commonly known as:  PROTONIX   Take 1 tablet (40 mg) by mouth once for 1 dose                                polyethylene glycol powder   Commonly known as:  MIRALAX   Take 17 g (1 capful) by mouth daily                                * Notice:  This list has 2 medication(s) that are the same as other medications prescribed for you. Read the directions carefully, and ask your doctor or  other care provider to review them with you.

## 2018-04-04 NOTE — TELEPHONE ENCOUNTER
Daughter called .Wants refills for Lisinopril,Metoprolol and Protonix sent to [facility]. Lisinopril and Metoprolol where sent to [facility] yesterday.Protonix historical in [facility].Updated her that may want to wait to send BP meds to mail order until they know dosing is working. Protonix and BP medications pended.Thank you

## 2018-04-04 NOTE — DISCHARGE INSTRUCTIONS
-Try to limit foods high in carbohydrates - bread products, potatoes, noodles, rice, crackers, chips, sweets, drinks containing sugar.    -Be as active as you are able.    -Test your glucose 2x/day - fasting and 2 hours after supper meal.   -Target levels are fasting , 2 hours after supper less than 180.   -Keep taking 1000 mg Metformin 2x/day.   -Hold Lantus until your appointment with Dr. SEGURA.    -Bring your meter to appointment so we can download it.    -Call with any concerns - VALENTINE Merino, -[phone].

## 2018-04-06 ENCOUNTER — HOSPITAL ENCOUNTER (EMERGENCY)
Facility: HOSPITAL | Age: 73
Discharge: HOME | End: 2018-04-06
Attending: FAMILY MEDICINE | Admitting: FAMILY MEDICINE
Payer: MEDICARE

## 2018-04-06 ENCOUNTER — APPOINTMENT (OUTPATIENT)
Dept: ULTRASOUND IMAGING | Facility: HOSPITAL | Age: 73
End: 2018-04-06
Attending: FAMILY MEDICINE
Payer: MEDICARE

## 2018-04-06 ENCOUNTER — APPOINTMENT (OUTPATIENT)
Dept: GENERAL RADIOLOGY | Facility: HOSPITAL | Age: 73
End: 2018-04-06
Attending: FAMILY MEDICINE
Payer: MEDICARE

## 2018-04-06 VITALS
SYSTOLIC BLOOD PRESSURE: 190 MMHG | RESPIRATION RATE: 16 BRPM | HEART RATE: 54 BPM | DIASTOLIC BLOOD PRESSURE: 63 MMHG | OXYGEN SATURATION: 99 % | TEMPERATURE: 96.6 F

## 2018-04-06 DIAGNOSIS — S96.912A STRAIN OF LEFT FOOT, INITIAL ENCOUNTER: ICD-10-CM

## 2018-04-06 LAB
ALBUMIN SERPL-MCNC: 3 G/DL (ref 3.4–5)
ALP SERPL-CCNC: 60 U/L (ref 40–150)
ALT SERPL W P-5'-P-CCNC: 17 U/L (ref 0–50)
ANION GAP SERPL CALCULATED.3IONS-SCNC: 2 MMOL/L (ref 3–14)
AST SERPL W P-5'-P-CCNC: 9 U/L (ref 0–45)
BASOPHILS # BLD AUTO: 0.1 10E9/L (ref 0–0.2)
BASOPHILS NFR BLD AUTO: 0.8 %
BILIRUB SERPL-MCNC: 0.3 MG/DL (ref 0.2–1.3)
BUN SERPL-MCNC: 15 MG/DL (ref 7–30)
CALCIUM SERPL-MCNC: 9.4 MG/DL (ref 8.5–10.1)
CHLORIDE SERPL-SCNC: 110 MMOL/L (ref 94–109)
CO2 SERPL-SCNC: 28 MMOL/L (ref 20–32)
CREAT SERPL-MCNC: 0.81 MG/DL (ref 0.52–1.04)
CRP SERPL-MCNC: <2.9 MG/L (ref 0–8)
DIFFERENTIAL METHOD BLD: ABNORMAL
EOSINOPHIL # BLD AUTO: 0.1 10E9/L (ref 0–0.7)
EOSINOPHIL NFR BLD AUTO: 2.2 %
ERYTHROCYTE [DISTWIDTH] IN BLOOD BY AUTOMATED COUNT: 14.4 % (ref 10–15)
GFR SERPL CREATININE-BSD FRML MDRD: 69 ML/MIN/1.7M2
GLUCOSE SERPL-MCNC: 128 MG/DL (ref 70–99)
HCT VFR BLD AUTO: 36 % (ref 35–47)
HGB BLD-MCNC: 11.5 G/DL (ref 11.7–15.7)
IMM GRANULOCYTES # BLD: 0 10E9/L (ref 0–0.4)
IMM GRANULOCYTES NFR BLD: 0.3 %
INR PPP: 0.97 (ref 0.8–1.2)
LYMPHOCYTES # BLD AUTO: 1.2 10E9/L (ref 0.8–5.3)
LYMPHOCYTES NFR BLD AUTO: 19.3 %
MCH RBC QN AUTO: 27.1 PG (ref 26.5–33)
MCHC RBC AUTO-ENTMCNC: 31.9 G/DL (ref 31.5–36.5)
MCV RBC AUTO: 85 FL (ref 78–100)
MONOCYTES # BLD AUTO: 0.5 10E9/L (ref 0–1.3)
MONOCYTES NFR BLD AUTO: 7.4 %
NEUTROPHILS # BLD AUTO: 4.4 10E9/L (ref 1.6–8.3)
NEUTROPHILS NFR BLD AUTO: 70 %
NRBC # BLD AUTO: 0 10*3/UL
NRBC BLD AUTO-RTO: 0 /100
PLATELET # BLD AUTO: 273 10E9/L (ref 150–450)
POTASSIUM SERPL-SCNC: 4.3 MMOL/L (ref 3.4–5.3)
PROT SERPL-MCNC: 6.4 G/DL (ref 6.8–8.8)
RBC # BLD AUTO: 4.25 10E12/L (ref 3.8–5.2)
SODIUM SERPL-SCNC: 140 MMOL/L (ref 133–144)
WBC # BLD AUTO: 6.2 10E9/L (ref 4–11)

## 2018-04-06 PROCEDURE — 85025 COMPLETE CBC W/AUTO DIFF WBC: CPT | Performed by: FAMILY MEDICINE

## 2018-04-06 PROCEDURE — 73610 X-RAY EXAM OF ANKLE: CPT | Mod: TC,LT

## 2018-04-06 PROCEDURE — 86140 C-REACTIVE PROTEIN: CPT | Performed by: FAMILY MEDICINE

## 2018-04-06 PROCEDURE — 36415 COLL VENOUS BLD VENIPUNCTURE: CPT | Performed by: FAMILY MEDICINE

## 2018-04-06 PROCEDURE — 73630 X-RAY EXAM OF FOOT: CPT | Mod: TC,LT

## 2018-04-06 PROCEDURE — 99283 EMERGENCY DEPT VISIT LOW MDM: CPT | Mod: Z6 | Performed by: FAMILY MEDICINE

## 2018-04-06 PROCEDURE — 99284 EMERGENCY DEPT VISIT MOD MDM: CPT | Mod: 25

## 2018-04-06 PROCEDURE — 80053 COMPREHEN METABOLIC PANEL: CPT | Performed by: FAMILY MEDICINE

## 2018-04-06 PROCEDURE — 85610 PROTHROMBIN TIME: CPT | Performed by: FAMILY MEDICINE

## 2018-04-06 PROCEDURE — 93971 EXTREMITY STUDY: CPT | Mod: TC,LT

## 2018-04-06 NOTE — ED PROVIDER NOTES
eMERGENCY dEPARTMENT eNCOUnter        CHIEF COMPLAINT    Chief Complaint   Patient presents with     Leg Pain     co pain in her left lower leg for 2 days states painful and warm to the touch. lower lateral leg       HPI    Rosalie JANE Friend is a 72 year old female who presents with some bruising on her left foot and concerns about bilateral leg swelling.  Rosalie had a recent stroke.  She is cared for at home by her daughter.  The daughter noticed the bruising of the foot today and brings her in for evaluation.  She was in the emergency department 3 days ago for check of hypertension.    REVIEW OF SYSTEMS    General: No fever or chills  Cardiac: No chest pain  Pulmonary: No shortness of breath  GI: No vomiting or diarrhea  : No dysuria or hematuria  See HPI for further details.  All other systems reviewed and are negative.    PAST MEDICAL & SURGICAL HISTORY    Past Medical History:   Diagnosis Date     Cerebral infarction (H)     05/06/2017     Diabetes type 2, uncontrolled (H)      Hyperlipemia      Past Surgical History:   Procedure Laterality Date     ABDOMEN SURGERY      bladder repair     GYN SURGERY      hysterectomy        CURRENT MEDICATIONS    Current Outpatient Rx   Medication Sig Dispense Refill     lisinopril (PRINIVIL/ZESTRIL) 10 MG tablet Take 2 tablets (20 mg) by mouth daily 60 tablet 0     metoprolol tartrate (LOPRESSOR) 50 MG tablet Take 0.5 tablets (25 mg) by mouth 2 times daily 60 tablet 0     magnesium oxide (MAG-OX) 400 (241.3 MG) MG tablet Take 1 tablet (400 mg) by mouth 2 times daily (with meals) 90 tablet 3     aspirin 325 MG tablet Take 325 mg by mouth       atorvastatin (LIPITOR) 80 MG tablet Take 80 mg by mouth       clopidogrel (PLAVIX) 75 MG tablet Take 75 mg by mouth       magnesium hydroxide (MILK OF MAGNESIA) 400 MG/5ML suspension Take 30 mLs by mouth       albuterol (PROAIR HFA/PROVENTIL HFA/VENTOLIN HFA) 108 (90 BASE) MCG/ACT Inhaler Inhale 1-2 puffs into the lungs every 4 hours  as needed for shortness of breath / dyspnea or wheezing As needed. 1 Inhaler 3     polyethylene glycol (MIRALAX) powder Take 17 g (1 capful) by mouth daily 510 g 1     LANTUS SOLOSTAR 100 UNIT/ML soln INJECT 12 UNITS UNDER THE SKIN EVERY MORNING (Patient taking differently: 5 units) 15 mL 8     order for DME Equipment being ordered: Glucometer and test strips 1 Device 0     blood glucose monitoring (ONE TOUCH DELICA) lancets Use to test blood sugar 3 times daily. 360 each 3     Glucose Blood (BLOOD GLUCOSE TEST STRIPS) STRP four times a day. 360 each 3     ONETOUCH DELICA LANCETS 33G MISC four times a day.         ALLERGIES    Allergies   Allergen Reactions     Penicillins      Phenylephrine      Head aches     Tropicamide      Head aches         SOCIAL & FAMILY HISTORY    Social History     Social History     Marital status:      Spouse name: N/A     Number of children: N/A     Years of education: N/A     Social History Main Topics     Smoking status: Former Smoker     Types: Cigarettes     Quit date: 5/5/2017     Smokeless tobacco: Never Used      Comment: limits self to about 4-5 daily , she is quitting on her own     Alcohol use No     Drug use: No     Sexual activity: Not Asked     Other Topics Concern     None     Social History Narrative     Family History   Problem Relation Age of Onset     Aneurysm Mother      Other Cancer Father      DIABETES Brother      Hypertension Brother      Other Cancer Brother      Other Cancer Sister      Aneurysm Sister          PHYSICAL EXAM    VITAL SIGNS: BP (!) 190/63  Pulse 54  Temp 96.6  F (35.9  C) (Tympanic)  Resp 16  SpO2 99%  Constitutional:  Well developed, well nourished, no acute distress, non-toxic appearance , Rosalie is alert and pleasant.  Very talkative.  HENT:  Atraumatic, external ears normal, nose normal.  NECK- normal range of motion,  supple   Respiratory:  No respiratory distress, normal breath sounds.   Cardiovascular:  Normal rate, no  murmurs,   Vascular: Normal peripheral pulses  GI:  Soft, normal bowel sounds, nontender   Musculoskeletal: Exam of her left foot shows some bruising over the lateral aspect of the anterior foot.  Area does not appear tender.  Mild swelling of the left leg compared to the right.  Integument:  Well hydrated, no rash  Neurologic:  Awake alert, no slurred speech   Psychiatric: Cooperative, pleasant affect    RADIOLOGY/PROCEDURES    Results for orders placed or performed during the hospital encounter of 04/06/18   Ankle XR, G/E 3 views, left    Narrative    PROCEDURE:  XR ANKLE LT G/E 3 VW    HISTORY: swelling and bruising left foot, history stroke;     COMPARISON:  None.    TECHNIQUE:  3 views of the the ankle were obtained.    FINDINGS:  No fracture or dislocation is identified. The joint spaces  are preserved.   calyceal spurs are seen      Impression    IMPRESSION: No acute fracture.      ISH KILLIAN MD   Foot XR, G/E 3 views, left    Narrative    PROCEDURE:  XR FOOT LT G/E 3 VW    HISTORY: pain and foot bruising left;     COMPARISON:  None.    TECHNIQUE:  The views of the left were obtained.    FINDINGS:  No fracture or dislocation is identified. The joint spaces  are preserved.  There is bony spurs at the insertion of Achilles  tendon and plantar fascia into the calcaneus.       Impression    IMPRESSION: No acute fracture.      ISH KILLIAN MD   US Lower Extremity Venous Duplex Left    Narrative    Exam:US LOWER EXTREMITY VENOUS DUPLEX LEFT    History: Left leg pain and swelling    Comparisons: None    Technique: Venous duplex ultrasonography of the left lower extremity  was performed.     Findings: The common femoral vein, superficial femoral vein and  popliteal vein are fully compressible with spontaneous and augmentable  venous flow.           Impression    Impression: No evidence of deep venous thrombosis within the left  lower extremity.    ISH KILLIAN MD         ED COURSE & MEDICAL DECISION  MAKING    Pertinent Labs & Imaging studies reviewed and interpreted. (See chart for details)    Vitals:    04/06/18 0804   BP: (!) 190/63   Pulse: 54   Resp: 16   Temp: 96.6  F (35.9  C)   TempSrc: Tympanic   SpO2: 99%           FINAL IMPRESSION    1. Strain of left foot, initial encounter         PLAN  No evidence of fracture or DVT.  Discharged to home with symptomatic care.    (Please note that this note was completed with a voice recognition program.  Every attempt was made to edit the dictations, but inevitably there remain words that are mis-transcribed.)       Rupinder Jennings MD  04/06/18 5084

## 2018-04-06 NOTE — ED AVS SNAPSHOT
[facility]    [address]    [place] [address]    Phone:  [phone]                                       Rosalie JANE Friend   MRN: [id number]    Department:  [facility]   Date of Visit:  4/6/2018           Patient Information     Date Of Birth          1945        Your diagnoses for this visit were:     Strain of left foot, initial encounter        You were seen by Rupinder Jennings MD.      Follow-up Information     Follow up with Yash Cuellar DO In 1 week.    Specialty:  Internal Medicine    Contact information:    [facility]  [address] [place] [address]  [phone]          Discharge Instructions         Self-Care for Strains and Sprains  Most minor strains and sprains can be treated with self-care. Recovering from a strain or sprain may take 6 to 8 weeks. Your self-care goal is to reduce pain and immobilize the injury to speed healing.     A sprain injures ligaments (tissue that connects bones to bones).        A strain injures muscles or tendons (tissue that connects muscles to bones).   Support the injured area  Wrapping the injured area provides support for short, necessary activities. Be careful not to wrap the area too tightly. This could cut off the blood supply.    Support a wrist, elbow, or shoulder with a sling.    Wrap an ankle or knee with an elastic bandage.    Tape a finger or toe to the one next to it.  Use cold and heat  Cold reduces swelling. Both cold and heat reduce pain. Heat should not be used in the initial treatment of the injury. When using cold or heat, always place a towel between the pack and your skin.    Apply ice or a cold pack 10 to 15 minutes every hour you re awake for the first 2 days.    After the swelling goes down, use cold or heat to control pain. Don t use heat late in the day, since it can cause swelling when you re not active.  Rest and elevate  Rest and elevation help your injury heal  faster.    Raise the injured area above your heart level.    Keep the injured area from moving.    Limit the use of the joint or limb.  Use medicine    Aspirin reduces pain and swelling. (Note: Don t give aspirin to a child 18 or younger unless prescribed by the doctor.)    Aspirin substitutes, such as ibuprofen, can reduce pain. Some substitutes reduce swelling, too. Ask your pharmacist which substitutes you can use.  Call your doctor if:    The injured joint won t move, or bones make a grating sound when they move.    You can t put weight on the injured area, even after 24 hours.    The injured body part is cold, blue, or numb.    The joint or limb appears bent or crooked.    Pain increases or doesn t improve in 4 days.    When pressing along the injured area, you notice a spot that is especially painful.   Date Last Reviewed: 9/29/2015 2000-2017 [employer]. [address] [place] [address]. All rights reserved. This information is not intended as a substitute for professional medical care. Always follow your healthcare professional's instructions.          Your next 10 appointments already scheduled     Apr 09, 2018  1:40 PM CDT   (Arrive by 1:20 PM)   New Visit with Raheem Contreras,     [facility] ([facility] )    [address] [facility] [address] [phone]            Apr 12, 2018 11:15 AM CDT   (Arrive by 11:00 AM)   Office Visit with Yash Cuellar,    [facility] ([facility] )    [address] [facility] [address]   [phone]           Bring a current list of meds and any records pertaining to this visit. For Physicals, please bring immunization records and any forms needing to be filled out. Please arrive 10 minutes early to complete paperwork.                 Review of your medicines      Our records show that you are taking the medicines listed below. If these are incorrect, please call your family  doctor or clinic.        Dose / Directions Last dose taken    albuterol 108 (90 BASE) MCG/ACT Inhaler   Commonly known as:  PROAIR HFA/PROVENTIL HFA/VENTOLIN HFA   Dose:  1-2 puff   Quantity:  1 Inhaler        Inhale 1-2 puffs into the lungs every 4 hours as needed for shortness of breath / dyspnea or wheezing As needed.   Refills:  3        aspirin 325 MG tablet   Dose:  325 mg        Take 325 mg by mouth   Refills:  0        atorvastatin 80 MG tablet   Commonly known as:  LIPITOR   Dose:  80 mg        Take 80 mg by mouth   Refills:  0        BLOOD GLUCOSE TEST STRIPS Strp   Quantity:  360 each        four times a day.   Refills:  3        clopidogrel 75 MG tablet   Commonly known as:  PLAVIX   Dose:  75 mg        Take 75 mg by mouth   Refills:  0        LANTUS SOLOSTAR 100 UNIT/ML injection   Quantity:  15 mL   Generic drug:  insulin glargine        INJECT 12 UNITS UNDER THE SKIN EVERY MORNING   Refills:  8        lisinopril 10 MG tablet   Commonly known as:  PRINIVIL/ZESTRIL   Dose:  20 mg   Quantity:  60 tablet        Take 2 tablets (20 mg) by mouth daily   Refills:  0        magnesium hydroxide 400 MG/5ML suspension   Commonly known as:  MILK OF MAGNESIA   Dose:  30 mL        Take 30 mLs by mouth   Refills:  0        magnesium oxide 400 (241.3 MG) MG tablet   Commonly known as:  MAG-OX   Dose:  400 mg   Quantity:  90 tablet        Take 1 tablet (400 mg) by mouth 2 times daily (with meals)   Refills:  3        metoprolol tartrate 50 MG tablet   Commonly known as:  LOPRESSOR   Dose:  25 mg   Quantity:  60 tablet        Take 0.5 tablets (25 mg) by mouth 2 times daily   Refills:  0        * ONETOUCH DELICA LANCETS 33G Misc        four times a day.   Refills:  0        * blood glucose monitoring lancets   Quantity:  360 each        Use to test blood sugar 3 times daily.   Refills:  3        order for DME   Quantity:  1 Device        Equipment being ordered: Glucometer and test strips   Refills:  0         "polyethylene glycol powder   Commonly known as:  MIRALAX   Dose:  1 capful   Quantity:  510 g        Take 17 g (1 capful) by mouth daily   Refills:  1        * Notice:  This list has 2 medication(s) that are the same as other medications prescribed for you. Read the directions carefully, and ask your doctor or other care provider to review them with you.            Procedures and tests performed during your visit     Ankle XR, G/E 3 views, left    CBC with platelets differential    CRP inflammation    Comprehensive metabolic panel    Foot XR, G/E 3 views, left    INR    US Lower Extremity Venous Duplex Left      Orders Needing Specimen Collection     None      Pending Results     No orders found from 2018 to 2018.            Pending Culture Results     No orders found from 2018 to 2018.            Thank you for choosing [facility]       Thank you for choosing [facility] for your care. Our goal is always to provide you with excellent care. Hearing back from our patients is one way we can continue to improve our services. Please take a few minutes to complete the written survey that you may receive in the mail after you visit with us. Thank you!        [employer] Information     [employer] lets you send messages to your doctor, view your test results, renew your prescriptions, schedule appointments and more. To sign up, go to [email or web address] . Click on \"Log in\" on the left side of the screen, which will take you to the Welcome page. Then click on \"Sign up Now\" on the right side of the page.     You will be asked to enter the access code listed below, as well as some personal information. Please follow the directions to create your username and password.     Your access code is: [email or web address]  Expires: 5/10/2018 11:46 AM     Your access code will  in 90 days. If you need help or a new code, please call your [facility] clinic or [phone].        Care EveryWhere ID     This is your Care EveryWhere " ID. This could be used by other organizations to access your [facility] medical records  [id number]        Equal Access to Services     MICHELLE BOWEN : Richard Lemus, nahed tadeo, nery best. So [facility] [phone].    ATENCIÓN: Si habla español, tiene a blackmon disposición servicios gratuitos de asistencia lingüística. Llame al [phone].    We comply with applicable federal civil rights laws and [place] laws. We do not discriminate on the basis of race, color, national origin, age, disability, sex, sexual orientation, or gender identity.            After Visit Summary       This is your record. Keep this with you and show to your community pharmacist(s) and doctor(s) at your next visit.

## 2018-04-06 NOTE — DISCHARGE INSTRUCTIONS
Self-Care for Strains and Sprains  Most minor strains and sprains can be treated with self-care. Recovering from a strain or sprain may take 6 to 8 weeks. Your self-care goal is to reduce pain and immobilize the injury to speed healing.     A sprain injures ligaments (tissue that connects bones to bones).        A strain injures muscles or tendons (tissue that connects muscles to bones).   Support the injured area  Wrapping the injured area provides support for short, necessary activities. Be careful not to wrap the area too tightly. This could cut off the blood supply.    Support a wrist, elbow, or shoulder with a sling.    Wrap an ankle or knee with an elastic bandage.    Tape a finger or toe to the one next to it.  Use cold and heat  Cold reduces swelling. Both cold and heat reduce pain. Heat should not be used in the initial treatment of the injury. When using cold or heat, always place a towel between the pack and your skin.    Apply ice or a cold pack 10 to 15 minutes every hour you re awake for the first 2 days.    After the swelling goes down, use cold or heat to control pain. Don t use heat late in the day, since it can cause swelling when you re not active.  Rest and elevate  Rest and elevation help your injury heal faster.    Raise the injured area above your heart level.    Keep the injured area from moving.    Limit the use of the joint or limb.  Use medicine    Aspirin reduces pain and swelling. (Note: Don t give aspirin to a child 18 or younger unless prescribed by the doctor.)    Aspirin substitutes, such as ibuprofen, can reduce pain. Some substitutes reduce swelling, too. Ask your pharmacist which substitutes you can use.  Call your doctor if:    The injured joint won t move, or bones make a grating sound when they move.    You can t put weight on the injured area, even after 24 hours.    The injured body part is cold, blue, or numb.    The joint or limb appears bent or crooked.    Pain increases  or doesn t improve in 4 days.    When pressing along the injured area, you notice a spot that is especially painful.   Date Last Reviewed: 9/29/2015 2000-2017 [employer]. [address] [place] [address], [place] [address]. All rights reserved. This information is not intended as a substitute for professional medical care. Always follow your healthcare professional's instructions.

## 2018-04-06 NOTE — ED AVS SNAPSHOT
[facility]    [address] [address]    Phone:  [phone]                                       Rosalie JANE Friend   MRN: [id number]    Department[facility]  [facility]   Date of Visit:  4/6/2018           After Visit Summary Signature Page     I have received my discharge instructions, and my questions have been answered. I have discussed any challenges I see with this plan with the nurse or doctor.    ..........................................................................................................................................  Patient/Patient Representative Signature      ..........................................................................................................................................  Patient Representative Print Name and Relationship to Patient    ..................................................               ................................................  Date                                            Time    ..........................................................................................................................................  Reviewed by Signature/Title    ...................................................              ..............................................  Date                                                            Time

## 2018-04-06 NOTE — ED NOTES
Discharge instructions reviewed with family member. Pt already gone at this time. Family verbalized understanding.

## 2018-04-09 ENCOUNTER — OFFICE VISIT (OUTPATIENT)
Dept: ORTHOPEDICS | Facility: OTHER | Age: 73
End: 2018-04-09
Attending: INTERNAL MEDICINE
Payer: MEDICARE

## 2018-04-09 VITALS
HEART RATE: 52 BPM | OXYGEN SATURATION: 96 % | TEMPERATURE: 98.2 F | DIASTOLIC BLOOD PRESSURE: 82 MMHG | SYSTOLIC BLOOD PRESSURE: 130 MMHG

## 2018-04-09 DIAGNOSIS — M25.512 ACUTE PAIN OF LEFT SHOULDER: ICD-10-CM

## 2018-04-09 PROCEDURE — G0463 HOSPITAL OUTPT CLINIC VISIT: HCPCS

## 2018-04-09 PROCEDURE — 99213 OFFICE O/P EST LOW 20 MIN: CPT | Performed by: ORTHOPAEDIC SURGERY

## 2018-04-09 ASSESSMENT — PAIN SCALES - GENERAL: PAINLEVEL: EXTREME PAIN (8)

## 2018-04-09 NOTE — PROGRESS NOTES
Patient presents today for evaluation of her left arm.  She's had a series of strokes and has very limited use of her left arm.  She's been worked on by the nurses and physical therapist to keep her passive range of motion in hopes that she will regain some motor function in her left arm.  Her helpers noted a wound over the anteromedial aspect of the arm approximately 2 weeks ago that was initially tender although now the tenderness is resolving.  She presents today for evaluation of this mass.    Physical exam: Skin is intact and there is no ecchymosis.  Passive range of motion of the shoulder, elbow, and wrists is within normal range.  She Is Unable to Actively Flex or Extend the Elbow, or Abduct, Forward Flex, or Extend the Shoulder.  There Is a Tender Palpable Mass over the Medial Aspect of the Arm, This Has the Classic Appearance of a Rupture of the Long Head of the Biceps.    Assessment and Plan: Acute Rupture of the Long Head of the Biceps Tendon.  She Was Reassured That This Is Not Disabling Injury and Should Not Interfere with Her Recovery.  Usually They Remain Tender for Several Weeks and Then the Tenderness Goes Away.  She May Always Have a Palpable Mass in This Area.  If Her Symptoms Change or Worsen, She Should Probably Follow up for Repeat Evaluation.

## 2018-04-09 NOTE — MR AVS SNAPSHOT
"              After Visit Summary   4/9/2018    Rosalie Seals    MRN: [id number]           Patient Information     Date Of Birth          1945        Visit Information        Provider Department      4/9/2018 1:40 PM Raheem Contreras, DO  ORTH[facility]ED[facility]        Today's Diagnoses     Acute pain of left shoulder           Follow-ups after your visit        Your next 10 appointments already scheduled     Apr 12, 2018 11:15 AM CDT   (Arrive by 11:00 AM)   Office Visit with Yash Cuellar,    [facility] [place] ([facility] [place] )    [address]  [place] [address]   [phone]           Bring a current list of meds and any records pertaining to this visit. For Physicals, please bring immunization records and any forms needing to be filled out. Please arrive 10 minutes early to complete paperwork.              Who to contact     If you have questions or need follow up information about today's clinic visit or your schedule please contact  ORTHOPEDICS directly at [phone].  Normal or non-critical lab and imaging results will be communicated to you by [employer], letter or phone within 4 business days after the clinic has received the results. If you do not hear from us within 7 days, please contact the clinic through [employer] or phone. If you have a critical or abnormal lab result, we will notify you by phone as soon as possible.  Submit refill requests through [employer] or call your pharmacy and they will forward the refill request to us. Please allow 3 business days for your refill to be completed.          Additional Information About Your Visit        [employer] Information     [employer] lets you send messages to your doctor, view your test results, renew your prescriptions, schedule appointments and more. To sign up, go to [email or web address] . Click on \"Log in\" on the left side of the screen, which will take you to the Welcome page. Then click on \"Sign up Now\" on the right " side of the page.     You will be asked to enter the access code listed below, as well as some personal information. Please follow the directions to create your username and password.     Your access code is: [email or web address]  Expires: 5/10/2018 11:46 AM     Your access code will  in 90 days. If you need help or a new code, please call your [facility] clinic or [phone].        Care EveryWhere ID     This is your Care EveryWhere ID. This could be used by other organizations to access your [facility] medical records  [id number]        Your Vitals Were     Pulse Temperature Pulse Oximetry             52 98.2  F (36.8  C) (Tympanic) 96%          Blood Pressure from Last 3 Encounters:   18 130/82   18 (!) 190/63   18 157/67    Weight from Last 3 Encounters:   18 211 lb (95.7 kg)   18 223 lb 1.6 oz (101.2 kg)   18 230 lb (104.3 kg)              Today, you had the following     No orders found for display         Today's Medication Changes          These changes are accurate as of 18  2:48 PM.  If you have any questions, ask your nurse or doctor.               Stop taking these medicines if you haven't already. Please contact your care team if you have questions.     LANTUS SOLOSTAR 100 UNIT/ML injection   Generic drug:  insulin glargine   Stopped by:  Raheem Contreras DO                    Primary Care Provider Office Phone # Fax #    Yash Cuellar [phone]       [address] [address]        Equal Access to Services     [facility] : Hadii amos Lemus, waaxda lupaul, qaybta ajitalnery valentino. So [facility] [phone].    ATENCIÓN: Si habla español, tiene a blackmon disposición servicios gratuitos de asistencia lingüística. Llame al [phone].    We comply with applicable federal civil rights laws and [place] laws. We do not discriminate on the basis of race,  color, national origin, age, disability, sex, sexual orientation, or gender identity.            Thank you!     Thank you for choosing  ORTH[facility]EDICS  for your care. Our goal is always to provide you with excellent care. Hearing back from our patients is one way we can continue to improve our services. Please take a few minutes to complete the written survey that you may receive in the mail after your visit with us. Thank you!             Your Updated Medication List - Protect others around you: Learn how to safely use, store and throw away your medicines at www.disposemymeds.org.          This list is accurate as of 4/9/18  2:48 PM.  Always use your most recent med list.                   Brand Name Dispense Instructions for use Diagnosis    albuterol 108 (90 BASE) MCG/ACT Inhaler    PROAIR HFA/PROVENTIL HFA/VENTOLIN HFA    1 Inhaler    Inhale 1-2 puffs into the lungs every 4 hours as needed for shortness of breath / dyspnea or wheezing As needed.    Cough, Acute bronchitis, unspecified organism       aspirin 325 MG tablet      Take 325 mg by mouth        atorvastatin 80 MG tablet    LIPITOR     Take 80 mg by mouth        BLOOD GLUCOSE TEST STRIPS Strp     360 each    four times a day.    Type 2 diabetes mellitus with complication, without long-term current use of insulin (H)       clopidogrel 75 MG tablet    PLAVIX     Take 75 mg by mouth        lisinopril 10 MG tablet    PRINIVIL/ZESTRIL    60 tablet    Take 2 tablets (20 mg) by mouth daily    Hypertension, unspecified type       magnesium hydroxide 400 MG/5ML suspension    MILK OF MAGNESIA     Take 30 mLs by mouth        magnesium oxide 400 (241.3 MG) MG tablet    MAG-OX    90 tablet    Take 1 tablet (400 mg) by mouth 2 times daily (with meals)    Hypomagnesemia       metoprolol tartrate 50 MG tablet    LOPRESSOR    60 tablet    Take 0.5 tablets (25 mg) by mouth 2 times daily    Hypertension, unspecified type       * ONETOUCH DELICA LANCETS 33G [facility]      four  times a day.        * blood glucose monitoring lancets     360 each    Use to test blood sugar 3 times daily.    Type 2 diabetes mellitus with hyperglycemia, with long-term current use of insulin (H)       order for DME     1 Device    Equipment being ordered: Glucometer and test strips    Well controlled type 2 diabetes mellitus (H)       polyethylene glycol powder    MIRALAX    510 g    Take 17 g (1 capful) by mouth daily    Constipation, unspecified constipation type       * Notice:  This list has 2 medication(s) that are the same as other medications prescribed for you. Read the directions carefully, and ask your doctor or other care provider to review them with you.

## 2018-04-09 NOTE — NURSING NOTE
"Chief Complaint   Patient presents with     Musculoskeletal Problem     New Injury Left Arm         Initial /82  Pulse 52  Temp 98.2  F (36.8  C) (Tympanic)  SpO2 96% Estimated body mass index is 35.11 kg/(m^2) as calculated from the following:    Height as of 2/9/18: 5' 5\" (1.651 m).    Weight as of 4/3/18: 211 lb (95.7 kg).  Medication Reconciliation: complete   Love Cowart      "

## 2018-04-12 ENCOUNTER — OFFICE VISIT (OUTPATIENT)
Dept: INTERNAL MEDICINE | Facility: OTHER | Age: 73
End: 2018-04-12
Attending: INTERNAL MEDICINE
Payer: MEDICARE

## 2018-04-12 VITALS
DIASTOLIC BLOOD PRESSURE: 64 MMHG | SYSTOLIC BLOOD PRESSURE: 120 MMHG | HEART RATE: 61 BPM | OXYGEN SATURATION: 100 % | TEMPERATURE: 96.3 F

## 2018-04-12 DIAGNOSIS — I63.9 CEREBROVASCULAR ACCIDENT (CVA), UNSPECIFIED MECHANISM (H): Primary | ICD-10-CM

## 2018-04-12 DIAGNOSIS — M79.89 LEFT LEG SWELLING: ICD-10-CM

## 2018-04-12 DIAGNOSIS — I10 BENIGN ESSENTIAL HYPERTENSION: ICD-10-CM

## 2018-04-12 PROBLEM — E11.49 TYPE 2 DIABETES MELLITUS WITH NEUROLOGIC COMPLICATION, WITHOUT LONG-TERM CURRENT USE OF INSULIN (H): Status: ACTIVE | Noted: 2018-04-12

## 2018-04-12 PROCEDURE — G0463 HOSPITAL OUTPT CLINIC VISIT: HCPCS

## 2018-04-12 PROCEDURE — 99214 OFFICE O/P EST MOD 30 MIN: CPT | Performed by: INTERNAL MEDICINE

## 2018-04-12 ASSESSMENT — PAIN SCALES - GENERAL: PAINLEVEL: NO PAIN (0)

## 2018-04-12 NOTE — MR AVS SNAPSHOT
"              After Visit Summary   4/12/2018    Rosalie JANE Friend    MRN: [id number]           Patient Information     Date Of Birth          1945        Visit Information        Provider Department      4/12/2018 11:15 AM Yash Cuellar DO [facility]        Today's Diagnoses     Cerebrovascular accident (CVA), unspecified mechanism (H)    -  1    Benign essential hypertension        Left leg swelling           Follow-ups after your visit        Additional Services     OCCUPATIONAL THERAPY REFERRAL       [facility]            [facility]                  Future tests that were ordered for you today     Open Future Orders        Priority Expected Expires Ordered    US MICAH Doppler No Exercise Routine  4/12/2019 4/12/2018            Who to contact     If you have questions or need follow up information about today's clinic visit or your schedule please contact [facility] directly at [phone].  Normal or non-critical lab and imaging results will be communicated to you by MyChart, letter or phone within 4 business days after the clinic has received the results. If you do not hear from us within 7 days, please contact the clinic through MyChart or phone. If you have a critical or abnormal lab result, we will notify you by phone as soon as possible.  Submit refill requests through Particle or call your pharmacy and they will forward the refill request to us. Please allow 3 business days for your refill to be completed.          Additional Information About Your Visit        MyChart Information     Particle lets you send messages to your doctor, view your test results, renew your prescriptions, schedule appointments and more. To sign up, go to [email or web address] . Click on \"Log in\" on the left side of the screen, which will take you to the Welcome page. Then click on \"Sign up Now\" on the right side of the page.     You will be asked to enter the " access code listed below, as well as some personal information. Please follow the directions to create your username and password.     Your access code is: [email or web address]  Expires: 5/10/2018 11:46 AM     Your access code will  in 90 days. If you need help or a new code, please call your [facility] or [phone].        Care EveryWhere ID     This is your Care EveryWhere ID. This could be used by other organizations to access your [facility] medical records  [id number]        Your Vitals Were     Pulse Temperature Pulse Oximetry             61 96.3  F (35.7  C) (Tympanic) 100%          Blood Pressure from Last 3 Encounters:   18 120/64   18 130/82   18 (!) 190/63    Weight from Last 3 Encounters:   18 211 lb (95.7 kg)   18 223 lb 1.6 oz (101.2 kg)   18 230 lb (104.3 kg)              We Performed the Following     Basic metabolic panel     OCCUPATIONAL THERAPY REFERRAL     PHYSICAL THERAPY REFERRAL        Primary Care Provider Office Phone # Fax #    Yash Cuellar [phone]       [facility] [address]  [place] [address]        Equal Access to Services     MICHELLE BOWEN : Hadii aad ku hadasho Soomaali, waaxda luqadaha, qaybta kaalmada adeegyada, waxay idiin haychuckn keith ontiveros . So [facility] [phone].    ATENCIÓN: Si habla español, tiene a blackmon disposición servicios gratuitos de asistencia lingüística. Llame al [phone].    We comply with applicable federal civil rights laws and [place] laws. We do not discriminate on the basis of race, color, national origin, age, disability, sex, sexual orientation, or gender identity.            Thank you!     Thank you for choosing [facility]  for your care. Our goal is always to provide you with excellent care. Hearing back from our patients is one way we can continue to improve our services. Please take a few minutes to complete the written survey that you may  receive in the mail after your visit with us. Thank you!             Your Updated Medication List - Protect others around you: Learn how to safely use, store and throw away your medicines at www.disposemymeds.org.          This list is accurate as of 4/12/18 11:24 AM.  Always use your most recent med list.                   Brand Name Dispense Instructions for use Diagnosis    albuterol 108 (90 Base) MCG/ACT Inhaler    PROAIR HFA/PROVENTIL HFA/VENTOLIN HFA    1 Inhaler    Inhale 1-2 puffs into the lungs every 4 hours as needed for shortness of breath / dyspnea or wheezing As needed.    Cough, Acute bronchitis, unspecified organism       aspirin 325 MG tablet      Take 325 mg by mouth        atorvastatin 80 MG tablet    LIPITOR     Take 80 mg by mouth        BLOOD GLUCOSE TEST STRIPS Strp     360 each    four times a day.    Type 2 diabetes mellitus with complication, without long-term current use of insulin (H)       clopidogrel 75 MG tablet    PLAVIX     Take 75 mg by mouth        lisinopril 10 MG tablet    PRINIVIL/ZESTRIL    60 tablet    Take 2 tablets (20 mg) by mouth daily    Hypertension, unspecified type       magnesium hydroxide 400 MG/5ML suspension    MILK OF MAGNESIA     Take 30 mLs by mouth        magnesium oxide 400 (241.3 Mg) MG tablet    MAG-OX    90 tablet    Take 1 tablet (400 mg) by mouth 2 times daily (with meals)    Hypomagnesemia       metoprolol tartrate 50 MG tablet    LOPRESSOR    60 tablet    Take 0.5 tablets (25 mg) by mouth 2 times daily    Hypertension, unspecified type       * ONETOUCH DELICA LANCETS 33G Misc      four times a day.        * blood glucose monitoring lancets     360 each    Use to test blood sugar 3 times daily.    Type 2 diabetes mellitus with hyperglycemia, with long-term current use of insulin (H)       order for DME     1 Device    Equipment being ordered: Glucometer and test strips    Well controlled type 2 diabetes mellitus (H)       polyethylene glycol powder     MIRALAX    510 g    Take 17 g (1 capful) by mouth daily    Constipation, unspecified constipation type       * Notice:  This list has 2 medication(s) that are the same as other medications prescribed for you. Read the directions carefully, and ask your doctor or other care provider to review them with you.

## 2018-04-12 NOTE — PROGRESS NOTES
"  SUBJECTIVE:   Rosalie JANE Friend is a 72 year old female who presents to clinic today for the following health issues:      ED/UC Followup:    Facility:  [facility]  Date of visit: 4/3/2018& 4/6/2018  Reason for visit: hypertension and strain of left foot  Current Status: left foot swollen, painful to touch, b/p has been \"excellent\"     Hypertension Follow-up      Outpatient blood pressures are being checked at home.  Results are \"realy good\".    Low Salt Diet: not monitoring salt    Rosalie presents today for follow up of LLE swelling and HTN.  She was seen in ED for both these issues and I was contacted about these visits.  We elected to increase her Lisinopril to 20 mg PO daily which did dramatically improve her BP.  In regard to LE swelling she denies any significant pain but definitely has some motor deficits in her left leg and left arm from her previous CVA.  Hence uncertain as to her sense of pain.  US and Xrays of LLE were negative for fracture.      Problem list and histories reviewed & adjusted, as indicated.  Additional history: as documented    Patient Active Problem List   Diagnosis     ACP (advance care planning)     Acute ischemic stroke (H)     Benign essential hypertension     Type 2 diabetes mellitus with neurologic complication, without long-term current use of insulin (H)     Past Surgical History:   Procedure Laterality Date     ABDOMEN SURGERY      bladder repair     GYN SURGERY      hysterectomy        Social History   Substance Use Topics     Smoking status: Former Smoker     Types: Cigarettes     Quit date: 5/5/2017     Smokeless tobacco: Never Used      Comment: limits self to about 4-5 daily , she is quitting on her own     Alcohol use No     Family History   Problem Relation Age of Onset     Aneurysm Mother      Other Cancer Father      DIABETES Brother      Hypertension Brother      Other Cancer Brother      Other Cancer Sister      Aneurysm Sister          Current Outpatient " Prescriptions   Medication Sig Dispense Refill     lisinopril (PRINIVIL/ZESTRIL) 10 MG tablet Take 2 tablets (20 mg) by mouth daily 60 tablet 0     metoprolol tartrate (LOPRESSOR) 50 MG tablet Take 0.5 tablets (25 mg) by mouth 2 times daily 60 tablet 0     magnesium oxide (MAG-OX) 400 (241.3 MG) MG tablet Take 1 tablet (400 mg) by mouth 2 times daily (with meals) 90 tablet 3     aspirin 325 MG tablet Take 325 mg by mouth       atorvastatin (LIPITOR) 80 MG tablet Take 80 mg by mouth       clopidogrel (PLAVIX) 75 MG tablet Take 75 mg by mouth       magnesium hydroxide (MILK OF MAGNESIA) 400 MG/5ML suspension Take 30 mLs by mouth       albuterol (PROAIR HFA/PROVENTIL HFA/VENTOLIN HFA) 108 (90 BASE) MCG/ACT Inhaler Inhale 1-2 puffs into the lungs every 4 hours as needed for shortness of breath / dyspnea or wheezing As needed. 1 Inhaler 3     polyethylene glycol (MIRALAX) powder Take 17 g (1 capful) by mouth daily 510 g 1     order for DME Equipment being ordered: Glucometer and test strips 1 Device 0     blood glucose monitoring (ONE TOUCH DELICA) lancets Use to test blood sugar 3 times daily. 360 each 3     Glucose Blood (BLOOD GLUCOSE TEST STRIPS) STRP four times a day. 360 each 3     ONETOUCH DELICA LANCETS 33G MISC four times a day.       Allergies   Allergen Reactions     Penicillins      Phenylephrine      Head aches     Tropicamide      Head aches       Recent Labs   Lab Test  04/06/18   0858  03/13/18   1443   02/18/18   0905  02/17/18   2109   02/09/18   1106  10/06/17   06/13/17   0820 05/06/17   A1C   --    --    --   6.7*   --    --   6.4*   --   6.2   --   8.4*  10.7*   LDL   --    --    --   35   --    --    --    --    --    --   41  99   HDL   --    --    --   54   --    --    --    --    --    --   55  52   TRIG   --    --    --   78   --    --    --    --    --    --   93  65   ALT  17  22   --    --   24   --    --    < >   --    < >  30   --    CR  0.81  0.87   < >  0.66  0.76   < >   --    < >   --     < >  0.68   --    GFRESTIMATED  69  64   < >  88  74   < >   --    < >   --    < >  86   --    GFRESTBLACK  84  77   < >  >90  >90   < >   --    < >   --    < >  >90   GFR Calc     --    POTASSIUM  4.3  4.1   < >  4.0  4.3   < >   --    < >   --    < >  4.1   --    TSH   --    --    --    --    --    --    --    --    --    --   1.30   --     < > = values in this interval not displayed.      BP Readings from Last 3 Encounters:   04/12/18 120/64   04/09/18 130/82   04/06/18 (!) 190/63    Wt Readings from Last 3 Encounters:   04/03/18 211 lb (95.7 kg)   02/18/18 223 lb 1.6 oz (101.2 kg)   02/09/18 230 lb (104.3 kg)                    Reviewed and updated as needed this visit by clinical staff       Reviewed and updated as needed this visit by Provider         ROS:  Constitutional, HEENT, cardiovascular, pulmonary, gi and gu systems are negative, except as otherwise noted.    OBJECTIVE:     /64 (BP Location: Right arm, Patient Position: Supine, Cuff Size: Adult Large)  Pulse 61  Temp 96.3  F (35.7  C) (Tympanic)  SpO2 100%  There is no height or weight on file to calculate BMI.   GENERAL: healthy, alert and no distress  RESP: lungs clear to auscultation - no rales, rhonchi or wheezes  CV: regular rate and rhythm, normal S1 S2, no S3 or S4, no murmur, click or rub, no peripheral edema and peripheral pulses strong  ABDOMEN: soft, non tender.  MS: Left ankle swelling and some bruising consistent with ankle sprain. Only mild ankle tenderness.     SKIN: no suspicious lesions or rashes  NEURO: Amblyopia left eye extropion.  Weakness of left arm and leg.    PSYCH: mentation appears normal, affect normal/bright    Diagnostic Test Results:  Results for orders placed or performed during the hospital encounter of 04/06/18   Ankle XR, G/E 3 views, left    Narrative    PROCEDURE:  XR ANKLE LT G/E 3 VW    HISTORY: swelling and bruising left foot, history stroke;     COMPARISON:  None.    TECHNIQUE:  3  views of the the ankle were obtained.    FINDINGS:  No fracture or dislocation is identified. The joint spaces  are preserved.   calyceal spurs are seen      Impression    IMPRESSION: No acute fracture.      ISH KILLIAN MD   Foot XR, G/E 3 views, left    Narrative    PROCEDURE:  XR FOOT LT G/E 3 VW    HISTORY: pain and foot bruising left;     COMPARISON:  None.    TECHNIQUE:  The views of the left were obtained.    FINDINGS:  No fracture or dislocation is identified. The joint spaces  are preserved.  There is bony spurs at the insertion of Achilles  tendon and plantar fascia into the calcaneus.       Impression    IMPRESSION: No acute fracture.      ISH KILLIAN MD   US Lower Extremity Venous Duplex Left    Narrative    Exam:US LOWER EXTREMITY VENOUS DUPLEX LEFT    History: Left leg pain and swelling    Comparisons: None    Technique: Venous duplex ultrasonography of the left lower extremity  was performed.     Findings: The common femoral vein, superficial femoral vein and  popliteal vein are fully compressible with spontaneous and augmentable  venous flow.           Impression    Impression: No evidence of deep venous thrombosis within the left  lower extremity.    ISH KILLIAN MD   CBC with platelets differential   Result Value Ref Range    WBC 6.2 4.0 - 11.0 10e9/L    RBC Count 4.25 3.8 - 5.2 10e12/L    Hemoglobin 11.5 (L) 11.7 - 15.7 g/dL    Hematocrit 36.0 35.0 - 47.0 %    MCV 85 78 - 100 fl    MCH 27.1 26.5 - 33.0 pg    MCHC 31.9 31.5 - 36.5 g/dL    RDW 14.4 10.0 - 15.0 %    Platelet Count 273 150 - 450 10e9/L    Diff Method Automated Method     % Neutrophils 70.0 %    % Lymphocytes 19.3 %    % Monocytes 7.4 %    % Eosinophils 2.2 %    % Basophils 0.8 %    % Immature Granulocytes 0.3 %    Nucleated RBCs 0 0 /100    Absolute Neutrophil 4.4 1.6 - 8.3 10e9/L    Absolute Lymphocytes 1.2 0.8 - 5.3 10e9/L    Absolute Monocytes 0.5 0.0 - 1.3 10e9/L    Absolute Eosinophils 0.1 0.0 - 0.7 10e9/L     Absolute Basophils 0.1 0.0 - 0.2 10e9/L    Abs Immature Granulocytes 0.0 0 - 0.4 10e9/L    Absolute Nucleated RBC 0.0    Comprehensive metabolic panel   Result Value Ref Range    Sodium 140 133 - 144 mmol/L    Potassium 4.3 3.4 - 5.3 mmol/L    Chloride 110 (H) 94 - 109 mmol/L    Carbon Dioxide 28 20 - 32 mmol/L    Anion Gap 2 (L) 3 - 14 mmol/L    Glucose 128 (H) 70 - 99 mg/dL    Urea Nitrogen 15 7 - 30 mg/dL    Creatinine 0.81 0.52 - 1.04 mg/dL    GFR Estimate 69 >60 mL/min/1.7m2    GFR Estimate If Black 84 >60 mL/min/1.7m2    Calcium 9.4 8.5 - 10.1 mg/dL    Bilirubin Total 0.3 0.2 - 1.3 mg/dL    Albumin 3.0 (L) 3.4 - 5.0 g/dL    Protein Total 6.4 (L) 6.8 - 8.8 g/dL    Alkaline Phosphatase 60 40 - 150 U/L    ALT 17 0 - 50 U/L    AST 9 0 - 45 U/L   CRP inflammation   Result Value Ref Range    CRP Inflammation <2.9 0.0 - 8.0 mg/L   INR   Result Value Ref Range    INR 0.97 0.80 - 1.20       ASSESSMENT/PLAN:     Problem List Items Addressed This Visit     Benign essential hypertension    Relevant Orders    Basic metabolic panel      Other Visit Diagnoses     Cerebrovascular accident (CVA), unspecified mechanism (H)    -  Primary    Relevant Orders    PHYSICAL THERAPY REFERRAL    OCCUPATIONAL THERAPY REFERRAL    Left leg swelling        Relevant Orders    US MICAH Doppler No Exercise         In regard to the Left ankile swelling it appears mostly consistent with high ankle sprain.  Rest and icing were discussed at length today.  However due to her Hx we will also proceed with MICAH of LLE.    HTN-BP currently at goal.    CVA-Left sided weakness-PT/OT at [facility] requested.      25 minutes of time was spent on this patient's care today.  Greater than 50% of the time was spent face ot face with the patient and her daughter.  They were educated as to possibility of ankle sprain and also possibility of PVD and ABIs.  We discussed BP along with the fact her glucose measurements have been very good as of late.  They additionally  had numerous questions regarding her CT angiogram studies which we discussed at length today.      Yash Cuellar, DO  [facility]

## 2018-04-13 ENCOUNTER — HOSPITAL ENCOUNTER (OUTPATIENT)
Dept: ULTRASOUND IMAGING | Facility: HOSPITAL | Age: 73
Discharge: HOME | End: 2018-04-13
Attending: INTERNAL MEDICINE | Admitting: INTERNAL MEDICINE
Payer: MEDICARE

## 2018-04-13 DIAGNOSIS — H43.812 PVD (POSTERIOR VITREOUS DETACHMENT), LEFT: Primary | ICD-10-CM

## 2018-04-13 DIAGNOSIS — M79.89 LEFT LEG SWELLING: ICD-10-CM

## 2018-04-13 PROCEDURE — 93922 UPR/L XTREMITY ART 2 LEVELS: CPT | Mod: TC

## 2018-04-16 ENCOUNTER — TELEPHONE (OUTPATIENT)
Dept: INTERNAL MEDICINE | Facility: OTHER | Age: 73
End: 2018-04-16

## 2018-04-16 DIAGNOSIS — R06.09 DOE (DYSPNEA ON EXERTION): Primary | ICD-10-CM

## 2018-04-16 NOTE — TELEPHONE ENCOUNTER
Per MD patient to be seen in ER. Called and updated daughter to go to ER for foot. OK for test for COPD. Discussed RN care coordination.Declined at this time.Please note.

## 2018-04-16 NOTE — TELEPHONE ENCOUNTER
Call from daughter.Wanted to know MD name for patients leg.Gave name . Today her left foot is cold, and light blue in color.Temp is the same.Color is new.Sensation intact.Please advise.    She also wants the test to check for COPD because she is still SOB.

## 2018-04-23 ENCOUNTER — TELEPHONE (OUTPATIENT)
Dept: INTERNAL MEDICINE | Facility: OTHER | Age: 73
End: 2018-04-23

## 2018-04-23 ENCOUNTER — HOSPITAL ENCOUNTER (OUTPATIENT)
Dept: OCCUPATIONAL THERAPY | Facility: HOSPITAL | Age: 73
Setting detail: THERAPIES SERIES
End: 2018-04-23
Attending: INTERNAL MEDICINE
Payer: MEDICARE

## 2018-04-23 PROCEDURE — G8984 CARRY CURRENT STATUS: HCPCS | Mod: GO,CN

## 2018-04-23 PROCEDURE — 40000118 ZZH STATISTIC OT DEPT VISIT

## 2018-04-23 PROCEDURE — 97110 THERAPEUTIC EXERCISES: CPT | Mod: GO

## 2018-04-23 PROCEDURE — 97166 OT EVAL MOD COMPLEX 45 MIN: CPT | Mod: GO

## 2018-04-23 PROCEDURE — G8985 CARRY GOAL STATUS: HCPCS | Mod: GO,CK

## 2018-04-23 NOTE — PROGRESS NOTES
04/23/18 1430   Quick Adds   Quick Adds Certification   Type of Visit Initial Outpatient Occupational Therapy Evaluation   General Information   Start Of Care Date 04/23/18   Referring Physician Yash Cuellar MD   Orders Other   Other Orders OT referral   Orders Date 04/12/18   Medical Diagnosis CVA x5   Onset of Illness/Injury or Date of Surgery 02/15/18   Precautions/Limitations Fall precautions   Surgical/Medical History Reviewed Yes  (anneurysm 1 repaired with coil, 1 thy're just , diabetes,CVA)   Additional Occupational Profile Info/Pertinent History of Current Problem Pt is a 74 yo female who has had 5 CVA in the last year.  She is only interested in working on her left arm function in out patient therapy.  Pt has been at in pt rehab and home heallth prior to coming here today.   Role/Living Environment   Current Community Support Family/friend caregiver   Patient role/Employment history Retired   Community/Avocational Activities meghan, volunteer at [employer], [employer]   Current Living Environment House   Number of Stairs to Enter Home 0   Number of Stairs Within Home 24  (pt does not use them)   Primary Bathroom Location/Comments main floor   Primary Bathroom Set Up/Equipment Tub grab bar;Shower/tub chair;Toilet grab bar;Raised toilet seat with arms;Hand held shower  (walk-in tub)   Prior Level - Transfers Independent   Prior Level - Ambulation Independent   Prior Level - ADLS Independent   Prior Responsibilities - IADL Driving;Finances;Medication management;Yardwork;Shopping;Laundry;Housekeeping;Meal Preparation   Current Assistive Devices - Mobility Manual wheelchair;Front wheeled walker;Walker;Standard cane;Power wheelchair  (Not walking at home, using w/c)   Pain   Patient currently in pain No   Fall Risk Screen   Fall screen completed by OT   Have you fallen 2 or more times in the past year? No   Is patient a fall risk? Yes   Fall screen comments Looking for PT, don't want to wait a week for PT    Cognitive Status Examination   Orientation Orientation to person, place and time   Level of Consciousness Alert   Cognitive Comment Formal evaluation not completed, not problems noted during therapy and pt and her daughter report no problems with cognition   Visual Perception   Visual Perception Wears glasses   Range of Motion (ROM)   ROM Quick Adds Shoulder, Left;Elbow/Forearm, Left;Wrist, Left;Hand, General   Left Shoulder Flexion ROM   Left Shoulder Flexion AROM - degrees 0   Left Shoulder Extension ROM   Left Shoulder Extension AROM - degrees 22   Left Shoulder ABduction ROM   Left Shoulder ABduction AROM - degrees 45  (full arm and trunk working to accomplish)   Left Elbow Extension/Flexion ROM   Left Elbow Extension/Flexion AROM - degrees -10/110   Left Forearm Supination ROM   Left Forearm Supination AROM - degrees (does not supinate)   Left Wrist Flexion ROM   Left Wrist Flexion AROM - degrees 0   Left Wrist Extension ROM   Left Wrist Extension AROM - degrees 0   Hand, General ROM   Hand, General Left Hand ROM   Left Hand AROM minisiscule finger flexion with full arm working    Hand Strength   Hand Dominance Right   Left Hand  (pounds) 0 pounds   Muscle Tone   Muscle Tone LUE   Muscle Tone Assessment - LUE mildly increased tone   Clinical Impression   Criteria for Skilled Therapeutic Interventions Met Yes, treatment indicated   OT Diagnosis CVA with left hemiparesis   Influenced by the following impairments mild tone in left U/E, minimal function of left U/E,   Assessment of Occupational Performance 5 or more Performance Deficits   Identified Performance Deficits dressing, bathing, homemaking, functional mobility, leisure participation   Clinical Decision Making (Complexity) Moderate complexity   Therapy Frequency 2x/week   Predicted Duration of Therapy Intervention (days/wks) 4 weeks   Risks and Benefits of Treatment have been explained. Yes   Patient, Family & other staff in agreement with plan of  care Yes   Education Assessment   Barriers To Learning Emotional   Preferred Learning Style Demonstration   Therapy Certification   Certification date from 04/23/18   Certification date to 07/22/18   Total Evaluation Time   Total Evaluation Time 45

## 2018-04-23 NOTE — TELEPHONE ENCOUNTER
Daughter called and wanted to update Kalee ortega of MD that pulmonary test will be tomorrow.Thank you

## 2018-04-23 NOTE — TELEPHONE ENCOUNTER
Daughter called to say PFT's have been moved up to tomorrow morning and wondering when results will be available. Was notified if done tomorrow morning should hear back by tomorrow afternoon or wed.  Kalee Busby LPN

## 2018-04-24 ENCOUNTER — HOSPITAL ENCOUNTER (OUTPATIENT)
Dept: RESPIRATORY THERAPY | Facility: HOSPITAL | Age: 73
Discharge: HOME | End: 2018-04-24
Attending: INTERNAL MEDICINE | Admitting: INTERNAL MEDICINE
Payer: MEDICARE

## 2018-04-24 DIAGNOSIS — R06.09 DOE (DYSPNEA ON EXERTION): ICD-10-CM

## 2018-04-24 PROCEDURE — 25000125 ZZHC RX 250: Performed by: INTERNAL MEDICINE

## 2018-04-24 PROCEDURE — 94726 PLETHYSMOGRAPHY LUNG VOLUMES: CPT | Mod: 26 | Performed by: INTERNAL MEDICINE

## 2018-04-24 PROCEDURE — 94729 DIFFUSING CAPACITY: CPT

## 2018-04-24 PROCEDURE — 94726 PLETHYSMOGRAPHY LUNG VOLUMES: CPT

## 2018-04-24 PROCEDURE — 94060 EVALUATION OF WHEEZING: CPT

## 2018-04-24 PROCEDURE — 94060 EVALUATION OF WHEEZING: CPT | Mod: 26 | Performed by: INTERNAL MEDICINE

## 2018-04-24 PROCEDURE — 94729 DIFFUSING CAPACITY: CPT | Mod: 26 | Performed by: INTERNAL MEDICINE

## 2018-04-24 RX ORDER — METFORMIN HCL 500 MG
1000 TABLET, EXTENDED RELEASE 24 HR ORAL 2 TIMES DAILY WITH MEALS
COMMUNITY
End: 2019-09-18

## 2018-04-24 RX ORDER — ALBUTEROL SULFATE 0.83 MG/ML
2.5 SOLUTION RESPIRATORY (INHALATION)
Status: COMPLETED | OUTPATIENT
Start: 2018-04-24 | End: 2018-04-24

## 2018-04-24 RX ADMIN — ALBUTEROL SULFATE 2.5 MG: 2.5 SOLUTION RESPIRATORY (INHALATION) at 08:03

## 2018-04-25 ENCOUNTER — TELEPHONE (OUTPATIENT)
Dept: INTERNAL MEDICINE | Facility: OTHER | Age: 73
End: 2018-04-25

## 2018-04-25 ENCOUNTER — TRANSFERRED RECORDS (OUTPATIENT)
Dept: HEALTH INFORMATION MANAGEMENT | Facility: CLINIC | Age: 73
End: 2018-04-25

## 2018-04-25 NOTE — PROGRESS NOTES
4/25/2018    OT Discharge    Pt's daughter contacted this therapist by phone stating pt will not be returning to this facility for OT as they are going to go elsewhere.  OT is discontinued at this time.  Medicare d/c g-codes will not be completed as pt is not returning to this facility.

## 2018-04-25 NOTE — TELEPHONE ENCOUNTER
Daughter left voicemail.Would like to update MD that patient will be having PT at the [facility] in [facility] from the hospital.Please note.Thank you.

## 2018-04-26 ENCOUNTER — TRANSFERRED RECORDS (OUTPATIENT)
Dept: HEALTH INFORMATION MANAGEMENT | Facility: CLINIC | Age: 73
End: 2018-04-26

## 2018-04-26 DIAGNOSIS — I63.9 ACUTE ISCHEMIC STROKE (H): Primary | ICD-10-CM

## 2018-04-26 RX ORDER — CLOPIDOGREL BISULFATE 75 MG/1
75 TABLET ORAL DAILY
Qty: 30 TABLET | Refills: 0 | Status: SHIPPED | OUTPATIENT
Start: 2018-04-26 | End: 2018-05-02

## 2018-04-26 NOTE — TELEPHONE ENCOUNTER
Patient of .Call from daughter that patient needs Plavix and will not have enough for the weekend. Would like sent to [facility] and then to [facility].HIstorical in Epic.Thank you.

## 2018-05-02 ENCOUNTER — OFFICE VISIT (OUTPATIENT)
Dept: INTERNAL MEDICINE | Facility: OTHER | Age: 73
End: 2018-05-02
Attending: INTERNAL MEDICINE
Payer: MEDICARE

## 2018-05-02 ENCOUNTER — TELEPHONE (OUTPATIENT)
Dept: INTERNAL MEDICINE | Facility: OTHER | Age: 73
End: 2018-05-02

## 2018-05-02 VITALS
TEMPERATURE: 98.2 F | OXYGEN SATURATION: 99 % | HEART RATE: 67 BPM | SYSTOLIC BLOOD PRESSURE: 118 MMHG | DIASTOLIC BLOOD PRESSURE: 82 MMHG

## 2018-05-02 DIAGNOSIS — I73.9 PVD (PERIPHERAL VASCULAR DISEASE) (H): Primary | ICD-10-CM

## 2018-05-02 DIAGNOSIS — R05.9 COUGH: ICD-10-CM

## 2018-05-02 DIAGNOSIS — J43.1 PANLOBULAR EMPHYSEMA (H): ICD-10-CM

## 2018-05-02 DIAGNOSIS — I63.9 ACUTE ISCHEMIC STROKE (H): ICD-10-CM

## 2018-05-02 DIAGNOSIS — I10 BENIGN ESSENTIAL HYPERTENSION: ICD-10-CM

## 2018-05-02 DIAGNOSIS — J20.9 ACUTE BRONCHITIS, UNSPECIFIED ORGANISM: ICD-10-CM

## 2018-05-02 DIAGNOSIS — E11.40 TYPE 2 DIABETES MELLITUS WITH DIABETIC NEUROPATHY, WITHOUT LONG-TERM CURRENT USE OF INSULIN (H): ICD-10-CM

## 2018-05-02 PROCEDURE — G0463 HOSPITAL OUTPT CLINIC VISIT: HCPCS

## 2018-05-02 PROCEDURE — 99214 OFFICE O/P EST MOD 30 MIN: CPT | Performed by: INTERNAL MEDICINE

## 2018-05-02 RX ORDER — CLOPIDOGREL BISULFATE 75 MG/1
75 TABLET ORAL DAILY
Qty: 90 TABLET | Refills: 3 | Status: SHIPPED | OUTPATIENT
Start: 2018-05-02 | End: 2019-04-15

## 2018-05-02 RX ORDER — ALBUTEROL SULFATE 90 UG/1
1-2 AEROSOL, METERED RESPIRATORY (INHALATION) EVERY 4 HOURS PRN
Qty: 1 INHALER | Refills: 3 | Status: SHIPPED | OUTPATIENT
Start: 2018-05-02 | End: 2019-03-28

## 2018-05-02 ASSESSMENT — PAIN SCALES - GENERAL: PAINLEVEL: NO PAIN (0)

## 2018-05-02 NOTE — TELEPHONE ENCOUNTER
Pt is requesting Advair to go to [facility]- states it was over $400 for one inhaler. They contacted her insurance company and it will cost $40 for 3 inhalers at [facility]. Kalee Busby LPN

## 2018-05-02 NOTE — PROGRESS NOTES
SUBJECTIVE:   Rosalie JANE Friend is a 73 year old female who presents to clinic today for the following health issues:      PFT results      Duration: 4/28/18    Description (location/character/radiation): pt here to go over PFT test    Intensity:  n/a    Accompanying signs and symptoms: still having SOB     History (similar episodes/previous evaluation): None    Precipitating or alleviating factors: None    Therapies tried and outcome: has a incentive spirometer - pt feels does not work- also feels proair is not working either       Rosalie presents today for follow up of her SOB.  Recent PFTs support diagnosis of COPD likely secondary to her years of smoking.  She is willing to try an inhaler(s).  She continues PT to improve strength and endurance due to her LEFT extremity weakness in her arm and leg.  We also discussed her visit with vascular surgery today.      Problem list and histories reviewed & adjusted, as indicated.  Additional history: as documented    Patient Active Problem List   Diagnosis     ACP (advance care planning)     Acute ischemic stroke (H)     Benign essential hypertension     Type 2 diabetes mellitus with neurologic complication, without long-term current use of insulin (H)     Past Surgical History:   Procedure Laterality Date     ABDOMEN SURGERY      bladder repair     GYN SURGERY      hysterectomy        Social History   Substance Use Topics     Smoking status: Former Smoker     Types: Cigarettes     Quit date: 5/5/2017     Smokeless tobacco: Never Used      Comment: limits self to about 4-5 daily , she is quitting on her own     Alcohol use No     Family History   Problem Relation Age of Onset     Aneurysm Mother      Other Cancer Father      DIABETES Brother      Hypertension Brother      Other Cancer Brother      Other Cancer Sister      Aneurysm Sister          Current Outpatient Prescriptions   Medication Sig Dispense Refill     albuterol (PROAIR HFA/PROVENTIL HFA/VENTOLIN HFA) 108  (90 Base) MCG/ACT Inhaler Inhale 1-2 puffs into the lungs every 4 hours as needed for shortness of breath / dyspnea or wheezing As needed. 1 Inhaler 3     aspirin 325 MG tablet Take 325 mg by mouth       atorvastatin (LIPITOR) 80 MG tablet Take 80 mg by mouth       blood glucose monitoring (ONE TOUCH DELICA) lancets Use to test blood sugar 3 times daily. 360 each 3     clopidogrel (PLAVIX) 75 MG tablet Take 1 tablet (75 mg) by mouth daily 90 tablet 3     fluticasone-salmeterol (ADVAIR) 250-50 MCG/DOSE diskus inhaler Inhale 1 puff into the lungs 2 times daily 3 Inhaler 0     Glucose Blood (BLOOD GLUCOSE TEST STRIPS) STRP four times a day. 360 each 3     lisinopril (PRINIVIL/ZESTRIL) 10 MG tablet Take 2 tablets (20 mg) by mouth daily 60 tablet 0     magnesium hydroxide (MILK OF MAGNESIA) 400 MG/5ML suspension Take 30 mLs by mouth       magnesium oxide (MAG-OX) 400 (241.3 MG) MG tablet Take 1 tablet (400 mg) by mouth 2 times daily (with meals) 90 tablet 3     metFORMIN (GLUCOPHAGE-XR) 500 MG 24 hr tablet Take 1,000 mg by mouth 2 times daily (with meals)        metoprolol tartrate (LOPRESSOR) 50 MG tablet Take 0.5 tablets (25 mg) by mouth 2 times daily 60 tablet 0     ONETOUCH DELICA LANCETS 33G MISC four times a day.       order for DME Equipment being ordered: Glucometer and test strips 1 Device 0     polyethylene glycol (MIRALAX) powder Take 17 g (1 capful) by mouth daily 510 g 1     [DISCONTINUED] albuterol (PROAIR HFA/PROVENTIL HFA/VENTOLIN HFA) 108 (90 BASE) MCG/ACT Inhaler Inhale 1-2 puffs into the lungs every 4 hours as needed for shortness of breath / dyspnea or wheezing As needed. 1 Inhaler 3     [DISCONTINUED] clopidogrel (PLAVIX) 75 MG tablet Take 1 tablet (75 mg) by mouth daily 30 tablet 0     [DISCONTINUED] fluticasone-salmeterol (ADVAIR) 250-50 MCG/DOSE diskus inhaler Inhale 1 puff into the lungs 2 times daily 3 Inhaler 3     Allergies   Allergen Reactions     Penicillins      Phenylephrine      Head  aches     Tropicamide      Head aches       Recent Labs   Lab Test  04/06/18   0858  03/13/18   1443   02/18/18   0905  02/17/18   2109   02/09/18   1106  10/06/17   06/13/17   0820 05/06/17   A1C   --    --    --   6.7*   --    --   6.4*   --   6.2   --   8.4*  10.7*   LDL   --    --    --   35   --    --    --    --    --    --   41  99   HDL   --    --    --   54   --    --    --    --    --    --   55  52   TRIG   --    --    --   78   --    --    --    --    --    --   93  65   ALT  17  22   --    --   24   --    --    < >   --    < >  30   --    CR  0.81  0.87   < >  0.66  0.76   < >   --    < >   --    < >  0.68   --    GFRESTIMATED  69  64   < >  88  74   < >   --    < >   --    < >  86   --    GFRESTBLACK  84  77   < >  >90  >90   < >   --    < >   --    < >  >90   GFR Calc     --    POTASSIUM  4.3  4.1   < >  4.0  4.3   < >   --    < >   --    < >  4.1   --    TSH   --    --    --    --    --    --    --    --    --    --   1.30   --     < > = values in this interval not displayed.      BP Readings from Last 3 Encounters:   05/02/18 118/82   04/12/18 120/64   04/09/18 130/82    Wt Readings from Last 3 Encounters:   04/03/18 211 lb (95.7 kg)   02/18/18 223 lb 1.6 oz (101.2 kg)   02/09/18 230 lb (104.3 kg)         Reviewed and updated as needed this visit by clinical staff  Tobacco  Allergies  Meds  Med Hx  Surg Hx  Fam Hx  Soc Hx      Reviewed and updated as needed this visit by Provider       ROS:  Constitutional, HEENT, cardiovascular, pulmonary, gi and gu systems are negative, except as otherwise noted.    OBJECTIVE:     /82 (BP Location: Right arm, Patient Position: Supine, Cuff Size: Adult Regular)  Pulse 67  Temp 98.2  F (36.8  C) (Tympanic)  SpO2 99%  There is no height or weight on file to calculate BMI.   GENERAL: Alert and no distress  EYES: Ptosis of left eye.    RESP: lungs clear to auscultation - no rales, rhonchi or wheezes  CV: regular rate and rhythm, normal S1  S2, no S3 or S4, no murmur, click or rub, no peripheral edema and peripheral pulses strong  MS: Weakness in the left extremities.    SKIN: no suspicious lesions or rashes  NEURO: Weakness in left extremities.  Ptosis of left eye.    PSYCH: mentation appears normal, affect normal/bright    PFTs were reviewed at length today.      ASSESSMENT/PLAN:     Problem List Items Addressed This Visit     Acute ischemic stroke (H)    Relevant Medications    clopidogrel (PLAVIX) 75 MG tablet    Benign essential hypertension    Type 2 diabetes mellitus with neurologic complication, without long-term current use of insulin (H)      Other Visit Diagnoses     PVD (peripheral vascular disease) (H)    -  Primary    Relevant Medications    clopidogrel (PLAVIX) 75 MG tablet    Panlobular emphysema (H)        Relevant Medications    albuterol (PROAIR HFA/PROVENTIL HFA/VENTOLIN HFA) 108 (90 Base) MCG/ACT Inhaler    fluticasone-salmeterol (ADVAIR) 250-50 MCG/DOSE diskus inhaler    Cough        Relevant Medications    albuterol (PROAIR HFA/PROVENTIL HFA/VENTOLIN HFA) 108 (90 Base) MCG/ACT Inhaler    fluticasone-salmeterol (ADVAIR) 250-50 MCG/DOSE diskus inhaler    Acute bronchitis, unspecified organism        Relevant Medications    albuterol (PROAIR HFA/PROVENTIL HFA/VENTOLIN HFA) 108 (90 Base) MCG/ACT Inhaler           MEDICATIONS:  Continue current medications without change  BP at Goal  COPD Start Advair  Follow up 1 month for DM  PVD continue Plavix and BP control along with statin.  Vascular surgery notes reviewed today.  Vit D at next visit.    30 minutes was spent on this patient's care today.  Greater than 50% of the time was spent face to face with the patient and her daughter.  PFTs were discussed at length-all questions were answer.  Advair prescribed and explained the pathophysiology of COPD and the medications for this.  We also discussed the PVD assessment.        Yash Cuellar,   [facility]

## 2018-05-02 NOTE — MR AVS SNAPSHOT
"              After Visit Summary   5/2/2018    Rosalie JANE Friend    MRN: [id number]           Patient Information     Date Of Birth          1945        Visit Information        Provider Department      5/2/2018 1:00 PM Yash Cuellar DO [facility]        Today's Diagnoses     PVD (peripheral vascular disease) (H)    -  1    Panlobular emphysema (H)        Type 2 diabetes mellitus with diabetic neuropathy, without long-term current use of insulin (H)        Benign essential hypertension        Cough        Acute bronchitis, unspecified organism        Acute ischemic stroke (H)           Follow-ups after your visit        Your next 10 appointments already scheduled     Shilo 15, 2018  1:30 PM CDT   (Arrive by 1:15 PM)   SHORT with Yash Cuellar DO   [facility] ([facility] [place] )    [address] [place] [address]   [phone]              Who to contact     If you have questions or need follow up information about today's clinic visit or your schedule please contact [facility] directly at [phone].  Normal or non-critical lab and imaging results will be communicated to you by MyChart, letter or phone within 4 business days after the clinic has received the results. If you do not hear from us within 7 days, please contact the clinic through [employer] or phone. If you have a critical or abnormal lab result, we will notify you by phone as soon as possible.  Submit refill requests through [employer] or call your pharmacy and they will forward the refill request to us. Please allow 3 business days for your refill to be completed.          Additional Information About Your Visit        MyChart Information     ERLink lets you send messages to your doctor, view your test results, renew your prescriptions, schedule appointments and more. To sign up, go to [email or web address] . Click on \"Log in\" on the left side of the screen, which will " "take you to the Welcome page. Then click on \"Sign up Now\" on the right side of the page.     You will be asked to enter the access code listed below, as well as some personal information. Please follow the directions to create your username and password.     Your access code is: [email or web address]  Expires: 5/10/2018 11:46 AM     Your access code will  in 90 days. If you need help or a new code, please call your [facility] clinic or [phone].        Care EveryWhere ID     This is your Care EveryWhere ID. This could be used by other organizations to access your [facility] medical records  [id number]        Your Vitals Were     Pulse Temperature Pulse Oximetry             67 98.2  F (36.8  C) (Tympanic) 99%          Blood Pressure from Last 3 Encounters:   18 118/82   18 120/64   18 130/82    Weight from Last 3 Encounters:   18 211 lb (95.7 kg)   18 223 lb 1.6 oz (101.2 kg)   18 230 lb (104.3 kg)              Today, you had the following     No orders found for display         Today's Medication Changes          These changes are accurate as of 18  1:39 PM.  If you have any questions, ask your nurse or doctor.               Start taking these medicines.        Dose/Directions    fluticasone-salmeterol 250-50 MCG/DOSE diskus inhaler   Commonly known as:  ADVAIR   Used for:  Panlobular emphysema (H)   Started by:  Yash Cuellar, DO        Dose:  1 puff   Inhale 1 puff into the lungs 2 times daily   Quantity:  3 Inhaler   Refills:  0            Where to get your medicines      These medications were sent to [facility] - [address]  [address] [facility] [address]     Phone:  [phone]     albuterol 108 (90 Base) MCG/ACT Inhaler    clopidogrel 75 MG tablet         These medications were sent to [id number] [address] [place] [address]  [address] [address]     Phone:  [phone]     " fluticasone-salmeterol 250-50 MCG/DOSE diskus inhaler                Primary Care Provider Office Phone # Fax #    Yash BOOGIE CarenDO walter [phone]       [facility] [address] [place] [address]        Equal Access to Services     MICHELLE BOWEN : Hadii aad ku hadasho Soomaali, waaxda luqadaha, qaybta kaalmada adeegyada, waxay carolin hayaan adeezekiel swartzignaciojose neri. So [facility] [place] [phone].    ATENCIÓN: Si habla español, tiene a blackmon disposición servicios gratuitos de asistencia lingüística. Hu al [phone].    We comply with applicable federal civil rights laws and [place] laws. We do not discriminate on the basis of race, color, national origin, age, disability, sex, sexual orientation, or gender identity.            Thank you!     Thank you for choosing [facility]3]  for your care. Our goal is always to provide you with excellent care. Hearing back from our patients is one way we can continue to improve our services. Please take a few minutes to complete the written survey that you may receive in the mail after your visit with us. Thank you!             Your Updated Medication List - Protect others around you: Learn how to safely use, store and throw away your medicines at www.disposemymeds.org.          This list is accurate as of 5/2/18  1:39 PM.  Always use your most recent med list.                   Brand Name Dispense Instructions for use Diagnosis    albuterol 108 (90 Base) MCG/ACT Inhaler    PROAIR HFA/PROVENTIL HFA/VENTOLIN HFA    1 Inhaler    Inhale 1-2 puffs into the lungs every 4 hours as needed for shortness of breath / dyspnea or wheezing As needed.    Cough, Acute bronchitis, unspecified organism       aspirin 325 MG tablet      Take 325 mg by mouth        atorvastatin 80 MG tablet    LIPITOR     Take 80 mg by mouth        BLOOD GLUCOSE TEST STRIPS Strp     360 each    four times a day.    Type 2 diabetes mellitus with complication, without long-term  current use of insulin (H)       clopidogrel 75 MG tablet    PLAVIX    90 tablet    Take 1 tablet (75 mg) by mouth daily    Acute ischemic stroke (H), PVD (peripheral vascular disease) (H)       fluticasone-salmeterol 250-50 MCG/DOSE diskus inhaler    ADVAIR    3 Inhaler    Inhale 1 puff into the lungs 2 times daily    Panlobular emphysema (H)       lisinopril 10 MG tablet    PRINIVIL/ZESTRIL    60 tablet    Take 2 tablets (20 mg) by mouth daily    Hypertension, unspecified type       magnesium hydroxide 400 MG/5ML suspension    MILK OF MAGNESIA     Take 30 mLs by mouth        magnesium oxide 400 (241.3 Mg) MG tablet    MAG-OX    90 tablet    Take 1 tablet (400 mg) by mouth 2 times daily (with meals)    Hypomagnesemia       metFORMIN 500 MG 24 hr tablet    GLUCOPHAGE-XR     Take 1,000 mg by mouth 2 times daily (with meals)        metoprolol tartrate 50 MG tablet    LOPRESSOR    60 tablet    Take 0.5 tablets (25 mg) by mouth 2 times daily    Hypertension, unspecified type       * ONETOUCH DELICA LANCETS 33G Misc      four times a day.        * blood glucose monitoring lancets     360 each    Use to test blood sugar 3 times daily.    Type 2 diabetes mellitus with hyperglycemia, with long-term current use of insulin (H)       order for DME     1 Device    Equipment being ordered: Glucometer and test strips    Well controlled type 2 diabetes mellitus (H)       polyethylene glycol powder    MIRALAX    510 g    Take 17 g (1 capful) by mouth daily    Constipation, unspecified constipation type       * Notice:  This list has 2 medication(s) that are the same as other medications prescribed for you. Read the directions carefully, and ask your doctor or other care provider to review them with you.

## 2018-05-03 ENCOUNTER — TELEPHONE (OUTPATIENT)
Dept: FAMILY MEDICINE | Facility: OTHER | Age: 73
End: 2018-05-03

## 2018-05-03 NOTE — TELEPHONE ENCOUNTER
[facility] calling d/t allergy to Phenylephrine. First time filing Advair and Ventolin.There is a warning d/t allergy. OK to bypass and fill meds?

## 2018-05-06 DIAGNOSIS — E11.8 TYPE 2 DIABETES MELLITUS WITH COMPLICATION, WITHOUT LONG-TERM CURRENT USE OF INSULIN (H): ICD-10-CM

## 2018-05-07 RX ORDER — BLOOD SUGAR DIAGNOSTIC
STRIP MISCELLANEOUS
Qty: 400 EACH | Refills: 3 | Status: SHIPPED | OUTPATIENT
Start: 2018-05-07

## 2018-05-16 DIAGNOSIS — I10 HYPERTENSION, UNSPECIFIED TYPE: ICD-10-CM

## 2018-05-17 RX ORDER — METOPROLOL TARTRATE 50 MG
TABLET ORAL
Qty: 60 TABLET | Refills: 2 | Status: SHIPPED | OUTPATIENT
Start: 2018-05-17 | End: 2018-11-12

## 2018-05-17 NOTE — TELEPHONE ENCOUNTER
Metoprolol  Last Written Prescription Date: 4/4/18  Last Fill Quantity: 60 # of Refills: 0  Last Office Visit: 5/2/18

## 2018-05-21 DIAGNOSIS — I10 HYPERTENSION, UNSPECIFIED TYPE: ICD-10-CM

## 2018-05-21 RX ORDER — LISINOPRIL 10 MG/1
20 TABLET ORAL DAILY
Qty: 60 TABLET | Refills: 0 | Status: SHIPPED | OUTPATIENT
Start: 2018-05-21 | End: 2018-05-22

## 2018-05-21 NOTE — TELEPHONE ENCOUNTER
Medication    1. Medication Name? Lucinapril  2. Is this request for a refill? Yes  3. What Pharmacy do you use? [facility]   4. Have you contacted your pharmacy? Yes    5. If yes, when?  (Please note that the turn-around-time for prescriptions is 72 business hours; I am sending your request at this time. SEND TO  [facility]  )  Description: Patient is completely out of the rx she [facility] and they had the dosage upped from 1 pill to 2 pills so the rx has not been filled.  Was an appointment offered for this a call? No   Preferred method for responding to this messageTelephone Call   [phone]  If we cannot reach you directly, may we leave a detailed response at the number you provided? Yes  Can this message wait until your PCP/Provider returns if not available today? No

## 2018-05-21 NOTE — TELEPHONE ENCOUNTER
Reason for call:  Medication    1. Medication Name? Lucinapril  2. Is this request for a refill? Yes  3. What Pharmacy do you use? [facility]   4. Have you contacted your pharmacy? Yes    5. If yes, when?  (Please note that the turn-around-time for prescriptions is 72 business hours; I am sending your request at this time. SEND TO  [facility]  )  Description: Patient is completely out of the rx she [facility] and they had the dosage upped from 1 pill to 2 pills so the rx has not been filled.  Was an appointment offered for this a call? No   Preferred method for responding to this messageTelephone Call   [phone]  If we cannot reach you directly, may we leave a detailed response at the number you provided? Yes  Can this message wait until your PCP/Provider returns if not available today? No

## 2018-05-22 ENCOUNTER — TELEPHONE (OUTPATIENT)
Dept: INTERNAL MEDICINE | Facility: OTHER | Age: 73
End: 2018-05-22

## 2018-05-22 DIAGNOSIS — I10 HYPERTENSION, UNSPECIFIED TYPE: ICD-10-CM

## 2018-05-22 DIAGNOSIS — K59.00 CONSTIPATION, UNSPECIFIED CONSTIPATION TYPE: ICD-10-CM

## 2018-05-22 RX ORDER — POLYETHYLENE GLYCOL 3350 17 G/17G
1 POWDER, FOR SOLUTION ORAL DAILY
Qty: 510 G | Refills: 1 | Status: SHIPPED | OUTPATIENT
Start: 2018-05-22 | End: 2019-01-22

## 2018-05-22 RX ORDER — LISINOPRIL 10 MG/1
20 TABLET ORAL DAILY
Qty: 30 TABLET | Refills: 0 | Status: SHIPPED | OUTPATIENT
Start: 2018-05-22 | End: 2018-05-24

## 2018-05-22 NOTE — TELEPHONE ENCOUNTER
Pt would like a script sent to [facility] for lisinopril due to being out and will not get new script from [facility] for about a week. Also would like a new script for Miralax sent to [facility]- pt called [facility] and was notified to contact doctors office for a new prescription. Kalee Busby LPN

## 2018-05-22 NOTE — TELEPHONE ENCOUNTER
Patient would like a call back regarding the last phone message please.  She can be reached [phone].

## 2018-05-23 DIAGNOSIS — E11.51 TYPE II DIABETES MELLITUS WITH PERIPHERAL CIRCULATORY DISORDER (H): Primary | ICD-10-CM

## 2018-05-23 DIAGNOSIS — E78.5 HYPERLIPIDEMIA LDL GOAL <100: ICD-10-CM

## 2018-05-24 DIAGNOSIS — I10 HYPERTENSION, UNSPECIFIED TYPE: ICD-10-CM

## 2018-05-24 RX ORDER — LISINOPRIL 10 MG/1
20 TABLET ORAL DAILY
Qty: 30 TABLET | Refills: 5 | Status: SHIPPED | OUTPATIENT
Start: 2018-05-24 | End: 2018-05-25

## 2018-05-24 NOTE — TELEPHONE ENCOUNTER
Metformin  Last Written Prescription Date:  Unknown -  Historical medication   Last Fill Qty:  Unknown, # Refills:    Last Office Visit:  5/2/18    Lipitor  Last Written Prescription Date:  Unknown - Historical medication  Last Fill Qty:  Unknown, # Refills:    Last Office Visit:  5/2/18

## 2018-05-25 DIAGNOSIS — I10 HYPERTENSION, UNSPECIFIED TYPE: ICD-10-CM

## 2018-05-25 RX ORDER — LISINOPRIL 10 MG/1
20 TABLET ORAL DAILY
Qty: 60 TABLET | Refills: 5 | Status: SHIPPED | OUTPATIENT
Start: 2018-05-25 | End: 2018-06-15

## 2018-05-25 RX ORDER — ATORVASTATIN CALCIUM 80 MG/1
TABLET, FILM COATED ORAL
Qty: 90 TABLET | Refills: 3 | Status: SHIPPED | OUTPATIENT
Start: 2018-05-25 | End: 2019-05-22

## 2018-06-04 ENCOUNTER — TELEPHONE (OUTPATIENT)
Dept: FAMILY MEDICINE | Facility: OTHER | Age: 73
End: 2018-06-04

## 2018-06-04 ENCOUNTER — HEALTH MAINTENANCE LETTER (OUTPATIENT)
Age: 73
End: 2018-06-04

## 2018-06-04 DIAGNOSIS — K21.9 GASTROESOPHAGEAL REFLUX DISEASE WITHOUT ESOPHAGITIS: Primary | ICD-10-CM

## 2018-06-04 RX ORDER — PANTOPRAZOLE SODIUM 40 MG/1
40 TABLET, DELAYED RELEASE ORAL DAILY
Qty: 90 TABLET | Refills: 3 | Status: SHIPPED | OUTPATIENT
Start: 2018-06-04 | End: 2018-09-04

## 2018-06-04 NOTE — TELEPHONE ENCOUNTER
Reason for call:  Medication    1. Medication Name? Pantoproazole sod. 40 mg  2. Is this request for a refill? Yes  3. What Pharmacy do you use? [facility] in [place]  4. Have you contacted your pharmacy? Yes    If yes, when?  Called today they advised it could take up to 10 days to fill  (Please note that the turn-around-time for prescriptions is 72 business hours; I am sending your request at this time. SEND TO  [facility]  )  Description: Please fill   Was an appointment offered for this a call? No   Preferred method for responding to this messageTelephone Call  [phone]  If we cannot reach you directly, may we leave a detailed response at the number you provided? Yes  Can this message wait until your PCP/Provider returns if not available today? Not applicable provider is in.

## 2018-06-14 ENCOUNTER — TELEPHONE (OUTPATIENT)
Dept: INTERNAL MEDICINE | Facility: OTHER | Age: 73
End: 2018-06-14

## 2018-06-14 NOTE — TELEPHONE ENCOUNTER
Would like MD nurse to call back.She has appointment tomorrow and doesn't know if she should come in for it.She was exposed to mono by her granddaughter.Asked if she had drank out of same glass,or in contact with body fluids.She said she may have d/t hugging her.Please call.Thank you.

## 2018-06-14 NOTE — ED NOTES
Pt to CT on cart.    patient is resting in the room waiting for a bed upstairs. patient denies any complaints. vss/nad. will continue to monitor.

## 2018-06-15 ENCOUNTER — OFFICE VISIT (OUTPATIENT)
Dept: INTERNAL MEDICINE | Facility: OTHER | Age: 73
End: 2018-06-15
Attending: INTERNAL MEDICINE
Payer: MEDICARE

## 2018-06-15 VITALS
DIASTOLIC BLOOD PRESSURE: 70 MMHG | OXYGEN SATURATION: 98 % | TEMPERATURE: 96.6 F | SYSTOLIC BLOOD PRESSURE: 124 MMHG | HEART RATE: 65 BPM

## 2018-06-15 DIAGNOSIS — I63.9 ACUTE ISCHEMIC STROKE (H): ICD-10-CM

## 2018-06-15 DIAGNOSIS — D50.0 IRON DEFICIENCY ANEMIA DUE TO CHRONIC BLOOD LOSS: ICD-10-CM

## 2018-06-15 DIAGNOSIS — E11.49 TYPE 2 DIABETES MELLITUS WITH OTHER NEUROLOGIC COMPLICATION, WITHOUT LONG-TERM CURRENT USE OF INSULIN (H): Primary | ICD-10-CM

## 2018-06-15 DIAGNOSIS — I10 BENIGN ESSENTIAL HYPERTENSION: ICD-10-CM

## 2018-06-15 LAB
ANION GAP SERPL CALCULATED.3IONS-SCNC: 6 MMOL/L (ref 3–14)
BASOPHILS # BLD AUTO: 0.1 10E9/L (ref 0–0.2)
BASOPHILS NFR BLD AUTO: 0.9 %
BUN SERPL-MCNC: 21 MG/DL (ref 7–30)
CALCIUM SERPL-MCNC: 9.1 MG/DL (ref 8.5–10.1)
CHLORIDE SERPL-SCNC: 107 MMOL/L (ref 94–109)
CHOLEST SERPL-MCNC: 127 MG/DL
CO2 SERPL-SCNC: 28 MMOL/L (ref 20–32)
CREAT SERPL-MCNC: 0.72 MG/DL (ref 0.52–1.04)
DIFFERENTIAL METHOD BLD: NORMAL
EOSINOPHIL # BLD AUTO: 0.2 10E9/L (ref 0–0.7)
EOSINOPHIL NFR BLD AUTO: 2.8 %
ERYTHROCYTE [DISTWIDTH] IN BLOOD BY AUTOMATED COUNT: 13.6 % (ref 10–15)
EST. AVERAGE GLUCOSE BLD GHB EST-MCNC: 126 MG/DL
GFR SERPL CREATININE-BSD FRML MDRD: 79 ML/MIN/1.7M2
GLUCOSE SERPL-MCNC: 138 MG/DL (ref 70–99)
HBA1C MFR BLD: 6 % (ref 0–5.6)
HCT VFR BLD AUTO: 37.2 % (ref 35–47)
HDLC SERPL-MCNC: 58 MG/DL
HGB BLD-MCNC: 12.1 G/DL (ref 11.7–15.7)
IMM GRANULOCYTES # BLD: 0 10E9/L (ref 0–0.4)
IMM GRANULOCYTES NFR BLD: 0.4 %
LDLC SERPL CALC-MCNC: 48 MG/DL
LYMPHOCYTES # BLD AUTO: 1.3 10E9/L (ref 0.8–5.3)
LYMPHOCYTES NFR BLD AUTO: 22.7 %
MCH RBC QN AUTO: 27.6 PG (ref 26.5–33)
MCHC RBC AUTO-ENTMCNC: 32.5 G/DL (ref 31.5–36.5)
MCV RBC AUTO: 85 FL (ref 78–100)
MONOCYTES # BLD AUTO: 0.5 10E9/L (ref 0–1.3)
MONOCYTES NFR BLD AUTO: 9 %
NEUTROPHILS # BLD AUTO: 3.7 10E9/L (ref 1.6–8.3)
NEUTROPHILS NFR BLD AUTO: 64.2 %
NONHDLC SERPL-MCNC: 69 MG/DL
NRBC # BLD AUTO: 0 10*3/UL
NRBC BLD AUTO-RTO: 0 /100
PLATELET # BLD AUTO: 282 10E9/L (ref 150–450)
POTASSIUM SERPL-SCNC: 4.5 MMOL/L (ref 3.4–5.3)
RBC # BLD AUTO: 4.38 10E12/L (ref 3.8–5.2)
SODIUM SERPL-SCNC: 141 MMOL/L (ref 133–144)
TRIGL SERPL-MCNC: 104 MG/DL
WBC # BLD AUTO: 5.7 10E9/L (ref 4–11)

## 2018-06-15 PROCEDURE — 80061 LIPID PANEL: CPT | Mod: ZL | Performed by: INTERNAL MEDICINE

## 2018-06-15 PROCEDURE — 36415 COLL VENOUS BLD VENIPUNCTURE: CPT | Mod: ZL | Performed by: INTERNAL MEDICINE

## 2018-06-15 PROCEDURE — 83036 HEMOGLOBIN GLYCOSYLATED A1C: CPT | Mod: ZL | Performed by: INTERNAL MEDICINE

## 2018-06-15 PROCEDURE — 80048 BASIC METABOLIC PNL TOTAL CA: CPT | Mod: ZL | Performed by: INTERNAL MEDICINE

## 2018-06-15 PROCEDURE — 99214 OFFICE O/P EST MOD 30 MIN: CPT | Performed by: INTERNAL MEDICINE

## 2018-06-15 PROCEDURE — 40000788 ZZHCL STATISTIC ESTIMATED AVERAGE GLUCOSE: Mod: ZL | Performed by: INTERNAL MEDICINE

## 2018-06-15 PROCEDURE — G0463 HOSPITAL OUTPT CLINIC VISIT: HCPCS

## 2018-06-15 PROCEDURE — 85025 COMPLETE CBC W/AUTO DIFF WBC: CPT | Mod: ZL | Performed by: INTERNAL MEDICINE

## 2018-06-15 ASSESSMENT — PAIN SCALES - GENERAL: PAINLEVEL: NO PAIN (0)

## 2018-06-15 NOTE — PROGRESS NOTES
SUBJECTIVE:   Rosalie JANE Friend is a 73 year old female who presents to clinic today for the following health issues:      Diabetes Follow-up    Patient is checking blood sugars: twice daily.    Blood sugar testing frequency justification: Patient modifying lifestyle changes (diet, exercise) with blood sugars  Results are as follows:         am - 120-135              postprandial after supper- 180's    Diabetic concerns: None     Symptoms of hypoglycemia (low blood sugar): none     Paresthesias (numbness or burning in feet) or sores: Yes big toe      Date of last diabetic eye exam: has one scheduled for July 2018- [facility]    BP Readings from Last 2 Encounters:   05/02/18 118/82   04/12/18 120/64     Hemoglobin A1C (%)   Date Value   02/18/2018 6.7 (H)   02/09/2018 6.4 (H)     LDL Cholesterol Calculated (mg/dL)   Date Value   02/18/2018 35   06/13/2017 41       Amount of exercise or physical activity: 2-3 days/week for an average of 15-30 minutes    Problems taking medications regularly: No    Medication side effects: none    Diet: regular (no restrictions)      Rosalie presents today for follow up of her DM, CVA and HTN.  She continues PT is fairly independent in function now despite the left arm weakness.  She is doing transfers at home alone now.  She otherwise has no acute concerns today.    Problem list and histories reviewed & adjusted, as indicated.  Additional history: as documented    Patient Active Problem List   Diagnosis     ACP (advance care planning)     Acute ischemic stroke (H)     Benign essential hypertension     Type 2 diabetes mellitus with neurologic complication, without long-term current use of insulin (H)     Past Surgical History:   Procedure Laterality Date     ABDOMEN SURGERY      bladder repair     GYN SURGERY      hysterectomy        Social History   Substance Use Topics     Smoking status: Former Smoker     Types: Cigarettes     Start date: 1/1/1956     Quit date: 5/5/2017     Smokeless  tobacco: Never Used      Comment: limits self to about 4-5 daily , she is quitting on her own     Alcohol use No     Family History   Problem Relation Age of Onset     Aneurysm Mother      Other Cancer Father      DIABETES Brother      Hypertension Brother      Other Cancer Brother      Other Cancer Sister      Aneurysm Sister          Current Outpatient Prescriptions   Medication Sig Dispense Refill     albuterol (PROAIR HFA/PROVENTIL HFA/VENTOLIN HFA) 108 (90 Base) MCG/ACT Inhaler Inhale 1-2 puffs into the lungs every 4 hours as needed for shortness of breath / dyspnea or wheezing As needed. 1 Inhaler 3     aspirin 325 MG tablet Take 325 mg by mouth       atorvastatin (LIPITOR) 80 MG tablet TAKE 1 TABLET DAILY 90 tablet 3     blood glucose monitoring (ONE TOUCH DELICA) lancets Use to test blood sugar 3 times daily. 360 each 3     clopidogrel (PLAVIX) 75 MG tablet Take 1 tablet (75 mg) by mouth daily 90 tablet 3     fluticasone-salmeterol (ADVAIR) 250-50 MCG/DOSE diskus inhaler Inhale 1 puff into the lungs 2 times daily 3 Inhaler 0     lisinopril (PRINIVIL/ZESTRIL) 10 MG tablet TAKE 2 TABLETS DAILY 60 tablet 3     magnesium hydroxide (MILK OF MAGNESIA) 400 MG/5ML suspension Take 30 mLs by mouth       magnesium oxide (MAG-OX) 400 (241.3 MG) MG tablet Take 1 tablet (400 mg) by mouth 2 times daily (with meals) 90 tablet 3     metoprolol tartrate (LOPRESSOR) 50 MG tablet TAKE ONE-HALF (1/2) TABLET TWICE A DAY 60 tablet 2     ONETOUCH DELICA LANCETS 33G MISC four times a day.       ONETOUCH VERIO IQ test strip USE FOUR TIMES A  each 3     order for DME Equipment being ordered: Glucometer and test strips 1 Device 0     pantoprazole (PROTONIX) 40 MG EC tablet Take 1 tablet (40 mg) by mouth daily Take 30-60 minutes before a meal. 90 tablet 3     polyethylene glycol (MIRALAX) powder Take 17 g (1 capful) by mouth daily 510 g 1     metFORMIN (GLUCOPHAGE) 500 MG tablet TAKE 2 TABLETS TWICE A DAY WITH MEALS 180 tablet 3      metFORMIN (GLUCOPHAGE-XR) 500 MG 24 hr tablet Take 1,000 mg by mouth 2 times daily (with meals)        [DISCONTINUED] atorvastatin (LIPITOR) 80 MG tablet Take 80 mg by mouth       [DISCONTINUED] lisinopril (PRINIVIL/ZESTRIL) 10 MG tablet Take 2 tablets (20 mg) by mouth daily 60 tablet 5     Allergies   Allergen Reactions     Penicillins      Phenylephrine      Head aches     Tropicamide      Head aches       Recent Labs   Lab Test  06/15/18   1341  04/06/18   0858  03/13/18   1443   02/18/18   0905  02/17/18   2109   02/09/18   1106   06/13/17   0820   A1C  6.0*   --    --    --   6.7*   --    --   6.4*   < >  8.4*   LDL  48   --    --    --   35   --    --    --    --   41   HDL  58   --    --    --   54   --    --    --    --   55   TRIG  104   --    --    --   78   --    --    --    --   93   ALT   --   17  22   --    --   24   --    --    < >  30   CR  0.72  0.81  0.87   < >  0.66  0.76   < >   --    < >  0.68   GFRESTIMATED  79  69  64   < >  88  74   < >   --    < >  86   GFRESTBLACK  >90  84  77   < >  >90  >90   < >   --    < >  >90   GFR Calc     POTASSIUM  4.5  4.3  4.1   < >  4.0  4.3   < >   --    < >  4.1   TSH   --    --    --    --    --    --    --    --    --   1.30    < > = values in this interval not displayed.      BP Readings from Last 3 Encounters:   06/15/18 124/70   05/02/18 118/82   04/12/18 120/64    Wt Readings from Last 3 Encounters:   04/03/18 211 lb (95.7 kg)   02/18/18 223 lb 1.6 oz (101.2 kg)   02/09/18 230 lb (104.3 kg)         Reviewed and updated as needed this visit by clinical staff       Reviewed and updated as needed this visit by Provider       ROS:  Constitutional, HEENT, cardiovascular, pulmonary, gi and gu systems are negative, except as otherwise noted.    OBJECTIVE:     /70 (BP Location: Right arm, Patient Position: Sitting, Cuff Size: Adult Large)  Pulse 65  Temp 96.6  F (35.9  C) (Tympanic)  SpO2 98%  There is no height or weight on file to  calculate BMI.   GENERAL: Alert and no distress, pleasant.    EYES: Extropion left eye-which is baseline for her prior to her CVAs.    RESP: lungs clear to auscultation - no rales, rhonchi or wheezes  CV: regular rate and rhythm, normal S1 S2, no S3 or S4, no murmur, click or rub, no peripheral edema and peripheral pulses strong  ABDOMEN: soft, nontender, positive BS.    MS: +1 LE edema    SKIN: no suspicious lesions or rashes  NEURO: Normal strength and tone, mentation intact and speech normal  NEURO: Left arm and hand weakness 1-2/5 with  and flexion and extension strength.   Monofilament testing with mild distal neuropathy of toes otherwise sensation feet intact.    PSYCH: mentation appears normal, affect normal/bright    Diagnostic Test Results:  Results for orders placed or performed in visit on 06/15/18 (from the past 24 hour(s))   Hemoglobin A1c   Result Value Ref Range    Hemoglobin A1C 6.0 (H) 0 - 5.6 %   CBC with platelets and differential   Result Value Ref Range    WBC 5.7 4.0 - 11.0 10e9/L    RBC Count 4.38 3.8 - 5.2 10e12/L    Hemoglobin 12.1 11.7 - 15.7 g/dL    Hematocrit 37.2 35.0 - 47.0 %    MCV 85 78 - 100 fl    MCH 27.6 26.5 - 33.0 pg    MCHC 32.5 31.5 - 36.5 g/dL    RDW 13.6 10.0 - 15.0 %    Platelet Count 282 150 - 450 10e9/L    Diff Method Automated Method     % Neutrophils 64.2 %    % Lymphocytes 22.7 %    % Monocytes 9.0 %    % Eosinophils 2.8 %    % Basophils 0.9 %    % Immature Granulocytes 0.4 %    Nucleated RBCs 0 0 /100    Absolute Neutrophil 3.7 1.6 - 8.3 10e9/L    Absolute Lymphocytes 1.3 0.8 - 5.3 10e9/L    Absolute Monocytes 0.5 0.0 - 1.3 10e9/L    Absolute Eosinophils 0.2 0.0 - 0.7 10e9/L    Absolute Basophils 0.1 0.0 - 0.2 10e9/L    Abs Immature Granulocytes 0.0 0 - 0.4 10e9/L    Absolute Nucleated RBC 0.0    Lipid Profile   Result Value Ref Range    Cholesterol 127 <200 mg/dL    Triglycerides 104 <150 mg/dL    HDL Cholesterol 58 >49 mg/dL    LDL Cholesterol Calculated 48 <100  mg/dL    Non HDL Cholesterol 69 <130 mg/dL   Basic metabolic panel   Result Value Ref Range    Sodium 141 133 - 144 mmol/L    Potassium 4.5 3.4 - 5.3 mmol/L    Chloride 107 94 - 109 mmol/L    Carbon Dioxide 28 20 - 32 mmol/L    Anion Gap 6 3 - 14 mmol/L    Glucose 138 (H) 70 - 99 mg/dL    Urea Nitrogen 21 7 - 30 mg/dL    Creatinine 0.72 0.52 - 1.04 mg/dL    GFR Estimate 79 >60 mL/min/1.7m2    GFR Estimate If Black >90 >60 mL/min/1.7m2    Calcium 9.1 8.5 - 10.1 mg/dL   Estimated Average Glucose   Result Value Ref Range    Estimated Average Glucose 126 mg/dL     Results for orders placed or performed in visit on 06/15/18   Hemoglobin A1c   Result Value Ref Range    Hemoglobin A1C 6.0 (H) 0 - 5.6 %   CBC with platelets and differential   Result Value Ref Range    WBC 5.7 4.0 - 11.0 10e9/L    RBC Count 4.38 3.8 - 5.2 10e12/L    Hemoglobin 12.1 11.7 - 15.7 g/dL    Hematocrit 37.2 35.0 - 47.0 %    MCV 85 78 - 100 fl    MCH 27.6 26.5 - 33.0 pg    MCHC 32.5 31.5 - 36.5 g/dL    RDW 13.6 10.0 - 15.0 %    Platelet Count 282 150 - 450 10e9/L    Diff Method Automated Method     % Neutrophils 64.2 %    % Lymphocytes 22.7 %    % Monocytes 9.0 %    % Eosinophils 2.8 %    % Basophils 0.9 %    % Immature Granulocytes 0.4 %    Nucleated RBCs 0 0 /100    Absolute Neutrophil 3.7 1.6 - 8.3 10e9/L    Absolute Lymphocytes 1.3 0.8 - 5.3 10e9/L    Absolute Monocytes 0.5 0.0 - 1.3 10e9/L    Absolute Eosinophils 0.2 0.0 - 0.7 10e9/L    Absolute Basophils 0.1 0.0 - 0.2 10e9/L    Abs Immature Granulocytes 0.0 0 - 0.4 10e9/L    Absolute Nucleated RBC 0.0    Lipid Profile   Result Value Ref Range    Cholesterol 127 <200 mg/dL    Triglycerides 104 <150 mg/dL    HDL Cholesterol 58 >49 mg/dL    LDL Cholesterol Calculated 48 <100 mg/dL    Non HDL Cholesterol 69 <130 mg/dL   Basic metabolic panel   Result Value Ref Range    Sodium 141 133 - 144 mmol/L    Potassium 4.5 3.4 - 5.3 mmol/L    Chloride 107 94 - 109 mmol/L    Carbon Dioxide 28 20 - 32 mmol/L     Anion Gap 6 3 - 14 mmol/L    Glucose 138 (H) 70 - 99 mg/dL    Urea Nitrogen 21 7 - 30 mg/dL    Creatinine 0.72 0.52 - 1.04 mg/dL    GFR Estimate 79 >60 mL/min/1.7m2    GFR Estimate If Black >90 >60 mL/min/1.7m2    Calcium 9.1 8.5 - 10.1 mg/dL   Estimated Average Glucose   Result Value Ref Range    Estimated Average Glucose 126 mg/dL       ASSESSMENT/PLAN:     Problem List Items Addressed This Visit     Acute ischemic stroke (H)    Benign essential hypertension    Type 2 diabetes mellitus with neurologic complication, without long-term current use of insulin (H) - Primary    Relevant Orders    Albumin Random Urine Quantitative with Creat Ratio    Hemoglobin A1c (Completed)    Lipid Profile (Completed)    Estimated Average Glucose (Completed)      Other Visit Diagnoses     Iron deficiency anemia due to chronic blood loss        Relevant Orders    CBC with platelets and differential (Completed)         Follow up 3 months or prn.      Yash Cuellar, [facility]

## 2018-06-15 NOTE — MR AVS SNAPSHOT
After Visit Summary   6/15/2018    Rosaile Seals    MRN: [id number]           Patient Information     Date Of Birth          1945        Visit Information        Provider Department      6/15/2018 1:30 PM Yash Cuellar DO [facility]        Today's Diagnoses     Type 2 diabetes mellitus with other neurologic complication, without long-term current use of insulin (H)    -  1    Acute ischemic stroke (H)        Benign essential hypertension        Iron deficiency anemia due to chronic blood loss          Care Instructions    Labs today  Follow up in 3 months            Follow-ups after your visit        Your next 10 appointments already scheduled     Shilo 15, 2018  1:30 PM CDT   (Arrive by 1:15 PM)   SHORT with Yash Cuellar DO   [facility] [place] ([facility] [place] )    [address]  [place] [address]   [phone]              Future tests that were ordered for you today     Open Future Orders        Priority Expected Expires Ordered    Albumin Random Urine Quantitative with Creat Ratio Routine  6/15/2019 6/15/2018            Who to contact     If you have questions or need follow up information about today's clinic visit or your schedule please contact [facility] directly at [phone].  Normal or non-critical lab and imaging results will be communicated to you by MyChart, letter or phone within 4 business days after the clinic has received the results. If you do not hear from us within 7 days, please contact the clinic through MyChart or phone. If you have a critical or abnormal lab result, we will notify you by phone as soon as possible.  Submit refill requests through [employer] or call your pharmacy and they will forward the refill request to us. Please allow 3 business days for your refill to be completed.          Additional Information About Your Visit        Care EveryWhere ID     This is your Care EveryWhere ID.  This could be used by other organizations to access your [facility] medical records  [id number]        Your Vitals Were     Pulse Temperature Pulse Oximetry             65 96.6  F (35.9  C) (Tympanic) 98%          Blood Pressure from Last 3 Encounters:   06/15/18 124/70   05/02/18 118/82   04/12/18 120/64    Weight from Last 3 Encounters:   04/03/18 211 lb (95.7 kg)   02/18/18 223 lb 1.6 oz (101.2 kg)   02/09/18 230 lb (104.3 kg)              We Performed the Following     CBC with platelets and differential     Hemoglobin A1c          Today's Medication Changes          These changes are accurate as of 6/15/18  1:29 PM.  If you have any questions, ask your nurse or doctor.               These medicines have changed or have updated prescriptions.        Dose/Directions    atorvastatin 80 MG tablet   Commonly known as:  LIPITOR   This may have changed:  Another medication with the same name was removed. Continue taking this medication, and follow the directions you see here.   Used for:  Hyperlipidemia LDL goal <100   Changed by:  Yash Cuellar DO        TAKE 1 TABLET DAILY   Quantity:  90 tablet   Refills:  3       lisinopril 10 MG tablet   Commonly known as:  PRINIVIL/ZESTRIL   This may have changed:  Another medication with the same name was removed. Continue taking this medication, and follow the directions you see here.   Used for:  Hypertension, unspecified type   Changed by:  Yash Cuellar DO        TAKE 2 TABLETS DAILY   Quantity:  60 tablet   Refills:  3                Primary Care Provider Office Phone # Fax #    Yash Cuellar [phone]       [facility] [id number]        Equal Access to Services     [facility]: Hadii amos Lemus, waaxda luqadaha, qaybta kaalmada luciana, nery neri. [facility] [phone].    ATENCIÓN: Si habla español, tiene a blackmon disposición servicios gratuitos de asistencia  lingüística. Hu al [phone].    We comply with applicable federal civil rights laws and [place] laws. We do not discriminate on the basis of race, color, national origin, age, disability, sex, sexual orientation, or gender identity.            Thank you!     Thank you for choosing [facility]  for your care. Our goal is always to provide you with excellent care. Hearing back from our patients is one way we can continue to improve our services. Please take a few minutes to complete the written survey that you may receive in the mail after your visit with us. Thank you!             Your Updated Medication List - Protect others around you: Learn how to safely use, store and throw away your medicines at www.disposemymeds.org.          This list is accurate as of 6/15/18  1:29 PM.  Always use your most recent med list.                   Brand Name Dispense Instructions for use Diagnosis    albuterol 108 (90 Base) MCG/ACT Inhaler    PROAIR HFA/PROVENTIL HFA/VENTOLIN HFA    1 Inhaler    Inhale 1-2 puffs into the lungs every 4 hours as needed for shortness of breath / dyspnea or wheezing As needed.    Cough, Acute bronchitis, unspecified organism       aspirin 325 MG tablet      Take 325 mg by mouth        atorvastatin 80 MG tablet    LIPITOR    90 tablet    TAKE 1 TABLET DAILY    Hyperlipidemia LDL goal <100       clopidogrel 75 MG tablet    PLAVIX    90 tablet    Take 1 tablet (75 mg) by mouth daily    Acute ischemic stroke (H), PVD (peripheral vascular disease) (H)       fluticasone-salmeterol 250-50 MCG/DOSE diskus inhaler    ADVAIR    3 Inhaler    Inhale 1 puff into the lungs 2 times daily    Panlobular emphysema (H)       lisinopril 10 MG tablet    PRINIVIL/ZESTRIL    60 tablet    TAKE 2 TABLETS DAILY    Hypertension, unspecified type       magnesium hydroxide 400 MG/5ML suspension    MILK OF MAGNESIA     Take 30 mLs by mouth        magnesium oxide 400 (241.3 Mg) MG tablet    MAG-OX    90 tablet     Take 1 tablet (400 mg) by mouth 2 times daily (with meals)    Hypomagnesemia       * metFORMIN 500 MG 24 hr tablet    GLUCOPHAGE-XR     Take 1,000 mg by mouth 2 times daily (with meals)        * metFORMIN 500 MG tablet    GLUCOPHAGE    180 tablet    TAKE 2 TABLETS TWICE A DAY WITH MEALS    Type II diabetes mellitus with peripheral circulatory disorder (H)       metoprolol tartrate 50 MG tablet    LOPRESSOR    60 tablet    TAKE ONE-HALF (1/2) TABLET TWICE A DAY    Hypertension, unspecified type       * ONETOUCH DELICA LANCETS 33G Misc      four times a day.        * blood glucose monitoring lancets     360 each    Use to test blood sugar 3 times daily.    Type 2 diabetes mellitus with hyperglycemia, with long-term current use of insulin (H)       ONETOUCH VERIO IQ test strip   Generic drug:  blood glucose monitoring     400 each    USE FOUR TIMES A DAY    Type 2 diabetes mellitus with complication, without long-term current use of insulin (H)       order for DME     1 Device    Equipment being ordered: Glucometer and test strips    Well controlled type 2 diabetes mellitus (H)       pantoprazole 40 MG EC tablet    PROTONIX    90 tablet    Take 1 tablet (40 mg) by mouth daily Take 30-60 minutes before a meal.    Gastroesophageal reflux disease without esophagitis       polyethylene glycol powder    MIRALAX    510 g    Take 17 g (1 capful) by mouth daily    Constipation, unspecified constipation type       * Notice:  This list has 4 medication(s) that are the same as other medications prescribed for you. Read the directions carefully, and ask your doctor or other care provider to review them with you.

## 2018-06-25 ENCOUNTER — APPOINTMENT (OUTPATIENT)
Dept: GENERAL RADIOLOGY | Facility: HOSPITAL | Age: 73
End: 2018-06-25
Attending: PHYSICIAN ASSISTANT
Payer: MEDICARE

## 2018-06-25 ENCOUNTER — HOSPITAL ENCOUNTER (EMERGENCY)
Facility: HOSPITAL | Age: 73
Discharge: HOME | End: 2018-06-25
Attending: PHYSICIAN ASSISTANT | Admitting: PHYSICIAN ASSISTANT
Payer: MEDICARE

## 2018-06-25 VITALS
OXYGEN SATURATION: 97 % | DIASTOLIC BLOOD PRESSURE: 81 MMHG | HEART RATE: 80 BPM | TEMPERATURE: 98.1 F | SYSTOLIC BLOOD PRESSURE: 196 MMHG | RESPIRATION RATE: 18 BRPM

## 2018-06-25 DIAGNOSIS — S61.209A OPEN WOUND OF FINGER, INITIAL ENCOUNTER: ICD-10-CM

## 2018-06-25 DIAGNOSIS — S60.00XA CONTUSION OF FINGER WITHOUT DAMAGE TO NAIL, UNSPECIFIED FINGER, INITIAL ENCOUNTER: ICD-10-CM

## 2018-06-25 PROCEDURE — 73140 X-RAY EXAM OF FINGER(S): CPT | Mod: TC,LT

## 2018-06-25 PROCEDURE — 12001 RPR S/N/AX/GEN/TRNK 2.5CM/<: CPT

## 2018-06-25 PROCEDURE — 12001 RPR S/N/AX/GEN/TRNK 2.5CM/<: CPT | Performed by: PHYSICIAN ASSISTANT

## 2018-06-25 ASSESSMENT — ENCOUNTER SYMPTOMS
CONSTITUTIONAL NEGATIVE: 1
NEUROLOGICAL NEGATIVE: 1
CARDIOVASCULAR NEGATIVE: 1
WOUND: 1
PSYCHIATRIC NEGATIVE: 1

## 2018-06-25 NOTE — ED AVS SNAPSHOT
[facility]    [address] [address]    Phone:  [phone]                                       Rosalie JANE Friend   MRN: [id number]    Department:  [facility]   Date of Visit:  6/25/2018           Patient Information     Date Of Birth          1945        Your diagnoses for this visit were:     Open wound of finger, initial encounter     Contusion of finger without damage to nail, unspecified finger, initial encounter        You were seen by Meron Petty PA.      Follow-up Information     Follow up with Yash Cuellar DO.    Specialty:  Internal Medicine    Why:  If symptoms worsen    Contact information:    [address] [facility] [address]  [phone]          Follow up with [facility].    Specialty:  EMERGENCY MEDICINE    Why:  if further concerns develop    Contact information:    [address] [address] [phone]    Additional information:    From [facility]: Take [address]. Turn left at [address]. Turn left at the first stoplight on [address]. At the first stop sign, take a right onto [address]. Take a left into the parking lot and continue through until you reach the North enterance of the building.       From [place]: Take [address]. Take the [address] ramp towards [place]. Turn left onto [address]. Take a slight right onto [address]. Turn left at the first stoplight on [address]. At the first stop sign, take a right onto [address]. Take a left into the parking lot and continue through until you reach the North enterance of the building.       From [place]: Take [address]. Take a right at [address]. At the first stop sign, take a right onto [address]. Take a left into the parking lot and continue through until you reach the North enterance of the building.       Discharge  References/Attachments     SKIN AVULSION (ENGLISH)    BRUISES (CONTUSIONS) (ENGLISH)      Your next 10 appointments already scheduled     Aug 24, 2018 10:45 AM CDT   (Arrive by 10:30 AM)   SHORT with Yash Cuellar,    [facility] [place] ([facility] [place] )    [address]  [place] [address]   [phone]                 Review of your medicines      Our records show that you are taking the medicines listed below. If these are incorrect, please call your family doctor or clinic.        Dose / Directions Last dose taken    albuterol 108 (90 Base) MCG/ACT Inhaler   Commonly known as:  PROAIR HFA/PROVENTIL HFA/VENTOLIN HFA   Dose:  1-2 puff   Quantity:  1 Inhaler        Inhale 1-2 puffs into the lungs every 4 hours as needed for shortness of breath / dyspnea or wheezing As needed.   Refills:  3        aspirin 325 MG tablet   Dose:  325 mg        Take 325 mg by mouth   Refills:  0        atorvastatin 80 MG tablet   Commonly known as:  LIPITOR   Quantity:  90 tablet        TAKE 1 TABLET DAILY   Refills:  3        clopidogrel 75 MG tablet   Commonly known as:  PLAVIX   Dose:  75 mg   Quantity:  90 tablet        Take 1 tablet (75 mg) by mouth daily   Refills:  3        fluticasone-salmeterol 250-50 MCG/DOSE diskus inhaler   Commonly known as:  ADVAIR   Dose:  1 puff   Quantity:  3 Inhaler        Inhale 1 puff into the lungs 2 times daily   Refills:  0        lisinopril 10 MG tablet   Commonly known as:  PRINIVIL/ZESTRIL   Quantity:  60 tablet        TAKE 2 TABLETS DAILY   Refills:  3        magnesium hydroxide 400 MG/5ML suspension   Commonly known as:  MILK OF MAGNESIA   Dose:  30 mL        Take 30 mLs by mouth   Refills:  0        magnesium oxide 400 (241.3 Mg) MG tablet   Commonly known as:  MAG-OX   Dose:  400 mg   Quantity:  90 tablet        Take 1 tablet (400 mg) by mouth 2 times daily (with meals)   Refills:  3        * metFORMIN 500 MG 24 hr tablet   Commonly known as:   GLUCOPHAGE-XR   Dose:  1000 mg        Take 1,000 mg by mouth 2 times daily (with meals)   Refills:  0        * metFORMIN 500 MG tablet   Commonly known as:  GLUCOPHAGE   Quantity:  180 tablet        TAKE 2 TABLETS TWICE A DAY WITH MEALS   Refills:  3        metoprolol tartrate 50 MG tablet   Commonly known as:  LOPRESSOR   Quantity:  60 tablet        TAKE ONE-HALF (1/2) TABLET TWICE A DAY   Refills:  2        * ONETOUCH DELICA LANCETS 33G Misc        four times a day.   Refills:  0        * blood glucose monitoring lancets   Quantity:  360 each        Use to test blood sugar 3 times daily.   Refills:  3        ONETOUCH VERIO IQ test strip   Quantity:  400 each   Generic drug:  blood glucose monitoring        USE FOUR TIMES A DAY   Refills:  3        order for DME   Quantity:  1 Device        Equipment being ordered: Glucometer and test strips   Refills:  0        pantoprazole 40 MG EC tablet   Commonly known as:  PROTONIX   Dose:  40 mg   Quantity:  90 tablet        Take 1 tablet (40 mg) by mouth daily Take 30-60 minutes before a meal.   Refills:  3        polyethylene glycol powder   Commonly known as:  MIRALAX   Dose:  1 capful   Quantity:  510 g        Take 17 g (1 capful) by mouth daily   Refills:  1        * Notice:  This list has 4 medication(s) that are the same as other medications prescribed for you. Read the directions carefully, and ask your doctor or other care provider to review them with you.            Procedures and tests performed during your visit     Fingers XR, 2-3 views, left      Orders Needing Specimen Collection     None      Pending Results     No orders found from 6/23/2018 to 6/26/2018.            Pending Culture Results     No orders found from 6/23/2018 to 6/26/2018.            Thank you for choosing [facility]       Thank you for choosing [facility] for your care. Our goal is always to provide you with excellent care. Hearing back from our patients is one way we can continue to improve our  services. Please take a few minutes to complete the written survey that you may receive in the mail after you visit with us. Thank you!        Care EveryWhere ID     This is your Care EveryWhere ID. This could be used by other organizations to access your [place] medical records  [id number]        Equal Access to Services     MICHELLE BOWEN : Richard Lemus, wajose tadeo, elizabeth fongalmichelle chowdhury, nery neri. So [facility] [phone].    ATENCIÓN: Si habla español, tiene a blackmon disposición servicios gratuitos de asistencia lingüística. Llame al [phone].    We comply with applicable federal civil rights laws and [place] laws. We do not discriminate on the basis of race, color, national origin, age, disability, sex, sexual orientation, or gender identity.            After Visit Summary       This is your record. Keep this with you and show to your community pharmacist(s) and doctor(s) at your next visit.

## 2018-06-25 NOTE — ED PROVIDER NOTES
History     Chief Complaint   Patient presents with     Hand Pain     left middle finger slammed in cardoor, no bleeding at this time     The history is provided by the patient. No  was used.     Rosalie JANE Friend is a 73 year old female who just slammed her left middle finger in a car door. Pt does not have good feeling in her left hand due to past CVA. It has caused swelling and an open wound. Pt denies any other injury at this time.     Problem List:    Patient Active Problem List    Diagnosis Date Noted     Benign essential hypertension 04/12/2018     Priority: Medium     Type 2 diabetes mellitus with neurologic complication, without long-term current use of insulin (H) 04/12/2018     Priority: Medium     Acute ischemic stroke (H) 02/18/2018     Priority: Medium     ACP (advance care planning) 05/09/2017     Priority: Medium     Advance Care Planning 5/9/2017: ACP Review of Chart / Resources Provided:  Reviewed chart for advance care plan.  Rosalie Seals has been provided information and resources to begin or update their advance care plan.  Added by Kalee Busby                Past Medical History:    Past Medical History:   Diagnosis Date     Cerebral infarction (H)      Diabetes type 2, uncontrolled (H)      Hyperlipemia        Past Surgical History:    Past Surgical History:   Procedure Laterality Date     ABDOMEN SURGERY      bladder repair     GYN SURGERY      hysterectomy        Family History:    Family History   Problem Relation Age of Onset     Aneurysm Mother      Other Cancer Father      Diabetes Brother      Hypertension Brother      Other Cancer Brother      Other Cancer Sister      Aneurysm Sister        Social History:  Marital Status:   [4]  Social History   Substance Use Topics     Smoking status: Former Smoker     Types: Cigarettes     Start date: 1/1/1956     Quit date: 5/5/2017     Smokeless tobacco: Never Used      Comment: limits self to about 4-5 daily ,  she is quitting on her own     Alcohol use No        Medications:      albuterol (PROAIR HFA/PROVENTIL HFA/VENTOLIN HFA) 108 (90 Base) MCG/ACT Inhaler   aspirin 325 MG tablet   atorvastatin (LIPITOR) 80 MG tablet   blood glucose monitoring (ONE TOUCH DELICA) lancets   clopidogrel (PLAVIX) 75 MG tablet   fluticasone-salmeterol (ADVAIR) 250-50 MCG/DOSE diskus inhaler   lisinopril (PRINIVIL/ZESTRIL) 10 MG tablet   magnesium hydroxide (MILK OF MAGNESIA) 400 MG/5ML suspension   magnesium oxide (MAG-OX) 400 (241.3 MG) MG tablet   metFORMIN (GLUCOPHAGE) 500 MG tablet   metFORMIN (GLUCOPHAGE-XR) 500 MG 24 hr tablet   metoprolol tartrate (LOPRESSOR) 50 MG tablet   ONETOUCH DELICA LANCETS 33G MISC   ONETOUCH VERIO IQ test strip   order for DME   pantoprazole (PROTONIX) 40 MG EC tablet   polyethylene glycol (MIRALAX) powder         Review of Systems   Constitutional: Negative.    HENT: Negative.    Cardiovascular: Negative.    Skin: Positive for wound.   Neurological: Negative.    Psychiatric/Behavioral: Negative.        Physical Exam   BP: (!) 196/81  Pulse: 80  Temp: 98.1  F (36.7  C)  Resp: 18  SpO2: 97 %      Physical Exam   Constitutional: She is oriented to person, place, and time. She appears well-developed and well-nourished. No distress.   Cardiovascular: Normal rate.    Pulmonary/Chest: Effort normal.   Musculoskeletal:   Left 3rd finger: skin avulsion approx 1 cm, mild bleeding, n/v intact.   Neurological: She is alert and oriented to person, place, and time.   Skin: She is not diaphoretic.   Psychiatric: She has a normal mood and affect.   Nursing note and vitals reviewed.      ED Course     ED Course     Laceration repair  Performed by: BRIA PINEDA  Authorized by: BRIA PINEDA   Consent: Verbal consent obtained.  Consent given by: patient  Patient identity confirmed: verbally with patient and provided demographic data  Body area: upper extremity  Location details: left long finger  Laceration length: 1  cm  Foreign bodies: no foreign bodies  Tendon involvement: none  Nerve involvement: none  Vascular damage: no  Amount of cleaning: standard  Skin closure: glue  Approximation: close  Approximation difficulty: simple  Dressing: 4x4 sterile gauze  Patient tolerance: Patient tolerated the procedure well with no immediate complications                Results for orders placed or performed during the hospital encounter of 06/25/18 (from the past 24 hour(s))   Fingers XR, 2-3 views, left    Narrative    PROCEDURE:  XR FINGER LT G/E 2 VW    HISTORY: pain, swelling, ecchymosis;     COMPARISON:  None.    TECHNIQUE:  2 views of the left third finger were obtained.    FINDINGS:  No fracture or dislocation is identified. The joint spaces  are preserved.        Impression    IMPRESSION: Degenerative arthritic changes of the distal  interphalangeal joint. No acute bony abnormality.      ISH KILLIAN MD           Assessments & Plan (with Medical Decision Making)     I have reviewed the nursing notes.    I have reviewed the findings, diagnosis, plan and need for follow up with the patient.    Final diagnoses:   Open wound of finger, initial encounter   Contusion of finger without damage to nail, unspecified finger, initial encounter           Patient verbally educated and given appropriate education sheets for the diagnoses and has no questions.  Take medications as directed.   Follow up with your Primary Care provider if symptoms increase or if further concerns develop, return to the ER  Meron Petty Certified  Physician Assistant  6/25/2018  5:55 PM  URGENT CARE CLINIC      6/25/2018   [facility]     Meron Petty PA  06/25/18 [id number]

## 2018-06-25 NOTE — ED AVS SNAPSHOT
[facility]    [address] [address]    Phone:  [phone]                                       Rosalie JANE Friend   MRN: [id number]    Department:  [facility]   Date of Visit:  6/25/2018           After Visit Summary Signature Page     I have received my discharge instructions, and my questions have been answered. I have discussed any challenges I see with this plan with the nurse or doctor.    ..........................................................................................................................................  Patient/Patient Representative Signature      ..........................................................................................................................................  Patient Representative Print Name and Relationship to Patient    ..................................................               ................................................  Date                                            Time    ..........................................................................................................................................  Reviewed by Signature/Title    ...................................................              ..............................................  Date                                                            Time

## 2018-07-17 ENCOUNTER — TRANSFERRED RECORDS (OUTPATIENT)
Dept: HEALTH INFORMATION MANAGEMENT | Facility: CLINIC | Age: 73
End: 2018-07-17

## 2018-08-24 ENCOUNTER — OFFICE VISIT (OUTPATIENT)
Dept: INTERNAL MEDICINE | Facility: OTHER | Age: 73
End: 2018-08-24
Attending: INTERNAL MEDICINE
Payer: MEDICARE

## 2018-08-24 VITALS
WEIGHT: 222 LBS | TEMPERATURE: 96.2 F | SYSTOLIC BLOOD PRESSURE: 132 MMHG | DIASTOLIC BLOOD PRESSURE: 60 MMHG | BODY MASS INDEX: 36.94 KG/M2 | HEART RATE: 57 BPM | OXYGEN SATURATION: 96 %

## 2018-08-24 DIAGNOSIS — M21.372 FOOT DROP, LEFT FOOT: ICD-10-CM

## 2018-08-24 DIAGNOSIS — R53.83 OTHER FATIGUE: ICD-10-CM

## 2018-08-24 DIAGNOSIS — Z12.11 SPECIAL SCREENING FOR MALIGNANT NEOPLASMS, COLON: Primary | ICD-10-CM

## 2018-08-24 DIAGNOSIS — E55.9 VITAMIN D DEFICIENCY: ICD-10-CM

## 2018-08-24 LAB
ALBUMIN SERPL-MCNC: 3.5 G/DL (ref 3.4–5)
ALP SERPL-CCNC: 65 U/L (ref 40–150)
ALT SERPL W P-5'-P-CCNC: 21 U/L (ref 0–50)
ANION GAP SERPL CALCULATED.3IONS-SCNC: 8 MMOL/L (ref 3–14)
AST SERPL W P-5'-P-CCNC: 7 U/L (ref 0–45)
BASOPHILS # BLD AUTO: 0.1 10E9/L (ref 0–0.2)
BASOPHILS NFR BLD AUTO: 1.5 %
BILIRUB SERPL-MCNC: 0.3 MG/DL (ref 0.2–1.3)
BUN SERPL-MCNC: 18 MG/DL (ref 7–30)
CALCIUM SERPL-MCNC: 9.3 MG/DL (ref 8.5–10.1)
CHLORIDE SERPL-SCNC: 106 MMOL/L (ref 94–109)
CO2 SERPL-SCNC: 27 MMOL/L (ref 20–32)
CREAT SERPL-MCNC: 0.78 MG/DL (ref 0.52–1.04)
DIFFERENTIAL METHOD BLD: ABNORMAL
EOSINOPHIL # BLD AUTO: 0.1 10E9/L (ref 0–0.7)
EOSINOPHIL NFR BLD AUTO: 2.3 %
ERYTHROCYTE [DISTWIDTH] IN BLOOD BY AUTOMATED COUNT: 13.2 % (ref 10–15)
GFR SERPL CREATININE-BSD FRML MDRD: 73 ML/MIN/1.7M2
GLUCOSE SERPL-MCNC: 121 MG/DL (ref 70–99)
HCT VFR BLD AUTO: 37.9 % (ref 35–47)
HGB BLD-MCNC: 12.1 G/DL (ref 11.7–15.7)
IMM GRANULOCYTES # BLD: 0 10E9/L (ref 0–0.4)
IMM GRANULOCYTES NFR BLD: 0.4 %
LYMPHOCYTES # BLD AUTO: 1.1 10E9/L (ref 0.8–5.3)
LYMPHOCYTES NFR BLD AUTO: 23.8 %
MCH RBC QN AUTO: 26.1 PG (ref 26.5–33)
MCHC RBC AUTO-ENTMCNC: 31.9 G/DL (ref 31.5–36.5)
MCV RBC AUTO: 82 FL (ref 78–100)
MONOCYTES # BLD AUTO: 0.4 10E9/L (ref 0–1.3)
MONOCYTES NFR BLD AUTO: 7.4 %
NEUTROPHILS # BLD AUTO: 3.1 10E9/L (ref 1.6–8.3)
NEUTROPHILS NFR BLD AUTO: 64.6 %
NRBC # BLD AUTO: 0 10*3/UL
NRBC BLD AUTO-RTO: 0 /100
PLATELET # BLD AUTO: 275 10E9/L (ref 150–450)
POTASSIUM SERPL-SCNC: 4.5 MMOL/L (ref 3.4–5.3)
PROT SERPL-MCNC: 6.8 G/DL (ref 6.8–8.8)
RBC # BLD AUTO: 4.64 10E12/L (ref 3.8–5.2)
SODIUM SERPL-SCNC: 141 MMOL/L (ref 133–144)
TSH SERPL DL<=0.005 MIU/L-ACNC: 1.03 MU/L (ref 0.4–4)
WBC # BLD AUTO: 4.7 10E9/L (ref 4–11)

## 2018-08-24 PROCEDURE — 82306 VITAMIN D 25 HYDROXY: CPT | Mod: ZL | Performed by: INTERNAL MEDICINE

## 2018-08-24 PROCEDURE — 36415 COLL VENOUS BLD VENIPUNCTURE: CPT | Mod: ZL | Performed by: INTERNAL MEDICINE

## 2018-08-24 PROCEDURE — 85025 COMPLETE CBC W/AUTO DIFF WBC: CPT | Mod: ZL | Performed by: INTERNAL MEDICINE

## 2018-08-24 PROCEDURE — 99213 OFFICE O/P EST LOW 20 MIN: CPT | Performed by: INTERNAL MEDICINE

## 2018-08-24 PROCEDURE — 84443 ASSAY THYROID STIM HORMONE: CPT | Mod: ZL | Performed by: INTERNAL MEDICINE

## 2018-08-24 PROCEDURE — G0463 HOSPITAL OUTPT CLINIC VISIT: HCPCS

## 2018-08-24 PROCEDURE — 80053 COMPREHEN METABOLIC PANEL: CPT | Mod: ZL | Performed by: INTERNAL MEDICINE

## 2018-08-24 ASSESSMENT — ANXIETY QUESTIONNAIRES
3. WORRYING TOO MUCH ABOUT DIFFERENT THINGS: NOT AT ALL
GAD7 TOTAL SCORE: 0
5. BEING SO RESTLESS THAT IT IS HARD TO SIT STILL: NOT AT ALL
4. TROUBLE RELAXING: NOT AT ALL
7. FEELING AFRAID AS IF SOMETHING AWFUL MIGHT HAPPEN: NOT AT ALL
6. BECOMING EASILY ANNOYED OR IRRITABLE: NOT AT ALL
2. NOT BEING ABLE TO STOP OR CONTROL WORRYING: NOT AT ALL
1. FEELING NERVOUS, ANXIOUS, OR ON EDGE: NOT AT ALL

## 2018-08-24 ASSESSMENT — PAIN SCALES - GENERAL: PAINLEVEL: NO PAIN (0)

## 2018-08-24 NOTE — MR AVS SNAPSHOT
After Visit Summary   8/24/2018    Rosalie Seals    MRN: [id number]           Patient Information     Date Of Birth          1945        Visit Information        Provider Department      8/24/2018 10:45 AM Yash Cuellar, DO [facility]        Today's Diagnoses     Special screening for malignant neoplasms, colon    -  1    Other fatigue        Vitamin D deficiency           Follow-ups after your visit        Future tests that were ordered for you today     Open Future Orders        Priority Expected Expires Ordered    Immunos occult blood Routine  8/24/2019 8/24/2018            Who to contact     If you have questions or need follow up information about today's clinic visit or your schedule please contact [facility] directly at [phone].  Normal or non-critical lab and imaging results will be communicated to you by MyChart, letter or phone within 4 business days after the clinic has received the results. If you do not hear from us within 7 days, please contact the clinic through MyChart or phone. If you have a critical or abnormal lab result, we will notify you by phone as soon as possible.  Submit refill requests through [employer] or call your pharmacy and they will forward the refill request to us. Please allow 3 business days for your refill to be completed.          Additional Information About Your Visit        Care EveryWhere ID     This is your Care EveryWhere ID. This could be used by other organizations to access your [facility] medical records  [id number]        Your Vitals Were     Pulse Temperature Pulse Oximetry BMI (Body Mass Index)          57 96.2  F (35.7  C) (Tympanic) 96% 36.94 kg/m2         Blood Pressure from Last 3 Encounters:   08/24/18 132/60   06/25/18 (!) 196/81   06/15/18 124/70    Weight from Last 3 Encounters:   08/24/18 222 lb (100.7 kg)   04/03/18 211 lb (95.7 kg)   02/18/18 223 lb 1.6 oz (101.2 kg)              We Performed  the Following     CBC with platelets and differential     Comprehensive metabolic panel (BMP + Alb, Alk Phos, ALT, AST, Total. Bili, TP)     TSH with free T4 reflex     Vitamin D Deficiency        Primary Care Provider Office Phone # Fax #    Yash Cuellar [phone] [address] [facility] [address]        Equal Access to Services     [facility] : Hadii aad ku hadasho Soomaali, waaxda luqadaha, qaybta kaalmada adeegyada, waxay idiin hayaan adeeg mirnajose laharriet . So [facility] [phone].    ATENCIÓN: Si habla español, tiene a blackmon disposición servicios gratuitos de asistencia lingüística. Pradeepame al [phone].    We comply with applicable federal civil rights laws and [place] laws. We do not discriminate on the basis of race, color, national origin, age, disability, sex, sexual orientation, or gender identity.            Thank you!     Thank you for choosing [facility]  for your care. Our goal is always to provide you with excellent care. Hearing back from our patients is one way we can continue to improve our services. Please take a few minutes to complete the written survey that you may receive in the mail after your visit with us. Thank you!             Your Updated Medication List - Protect others around you: Learn how to safely use, store and throw away your medicines at www.disposemymeds.org.          This list is accurate as of 8/24/18 11:03 AM.  Always use your most recent med list.                   Brand Name Dispense Instructions for use Diagnosis    albuterol 108 (90 Base) MCG/ACT inhaler    PROAIR HFA/PROVENTIL HFA/VENTOLIN HFA    1 Inhaler    Inhale 1-2 puffs into the lungs every 4 hours as needed for shortness of breath / dyspnea or wheezing As needed.    Cough, Acute bronchitis, unspecified organism       aspirin 325 MG tablet      Take 325 mg by mouth        atorvastatin 80 MG tablet    LIPITOR    90 tablet    TAKE 1 TABLET DAILY     Hyperlipidemia LDL goal <100       clopidogrel 75 MG tablet    PLAVIX    90 tablet    Take 1 tablet (75 mg) by mouth daily    Acute ischemic stroke (H), PVD (peripheral vascular disease) (H)       fluticasone-salmeterol 250-50 MCG/DOSE diskus inhaler    ADVAIR    3 Inhaler    Inhale 1 puff into the lungs 2 times daily    Panlobular emphysema (H)       lisinopril 10 MG tablet    PRINIVIL/ZESTRIL    60 tablet    TAKE 2 TABLETS DAILY    Hypertension, unspecified type       magnesium hydroxide 400 MG/5ML suspension    MILK OF MAGNESIA     Take 30 mLs by mouth        magnesium oxide 400 (241.3 Mg) MG tablet    MAG-OX    90 tablet    Take 1 tablet (400 mg) by mouth 2 times daily (with meals)    Hypomagnesemia       * metFORMIN 500 MG 24 hr tablet    GLUCOPHAGE-XR     Take 1,000 mg by mouth 2 times daily (with meals)        * metFORMIN 500 MG tablet    GLUCOPHAGE    180 tablet    TAKE 2 TABLETS TWICE A DAY WITH MEALS    Type II diabetes mellitus with peripheral circulatory disorder (H)       metoprolol tartrate 50 MG tablet    LOPRESSOR    60 tablet    TAKE ONE-HALF (1/2) TABLET TWICE A DAY    Hypertension, unspecified type       * ONETOUCH DELICA LANCETS 33G Misc      four times a day.        * blood glucose monitoring lancets     360 each    Use to test blood sugar 3 times daily.    Type 2 diabetes mellitus with hyperglycemia, with long-term current use of insulin (H)       ONETOUCH VERIO IQ test strip   Generic drug:  blood glucose monitoring     400 each    USE FOUR TIMES A DAY    Type 2 diabetes mellitus with complication, without long-term current use of insulin (H)       order for DME     1 Device    Equipment being ordered: Glucometer and test strips    Well controlled type 2 diabetes mellitus (H)       pantoprazole 40 MG EC tablet    PROTONIX    90 tablet    Take 1 tablet (40 mg) by mouth daily Take 30-60 minutes before a meal.    Gastroesophageal reflux disease without esophagitis       polyethylene glycol powder     MIRALAX    510 g    Take 17 g (1 capful) by mouth daily    Constipation, unspecified constipation type       * Notice:  This list has 4 medication(s) that are the same as other medications prescribed for you. Read the directions carefully, and ask your doctor or other care provider to review them with you.

## 2018-08-24 NOTE — PROGRESS NOTES
SUBJECTIVE:   Rosalie JANE Friend is a 73 year old female who presents to clinic today for the following health issues:      Fatigue       Duration: about a month    Description (location/character/radiation): feels weak and feels like she does not have much energy    Intensity:  mild    Accompanying signs and symptoms: n/a    History (similar episodes/previous evaluation): None    Precipitating or alleviating factors: None    Therapies tried and outcome: None     Rosalie presents today for fatigue.  She reports this has been more pronounced over the past month.  She feels like sleeping or napping but denies actually napping.  She does report not sleeping well at night.  No chest pain or SOB reported and otherwise she states she feels well.  She continues with PT for her CVAs and left sided weakness.  It was suggested she have a locking LLE brace to assist her to ambulate.      Problem list and histories reviewed & adjusted, as indicated.  Additional history: as documented    Patient Active Problem List   Diagnosis     ACP (advance care planning)     Acute ischemic stroke (H)     Benign essential hypertension     Type 2 diabetes mellitus with neurologic complication, without long-term current use of insulin (H)     Past Surgical History:   Procedure Laterality Date     ABDOMEN SURGERY      bladder repair     GYN SURGERY      hysterectomy        Social History   Substance Use Topics     Smoking status: Former Smoker     Types: Cigarettes     Start date: 1/1/1956     Quit date: 5/5/2017     Smokeless tobacco: Never Used      Comment: limits self to about 4-5 daily , she is quitting on her own     Alcohol use No     Family History   Problem Relation Age of Onset     Aneurysm Mother      Other Cancer Father      Diabetes Brother      Hypertension Brother      Other Cancer Brother      Other Cancer Sister      Aneurysm Sister          Current Outpatient Prescriptions   Medication Sig Dispense Refill     aspirin 325 MG  tablet Take 325 mg by mouth       atorvastatin (LIPITOR) 80 MG tablet TAKE 1 TABLET DAILY 90 tablet 3     blood glucose monitoring (ONE TOUCH DELICA) lancets Use to test blood sugar 3 times daily. 360 each 3     clopidogrel (PLAVIX) 75 MG tablet Take 1 tablet (75 mg) by mouth daily 90 tablet 3     lisinopril (PRINIVIL/ZESTRIL) 10 MG tablet TAKE 2 TABLETS DAILY 60 tablet 3     magnesium hydroxide (MILK OF MAGNESIA) 400 MG/5ML suspension Take 30 mLs by mouth       magnesium oxide (MAG-OX) 400 (241.3 MG) MG tablet Take 1 tablet (400 mg) by mouth 2 times daily (with meals) 90 tablet 3     metFORMIN (GLUCOPHAGE) 500 MG tablet TAKE 2 TABLETS TWICE A DAY WITH MEALS 180 tablet 3     metFORMIN (GLUCOPHAGE-XR) 500 MG 24 hr tablet Take 1,000 mg by mouth 2 times daily (with meals)        metoprolol tartrate (LOPRESSOR) 50 MG tablet TAKE ONE-HALF (1/2) TABLET TWICE A DAY 60 tablet 2     ONETOUCH DELICA LANCETS 33G MISC four times a day.       ONETOUCH VERIO IQ test strip USE FOUR TIMES A  each 3     order for DME Equipment being ordered: Glucometer and test strips 1 Device 0     pantoprazole (PROTONIX) 40 MG EC tablet Take 1 tablet (40 mg) by mouth daily Take 30-60 minutes before a meal. 90 tablet 3     polyethylene glycol (MIRALAX) powder Take 17 g (1 capful) by mouth daily 510 g 1     albuterol (PROAIR HFA/PROVENTIL HFA/VENTOLIN HFA) 108 (90 Base) MCG/ACT Inhaler Inhale 1-2 puffs into the lungs every 4 hours as needed for shortness of breath / dyspnea or wheezing As needed. (Patient not taking: Reported on 8/24/2018) 1 Inhaler 3     fluticasone-salmeterol (ADVAIR) 250-50 MCG/DOSE diskus inhaler Inhale 1 puff into the lungs 2 times daily (Patient not taking: Reported on 8/24/2018) 3 Inhaler 0     Allergies   Allergen Reactions     Penicillins      Phenylephrine      Head aches     Tropicamide      Head aches       Recent Labs   Lab Test  06/15/18   1341  04/06/18   0858  03/13/18   1443   02/18/18   0905  02/17/18   3969    02/09/18   1106   06/13/17   0820   A1C  6.0*   --    --    --   6.7*   --    --   6.4*   < >  8.4*   LDL  48   --    --    --   35   --    --    --    --   41   HDL  58   --    --    --   54   --    --    --    --   55   TRIG  104   --    --    --   78   --    --    --    --   93   ALT   --   17  22   --    --   24   --    --    < >  30   CR  0.72  0.81  0.87   < >  0.66  0.76   < >   --    < >  0.68   GFRESTIMATED  79  69  64   < >  88  74   < >   --    < >  86   GFRESTBLACK  >90  84  77   < >  >90  >90   < >   --    < >  >90   GFR Calc     POTASSIUM  4.5  4.3  4.1   < >  4.0  4.3   < >   --    < >  4.1   TSH   --    --    --    --    --    --    --    --    --   1.30    < > = values in this interval not displayed.      BP Readings from Last 3 Encounters:   08/24/18 132/60   06/25/18 (!) 196/81   06/15/18 124/70    Wt Readings from Last 3 Encounters:   08/24/18 222 lb (100.7 kg)   04/03/18 211 lb (95.7 kg)   02/18/18 223 lb 1.6 oz (101.2 kg)                    Reviewed and updated as needed this visit by clinical staff       Reviewed and updated as needed this visit by Provider         ROS:  Constitutional, HEENT, cardiovascular, pulmonary, gi and gu systems are negative, except as otherwise noted.    OBJECTIVE:     /60 (BP Location: Right arm, Patient Position: Sitting, Cuff Size: Adult Large)  Pulse 57  Temp 96.2  F (35.7  C) (Tympanic)  Wt 222 lb (100.7 kg)  SpO2 96%  BMI 36.94 kg/m2  Body mass index is 36.94 kg/(m^2).   GENERAL: Alert and no distress  RESP: lungs clear to auscultation - no rales, rhonchi or wheezes  CV: regular rate and rhythm, normal S1 S2, no S3 or S4, no murmur, click or rub, no peripheral edema and peripheral pulses strong  MS: no gross musculoskeletal defects noted, no edema  SKIN: no suspicious lesions or rashes  NEURO: Left arm and leg weakness secondary to CVA. Extro pion of left eye with slight ptosis which is her baseline.     PSYCH: mentation appears  normal, affect normal/bright    Diagnostic Test Results:  No results found for this or any previous visit (from the past 24 hour(s)).  Results for orders placed or performed in visit on 07/17/18   Eye Exam - HIM Scan    Narrative    [facility]       ASSESSMENT/PLAN:     Problem List Items Addressed This Visit     None      Visit Diagnoses     Special screening for malignant neoplasms, colon    -  Primary    Relevant Orders    Immunos occult blood    Other fatigue        Relevant Orders    TSH with free T4 reflex (Completed)    CBC with platelets and differential (Completed)    Comprehensive metabolic panel (BMP + Alb, Alk Phos, ALT, AST, Total. Bili, TP) (Completed)    Vitamin D deficiency        Relevant Orders    Vitamin D Deficiency (Completed)    Foot drop, left foot        Relevant Orders    ORTHOTICS REFERRAL:  Evonne Cuellar, [facility]

## 2018-08-24 NOTE — LETTER
My Depression Action Plan  Name: Rosalie Seals   Date of Birth 1945  Date: 8/24/2018    My doctor: Yash Cuellar   My clinic: [facility] [place]  [place] [address]  [phone]          GREEN    ZONE   Good Control    What it looks like:     Things are going generally well. You have normal up s and down s. You may even feel depressed from time to time, but bad moods usually last less than a day.   What you need to do:  1. Continue to care for yourself (see self care plan)  2. Check your depression survival kit and update it as needed  3. Follow your physician s recommendations including any medication.  4. Do not stop taking medication unless you consult with your physician first.           YELLOW         ZONE Getting Worse    What it looks like:     Depression is starting to interfere with your life.     It may be hard to get out of bed; you may be starting to isolate yourself from others.    Symptoms of depression are starting to last most all day and this has happened for several days.     You may have suicidal thoughts but they are not constant.   What you need to do:     1. Call your care team, your response to treatment will improve if you keep your care team informed of your progress. Yellow periods are signs an adjustment may need to be made.     2. Continue your self-care, even if you have to fake it!    3. Talk to someone in your support network    4. Open up your depression survival kit           RED    ZONE Medical Alert - Get Help    What it looks like:     Depression is seriously interfering with your life.     You may experience these or other symptoms: You can t get out of bed most days, can t work or engage in other necessary activities, you have trouble taking care of basic hygiene, or basic responsibilities, thoughts of suicide or death that will not go away, self-injurious behavior.     What you need to do:  1. Call your care team and request  a same-day appointment. If they are not available (weekends or after hours) call your local crisis line, emergency room or 911.            Depression Self Care Plan / Survival Kit    Self-Care for Depression  Here s the deal. Your body and mind are really not as separate as most people think.  What you do and think affects how you feel and how you feel influences what you do and think. This means if you do things that people who feel good do, it will help you feel better.  Sometimes this is all it takes.  There is also a place for medication and therapy depending on how severe your depression is, so be sure to consult with your medical provider and/ or Behavioral Health Consultant if your symptoms are worsening or not improving.     In order to better manage my stress, I will:    Exercise  Get some form of exercise, every day. This will help reduce pain and release endorphins, the  feel good  chemicals in your brain. This is almost as good as taking antidepressants!  This is not the same as joining a gym and then never going! (they count on that by the way ) It can be as simple as just going for a walk or doing some gardening, anything that will get you moving.      Hygiene   Maintain good hygiene (Get out of bed in the morning, Make your bed, Brush your teeth, Take a shower, and Get dressed like you were going to work, even if you are unemployed).  If your clothes don't fit try to get ones that do.    Diet  I will strive to eat foods that are good for me, drink plenty of water, and avoid excessive sugar, caffeine, alcohol, and other mood-altering substances.  Some foods that are helpful in depression are: complex carbohydrates, B vitamins, flaxseed, fish or fish oil, fresh fruits and vegetables.    Psychotherapy  I agree to participate in Individual Therapy (if recommended).    Medication  If prescribed medications, I agree to take them.  Missing doses can result in serious side effects.  I understand that drinking  alcohol, or other illicit drug use, may cause potential side effects.  I will not stop my medication abruptly without first discussing it with my provider.    Staying Connected With Others  I will stay in touch with my friends, family members, and my primary care provider/team.    Use your imagination  Be creative.  We all have a creative side; it doesn t matter if it s oil painting, sand castles, or mud pies! This will also kick up the endorphins.    Witness Beauty  (AKA stop and smell the roses) Take a look outside, even in mid-winter. Notice colors, textures. Watch the squirrels and birds.     Service to others  Be of service to others.  There is always someone else in need.  By helping others we can  get out of ourselves  and remember the really important things.  This also provides opportunities for practicing all the other parts of the program.    Humor  Laugh and be silly!  Adjust your TV habits for less news and crime-drama and more comedy.    Control your stress  Try breathing deep, massage therapy, biofeedback, and meditation. Find time to relax each day.     My support system    Clinic Contact:  Phone number:    Contact 1:  Phone number:    Contact 2:  Phone number:    Rastafari/:  Phone number:    Therapist:  Phone number:    Local crisis center:    Phone number:    Other community support:  Phone number:

## 2018-08-25 ASSESSMENT — ANXIETY QUESTIONNAIRES: GAD7 TOTAL SCORE: 0

## 2018-08-25 ASSESSMENT — PATIENT HEALTH QUESTIONNAIRE - PHQ9: SUM OF ALL RESPONSES TO PHQ QUESTIONS 1-9: 0

## 2018-08-26 DIAGNOSIS — I10 HYPERTENSION, UNSPECIFIED TYPE: ICD-10-CM

## 2018-08-26 LAB — DEPRECATED CALCIDIOL+CALCIFEROL SERPL-MC: 15 UG/L (ref 20–75)

## 2018-08-27 DIAGNOSIS — E55.9 VITAMIN D DEFICIENCY: Primary | ICD-10-CM

## 2018-08-27 RX ORDER — ERGOCALCIFEROL 1.25 MG/1
50000 CAPSULE, LIQUID FILLED ORAL
Qty: 8 CAPSULE | Refills: 0 | Status: SHIPPED | OUTPATIENT
Start: 2018-08-27 | End: 2019-02-21

## 2018-08-27 RX ORDER — LISINOPRIL 10 MG/1
TABLET ORAL
Qty: 60 TABLET | Refills: 3 | Status: SHIPPED | OUTPATIENT
Start: 2018-08-27 | End: 2018-11-25

## 2018-09-04 ENCOUNTER — TELEPHONE (OUTPATIENT)
Dept: INTERNAL MEDICINE | Facility: OTHER | Age: 73
End: 2018-09-04

## 2018-09-04 DIAGNOSIS — K21.9 GASTROESOPHAGEAL REFLUX DISEASE WITHOUT ESOPHAGITIS: ICD-10-CM

## 2018-09-04 DIAGNOSIS — E83.42 HYPOMAGNESEMIA: ICD-10-CM

## 2018-09-04 RX ORDER — PANTOPRAZOLE SODIUM 40 MG/1
40 TABLET, DELAYED RELEASE ORAL DAILY
Qty: 90 TABLET | Refills: 3 | Status: SHIPPED | OUTPATIENT
Start: 2018-09-04 | End: 2019-08-19

## 2018-09-04 NOTE — TELEPHONE ENCOUNTER
10:21 AM    Reason for Call: Phone Call    Description: needs prescriptions pantropozal and magnesium oxiode rewritten for 90 days if possible.      Was an appointment offered for this call? No  If yes : Appointment type              Date    Preferred method for responding to this message: Telephone Call  What is your phone number ? [phone]    If we cannot reach you directly, may we leave a detailed response at the number you provided? Yes    Can this message wait until your PCP/provider returns, if available today? Not applicable, provider is in today.    Thank you,     Shantel Eldridge

## 2018-09-07 ENCOUNTER — OFFICE VISIT (OUTPATIENT)
Dept: INTERNAL MEDICINE | Facility: OTHER | Age: 73
End: 2018-09-07
Attending: INTERNAL MEDICINE
Payer: MEDICARE

## 2018-09-07 VITALS
SYSTOLIC BLOOD PRESSURE: 132 MMHG | BODY MASS INDEX: 36.94 KG/M2 | OXYGEN SATURATION: 98 % | TEMPERATURE: 96.3 F | HEART RATE: 62 BPM | WEIGHT: 222 LBS | DIASTOLIC BLOOD PRESSURE: 62 MMHG

## 2018-09-07 DIAGNOSIS — Z86.73 HISTORY OF CVA (CEREBROVASCULAR ACCIDENT): Primary | ICD-10-CM

## 2018-09-07 DIAGNOSIS — R53.1 LEFT-SIDED WEAKNESS: ICD-10-CM

## 2018-09-07 PROCEDURE — 99213 OFFICE O/P EST LOW 20 MIN: CPT | Performed by: INTERNAL MEDICINE

## 2018-09-07 PROCEDURE — G0463 HOSPITAL OUTPT CLINIC VISIT: HCPCS

## 2018-09-07 ASSESSMENT — ANXIETY QUESTIONNAIRES
5. BEING SO RESTLESS THAT IT IS HARD TO SIT STILL: NOT AT ALL
7. FEELING AFRAID AS IF SOMETHING AWFUL MIGHT HAPPEN: NOT AT ALL
2. NOT BEING ABLE TO STOP OR CONTROL WORRYING: NOT AT ALL
6. BECOMING EASILY ANNOYED OR IRRITABLE: NOT AT ALL
4. TROUBLE RELAXING: NOT AT ALL
GAD7 TOTAL SCORE: 0
1. FEELING NERVOUS, ANXIOUS, OR ON EDGE: NOT AT ALL
3. WORRYING TOO MUCH ABOUT DIFFERENT THINGS: NOT AT ALL

## 2018-09-07 ASSESSMENT — PAIN SCALES - GENERAL: PAINLEVEL: NO PAIN (0)

## 2018-09-07 NOTE — MR AVS SNAPSHOT
After Visit Summary   9/7/2018    Rosalie Seals    MRN: [id number]           Patient Information     Date Of Birth          1945        Visit Information        Provider Department      9/7/2018 3:30 PM [facility] DO [facility]        Today's Diagnoses     History of CVA (cerebrovascular accident)    -  1    Left-sided weakness           Follow-ups after your visit        Additional Services     PHYSICAL THERAPY REFERRAL       Mobility assessment for garth    [facility]                  Your next 10 appointments already scheduled     Sep 07, 2018  3:30 PM CDT   (Arrive by 3:15 PM)   SHORT with Yash Cuellar DO   [facility] [place] ([facility] [place] )    [address]  [place] [address]   [phone]              Who to contact     If you have questions or need follow up information about today's clinic visit or your schedule please contact [facility] directly at [phone].  Normal or non-critical lab and imaging results will be communicated to you by MyChart, letter or phone within 4 business days after the clinic has received the results. If you do not hear from us within 7 days, please contact the clinic through MyChart or phone. If you have a critical or abnormal lab result, we will notify you by phone as soon as possible.  Submit refill requests through [employer] or call your pharmacy and they will forward the refill request to us. Please allow 3 business days for your refill to be completed.          Additional Information About Your Visit        Care EveryWhere ID     This is your Care EveryWhere ID. This could be used by other organizations to access your [place] medical records  [id number]        Your Vitals Were     Pulse Temperature Pulse Oximetry BMI (Body Mass Index)          62 96.3  F (35.7  C) (Tympanic) 98% 36.94 kg/m2         Blood Pressure from Last 3 Encounters:   09/07/18 132/62   08/24/18 132/60    06/25/18 (!) 196/81    Weight from Last 3 Encounters:   09/07/18 222 lb (100.7 kg)   08/24/18 222 lb (100.7 kg)   04/03/18 211 lb (95.7 kg)              We Performed the Following     PHYSICAL THERAPY REFERRAL        Primary Care Provider Office Phone # Fax #    Yash Cuellar [phone] [address] [facility] [address]        Equal Access to Services     MICHELLE BOWEN : Hadii aad ku hadasho Soomaali, waaxda luqadaha, qaybta kaalmada adeegyada, waxay idiin hayaan adeeg kharash la'mehreen . So [facility] [place] [phone].    ATENCIÓN: Si habla español, tiene a blackmon disposición servicios gratuitos de asistencia lingüística. [facility] [phone].    We comply with applicable federal civil rights laws and [place] laws. We do not discriminate on the basis of race, color, national origin, age, disability, sex, sexual orientation, or gender identity.            Thank you!     Thank you for choosing [facility]  for your care. Our goal is always to provide you with excellent care. Hearing back from our patients is one way we can continue to improve our services. Please take a few minutes to complete the written survey that you may receive in the mail after your visit with us. Thank you!             Your Updated Medication List - Protect others around you: Learn how to safely use, store and throw away your medicines at www.disposemymeds.org.          This list is accurate as of 9/7/18  3:07 PM.  Always use your most recent med list.                   Brand Name Dispense Instructions for use Diagnosis    albuterol 108 (90 Base) MCG/ACT inhaler    PROAIR HFA/PROVENTIL HFA/VENTOLIN HFA    1 Inhaler    Inhale 1-2 puffs into the lungs every 4 hours as needed for shortness of breath / dyspnea or wheezing As needed.    Cough, Acute bronchitis, unspecified organism       aspirin 325 MG tablet      Take 325 mg by mouth        atorvastatin 80 MG tablet    LIPITOR    90 tablet    TAKE 1 TABLET  DAILY    Hyperlipidemia LDL goal <100       clopidogrel 75 MG tablet    PLAVIX    90 tablet    Take 1 tablet (75 mg) by mouth daily    Acute ischemic stroke (H), PVD (peripheral vascular disease) (H)       fluticasone-salmeterol 250-50 MCG/DOSE diskus inhaler    ADVAIR    3 Inhaler    Inhale 1 puff into the lungs 2 times daily    Panlobular emphysema (H)       lisinopril 10 MG tablet    PRINIVIL/ZESTRIL    60 tablet    TAKE 2 TABLETS DAILY    Hypertension, unspecified type       magnesium hydroxide 400 MG/5ML suspension    MILK OF MAGNESIA     Take 30 mLs by mouth        magnesium oxide 400 (241.3 Mg) MG tablet    MAG-OX    180 tablet    Take 1 tablet (400 mg) by mouth 2 times daily (with meals)    Hypomagnesemia       * metFORMIN 500 MG 24 hr tablet    GLUCOPHAGE-XR     Take 1,000 mg by mouth 2 times daily (with meals)        * metFORMIN 500 MG tablet    GLUCOPHAGE    180 tablet    TAKE 2 TABLETS TWICE A DAY WITH MEALS    Type II diabetes mellitus with peripheral circulatory disorder (H)       metoprolol tartrate 50 MG tablet    LOPRESSOR    60 tablet    TAKE ONE-HALF (1/2) TABLET TWICE A DAY    Hypertension, unspecified type       * ONETOUCH DELICA LANCETS 33G Misc      four times a day.        * blood glucose monitoring lancets     360 each    Use to test blood sugar 3 times daily.    Type 2 diabetes mellitus with hyperglycemia, with long-term current use of insulin (H)       ONETOUCH VERIO IQ test strip   Generic drug:  blood glucose monitoring     400 each    USE FOUR TIMES A DAY    Type 2 diabetes mellitus with complication, without long-term current use of insulin (H)       order for DME     1 Device    Equipment being ordered: Glucometer and test strips    Well controlled type 2 diabetes mellitus (H)       pantoprazole 40 MG EC tablet    PROTONIX    90 tablet    Take 1 tablet (40 mg) by mouth daily Take 30-60 minutes before a meal.    Gastroesophageal reflux disease without esophagitis       polyethylene  glycol powder    MIRALAX    510 g    Take 17 g (1 capful) by mouth daily    Constipation, unspecified constipation type       vitamin D 89680 UNIT capsule    ERGOCALCIFEROL    8 capsule    Take 1 capsule (50,000 Units) by mouth every 7 days for 8 doses    Vitamin D deficiency       * Notice:  This list has 4 medication(s) that are the same as other medications prescribed for you. Read the directions carefully, and ask your doctor or other care provider to review them with you.

## 2018-09-07 NOTE — PROGRESS NOTES
Internal Medicine:    Chief Complaint   Patient presents with     Orders     face to face for scooter- pt has not had a mobility assessment        Rosalie presents today for a face to face for a mobile scooter.  Rosalie suffered from a CVA in the past leaving her with ongoing weakness in her LLE with left foot drop and weakness and inability to use left upper extremity.  She would like to be evaluated for a motorized scooter/chair.           Patient Active Problem List   Diagnosis     ACP (advance care planning)     Acute ischemic stroke (H)     Benign essential hypertension     Type 2 diabetes mellitus with neurologic complication, without long-term current use of insulin (H)            Past Medical History:   Diagnosis Date     Cerebral infarction (H)     05/06/2017     Diabetes type 2, uncontrolled (H)      Hyperlipemia             Past Surgical History:   Procedure Laterality Date     ABDOMEN SURGERY      bladder repair     GYN SURGERY      hysterectomy             Social History   Substance Use Topics     Smoking status: Former Smoker     Types: Cigarettes     Start date: 1/1/1956     Quit date: 5/5/2017     Smokeless tobacco: Never Used      Comment: limits self to about 4-5 daily , she is quitting on her own     Alcohol use No            Family History   Problem Relation Age of Onset     Aneurysm Mother      Other Cancer Father      Diabetes Brother      Hypertension Brother      Other Cancer Brother      Other Cancer Sister      Aneurysm Sister                Allergies   Allergen Reactions     Penicillins      Phenylephrine      Head aches     Tropicamide      Head aches              Current Outpatient Prescriptions   Medication Sig Dispense Refill     aspirin 325 MG tablet Take 325 mg by mouth       atorvastatin (LIPITOR) 80 MG tablet TAKE 1 TABLET DAILY 90 tablet 3     blood glucose monitoring (ONE TOUCH DELICA) lancets Use to test blood sugar 3 times daily. 360 each 3     clopidogrel (PLAVIX) 75 MG tablet  Take 1 tablet (75 mg) by mouth daily 90 tablet 3     fluticasone-salmeterol (ADVAIR) 250-50 MCG/DOSE diskus inhaler Inhale 1 puff into the lungs 2 times daily 3 Inhaler 0     lisinopril (PRINIVIL/ZESTRIL) 10 MG tablet TAKE 2 TABLETS DAILY 60 tablet 3     magnesium hydroxide (MILK OF MAGNESIA) 400 MG/5ML suspension Take 30 mLs by mouth       magnesium oxide (MAG-OX) 400 (241.3 Mg) MG tablet Take 1 tablet (400 mg) by mouth 2 times daily (with meals) 180 tablet 3     metoprolol tartrate (LOPRESSOR) 50 MG tablet TAKE ONE-HALF (1/2) TABLET TWICE A DAY 60 tablet 2     ONETOUCH DELICA LANCETS 33G MISC four times a day.       ONETOUCH VERIO IQ test strip USE FOUR TIMES A  each 3     order for DME Equipment being ordered: Glucometer and test strips 1 Device 0     pantoprazole (PROTONIX) 40 MG EC tablet Take 1 tablet (40 mg) by mouth daily Take 30-60 minutes before a meal. 90 tablet 3     polyethylene glycol (MIRALAX) powder Take 17 g (1 capful) by mouth daily 510 g 1     vitamin D (ERGOCALCIFEROL) 58161 UNIT capsule Take 1 capsule (50,000 Units) by mouth every 7 days for 8 doses 8 capsule 0     albuterol (PROAIR HFA/PROVENTIL HFA/VENTOLIN HFA) 108 (90 Base) MCG/ACT Inhaler Inhale 1-2 puffs into the lungs every 4 hours as needed for shortness of breath / dyspnea or wheezing As needed. (Patient not taking: Reported on 8/24/2018) 1 Inhaler 3     metFORMIN (GLUCOPHAGE) 500 MG tablet TAKE 2 TABLETS TWICE A DAY WITH MEALS 180 tablet 3     metFORMIN (GLUCOPHAGE-XR) 500 MG 24 hr tablet Take 1,000 mg by mouth 2 times daily (with meals)          Review Of Systems:    Skin: negative  Eyes: negative  Ears/Nose/Throat: negative  Respiratory: No shortness of breath, dyspnea on exertion, cough, or hemoptysis  Cardiovascular: negative  Musculoskeletal: as above  Neurologic: as above  Psychiatric: negative  Endocrine: diabetes    Objective:   /62 (BP Location: Right arm, Patient Position: Sitting, Cuff Size: Adult Large)  Pulse  62  Temp 96.3  F (35.7  C) (Tympanic)  Wt 222 lb (100.7 kg)  SpO2 98%  BMI 36.94 kg/m2  EXAM:  Constitutional: alert and no distress   Cardiovascular: RRR. No murmurs, clicks gallops or rub  Respiratory:  Lungs clear  NEURO: In wheelchair.  Left sided arm and leg weakness with left foot drop       Orders placed or performed in visit on 09/04/18     pantoprazole (PROTONIX) 40 MG EC tablet     magnesium oxide (MAG-OX) 400 (241.3 Mg) MG tablet       Assessment and Plan:    (Z86.73) History of CVA (cerebrovascular accident)  (primary encounter diagnosis)  Comment:  Plan: PHYSICAL THERAPY REFERRAL    (R53.1) Left-sided weakness  Comment: Weakness and left foot drop secondary to CVA.    Plan: PHYSICAL THERAPY REFERRAL:  Mobility assessment for scooter.           Yash Cuellar, DO

## 2018-09-08 ASSESSMENT — ANXIETY QUESTIONNAIRES: GAD7 TOTAL SCORE: 0

## 2018-09-08 ASSESSMENT — PATIENT HEALTH QUESTIONNAIRE - PHQ9: SUM OF ALL RESPONSES TO PHQ QUESTIONS 1-9: 0

## 2018-09-11 ENCOUNTER — TELEPHONE (OUTPATIENT)
Dept: INTERNAL MEDICINE | Facility: OTHER | Age: 73
End: 2018-09-11

## 2018-09-12 ENCOUNTER — TRANSFERRED RECORDS (OUTPATIENT)
Dept: HEALTH INFORMATION MANAGEMENT | Facility: CLINIC | Age: 73
End: 2018-09-12

## 2018-09-14 ENCOUNTER — TELEPHONE (OUTPATIENT)
Dept: EDUCATION SERVICES | Facility: HOSPITAL | Age: 73
End: 2018-09-14

## 2018-09-14 NOTE — TELEPHONE ENCOUNTER
Returned pt's call.  Pt feels there is a problem with her glucose meter.  She will come on Monday to have it checked.

## 2018-09-16 ENCOUNTER — APPOINTMENT (OUTPATIENT)
Dept: LAB | Facility: HOSPITAL | Age: 73
End: 2018-09-16
Attending: INTERNAL MEDICINE
Payer: MEDICARE

## 2018-09-20 DIAGNOSIS — E11.40 TYPE 2 DIABETES MELLITUS WITH DIABETIC NEUROPATHY, WITHOUT LONG-TERM CURRENT USE OF INSULIN (H): Primary | ICD-10-CM

## 2018-09-20 NOTE — TELEPHONE ENCOUNTER
[facility] sent fax requesting freestyle dallin kit and reader (sensor)  Would you like to refer this patient to diabetes? Sent to primary.

## 2018-09-25 ENCOUNTER — TELEPHONE (OUTPATIENT)
Dept: INTERNAL MEDICINE | Facility: OTHER | Age: 73
End: 2018-09-25

## 2018-09-25 NOTE — TELEPHONE ENCOUNTER
Pt called and asked if she could get a prescription for a Freestyle Luciano. States it is too hard to check blood sugars with one hand. She would like this sent to [facility] in [place]. Kalee Busby LPN

## 2018-10-03 ENCOUNTER — TELEPHONE (OUTPATIENT)
Dept: INTERNAL MEDICINE | Facility: OTHER | Age: 73
End: 2018-10-03

## 2018-10-03 NOTE — TELEPHONE ENCOUNTER
Pt is looking for a call back please, stated you know the conversation; would not explain further    Thanks,    Meliza CABRERA

## 2018-10-12 ENCOUNTER — MEDICAL CORRESPONDENCE (OUTPATIENT)
Dept: HEALTH INFORMATION MANAGEMENT | Facility: CLINIC | Age: 73
End: 2018-10-12

## 2018-10-12 ENCOUNTER — TELEPHONE (OUTPATIENT)
Dept: INTERNAL MEDICINE | Facility: OTHER | Age: 73
End: 2018-10-12

## 2018-10-12 NOTE — TELEPHONE ENCOUNTER
Left message on pt phone notifying her that the Freestyle dallin will not be covered unless he is testing her blood sugars 4 times a day and is on insulin. I did explain the paralysis in the arm and it was still not covered. Kalee Busby LPN

## 2018-10-17 ENCOUNTER — TELEPHONE (OUTPATIENT)
Dept: INTERNAL MEDICINE | Facility: OTHER | Age: 73
End: 2018-10-17

## 2018-10-17 NOTE — TELEPHONE ENCOUNTER
Received a fax that was filled out (seven element written order) filled out and faxed back. Received another fax back stating to fix #2- which indicated a date change. Left message on Ute French's voicemail ([phone]) to call back. The date indicated on the form is correct- looking for clarification. Will send copy of fax to scanning once finished. Kalee Busby LPN

## 2018-11-12 ENCOUNTER — TELEPHONE (OUTPATIENT)
Dept: INTERNAL MEDICINE | Facility: OTHER | Age: 73
End: 2018-11-12

## 2018-11-12 DIAGNOSIS — E55.9 VITAMIN D DEFICIENCY: Primary | ICD-10-CM

## 2018-11-12 DIAGNOSIS — I10 HYPERTENSION, UNSPECIFIED TYPE: ICD-10-CM

## 2018-11-13 RX ORDER — METOPROLOL TARTRATE 50 MG
TABLET ORAL
Qty: 60 TABLET | Refills: 2 | Status: SHIPPED | OUTPATIENT
Start: 2018-11-13 | End: 2019-05-12

## 2018-11-19 DIAGNOSIS — E11.51 TYPE II DIABETES MELLITUS WITH PERIPHERAL CIRCULATORY DISORDER (H): ICD-10-CM

## 2018-11-25 DIAGNOSIS — E11.8 TYPE 2 DIABETES MELLITUS WITH COMPLICATION, UNSPECIFIED WHETHER LONG TERM INSULIN USE: Primary | ICD-10-CM

## 2018-11-25 DIAGNOSIS — I10 HYPERTENSION, UNSPECIFIED TYPE: ICD-10-CM

## 2018-11-26 RX ORDER — LISINOPRIL 10 MG/1
TABLET ORAL
Qty: 60 TABLET | Refills: 3 | Status: SHIPPED | OUTPATIENT
Start: 2018-11-26 | End: 2019-02-22

## 2018-11-28 RX ORDER — INSULIN GLARGINE 100 [IU]/ML
INJECTION, SOLUTION SUBCUTANEOUS
Qty: 15 ML | Refills: 8 | Status: SHIPPED | OUTPATIENT
Start: 2018-11-28 | End: 2019-01-03

## 2018-11-30 DIAGNOSIS — E11.51 TYPE II DIABETES MELLITUS WITH PERIPHERAL CIRCULATORY DISORDER (H): ICD-10-CM

## 2019-01-03 ENCOUNTER — OFFICE VISIT (OUTPATIENT)
Dept: INTERNAL MEDICINE | Facility: OTHER | Age: 74
End: 2019-01-03
Attending: INTERNAL MEDICINE
Payer: MEDICARE

## 2019-01-03 VITALS
SYSTOLIC BLOOD PRESSURE: 124 MMHG | OXYGEN SATURATION: 97 % | WEIGHT: 222 LBS | TEMPERATURE: 96.3 F | BODY MASS INDEX: 36.94 KG/M2 | DIASTOLIC BLOOD PRESSURE: 82 MMHG | HEART RATE: 57 BPM

## 2019-01-03 DIAGNOSIS — R53.83 OTHER FATIGUE: ICD-10-CM

## 2019-01-03 DIAGNOSIS — E55.9 VITAMIN D DEFICIENCY: ICD-10-CM

## 2019-01-03 DIAGNOSIS — Z86.73 HISTORY OF CVA (CEREBROVASCULAR ACCIDENT): ICD-10-CM

## 2019-01-03 DIAGNOSIS — M81.0 AGE-RELATED OSTEOPOROSIS WITHOUT CURRENT PATHOLOGICAL FRACTURE: Primary | ICD-10-CM

## 2019-01-03 DIAGNOSIS — E11.8 TYPE 2 DIABETES MELLITUS WITH COMPLICATION, UNSPECIFIED WHETHER LONG TERM INSULIN USE: ICD-10-CM

## 2019-01-03 DIAGNOSIS — E66.01 MORBID OBESITY (H): ICD-10-CM

## 2019-01-03 LAB
ALBUMIN SERPL-MCNC: 3.7 G/DL (ref 3.4–5)
ALP SERPL-CCNC: 65 U/L (ref 40–150)
ALT SERPL W P-5'-P-CCNC: 17 U/L (ref 0–50)
ANION GAP SERPL CALCULATED.3IONS-SCNC: 5 MMOL/L (ref 3–14)
AST SERPL W P-5'-P-CCNC: 13 U/L (ref 0–45)
BASOPHILS # BLD AUTO: 0.1 10E9/L (ref 0–0.2)
BASOPHILS NFR BLD AUTO: 1.1 %
BILIRUB SERPL-MCNC: 0.3 MG/DL (ref 0.2–1.3)
BUN SERPL-MCNC: 17 MG/DL (ref 7–30)
CALCIUM SERPL-MCNC: 9.3 MG/DL (ref 8.5–10.1)
CHLORIDE SERPL-SCNC: 105 MMOL/L (ref 94–109)
CO2 SERPL-SCNC: 28 MMOL/L (ref 20–32)
CREAT SERPL-MCNC: 0.79 MG/DL (ref 0.52–1.04)
CREAT UR-MCNC: 44 MG/DL
DIFFERENTIAL METHOD BLD: NORMAL
EOSINOPHIL # BLD AUTO: 0.2 10E9/L (ref 0–0.7)
EOSINOPHIL NFR BLD AUTO: 3 %
ERYTHROCYTE [DISTWIDTH] IN BLOOD BY AUTOMATED COUNT: 14.2 % (ref 10–15)
EST. AVERAGE GLUCOSE BLD GHB EST-MCNC: 128 MG/DL
GFR SERPL CREATININE-BSD FRML MDRD: 73 ML/MIN/{1.73_M2}
GLUCOSE SERPL-MCNC: 96 MG/DL (ref 70–99)
HBA1C MFR BLD: 6.1 % (ref 0–5.6)
HCT VFR BLD AUTO: 38.5 % (ref 35–47)
HGB BLD-MCNC: 12.6 G/DL (ref 11.7–15.7)
IMM GRANULOCYTES # BLD: 0 10E9/L (ref 0–0.4)
IMM GRANULOCYTES NFR BLD: 0.2 %
LYMPHOCYTES # BLD AUTO: 1.1 10E9/L (ref 0.8–5.3)
LYMPHOCYTES NFR BLD AUTO: 20 %
MCH RBC QN AUTO: 27.1 PG (ref 26.5–33)
MCHC RBC AUTO-ENTMCNC: 32.7 G/DL (ref 31.5–36.5)
MCV RBC AUTO: 83 FL (ref 78–100)
MICROALBUMIN UR-MCNC: <5 MG/L
MICROALBUMIN/CREAT UR: NORMAL MG/G CR (ref 0–25)
MONOCYTES # BLD AUTO: 0.5 10E9/L (ref 0–1.3)
MONOCYTES NFR BLD AUTO: 9.2 %
NEUTROPHILS # BLD AUTO: 3.8 10E9/L (ref 1.6–8.3)
NEUTROPHILS NFR BLD AUTO: 66.5 %
NRBC # BLD AUTO: 0 10*3/UL
NRBC BLD AUTO-RTO: 0 /100
PLATELET # BLD AUTO: 314 10E9/L (ref 150–450)
POTASSIUM SERPL-SCNC: 4.7 MMOL/L (ref 3.4–5.3)
PROT SERPL-MCNC: 6.7 G/DL (ref 6.8–8.8)
RBC # BLD AUTO: 4.65 10E12/L (ref 3.8–5.2)
SODIUM SERPL-SCNC: 138 MMOL/L (ref 133–144)
TSH SERPL DL<=0.005 MIU/L-ACNC: 0.95 MU/L (ref 0.4–4)
WBC # BLD AUTO: 5.7 10E9/L (ref 4–11)

## 2019-01-03 PROCEDURE — 83036 HEMOGLOBIN GLYCOSYLATED A1C: CPT | Mod: ZL | Performed by: INTERNAL MEDICINE

## 2019-01-03 PROCEDURE — 80053 COMPREHEN METABOLIC PANEL: CPT | Mod: ZL | Performed by: INTERNAL MEDICINE

## 2019-01-03 PROCEDURE — 82306 VITAMIN D 25 HYDROXY: CPT | Mod: ZL | Performed by: INTERNAL MEDICINE

## 2019-01-03 PROCEDURE — 99214 OFFICE O/P EST MOD 30 MIN: CPT | Performed by: INTERNAL MEDICINE

## 2019-01-03 PROCEDURE — 82043 UR ALBUMIN QUANTITATIVE: CPT | Mod: ZL | Performed by: INTERNAL MEDICINE

## 2019-01-03 PROCEDURE — 85025 COMPLETE CBC W/AUTO DIFF WBC: CPT | Mod: ZL | Performed by: INTERNAL MEDICINE

## 2019-01-03 PROCEDURE — 40000788 ZZHCL STATISTIC ESTIMATED AVERAGE GLUCOSE: Mod: ZL | Performed by: INTERNAL MEDICINE

## 2019-01-03 PROCEDURE — 36415 COLL VENOUS BLD VENIPUNCTURE: CPT | Mod: ZL | Performed by: INTERNAL MEDICINE

## 2019-01-03 PROCEDURE — 84443 ASSAY THYROID STIM HORMONE: CPT | Mod: ZL | Performed by: INTERNAL MEDICINE

## 2019-01-03 PROCEDURE — G0463 HOSPITAL OUTPT CLINIC VISIT: HCPCS

## 2019-01-03 ASSESSMENT — PATIENT HEALTH QUESTIONNAIRE - PHQ9: SUM OF ALL RESPONSES TO PHQ QUESTIONS 1-9: 0

## 2019-01-03 ASSESSMENT — ANXIETY QUESTIONNAIRES
4. TROUBLE RELAXING: NOT AT ALL
6. BECOMING EASILY ANNOYED OR IRRITABLE: NOT AT ALL
1. FEELING NERVOUS, ANXIOUS, OR ON EDGE: NOT AT ALL
7. FEELING AFRAID AS IF SOMETHING AWFUL MIGHT HAPPEN: NOT AT ALL
2. NOT BEING ABLE TO STOP OR CONTROL WORRYING: NOT AT ALL
GAD7 TOTAL SCORE: 0
3. WORRYING TOO MUCH ABOUT DIFFERENT THINGS: NOT AT ALL
5. BEING SO RESTLESS THAT IT IS HARD TO SIT STILL: NOT AT ALL

## 2019-01-03 ASSESSMENT — PAIN SCALES - GENERAL: PAINLEVEL: NO PAIN (0)

## 2019-01-03 NOTE — PROGRESS NOTES
SUBJECTIVE:   Rosalie JANE Friend is a 73 year old female who presents to clinic today for the following health issues:      Fatigue      Duration: chronic    Description (location/character/radiation): pt was put on Vitamin D- still feeling tired- thinks may be metfomrin    Intensity:  moderate    Accompanying signs and symptoms: n/a    History (similar episodes/previous evaluation): None    Precipitating or alleviating factors: None    Therapies tried and outcome: vitamin B      Rosalie presents today for ongoing fatigue.  She denies napping during the day but does report getting up 3 x at night to use restroom.  Vit D has not helped and thyroid studies have been fine in the recent past. She states she otherwise feels well.  She would like to continue with PT if possible due to her left sided deficits from previous CVA.  She is also due for routine labs for Diabetes.      Problem list and histories reviewed & adjusted, as indicated.  Additional history: as documented    Patient Active Problem List   Diagnosis     ACP (advance care planning)     Acute ischemic stroke (H)     Benign essential hypertension     Type 2 diabetes mellitus with neurologic complication, without long-term current use of insulin (H)     Obesity (BMI 35.0-39.9) with comorbidity (H)     Past Surgical History:   Procedure Laterality Date     ABDOMEN SURGERY      bladder repair     GYN SURGERY      hysterectomy        Social History     Tobacco Use     Smoking status: Former Smoker     Types: Cigarettes     Start date: 1956     Last attempt to quit: 2017     Years since quittin.6     Smokeless tobacco: Never Used     Tobacco comment: limits self to about 4-5 daily , she is quitting on her own   Substance Use Topics     Alcohol use: No     Family History   Problem Relation Age of Onset     Aneurysm Mother      Other Cancer Father      Diabetes Brother      Hypertension Brother      Other Cancer Brother      Other Cancer Sister       Aneurysm Sister          Current Outpatient Medications   Medication Sig Dispense Refill     albuterol (PROAIR HFA/PROVENTIL HFA/VENTOLIN HFA) 108 (90 Base) MCG/ACT Inhaler Inhale 1-2 puffs into the lungs every 4 hours as needed for shortness of breath / dyspnea or wheezing As needed. 1 Inhaler 3     aspirin 325 MG tablet Take 325 mg by mouth       atorvastatin (LIPITOR) 80 MG tablet TAKE 1 TABLET DAILY 90 tablet 3     blood glucose monitoring (NO BRAND SPECIFIED) meter device kit Use to test blood sugar 4 times daily or as directed. 1 kit 0     blood glucose monitoring (ONE TOUCH DELICA) lancets Use to test blood sugar 3 times daily. 360 each 3     clopidogrel (PLAVIX) 75 MG tablet Take 1 tablet (75 mg) by mouth daily 90 tablet 3     fluticasone-salmeterol (ADVAIR) 250-50 MCG/DOSE diskus inhaler Inhale 1 puff into the lungs 2 times daily 3 Inhaler 0     lisinopril (PRINIVIL/ZESTRIL) 10 MG tablet TAKE 2 TABLETS DAILY 60 tablet 3     magnesium hydroxide (MILK OF MAGNESIA) 400 MG/5ML suspension Take 30 mLs by mouth       magnesium oxide (MAG-OX) 400 (241.3 Mg) MG tablet Take 1 tablet (400 mg) by mouth 2 times daily (with meals) 180 tablet 3     metFORMIN (GLUCOPHAGE) 500 MG tablet TAKE 2 TABLETS TWICE A DAY WITH MEALS 120 tablet 0     metFORMIN (GLUCOPHAGE-XR) 500 MG 24 hr tablet Take 1,000 mg by mouth 2 times daily (with meals)        metoprolol tartrate (LOPRESSOR) 50 MG tablet TAKE ONE-HALF (1/2) TABLET TWICE A DAY 60 tablet 2     ONETOUCH DELICA LANCETS 33G MISC four times a day.       ONETOUCH VERIO IQ test strip USE FOUR TIMES A  each 3     order for DME Equipment being ordered: Glucometer and test strips 1 Device 0     pantoprazole (PROTONIX) 40 MG EC tablet Take 1 tablet (40 mg) by mouth daily Take 30-60 minutes before a meal. 90 tablet 3     polyethylene glycol (MIRALAX) powder Take 17 g (1 capful) by mouth daily 510 g 1     Allergies   Allergen Reactions     Penicillins      Phenylephrine      Head  aches     Tropicamide      Head aches       Recent Labs   Lab Test 08/24/18  1110 06/15/18  1341 04/06/18  0858 03/13/18  1443  02/18/18  0905  02/09/18  1106  06/13/17  0820   A1C  --  6.0*  --   --   --  6.7*  --  6.4*   < > 8.4*   LDL  --  48  --   --   --  35  --   --   --  41   HDL  --  58  --   --   --  54  --   --   --  55   TRIG  --  104  --   --   --  78  --   --   --  93   ALT 21  --  17 22  --   --    < >  --    < > 30   CR 0.78 0.72 0.81 0.87   < > 0.66   < >  --    < > 0.68   GFRESTIMATED 73 79 69 64   < > 88   < >  --    < > 86   GFRESTBLACK 88 >90 84 77   < > >90   < >  --    < > >90   GFR Calc     POTASSIUM 4.5 4.5 4.3 4.1   < > 4.0   < >  --    < > 4.1   TSH 1.03  --   --   --   --   --   --   --   --  1.30    < > = values in this interval not displayed.      BP Readings from Last 3 Encounters:   01/03/19 124/82   09/07/18 132/62   08/24/18 132/60    Wt Readings from Last 3 Encounters:   01/03/19 100.7 kg (222 lb)   09/07/18 100.7 kg (222 lb)   08/24/18 100.7 kg (222 lb)                    Reviewed and updated as needed this visit by clinical staff       Reviewed and updated as needed this visit by Provider         ROS:  Constitutional, HEENT, cardiovascular, pulmonary, gi and gu systems are negative, except as otherwise noted.    OBJECTIVE:     /82   Pulse 57   Temp 96.3  F (35.7  C) (Tympanic)   Wt 100.7 kg (222 lb)   SpO2 97%   BMI 36.94 kg/m    Body mass index is 36.94 kg/m .   GENERAL:Alert and no distress  EYES: Left eye exotropion and ptosis of lid.    HENT: ear canals and TM's normal, nose and mouth without ulcers or lesions  NECK: no adenopathy, no asymmetry, masses, or scars and thyroid normal to palpation  RESP: lungs clear to auscultation - no rales, rhonchi or wheezes  CV: regular rate and rhythm, normal S1 S2, no S3 or S4, no murmur, click or rub, no peripheral edema and peripheral pulses strong  ABDOMEN: soft, nontender, no hepatosplenomegaly, no masses and  bowel sounds normal  MS: no gross musculoskeletal defects noted, no edema  SKIN: no suspicious lesions or rashes  NEURO: Left upper and LE weakness due to CVA  PSYCH: mentation appears normal, affect normal/bright    Diagnostic Test Results:  Results for orders placed or performed in visit on 01/03/19 (from the past 24 hour(s))   CBC with platelets and differential   Result Value Ref Range    WBC 5.7 4.0 - 11.0 10e9/L    RBC Count 4.65 3.8 - 5.2 10e12/L    Hemoglobin 12.6 11.7 - 15.7 g/dL    Hematocrit 38.5 35.0 - 47.0 %    MCV 83 78 - 100 fl    MCH 27.1 26.5 - 33.0 pg    MCHC 32.7 31.5 - 36.5 g/dL    RDW 14.2 10.0 - 15.0 %    Platelet Count 314 150 - 450 10e9/L    Diff Method Automated Method     % Neutrophils 66.5 %    % Lymphocytes 20.0 %    % Monocytes 9.2 %    % Eosinophils 3.0 %    % Basophils 1.1 %    % Immature Granulocytes 0.2 %    Nucleated RBCs 0 0 /100    Absolute Neutrophil 3.8 1.6 - 8.3 10e9/L    Absolute Lymphocytes 1.1 0.8 - 5.3 10e9/L    Absolute Monocytes 0.5 0.0 - 1.3 10e9/L    Absolute Eosinophils 0.2 0.0 - 0.7 10e9/L    Absolute Basophils 0.1 0.0 - 0.2 10e9/L    Abs Immature Granulocytes 0.0 0 - 0.4 10e9/L    Absolute Nucleated RBC 0.0      Results for orders placed or performed in visit on 01/03/19   CBC with platelets and differential   Result Value Ref Range    WBC 5.7 4.0 - 11.0 10e9/L    RBC Count 4.65 3.8 - 5.2 10e12/L    Hemoglobin 12.6 11.7 - 15.7 g/dL    Hematocrit 38.5 35.0 - 47.0 %    MCV 83 78 - 100 fl    MCH 27.1 26.5 - 33.0 pg    MCHC 32.7 31.5 - 36.5 g/dL    RDW 14.2 10.0 - 15.0 %    Platelet Count 314 150 - 450 10e9/L    Diff Method Automated Method     % Neutrophils 66.5 %    % Lymphocytes 20.0 %    % Monocytes 9.2 %    % Eosinophils 3.0 %    % Basophils 1.1 %    % Immature Granulocytes 0.2 %    Nucleated RBCs 0 0 /100    Absolute Neutrophil 3.8 1.6 - 8.3 10e9/L    Absolute Lymphocytes 1.1 0.8 - 5.3 10e9/L    Absolute Monocytes 0.5 0.0 - 1.3 10e9/L    Absolute Eosinophils 0.2  0.0 - 0.7 10e9/L    Absolute Basophils 0.1 0.0 - 0.2 10e9/L    Abs Immature Granulocytes 0.0 0 - 0.4 10e9/L    Absolute Nucleated RBC 0.0        ASSESSMENT/PLAN:     Problem List Items Addressed This Visit     Obesity (BMI 35.0-39.9) with comorbidity (H)      Other Visit Diagnoses     Age-related osteoporosis without current pathological fracture    -  Primary    Relevant Orders    DX Hip/Pelvis/Spine    Type 2 diabetes mellitus with complication, unspecified whether long term insulin use (H)        Relevant Orders    Hemoglobin A1c (Completed)    Albumin Random Urine Quantitative with Creat Ratio (Completed)    Other fatigue        Relevant Orders    CBC with platelets and differential (Completed)    TSH with free T4 reflex (Completed)    COMPREHENSIVE METABOLIC PANEL  (Completed)    SLEEP EVALUATION & MANAGEMENT REFERRAL - ADULT -[facility] [place] [phone] (Age 5 and up)    History of CVA (cerebrovascular accident)        Relevant Orders    PHYSICAL THERAPY REFERRAL    Vitamin D deficiency        Relevant Orders    Vitamin D Deficiency (Completed)           Yash Cuellar, DO  [facility] [place]

## 2019-01-03 NOTE — PROGRESS NOTES
"    SUBJECTIVE:                                                    Rosalie JANE Friend is a 73 year old female who presents to clinic today for the following health issues:  {Provider please address medication reconciliation discrepancies--rooming staff please delete if no med/rec issues}    {Superlists:500624}    {additional problems for provider to add:800380}    Problem list and histories reviewed & adjusted, as indicated.  Additional history: {NONE - AS DOCUMENTED:215281::\"as documented\"}    {HIST REVIEW/ LINKS 2:949634}    {PROVIDER CHARTING PREFERENCE:722863}      "

## 2019-01-03 NOTE — NURSING NOTE
"Chief Complaint   Patient presents with     Fatigue       Initial /82   Pulse 57   Temp 96.3  F (35.7  C) (Tympanic)   Wt 100.7 kg (222 lb)   SpO2 97%   BMI 36.94 kg/m   Estimated body mass index is 36.94 kg/m  as calculated from the following:    Height as of 4/4/18: 1.651 m (5' 5\").    Weight as of this encounter: 100.7 kg (222 lb).  Medication Reconciliation: complete    Kalee Busby LPN  "

## 2019-01-04 ENCOUNTER — HOSPITAL ENCOUNTER (OUTPATIENT)
Dept: BONE DENSITY | Facility: HOSPITAL | Age: 74
Discharge: HOME | End: 2019-01-04
Attending: INTERNAL MEDICINE | Admitting: INTERNAL MEDICINE
Payer: MEDICARE

## 2019-01-04 DIAGNOSIS — M81.0 AGE-RELATED OSTEOPOROSIS WITHOUT CURRENT PATHOLOGICAL FRACTURE: ICD-10-CM

## 2019-01-04 PROCEDURE — 77080 DXA BONE DENSITY AXIAL: CPT | Mod: TC

## 2019-01-04 ASSESSMENT — ANXIETY QUESTIONNAIRES: GAD7 TOTAL SCORE: 0

## 2019-01-07 DIAGNOSIS — E55.9 VITAMIN D DEFICIENCY: Primary | ICD-10-CM

## 2019-01-07 LAB — DEPRECATED CALCIDIOL+CALCIFEROL SERPL-MC: 19 UG/L (ref 20–75)

## 2019-01-07 RX ORDER — ERGOCALCIFEROL 1.25 MG/1
50000 CAPSULE, LIQUID FILLED ORAL WEEKLY
Qty: 8 CAPSULE | Refills: 3 | Status: SHIPPED | OUTPATIENT
Start: 2019-01-07 | End: 2019-09-19

## 2019-01-07 RX ORDER — ERGOCALCIFEROL 1.25 MG/1
50000 CAPSULE, LIQUID FILLED ORAL WEEKLY
Qty: 8 CAPSULE | Refills: 3 | Status: SHIPPED | OUTPATIENT
Start: 2019-01-07 | End: 2019-02-21

## 2019-01-22 ENCOUNTER — TRANSFERRED RECORDS (OUTPATIENT)
Dept: HEALTH INFORMATION MANAGEMENT | Facility: CLINIC | Age: 74
End: 2019-01-22

## 2019-01-23 ENCOUNTER — DOCUMENTATION ONLY (OUTPATIENT)
Dept: SLEEP MEDICINE | Facility: HOSPITAL | Age: 74
End: 2019-01-23

## 2019-01-23 DIAGNOSIS — G47.00 INSOMNIA, UNSPECIFIED TYPE: ICD-10-CM

## 2019-01-23 DIAGNOSIS — R53.81 MALAISE AND FATIGUE: Primary | ICD-10-CM

## 2019-01-23 DIAGNOSIS — E66.01 MORBID OBESITY (H): Chronic | ICD-10-CM

## 2019-01-23 DIAGNOSIS — I10 BENIGN ESSENTIAL HYPERTENSION: Chronic | ICD-10-CM

## 2019-01-23 DIAGNOSIS — R53.83 MALAISE AND FATIGUE: Primary | ICD-10-CM

## 2019-01-23 DIAGNOSIS — E11.49 TYPE 2 DIABETES MELLITUS WITH OTHER NEUROLOGIC COMPLICATION, WITHOUT LONG-TERM CURRENT USE OF INSULIN (H): Chronic | ICD-10-CM

## 2019-01-23 DIAGNOSIS — J44.9 CHRONIC OBSTRUCTIVE PULMONARY DISEASE, UNSPECIFIED COPD TYPE (H): ICD-10-CM

## 2019-01-23 DIAGNOSIS — G47.9 DISTURBANCE IN SLEEP BEHAVIOR: ICD-10-CM

## 2019-01-23 DIAGNOSIS — Z86.73 HISTORY OF CVA (CEREBROVASCULAR ACCIDENT): ICD-10-CM

## 2019-01-23 NOTE — Clinical Note
Polysomnogram (using 4% desaturation/Medicare/2012 AASM 1B scoring rules) for modest probability obstructive sleep apnea.  Ambien if needed. Patient is a poor candidate for Home Sleep Testing due to not high probability of severe SOFIE, possible entral sleep apnea and probable insomnia.Orders placed

## 2019-01-23 NOTE — PROGRESS NOTES
SLEEP HISTORY QUESTIONNAIRE    Please describe the main reason for your sleep appointment? Tired all the time    How long has this been a problem?     Have you been diagnosed with a sleep problem in the past? NO    If so, what?     What treatment was recommended?     Have you had a sleep study in the past? NO    If yes, where and when?     Sleep Habits:   Do you read in bed? No  Do you eat in bed? No  Do you watch TV in bed? Yes  Do you work in bed? No  Do you use a phone or computer in bed? Yes    Is you sleep disturbed by:   Bed partner: No  Children: No  Noise: Yes   Pets: No  Other:       On two or more nights per week, do you drink alcohol to help you fall asleep?NO    On two or more nights per week, do you take melatonin to help you fall asleep? YES    On two or more nights per week, do you take over the counter medicine to fall asleep?  NO    Do you take drinks with caffeine (coffee, tea, soda, energy drinks)? YES    Do you have 3 or more caffeine drinks in a day? YES    Do you have caffeine drinks within 6 hours of bedtime? YES    Do you smoke or use tobacco? NO    Do you exercise? NO    Sleep Routine:   Using a 24 Hour Clock    What time do you usually get into bed on workdays?     Weekend/non work days?     What time do you get out of bed on workdays?       Weekend/non work days?    Do you work the evening or night shift or do your shifts rotate? NO    How long does it usually take to fall to sleep? varries    How many times do you wake during the night? 2-3    How much time do you feel that you are awake during the entire night? varries    How long does it take for you to fall back to sleep after you wake up? varries    Why do you think you wake up? bathroom    What do you do when you wake up? bathroom    How much sleep do you think you get on work nights?     How much sleep do you think you get on weekends/non work days?     How much sleep do you think you need to feel your best? 8 hours    How many days  during a week do you take a nap on average? 2    What is the average length of your naps? 30 minutes    Do you feel better after taking a nap? YES    If you could chose the best sleep schedule for you, what time would you go to bed? 9 pm  What time would you get up? 10 am    Do you read in bed? NO    Do you eat in bed? NO    Do you watch TV in bed? YES    Do you do work in bed? NO    Do you use a computer or phone in bed? YES    Sleep Disruptions?   Leg movements:  Do you ever have restless, crawling, aching or other unusual feelings in your legs? NO    Do you ever wake yourself by kicking your legs during the night? NO    Are the sheets and blankets messed up or tossed about when you get up? NO    Night-time behaviors:   Do you have nightmares or night terrors? NO   How often?     Have you had times when you were sleep walking? NO    Have you been seen doing anything unusual while you sleep at nights? NO  What?   How often?     Have you ever hurt yourself or someone else while you were sleeping? NO  Please describe:     Do you clench or grind your teeth during the night? no    Sleep Apnea (pauses in breathing during sleep):  Do you wake with a headache in the morning? NO  How often?     Does your bed partner, family or friends ever say that you snore? YES  How many nights per week do you snore?   Can snoring be heard outside the bedroom?     Do you ever wake yourself up from snoring, gasping or choking? NO    Have you ever been told that you stop breathing or have pauses in your breathing? NO    Do you wake in the morning with a dry throat or mouth? NO    Do you have trouble breathing through your nose? YES    Do you have problems with heartburn, reflux or a hiatal hernia? NO    Which positions do you usually sleep in? (stomach, back, sides, all) sides    Do you use oxygen or any other medical equipment when you sleep? NO    Do members of your family (related by blood) snore? YES    Have any members of your family  been diagnosed with with sleep apnea? YES    Do other members of your family have restless leg? NO    Do other members of your family have sleep walking? NO    Have you ever had an accident, or near accident due to sleepiness while driving? NO    Does your sleepiness affect your work on the job or at school? NO    Do you ever fall asleep by accident while doing a task? NO    Have you had sudden muscle weakness when laughing, angry or surprised? NO    Have you ever been unable to move your body when falling asleep or waking up? NO    Do you ever have trouble  your dreams from real life events? NO  Please describe:     Physical Health: (including illness and injury): During the past 30 days, on how many days was your physical health not good? 0/30 days     Mental Health: (including stress, depression, and problems with emotions): During the last 30 days, how may days was your mental health not good? 0/30 days.     During the past 30 days, on how many days did poor physical or mental health keep you from doing your usual activities? This might be self-care, work, or play? 0/30 days.     Social History:   Marital status:     Who lives in your home with you? alone    Mother (alive or dead)? dead If has , from what?   Father (alive or dead)? dead If has , from what?       Siblings: YES  Have any ? YES  If so, from what? Cancer, aneurysm    Currently working? NO  If yes, work:   Former jobs:      Sleepiness Scale:   Sitting and reading 0   Watching TV 2   Sitting in a public place 2   Riding in a car 0   Lying down to rest in the afternoon 2   Sitting and talking to someone 0   Sitting quietly after a lunch without alcohol 0   In a car, stopping for a few minutes in traffic 0       Surgical History:   Past Surgical History:   Procedure Laterality Date     ABDOMEN SURGERY      bladder repair     GYN SURGERY      hysterectomy        Medical Conditions:   Past Medical History:   Diagnosis Date      Cerebral infarction (H)     05/06/2017     Diabetes type 2, uncontrolled (H)      Hyperlipemia        Medications:   Current Outpatient Medications   Medication Sig     albuterol (PROAIR HFA/PROVENTIL HFA/VENTOLIN HFA) 108 (90 Base) MCG/ACT Inhaler Inhale 1-2 puffs into the lungs every 4 hours as needed for shortness of breath / dyspnea or wheezing As needed.     aspirin 325 MG tablet Take 325 mg by mouth     atorvastatin (LIPITOR) 80 MG tablet TAKE 1 TABLET DAILY     blood glucose monitoring (NO BRAND SPECIFIED) meter device kit Use to test blood sugar 4 times daily or as directed.     blood glucose monitoring (ONE TOUCH DELICA) lancets Use to test blood sugar 3 times daily.     clopidogrel (PLAVIX) 75 MG tablet Take 1 tablet (75 mg) by mouth daily     fluticasone-salmeterol (ADVAIR) 250-50 MCG/DOSE diskus inhaler Inhale 1 puff into the lungs 2 times daily     GAVILAX powder FILL DOSING CUP TO MARKED LINE (17 GRAMS) THEN  MIX IN LIQUID AND DRINK DAILY     lisinopril (PRINIVIL/ZESTRIL) 10 MG tablet TAKE 2 TABLETS DAILY     magnesium hydroxide (MILK OF MAGNESIA) 400 MG/5ML suspension Take 30 mLs by mouth     magnesium oxide (MAG-OX) 400 (241.3 Mg) MG tablet Take 1 tablet (400 mg) by mouth 2 times daily (with meals)     metFORMIN (GLUCOPHAGE) 500 MG tablet TAKE 2 TABLETS TWICE A DAY WITH MEALS     metFORMIN (GLUCOPHAGE-XR) 500 MG 24 hr tablet Take 1,000 mg by mouth 2 times daily (with meals)      metoprolol tartrate (LOPRESSOR) 50 MG tablet TAKE ONE-HALF (1/2) TABLET TWICE A DAY     ONETOUCH DELICA LANCETS 33G MISC four times a day.     ONETOUCH VERIO IQ test strip USE FOUR TIMES A DAY     order for DME Equipment being ordered: Glucometer and test strips     pantoprazole (PROTONIX) 40 MG EC tablet Take 1 tablet (40 mg) by mouth daily Take 30-60 minutes before a meal.     vitamin D2 (ERGOCALCIFEROL) 03153 units (1250 mcg) capsule Take 1 capsule (50,000 Units) by mouth once a week     vitamin D2 (ERGOCALCIFEROL)  35339 units (1250 mcg) capsule Take 1 capsule (50,000 Units) by mouth once a week     No current facility-administered medications for this visit.        Are you currently having any of the following symptoms?   General:   Obvious weight gain or loss NO  Fever, chills or sweats NO  Drug allergies: penicillian    Eyes:   Changes in vision NO  Blind spots NO  Double vision NO  Other dry    Ear, Nose and Throat:   Ear pain NO  Sore throat NO  Sinus pain NO  Post-nasal drip NO  Runny nose NO  Bloody nose NO    Heart:   Rapid or irregular heart beat NO  Chest pain or pressure NO  Out of breath when lying down NO  Swelling in feet or legs NO  High blood pressure YES  Heart disease NO    Nervous system   Headaches NO  Weakness in arms or legs NO  Numbness in arms of legs NO  Other:     Skin  Rashes NO  New moles or skin changes NO  Other     Lungs  Shortness of breath at rest NO  Shortness of breath with activity NO  Dry cough NO  Coughing up mucous or phlegm NO  Coughing up blood NO  Wheezing when breathing NO    Lymph System  Swollen lymph nodes NO  New lumps or bumps NO  Changes in breasts or discharge NO    Digestive System   Nausea or vomiting NO  Loose or watery stools NO  Hard, dry stools (constipation) NO  Fat or grease in stools NO  Blood in stools NO  Stools are black or bloody NO  Abdominal (belly) pain NO    Urinary Tract   Pain when you urinate (pee) NO  Blood in your urine NO  Urinate (pee) more than normal NO  Irregular periods NO    Muscles and bones   Muscle pain NO  Joint or bone pain NO  Swollen joints NO  Other     Glands  Increased thirst or urination NO  Diabetes YES  Morning glucose:   Afternoon glucose:     Mental Health  Depression NO  Anxiety NO  Other mental health issues:

## 2019-01-23 NOTE — Clinical Note
"Please try and get SOME sense of sleep schedule and time in bed. 'Varies' and 'It depends' give no information at all and patients will be able to give some details \"between 8 and 10\" etc..... If she is going to bed at 4 am and in bed until 3 pm I probably wont start with a night time Polysomnogram "

## 2019-01-23 NOTE — PROGRESS NOTES
FRANK MARCELINA       Name: Rosalie JANE Friend MRN# [id number]   Age: 73 year old YOB: 1945     Stop Bang questionnaire completed with a score of >3 to allow for HST     Have you been told you snore loudly (louder than talking or loud enough to be heard through doors)? NO    Do you often feel tired, fatigued, or sleepy during the daytime? YES    Has anyone observed you stop breathing during your sleep? NO    Do you have or are you being treated for high blood pressure? YES    Is your BMI greater than 35? YES    Is your neck size circumference 16 inches or greater? NO    Are you over 50 years old? YES    Stop Bang Score (# of yes): 4

## 2019-02-07 ENCOUNTER — HOSPITAL ENCOUNTER (OUTPATIENT)
Dept: MRI IMAGING | Facility: HOSPITAL | Age: 74
Discharge: HOME | End: 2019-02-07
Attending: NURSE PRACTITIONER | Admitting: NURSE PRACTITIONER
Payer: MEDICARE

## 2019-02-07 DIAGNOSIS — I67.1 CEREBRAL ANEURYSM: ICD-10-CM

## 2019-02-07 PROCEDURE — 70544 MR ANGIOGRAPHY HEAD W/O DYE: CPT | Mod: TC

## 2019-02-19 ENCOUNTER — TELEPHONE (OUTPATIENT)
Dept: INTERNAL MEDICINE | Facility: OTHER | Age: 74
End: 2019-02-19

## 2019-02-19 NOTE — PROGRESS NOTES
SUBJECTIVE:   Rosalie JANE Friend is a 73 year old female who presents to clinic today for the following health issues:      RESPIRATORY SYMPTOMS      Duration: about a week    Description  rhinorrhea, cough and wheezing    Severity: mild    Accompanying signs and symptoms: None    History (predisposing factors):  none    Precipitating or alleviating factors: None    Therapies tried and outcome:  [place] - unknown if effective       Cerebrovascular Follow-up      Patient history: ischemic cerebrovascular incident    Residual symptoms: None, Difficult walking, Facial droop on the left and Weakness in the arm on the left    Worsened or new symptoms since last visit:  YES eye droop and now left leg weakness    Daily aspirin use: Yes    Hypertension controlled: Yes    Rosalie presents today due to 7+ days of cough, chest congestion, sputum production.  She was down in [place] when her symptoms began.  She denies any fevers.  Symptoms have persisted but are not worse.    She also completed a recent MRA of the brain as directed by her neurosurgeon.  Her PT was discontinued but she would like to resume this once again as she feels a bit weaker since being ill.    Problem list and histories reviewed & adjusted, as indicated.  Additional history: as documented    Patient Active Problem List   Diagnosis     ACP (advance care planning)     Acute ischemic stroke (H)     Benign essential hypertension     Type 2 diabetes mellitus with neurologic complication, without long-term current use of insulin (H)     Obesity (BMI 35.0-39.9) with comorbidity (H)     Past Surgical History:   Procedure Laterality Date     ABDOMEN SURGERY      bladder repair     GYN SURGERY      hysterectomy        Social History     Tobacco Use     Smoking status: Former Smoker     Types: Cigarettes     Start date: 1956     Last attempt to quit: 2017     Years since quittin.8     Smokeless tobacco: Never Used     Tobacco comment: limits self to  about 4-5 daily , she is quitting on her own   Substance Use Topics     Alcohol use: No     Family History   Problem Relation Age of Onset     Aneurysm Mother      Other Cancer Father      Diabetes Brother      Hypertension Brother      Other Cancer Brother      Other Cancer Sister      Aneurysm Sister          Current Outpatient Medications   Medication Sig Dispense Refill     albuterol (PROAIR HFA/PROVENTIL HFA/VENTOLIN HFA) 108 (90 Base) MCG/ACT Inhaler Inhale 1-2 puffs into the lungs every 4 hours as needed for shortness of breath / dyspnea or wheezing As needed. 1 Inhaler 3     aspirin 325 MG tablet Take 325 mg by mouth       atorvastatin (LIPITOR) 80 MG tablet TAKE 1 TABLET DAILY 90 tablet 3     azithromycin (ZITHROMAX) 250 MG tablet Take 2 tablets (500 mg) by mouth daily for 1 day, THEN 1 tablet (250 mg) daily for 9 days. 11 tablet 0     blood glucose monitoring (NO BRAND SPECIFIED) meter device kit Use to test blood sugar 4 times daily or as directed. 1 kit 0     blood glucose monitoring (ONE TOUCH DELICA) lancets Use to test blood sugar 3 times daily. 360 each 3     clopidogrel (PLAVIX) 75 MG tablet Take 1 tablet (75 mg) by mouth daily 90 tablet 3     fluticasone-salmeterol (ADVAIR) 250-50 MCG/DOSE diskus inhaler Inhale 1 puff into the lungs 2 times daily 3 Inhaler 0     GAVILAX powder FILL DOSING CUP TO MARKED LINE (17 GRAMS) THEN  MIX IN LIQUID AND DRINK DAILY 510 g 1     guaiFENesin (MUCINEX) 600 MG 12 hr tablet Take 1 tablet (600 mg) by mouth 2 times daily for 10 days 20 tablet 0     lisinopril (PRINIVIL/ZESTRIL) 10 MG tablet TAKE 2 TABLETS DAILY 60 tablet 3     magnesium hydroxide (MILK OF MAGNESIA) 400 MG/5ML suspension Take 30 mLs by mouth       magnesium oxide (MAG-OX) 400 (241.3 Mg) MG tablet Take 1 tablet (400 mg) by mouth 2 times daily (with meals) 180 tablet 3     metFORMIN (GLUCOPHAGE) 500 MG tablet TAKE 2 TABLETS TWICE A DAY WITH MEALS 120 tablet 0     metFORMIN (GLUCOPHAGE-XR) 500 MG 24 hr  tablet Take 1,000 mg by mouth 2 times daily (with meals)        metoprolol tartrate (LOPRESSOR) 50 MG tablet TAKE ONE-HALF (1/2) TABLET TWICE A DAY 60 tablet 2     ONETOUCH DELICA LANCETS 33G MISC four times a day.       ONETOUCH VERIO IQ test strip USE FOUR TIMES A  each 3     order for DME Equipment being ordered: Glucometer and test strips 1 Device 0     pantoprazole (PROTONIX) 40 MG EC tablet Take 1 tablet (40 mg) by mouth daily Take 30-60 minutes before a meal. 90 tablet 3     vitamin D2 (ERGOCALCIFEROL) 97508 units (1250 mcg) capsule Take 1 capsule (50,000 Units) by mouth once a week 8 capsule 3     Allergies   Allergen Reactions     Penicillins      Phenylephrine      Head aches     Tropicamide      Head aches       Recent Labs   Lab Test 01/03/19  1419 08/24/18  1110 06/15/18  1341 04/06/18  0858  02/18/18  0905  06/13/17  0820   A1C 6.1*  --  6.0*  --   --  6.7*   < > 8.4*   LDL  --   --  48  --   --  35  --  41   HDL  --   --  58  --   --  54  --  55   TRIG  --   --  104  --   --  78  --  93   ALT 17 21  --  17   < >  --    < > 30   CR 0.79 0.78 0.72 0.81   < > 0.66   < > 0.68   GFRESTIMATED 73 73 79 69   < > 88   < > 86   GFRESTBLACK 85 88 >90 84   < > >90   < > >90  African American GFR Calc     POTASSIUM 4.7 4.5 4.5 4.3   < > 4.0   < > 4.1   TSH 0.95 1.03  --   --   --   --   --  1.30    < > = values in this interval not displayed.      BP Readings from Last 3 Encounters:   02/21/19 122/72   01/03/19 124/82   09/07/18 132/62    Wt Readings from Last 3 Encounters:   02/21/19 100.7 kg (222 lb)   01/03/19 100.7 kg (222 lb)   09/07/18 100.7 kg (222 lb)           Reviewed and updated as needed this visit by clinical staff       Reviewed and updated as needed this visit by Provider       ROS:  Constitutional, HEENT, cardiovascular, pulmonary, gi and gu systems are negative, except as otherwise noted.    OBJECTIVE:     /72   Pulse 71   Temp 97  F (36.1  C) (Tympanic)   Wt 100.7 kg (222 lb)    SpO2 98%   BMI 36.94 kg/m    Body mass index is 36.94 kg/m .   GENERAL: Alert and no distress  RESP:  No wheezes but slight rhonchi more so on the right.    CV: regular rate and rhythm, normal S1 S2, no S3 or S4, no murmurs  MS: no gross musculoskeletal defects noted, no edema  SKIN: no suspicious lesions or rashes  NEURO: Ptosis and exotropia of left eye.  Paralysis of left upper and LE.    PSYCH: mentation appears normal, affect normal/bright    Diagnostic Test Results:  No results found for this or any previous visit (from the past 24 hour(s)).  Results for orders placed or performed during the hospital encounter of 02/07/19   MRA Brain ([place] [facility] [place]) wo Contrast    Narrative    PROCEDURE: MRA BRAIN ([place] [facility] [place]) WO CONTRAST 2/7/2019 11:36 AM    HISTORY: fax results to [phone]; Cerebral aneurysm    COMPARISONS: None.    Meds/Dose Given:    TECHNIQUE: CT angiogram of the base the brain was performed using 3-D  time-of-flight method.    FINDINGS: There is total occlusion of the carotid flow on the right at  the level the carotid siphon. The supraclinoid internal carotid artery  reconstitutes via flow via the anterior communicating and posterior  communicating arteries. 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 There appears to be  narrowing of the proximal middle cerebral artery but this most likely  represents artifact from the aneurysm coiling. No flow within the  aneurysm was seen. Beyond the level of coiling, the middle cerebral  artery on the left appears normal. Both anterior cerebral arteries are  predominantly filled via the left horizontal component of the anterior  cerebral artery. The left carotid siphon appears normal. The right  vertebral artery is developmentally small. Dominant flow to the  basilar artery is via the left vertebral artery. Both posterior  cerebral and superior cerebral cerebellar arteries appear normal.         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 There is apparent narrowing of the left middle cerebral  artery at the site of coiling which may be artifactual.  2. 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 There is  reconstitution of the supraclinoid internal carotid artery via flow  from the anterior communicating and posterior communicating arteries    ISH KILLIAN MD       ASSESSMENT/PLAN:     Problem List Items Addressed This Visit     Acute ischemic stroke (H)      Other Visit Diagnoses     Bronchitis    -  Primary    Relevant Medications    azithromycin (ZITHROMAX) 250 MG tablet x 10 days    guaiFENesin (MUCINEX) 600 MG 12 hr tablet BID x 10 days         Follow up in 1 month for A1c    Yash Cuellar,   [facility] [place]

## 2019-02-19 NOTE — TELEPHONE ENCOUNTER
GENERIC ADULT  Rosalie JANE Friend is a 73 year old female who calls with complaints of upper respiratory symptoms.  She is on her way back from [place] and has had symptoms for about 1 week.  Patient's symptoms include productive cough with yellow sputum, severe coughing spells, shortness of breath, and difficulty catching breath.  Patient denies fever or chest pain.  Patient states does have COPD.      RECOMMENDED DISPOSITION:  Requesting appointment Thursday with PCP.  Advised with symptoms and history patient should be seen today.  They are currently driving back from [place] so they were hoping to wait until Thursday.      Spoke with 's primary nurse who spoke with patient and also advised that patient should be seen immediately if she is having difficulty breathing.  Patient is scheduled on Thursday with PCP.     Next 5 appointments (look out 90 days)    Feb 21, 2019  1:30 PM CST  (Arrive by 1:15 PM)  SHORT with Yash Cuellar, DO  [facility] ([facility] ) [address] [place] [id number]  [phone]          Will comply with recommendation: Yes    If further questions/concerns or if symptoms do not improve, worsen or new symptoms develop, call your PCP or [facility] as soon as possible.      Guideline used: Adult Telephone Protocols Office Version 3rd Edition - Juan Kelly MD, FACEP      Nohemy Grimm, JANIS

## 2019-02-21 ENCOUNTER — OFFICE VISIT (OUTPATIENT)
Dept: INTERNAL MEDICINE | Facility: OTHER | Age: 74
End: 2019-02-21
Attending: INTERNAL MEDICINE
Payer: MEDICARE

## 2019-02-21 VITALS
DIASTOLIC BLOOD PRESSURE: 72 MMHG | WEIGHT: 222 LBS | SYSTOLIC BLOOD PRESSURE: 122 MMHG | HEART RATE: 71 BPM | TEMPERATURE: 97 F | OXYGEN SATURATION: 98 % | BODY MASS INDEX: 36.94 KG/M2

## 2019-02-21 DIAGNOSIS — J40 BRONCHITIS: Primary | ICD-10-CM

## 2019-02-21 DIAGNOSIS — I63.9 ACUTE ISCHEMIC STROKE (H): ICD-10-CM

## 2019-02-21 PROCEDURE — 99213 OFFICE O/P EST LOW 20 MIN: CPT | Performed by: INTERNAL MEDICINE

## 2019-02-21 PROCEDURE — G0463 HOSPITAL OUTPT CLINIC VISIT: HCPCS

## 2019-02-21 RX ORDER — GUAIFENESIN 600 MG/1
600 TABLET, EXTENDED RELEASE ORAL 2 TIMES DAILY
Qty: 20 TABLET | Refills: 0 | Status: SHIPPED | OUTPATIENT
Start: 2019-02-21 | End: 2019-03-03

## 2019-02-21 RX ORDER — AZITHROMYCIN 250 MG/1
TABLET, FILM COATED ORAL
Qty: 11 TABLET | Refills: 0 | Status: SHIPPED | OUTPATIENT
Start: 2019-02-21 | End: 2019-03-03

## 2019-02-21 ASSESSMENT — ANXIETY QUESTIONNAIRES
7. FEELING AFRAID AS IF SOMETHING AWFUL MIGHT HAPPEN: NOT AT ALL
GAD7 TOTAL SCORE: 0
5. BEING SO RESTLESS THAT IT IS HARD TO SIT STILL: NOT AT ALL
4. TROUBLE RELAXING: NOT AT ALL
2. NOT BEING ABLE TO STOP OR CONTROL WORRYING: NOT AT ALL
3. WORRYING TOO MUCH ABOUT DIFFERENT THINGS: NOT AT ALL
1. FEELING NERVOUS, ANXIOUS, OR ON EDGE: NOT AT ALL
6. BECOMING EASILY ANNOYED OR IRRITABLE: NOT AT ALL

## 2019-02-21 ASSESSMENT — PATIENT HEALTH QUESTIONNAIRE - PHQ9: SUM OF ALL RESPONSES TO PHQ QUESTIONS 1-9: 0

## 2019-02-21 ASSESSMENT — PAIN SCALES - GENERAL: PAINLEVEL: NO PAIN (0)

## 2019-02-21 NOTE — NURSING NOTE
"No chief complaint on file.      Initial /72   Pulse 71   Temp 97  F (36.1  C) (Tympanic)   Wt 100.7 kg (222 lb)   SpO2 98%   BMI 36.94 kg/m   Estimated body mass index is 36.94 kg/m  as calculated from the following:    Height as of 4/4/18: 1.651 m (5' 5\").    Weight as of this encounter: 100.7 kg (222 lb).  Medication Reconciliation: complete    Kalee Busby LPN    "

## 2019-02-22 DIAGNOSIS — I10 HYPERTENSION, UNSPECIFIED TYPE: ICD-10-CM

## 2019-02-22 ASSESSMENT — ANXIETY QUESTIONNAIRES: GAD7 TOTAL SCORE: 0

## 2019-02-25 RX ORDER — LISINOPRIL 10 MG/1
TABLET ORAL
Qty: 60 TABLET | Refills: 5 | Status: SHIPPED | OUTPATIENT
Start: 2019-02-25 | End: 2019-08-23

## 2019-03-02 PROBLEM — K21.9 GERD (GASTROESOPHAGEAL REFLUX DISEASE): Chronic | Status: ACTIVE | Noted: 2019-03-02

## 2019-03-02 PROBLEM — E66.01 MORBID OBESITY (H): Chronic | Status: ACTIVE | Noted: 2019-01-03

## 2019-03-02 PROBLEM — Z86.73 HISTORY OF CVA (CEREBROVASCULAR ACCIDENT): Status: ACTIVE | Noted: 2019-03-02

## 2019-03-02 PROBLEM — J44.9 COPD (CHRONIC OBSTRUCTIVE PULMONARY DISEASE) (H): Chronic | Status: ACTIVE | Noted: 2019-03-02

## 2019-03-02 PROBLEM — Z71.89 ACP (ADVANCE CARE PLANNING): Chronic | Status: ACTIVE | Noted: 2017-05-09

## 2019-03-02 PROBLEM — I67.1 CEREBRAL ANEURYSM: Status: ACTIVE | Noted: 2019-03-02

## 2019-03-02 PROBLEM — Z86.73 HISTORY OF CVA (CEREBROVASCULAR ACCIDENT): Chronic | Status: ACTIVE | Noted: 2019-03-02

## 2019-03-02 PROBLEM — I63.9 ACUTE ISCHEMIC STROKE (H): Status: RESOLVED | Noted: 2018-02-18 | Resolved: 2019-03-02

## 2019-03-02 PROBLEM — E78.5 HYPERLIPIDEMIA: Chronic | Status: ACTIVE | Noted: 2019-03-02

## 2019-03-02 PROBLEM — I67.1 CEREBRAL ANEURYSM: Chronic | Status: ACTIVE | Noted: 2019-03-02

## 2019-03-02 PROBLEM — I73.9 PVD (PERIPHERAL VASCULAR DISEASE) (H): Chronic | Status: ACTIVE | Noted: 2019-03-02

## 2019-03-02 PROBLEM — E11.49 TYPE 2 DIABETES MELLITUS WITH NEUROLOGIC COMPLICATION, WITHOUT LONG-TERM CURRENT USE OF INSULIN (H): Chronic | Status: ACTIVE | Noted: 2018-04-12

## 2019-03-02 PROBLEM — J44.9 COPD (CHRONIC OBSTRUCTIVE PULMONARY DISEASE) (H): Status: ACTIVE | Noted: 2019-03-02

## 2019-03-02 PROBLEM — I10 BENIGN ESSENTIAL HYPERTENSION: Chronic | Status: ACTIVE | Noted: 2018-04-12

## 2019-03-02 RX ORDER — ZOLPIDEM TARTRATE 5 MG/1
TABLET ORAL
Qty: 1 TABLET | Refills: 0 | Status: SHIPPED | OUTPATIENT
Start: 2019-03-02 | End: 2019-11-08

## 2019-03-02 NOTE — PROGRESS NOTES
Fatigue (ESS 6), obesity. Comorbid hypertension, diabetes mellitus, history of CVA.  Patient lives alone nobody to tell her if she snores    STOPBANG 4    Sleep schedule not elicited    Sleep questionnaire norable for:  - Uses TV, computer or phone in bed  - Probable inappropriate caffeine use  - Possible sleep onset, maintenance difficulties    10 point ROS negative    Probable insomnia. Polysomnogram (using 4% desaturation/Medicare/2012 AASM 1B scoring rules) for modest probability obstructive sleep apnea.  Ambien if needed. Patient is a poor candidate for Home Sleep Testing due to not high probability of severe SOFIE, possible entral sleep apnea and probable insomnia.    Orders placed

## 2019-03-06 ENCOUNTER — TELEPHONE (OUTPATIENT)
Dept: SLEEP MEDICINE | Facility: HOSPITAL | Age: 74
End: 2019-03-06

## 2019-03-21 ENCOUNTER — TELEPHONE (OUTPATIENT)
Dept: SLEEP MEDICINE | Facility: HOSPITAL | Age: 74
End: 2019-03-21

## 2019-03-22 DIAGNOSIS — E11.51 TYPE II DIABETES MELLITUS WITH PERIPHERAL CIRCULATORY DISORDER (H): ICD-10-CM

## 2019-03-22 NOTE — TELEPHONE ENCOUNTER
Pt states [facility] called and something happened so her metformin will be delayed-asking for metformin to be sent to [facility]. Kalee Busby LPN

## 2019-03-28 DIAGNOSIS — R05.9 COUGH: ICD-10-CM

## 2019-03-28 DIAGNOSIS — J20.9 ACUTE BRONCHITIS, UNSPECIFIED ORGANISM: ICD-10-CM

## 2019-03-29 RX ORDER — ALBUTEROL SULFATE 90 UG/1
AEROSOL, METERED RESPIRATORY (INHALATION)
Qty: 18 G | Refills: 1 | Status: SHIPPED | OUTPATIENT
Start: 2019-03-29 | End: 2019-11-11

## 2019-04-10 NOTE — TELEPHONE ENCOUNTER
Metformin  Last Written Prescription Date: 3/22/19  Last Fill Quantity: 120 # of Refills: 2  Last Office Visit: 2/21/19

## 2019-04-11 DIAGNOSIS — E11.51 TYPE II DIABETES MELLITUS WITH PERIPHERAL CIRCULATORY DISORDER (H): ICD-10-CM

## 2019-04-11 RX ORDER — METFORMIN HCL 500 MG
1000 TABLET, EXTENDED RELEASE 24 HR ORAL 2 TIMES DAILY WITH MEALS
OUTPATIENT
Start: 2019-04-11

## 2019-04-11 NOTE — TELEPHONE ENCOUNTER
Per pt Metformin not XR and needs to go to [facility]. Please remove XR.Regular Metformin pended for [facility].Thank you.

## 2019-04-15 DIAGNOSIS — I73.9 PVD (PERIPHERAL VASCULAR DISEASE) (H): ICD-10-CM

## 2019-04-15 DIAGNOSIS — I63.9 ACUTE ISCHEMIC STROKE (H): ICD-10-CM

## 2019-04-15 RX ORDER — CLOPIDOGREL BISULFATE 75 MG/1
TABLET ORAL
Qty: 90 TABLET | Refills: 1 | Status: SHIPPED | OUTPATIENT
Start: 2019-04-15 | End: 2019-10-12

## 2019-05-12 DIAGNOSIS — E55.9 VITAMIN D DEFICIENCY: ICD-10-CM

## 2019-05-13 RX ORDER — METOPROLOL TARTRATE 50 MG
TABLET ORAL
Qty: 60 TABLET | Refills: 0 | Status: SHIPPED | OUTPATIENT
Start: 2019-05-13 | End: 2019-07-11

## 2019-05-22 ENCOUNTER — TELEPHONE (OUTPATIENT)
Dept: SLEEP MEDICINE | Facility: HOSPITAL | Age: 74
End: 2019-05-22

## 2019-05-22 DIAGNOSIS — E78.5 HYPERLIPIDEMIA LDL GOAL <100: ICD-10-CM

## 2019-05-22 RX ORDER — ATORVASTATIN CALCIUM 80 MG/1
TABLET, FILM COATED ORAL
Qty: 90 TABLET | Refills: 3 | Status: SHIPPED | OUTPATIENT
Start: 2019-05-22 | End: 2020-01-06

## 2019-05-22 NOTE — TELEPHONE ENCOUNTER
Patient has not been seen in over a year for hyperlipidemia.  Has had acute changes in last year.  No future appointment scheduled.  Please advise if you would like to refill medication.  Thank you.

## 2019-05-22 NOTE — TELEPHONE ENCOUNTER
Lipitor       Last Written Prescription Date:  5/25/2018  Last Fill Quantity: 90,   # refills: 3  Last Office Visit: 2/21/2019  Future Office visit:

## 2019-06-09 DIAGNOSIS — E11.51 TYPE II DIABETES MELLITUS WITH PERIPHERAL CIRCULATORY DISORDER (H): ICD-10-CM

## 2019-07-11 DIAGNOSIS — E55.9 VITAMIN D DEFICIENCY: ICD-10-CM

## 2019-07-12 RX ORDER — METOPROLOL TARTRATE 50 MG
TABLET ORAL
Qty: 60 TABLET | Refills: 0 | Status: SHIPPED | OUTPATIENT
Start: 2019-07-12 | End: 2019-09-09

## 2019-08-08 DIAGNOSIS — E11.51 TYPE II DIABETES MELLITUS WITH PERIPHERAL CIRCULATORY DISORDER (H): ICD-10-CM

## 2019-09-05 DIAGNOSIS — I10 HYPERTENSION, UNSPECIFIED TYPE: ICD-10-CM

## 2019-09-09 DIAGNOSIS — E55.9 VITAMIN D DEFICIENCY: ICD-10-CM

## 2019-09-09 RX ORDER — LISINOPRIL 10 MG/1
TABLET ORAL
Qty: 60 TABLET | Refills: 2 | Status: SHIPPED | OUTPATIENT
Start: 2019-09-09 | End: 2019-09-23

## 2019-09-09 NOTE — TELEPHONE ENCOUNTER
Lisinopril       Last Written Prescription Date:  8/27/2019  Last Fill Quantity: 60,   # refills: 0  Last Office Visit: 2/21/2019  Future Office visit:    Next 5 appointments (look out 90 days)    Sep 09, 2019  4:00 PM CDT  (Arrive by 3:45 PM)  SHORT with Yash Cuellar DO  [facility] ([facility] ) [address]  [place] [id number] [phone]

## 2019-09-11 RX ORDER — METOPROLOL TARTRATE 50 MG
TABLET ORAL
Qty: 60 TABLET | Refills: 5 | Status: SHIPPED | OUTPATIENT
Start: 2019-09-11 | End: 2020-01-06

## 2019-09-17 DIAGNOSIS — E83.42 HYPOMAGNESEMIA: ICD-10-CM

## 2019-09-17 NOTE — PROGRESS NOTES
Subjective     Rosalie JANE Friend is a 74 year old female who presents to clinic today for the following health issues:    HPI   Diabetes Follow-up      How often are you checking your blood sugar? Not at all    What time of day are you checking your blood sugars (select all that apply)? n/a    Have you had any blood sugars above 200?  No    Have you had any blood sugars below 70?  No    What symptoms do you notice when your blood sugar is low?  None    What concerns do you have today about your diabetes? None     Do you have any of these symptoms? (Select all that apply)  No numbness or tingling in feet.  No redness, sores or blisters on feet.  No complaints of excessive thirst.  No reports of blurry vision.  No significant changes to weight.     Have you had a diabetic eye exam in the last 12 months? Yes- Date of last eye exam: July 2019    [facility]    Hyperlipidemia Follow-Up      Are you having any of the following symptoms? (Select all that apply)  No complaints of shortness of breath, chest pain or pressure.  No increased sweating or nausea with activity.  No left-sided neck or arm pain.  No complaints of pain in calves when walking 1-2 blocks.    Are you regularly taking any medication or supplement to lower your cholesterol?   Yes- Lipitor    Are you having muscle aches or other side effects that you think could be caused by your cholesterol lowering medication?  No    Hypertension Follow-up      Do you check your blood pressure regularly outside of the clinic? No     Are you following a low salt diet? No    Are your blood pressures ever more than 140 on the top number (systolic) OR more   than 90 on the bottom number (diastolic), for example 140/90? Yes    BP Readings from Last 2 Encounters:   09/19/19 138/70   02/21/19 122/72     Hemoglobin A1C (%)   Date Value   01/03/2019 6.1 (H)   06/15/2018 6.0 (H)     LDL Cholesterol Calculated (mg/dL)   Date Value   06/15/2018 48   02/18/2018 35      Rosalie presents today for follow up.  She has a history of HTN, Hyperlipidemia, Type 2 diabetes and CVA with left extremity weakness.  She is requesting additional PT to try and get some movement in her LUE and she also is requesting a wheelchair.    In addition she reports dry eyes and insomnia.  She otherwise has been doing fairly well and will need routine labs.        Patient Active Problem List   Diagnosis     ACP (advance care planning)     Benign essential hypertension     Type 2 diabetes mellitus with neurologic complication, without long-term current use of insulin (H)     Obesity (BMI 35.0-39.9) with comorbidity (H)     History of CVA (cerebrovascular accident)     Hyperlipidemia     GERD (gastroesophageal reflux disease)     Cerebral aneurysm     PVD (peripheral vascular disease) (H)     COPD (chronic obstructive pulmonary disease) (H)     Past Surgical History:   Procedure Laterality Date     ABDOMEN SURGERY  1997    bladder repair     GYN SURGERY  1974    hysterectomy        Social History     Tobacco Use     Smoking status: Former Smoker     Types: Cigarettes     Start date: 1956     Last attempt to quit: 2017     Years since quittin.3     Smokeless tobacco: Never Used     Tobacco comment: limits self to about 4-5 daily , she is quitting on her own   Substance Use Topics     Alcohol use: No     Family History   Problem Relation Age of Onset     Aneurysm Mother      Other Cancer Father      Diabetes Brother      Hypertension Brother      Other Cancer Brother      Other Cancer Sister      Aneurysm Sister          Current Outpatient Medications   Medication Sig Dispense Refill     aspirin 325 MG tablet Take 325 mg by mouth       atorvastatin (LIPITOR) 80 MG tablet TAKE 1 TABLET DAILY 90 tablet 3     blood glucose monitoring (NO BRAND SPECIFIED) meter device kit Use to test blood sugar 4 times daily or as directed. 1 kit 0     blood glucose monitoring (ONE TOUCH DELICA) lancets Use  to test blood sugar 3 times daily. 360 each 3     clopidogrel (PLAVIX) 75 MG tablet TAKE 1 TABLET DAILY 90 tablet 1     cycloSPORINE (RESTASIS) 0.05 % ophthalmic emulsion Place 1 drop into both eyes 2 times daily 1 Box 1     fluticasone-salmeterol (ADVAIR) 250-50 MCG/DOSE diskus inhaler Inhale 1 puff into the lungs 2 times daily 3 Inhaler 0     GAVILAX powder FILL DOSING CUP TO MARKED LINE (17 GRAMS) THEN  MIX IN LIQUID AND DRINK DAILY 510 g 1     lisinopril (PRINIVIL/ZESTRIL) 10 MG tablet TAKE 2 TABLETS DAILY 60 tablet 2     magnesium oxide (MAG-OX) 400 (241.3 Mg) MG tablet TAKE 1 TABLET TWICE A DAY WITH MEALS 180 tablet 4     melatonin 5 MG CAPS Take 5 mg by mouth nightly as needed (Insomnia) 90 capsule 1     metFORMIN (GLUCOPHAGE-XR) 500 MG 24 hr tablet Take 2 tablets (1,000 mg) by mouth 2 times daily (with meals) 120 tablet 0     metoprolol tartrate (LOPRESSOR) 50 MG tablet TAKE ONE-HALF (1/2) TABLET TWICE A DAY 60 tablet 5     ONETOUCH DELICA LANCETS 33G MISC four times a day.       ONETOUCH VERIO IQ test strip USE FOUR TIMES A  each 3     order for DME Equipment being ordered: Wheelchair with foot rests 1 Device 0     order for DME Equipment being ordered: Glucometer and test strips 1 Device 0     pantoprazole (PROTONIX) 40 MG EC tablet TAKE 1 TABLET DAILY 30 TO 60 MINUTES BEFORE A MEAL 90 tablet 0     VENTOLIN  (90 Base) MCG/ACT inhaler USE 1 TO 2 INHALATIONS EVERY 4 HOURS AS NEEDED FOR SHORTNESS OF BREATH, DIFFICULTY BREATHING,  OR WHEEZING 18 g 1     zolpidem (AMBIEN) 5 MG tablet Take tablet by mouth 15 minutes prior to sleep, for Sleep Study 1 tablet 0     Allergies   Allergen Reactions     Penicillins      Phenylephrine      Head aches     Tropicamide      Head aches       Recent Labs   Lab Test 01/03/19  1419 08/24/18  1110 06/15/18  1341 04/06/18  0858  02/18/18  0905  06/13/17  0820   A1C 6.1*  --  6.0*  --   --  6.7*   < > 8.4*   LDL  --   --  48  --   --  35  --  41   HDL  --   --  58   --   --  54  --  55   TRIG  --   --  104  --   --  78  --  93   ALT 17 21  --  17   < >  --    < > 30   CR 0.79 0.78 0.72 0.81   < > 0.66   < > 0.68   GFRESTIMATED 73 73 79 69   < > 88   < > 86   GFRESTBLACK 85 88 >90 84   < > >90   < > >90  African American GFR Calc     POTASSIUM 4.7 4.5 4.5 4.3   < > 4.0   < > 4.1   TSH 0.95 1.03  --   --   --   --   --  1.30    < > = values in this interval not displayed.      BP Readings from Last 3 Encounters:   09/19/19 138/70   02/21/19 122/72   01/03/19 124/82    Wt Readings from Last 3 Encounters:   09/19/19 99.8 kg (220 lb)   02/21/19 100.7 kg (222 lb)   01/03/19 100.7 kg (222 lb)                    Reviewed and updated as needed this visit by Provider         Review of Systems   ROS COMP: Constitutional, HEENT, cardiovascular, pulmonary, gi and gu systems are negative, except as otherwise noted.      Objective    /70 (BP Location: Left arm, Patient Position: Chair, Cuff Size: Adult Regular)   Pulse 78   Temp 98  F (36.7  C) (Tympanic)   Wt 99.8 kg (220 lb)   SpO2 95%   BMI 36.61 kg/m    Body mass index is 36.61 kg/m .  Physical Exam   GENERAL: Alert and no distress  RESP: lungs clear to auscultation - no rales, rhonchi or wheezes  CV: regular rate and rhythm, normal S1 S2, no S3 or S4, no murmur, click or rub, no peripheral edema and peripheral pulses strong  ABDOMEN: soft, nontender, no hepatosplenomegaly, no masses and bowel sounds normal  MS: no gross musculoskeletal defects noted, no edema  SKIN: no suspicious lesions or rashes  NEURO: Left eye ptosis and Left upper and low extremity weakness    PSYCH: mentation appears normal, affect normal/bright    Diagnostic Test Results:  No results found for this or any previous visit (from the past 24 hour(s)).  Results for orders placed or performed during the hospital encounter of 02/07/19   MRA Brain ([facility] [facility] [place]) wo Contrast    Narrative    PROCEDURE: MRA BRAIN ([place] [facility] [place]) WO CONTRAST 2/7/2019 11:36  AM    HISTORY: fax results to [phone]; Cerebral aneurysm    COMPARISONS: None.    Meds/Dose Given:    TECHNIQUE: CT angiogram of the base the brain was performed using 3-D  time-of-flight method.    FINDINGS: There is total occlusion of the carotid flow on the right at  the level the carotid siphon. The supraclinoid internal carotid artery  reconstitutes via flow via the anterior communicating and posterior  communicating arteries. 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 There appears to be  narrowing of the proximal middle cerebral artery but this most likely  represents artifact from the aneurysm coiling. No flow within the  aneurysm was seen. Beyond the level of coiling, the middle cerebral  artery on the left appears normal. Both anterior cerebral arteries are  predominantly filled via the left horizontal component of the anterior  cerebral artery. The left carotid siphon appears normal. The right  vertebral artery is developmentally small. Dominant flow to the  basilar artery is via the left vertebral artery. Both posterior  cerebral and superior cerebral cerebellar arteries appear normal.         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 There is apparent narrowing of the left middle cerebral  artery at the site of coiling which may be artifactual.  2. 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 There is  reconstitution of the supraclinoid internal carotid artery via flow  from the anterior communicating and posterior communicating arteries    ISH KILLIAN MD           Assessment & Plan   Problem List Items Addressed This Visit        Nervous and Auditory    Type 2 diabetes mellitus with neurologic complication, without long-term current use of insulin (H) - Primary (Chronic)    Relevant Orders    Lipid Profile    Hemoglobin A1c       Respiratory    COPD (chronic obstructive pulmonary disease) (H)  (Chronic)       Endocrine    Hyperlipidemia (Chronic)    Relevant Orders    Lipid Profile       Circulatory    Benign essential hypertension (Chronic)    Relevant Orders    CBC with platelets differential    Comprehensive metabolic panel (BMP + Alb, Alk Phos, ALT, AST, Total. Bili, TP)       Other    History of CVA (cerebrovascular accident) (Chronic)    Relevant Medications    order for DME-Wheelchair with foot rests     Other Relevant Orders    PHYSICAL THERAPY REFERRAL      Other Visit Diagnoses     Dry eyes        Relevant Medications    cycloSPORINE (RESTASIS) 0.05 % ophthalmic emulsion    Primary insomnia        Relevant Medications    melatonin 5 MG CAPS           25 minutes was spent on this patient's care today.  Greater than 50% of the time was spent face to face with the patient and her daughter today.  Care plan discussed and DME written.  All questions answered today.    Yash Cuellar, DO  [facility] [place] [facility]

## 2019-09-18 DIAGNOSIS — E11.49 TYPE 2 DIABETES MELLITUS WITH OTHER NEUROLOGIC COMPLICATION, WITHOUT LONG-TERM CURRENT USE OF INSULIN (H): Primary | Chronic | ICD-10-CM

## 2019-09-18 RX ORDER — METFORMIN HCL 500 MG
1000 TABLET, EXTENDED RELEASE 24 HR ORAL 2 TIMES DAILY WITH MEALS
Qty: 120 TABLET | Refills: 0 | Status: SHIPPED | OUTPATIENT
Start: 2019-09-18 | End: 2019-10-15

## 2019-09-18 NOTE — TELEPHONE ENCOUNTER
Magnesium oxide 400mg      Last Written Prescription Date:  9/4/2018  Last Fill Quantity: 180,   # refills: 3  Last Office Visit: 2/21/2019  Future Office visit:    Next 5 appointments (look out 90 days)    Sep 19, 2019  3:30 PM CDT  (Arrive by 3:15 PM)  SHORT with Yash Cuellar DO  [facility] ([facility] ) [address]  [facility] [id number] [phone]           Routing refill request to provider for review/approval because:  Drug not on the [facility], [facility] or  Health refill protocol or controlled substance

## 2019-09-18 NOTE — TELEPHONE ENCOUNTER
Metformin      Last Written Prescription Date:  Uknown  Last Fill Quantity: 0,   # refills: 0  Last Office Visit: 2/21/19  Future Office visit:    Next 5 appointments (look out 90 days)    Sep 19, 2019  3:30 PM CDT  (Arrive by 3:15 PM)  SHORT with Yash Cuellar DO  [facility] ([facility] ) [address]  [facility] [id number] [phone]           Routing refill request to provider for review/approval because:

## 2019-09-19 ENCOUNTER — OFFICE VISIT (OUTPATIENT)
Dept: INTERNAL MEDICINE | Facility: OTHER | Age: 74
End: 2019-09-19
Attending: INTERNAL MEDICINE
Payer: MEDICARE

## 2019-09-19 VITALS
SYSTOLIC BLOOD PRESSURE: 138 MMHG | OXYGEN SATURATION: 95 % | WEIGHT: 220 LBS | BODY MASS INDEX: 36.61 KG/M2 | TEMPERATURE: 98 F | DIASTOLIC BLOOD PRESSURE: 70 MMHG | HEART RATE: 78 BPM

## 2019-09-19 DIAGNOSIS — I10 BENIGN ESSENTIAL HYPERTENSION: Chronic | ICD-10-CM

## 2019-09-19 DIAGNOSIS — F51.01 PRIMARY INSOMNIA: ICD-10-CM

## 2019-09-19 DIAGNOSIS — E11.40 TYPE 2 DIABETES MELLITUS WITH DIABETIC NEUROPATHY, WITHOUT LONG-TERM CURRENT USE OF INSULIN (H): Primary | Chronic | ICD-10-CM

## 2019-09-19 DIAGNOSIS — J43.1 PANLOBULAR EMPHYSEMA (H): Chronic | ICD-10-CM

## 2019-09-19 DIAGNOSIS — E78.2 MIXED HYPERLIPIDEMIA: Chronic | ICD-10-CM

## 2019-09-19 DIAGNOSIS — H04.123 DRY EYES: ICD-10-CM

## 2019-09-19 DIAGNOSIS — Z86.73 HISTORY OF CVA (CEREBROVASCULAR ACCIDENT): ICD-10-CM

## 2019-09-19 PROCEDURE — 99214 OFFICE O/P EST MOD 30 MIN: CPT | Performed by: INTERNAL MEDICINE

## 2019-09-19 PROCEDURE — G0463 HOSPITAL OUTPT CLINIC VISIT: HCPCS

## 2019-09-19 RX ORDER — CYCLOSPORINE 0.5 MG/ML
1 EMULSION OPHTHALMIC 2 TIMES DAILY
Qty: 1 BOX | Refills: 1 | Status: SHIPPED | OUTPATIENT
Start: 2019-09-19 | End: 2020-05-26

## 2019-09-19 ASSESSMENT — PAIN SCALES - GENERAL: PAINLEVEL: NO PAIN (0)

## 2019-09-19 NOTE — NURSING NOTE
"Chief Complaint   Patient presents with     Hypertension     Diabetes     Lipids       Initial /70 (BP Location: Left arm, Patient Position: Chair, Cuff Size: Adult Regular)   Pulse 78   Temp 98  F (36.7  C) (Tympanic)   Wt 99.8 kg (220 lb)   SpO2 95%   BMI 36.61 kg/m   Estimated body mass index is 36.61 kg/m  as calculated from the following:    Height as of 4/4/18: 1.651 m (5' 5\").    Weight as of this encounter: 99.8 kg (220 lb).  Medication Reconciliation: complete     CHERELLE JACSKON LPN      "

## 2019-09-20 ENCOUNTER — TELEPHONE (OUTPATIENT)
Dept: INTERNAL MEDICINE | Facility: OTHER | Age: 74
End: 2019-09-20

## 2019-09-20 NOTE — TELEPHONE ENCOUNTER
Received a questionnaire from [employer] for Restasis. PA must have been started by someone else. Completed form and faxed in. Waiting for a response.

## 2019-09-23 DIAGNOSIS — I10 HYPERTENSION, UNSPECIFIED TYPE: ICD-10-CM

## 2019-09-23 RX ORDER — LISINOPRIL 10 MG/1
20 TABLET ORAL DAILY
Qty: 60 TABLET | Refills: 5 | Status: SHIPPED | OUTPATIENT
Start: 2019-09-23 | End: 2020-01-06

## 2019-10-17 DIAGNOSIS — E78.2 MIXED HYPERLIPIDEMIA: Chronic | ICD-10-CM

## 2019-10-17 DIAGNOSIS — I10 BENIGN ESSENTIAL HYPERTENSION: Chronic | ICD-10-CM

## 2019-10-17 DIAGNOSIS — E11.40 TYPE 2 DIABETES MELLITUS WITH DIABETIC NEUROPATHY, WITHOUT LONG-TERM CURRENT USE OF INSULIN (H): Chronic | ICD-10-CM

## 2019-10-17 LAB
ALBUMIN SERPL-MCNC: 3.5 G/DL (ref 3.4–5)
ALP SERPL-CCNC: 71 U/L (ref 40–150)
ALT SERPL W P-5'-P-CCNC: 17 U/L (ref 0–50)
ANION GAP SERPL CALCULATED.3IONS-SCNC: 2 MMOL/L (ref 3–14)
AST SERPL W P-5'-P-CCNC: 10 U/L (ref 0–45)
BASOPHILS # BLD AUTO: 0 10E9/L (ref 0–0.2)
BASOPHILS NFR BLD AUTO: 0.7 %
BILIRUB SERPL-MCNC: 0.4 MG/DL (ref 0.2–1.3)
BUN SERPL-MCNC: 14 MG/DL (ref 7–30)
CALCIUM SERPL-MCNC: 9.6 MG/DL (ref 8.5–10.1)
CHLORIDE SERPL-SCNC: 105 MMOL/L (ref 94–109)
CHOLEST SERPL-MCNC: 128 MG/DL
CO2 SERPL-SCNC: 30 MMOL/L (ref 20–32)
CREAT SERPL-MCNC: 0.93 MG/DL (ref 0.52–1.04)
DIFFERENTIAL METHOD BLD: ABNORMAL
EOSINOPHIL # BLD AUTO: 0.1 10E9/L (ref 0–0.7)
EOSINOPHIL NFR BLD AUTO: 2.6 %
ERYTHROCYTE [DISTWIDTH] IN BLOOD BY AUTOMATED COUNT: 13.9 % (ref 10–15)
EST. AVERAGE GLUCOSE BLD GHB EST-MCNC: 151 MG/DL
GFR SERPL CREATININE-BSD FRML MDRD: 60 ML/MIN/{1.73_M2}
GLUCOSE SERPL-MCNC: 125 MG/DL (ref 70–99)
HBA1C MFR BLD: 6.9 % (ref 0–5.6)
HCT VFR BLD AUTO: 40.1 % (ref 35–47)
HDLC SERPL-MCNC: 67 MG/DL
HGB BLD-MCNC: 12.6 G/DL (ref 11.7–15.7)
IMM GRANULOCYTES # BLD: 0 10E9/L (ref 0–0.4)
IMM GRANULOCYTES NFR BLD: 0.2 %
LDLC SERPL CALC-MCNC: 43 MG/DL
LYMPHOCYTES # BLD AUTO: 1.2 10E9/L (ref 0.8–5.3)
LYMPHOCYTES NFR BLD AUTO: 22.2 %
MCH RBC QN AUTO: 25.8 PG (ref 26.5–33)
MCHC RBC AUTO-ENTMCNC: 31.4 G/DL (ref 31.5–36.5)
MCV RBC AUTO: 82 FL (ref 78–100)
MONOCYTES # BLD AUTO: 0.5 10E9/L (ref 0–1.3)
MONOCYTES NFR BLD AUTO: 8.6 %
NEUTROPHILS # BLD AUTO: 3.5 10E9/L (ref 1.6–8.3)
NEUTROPHILS NFR BLD AUTO: 65.7 %
NONHDLC SERPL-MCNC: 61 MG/DL
NRBC # BLD AUTO: 0 10*3/UL
NRBC BLD AUTO-RTO: 0 /100
PLATELET # BLD AUTO: 294 10E9/L (ref 150–450)
POTASSIUM SERPL-SCNC: 4.6 MMOL/L (ref 3.4–5.3)
PROT SERPL-MCNC: 6.9 G/DL (ref 6.8–8.8)
RBC # BLD AUTO: 4.88 10E12/L (ref 3.8–5.2)
SODIUM SERPL-SCNC: 137 MMOL/L (ref 133–144)
TRIGL SERPL-MCNC: 92 MG/DL
WBC # BLD AUTO: 5.4 10E9/L (ref 4–11)

## 2019-10-17 PROCEDURE — 80061 LIPID PANEL: CPT | Mod: ZL | Performed by: INTERNAL MEDICINE

## 2019-10-17 PROCEDURE — 36415 COLL VENOUS BLD VENIPUNCTURE: CPT | Mod: ZL | Performed by: INTERNAL MEDICINE

## 2019-10-17 PROCEDURE — 85025 COMPLETE CBC W/AUTO DIFF WBC: CPT | Mod: ZL | Performed by: INTERNAL MEDICINE

## 2019-10-17 PROCEDURE — 80053 COMPREHEN METABOLIC PANEL: CPT | Mod: ZL | Performed by: INTERNAL MEDICINE

## 2019-10-17 PROCEDURE — 40000788 ZZHCL STATISTIC ESTIMATED AVERAGE GLUCOSE: Mod: ZL | Performed by: INTERNAL MEDICINE

## 2019-10-17 PROCEDURE — 83036 HEMOGLOBIN GLYCOSYLATED A1C: CPT | Mod: ZL | Performed by: INTERNAL MEDICINE

## 2019-10-23 ENCOUNTER — TRANSFERRED RECORDS (OUTPATIENT)
Dept: HEALTH INFORMATION MANAGEMENT | Facility: CLINIC | Age: 74
End: 2019-10-23

## 2019-10-29 ENCOUNTER — TRANSFERRED RECORDS (OUTPATIENT)
Dept: HEALTH INFORMATION MANAGEMENT | Facility: CLINIC | Age: 74
End: 2019-10-29

## 2019-11-08 ENCOUNTER — HOSPITAL ENCOUNTER (EMERGENCY)
Facility: HOSPITAL | Age: 74
Discharge: HOME | End: 2019-11-08
Attending: NURSE PRACTITIONER | Admitting: NURSE PRACTITIONER
Payer: MEDICARE

## 2019-11-08 VITALS
SYSTOLIC BLOOD PRESSURE: 162 MMHG | HEART RATE: 60 BPM | HEIGHT: 66 IN | OXYGEN SATURATION: 97 % | WEIGHT: 220 LBS | DIASTOLIC BLOOD PRESSURE: 94 MMHG | TEMPERATURE: 97.8 F | RESPIRATION RATE: 20 BRPM | BODY MASS INDEX: 35.36 KG/M2

## 2019-11-08 DIAGNOSIS — J44.1 COPD EXACERBATION (H): Primary | ICD-10-CM

## 2019-11-08 LAB — GLUCOSE BLDC GLUCOMTR-MCNC: 115 MG/DL (ref 70–99)

## 2019-11-08 PROCEDURE — 99213 OFFICE O/P EST LOW 20 MIN: CPT | Mod: Z6 | Performed by: NURSE PRACTITIONER

## 2019-11-08 PROCEDURE — G0463 HOSPITAL OUTPT CLINIC VISIT: HCPCS

## 2019-11-08 PROCEDURE — 00000146 ZZHCL STATISTIC GLUCOSE BY METER IP

## 2019-11-08 RX ORDER — DOXYCYCLINE 100 MG/1
100 CAPSULE ORAL 2 TIMES DAILY
Qty: 20 CAPSULE | Refills: 0 | Status: SHIPPED | OUTPATIENT
Start: 2019-11-08 | End: 2020-01-16

## 2019-11-08 ASSESSMENT — MIFFLIN-ST. JEOR: SCORE: 1514.66

## 2019-11-08 NOTE — ED AVS SNAPSHOT
[facility]  [address] [address]  Phone:  [phone]                                    Rosalie JANE Friend   MRN: [id number]    Department:  [facility]   Date of Visit:  11/8/2019           After Visit Summary Signature Page    I have received my discharge instructions, and my questions have been answered. I have discussed any challenges I see with this plan with the nurse or doctor.    ..........................................................................................................................................  Patient/Patient Representative Signature      ..........................................................................................................................................  Patient Representative Print Name and Relationship to Patient    ..................................................               ................................................  Date                                   Time    ..........................................................................................................................................  Reviewed by Signature/Title    ...................................................              ..............................................  Date                                               Time          22EPIC Rev 08/18

## 2019-11-08 NOTE — ED TRIAGE NOTES
Pt presents today with c/o productive cough, difficultly breathing, chills, SOB ( when moving/standing up).  Pt is able to breath deep. Denies fevers. Started 2 weeks ago. Not getting worse, just tired of cough. Hx of COPD. Was a smoker for 60 years.

## 2019-11-08 NOTE — DISCHARGE INSTRUCTIONS
Take antibiotic as prescribed.  Continue using your inhaler as needed.    Follow-up with Dr. Hoskins to establish care on 11/26/2019.    Return to emergency department for worsening or concerning symptoms.

## 2019-11-08 NOTE — ED PROVIDER NOTES
History     Chief Complaint   Patient presents with     Cough     X 2 weeks with white colored sputum     HPI  Rosalie JANE Friend is a 74 year old female PMHx diabetes mellitus and COPD who presents with daughter and granddaughter for a cough. Onset 2 weeks ago. She reports productive cough, chest congestion, chills and 1 episode of vomiting after a coughing spell. She has been using her ventolin inhaler with which helps with her breathing. She states she hardly ever uses her inhaler. Denies CP, fever, headache, nausea or vomiting. Has tried robitussin with minimal effectiveness. States her blood sugar has been okay.     Allergies:  Allergies   Allergen Reactions     Penicillins      Phenylephrine      Head aches     Tropicamide      Head aches         Problem List:    Patient Active Problem List    Diagnosis Date Noted     History of CVA (cerebrovascular accident) 03/02/2019     Priority: High     5/6/17 sudden onset right vision loss and left side weakness, which resolved by the time of arrival to ED in [place]. CT of head and CT angiogram showed R ICA occlusion. L distal ICA, and R MCA 8mm 6mm aneurysms. Neurology was consulted and pt was transferred to [facility] for further management.  mg and lipitor 80 mg was started. Pt was found elevated HgA1c at 10. Imaging showed R temporal lobe infarct on CT and Angio showed R GOPI occlusion with no intracranial occlusion and L ICA and R MCA aneurysms. Strong family hx of ruptured aneurysms.  Seen at [facility] for an incidental finding of bilat ICA aneurysms. She had diagnostic cerebral angiogram with endovascular coil embolization of the larger left ICA aneurysm. The right ICA aneurysm will be monitored with serial imaging.     2/15/2018 acute onset weakness of her left upper extremity. MR angiogram of the head and neck and MR of the brain, which revealed an acute lacunar infarct in the right centrum semiovale region in an area of old ischemia. Multiple  atherosclerotic lesions with complete occlusion of her right internal carotid and other more distal lesions as well. (clopidogrel was added)       Hyperlipidemia 03/02/2019     Priority: Medium     Cerebral aneurysm 03/02/2019     Priority: Medium     Cerebral aneurysm without rupture, left ICA, s/p endovascular coiling 05/2017       PVD (peripheral vascular disease) (H) 03/02/2019     Priority: Medium     ankle-brachial index 2018 1.02 on the right and 0.52 on the left, consistent with moderate ischemia of the left lower extremity.        COPD (chronic obstructive pulmonary disease) (H) 03/02/2019     Priority: Medium     PFTS 5/2018 - FEV1- 2.13 (74%), ratio 0.67, 20% change with bronchodilators,  TLC 99%, %, DLCO 60%        Obesity (BMI 35.0-39.9) with comorbidity (H) 01/03/2019     Priority: Medium     Benign essential hypertension 04/12/2018     Priority: Medium     Type 2 diabetes mellitus with neurologic complication, without long-term current use of insulin (H) 04/12/2018     Priority: Medium     ACP (advance care planning) 05/09/2017     Priority: Medium     Advance Care Planning 5/9/2017: ACP Review of Chart / Resources Provided:  Reviewed chart for advance care plan.  Rosalie JANE Friend has been provided information and resources to begin or update their advance care plan.  Added by Kalee Busby             GERD (gastroesophageal reflux disease) 03/02/2019     Priority: Low        Past Medical History:    Past Medical History:   Diagnosis Date     Acute ischemic stroke (H) 02/18/2018     Cerebral infarction (H) 05/06/2017       Past Surgical History:    Past Surgical History:   Procedure Laterality Date     ABDOMEN SURGERY  04/1997    bladder repair     GYN SURGERY  04/1974    hysterectomy        Family History:    Family History   Problem Relation Age of Onset     Aneurysm Mother      Other Cancer Father      Diabetes Brother      Hypertension Brother      Other Cancer Brother      Other Cancer  "Sister      Aneurysm Sister        Social History:  Marital Status:   [4]  Social History     Tobacco Use     Smoking status: Former Smoker     Types: Cigarettes     Start date: 1956     Last attempt to quit: 2017     Years since quittin.5     Smokeless tobacco: Never Used     Tobacco comment: limits self to about 4-5 daily , she is quitting on her own   Substance Use Topics     Alcohol use: No     Drug use: No        Medications:    atorvastatin (LIPITOR) 80 MG tablet  clopidogrel (PLAVIX) 75 MG tablet  doxycycline hyclate (VIBRAMYCIN) 100 MG capsule  lisinopril (PRINIVIL/ZESTRIL) 10 MG tablet  melatonin 5 MG CAPS  metFORMIN (GLUCOPHAGE-XR) 500 MG 24 hr tablet  metoprolol tartrate (LOPRESSOR) 50 MG tablet  pantoprazole (PROTONIX) 40 MG EC tablet  aspirin 325 MG tablet  blood glucose monitoring (NO BRAND SPECIFIED) meter device kit  blood glucose monitoring (ONE TOUCH DELICA) lancets  cycloSPORINE (RESTASIS) 0.05 % ophthalmic emulsion  GAVILAX powder  ONETOUCH DELICA LANCETS 33G MISC  ONETOUCH VERIO IQ test strip  order for DME  order for DME  VENTOLIN  (90 Base) MCG/ACT inhaler          Review of Systems   Constitutional: Positive for chills. Negative for fever.   HENT: Positive for congestion.    Respiratory: Positive for cough and shortness of breath.    Gastrointestinal: Negative for nausea and vomiting.   All other systems reviewed and are negative.      Physical Exam   BP: (!) 181/82  Pulse: 60  Temp: 97.8  F (36.6  C)  Resp: 20  Height: 167.6 cm (5' 6\")  Weight: 99.8 kg (220 lb)  SpO2: 97 %      Physical Exam  Vitals signs and nursing note reviewed.   Constitutional:       Appearance: Normal appearance.   HENT:      Right Ear: Tympanic membrane and ear canal normal.      Left Ear: Tympanic membrane and ear canal normal.      Mouth/Throat:      Mouth: Mucous membranes are moist.   Neck:      Musculoskeletal: Neck supple.   Cardiovascular:      Rate and Rhythm: Normal rate.      Heart " sounds: Normal heart sounds.   Pulmonary:      Effort: Pulmonary effort is normal.      Breath sounds: No wheezing, rhonchi or rales.      Comments: Congested cough heard during exam.  Skin:     Capillary Refill: Capillary refill takes less than 2 seconds.   Neurological:      Mental Status: She is alert and oriented to person, place, and time.         ED Course        Procedures         No results found for this or any previous visit (from the past 24 hour(s)).    Medications - No data to display    Assessments & Plan (with Medical Decision Making)   COPD exacerbation:  Patient presents with productive cough x2 weeks.  Bilateral lung sounds CTA.  Heart rate and rhythm regular.  Patient afebrile and oxygen saturation at 97% on room air.  Respirations nonlabored.  Will prescribe doxycycline.  Patient feels shortness of breath is controlled with the use of her albuterol inhaler as needed.  Discussed with patient to continue using inhaler.  Follow-up with PCP if no improvement in symptoms return to emergency department for worsening or concerning symptoms.  Patient verbalized understanding.    I have reviewed the nursing notes.    I have reviewed the findings, diagnosis, plan and need for follow up with the patient.      New Prescriptions    DOXYCYCLINE HYCLATE (VIBRAMYCIN) 100 MG CAPSULE    Take 1 capsule (100 mg) by mouth 2 times daily for 10 days       Final diagnoses:   COPD exacerbation (H)       11/8/2019   [facility]     Isis Grigsby, CNP  11/09/19 0929

## 2019-11-09 ASSESSMENT — ENCOUNTER SYMPTOMS
NAUSEA: 0
COUGH: 1
VOMITING: 0
CHILLS: 1
FEVER: 0
SHORTNESS OF BREATH: 1

## 2019-11-14 ENCOUNTER — TELEPHONE (OUTPATIENT)
Dept: INTERNAL MEDICINE | Facility: OTHER | Age: 74
End: 2019-11-14

## 2019-11-14 DIAGNOSIS — E11.49 TYPE 2 DIABETES MELLITUS WITH OTHER NEUROLOGIC COMPLICATION, WITHOUT LONG-TERM CURRENT USE OF INSULIN (H): Chronic | ICD-10-CM

## 2019-11-14 RX ORDER — METFORMIN HCL 500 MG
TABLET, EXTENDED RELEASE 24 HR ORAL
Qty: 120 TABLET | Refills: 3 | Status: SHIPPED | OUTPATIENT
Start: 2019-11-14 | End: 2020-01-06

## 2019-11-14 NOTE — TELEPHONE ENCOUNTER
Family is cleaning [employer]. They misplaced or threw away her metformin. She will be out on Saturday. Requesting a early fill. Order pending     CHERELLE JACKSON LPN

## 2019-11-21 ENCOUNTER — TRANSFERRED RECORDS (OUTPATIENT)
Dept: HEALTH INFORMATION MANAGEMENT | Facility: CLINIC | Age: 74
End: 2019-11-21

## 2019-12-19 DIAGNOSIS — E55.9 VITAMIN D DEFICIENCY: ICD-10-CM

## 2019-12-19 NOTE — TELEPHONE ENCOUNTER
Vit D2 50,000 nits 1 weekly  Last visit date with prescribing provider: 9-  Last refill date: no hx  Quantity: ?, Refills: ?    Addie Hernandez LPN

## 2019-12-23 RX ORDER — ERGOCALCIFEROL 1.25 MG/1
CAPSULE, LIQUID FILLED ORAL
Qty: 8 CAPSULE | Refills: 6 | Status: SHIPPED | OUTPATIENT
Start: 2019-12-23 | End: 2020-01-06

## 2020-01-06 DIAGNOSIS — K21.9 GASTROESOPHAGEAL REFLUX DISEASE WITHOUT ESOPHAGITIS: ICD-10-CM

## 2020-01-06 DIAGNOSIS — E11.49 TYPE 2 DIABETES MELLITUS WITH OTHER NEUROLOGIC COMPLICATION, WITHOUT LONG-TERM CURRENT USE OF INSULIN (H): Chronic | ICD-10-CM

## 2020-01-06 DIAGNOSIS — E55.9 VITAMIN D DEFICIENCY: ICD-10-CM

## 2020-01-06 DIAGNOSIS — F51.01 PRIMARY INSOMNIA: ICD-10-CM

## 2020-01-06 DIAGNOSIS — I10 HYPERTENSION, UNSPECIFIED TYPE: ICD-10-CM

## 2020-01-06 DIAGNOSIS — I73.9 PVD (PERIPHERAL VASCULAR DISEASE) (H): ICD-10-CM

## 2020-01-06 DIAGNOSIS — E83.42 HYPOMAGNESEMIA: ICD-10-CM

## 2020-01-06 DIAGNOSIS — K59.00 CONSTIPATION, UNSPECIFIED CONSTIPATION TYPE: ICD-10-CM

## 2020-01-06 DIAGNOSIS — I63.9 ACUTE ISCHEMIC STROKE (H): ICD-10-CM

## 2020-01-06 DIAGNOSIS — E78.5 HYPERLIPIDEMIA LDL GOAL <100: ICD-10-CM

## 2020-01-06 RX ORDER — METFORMIN HCL 500 MG
TABLET, EXTENDED RELEASE 24 HR ORAL
Qty: 360 TABLET | Refills: 3 | Status: SHIPPED | OUTPATIENT
Start: 2020-01-06 | End: 2020-01-09

## 2020-01-06 RX ORDER — LISINOPRIL 10 MG/1
20 TABLET ORAL DAILY
Qty: 180 TABLET | Refills: 3 | Status: SHIPPED | OUTPATIENT
Start: 2020-01-06 | End: 2020-12-21

## 2020-01-06 RX ORDER — POLYETHYLENE GLYCOL 3350 17 G/17G
POWDER, FOR SOLUTION ORAL
Qty: 510 G | Refills: 1 | Status: SHIPPED | OUTPATIENT
Start: 2020-01-06 | End: 2021-05-31

## 2020-01-06 RX ORDER — ATORVASTATIN CALCIUM 80 MG/1
80 TABLET, FILM COATED ORAL DAILY
Qty: 90 TABLET | Refills: 3 | Status: SHIPPED | OUTPATIENT
Start: 2020-01-06 | End: 2020-12-21

## 2020-01-06 RX ORDER — ERGOCALCIFEROL 1.25 MG/1
CAPSULE, LIQUID FILLED ORAL
Qty: 8 CAPSULE | Refills: 6 | Status: SHIPPED | OUTPATIENT
Start: 2020-01-06 | End: 2021-05-31

## 2020-01-06 RX ORDER — CLOPIDOGREL BISULFATE 75 MG/1
75 TABLET ORAL DAILY
Qty: 90 TABLET | Refills: 3 | Status: SHIPPED | OUTPATIENT
Start: 2020-01-06 | End: 2020-12-21

## 2020-01-06 RX ORDER — PANTOPRAZOLE SODIUM 40 MG/1
TABLET, DELAYED RELEASE ORAL
Qty: 90 TABLET | Refills: 2 | Status: SHIPPED | OUTPATIENT
Start: 2020-01-06 | End: 2020-03-03

## 2020-01-06 RX ORDER — METOPROLOL TARTRATE 50 MG
TABLET ORAL
Qty: 90 TABLET | Refills: 3 | Status: SHIPPED | OUTPATIENT
Start: 2020-01-06 | End: 2020-12-21

## 2020-01-06 NOTE — TELEPHONE ENCOUNTER
Patient needs to have all her medications now sent to [facility] mail order. Medications pending   CHERELLE JACKSON LPN

## 2020-01-07 DIAGNOSIS — J20.9 ACUTE BRONCHITIS, UNSPECIFIED ORGANISM: ICD-10-CM

## 2020-01-07 DIAGNOSIS — R05.9 COUGH: ICD-10-CM

## 2020-01-07 NOTE — TELEPHONE ENCOUNTER
VENTOLIN  (90 Base) MCG/ACT inhaler  Last visit date with prescribing provider: 9-  Last refill date: 11-  Quantity: 18 g , Refills: 11    Addie Hernandez LPN

## 2020-01-08 RX ORDER — ALBUTEROL SULFATE 90 UG/1
AEROSOL, METERED RESPIRATORY (INHALATION)
Qty: 18 G | Refills: 11 | Status: SHIPPED | OUTPATIENT
Start: 2020-01-08 | End: 2022-06-06

## 2020-01-09 DIAGNOSIS — E11.49 TYPE 2 DIABETES MELLITUS WITH OTHER NEUROLOGIC COMPLICATION, WITHOUT LONG-TERM CURRENT USE OF INSULIN (H): Chronic | ICD-10-CM

## 2020-01-09 RX ORDER — METFORMIN HCL 500 MG
TABLET, EXTENDED RELEASE 24 HR ORAL
Qty: 360 TABLET | Refills: 0 | Status: SHIPPED | OUTPATIENT
Start: 2020-01-09 | End: 2020-04-14

## 2020-01-16 ENCOUNTER — TELEPHONE (OUTPATIENT)
Dept: INTERNAL MEDICINE | Facility: OTHER | Age: 75
End: 2020-01-16

## 2020-01-16 DIAGNOSIS — J06.9 UPPER RESPIRATORY TRACT INFECTION, UNSPECIFIED TYPE: Primary | ICD-10-CM

## 2020-01-16 RX ORDER — DOXYCYCLINE 100 MG/1
100 CAPSULE ORAL 2 TIMES DAILY
Qty: 20 CAPSULE | Refills: 0 | Status: SHIPPED | OUTPATIENT
Start: 2020-01-16 | End: 2020-03-03

## 2020-01-16 NOTE — TELEPHONE ENCOUNTER
Patient is requesting a antibiotic due to her cough. History of COPD. She states the last antibiotic helped symptoms. Please advise   CHERELLE JACKSON LPN

## 2020-01-16 NOTE — TELEPHONE ENCOUNTER
"Pt's daughter called, pt would like to see PCP today for cough. Daughter reports pt has had a cough \"for months.\" No appts available with PCP. Offered appts with covering providers but daughter declines. Offered to send overbook to PCP but daughter also declines. States that she will bring pt to UC.   "

## 2020-02-18 ENCOUNTER — TELEPHONE (OUTPATIENT)
Dept: INTERNAL MEDICINE | Facility: OTHER | Age: 75
End: 2020-02-18

## 2020-02-18 DIAGNOSIS — I67.1 CEREBRAL ANEURYSM: Primary | ICD-10-CM

## 2020-03-03 ENCOUNTER — OFFICE VISIT (OUTPATIENT)
Dept: INTERNAL MEDICINE | Facility: OTHER | Age: 75
End: 2020-03-03
Attending: INTERNAL MEDICINE
Payer: MEDICARE

## 2020-03-03 VITALS
HEART RATE: 57 BPM | SYSTOLIC BLOOD PRESSURE: 136 MMHG | TEMPERATURE: 97.6 F | OXYGEN SATURATION: 94 % | WEIGHT: 220 LBS | DIASTOLIC BLOOD PRESSURE: 74 MMHG | BODY MASS INDEX: 35.51 KG/M2

## 2020-03-03 DIAGNOSIS — E11.40 TYPE 2 DIABETES MELLITUS WITH DIABETIC NEUROPATHY, WITHOUT LONG-TERM CURRENT USE OF INSULIN (H): Primary | Chronic | ICD-10-CM

## 2020-03-03 DIAGNOSIS — J43.8 OTHER EMPHYSEMA (H): Chronic | ICD-10-CM

## 2020-03-03 DIAGNOSIS — I10 BENIGN ESSENTIAL HYPERTENSION: Chronic | ICD-10-CM

## 2020-03-03 DIAGNOSIS — E78.2 MIXED HYPERLIPIDEMIA: Chronic | ICD-10-CM

## 2020-03-03 DIAGNOSIS — L60.0 INGROWN NAIL: ICD-10-CM

## 2020-03-03 LAB
ALBUMIN SERPL-MCNC: 3.5 G/DL (ref 3.4–5)
ALP SERPL-CCNC: 80 U/L (ref 40–150)
ALT SERPL W P-5'-P-CCNC: 20 U/L (ref 0–50)
ANION GAP SERPL CALCULATED.3IONS-SCNC: 7 MMOL/L (ref 3–14)
AST SERPL W P-5'-P-CCNC: 10 U/L (ref 0–45)
BASOPHILS # BLD AUTO: 0 10E9/L (ref 0–0.2)
BASOPHILS NFR BLD AUTO: 0.6 %
BILIRUB SERPL-MCNC: 0.4 MG/DL (ref 0.2–1.3)
BUN SERPL-MCNC: 15 MG/DL (ref 7–30)
CALCIUM SERPL-MCNC: 9.4 MG/DL (ref 8.5–10.1)
CHLORIDE SERPL-SCNC: 106 MMOL/L (ref 94–109)
CHOLEST SERPL-MCNC: 128 MG/DL
CO2 SERPL-SCNC: 26 MMOL/L (ref 20–32)
CREAT SERPL-MCNC: 0.77 MG/DL (ref 0.52–1.04)
DIFFERENTIAL METHOD BLD: ABNORMAL
EOSINOPHIL # BLD AUTO: 0.1 10E9/L (ref 0–0.7)
EOSINOPHIL NFR BLD AUTO: 2.4 %
ERYTHROCYTE [DISTWIDTH] IN BLOOD BY AUTOMATED COUNT: 13.8 % (ref 10–15)
EST. AVERAGE GLUCOSE BLD GHB EST-MCNC: 157 MG/DL
GFR SERPL CREATININE-BSD FRML MDRD: 75 ML/MIN/{1.73_M2}
GLUCOSE SERPL-MCNC: 142 MG/DL (ref 70–99)
HBA1C MFR BLD: 7.1 % (ref 0–5.6)
HCT VFR BLD AUTO: 40.3 % (ref 35–47)
HDLC SERPL-MCNC: 64 MG/DL
HGB BLD-MCNC: 12.8 G/DL (ref 11.7–15.7)
LDLC SERPL CALC-MCNC: 44 MG/DL
LYMPHOCYTES # BLD AUTO: 1.1 10E9/L (ref 0.8–5.3)
LYMPHOCYTES NFR BLD AUTO: 21.1 %
MCH RBC QN AUTO: 26.2 PG (ref 26.5–33)
MCHC RBC AUTO-ENTMCNC: 31.8 G/DL (ref 31.5–36.5)
MCV RBC AUTO: 83 FL (ref 78–100)
MONOCYTES # BLD AUTO: 0.4 10E9/L (ref 0–1.3)
MONOCYTES NFR BLD AUTO: 8.1 %
NEUTROPHILS # BLD AUTO: 3.6 10E9/L (ref 1.6–8.3)
NEUTROPHILS NFR BLD AUTO: 67.8 %
NONHDLC SERPL-MCNC: 64 MG/DL
PLATELET # BLD AUTO: 302 10E9/L (ref 150–450)
POTASSIUM SERPL-SCNC: 4.5 MMOL/L (ref 3.4–5.3)
PROT SERPL-MCNC: 6.9 G/DL (ref 6.8–8.8)
RBC # BLD AUTO: 4.88 10E12/L (ref 3.8–5.2)
SODIUM SERPL-SCNC: 139 MMOL/L (ref 133–144)
TRIGL SERPL-MCNC: 99 MG/DL
TSH SERPL DL<=0.005 MIU/L-ACNC: 0.65 MU/L (ref 0.4–4)
WBC # BLD AUTO: 5.3 10E9/L (ref 4–11)

## 2020-03-03 PROCEDURE — 36415 COLL VENOUS BLD VENIPUNCTURE: CPT | Mod: ZL | Performed by: INTERNAL MEDICINE

## 2020-03-03 PROCEDURE — 80053 COMPREHEN METABOLIC PANEL: CPT | Mod: ZL | Performed by: INTERNAL MEDICINE

## 2020-03-03 PROCEDURE — 84443 ASSAY THYROID STIM HORMONE: CPT | Mod: ZL | Performed by: INTERNAL MEDICINE

## 2020-03-03 PROCEDURE — 85025 COMPLETE CBC W/AUTO DIFF WBC: CPT | Mod: ZL | Performed by: INTERNAL MEDICINE

## 2020-03-03 PROCEDURE — G0463 HOSPITAL OUTPT CLINIC VISIT: HCPCS

## 2020-03-03 PROCEDURE — 80061 LIPID PANEL: CPT | Mod: ZL | Performed by: INTERNAL MEDICINE

## 2020-03-03 PROCEDURE — 83036 HEMOGLOBIN GLYCOSYLATED A1C: CPT | Mod: ZL | Performed by: INTERNAL MEDICINE

## 2020-03-03 PROCEDURE — 99214 OFFICE O/P EST MOD 30 MIN: CPT | Performed by: INTERNAL MEDICINE

## 2020-03-03 PROCEDURE — 40000788 ZZHCL STATISTIC ESTIMATED AVERAGE GLUCOSE: Mod: ZL | Performed by: INTERNAL MEDICINE

## 2020-03-03 ASSESSMENT — PAIN SCALES - GENERAL: PAINLEVEL: NO PAIN (0)

## 2020-03-10 ENCOUNTER — HOSPITAL ENCOUNTER (OUTPATIENT)
Dept: MRI IMAGING | Facility: HOSPITAL | Age: 75
Discharge: HOME | End: 2020-03-10
Attending: INTERNAL MEDICINE | Admitting: INTERNAL MEDICINE
Payer: MEDICARE

## 2020-03-10 DIAGNOSIS — I67.1 CEREBRAL ANEURYSM: ICD-10-CM

## 2020-03-10 PROCEDURE — 70544 MR ANGIOGRAPHY HEAD W/O DYE: CPT | Mod: TC

## 2020-03-10 NOTE — RESULT ENCOUNTER NOTE
Pt notified of results, please send these results to Dr. Ramsey at [facility]  Henny Vincent

## 2020-04-09 ENCOUNTER — TELEPHONE (OUTPATIENT)
Dept: INTERNAL MEDICINE | Facility: OTHER | Age: 75
End: 2020-04-09

## 2020-04-14 NOTE — TELEPHONE ENCOUNTER
Patient returned call and was informed. Stated to contact the pharmacy to check the status and call back with any further concerns. Ashley A. Lechevalier, LPN on 4/14/2020 at 10:13 AM

## 2020-04-20 ENCOUNTER — TELEPHONE (OUTPATIENT)
Dept: INTERNAL MEDICINE | Facility: OTHER | Age: 75
End: 2020-04-20

## 2020-04-21 ENCOUNTER — TELEPHONE (OUTPATIENT)
Dept: INTERNAL MEDICINE | Facility: OTHER | Age: 75
End: 2020-04-21

## 2020-04-21 NOTE — TELEPHONE ENCOUNTER
Patient was not available for an appointment. Patient's daughter said she called yesterday and they were just wondering if the magnesium oxide was necessary to continue taking. Patient's insurance no longer covers it and wants to know if she can stop taking it. Please advise.

## 2020-04-23 ENCOUNTER — TELEPHONE (OUTPATIENT)
Dept: INTERNAL MEDICINE | Facility: OTHER | Age: 75
End: 2020-04-23

## 2020-04-23 DIAGNOSIS — N30.01 ACUTE CYSTITIS WITH HEMATURIA: ICD-10-CM

## 2020-04-23 DIAGNOSIS — R31.9 BLOOD IN URINE: Primary | ICD-10-CM

## 2020-04-23 NOTE — TELEPHONE ENCOUNTER
Notified that lab orders were placed and may go to [facility] to do as [facility] clinic is closed at this time.

## 2020-04-23 NOTE — TELEPHONE ENCOUNTER
Daughter Monika called stating the patient may have seen blood in her urine.  Monika would like to get her urine tested.  Monika states she would come and  a urine specimen cup  if appropriate.  Denies any other symptoms at this time.      [phone]

## 2020-05-26 DIAGNOSIS — H04.123 DRY EYES: ICD-10-CM

## 2020-05-26 RX ORDER — CYCLOSPORINE 0.5 MG/ML
1 EMULSION OPHTHALMIC 2 TIMES DAILY
Qty: 1 BOX | Refills: 1 | Status: SHIPPED | OUTPATIENT
Start: 2020-05-26 | End: 2021-01-12

## 2020-05-26 NOTE — TELEPHONE ENCOUNTER
cycloSPORINE (RESTASIS) 0.05 % ophthalmic emulsion      Last Written Prescription Date:  9/19/19  Last Fill Quantity: 1,   # refills: 1  Last Office Visit: 3/3/20  Future Office visit:       Routing refill request to provider for review/approval because:  Drug not on the G, P or [facility] refill protocol or controlled substance

## 2020-06-02 ENCOUNTER — TELEPHONE (OUTPATIENT)
Dept: FAMILY MEDICINE | Facility: OTHER | Age: 75
End: 2020-06-02

## 2020-06-02 NOTE — TELEPHONE ENCOUNTER
Received a call from daughter that patient's insurance was going to send a prior auth for the restasis eye drops. Daughter stated they are in need of eye drops please advise.

## 2020-06-03 ENCOUNTER — TELEPHONE (OUTPATIENT)
Dept: INTERNAL MEDICINE | Facility: OTHER | Age: 75
End: 2020-06-03

## 2020-06-03 NOTE — TELEPHONE ENCOUNTER
Received a PA from [employer] for Restasis Multidose 0.05% emulsion. Submitted on CMM. Waiting for a response.

## 2020-06-08 NOTE — TELEPHONE ENCOUNTER
Received an APPROVAL from [employer] for Restasis Multidose 0.05% emulsion. Effective 06/03/2020 to 06/03/2021. Forms scanned to Epic.

## 2020-07-07 ENCOUNTER — TELEPHONE (OUTPATIENT)
Dept: INTERNAL MEDICINE | Facility: OTHER | Age: 75
End: 2020-07-07

## 2020-07-07 NOTE — TELEPHONE ENCOUNTER
"Attempted again to call [facility], per recorded message they are still experiencing \"technical difficulties.\" Will attempt to call pharmacy later.  "

## 2020-07-07 NOTE — TELEPHONE ENCOUNTER
"Pt's daughter called with concerns related to metformin recall. Daughter states that she spoke with [employer] regarding recall and they stated that no action was necessary at this time. Daughter states that a few days later they received a letter from [employer] stating that metformin had been recalled and to contact provider for a different medication. Daughter is wondering if pt can stop taking medication. She was advised to have pt continue medication for now.     Attempted to call [employer] x2, per recorded message they are experiencing \"technical difficulties.\" Will attempt to call pharmacy later.   "

## 2020-07-07 NOTE — TELEPHONE ENCOUNTER
Called and spoke with staff at [facility]. Per staff, pt has not received metformin since 3/31/20 and this medication is not recalled. Staff do state that medication pt received in January is part of recall however pt should not have any of this medication remaining.     Pt's daughter did mention that pt had received metformin from [facility] as well. Called and spoke with that pharmacy, med last filled 12/20/19 and per pharmacy this batch was not effected by recall.     LM for pt to call back.

## 2020-07-15 DIAGNOSIS — E11.49 TYPE 2 DIABETES MELLITUS WITH OTHER NEUROLOGIC COMPLICATION, WITHOUT LONG-TERM CURRENT USE OF INSULIN (H): Chronic | ICD-10-CM

## 2020-07-15 RX ORDER — METFORMIN HCL 500 MG
TABLET, EXTENDED RELEASE 24 HR ORAL
Qty: 360 TABLET | Refills: 0 | Status: SHIPPED | OUTPATIENT
Start: 2020-07-15 | End: 2021-01-12

## 2020-07-17 ENCOUNTER — TELEPHONE (OUTPATIENT)
Dept: INTERNAL MEDICINE | Facility: OTHER | Age: 75
End: 2020-07-17

## 2020-07-17 NOTE — TELEPHONE ENCOUNTER
Pt and daughter called, report that pt has been increasingly fatigued over the past few months. No other symptoms. Daughter and pt would like to discuss this with PCP. No appts available until August. Pt would prefer telephone visit. Please advise. Thank you!

## 2020-07-20 NOTE — TELEPHONE ENCOUNTER
Patient unable to do 2:30 as she has another appointment at 2pm.  Advised could see another provider or UC.

## 2020-09-26 DIAGNOSIS — K21.9 GASTROESOPHAGEAL REFLUX DISEASE WITHOUT ESOPHAGITIS: ICD-10-CM

## 2020-09-28 RX ORDER — PANTOPRAZOLE SODIUM 40 MG/1
TABLET, DELAYED RELEASE ORAL
Qty: 90 TABLET | Refills: 2 | Status: SHIPPED | OUTPATIENT
Start: 2020-09-28 | End: 2021-06-10

## 2020-10-20 ENCOUNTER — TELEPHONE (OUTPATIENT)
Dept: INTERNAL MEDICINE | Facility: OTHER | Age: 75
End: 2020-10-20

## 2020-10-20 NOTE — TELEPHONE ENCOUNTER
Patients daughter called stating patient has concerns about patient's eyes. Daughter states patient is tired all the time from her eyes and states her eyes are dry.  Daughter states patient has see PCP for issue in the past. Daughter states patient has seen eye doctor with a normal eye exam. Writer unable to get complete history due to daughter not being by patient and declined having patient call clinic. Patient scheduled to see PCP Thursday. Nurse advised daughter to call back or have patient seen in UC/ED if symptoms worsen. Daughter verbalized understanding.

## 2021-01-08 NOTE — PROGRESS NOTES
Assessment & Plan   Problem List Items Addressed This Visit        Nervous and Auditory    Type 2 diabetes mellitus with neurologic complication, without long-term current use of insulin (H) (Chronic)    Relevant Medications    metFORMIN (GLUCOPHAGE-XR) 500 MG 24 hr tablet    metFORMIN (GLUCOPHAGE-XR) 500 MG 24 hr tablet    Other Relevant Orders    Albumin Random Urine Quantitative with Creat Ratio (Completed)    Lipid Profile (Completed)    Comprehensive metabolic panel (Completed)    TSH with free T4 reflex (Completed)    Hemoglobin A1c (Completed)    CBC with platelets differential (Completed)      Other Visit Diagnoses     Vitamin D deficiency    -  Primary    Relevant Orders    Vitamin D Deficiency (Completed)           30 minutes spent on the date of the encounter doing chart review, review of test results, interpretation of tests, patient visit, documentation and discussion with family            Yash Cuellar, DO  [facility]     Rosalie is a 75 year old who presents to clinic today for the following health issues     HPI   Rosalie is a 75 year old with history of CVA and chronic left sided deficits, along with a history of HTN, Hyperlipidemia and type two diabetes.  She has no specific complaints today.  Her and her daughter have sever questions about the Covid vaccine.  Rosalie still reports fatigue on a daily basis.  No chest pain and no fevers are reported.      Diabetes Follow-up      How often are you checking your blood sugar? Not at all    What concerns do you have today about your diabetes? None     Do you have any of these symptoms? (Select all that apply)  No numbness or tingling in feet.  No redness, sores or blisters on feet.  No complaints of excessive thirst.  No reports of blurry vision.  No significant changes to weight.    Have you had a diabetic eye exam in the last 12 months? YES           Hyperlipidemia Follow-Up      Are you regularly  taking any medication or supplement to lower your cholesterol?   Yes- lipitor    Are you having muscle aches or other side effects that you think could be caused by your cholesterol lowering medication?  No    Hypertension Follow-up      Do you check your blood pressure regularly outside of the clinic? No     Are you following a low salt diet? Yes    Are your blood pressures ever more than 140 on the top number (systolic) OR more   than 90 on the bottom number (diastolic), for example 140/90? No    BP Readings from Last 2 Encounters:   01/12/21 130/78   03/03/20 136/74     Hemoglobin A1C (%)   Date Value   03/03/2020 7.1 (H)   10/17/2019 6.9 (H)     LDL Cholesterol Calculated (mg/dL)   Date Value   03/03/2020 44   10/17/2019 43               Review of Systems   Constitutional, HEENT, cardiovascular, pulmonary, gi and gu systems are negative, except as otherwise noted.      Objective    /78 (BP Location: Right arm, Patient Position: Chair, Cuff Size: Adult Large)   Pulse 57   Temp 97.2  F (36.2  C) (Tympanic)   SpO2 99%   There is no height or weight on file to calculate BMI.  Physical Exam   GENERAL: Alert and no distress  RESP: lungs clear to auscultation - no rales, rhonchi or wheezes  CV: regular rate and rhythm, normal S1 S2, no S3 or S4, no murmur, click or rub, no peripheral edema and peripheral pulses strong  ABDOMEN: soft, nontender, no hepatosplenomegaly, no masses and bowel sounds normal  MS: Inability to use left extremities.    SKIN: Small ulceration on left second toe both on plantar and doral aspect along with small ulcer on later malleolus of right foot.    NEURO: Monofilament testing reveals sensation intact in both feet.    PSYCH: mentation appears normal, affect normal/bright    Office Visit on 03/03/2020   Component Date Value Ref Range Status     Cholesterol 03/03/2020 128  <200 mg/dL Final     Triglycerides 03/03/2020 99  <150 mg/dL Final    Fasting specimen     HDL Cholesterol  03/03/2020 64  >49 mg/dL Final     LDL Cholesterol Calculated 03/03/2020 44  <100 mg/dL Final    Desirable:       <100 mg/dl     Non HDL Cholesterol 03/03/2020 64  <130 mg/dL Final     Sodium 03/03/2020 139  133 - 144 mmol/L Final     Potassium 03/03/2020 4.5  3.4 - 5.3 mmol/L Final     Chloride 03/03/2020 106  94 - 109 mmol/L Final     Carbon Dioxide 03/03/2020 26  20 - 32 mmol/L Final     Anion Gap 03/03/2020 7  3 - 14 mmol/L Final     Glucose 03/03/2020 142* 70 - 99 mg/dL Final    Fasting specimen     Urea Nitrogen 03/03/2020 15  7 - 30 mg/dL Final     Creatinine 03/03/2020 0.77  0.52 - 1.04 mg/dL Final     GFR Estimate 03/03/2020 75  >60 mL/min/[1.73_m2] Final    Comment: Non  GFR Calc  Starting 12/18/2018, serum creatinine based estimated GFR (eGFR) will be   calculated using the Chronic Kidney Disease Epidemiology Collaboration   (CKD-EPI) equation.       GFR Estimate If Black 03/03/2020 87  >60 mL/min/[1.73_m2] Final    Comment:  GFR Calc  Starting 12/18/2018, serum creatinine based estimated GFR (eGFR) will be   calculated using the Chronic Kidney Disease Epidemiology Collaboration   (CKD-EPI) equation.       Calcium 03/03/2020 9.4  8.5 - 10.1 mg/dL Final     Bilirubin Total 03/03/2020 0.4  0.2 - 1.3 mg/dL Final     Albumin 03/03/2020 3.5  3.4 - 5.0 g/dL Final     Protein Total 03/03/2020 6.9  6.8 - 8.8 g/dL Final     Alkaline Phosphatase 03/03/2020 80  40 - 150 U/L Final     ALT 03/03/2020 20  0 - 50 U/L Final     AST 03/03/2020 10  0 - 45 U/L Final     Hemoglobin A1C 03/03/2020 7.1* 0 - 5.6 % Final    Comment: Normal <5.7% Prediabetes 5.7-6.4%  Diabetes 6.5% or higher - adopted from ADA   consensus guidelines.       TSH 03/03/2020 0.65  0.40 - 4.00 mU/L Final     WBC 03/03/2020 5.3  4.0 - 11.0 10e9/L Final     RBC Count 03/03/2020 4.88  3.8 - 5.2 10e12/L Final     Hemoglobin 03/03/2020 12.8  11.7 - 15.7 g/dL Final     Hematocrit 03/03/2020 40.3  35.0 - 47.0 % Final     MCV  03/03/2020 83  78 - 100 fl Final     MCH 03/03/2020 26.2* 26.5 - 33.0 pg Final     MCHC 03/03/2020 31.8  31.5 - 36.5 g/dL Final     RDW 03/03/2020 13.8  10.0 - 15.0 % Final     Platelet Count 03/03/2020 302  150 - 450 10e9/L Final     % Neutrophils 03/03/2020 67.8  % Final     % Lymphocytes 03/03/2020 21.1  % Final     % Monocytes 03/03/2020 8.1  % Final     % Eosinophils 03/03/2020 2.4  % Final     % Basophils 03/03/2020 0.6  % Final     Absolute Neutrophil 03/03/2020 3.6  1.6 - 8.3 10e9/L Final     Absolute Lymphocytes 03/03/2020 1.1  0.8 - 5.3 10e9/L Final     Absolute Monocytes 03/03/2020 0.4  0.0 - 1.3 10e9/L Final     Absolute Eosinophils 03/03/2020 0.1  0.0 - 0.7 10e9/L Final     Absolute Basophils 03/03/2020 0.0  0.0 - 0.2 10e9/L Final     Diff Method 03/03/2020 Automated Method   Final     Estimated Average Glucose 03/03/2020 157  mg/dL Final     No results found for any visits on 01/12/21.  No results found for this or any previous visit (from the past 24 hour(s)).

## 2021-01-12 ENCOUNTER — OFFICE VISIT (OUTPATIENT)
Dept: INTERNAL MEDICINE | Facility: OTHER | Age: 76
End: 2021-01-12
Attending: INTERNAL MEDICINE
Payer: MEDICARE

## 2021-01-12 VITALS
OXYGEN SATURATION: 99 % | TEMPERATURE: 97.2 F | DIASTOLIC BLOOD PRESSURE: 78 MMHG | SYSTOLIC BLOOD PRESSURE: 130 MMHG | HEART RATE: 57 BPM

## 2021-01-12 DIAGNOSIS — E55.9 VITAMIN D DEFICIENCY: Primary | ICD-10-CM

## 2021-01-12 DIAGNOSIS — E11.49 TYPE 2 DIABETES MELLITUS WITH OTHER NEUROLOGIC COMPLICATION, WITHOUT LONG-TERM CURRENT USE OF INSULIN (H): Chronic | ICD-10-CM

## 2021-01-12 LAB
ALBUMIN SERPL-MCNC: 3.5 G/DL (ref 3.4–5)
ALP SERPL-CCNC: 87 U/L (ref 40–150)
ALT SERPL W P-5'-P-CCNC: 18 U/L (ref 0–50)
ANION GAP SERPL CALCULATED.3IONS-SCNC: 8 MMOL/L (ref 3–14)
AST SERPL W P-5'-P-CCNC: 15 U/L (ref 0–45)
BASOPHILS # BLD AUTO: 0 10E9/L (ref 0–0.2)
BASOPHILS NFR BLD AUTO: 0.3 %
BILIRUB SERPL-MCNC: 0.5 MG/DL (ref 0.2–1.3)
BUN SERPL-MCNC: 12 MG/DL (ref 7–30)
CALCIUM SERPL-MCNC: 9.4 MG/DL (ref 8.5–10.1)
CHLORIDE SERPL-SCNC: 104 MMOL/L (ref 94–109)
CHOLEST SERPL-MCNC: 125 MG/DL
CO2 SERPL-SCNC: 27 MMOL/L (ref 20–32)
CREAT SERPL-MCNC: 0.84 MG/DL (ref 0.52–1.04)
CREAT UR-MCNC: 56 MG/DL
DIFFERENTIAL METHOD BLD: ABNORMAL
EOSINOPHIL # BLD AUTO: 0.2 10E9/L (ref 0–0.7)
EOSINOPHIL NFR BLD AUTO: 2.8 %
ERYTHROCYTE [DISTWIDTH] IN BLOOD BY AUTOMATED COUNT: 14.9 % (ref 10–15)
EST. AVERAGE GLUCOSE BLD GHB EST-MCNC: 137 MG/DL
GFR SERPL CREATININE-BSD FRML MDRD: 67 ML/MIN/{1.73_M2}
GLUCOSE SERPL-MCNC: 130 MG/DL (ref 70–99)
HBA1C MFR BLD: 6.4 % (ref 0–5.6)
HCT VFR BLD AUTO: 39.4 % (ref 35–47)
HDLC SERPL-MCNC: 61 MG/DL
HGB BLD-MCNC: 12.3 G/DL (ref 11.7–15.7)
LDLC SERPL CALC-MCNC: 45 MG/DL
LYMPHOCYTES # BLD AUTO: 1 10E9/L (ref 0.8–5.3)
LYMPHOCYTES NFR BLD AUTO: 16.8 %
MCH RBC QN AUTO: 25.6 PG (ref 26.5–33)
MCHC RBC AUTO-ENTMCNC: 31.2 G/DL (ref 31.5–36.5)
MCV RBC AUTO: 82 FL (ref 78–100)
MICROALBUMIN UR-MCNC: 9 MG/L
MICROALBUMIN/CREAT UR: 16.91 MG/G CR (ref 0–25)
MONOCYTES # BLD AUTO: 0.4 10E9/L (ref 0–1.3)
MONOCYTES NFR BLD AUTO: 7.3 %
NEUTROPHILS # BLD AUTO: 4.2 10E9/L (ref 1.6–8.3)
NEUTROPHILS NFR BLD AUTO: 72.8 %
NONHDLC SERPL-MCNC: 64 MG/DL
PLATELET # BLD AUTO: 307 10E9/L (ref 150–450)
POTASSIUM SERPL-SCNC: 4.7 MMOL/L (ref 3.4–5.3)
PROT SERPL-MCNC: 7.2 G/DL (ref 6.8–8.8)
RBC # BLD AUTO: 4.8 10E12/L (ref 3.8–5.2)
SODIUM SERPL-SCNC: 139 MMOL/L (ref 133–144)
TRIGL SERPL-MCNC: 95 MG/DL
TSH SERPL DL<=0.005 MIU/L-ACNC: 0.85 MU/L (ref 0.4–4)
WBC # BLD AUTO: 5.8 10E9/L (ref 4–11)

## 2021-01-12 PROCEDURE — 36415 COLL VENOUS BLD VENIPUNCTURE: CPT | Mod: ZL | Performed by: INTERNAL MEDICINE

## 2021-01-12 PROCEDURE — G0463 HOSPITAL OUTPT CLINIC VISIT: HCPCS

## 2021-01-12 PROCEDURE — 80053 COMPREHEN METABOLIC PANEL: CPT | Mod: ZL | Performed by: INTERNAL MEDICINE

## 2021-01-12 PROCEDURE — 999N001182 HC STATISTIC ESTIMATED AVERAGE GLUCOSE: Mod: ZL | Performed by: INTERNAL MEDICINE

## 2021-01-12 PROCEDURE — 99214 OFFICE O/P EST MOD 30 MIN: CPT | Performed by: INTERNAL MEDICINE

## 2021-01-12 PROCEDURE — 85025 COMPLETE CBC W/AUTO DIFF WBC: CPT | Mod: ZL | Performed by: INTERNAL MEDICINE

## 2021-01-12 PROCEDURE — 80061 LIPID PANEL: CPT | Mod: ZL | Performed by: INTERNAL MEDICINE

## 2021-01-12 PROCEDURE — 82306 VITAMIN D 25 HYDROXY: CPT | Mod: ZL | Performed by: INTERNAL MEDICINE

## 2021-01-12 PROCEDURE — 84443 ASSAY THYROID STIM HORMONE: CPT | Mod: ZL | Performed by: INTERNAL MEDICINE

## 2021-01-12 PROCEDURE — 83036 HEMOGLOBIN GLYCOSYLATED A1C: CPT | Mod: ZL | Performed by: INTERNAL MEDICINE

## 2021-01-12 PROCEDURE — 82043 UR ALBUMIN QUANTITATIVE: CPT | Mod: ZL | Performed by: INTERNAL MEDICINE

## 2021-01-12 RX ORDER — METFORMIN HCL 500 MG
1000 TABLET, EXTENDED RELEASE 24 HR ORAL 2 TIMES DAILY WITH MEALS
Qty: 120 TABLET | Refills: 0 | Status: SHIPPED | OUTPATIENT
Start: 2021-01-12 | End: 2021-01-13

## 2021-01-12 RX ORDER — METFORMIN HCL 500 MG
TABLET, EXTENDED RELEASE 24 HR ORAL
Qty: 360 TABLET | Refills: 3 | Status: SHIPPED | OUTPATIENT
Start: 2021-01-12 | End: 2021-06-11

## 2021-01-12 ASSESSMENT — PAIN SCALES - GENERAL: PAINLEVEL: NO PAIN (0)

## 2021-01-12 NOTE — NURSING NOTE
"Chief Complaint   Patient presents with     Diabetes     Hypertension     Lipids       Initial /78 (BP Location: Right arm, Patient Position: Chair, Cuff Size: Adult Large)   Pulse 57   Temp 97.2  F (36.2  C) (Tympanic)   SpO2 99%  Estimated body mass index is 35.51 kg/m  as calculated from the following:    Height as of 11/8/19: 1.676 m (5' 6\").    Weight as of 3/3/20: 99.8 kg (220 lb).  Medication Reconciliation: complete  CHERELLE JACKSON LPN    "

## 2021-01-13 ENCOUNTER — TELEPHONE (OUTPATIENT)
Dept: INTERNAL MEDICINE | Facility: OTHER | Age: 76
End: 2021-01-13

## 2021-01-13 DIAGNOSIS — E11.49 TYPE 2 DIABETES MELLITUS WITH OTHER NEUROLOGIC COMPLICATION, WITHOUT LONG-TERM CURRENT USE OF INSULIN (H): Chronic | ICD-10-CM

## 2021-01-13 RX ORDER — METFORMIN HCL 500 MG
1000 TABLET, EXTENDED RELEASE 24 HR ORAL 2 TIMES DAILY WITH MEALS
Qty: 120 TABLET | Refills: 0 | Status: SHIPPED | OUTPATIENT
Start: 2021-01-13 | End: 2021-05-31

## 2021-01-13 NOTE — TELEPHONE ENCOUNTER
Patient need metformin to be sent to [facility]. Insurance wont allow her to use [employer].   Order pending   CHERELLE JACKSON LPN

## 2021-01-14 DIAGNOSIS — E55.9 VITAMIN D DEFICIENCY: Primary | ICD-10-CM

## 2021-01-14 LAB — DEPRECATED CALCIDIOL+CALCIFEROL SERPL-MC: 17 UG/L (ref 20–75)

## 2021-01-14 RX ORDER — ERGOCALCIFEROL 1.25 MG/1
50000 CAPSULE, LIQUID FILLED ORAL WEEKLY
Qty: 8 CAPSULE | Refills: 0 | Status: SHIPPED | OUTPATIENT
Start: 2021-01-14 | End: 2021-05-31

## 2021-02-18 ENCOUNTER — TELEPHONE (OUTPATIENT)
Dept: INTERNAL MEDICINE | Facility: OTHER | Age: 76
End: 2021-02-18

## 2021-02-18 DIAGNOSIS — I67.1 CEREBRAL ANEURYSM: Primary | ICD-10-CM

## 2021-02-28 ENCOUNTER — APPOINTMENT (OUTPATIENT)
Dept: CT IMAGING | Facility: HOSPITAL | Age: 76
End: 2021-02-28
Attending: EMERGENCY MEDICINE
Payer: MEDICARE

## 2021-02-28 ENCOUNTER — HOSPITAL ENCOUNTER (EMERGENCY)
Facility: HOSPITAL | Age: 76
Discharge: HOME | End: 2021-02-28
Attending: EMERGENCY MEDICINE | Admitting: EMERGENCY MEDICINE
Payer: MEDICARE

## 2021-02-28 VITALS
BODY MASS INDEX: 41.56 KG/M2 | HEART RATE: 77 BPM | WEIGHT: 257.5 LBS | OXYGEN SATURATION: 96 % | TEMPERATURE: 98.1 F | RESPIRATION RATE: 20 BRPM | SYSTOLIC BLOOD PRESSURE: 185 MMHG | DIASTOLIC BLOOD PRESSURE: 98 MMHG

## 2021-02-28 DIAGNOSIS — R07.9 CHEST PAIN, UNSPECIFIED TYPE: ICD-10-CM

## 2021-02-28 LAB
ANION GAP SERPL CALCULATED.3IONS-SCNC: 5 MMOL/L (ref 3–14)
BASOPHILS # BLD AUTO: 0 10E9/L (ref 0–0.2)
BASOPHILS NFR BLD AUTO: 0.6 %
BUN SERPL-MCNC: 11 MG/DL (ref 7–30)
CALCIUM SERPL-MCNC: 9 MG/DL (ref 8.5–10.1)
CHLORIDE SERPL-SCNC: 106 MMOL/L (ref 94–109)
CO2 SERPL-SCNC: 27 MMOL/L (ref 20–32)
CREAT SERPL-MCNC: 0.73 MG/DL (ref 0.52–1.04)
D DIMER PPP FEU-MCNC: 0.8 UG/ML FEU (ref 0–0.5)
DIFFERENTIAL METHOD BLD: ABNORMAL
EOSINOPHIL # BLD AUTO: 0.2 10E9/L (ref 0–0.7)
EOSINOPHIL NFR BLD AUTO: 2.5 %
ERYTHROCYTE [DISTWIDTH] IN BLOOD BY AUTOMATED COUNT: 14.6 % (ref 10–15)
GFR SERPL CREATININE-BSD FRML MDRD: 80 ML/MIN/{1.73_M2}
GLUCOSE SERPL-MCNC: 124 MG/DL (ref 70–99)
HCT VFR BLD AUTO: 40.6 % (ref 35–47)
HGB BLD-MCNC: 12.6 G/DL (ref 11.7–15.7)
IMM GRANULOCYTES # BLD: 0 10E9/L (ref 0–0.4)
IMM GRANULOCYTES NFR BLD: 0.4 %
LYMPHOCYTES # BLD AUTO: 1.1 10E9/L (ref 0.8–5.3)
LYMPHOCYTES NFR BLD AUTO: 15.9 %
MCH RBC QN AUTO: 25.8 PG (ref 26.5–33)
MCHC RBC AUTO-ENTMCNC: 31 G/DL (ref 31.5–36.5)
MCV RBC AUTO: 83 FL (ref 78–100)
MONOCYTES # BLD AUTO: 0.5 10E9/L (ref 0–1.3)
MONOCYTES NFR BLD AUTO: 6.7 %
NEUTROPHILS # BLD AUTO: 5.1 10E9/L (ref 1.6–8.3)
NEUTROPHILS NFR BLD AUTO: 73.9 %
NRBC # BLD AUTO: 0 10*3/UL
NRBC BLD AUTO-RTO: 0 /100
PLATELET # BLD AUTO: 324 10E9/L (ref 150–450)
POTASSIUM SERPL-SCNC: 3.6 MMOL/L (ref 3.4–5.3)
RBC # BLD AUTO: 4.88 10E12/L (ref 3.8–5.2)
SODIUM SERPL-SCNC: 138 MMOL/L (ref 133–144)
TROPONIN I SERPL-MCNC: <0.015 UG/L (ref 0–0.04)
TROPONIN I SERPL-MCNC: <0.015 UG/L (ref 0–0.04)
WBC # BLD AUTO: 6.8 10E9/L (ref 4–11)

## 2021-02-28 PROCEDURE — 71275 CT ANGIOGRAPHY CHEST: CPT | Mod: ME

## 2021-02-28 PROCEDURE — 99285 EMERGENCY DEPT VISIT HI MDM: CPT | Performed by: EMERGENCY MEDICINE

## 2021-02-28 PROCEDURE — 85379 FIBRIN DEGRADATION QUANT: CPT | Performed by: EMERGENCY MEDICINE

## 2021-02-28 PROCEDURE — 93010 ELECTROCARDIOGRAM REPORT: CPT | Performed by: INTERNAL MEDICINE

## 2021-02-28 PROCEDURE — 93005 ELECTROCARDIOGRAM TRACING: CPT

## 2021-02-28 PROCEDURE — 85025 COMPLETE CBC W/AUTO DIFF WBC: CPT | Performed by: EMERGENCY MEDICINE

## 2021-02-28 PROCEDURE — 250N000011 HC RX IP 250 OP 636: Performed by: EMERGENCY MEDICINE

## 2021-02-28 PROCEDURE — 84484 ASSAY OF TROPONIN QUANT: CPT | Mod: 91 | Performed by: EMERGENCY MEDICINE

## 2021-02-28 PROCEDURE — 99285 EMERGENCY DEPT VISIT HI MDM: CPT | Mod: 25

## 2021-02-28 PROCEDURE — 80048 BASIC METABOLIC PNL TOTAL CA: CPT | Performed by: EMERGENCY MEDICINE

## 2021-02-28 PROCEDURE — 99214 OFFICE O/P EST MOD 30 MIN: CPT | Performed by: INTERNAL MEDICINE

## 2021-02-28 PROCEDURE — 36415 COLL VENOUS BLD VENIPUNCTURE: CPT | Performed by: EMERGENCY MEDICINE

## 2021-02-28 RX ORDER — ASPIRIN 81 MG/1
324 TABLET, CHEWABLE ORAL ONCE
Status: DISCONTINUED | OUTPATIENT
Start: 2021-02-28 | End: 2021-02-28

## 2021-02-28 RX ORDER — IOPAMIDOL 755 MG/ML
100 INJECTION, SOLUTION INTRAVASCULAR ONCE
Status: COMPLETED | OUTPATIENT
Start: 2021-02-28 | End: 2021-02-28

## 2021-02-28 RX ADMIN — IOPAMIDOL 100 ML: 755 INJECTION, SOLUTION INTRAVENOUS at 14:05

## 2021-02-28 NOTE — CONSULTS
"Consult Date:  02/28/2021      PRIMARY CARE PHYSICIAN:  Yash Cuellar DO      CHIEF COMPLAINT:  Chest pain.      HISTORY OF PRESENT ILLNESS:  Ms. Seals states that she has had some left-sided chest pain that felt like \"gas\" since this morning.  She states the pain waxes and wanes.  She does report some prickly type of sensation in association with this pain.  Her pain is not an aggravated by deep inspiration.  She also reports feeling somewhat more short of breath.      Ms. Seals denies any recent cough and/or fever.  She does have a history of COPD and some chronic dyspnea.  There is no report of new leg pain and/or swelling.      Ms. Seals's history is remarkable for CVA, along with hypertension, hyperlipidemia, peripheral vascular disease, type 2 diabetes mellitus and GERD.      PHYSICAL EXAMINATION:   VITAL SIGNS:  Blood pressure 170/95, respirations 20, O2 sat 98% on room air, heart rate 80, temperature 98.   GENERAL:  The patient is alert and oriented, pleasant, conversant, in no acute distress.   HEENT:  Pupils are equal bilaterally.  She does have some exophthalmos of the left eye at baseline.   HEART:  Regular in rate and rhythm.  No murmurs, rubs or gallops were appreciated.   LUNGS:  Clear to auscultation.   ABDOMEN:  Obese, soft with positive bowel sounds.   EXTREMITIES:  With trace lower extremity edema, left greater than right.   NEUROLOGIC:  The patient does have significant left-sided deficits secondary to her previous CVA.      LABORATORY DATA AND TESTS:  EKG without any acute changes.  CBC with a WBC of 6.8, hemoglobin 12.6 and platelets of 324.  Sodium 138, potassium 3.6, BUN 11, creatinine 0.73.  D-dimer is slightly elevated at 0.8.  CTA of the chest with contrast is pending.  The troponin was negative.        ASSESSMENT AND PLAN:   1.  Ms. Seals is a 75-year-old female with an extensive past medical history who presents with left-sided chest pain since this morning.     2.  Chest " pain.  I did have an I did have a conversation with Ms. Smyth stating that it does not appear as though this is cardiac in nature as she has had the symptoms since this morning, and her EKG and troponin remain negative.  However, due to her elevated D-dimer, she did undergo a CTA, which is still pending at this time.  If the CTA does demonstrate concerning findings, she would possibly be hospitalized; however, that remains unlikely.  If the CTA is generally negative, the patient will be discharged home.         KENIA BERNAL DO             D: 2021   T: 2021   MT: BENJY      Name:     NADIA SMYTH   MRN:      [id number]        Account:       [id number]   :      1945           Consult Date:  2021      Document: [id number]

## 2021-02-28 NOTE — ED TRIAGE NOTES
"Pt states left chest pain that started at 1100 \"a good couple of hours\". States has not eaten today. History of strokes.  "

## 2021-02-28 NOTE — ED NOTES
Discharge instructions reviewed and patient verbalizes understanding. Patient will follow up with PCP and return to ED with any concerns.

## 2021-02-28 NOTE — ED NOTES
BP remains elevated 180/100 manually. Updated Dr. Sal of this. Patient has had no further episodes of chest pain.

## 2021-02-28 NOTE — ED PROVIDER NOTES
"  History     Chief Complaint   Patient presents with     Chest Pain     HPI  Rosalie JANE Friend is a 75 year old female who presents with left-sided chest pain.  She describes intermittent sharp \"prickly \"left-sided chest pain which comes on and off for the last 2 hours, lasting a few minutes at a time.  It is not present when I am interviewing her.  No aggravating or alleviating factors.  Specifically does not get any worse with deep breathing and is not worse with attempted exertion.  She was sitting in her wheelchair at rest when it started.  She feels somewhat more short of breath when the pain is there.  She has not noted any recent cough or fever.  She does report a history of COPD and some chronic dyspnea.  She denies any new leg swelling or pain.    Allergies:  Allergies   Allergen Reactions     Penicillins      Phenylephrine      Head aches     Tropicamide      Head aches         Problem List:    Patient Active Problem List    Diagnosis Date Noted     History of CVA (cerebrovascular accident) 03/02/2019     Priority: High     5/6/17 sudden onset right vision loss and left side weakness, which resolved by the time of arrival to ED in [place]. CT of head and CT angiogram showed R ICA occlusion. L distal ICA, and R MCA 8mm 6mm aneurysms. Neurology was consulted and pt was transferred to [facility] for further management.  mg and lipitor 80 mg was started. Pt was found elevated HgA1c at 10. Imaging showed R temporal lobe infarct on CT and Angio showed R GOPI occlusion with no intracranial occlusion and L ICA and R MCA aneurysms. Strong family hx of ruptured aneurysms.  Seen at [facility] for an incidental finding of bilat ICA aneurysms. She had diagnostic cerebral angiogram with endovascular coil embolization of the larger left ICA aneurysm. The right ICA aneurysm will be monitored with serial imaging.     2/15/2018 acute onset weakness of her left upper extremity. MR angiogram of the head and neck and " MR of the brain, which revealed an acute lacunar infarct in the right centrum semiovale region in an area of old ischemia. Multiple atherosclerotic lesions with complete occlusion of her right internal carotid and other more distal lesions as well. (clopidogrel was added)       Hyperlipidemia 03/02/2019     Priority: Medium     Cerebral aneurysm 03/02/2019     Priority: Medium     Cerebral aneurysm without rupture, left ICA, s/p endovascular coiling 05/2017       PVD (peripheral vascular disease) (H) 03/02/2019     Priority: Medium     ankle-brachial index 2018 1.02 on the right and 0.52 on the left, consistent with moderate ischemia of the left lower extremity.        COPD (chronic obstructive pulmonary disease) (H) 03/02/2019     Priority: Medium     PFTS 5/2018 - FEV1- 2.13 (74%), ratio 0.67, 20% change with bronchodilators,  TLC 99%, %, DLCO 60%        Obesity (BMI 35.0-39.9) with comorbidity (H) 01/03/2019     Priority: Medium     Benign essential hypertension 04/12/2018     Priority: Medium     Type 2 diabetes mellitus with neurologic complication, without long-term current use of insulin (H) 04/12/2018     Priority: Medium     ACP (advance care planning) 05/09/2017     Priority: Medium     Advance Care Planning 5/9/2017: ACP Review of Chart / Resources Provided:  Reviewed chart for advance care plan.  Rosalie JANE Friend has been provided information and resources to begin or update their advance care plan.  Added by Kalee Busby             GERD (gastroesophageal reflux disease) 03/02/2019     Priority: Low        Past Medical History:    Past Medical History:   Diagnosis Date     Acute ischemic stroke (H) 02/18/2018     Cerebral infarction (H) 05/06/2017       Past Surgical History:    Past Surgical History:   Procedure Laterality Date     ABDOMEN SURGERY  04/1997    bladder repair     GYN SURGERY  04/1974    hysterectomy        Family History:    Family History   Problem Relation Age of Onset      Aneurysm Mother      Other Cancer Father      Diabetes Brother      Hypertension Brother      Other Cancer Brother      Other Cancer Sister      Aneurysm Sister        Social History:  Marital Status:   [4]  Social History     Tobacco Use     Smoking status: Former Smoker     Types: Cigarettes     Start date: 1/1/1956     Quit date: 5/5/2017     Years since quitting: 3.8     Smokeless tobacco: Never Used     Tobacco comment: limits self to about 4-5 daily , she is quitting on her own   Substance Use Topics     Alcohol use: No     Drug use: No        Medications:    atorvastatin (LIPITOR) 80 MG tablet  albuterol (VENTOLIN HFA) 108 (90 Base) MCG/ACT inhaler  aspirin 325 MG tablet  blood glucose monitoring (NO BRAND SPECIFIED) meter device kit  blood glucose monitoring (ONE TOUCH DELICA) lancets  clopidogrel (PLAVIX) 75 MG tablet  lisinopril (ZESTRIL) 10 MG tablet  metFORMIN (GLUCOPHAGE-XR) 500 MG 24 hr tablet  metFORMIN (GLUCOPHAGE-XR) 500 MG 24 hr tablet  metoprolol tartrate (LOPRESSOR) 50 MG tablet  ONETOUCH DELICA LANCETS 33G MISC  ONETOUCH VERIO IQ test strip  order for DME  order for DME  pantoprazole (PROTONIX) 40 MG EC tablet  polyethylene glycol (GAVILAX) powder  vitamin D2 (ERGOCALCIFEROL) 95395 units (1250 mcg) capsule  vitamin D2 (ERGOCALCIFEROL) 00334 units (1250 mcg) capsule      Review of Systems   All systems reviewed and negative except as noted in HPI    Physical Exam   BP: (!) 134/118  Pulse: 83  Temp: 98  F (36.7  C)  Resp: 20  Weight: 116.8 kg (257 lb 8 oz)  SpO2: 92 %  Constitutional: Well developed, Well nourished, NAD.  HENT: Normocephalic, Atraumatic, Bilateral external ears normal. Neck Supple.  Eyes: EOMI, Conjunctiva normal.  Respiratory: Breathing comfortably on room air. Speaks full sentences easily. Lungs clear to ascultation.  Cardiovascular: Normal heart rate, Regular rhythm. No peripheral edema.  Abdomen: Soft, nontender.  Musculoskeletal: Good range of motion in all major  joints. No major deformities noted.  Integument: Warm, Dry.  Neurologic: Alert & awake, Normal motor function, Normal sensory function, No focal deficits noted.   Psychiatric: Cooperative. Mood and affect normal.      ED Course          EKG Interpretation:      Interpreted by Jerald Sal MD  Time reviewed: 106 PM  Symptoms at time of EKG: Chest pain   Rhythm: normal sinus; 1st degree AV block  Rate: normal  Axis: normal  Ectopy: none  Conduction: normal  ST Segments/ T Waves: No ST-T wave changes  Q Waves: none  Comparison to prior: Unchanged from 2/17/18    Clinical Impression: normal EKG        Results for orders placed or performed during the hospital encounter of 02/28/21 (from the past 24 hour(s))   CBC with platelets differential   Result Value Ref [place]    WBC 6.8 4.0 - 11.0 10e9/L    RBC Count 4.88 3.8 - 5.2 10e12/L    Hemoglobin 12.6 11.7 - 15.7 g/dL    Hematocrit 40.6 35.0 - 47.0 %    MCV 83 78 - 100 fl    MCH 25.8 (L) 26.5 - 33.0 pg    MCHC 31.0 (L) 31.5 - 36.5 g/dL    RDW 14.6 10.0 - 15.0 %    Platelet Count 324 150 - 450 10e9/L    Diff Method Automated Method     % Neutrophils 73.9 %    % Lymphocytes 15.9 %    % Monocytes 6.7 %    % Eosinophils 2.5 %    % Basophils 0.6 %    % Immature Granulocytes 0.4 %    Nucleated RBCs 0 0 /100    Absolute Neutrophil 5.1 1.6 - 8.3 10e9/L    Absolute Lymphocytes 1.1 0.8 - 5.3 10e9/L    Absolute Monocytes 0.5 0.0 - 1.3 10e9/L    Absolute Eosinophils 0.2 0.0 - 0.7 10e9/L    Absolute Basophils 0.0 0.0 - 0.2 10e9/L    Abs Immature Granulocytes 0.0 0 - 0.4 10e9/L    Absolute Nucleated RBC 0.0    Basic metabolic panel   Result Value Ref [place]    Sodium 138 133 - 144 mmol/L    Potassium 3.6 3.4 - 5.3 mmol/L    Chloride 106 94 - 109 mmol/L    Carbon Dioxide 27 20 - 32 mmol/L    Anion Gap 5 3 - 14 mmol/L    Glucose 124 (H) 70 - 99 mg/dL    Urea Nitrogen 11 7 - 30 mg/dL    Creatinine 0.73 0.52 - 1.04 mg/dL    GFR Estimate 80 >60 mL/min/[1.73_m2]    GFR Estimate If Black >90  >60 mL/min/[1.73_m2]    Calcium 9.0 8.5 - 10.1 mg/dL   Troponin I   Result Value Ref [place]    Troponin I ES <0.015 0.000 - 0.045 ug/L   D dimer quantitative   Result Value Ref [place]    D Dimer 0.8 (H) 0.0 - 0.50 ug/ml FEU   CTA Chest with Contrast    Narrative    PROCEDURE:  CTA CHEST WITH CONTRAST.    HISTORY:  Evaluate for pulmonary embolism.  PE suspected,  low/intermediate prob, positive D-dimer; L sided chest pain, elevated  d-dimer    TECHNIQUE:  Initial pre-contrast  and localizer images were  obtained.  Contrast enhanced helical CT pulmonary angiography was then  performed.  Routine transaxial and post-processed (multiplanar and/or  MIP) reformations were obtained.    COMPARISON:  3/13/18    MEDS/CONTRAST: Isovue 370 100 ml    PULMONARY CTA FINDINGS:  This is a diagnostic quality helical CT  pulmonary angiogram.  There is no acute pulmonary embolism to the  first subsegmental pulmonary artery level.    OTHER FINDINGS:      The neck base is grossly symmetric. Cardiac size is upper normal.  There are atherosclerotic calcifications of the coronary arteries.   There is no pericardial or pleural effusion. The main pulmonary artery  is borderline enlarged. The thoracic aorta is within normal limits in  size. No abnormal thoracic adenopathy is identified.    Limited views of the upper abdomen reveal no adrenal mass or acute  process.     The pleura is without thickening or effusions. The central airways are  unremarkable. Mild to moderate centrilobular emphysematous changes are  present. No focal consolidation or intrapulmonary mass is identified.    No suspicious osseous lesion is identified.      Impression    IMPRESSION:    No acute pulmonary emboli to the subsegmental level.    Moderate centrilobular emphysema.    UMU STEVENS MD   Troponin I   Result Value Ref [place]    Troponin I ES <0.015 0.000 - 0.045 ug/L       Medications   iopamidol (ISOVUE-370) solution 100 mL (100 mLs Intravenous Given  "2/28/21 1405)   sodium chloride (PF) 0.9% PF flush 100 mL (100 mLs Intravenous Given 2/28/21 1405)       Assessments & Plan (with Medical Decision Making)   Patient is a 75-year-old female with history of hypertension, type 2 diabetes, previous CVA, who presents with left-sided chest pain.  Pain has been intermittent for the last 2 hours, feels like prickling and stabbing.  She is moderately hypertensive but otherwise vitally stable in terms to the emergency department.  She denies any active ongoing chest pain when I meet her.  Her EKG shows sinus rhythm without any ischemic changes.  Her troponin is within normal limits.  Symptoms seem very atypical for ACS given the description of nonexertional left sided chest \"prickling\" and stabbing.  She is however fairly high risk given her multiple comorbidities and vascular disease.  The remainder of her labs were generally stable.  She did have a moderately elevated D-dimer which was followed up with a CT pulmonary angiogram which was negative for PE, pneumonia, pneumothorax, or anything else to account for her chest pain.  She was observed for about 3 hours and a delta troponin was again undetectable.  She essentially had no chest pain throughout the entirety of her ED visit.  Her primary doctor Polo  was on-call for the weekend here and actually met the patient in the emergency department  for a brief consultation.  With her symptoms very atypical for ACS, now fully resolved, and her work-up otherwise stable and reassuring, I feel she is safe for discharge home with close outpatient follow-up.  She was relieved by her work-up here, was comfortable with the discharge plan.  She left the emergency department in stable condition.    I have reviewed the nursing notes.    I have reviewed the findings, diagnosis, plan and need for follow up with the patient.    Final diagnoses:   Chest pain, unspecified type       2/28/2021   [facility]     Jonelle, " MD Jerald  02/28/21 1559

## 2021-02-28 NOTE — ED NOTES
C/o anterior left chest pain since approx 1300 today that comes and goes. Does not radiate anywhere. No SOB or diaphoresis accompanying pain. Patient does have a baseline SOB d/t hx of COPD. Also has hx of stroke with some left sided deficits. No new BEFAST symptoms. EKG done, labs sent and placed on cardiac, spo2, and BP monitors. Dr. Sal has been in room to assess patient.

## 2021-02-28 NOTE — DISCHARGE INSTRUCTIONS
You were seen today in the [facility] emergency department for chest pain.  Your evaluation here did not show any serious or life-threatening cause of the symptoms you are having.  Specifically an EKG and 2 blood tests related to your heart did not show any evidence of injury.  CT scan of your chest did not show any pneumonia, blood clot in the lungs, or other problem with the arteries.  We are not sure exactly what is causing your pain today.  Oftentimes when people have chest pain with otherwise negative evaluations it is related to the muscles and soft tissue of the chest wall or the GI tract.  We think your symptoms may be more related to a chest wall irritation.  This often will respond well to Tylenol, medications like aspirin, and rest.  We would recommend following up with your primary doctor sometime this week to review any ongoing symptoms after this ED visit.  If you have any other immediate concerns we can always reevaluate you here      What to expect when you have contrast    During your exam, we will inject  contrast  into your vein or artery. (Contrast is a clear liquid with iodine in it. It shows up on X-rays.)    You may feel warm or hot. You may have a metal taste in your mouth and a slight upset stomach. You may also feel pressure near the kidneys and bladder. These effects will last about 1 to 3 minutes.    Please tell us if you have:   Sneezing    Itching   Hives    Swelling in the face   A hoarse voice   Breathing problems   Other new symptoms    Serious problems are rare.  They may include:   Irregular heartbeat    Seizures   Kidney failure             Tissue damage   Shock     Death    If you have any problems during the exam, we  will treat them right away.    When you get home    Call your hospital if you have any new symptoms in the next 2 days, like hives or swelling. (Phone numbers are at the bottom of this page.) Or call your family doctor.     If you have wheezing or trouble  breathing, call 911.    Self-care  -Drink at least 4 extra glasses of water today.   This reduces the stress on your kidneys.  -Keep taking your regular medicines.    The contrast will pass out of your body in your  Urine(pee). This will happen in the next 24 hours. You  will not feel this. Your urine will not  change color.    If you take metformin    Drink 4 to 8 large glasses of water for the next  2 days, if you are not on a fluid restriction.    ?If you take metformin (Glucophage or Glucovance) for diabetes, keep taking it.        I have read and understand the above information.    Patient Sign Here:______________________________________Date:________Time:______    Staff Sign Here:________________________________________Date:_______Time:______      Radiology Departments:     ?[facility]: [phone] ?[place]: [phone]     ?[place]: [phone] ?[facility]:[phone]      ?[place]: [phone]  ?[facility]: [phone]  ?[place]:[phone]    ?[facility]:[phone]  ?[facility]:[phone]

## 2021-03-01 ENCOUNTER — TELEPHONE (OUTPATIENT)
Dept: INTERNAL MEDICINE | Facility: OTHER | Age: 76
End: 2021-03-01

## 2021-03-01 NOTE — TELEPHONE ENCOUNTER
Patient was seen in the ER over the weekend. /103 P 65. Daughter states machine say irregular heart beat.     Please advise  CHERELLE JACKSON LPN

## 2021-03-01 NOTE — TELEPHONE ENCOUNTER
I reviewed EKG from yesterday and the Tele and she was regular then.  I suppose she could be irregular now??? In which case she would have to go back to ER.  Have they felt her pulse?  Or this from a machine?   Her BP remains high so take a full 50 mg of Lopressor BID versus 25 BID.

## 2021-03-01 NOTE — TELEPHONE ENCOUNTER
Daughter notified, states pulse has always been running around 65, BP hasn't come down. Will increase medication and will call back in a couple days with readings.       CHERELLE JACKSON, HARLEYN

## 2021-03-09 ENCOUNTER — TELEPHONE (OUTPATIENT)
Dept: INTERNAL MEDICINE | Facility: OTHER | Age: 76
End: 2021-03-09

## 2021-03-09 NOTE — TELEPHONE ENCOUNTER
Daughter has been checking Rosalie's BP   Current BP is 209/96 pulse 61. Increased Lopressor to 50mg BID, also taking Lisinopril 20mg daily.  Please advise   CHERELLE JACKSON LPN

## 2021-03-10 ENCOUNTER — HOSPITAL ENCOUNTER (OUTPATIENT)
Dept: MRI IMAGING | Facility: HOSPITAL | Age: 76
Discharge: HOME | End: 2021-03-10
Attending: INTERNAL MEDICINE | Admitting: INTERNAL MEDICINE
Payer: MEDICARE

## 2021-03-10 ENCOUNTER — TELEPHONE (OUTPATIENT)
Dept: INTERNAL MEDICINE | Facility: OTHER | Age: 76
End: 2021-03-10

## 2021-03-10 DIAGNOSIS — I67.1 CEREBRAL ANEURYSM, NONRUPTURED: Primary | ICD-10-CM

## 2021-03-10 DIAGNOSIS — I67.1 CEREBRAL ANEURYSM: ICD-10-CM

## 2021-03-10 PROCEDURE — G1004 CDSM NDSC: HCPCS

## 2021-03-10 PROCEDURE — 70544 MR ANGIOGRAPHY HEAD W/O DYE: CPT | Mod: ME

## 2021-03-10 NOTE — RESULT ENCOUNTER NOTE
Pt notified of results, [facility]   Henny Vincent

## 2021-03-10 NOTE — TELEPHONE ENCOUNTER
"Pt's daughter Renetta called, per pt's report she signed a consent to communicate, approving us to speak with Renetta regarding her results, document not in epid yet, but writer did receive verbal consent from patient to communicate with daughter. Per Renetta \"no contrast should be used for the CTA per Dr. Ramsey\"  Aaliyah pt's daughter is requesting you call her back at [phone]  "

## 2021-03-22 ENCOUNTER — TELEPHONE (OUTPATIENT)
Dept: INTERNAL MEDICINE | Facility: OTHER | Age: 76
End: 2021-03-22

## 2021-03-22 NOTE — TELEPHONE ENCOUNTER
Pt daughter calling and patient is coughing up blood. Advised ED. She is asking for appointment because the patient would like to see MD and she is not coughing up blood every time.She is not SOB.    Spoke with PCP and pt needs to go to ED for eval. Daughter updated and verbalized understanding.She will assist pt to ED.    Please note.      Zeinab Summers RN

## 2021-05-12 ENCOUNTER — HOSPITAL ENCOUNTER (EMERGENCY)
Facility: HOSPITAL | Age: 76
Discharge: HOME | End: 2021-05-13
Attending: INTERNAL MEDICINE | Admitting: INTERNAL MEDICINE
Payer: MEDICARE

## 2021-05-12 DIAGNOSIS — R05.9 COUGH: ICD-10-CM

## 2021-05-12 DIAGNOSIS — L03.211 FACIAL CELLULITIS: ICD-10-CM

## 2021-05-12 DIAGNOSIS — J20.9 ACUTE BRONCHITIS, UNSPECIFIED ORGANISM: ICD-10-CM

## 2021-05-12 PROCEDURE — 99283 EMERGENCY DEPT VISIT LOW MDM: CPT | Performed by: INTERNAL MEDICINE

## 2021-05-12 PROCEDURE — 99283 EMERGENCY DEPT VISIT LOW MDM: CPT

## 2021-05-12 PROCEDURE — 250N000013 HC RX MED GY IP 250 OP 250 PS 637: Performed by: INTERNAL MEDICINE

## 2021-05-12 RX ORDER — CLINDAMYCIN HCL 150 MG
450 CAPSULE ORAL ONCE
Status: COMPLETED | OUTPATIENT
Start: 2021-05-12 | End: 2021-05-12

## 2021-05-12 RX ORDER — CLINDAMYCIN HCL 150 MG
450 CAPSULE ORAL 3 TIMES DAILY
Qty: 45 CAPSULE | Refills: 0 | Status: SHIPPED | OUTPATIENT
Start: 2021-05-12 | End: 2021-05-17

## 2021-05-12 RX ORDER — OXYCODONE HYDROCHLORIDE 5 MG/1
5 TABLET ORAL EVERY 6 HOURS PRN
Qty: 8 TABLET | Refills: 0 | Status: SHIPPED | OUTPATIENT
Start: 2021-05-12 | End: 2021-05-14

## 2021-05-12 RX ORDER — OXYCODONE HYDROCHLORIDE 5 MG/1
5 TABLET ORAL ONCE
Status: COMPLETED | OUTPATIENT
Start: 2021-05-12 | End: 2021-05-12

## 2021-05-12 RX ADMIN — OXYCODONE HYDROCHLORIDE 5 MG: 5 TABLET ORAL at 23:58

## 2021-05-12 RX ADMIN — CLINDAMYCIN HYDROCHLORIDE 450 MG: 150 CAPSULE ORAL at 23:58

## 2021-05-12 ASSESSMENT — MIFFLIN-ST. JEOR: SCORE: 1678.39

## 2021-05-13 ENCOUNTER — TELEPHONE (OUTPATIENT)
Dept: INTERNAL MEDICINE | Facility: OTHER | Age: 76
End: 2021-05-13

## 2021-05-13 VITALS
DIASTOLIC BLOOD PRESSURE: 72 MMHG | SYSTOLIC BLOOD PRESSURE: 156 MMHG | RESPIRATION RATE: 18 BRPM | HEART RATE: 86 BPM | OXYGEN SATURATION: 99 % | TEMPERATURE: 98.7 F | BODY MASS INDEX: 41.51 KG/M2 | WEIGHT: 258.3 LBS | HEIGHT: 66 IN

## 2021-05-13 DIAGNOSIS — K02.9 INFECTED DENTAL CARRIES: Primary | ICD-10-CM

## 2021-05-13 DIAGNOSIS — K04.7 INFECTED DENTAL CARRIES: Primary | ICD-10-CM

## 2021-05-13 PROCEDURE — 250N000013 HC RX MED GY IP 250 OP 250 PS 637: Performed by: INTERNAL MEDICINE

## 2021-05-13 RX ORDER — ALBUTEROL SULFATE 90 UG/1
2 AEROSOL, METERED RESPIRATORY (INHALATION) EVERY 6 HOURS PRN
Qty: 18 G | Refills: 0 | Status: SHIPPED | OUTPATIENT
Start: 2021-05-13 | End: 2021-09-08

## 2021-05-13 RX ORDER — CLONIDINE HYDROCHLORIDE 0.1 MG/1
0.2 TABLET ORAL ONCE
Status: COMPLETED | OUTPATIENT
Start: 2021-05-13 | End: 2021-05-13

## 2021-05-13 RX ORDER — AMLODIPINE BESYLATE 5 MG/1
5 TABLET ORAL ONCE
Status: COMPLETED | OUTPATIENT
Start: 2021-05-13 | End: 2021-05-13

## 2021-05-13 RX ADMIN — CLONIDINE HYDROCHLORIDE 0.2 MG: 0.1 TABLET ORAL at 00:07

## 2021-05-13 RX ADMIN — AMLODIPINE BESYLATE 5 MG: 5 TABLET ORAL at 00:07

## 2021-05-13 NOTE — TELEPHONE ENCOUNTER
Patient's daughter stated she spoke to the oral surgeon in [place] and she was told that a doctor should be able to give a referral. Patient does not have a dentist, daughter said she could maybe try getting into the college and seeing if they could give the referral if you can't.

## 2021-05-13 NOTE — TELEPHONE ENCOUNTER
I think they would need to see a dentist fr the referral.  They would likely need to see me for a pre op.

## 2021-05-13 NOTE — TELEPHONE ENCOUNTER
Patient was seen in hospital yesterday for facial cullulitis and was placed on antibiotics. She was advised to get 10 lower teeth removed. Patient's daughter was wondering if a referal could be sent to get it done in a hospital setting such as [facility] or [place] in [place]. Or wondering if she has to see you first. Please let Ammon's daughter know.

## 2021-05-13 NOTE — PROGRESS NOTES
Writer tried to contact patient twice with number listed in patients chart [phone].  Patients number has been disconnected or changed.  Patient has two prescriptions in the ED from 5/12/2021 visit.

## 2021-05-13 NOTE — ED TRIAGE NOTES
"Patient presents with concerns about left jaw pain.  Patient states approximately 1-2 hours ago she noticed swelling and pain to her left lower jaw.  States her whole face feels sore.  Patient has a history of COPD; and feels sob \"a little more than normal\"  Patient also noticed HTN at home.  "

## 2021-05-13 NOTE — ED NOTES
Plan of care discussed with patient and her daughter. Patient/daughter denied any questions/concerns. Patient advised to return to ED with any new or worsening symptoms. Patient verbalized understanding.

## 2021-05-19 ASSESSMENT — ENCOUNTER SYMPTOMS
VOICE CHANGE: 0
SINUS PRESSURE: 1
FACIAL SWELLING: 1
TROUBLE SWALLOWING: 0

## 2021-05-19 NOTE — ED PROVIDER NOTES
History     Chief Complaint   Patient presents with     Facial Pain     Hypertension     The history is provided by the patient.     Rosalie JANE Friend is a 76 year old female who came for facial pain and redness around left lower jaw extending to left and right upper jaw. Pt has upper denture and abnormal dentition and has been advised by dentist to extract lower teeth. denies fever, chills, difficulty swallowing or breathing.     Allergies:  Allergies   Allergen Reactions     Penicillins      Phenylephrine      Head aches     Tropicamide      Head aches         Problem List:    Patient Active Problem List    Diagnosis Date Noted     History of CVA (cerebrovascular accident) 03/02/2019     Priority: High     5/6/17 sudden onset right vision loss and left side weakness, which resolved by the time of arrival to ED in [place]. CT of head and CT angiogram showed R ICA occlusion. L distal ICA, and R MCA 8mm 6mm aneurysms. Neurology was consulted and pt was transferred to [facility] for further management.  mg and lipitor 80 mg was started. Pt was found elevated HgA1c at 10. Imaging showed R temporal lobe infarct on CT and Angio showed R GOPI occlusion with no intracranial occlusion and L ICA and R MCA aneurysms. Strong family hx of ruptured aneurysms.  Seen at [facility] for an incidental finding of bilat ICA aneurysms. She had diagnostic cerebral angiogram with endovascular coil embolization of the larger left ICA aneurysm. The right ICA aneurysm will be monitored with serial imaging.     2/15/2018 acute onset weakness of her left upper extremity. MR angiogram of the head and neck and MR of the brain, which revealed an acute lacunar infarct in the right centrum semiovale region in an area of old ischemia. Multiple atherosclerotic lesions with complete occlusion of her right internal carotid and other more distal lesions as well. (clopidogrel was added)       Hyperlipidemia 03/02/2019     Priority: Medium      Cerebral aneurysm 03/02/2019     Priority: Medium     Cerebral aneurysm without rupture, left ICA, s/p endovascular coiling 05/2017       PVD (peripheral vascular disease) (H) 03/02/2019     Priority: Medium     ankle-brachial index 2018 1.02 on the right and 0.52 on the left, consistent with moderate ischemia of the left lower extremity.        COPD (chronic obstructive pulmonary disease) (H) 03/02/2019     Priority: Medium     PFTS 5/2018 - FEV1- 2.13 (74%), ratio 0.67, 20% change with bronchodilators,  TLC 99%, %, DLCO 60%        Obesity (BMI 35.0-39.9) with comorbidity (H) 01/03/2019     Priority: Medium     Benign essential hypertension 04/12/2018     Priority: Medium     Type 2 diabetes mellitus with neurologic complication, without long-term current use of insulin (H) 04/12/2018     Priority: Medium     ACP (advance care planning) 05/09/2017     Priority: Medium     Advance Care Planning 5/9/2017: ACP Review of Chart / Resources Provided:  Reviewed chart for advance care plan.  Rosalie Seals has been provided information and resources to begin or update their advance care plan.  Added by Kalee Busby             GERD (gastroesophageal reflux disease) 03/02/2019     Priority: Low        Past Medical History:    Past Medical History:   Diagnosis Date     Acute ischemic stroke (H) 02/18/2018     Cerebral infarction (H) 05/06/2017       Past Surgical History:    Past Surgical History:   Procedure Laterality Date     ABDOMEN SURGERY  04/1997    bladder repair     GYN SURGERY  04/1974    hysterectomy        Family History:    Family History   Problem Relation Age of Onset     Aneurysm Mother      Other Cancer Father      Diabetes Brother      Hypertension Brother      Other Cancer Brother      Other Cancer Sister      Aneurysm Sister        Social History:  Marital Status:   [4]  Social History     Tobacco Use     Smoking status: Current Some Day Smoker     Types: Cigarettes     Start date:  "1956     Last attempt to quit: 2017     Years since quittin.0     Smokeless tobacco: Never Used     Tobacco comment: limits self to about 4-5 daily , she is quitting on her own   Substance Use Topics     Alcohol use: No     Drug use: No        Medications:    albuterol (PROAIR HFA/PROVENTIL HFA/VENTOLIN HFA) 108 (90 Base) MCG/ACT inhaler  aspirin 325 MG tablet  atorvastatin (LIPITOR) 80 MG tablet  blood glucose monitoring (NO BRAND SPECIFIED) meter device kit  blood glucose monitoring (ONE TOUCH DELICA) lancets  clopidogrel (PLAVIX) 75 MG tablet  lisinopril (ZESTRIL) 10 MG tablet  metFORMIN (GLUCOPHAGE-XR) 500 MG 24 hr tablet  metoprolol tartrate (LOPRESSOR) 50 MG tablet  pantoprazole (PROTONIX) 40 MG EC tablet  albuterol (VENTOLIN HFA) 108 (90 Base) MCG/ACT inhaler  metFORMIN (GLUCOPHAGE-XR) 500 MG 24 hr tablet  ONETOUCH DELICA LANCETS 33G MISC  ONETOUCH VERIO IQ test strip  order for DME  order for DME  polyethylene glycol (GAVILAX) powder  vitamin D2 (ERGOCALCIFEROL) 76013 units (1250 mcg) capsule  vitamin D2 (ERGOCALCIFEROL) 01823 units (1250 mcg) capsule          Review of Systems   HENT: Positive for facial swelling and sinus pressure. Negative for drooling, trouble swallowing and voice change.        Physical Exam   BP: (!) 226/109  Pulse: 101  Temp: 99  F (37.2  C)  Resp: (!) 28  Height: 167.6 cm (5' 6\")  Weight: 117.2 kg (258 lb 4.8 oz)  SpO2: 99 %      Physical Exam  Constitutional:       General: She is not in acute distress.     Appearance: She is not diaphoretic.   HENT:      Head: Atraumatic.      Mouth/Throat:      Pharynx: No oropharyngeal exudate.   Eyes:      General: No scleral icterus.     Pupils: Pupils are equal, round, and reactive to light.   Cardiovascular:      Heart sounds: Normal heart sounds.   Pulmonary:      Effort: No respiratory distress.      Breath sounds: Normal breath sounds.   Abdominal:      General: Bowel sounds are normal.      Palpations: Abdomen is soft.      " Tenderness: There is no abdominal tenderness.   Musculoskeletal:         General: No tenderness.   Skin:     General: Skin is warm.      Findings: No rash.         ED Course        Procedures                   No results found for this or any previous visit (from the past 24 hour(s)).    Medications   clindamycin (CLEOCIN) capsule 450 mg (450 mg Oral Given 5/12/21 2358)   oxyCODONE (ROXICODONE) tablet 5 mg (5 mg Oral Given 5/12/21 2358)   cloNIDine (CATAPRES) tablet 0.2 mg (0.2 mg Oral Given 5/13/21 0007)   amLODIPine (NORVASC) tablet 5 mg (5 mg Oral Given 5/13/21 0007)       Assessments & Plan (with Medical Decision Making)   Facial cellulitis + uncontrolled HTN  Clindamycin started  BP controlled after receiving amlodipine + clinidine  I advised her to return to ER if pain persisted, redness is increasing or become febrile or any other unusual symptoms  Follow-up with PCP for adjusting medication, dental clinic   She understood and agreed.   I have reviewed the nursing notes.    I have reviewed the findings, diagnosis, plan and need for follow up with the patient.      Discharge Medication List as of 5/13/2021 12:45 AM      START taking these medications    Details   !! albuterol (PROAIR HFA/PROVENTIL HFA/VENTOLIN HFA) 108 (90 Base) MCG/ACT inhaler Inhale 2 puffs into the lungs every 6 hours as needed for shortness of breath / dyspnea or wheezing, Disp-18 g, R-0, Local Print      clindamycin (CLEOCIN) 150 MG capsule Take 3 capsules (450 mg) by mouth 3 times daily for 5 days, Disp-45 capsule, R-0, Local Print      oxyCODONE (ROXICODONE) 5 MG tablet Take 1 tablet (5 mg) by mouth every 6 hours as needed for pain, Disp-8 tablet, R-0, Local Print       !! - Potential duplicate medications found. Please discuss with provider.          Final diagnoses:   Facial cellulitis       5/12/2021   [facility]     Ollie Dela Cruz MD  05/19/21 0135

## 2021-05-24 NOTE — TELEPHONE ENCOUNTER
Received fax from [facility]. They do not see oral surgery patients.   LM with daughter to find out where the should like to send the referral to next.    CHERELLE JACKSON LPN

## 2021-05-24 NOTE — TELEPHONE ENCOUNTER
Received a VM from Galina-jory. Shonda has not heard anything from [facility]. Can you please look into this and call Galina back  CHERELLE JACKSON LPN

## 2021-05-31 ENCOUNTER — HOSPITAL ENCOUNTER (EMERGENCY)
Facility: HOSPITAL | Age: 76
Discharge: HOME | End: 2021-05-31
Attending: NURSE PRACTITIONER | Admitting: NURSE PRACTITIONER
Payer: MEDICARE

## 2021-05-31 VITALS
HEART RATE: 86 BPM | SYSTOLIC BLOOD PRESSURE: 185 MMHG | OXYGEN SATURATION: 94 % | DIASTOLIC BLOOD PRESSURE: 81 MMHG | RESPIRATION RATE: 16 BRPM | TEMPERATURE: 97.7 F

## 2021-05-31 DIAGNOSIS — L03.116 CELLULITIS OF LEFT LOWER LEG: Primary | ICD-10-CM

## 2021-05-31 PROCEDURE — G0463 HOSPITAL OUTPT CLINIC VISIT: HCPCS

## 2021-05-31 PROCEDURE — 99213 OFFICE O/P EST LOW 20 MIN: CPT | Performed by: NURSE PRACTITIONER

## 2021-05-31 RX ORDER — DOXYCYCLINE 100 MG/1
100 TABLET ORAL DAILY
Qty: 14 TABLET | Refills: 0 | Status: SHIPPED | OUTPATIENT
Start: 2021-05-31 | End: 2021-06-07

## 2021-05-31 ASSESSMENT — ENCOUNTER SYMPTOMS
COLOR CHANGE: 1
MYALGIAS: 1

## 2021-05-31 NOTE — DISCHARGE INSTRUCTIONS
Take antibiotic as prescribed until finished.    Take Benadryl as per directions or apply cool compresses to help with the itching.    Follow-up with your doctor as needed if no improvement in symptoms.    Return to emergency department for worsening or concerning symptoms.

## 2021-05-31 NOTE — ED PROVIDER NOTES
History     Chief Complaint   Patient presents with     Cellulitis     lt lower leg cellulitis     HPI  Rosalie JANE Friend is a 76 year old female who presents to urgent care for concerns of pain and redness to her left lower leg.  Onset 1 day ago.  Patient also notes the area is itchy and tender to the touch.  No drainage.  No fevers or chills.  She does not ambulate as much due to her prior deficit.    Allergies:  Allergies   Allergen Reactions     Pcn [Penicillins]      Phenylephrine      Head aches     Tropicamide      Head aches         Problem List:    Patient Active Problem List    Diagnosis Date Noted     History of CVA (cerebrovascular accident) 03/02/2019     Priority: High     5/6/17 sudden onset right vision loss and left side weakness, which resolved by the time of arrival to ED in [place]. CT of head and CT angiogram showed R ICA occlusion. L distal ICA, and R MCA 8mm 6mm aneurysms. Neurology was consulted and pt was transferred to [facility] for further management.  mg and lipitor 80 mg was started. Pt was found elevated HgA1c at 10. Imaging showed R temporal lobe infarct on CT and Angio showed R GOPI occlusion with no intracranial occlusion and L ICA and R MCA aneurysms. Strong family hx of ruptured aneurysms.  Seen at [facility] for an incidental finding of bilat ICA aneurysms. She had diagnostic cerebral angiogram with endovascular coil embolization of the larger left ICA aneurysm. The right ICA aneurysm will be monitored with serial imaging.     2/15/2018 acute onset weakness of her left upper extremity. MR angiogram of the head and neck and MR of the brain, which revealed an acute lacunar infarct in the right centrum semiovale region in an area of old ischemia. Multiple atherosclerotic lesions with complete occlusion of her right internal carotid and other more distal lesions as well. (clopidogrel was added)       Hyperlipidemia 03/02/2019     Priority: Medium     Cerebral aneurysm  03/02/2019     Priority: Medium     Cerebral aneurysm without rupture, left ICA, s/p endovascular coiling 05/2017       PVD (peripheral vascular disease) (H) 03/02/2019     Priority: Medium     ankle-brachial index 2018 1.02 on the right and 0.52 on the left, consistent with moderate ischemia of the left lower extremity.        COPD (chronic obstructive pulmonary disease) (H) 03/02/2019     Priority: Medium     PFTS 5/2018 - FEV1- 2.13 (74%), ratio 0.67, 20% change with bronchodilators,  TLC 99%, %, DLCO 60%        Obesity (BMI 35.0-39.9) with comorbidity (H) 01/03/2019     Priority: Medium     Benign essential hypertension 04/12/2018     Priority: Medium     Type 2 diabetes mellitus with neurologic complication, without long-term current use of insulin (H) 04/12/2018     Priority: Medium     ACP (advance care planning) 05/09/2017     Priority: Medium     Advance Care Planning 5/9/2017: ACP Review of Chart / Resources Provided:  Reviewed chart for advance care plan.  Rosalie Seals has been provided information and resources to begin or update their advance care plan.  Added by Kalee Busby             GERD (gastroesophageal reflux disease) 03/02/2019     Priority: Low        Past Medical History:    Past Medical History:   Diagnosis Date     Acute ischemic stroke (H) 02/18/2018     Cerebral infarction (H) 05/06/2017       Past Surgical History:    Past Surgical History:   Procedure Laterality Date     ABDOMEN SURGERY  04/1997    bladder repair     GYN SURGERY  04/1974    hysterectomy        Family History:    Family History   Problem Relation Age of Onset     Aneurysm Mother      Other Cancer Father      Diabetes Brother      Hypertension Brother      Other Cancer Brother      Other Cancer Sister      Aneurysm Sister        Social History:  Marital Status:   [4]  Social History     Tobacco Use     Smoking status: Current Some Day Smoker     Types: Cigarettes     Start date: 1/1/1956     Last  attempt to quit: 2017     Years since quittin.0     Smokeless tobacco: Never Used     Tobacco comment: limits self to about 4-5 daily , she is quitting on her own   Substance Use Topics     Alcohol use: No     Drug use: No        Medications:    aspirin 325 MG tablet  atorvastatin (LIPITOR) 80 MG tablet  clopidogrel (PLAVIX) 75 MG tablet  doxycycline monohydrate (ADOXA) 100 MG tablet  lisinopril (ZESTRIL) 10 MG tablet  metFORMIN (GLUCOPHAGE-XR) 500 MG 24 hr tablet  metoprolol tartrate (LOPRESSOR) 50 MG tablet  pantoprazole (PROTONIX) 40 MG EC tablet  albuterol (PROAIR HFA/PROVENTIL HFA/VENTOLIN HFA) 108 (90 Base) MCG/ACT inhaler  albuterol (VENTOLIN HFA) 108 (90 Base) MCG/ACT inhaler  blood glucose monitoring (NO BRAND SPECIFIED) meter device kit  blood glucose monitoring (ONE TOUCH DELICA) lancets  ONETOUCH VERIO IQ test strip  order for DME  order for DME          Review of Systems   Cardiovascular: Negative for leg swelling.   Musculoskeletal: Positive for myalgias.   Skin: Positive for color change.   All other systems reviewed and are negative.      Physical Exam   BP: (!) 185/81  Pulse: 86  Temp: 97.7  F (36.5  C)  Resp: 16  SpO2: 94 %      Physical Exam  Vitals signs and nursing note reviewed.   Constitutional:       Appearance: Normal appearance. She is not ill-appearing or toxic-appearing.   HENT:      Head: Normocephalic.   Eyes:      Pupils: Pupils are equal, round, and reactive to light.   Neck:      Musculoskeletal: Neck supple.   Cardiovascular:      Rate and Rhythm: Normal rate.   Pulmonary:      Effort: Pulmonary effort is normal.   Musculoskeletal:         General: Tenderness present. No deformity or signs of injury.      Right lower leg: No edema.      Left lower leg: No edema.   Skin:     General: Skin is warm and dry.      Capillary Refill: Capillary refill takes less than 2 seconds.      Findings: Erythema present.             Comments: Approximately 5 x 3 inch erythematous area to  anterior left lower leg.    Neurological:      Mental Status: She is alert and oriented to person, place, and time.         ED Course        Procedures               No results found for this or any previous visit (from the past 24 hour(s)).    Medications - No data to display    Assessments & Plan (with Medical Decision Making)     I have reviewed the nursing notes.    I have reviewed the findings, diagnosis, plan and need for follow up with the patient.  76-year-old female that presented with concerns of pain and redness to left lower leg.  Patient has an approximately 5 x 3 inch erythematous area to anterior left lower leg that is warm and tender to the touch.  No fluctuance.  No drainage.    Doxycycline as prescribed for cellulitis to left lower leg.  Recommended cool compresses, antihistamines as needed for the itching.  APAP/ibuprofen as needed for pain.  Follow-up with PCP if no improvement in symptoms.  Return to ED/UC for worsening or concerning symptoms.  Patient verbalized understanding.    This document was prepared using a combination of typing and voice generated software.  While every attempt was made for accuracy, spelling and grammatical errors may exist.    New Prescriptions    DOXYCYCLINE MONOHYDRATE (ADOXA) 100 MG TABLET    Take 1 tablet (100 mg) by mouth daily for 7 days       Final diagnoses:   Cellulitis of left lower leg       5/31/2021   [facility]     Mpofu, Prudence, CNP  05/31/21 2085

## 2021-06-10 DIAGNOSIS — K21.9 GASTROESOPHAGEAL REFLUX DISEASE WITHOUT ESOPHAGITIS: ICD-10-CM

## 2021-06-10 RX ORDER — PANTOPRAZOLE SODIUM 40 MG/1
TABLET, DELAYED RELEASE ORAL
Qty: 90 TABLET | Refills: 2 | Status: SHIPPED | OUTPATIENT
Start: 2021-06-10 | End: 2022-03-07

## 2021-06-10 NOTE — TELEPHONE ENCOUNTER
Pantoprazole 40 mg ec      Last Written Prescription Date:  9/28/20  Last Fill Quantity: 90,   # refills: 2  Last Office Visit: 1/12/21  Future Office visit:       Routing refill request to provider for review/approval because:  Drug not on the [facility], P or [facility] refill protocol or controlled substance

## 2021-06-11 DIAGNOSIS — E11.49 TYPE 2 DIABETES MELLITUS WITH OTHER NEUROLOGIC COMPLICATION, WITHOUT LONG-TERM CURRENT USE OF INSULIN (H): Chronic | ICD-10-CM

## 2021-06-11 RX ORDER — METFORMIN HCL 500 MG
TABLET, EXTENDED RELEASE 24 HR ORAL
Qty: 80 TABLET | Refills: 0 | Status: SHIPPED | OUTPATIENT
Start: 2021-06-11 | End: 2021-06-29

## 2021-06-11 NOTE — TELEPHONE ENCOUNTER
Pt's daughter called reports mother will be out of metformin today, requesting a short term supply be sent to [facility] in [place]. [place] reports [facility] did mail meds, but haven't been received yet.     Request signed as pt's daughter requested.

## 2021-06-28 DIAGNOSIS — E11.49 TYPE 2 DIABETES MELLITUS WITH OTHER NEUROLOGIC COMPLICATION, WITHOUT LONG-TERM CURRENT USE OF INSULIN (H): Chronic | ICD-10-CM

## 2021-06-29 RX ORDER — METFORMIN HCL 500 MG
TABLET, EXTENDED RELEASE 24 HR ORAL
Qty: 80 TABLET | Refills: 0 | Status: SHIPPED | OUTPATIENT
Start: 2021-06-29 | End: 2022-03-07

## 2021-07-15 ENCOUNTER — OFFICE VISIT (OUTPATIENT)
Dept: INTERNAL MEDICINE | Facility: OTHER | Age: 76
End: 2021-07-15
Attending: INTERNAL MEDICINE
Payer: MEDICARE

## 2021-07-15 VITALS
SYSTOLIC BLOOD PRESSURE: 130 MMHG | TEMPERATURE: 97.6 F | DIASTOLIC BLOOD PRESSURE: 78 MMHG | OXYGEN SATURATION: 97 % | HEART RATE: 59 BPM

## 2021-07-15 DIAGNOSIS — E78.5 HYPERLIPIDEMIA LDL GOAL <100: ICD-10-CM

## 2021-07-15 DIAGNOSIS — S91.105A OPEN WOUND OF SECOND TOE OF LEFT FOOT, INITIAL ENCOUNTER: ICD-10-CM

## 2021-07-15 DIAGNOSIS — I10 BENIGN ESSENTIAL HYPERTENSION: Chronic | ICD-10-CM

## 2021-07-15 DIAGNOSIS — E11.9 TYPE 2 DIABETES, HBA1C GOAL < 7% (H): ICD-10-CM

## 2021-07-15 DIAGNOSIS — H04.123 DRY EYES: Primary | ICD-10-CM

## 2021-07-15 LAB
ALBUMIN SERPL-MCNC: 3.5 G/DL (ref 3.4–5)
ALP SERPL-CCNC: 79 U/L (ref 40–150)
ALT SERPL W P-5'-P-CCNC: 19 U/L (ref 0–50)
ANION GAP SERPL CALCULATED.3IONS-SCNC: 6 MMOL/L (ref 3–14)
AST SERPL W P-5'-P-CCNC: 13 U/L (ref 0–45)
BASOPHILS # BLD AUTO: 0 10E3/UL (ref 0–0.2)
BASOPHILS NFR BLD AUTO: 1 %
BILIRUB SERPL-MCNC: 0.3 MG/DL (ref 0.2–1.3)
BUN SERPL-MCNC: 18 MG/DL (ref 7–30)
CALCIUM SERPL-MCNC: 9.3 MG/DL (ref 8.5–10.1)
CHLORIDE BLD-SCNC: 104 MMOL/L (ref 94–109)
CHOLEST SERPL-MCNC: 125 MG/DL
CO2 SERPL-SCNC: 25 MMOL/L (ref 20–32)
CREAT SERPL-MCNC: 0.99 MG/DL (ref 0.52–1.04)
EOSINOPHIL # BLD AUTO: 0.1 10E3/UL (ref 0–0.7)
EOSINOPHIL NFR BLD AUTO: 2 %
ERYTHROCYTE [DISTWIDTH] IN BLOOD BY AUTOMATED COUNT: 14.6 % (ref 10–15)
EST. AVERAGE GLUCOSE BLD GHB EST-MCNC: 146 MG/DL
FASTING STATUS PATIENT QL REPORTED: NO
GFR SERPL CREATININE-BSD FRML MDRD: 56 ML/MIN/1.73M2
GLUCOSE BLD-MCNC: 122 MG/DL (ref 70–99)
HBA1C MFR BLD: 6.7 % (ref 0–5.6)
HCT VFR BLD AUTO: 37.9 % (ref 35–47)
HDLC SERPL-MCNC: 60 MG/DL
HGB BLD-MCNC: 12 G/DL (ref 11.7–15.7)
LDLC SERPL CALC-MCNC: 48 MG/DL
LYMPHOCYTES # BLD AUTO: 1.1 10E3/UL (ref 0.8–5.3)
LYMPHOCYTES NFR BLD AUTO: 19 %
MCH RBC QN AUTO: 25.8 PG (ref 26.5–33)
MCHC RBC AUTO-ENTMCNC: 31.7 G/DL (ref 31.5–36.5)
MCV RBC AUTO: 82 FL (ref 78–100)
MONOCYTES # BLD AUTO: 0.5 10E3/UL (ref 0–1.3)
MONOCYTES NFR BLD AUTO: 8 %
NEUTROPHILS # BLD AUTO: 4.2 10E3/UL (ref 1.6–8.3)
NEUTROPHILS NFR BLD AUTO: 71 %
NONHDLC SERPL-MCNC: 65 MG/DL
PLATELET # BLD AUTO: 272 10E3/UL (ref 150–450)
POTASSIUM BLD-SCNC: 4.9 MMOL/L (ref 3.4–5.3)
PROT SERPL-MCNC: 7.2 G/DL (ref 6.8–8.8)
RBC # BLD AUTO: 4.65 10E6/UL (ref 3.8–5.2)
SODIUM SERPL-SCNC: 135 MMOL/L (ref 133–144)
TRIGL SERPL-MCNC: 83 MG/DL
WBC # BLD AUTO: 6 10E3/UL (ref 4–11)

## 2021-07-15 PROCEDURE — 36415 COLL VENOUS BLD VENIPUNCTURE: CPT | Mod: ZL | Performed by: INTERNAL MEDICINE

## 2021-07-15 PROCEDURE — 80061 LIPID PANEL: CPT | Mod: ZL | Performed by: INTERNAL MEDICINE

## 2021-07-15 PROCEDURE — 99214 OFFICE O/P EST MOD 30 MIN: CPT | Performed by: INTERNAL MEDICINE

## 2021-07-15 PROCEDURE — 82040 ASSAY OF SERUM ALBUMIN: CPT | Mod: ZL | Performed by: INTERNAL MEDICINE

## 2021-07-15 PROCEDURE — G0463 HOSPITAL OUTPT CLINIC VISIT: HCPCS

## 2021-07-15 PROCEDURE — 85025 COMPLETE CBC W/AUTO DIFF WBC: CPT | Mod: ZL | Performed by: INTERNAL MEDICINE

## 2021-07-15 PROCEDURE — 83036 HEMOGLOBIN GLYCOSYLATED A1C: CPT | Mod: ZL | Performed by: INTERNAL MEDICINE

## 2021-07-15 RX ORDER — CETIRIZINE HYDROCHLORIDE 10 MG/1
10 TABLET ORAL DAILY
Qty: 30 TABLET | Refills: 1 | Status: SHIPPED | OUTPATIENT
Start: 2021-07-15 | End: 2022-02-02

## 2021-07-15 ASSESSMENT — PAIN SCALES - GENERAL: PAINLEVEL: NO PAIN (0)

## 2021-07-15 NOTE — NURSING NOTE
"Chief Complaint   Patient presents with     Hypertension     Diabetes     Lipids     Eye Problem       Initial /78 (BP Location: Right arm, Patient Position: Chair, Cuff Size: Adult Large)   Pulse 59   Temp 97.6  F (36.4  C) (Tympanic)   SpO2 97%  Estimated body mass index is 41.69 kg/m  as calculated from the following:    Height as of 5/12/21: 1.676 m (5' 6\").    Weight as of 5/12/21: 117.2 kg (258 lb 4.8 oz).  Medication Reconciliation: complete  CHERELLE JACKSON LPN  "

## 2021-07-15 NOTE — PROGRESS NOTES
"    Assessment & Plan   Problem List Items Addressed This Visit        Circulatory    Benign essential hypertension (Chronic)      Other Visit Diagnoses     Dry eyes    -  Primary    Relevant Medications    cetirizine (ZYRTEC) 10 MG tablet    Type 2 diabetes, HbA1c goal < 7% (H)        Relevant Orders    Comprehensive metabolic panel    CBC with Platelets & Differential    Hemoglobin A1c    Hyperlipidemia LDL goal <100        Relevant Orders    Lipid Profile    Open wound of second toe of left foot, initial encounter-paint in betadine BID x 7 days                30 minutes spent on the date of the encounter doing chart review, review of test results, interpretation of tests, patient visit and documentation        Tobacco Cessation:   reports that she has been smoking cigarettes. She started smoking about 65 years ago. She has never used smokeless tobacco.      BMI:   Estimated body mass index is 41.69 kg/m  as calculated from the following:    Height as of 5/12/21: 1.676 m (5' 6\").    Weight as of 5/12/21: 117.2 kg (258 lb 4.8 oz).           No follow-ups on file.    Yash Cuellar, [facility]   Rosalie is a 76 year old who presents for the following health issues     HPI   Rosalie presents today with her daughter for follow up.  Her primary complaint was dry eyes.  She was given Restasis.  This has not helped.  She denies trying any allergy medications and does have an upcoming eye exam scheduled.    She also reports bumping her left second toe a month ago when she was trying to clean the toilet.  She has an eschar on left second toe.       Diabetes Follow-up      How often are you checking your blood sugar? Not at all    What concerns do you have today about your diabetes? None     Do you have any of these symptoms? (Select all that apply)  No numbness or tingling in feet.  No redness, sores or blisters on feet.  No complaints of excessive thirst.  No reports of " blurry vision.  No significant changes to weight.    Have you had a diabetic eye exam in the last 12 months? No                Hyperlipidemia Follow-Up      Are you regularly taking any medication or supplement to lower your cholesterol?   Yes- Lipitor    Are you having muscle aches or other side effects that you think could be caused by your cholesterol lowering medication?  No    Hypertension Follow-up      Do you check your blood pressure regularly outside of the clinic? No     Are you following a low salt diet? Yes    Are your blood pressures ever more than 140 on the top number (systolic) OR more   than 90 on the bottom number (diastolic), for example 140/90? Yes    BP Readings from Last 2 Encounters:   07/15/21 130/78   05/31/21 (!) 185/81     Hemoglobin A1C (%)   Date Value   01/12/2021 6.4 (H)   03/03/2020 7.1 (H)     LDL Cholesterol Calculated (mg/dL)   Date Value   01/12/2021 45   03/03/2020 44           Eye(s) Problem  Onset/Duration: over 1 year   Description:   Location: bilateral eyes  Pain: no  Redness: no  Accompanying Signs & Symptoms:  Discharge/mattering: no  Swelling: no  Visual changes: YES eyes feel tired all the time   Fever: no  Nasal Congestion: no  Bothered by bright lights: YES  History:  Trauma: no  Foreign body exposure: no  Wearing contacts: no  Precipitating or alleviating factors: None  Therapies tried and outcome:  Eye drops       Review of Systems   Constitutional, HEENT, cardiovascular, pulmonary, gi and gu systems are negative, except as otherwise noted.      Objective    /78 (BP Location: Right arm, Patient Position: Chair, Cuff Size: Adult Large)   Pulse 59   Temp 97.6  F (36.4  C) (Tympanic)   SpO2 97%   There is no height or weight on file to calculate BMI.  Physical Exam   GENERAL: Alert and no distress  EYES:  PERRL with left eye ptosis    RESP: Rhonchi and wheezing appreciated throughout  CV: regular rate and rhythm, normal S1 S2, no S3 or S4, no murmur, click or  rub, no peripheral edema and peripheral pulses strong  MS: no gross musculoskeletal defects noted, no edema  SKIN: Eschar on left second toes  NEURO: Left arm and leg weakness-3-4/5 strength and contracture of left arm.    PSYCH: mentation appears normal, affect normal/bright    Admission on 02/28/2021, Discharged on 02/28/2021   Component Date Value Ref Range Status     WBC 02/28/2021 6.8  4.0 - 11.0 10e9/L Final     RBC Count 02/28/2021 4.88  3.8 - 5.2 10e12/L Final     Hemoglobin 02/28/2021 12.6  11.7 - 15.7 g/dL Final     Hematocrit 02/28/2021 40.6  35.0 - 47.0 % Final     MCV 02/28/2021 83  78 - 100 fl Final     MCH 02/28/2021 25.8* 26.5 - 33.0 pg Final     MCHC 02/28/2021 31.0* 31.5 - 36.5 g/dL Final     RDW 02/28/2021 14.6  10.0 - 15.0 % Final     Platelet Count 02/28/2021 324  150 - 450 10e9/L Final     Diff Method 02/28/2021 Automated Method   Final     % Neutrophils 02/28/2021 73.9  % Final     % Lymphocytes 02/28/2021 15.9  % Final     % Monocytes 02/28/2021 6.7  % Final     % Eosinophils 02/28/2021 2.5  % Final     % Basophils 02/28/2021 0.6  % Final     % Immature Granulocytes 02/28/2021 0.4  % Final     Nucleated RBCs 02/28/2021 0  0 /100 Final     Absolute Neutrophil 02/28/2021 5.1  1.6 - 8.3 10e9/L Final     Absolute Lymphocytes 02/28/2021 1.1  0.8 - 5.3 10e9/L Final     Absolute Monocytes 02/28/2021 0.5  0.0 - 1.3 10e9/L Final     Absolute Eosinophils 02/28/2021 0.2  0.0 - 0.7 10e9/L Final     Absolute Basophils 02/28/2021 0.0  0.0 - 0.2 10e9/L Final     Abs Immature Granulocytes 02/28/2021 0.0  0 - 0.4 10e9/L Final     Absolute Nucleated RBC 02/28/2021 0.0   Final     Sodium 02/28/2021 138  133 - 144 mmol/L Final     Potassium 02/28/2021 3.6  3.4 - 5.3 mmol/L Final     Chloride 02/28/2021 106  94 - 109 mmol/L Final     Carbon Dioxide 02/28/2021 27  20 - 32 mmol/L Final     Anion Gap 02/28/2021 5  3 - 14 mmol/L Final     Glucose 02/28/2021 124* 70 - 99 mg/dL Final     Urea Nitrogen 02/28/2021 11  7  - 30 mg/dL Final     Creatinine 02/28/2021 0.73  0.52 - 1.04 mg/dL Final     GFR Estimate 02/28/2021 80  >60 mL/min/[1.73_m2] Final    Comment: Non  GFR Calc  Starting 12/18/2018, serum creatinine based estimated GFR (eGFR) will be   calculated using the Chronic Kidney Disease Epidemiology Collaboration   (CKD-EPI) equation.       GFR Estimate If Black 02/28/2021 >90  >60 mL/min/[1.73_m2] Final    Comment:  GFR Calc  Starting 12/18/2018, serum creatinine based estimated GFR (eGFR) will be   calculated using the Chronic Kidney Disease Epidemiology Collaboration   (CKD-EPI) equation.       Calcium 02/28/2021 9.0  8.5 - 10.1 mg/dL Final     Troponin I ES 02/28/2021 <0.015  0.000 - 0.045 ug/L Final    Comment: The 99th percentile for upper reference range is 0.045 ug/L.  Troponin values   in the range of 0.045 - 0.120 ug/L may be associated with risks of adverse   clinical events.       D Dimer 02/28/2021 0.8* 0.0 - 0.50 ug/ml FEU Final    Comment: This D-dimer assay is intended for use in conjunction with a clinical pretest   probability assessment model to exclude pulmonary embolism (PE) and deep   venous thrombosis (DVT) in outpatients suspected of PE or DVT. The cut-off   value is 0.5 ug/mL FEU.       Troponin I ES 02/28/2021 <0.015  0.000 - 0.045 ug/L Final    Comment: The 99th percentile for upper reference range is 0.045 ug/L.  Troponin values   in the range of 0.045 - 0.120 ug/L may be associated with risks of adverse   clinical events.         Results for orders placed or performed in visit on 07/15/21   CBC with Platelets & Differential     Status: None (In process)    Narrative    The following orders were created for panel order CBC with Platelets & Differential.  Procedure                               Abnormality         Status                     ---------                               -----------         ------                     CBC with platelets and d...[[id number]]                       In process                   Please view results for these tests on the individual orders.     Results for orders placed or performed in visit on 07/15/21 (from the past 24 hour(s))   CBC with Platelets & Differential    Narrative    The following orders were created for panel order CBC with Platelets & Differential.  Procedure                               Abnormality         Status                     ---------                               -----------         ------                     CBC with platelets and d...[[id number]]                      In process                   Please view results for these tests on the individual orders.

## 2021-07-29 ENCOUNTER — TELEPHONE (OUTPATIENT)
Dept: INTERNAL MEDICINE | Facility: OTHER | Age: 76
End: 2021-07-29

## 2021-07-29 NOTE — TELEPHONE ENCOUNTER
Pt's daughter called concerned regarding eye appointment in [place] today at 1:15 PM , for previous eye concerns and vision. Pt's daughter is concerned due to smoke if this appointment should be rescheduled due to her mother's COPD.  requested she reach out to her PCP regarding rescheduling if appropriate. PCP to advise.     [phone]

## 2021-08-24 ENCOUNTER — TELEPHONE (OUTPATIENT)
Dept: INTERNAL MEDICINE | Facility: OTHER | Age: 76
End: 2021-08-24

## 2021-08-24 DIAGNOSIS — I10 HYPERTENSION, UNSPECIFIED TYPE: ICD-10-CM

## 2021-08-24 RX ORDER — LISINOPRIL 20 MG/1
20 TABLET ORAL DAILY
Qty: 90 TABLET | Refills: 1 | Status: ON HOLD | OUTPATIENT
Start: 2021-08-24 | End: 2022-01-06

## 2021-08-24 NOTE — TELEPHONE ENCOUNTER
Patient requesting refill on lisinopril takes  2 10mg tablets daily. States she was given 40mg but medication was causing her to be too tired. Requesting 1 20mg daily daily to be sent in.  Order pending   CHERELLE JACKSON LPN

## 2021-09-08 ENCOUNTER — TRANSFERRED RECORDS (OUTPATIENT)
Dept: HEALTH INFORMATION MANAGEMENT | Facility: CLINIC | Age: 76
End: 2021-09-08
Payer: MEDICARE

## 2021-09-08 DIAGNOSIS — J43.1 PANLOBULAR EMPHYSEMA (H): Primary | ICD-10-CM

## 2021-09-08 LAB
RETINOPATHY: NEGATIVE
RETINOPATHY: NEGATIVE

## 2021-09-08 RX ORDER — ALBUTEROL SULFATE 90 UG/1
2 AEROSOL, METERED RESPIRATORY (INHALATION) EVERY 6 HOURS PRN
Qty: 18 G | Refills: 1 | Status: SHIPPED | OUTPATIENT
Start: 2021-09-08 | End: 2023-11-13

## 2021-11-23 ENCOUNTER — ANESTHESIA EVENT (OUTPATIENT)
Dept: SURGERY | Facility: HOSPITAL | Age: 76
End: 2021-11-23
Payer: MEDICARE

## 2021-11-23 RX ORDER — ONDANSETRON 2 MG/ML
4 INJECTION INTRAMUSCULAR; INTRAVENOUS EVERY 30 MIN PRN
Status: CANCELLED | OUTPATIENT
Start: 2021-11-23

## 2021-11-23 RX ORDER — ONDANSETRON 4 MG/1
4 TABLET, ORALLY DISINTEGRATING ORAL EVERY 30 MIN PRN
Status: CANCELLED | OUTPATIENT
Start: 2021-11-23

## 2021-11-23 RX ORDER — LABETALOL 20 MG/4 ML (5 MG/ML) INTRAVENOUS SYRINGE
10
Status: CANCELLED | OUTPATIENT
Start: 2021-11-23

## 2021-11-23 RX ORDER — HYDRALAZINE HYDROCHLORIDE 20 MG/ML
2.5-5 INJECTION INTRAMUSCULAR; INTRAVENOUS EVERY 10 MIN PRN
Status: CANCELLED | OUTPATIENT
Start: 2021-11-23

## 2021-11-23 RX ORDER — SODIUM CHLORIDE, SODIUM LACTATE, POTASSIUM CHLORIDE, CALCIUM CHLORIDE 600; 310; 30; 20 MG/100ML; MG/100ML; MG/100ML; MG/100ML
INJECTION, SOLUTION INTRAVENOUS CONTINUOUS
Status: CANCELLED | OUTPATIENT
Start: 2021-11-23

## 2021-11-23 ASSESSMENT — LIFESTYLE VARIABLES: TOBACCO_USE: 1

## 2021-11-23 ASSESSMENT — COPD QUESTIONNAIRES: COPD: 1

## 2021-11-23 NOTE — ANESTHESIA PREPROCEDURE EVALUATION
Anesthesia Pre-Procedure Evaluation    Patient: Rosalie JANE Friend   MRN: [id number] : 1945        Preoperative Diagnosis: Age-related nuclear cataract, bilateral [H25.13]    Procedure : Procedure(s):  PHACOEMULSIFICATION, CATARACT, WITH STANDARD INTRAOCULAR LENS IMPLANT INSERTION          Past Medical History:   Diagnosis Date     Acute ischemic stroke (H) 2018     Cerebral infarction (H) 2017      Past Surgical History:   Procedure Laterality Date     ABDOMEN SURGERY  1997    bladder repair     GYN SURGERY  1974    hysterectomy       Allergies   Allergen Reactions     Pcn [Penicillins]      Phenylephrine      Head aches     Tropicamide      Head aches        Social History     Tobacco Use     Smoking status: Current Some Day Smoker     Types: Cigarettes     Start date: 1956     Last attempt to quit: 2017     Years since quittin.5     Smokeless tobacco: Never Used     Tobacco comment: limits self to about 4-5 daily , she is quitting on her own   Substance Use Topics     Alcohol use: No      Wt Readings from Last 1 Encounters:   21 117.2 kg (258 lb 4.8 oz)        Anesthesia Evaluation   Pt has had prior anesthetic. Type: General.    No history of anesthetic complications       ROS/MED HX  ENT/Pulmonary: Comment: Smokes not everyday    (+) SOFIE risk factors, hypertension, obese, daytime somnolence, tobacco use, Current use, COPD,     Neurologic: Comment: Cerebral aneurysm s/p coil 2017    (+) CVA (R ICA occlusion, multiple aneurysms), date: 2017, with deficits, - weakness left side.     Cardiovascular:     (+) Dyslipidemia hypertension-range: rx metoprolol/ Peripheral Vascular Disease (left lower extremity)-- Other. ---Taking blood thinners Instructions Given to patient: plavix, ASA 325mg.     METS/Exercise Tolerance: 1 - Eating, dressing    Hematologic:  - neg hematologic  ROS     Musculoskeletal:  - neg musculoskeletal ROS     GI/Hepatic:     (+) GERD, Asymptomatic on  medication,     Renal/Genitourinary:  - neg Renal ROS     Endo:     (+) type II DM, Not using insulin, Obesity,     Psychiatric/Substance Use:  - neg psychiatric ROS     Infectious Disease:  - neg infectious disease ROS     Malignancy:  - neg malignancy ROS     Other:  - neg other ROS          Physical Exam    Airway  airway exam normal      Mallampati: II   TM distance: > 3 FB   Neck ROM: full   Mouth opening: > 3 cm    Respiratory Devices and Support         Dental       (+) upper dentures      Cardiovascular          Rhythm and rate: regular and normal     Pulmonary           (+) wheezes           OUTSIDE LABS:  CBC:   Lab Results   Component Value Date    WBC 6.0 07/15/2021    WBC 6.8 02/28/2021    HGB 12.0 07/15/2021    HGB 12.6 02/28/2021    HCT 37.9 07/15/2021    HCT 40.6 02/28/2021     07/15/2021     02/28/2021     BMP:   Lab Results   Component Value Date     07/15/2021     02/28/2021    POTASSIUM 4.9 07/15/2021    POTASSIUM 3.6 02/28/2021    CHLORIDE 104 07/15/2021    CHLORIDE 106 02/28/2021    CO2 25 07/15/2021    CO2 27 02/28/2021    BUN 18 07/15/2021    BUN 11 02/28/2021    CR 0.99 07/15/2021    CR 0.73 02/28/2021     (H) 07/15/2021     (H) 02/28/2021     COAGS:   Lab Results   Component Value Date    PTT 27 02/17/2018    INR 0.97 04/06/2018     POC:   Lab Results   Component Value Date     (H) 11/08/2019     HEPATIC:   Lab Results   Component Value Date    ALBUMIN 3.5 07/15/2021    PROTTOTAL 7.2 07/15/2021    ALT 19 07/15/2021    AST 13 07/15/2021    ALKPHOS 79 07/15/2021    BILITOTAL 0.3 07/15/2021     OTHER:   Lab Results   Component Value Date    A1C 6.7 (H) 07/15/2021    VIOLA 9.3 07/15/2021    TSH 0.85 01/12/2021    CRP <2.9 04/06/2018    SED 4 05/05/2017       Anesthesia Plan    ASA Status:  3      Anesthesia Type: MAC.              Consents    Anesthesia Plan(s) and associated risks, benefits, and realistic alternatives discussed. Questions answered  and patient/representative(s) expressed understanding.    - Discussed:     - Discussed with:  Patient      - Extended Intubation/Ventilatory Support Discussed: No.      - Patient is DNR/DNI Status: No    Use of blood products discussed: No .     Postoperative Care            Comments:    Other Comments:  H&P (11/30)            ADALBERTO Browning CRNA

## 2021-11-28 ENCOUNTER — OFFICE VISIT (OUTPATIENT)
Dept: FAMILY MEDICINE | Facility: OTHER | Age: 76
End: 2021-11-28
Attending: OPHTHALMOLOGY
Payer: MEDICARE

## 2021-11-28 DIAGNOSIS — Z01.818 PREOP GENERAL PHYSICAL EXAM: Primary | ICD-10-CM

## 2021-11-28 PROCEDURE — U0005 INFEC AGEN DETEC AMPLI PROBE: HCPCS | Mod: ZL

## 2021-11-29 LAB — SARS-COV-2 RNA RESP QL NAA+PROBE: NEGATIVE

## 2021-11-30 ENCOUNTER — OFFICE VISIT (OUTPATIENT)
Dept: FAMILY MEDICINE | Facility: OTHER | Age: 76
End: 2021-11-30
Attending: FAMILY MEDICINE
Payer: MEDICARE

## 2021-11-30 VITALS
OXYGEN SATURATION: 98 % | HEART RATE: 66 BPM | DIASTOLIC BLOOD PRESSURE: 84 MMHG | SYSTOLIC BLOOD PRESSURE: 132 MMHG | BODY MASS INDEX: 38.45 KG/M2 | TEMPERATURE: 97.2 F | WEIGHT: 245 LBS | HEIGHT: 67 IN

## 2021-11-30 DIAGNOSIS — I63.9 ACUTE ISCHEMIC STROKE (H): ICD-10-CM

## 2021-11-30 DIAGNOSIS — I73.9 PVD (PERIPHERAL VASCULAR DISEASE) (H): ICD-10-CM

## 2021-11-30 DIAGNOSIS — E66.01 MORBID OBESITY (H): ICD-10-CM

## 2021-11-30 DIAGNOSIS — E55.9 VITAMIN D DEFICIENCY: ICD-10-CM

## 2021-11-30 DIAGNOSIS — E78.5 HYPERLIPIDEMIA LDL GOAL <100: ICD-10-CM

## 2021-11-30 DIAGNOSIS — J43.8 OTHER EMPHYSEMA (H): ICD-10-CM

## 2021-11-30 DIAGNOSIS — I10 HYPERTENSION, UNSPECIFIED TYPE: ICD-10-CM

## 2021-11-30 DIAGNOSIS — Z01.818 PREOP GENERAL PHYSICAL EXAM: Primary | ICD-10-CM

## 2021-11-30 DIAGNOSIS — E11.40 TYPE 2 DIABETES MELLITUS WITH DIABETIC NEUROPATHY, WITHOUT LONG-TERM CURRENT USE OF INSULIN (H): ICD-10-CM

## 2021-11-30 PROBLEM — J43.9 EMPHYSEMA LUNG (H): Status: ACTIVE | Noted: 2019-03-02

## 2021-11-30 PROBLEM — I67.1 NONRUPTURED CEREBRAL ANEURYSM: Status: ACTIVE | Noted: 2018-02-18

## 2021-11-30 PROBLEM — E11.65 TYPE 2 DIABETES MELLITUS WITH HYPERGLYCEMIA, WITHOUT LONG-TERM CURRENT USE OF INSULIN (H): Status: ACTIVE | Noted: 2021-11-30

## 2021-11-30 PROBLEM — K21.9 GASTROESOPHAGEAL REFLUX DISEASE WITHOUT ESOPHAGITIS: Status: ACTIVE | Noted: 2019-03-02

## 2021-11-30 LAB
ANION GAP SERPL CALCULATED.3IONS-SCNC: 3 MMOL/L (ref 3–14)
BUN SERPL-MCNC: 16 MG/DL (ref 7–30)
CALCIUM SERPL-MCNC: 9.6 MG/DL (ref 8.5–10.1)
CHLORIDE BLD-SCNC: 105 MMOL/L (ref 94–109)
CO2 SERPL-SCNC: 28 MMOL/L (ref 20–32)
CREAT SERPL-MCNC: 0.82 MG/DL (ref 0.52–1.04)
ERYTHROCYTE [DISTWIDTH] IN BLOOD BY AUTOMATED COUNT: 14.3 % (ref 10–15)
EST. AVERAGE GLUCOSE BLD GHB EST-MCNC: 143 MG/DL
GFR SERPL CREATININE-BSD FRML MDRD: 70 ML/MIN/1.73M2
GLUCOSE BLD-MCNC: 204 MG/DL (ref 70–99)
HBA1C MFR BLD: 6.6 % (ref 0–5.6)
HCT VFR BLD AUTO: 39 % (ref 35–47)
HGB BLD-MCNC: 11.9 G/DL (ref 11.7–15.7)
MCH RBC QN AUTO: 25.5 PG (ref 26.5–33)
MCHC RBC AUTO-ENTMCNC: 30.5 G/DL (ref 31.5–36.5)
MCV RBC AUTO: 84 FL (ref 78–100)
PLATELET # BLD AUTO: 327 10E3/UL (ref 150–450)
POTASSIUM BLD-SCNC: 4.7 MMOL/L (ref 3.4–5.3)
RBC # BLD AUTO: 4.67 10E6/UL (ref 3.8–5.2)
SODIUM SERPL-SCNC: 136 MMOL/L (ref 133–144)
WBC # BLD AUTO: 7.9 10E3/UL (ref 4–11)

## 2021-11-30 PROCEDURE — 99204 OFFICE O/P NEW MOD 45 MIN: CPT | Performed by: FAMILY MEDICINE

## 2021-11-30 PROCEDURE — 83036 HEMOGLOBIN GLYCOSYLATED A1C: CPT | Mod: ZL | Performed by: FAMILY MEDICINE

## 2021-11-30 PROCEDURE — 80048 BASIC METABOLIC PNL TOTAL CA: CPT | Mod: ZL | Performed by: FAMILY MEDICINE

## 2021-11-30 PROCEDURE — G0463 HOSPITAL OUTPT CLINIC VISIT: HCPCS

## 2021-11-30 PROCEDURE — 36415 COLL VENOUS BLD VENIPUNCTURE: CPT | Mod: ZL | Performed by: FAMILY MEDICINE

## 2021-11-30 PROCEDURE — 85027 COMPLETE CBC AUTOMATED: CPT | Mod: ZL | Performed by: FAMILY MEDICINE

## 2021-11-30 RX ORDER — CLOPIDOGREL BISULFATE 75 MG/1
TABLET ORAL
Qty: 90 TABLET | Refills: 3 | Status: SHIPPED | OUTPATIENT
Start: 2021-11-30 | End: 2022-11-28

## 2021-11-30 RX ORDER — ATORVASTATIN CALCIUM 80 MG/1
TABLET, FILM COATED ORAL
Qty: 90 TABLET | Refills: 3 | Status: SHIPPED | OUTPATIENT
Start: 2021-11-30 | End: 2022-11-28

## 2021-11-30 RX ORDER — METOPROLOL TARTRATE 50 MG
TABLET ORAL
Qty: 90 TABLET | Refills: 3 | Status: SHIPPED | OUTPATIENT
Start: 2021-11-30 | End: 2022-11-28

## 2021-11-30 RX ORDER — LISINOPRIL 10 MG/1
TABLET ORAL
Qty: 180 TABLET | Refills: 3 | Status: SHIPPED | OUTPATIENT
Start: 2021-11-30 | End: 2022-09-13

## 2021-11-30 SDOH — HEALTH STABILITY: PHYSICAL HEALTH: ON AVERAGE, HOW MANY MINUTES DO YOU ENGAGE IN EXERCISE AT THIS LEVEL?: 0 MIN

## 2021-11-30 SDOH — HEALTH STABILITY: PHYSICAL HEALTH: ON AVERAGE, HOW MANY DAYS PER WEEK DO YOU ENGAGE IN MODERATE TO STRENUOUS EXERCISE (LIKE A BRISK WALK)?: 0 DAYS

## 2021-11-30 ASSESSMENT — MIFFLIN-ST. JEOR: SCORE: 1626

## 2021-11-30 ASSESSMENT — PAIN SCALES - GENERAL: PAINLEVEL: NO PAIN (0)

## 2021-11-30 ASSESSMENT — LIFESTYLE VARIABLES
HOW OFTEN DO YOU HAVE A DRINK CONTAINING ALCOHOL: NEVER
HOW OFTEN DO YOU HAVE SIX OR MORE DRINKS ON ONE OCCASION: NEVER

## 2021-11-30 NOTE — PATIENT INSTRUCTIONS

## 2021-11-30 NOTE — PROGRESS NOTES
[facility] [place]  [address]  [place] [address]  Phone: [phone]  Primary Provider: Yash Cuellar  Pre-op Performing Provider: DEN MARIE      PREOPERATIVE EVALUATION:  Today's date: 11/30/2021    Rosalie Seals is a 76 year old female who presents for a preoperative evaluation.    Surgical Information:  Surgery/Procedure: Cataract Surgery 12/02/2021(left)   Surgery Location: [facility] [place]  Surgeon: Dr. Laguna  Surgery Date: 12/02/2021  Time of Surgery: unknown  Where patient plans to recover: At home with family  Fax number for surgical facility: Note does not need to be faxed, will be available electronically in Epic.    Type of Anesthesia Anticipated: to be determined    Assessment & Plan     The proposed surgical procedure is considered LOW risk.    Preop general physical exam    - CBC with platelets; Future  - Basic metabolic panel; Future  - Basic metabolic panel  - CBC with platelets    Type 2 diabetes mellitus with diabetic neuropathy, without long-term current use of insulin (H)  No labs since july  - Hemoglobin A1c; Future  - Hemoglobin A1c    Other emphysema (H)  stable    PVD (peripheral vascular disease) (H)  stable    Morbid obesity (H)  stable    Risks and Recommendations:  The patient has the following additional risks and recommendations for perioperative complications:   - Morbid obesity (BMI >40)    Medication Instructions:  Patient is to take all scheduled medications on the day of surgery    RECOMMENDATION:  APPROVAL GIVEN to proceed with proposed procedure, without further diagnostic evaluation.  Provider  Link to [facility] Help Grid :[id number]}    Subjective     HPI related to upcoming procedure: bilateral cataracts    Preop Questions 11/30/2021   1. Have you ever had a heart attack or stroke? YES - yes   2. Have you ever had surgery on your heart or blood vessels, such as a stent placement, a coronary artery bypass, or surgery on an artery in your head, neck, heart,  or legs? No   3. Do you have chest pain with activity? No   4. Do you have a history of  heart failure? No   5. Do you currently have a cold, bronchitis or symptoms of other infection? No   6. Do you have a cough, shortness of breath, or wheezing? No   7. Do you or anyone in your family have previous history of blood clots? No   8. Do you or does anyone in your family have a serious bleeding problem such as prolonged bleeding following surgeries or cuts? No   9. Have you ever had problems with anemia or been told to take iron pills? No   10. Have you had any abnormal blood loss such as black, tarry or bloody stools, or abnormal vaginal bleeding? No   11. Have you ever had a blood transfusion? YES -    11a. Have you ever had a transfusion reaction? No   12. Are you willing to have a blood transfusion if it is medically needed before, during, or after your surgery? Yes   13. Have you or any of your relatives ever had problems with anesthesia? No   14. Do you have sleep apnea, excessive snoring or daytime drowsiness? YES - daytime drowsiness   14a. Do you have a CPAP machine? No   15. Do you have any artifical heart valves or other implanted medical devices like a pacemaker, defibrillator, or continuous glucose monitor? No   16. Do you have artificial joints? No   17. Are you allergic to latex? No       Health Care Directive:  Patient does not have a Health Care Directive or Living Will: Advance Directive received and scanned. Click on Code in the patient header to view.    Preoperative Review of :   reviewed - no record of controlled substances prescribed.      Status of Chronic Conditions:  See problem list for active medical problems.  Problems all longstanding and stable, except as noted/documented.  See ROS for pertinent symptoms related to these conditions.      Review of Systems  CONSTITUTIONAL: NEGATIVE for fever, chills, change in weight  INTEGUMENTARY/SKIN: NEGATIVE for worrisome rashes, moles or  lesions  EYES: NEGATIVE for vision changes or irritation  ENT/MOUTH: NEGATIVE for ear, mouth and throat problems  RESP: NEGATIVE for significant cough or SOB  CV: NEGATIVE for chest pain, palpitations or peripheral edema  GI: NEGATIVE for nausea, abdominal pain, heartburn, or change in bowel habits  : NEGATIVE for frequency, dysuria, or hematuria  MUSCULOSKELETAL: NEGATIVE for significant arthralgias or myalgia  NEURO: NEGATIVE for weakness, dizziness or paresthesias  ENDOCRINE: NEGATIVE for temperature intolerance, skin/hair changes  HEME: NEGATIVE for bleeding problems  PSYCHIATRIC: NEGATIVE for changes in mood or affect    Patient Active Problem List    Diagnosis Date Noted     History of CVA (cerebrovascular accident) 03/02/2019     Priority: High     5/6/17 sudden onset right vision loss and left side weakness, which resolved by the time of arrival to ED in [place]. CT of head and CT angiogram showed R ICA occlusion. L distal ICA, and R MCA 8mm 6mm aneurysms. Neurology was consulted and pt was transferred to [facility] for further management.  mg and lipitor 80 mg was started. Pt was found elevated HgA1c at 10. Imaging showed R temporal lobe infarct on CT and Angio showed R GOPI occlusion with no intracranial occlusion and L ICA and R MCA aneurysms. Strong family hx of ruptured aneurysms.  Seen at [facility] for an incidental finding of bilat ICA aneurysms. She had diagnostic cerebral angiogram with endovascular coil embolization of the larger left ICA aneurysm. The right ICA aneurysm will be monitored with serial imaging.     2/15/2018 acute onset weakness of her left upper extremity. MR angiogram of the head and neck and MR of the brain, which revealed an acute lacunar infarct in the right centrum semiovale region in an area of old ischemia. Multiple atherosclerotic lesions with complete occlusion of her right internal carotid and other more distal lesions as well. (clopidogrel was added)        Hyperlipidemia 03/02/2019     Priority: Medium     Cerebral aneurysm 03/02/2019     Priority: Medium     Cerebral aneurysm without rupture, left ICA, s/p endovascular coiling 05/2017       PVD (peripheral vascular disease) (H) 03/02/2019     Priority: Medium     ankle-brachial index 2018 1.02 on the right and 0.52 on the left, consistent with moderate ischemia of the left lower extremity.        COPD (chronic obstructive pulmonary disease) (H) 03/02/2019     Priority: Medium     PFTS 5/2018 - FEV1- 2.13 (74%), ratio 0.67, 20% change with bronchodilators,  TLC 99%, %, DLCO 60%        Obesity (BMI 35.0-39.9) with comorbidity (H) 01/03/2019     Priority: Medium     Benign essential hypertension 04/12/2018     Priority: Medium     Type 2 diabetes mellitus with neurologic complication, without long-term current use of insulin (H) 04/12/2018     Priority: Medium     ACP (advance care planning) 05/09/2017     Priority: Medium     Advance Care Planning 5/9/2017: ACP Review of Chart / Resources Provided:  Reviewed chart for advance care plan.  Rosalie Seals has been provided information and resources to begin or update their advance care plan.  Added by Kalee Busby             GERD (gastroesophageal reflux disease) 03/02/2019     Priority: Low      Past Medical History:   Diagnosis Date     Acute ischemic stroke (H) 02/18/2018     Cerebral infarction (H) 05/06/2017     Past Surgical History:   Procedure Laterality Date     ABDOMEN SURGERY  04/1997    bladder repair     GYN SURGERY  04/1974    hysterectomy      Current Outpatient Medications   Medication Sig Dispense Refill     albuterol (PROAIR HFA/PROVENTIL HFA/VENTOLIN HFA) 108 (90 Base) MCG/ACT inhaler Inhale 2 puffs into the lungs every 6 hours as needed for shortness of breath / dyspnea or wheezing 18 g 1     albuterol (VENTOLIN HFA) 108 (90 Base) MCG/ACT inhaler USE 1 TO 2 INHALATIONS EVERY 4 HOURS AS NEEDED FOR SHORTNESS OF BREATH, DIFFICULTY BREATHING,   OR WHEEZING 18 g 11     aspirin 325 MG tablet Take 325 mg by mouth       atorvastatin (LIPITOR) 80 MG tablet TAKE 1 TABLET DAILY 90 tablet 3     blood glucose monitoring (NO BRAND SPECIFIED) meter device kit Use to test blood sugar 4 times daily or as directed. 1 kit 0     blood glucose monitoring (ONE TOUCH DELICA) lancets Use to test blood sugar 3 times daily. 360 each 3     cetirizine (ZYRTEC) 10 MG tablet Take 1 tablet (10 mg) by mouth daily 30 tablet 1     clopidogrel (PLAVIX) 75 MG tablet TAKE 1 TABLET DAILY 90 tablet 3     lisinopril (ZESTRIL) 20 MG tablet Take 1 tablet (20 mg) by mouth daily 90 tablet 1     metFORMIN (GLUCOPHAGE-XR) 500 MG 24 hr tablet TAKE 2 TABLETS BY MOUTH TWICE DAILY WITH MEALS 80 tablet 0     metoprolol tartrate (LOPRESSOR) 50 MG tablet TAKE 1/2 TABLET TWICE A DAY 90 tablet 3     ONETOUCH VERIO IQ test strip USE FOUR TIMES A  each 3     order for DME Equipment being ordered: Wheelchair with foot rests 1 Device 0     order for DME Equipment being ordered: Glucometer and test strips 1 Device 0     pantoprazole (PROTONIX) 40 MG EC tablet TAKE 1 TABLET DAILY 30 TO  60 MINUTES BEFORE A MEAL 90 tablet 2       Allergies   Allergen Reactions     Pcn [Penicillins]      Phenylephrine      Head aches     Tropicamide      Head aches          Social History     Tobacco Use     Smoking status: Current Some Day Smoker     Types: Cigarettes     Start date: 1956     Last attempt to quit: 2017     Years since quittin.5     Smokeless tobacco: Never Used     Tobacco comment: limits self to about 4-5 daily , she is quitting on her own   Substance Use Topics     Alcohol use: No     Family History   Problem Relation Age of Onset     Aneurysm Mother 44     Other Cancer Father      Other Cancer Sister 59        female     Aneurysm Sister 72     Aneurysm Sister 23     Diabetes Brother      Hypertension Brother      Other Cancer Brother         unknown     Sudden Infant Death Syndrome Brother   "       influenza     Family History Negative Brother      Family History Negative Brother      Family History Negative Brother      Family History Negative Brother      Family History Negative Brother      Unknown/Adopted Maternal Grandmother      Unknown/Adopted Maternal Grandfather      Unknown/Adopted Paternal Grandmother      Unknown/Adopted Paternal Grandfather      History   Drug Use No         Objective     /84 (BP Location: Right arm, Patient Position: Chair, Cuff Size: Adult Regular)   Pulse 66   Temp 97.2  F (36.2  C) (Tympanic)   Ht 1.689 m (5' 6.5\")   Wt 111.1 kg (245 lb)   SpO2 98%   BMI 38.95 kg/m      Physical Exam    GENERAL APPEARANCE: healthy, alert and no distress     EYES: EOMI, PERRL     HENT: ear canals and TM's normal and nose and mouth without ulcers or lesions     NECK: no adenopathy, no asymmetry, masses, or scars and thyroid normal to palpation     RESP: lungs clear to auscultation - no rales, rhonchi or wheezes     CV: regular rates and rhythm, normal S1 S2, no S3 or S4 and no murmur, click or rub     ABDOMEN:  soft, nontender, no HSM or masses and bowel sounds normal     MS: extremities normal- no gross deformities noted, no evidence of inflammation in joints, FROM in all extremities.     SKIN: no suspicious lesions or rashes     NEURO: Normal strength and tone, sensory exam grossly normal, mentation intact and speech normal     PSYCH: mentation appears normal. and affect normal/bright     LYMPHATICS: No cervical adenopathy    Recent Labs   Lab Test 07/15/21  1217 02/28/21  1306 01/12/21  1159   HGB 12.0 12.6 12.3    324 307    138 139   POTASSIUM 4.9 3.6 4.7   CR 0.99 0.73 0.84   A1C 6.7*  --  6.4*        Diagnostics:  Recent Results (from the past 24 hour(s))   Basic metabolic panel    Collection Time: 11/30/21  2:08 PM   Result Value Ref Range    Sodium 136 133 - 144 mmol/L    Potassium 4.7 3.4 - 5.3 mmol/L    Chloride 105 94 - 109 mmol/L    Carbon Dioxide " (CO2) 28 20 - 32 mmol/L    Anion Gap 3 3 - 14 mmol/L    Urea Nitrogen 16 7 - 30 mg/dL    Creatinine 0.82 0.52 - 1.04 mg/dL    Calcium 9.6 8.5 - 10.1 mg/dL    Glucose 204 (H) 70 - 99 mg/dL    GFR Estimate 70 >60 mL/min/1.73m2   CBC with platelets    Collection Time: 11/30/21  2:08 PM   Result Value Ref Range    WBC Count 7.9 4.0 - 11.0 10e3/uL    RBC Count 4.67 3.80 - 5.20 10e6/uL    Hemoglobin 11.9 11.7 - 15.7 g/dL    Hematocrit 39.0 35.0 - 47.0 %    MCV 84 78 - 100 fL    MCH 25.5 (L) 26.5 - 33.0 pg    MCHC 30.5 (L) 31.5 - 36.5 g/dL    RDW 14.3 10.0 - 15.0 %    Platelet Count 327 150 - 450 10e3/uL      No EKG this visit, completed in the last 90 days.    Revised Cardiac Risk Index (RCRI):  The patient has the following serious cardiovascular risks for perioperative complications:   - Cerebrovascular Disease (TIA or CVA) = 1 point     RCRI Interpretation: 1 point: Class II (low risk - 0.9% complication rate)           Signed Electronically by: Lissette Razo MD  Copy of this evaluation report is provided to requesting physician.

## 2021-11-30 NOTE — H&P (VIEW-ONLY)
[facility] [place]  [address]  [place] [address]  Phone: [phone]  Primary Provider: Yash Cuellar  Pre-op Performing Provider: DEN MARIE      PREOPERATIVE EVALUATION:  Today's date: 11/30/2021    Rosalie Seals is a 76 year old female who presents for a preoperative evaluation.    Surgical Information:  Surgery/Procedure: Cataract Surgery 12/02/2021(left)   Surgery Location: [facility] [place]  Surgeon: Dr. Laguna  Surgery Date: 12/02/2021  Time of Surgery: unknown  Where patient plans to recover: At home with family  Fax number for surgical facility: Note does not need to be faxed, will be available electronically in Epic.    Type of Anesthesia Anticipated: to be determined    Assessment & Plan     The proposed surgical procedure is considered LOW risk.    Preop general physical exam    - CBC with platelets; Future  - Basic metabolic panel; Future  - Basic metabolic panel  - CBC with platelets    Type 2 diabetes mellitus with diabetic neuropathy, without long-term current use of insulin (H)  No labs since july  - Hemoglobin A1c; Future  - Hemoglobin A1c    Other emphysema (H)  stable    PVD (peripheral vascular disease) (H)  stable    Morbid obesity (H)  stable    Risks and Recommendations:  The patient has the following additional risks and recommendations for perioperative complications:   - Morbid obesity (BMI >40)    Medication Instructions:  Patient is to take all scheduled medications on the day of surgery    RECOMMENDATION:  APPROVAL GIVEN to proceed with proposed procedure, without further diagnostic evaluation.  Provider  Link to [facility] Help Grid :[id number]}    Subjective     HPI related to upcoming procedure: bilateral cataracts    Preop Questions 11/30/2021   1. Have you ever had a heart attack or stroke? YES - yes   2. Have you ever had surgery on your heart or blood vessels, such as a stent placement, a coronary artery bypass, or surgery on an artery in your head, neck, heart,  or legs? No   3. Do you have chest pain with activity? No   4. Do you have a history of  heart failure? No   5. Do you currently have a cold, bronchitis or symptoms of other infection? No   6. Do you have a cough, shortness of breath, or wheezing? No   7. Do you or anyone in your family have previous history of blood clots? No   8. Do you or does anyone in your family have a serious bleeding problem such as prolonged bleeding following surgeries or cuts? No   9. Have you ever had problems with anemia or been told to take iron pills? No   10. Have you had any abnormal blood loss such as black, tarry or bloody stools, or abnormal vaginal bleeding? No   11. Have you ever had a blood transfusion? YES -    11a. Have you ever had a transfusion reaction? No   12. Are you willing to have a blood transfusion if it is medically needed before, during, or after your surgery? Yes   13. Have you or any of your relatives ever had problems with anesthesia? No   14. Do you have sleep apnea, excessive snoring or daytime drowsiness? YES - daytime drowsiness   14a. Do you have a CPAP machine? No   15. Do you have any artifical heart valves or other implanted medical devices like a pacemaker, defibrillator, or continuous glucose monitor? No   16. Do you have artificial joints? No   17. Are you allergic to latex? No       Health Care Directive:  Patient does not have a Health Care Directive or Living Will: Advance Directive received and scanned. Click on Code in the patient header to view.    Preoperative Review of :   reviewed - no record of controlled substances prescribed.      Status of Chronic Conditions:  See problem list for active medical problems.  Problems all longstanding and stable, except as noted/documented.  See ROS for pertinent symptoms related to these conditions.      Review of Systems  CONSTITUTIONAL: NEGATIVE for fever, chills, change in weight  INTEGUMENTARY/SKIN: NEGATIVE for worrisome rashes, moles or  lesions  EYES: NEGATIVE for vision changes or irritation  ENT/MOUTH: NEGATIVE for ear, mouth and throat problems  RESP: NEGATIVE for significant cough or SOB  CV: NEGATIVE for chest pain, palpitations or peripheral edema  GI: NEGATIVE for nausea, abdominal pain, heartburn, or change in bowel habits  : NEGATIVE for frequency, dysuria, or hematuria  MUSCULOSKELETAL: NEGATIVE for significant arthralgias or myalgia  NEURO: NEGATIVE for weakness, dizziness or paresthesias  ENDOCRINE: NEGATIVE for temperature intolerance, skin/hair changes  HEME: NEGATIVE for bleeding problems  PSYCHIATRIC: NEGATIVE for changes in mood or affect    Patient Active Problem List    Diagnosis Date Noted     History of CVA (cerebrovascular accident) 03/02/2019     Priority: High     5/6/17 sudden onset right vision loss and left side weakness, which resolved by the time of arrival to ED in [place]. CT of head and CT angiogram showed R ICA occlusion. L distal ICA, and R MCA 8mm 6mm aneurysms. Neurology was consulted and pt was transferred to [facility] for further management.  mg and lipitor 80 mg was started. Pt was found elevated HgA1c at 10. Imaging showed R temporal lobe infarct on CT and Angio showed R GOPI occlusion with no intracranial occlusion and L ICA and R MCA aneurysms. Strong family hx of ruptured aneurysms.  Seen at [facility] for an incidental finding of bilat ICA aneurysms. She had diagnostic cerebral angiogram with endovascular coil embolization of the larger left ICA aneurysm. The right ICA aneurysm will be monitored with serial imaging.     2/15/2018 acute onset weakness of her left upper extremity. MR angiogram of the head and neck and MR of the brain, which revealed an acute lacunar infarct in the right centrum semiovale region in an area of old ischemia. Multiple atherosclerotic lesions with complete occlusion of her right internal carotid and other more distal lesions as well. (clopidogrel was added)        Hyperlipidemia 03/02/2019     Priority: Medium     Cerebral aneurysm 03/02/2019     Priority: Medium     Cerebral aneurysm without rupture, left ICA, s/p endovascular coiling 05/2017       PVD (peripheral vascular disease) (H) 03/02/2019     Priority: Medium     ankle-brachial index 2018 1.02 on the right and 0.52 on the left, consistent with moderate ischemia of the left lower extremity.        COPD (chronic obstructive pulmonary disease) (H) 03/02/2019     Priority: Medium     PFTS 5/2018 - FEV1- 2.13 (74%), ratio 0.67, 20% change with bronchodilators,  TLC 99%, %, DLCO 60%        Obesity (BMI 35.0-39.9) with comorbidity (H) 01/03/2019     Priority: Medium     Benign essential hypertension 04/12/2018     Priority: Medium     Type 2 diabetes mellitus with neurologic complication, without long-term current use of insulin (H) 04/12/2018     Priority: Medium     ACP (advance care planning) 05/09/2017     Priority: Medium     Advance Care Planning 5/9/2017: ACP Review of Chart / Resources Provided:  Reviewed chart for advance care plan.  Rosalie Seals has been provided information and resources to begin or update their advance care plan.  Added by Kalee Busby             GERD (gastroesophageal reflux disease) 03/02/2019     Priority: Low      Past Medical History:   Diagnosis Date     Acute ischemic stroke (H) 02/18/2018     Cerebral infarction (H) 05/06/2017     Past Surgical History:   Procedure Laterality Date     ABDOMEN SURGERY  04/1997    bladder repair     GYN SURGERY  04/1974    hysterectomy      Current Outpatient Medications   Medication Sig Dispense Refill     albuterol (PROAIR HFA/PROVENTIL HFA/VENTOLIN HFA) 108 (90 Base) MCG/ACT inhaler Inhale 2 puffs into the lungs every 6 hours as needed for shortness of breath / dyspnea or wheezing 18 g 1     albuterol (VENTOLIN HFA) 108 (90 Base) MCG/ACT inhaler USE 1 TO 2 INHALATIONS EVERY 4 HOURS AS NEEDED FOR SHORTNESS OF BREATH, DIFFICULTY BREATHING,   OR WHEEZING 18 g 11     aspirin 325 MG tablet Take 325 mg by mouth       atorvastatin (LIPITOR) 80 MG tablet TAKE 1 TABLET DAILY 90 tablet 3     blood glucose monitoring (NO BRAND SPECIFIED) meter device kit Use to test blood sugar 4 times daily or as directed. 1 kit 0     blood glucose monitoring (ONE TOUCH DELICA) lancets Use to test blood sugar 3 times daily. 360 each 3     cetirizine (ZYRTEC) 10 MG tablet Take 1 tablet (10 mg) by mouth daily 30 tablet 1     clopidogrel (PLAVIX) 75 MG tablet TAKE 1 TABLET DAILY 90 tablet 3     lisinopril (ZESTRIL) 20 MG tablet Take 1 tablet (20 mg) by mouth daily 90 tablet 1     metFORMIN (GLUCOPHAGE-XR) 500 MG 24 hr tablet TAKE 2 TABLETS BY MOUTH TWICE DAILY WITH MEALS 80 tablet 0     metoprolol tartrate (LOPRESSOR) 50 MG tablet TAKE 1/2 TABLET TWICE A DAY 90 tablet 3     ONETOUCH VERIO IQ test strip USE FOUR TIMES A  each 3     order for DME Equipment being ordered: Wheelchair with foot rests 1 Device 0     order for DME Equipment being ordered: Glucometer and test strips 1 Device 0     pantoprazole (PROTONIX) 40 MG EC tablet TAKE 1 TABLET DAILY 30 TO  60 MINUTES BEFORE A MEAL 90 tablet 2       Allergies   Allergen Reactions     Pcn [Penicillins]      Phenylephrine      Head aches     Tropicamide      Head aches          Social History     Tobacco Use     Smoking status: Current Some Day Smoker     Types: Cigarettes     Start date: 1956     Last attempt to quit: 2017     Years since quittin.5     Smokeless tobacco: Never Used     Tobacco comment: limits self to about 4-5 daily , she is quitting on her own   Substance Use Topics     Alcohol use: No     Family History   Problem Relation Age of Onset     Aneurysm Mother 44     Other Cancer Father      Other Cancer Sister 59        female     Aneurysm Sister 72     Aneurysm Sister 23     Diabetes Brother      Hypertension Brother      Other Cancer Brother         unknown     Sudden Infant Death Syndrome Brother   "       influenza     Family History Negative Brother      Family History Negative Brother      Family History Negative Brother      Family History Negative Brother      Family History Negative Brother      Unknown/Adopted Maternal Grandmother      Unknown/Adopted Maternal Grandfather      Unknown/Adopted Paternal Grandmother      Unknown/Adopted Paternal Grandfather      History   Drug Use No         Objective     /84 (BP Location: Right arm, Patient Position: Chair, Cuff Size: Adult Regular)   Pulse 66   Temp 97.2  F (36.2  C) (Tympanic)   Ht 1.689 m (5' 6.5\")   Wt 111.1 kg (245 lb)   SpO2 98%   BMI 38.95 kg/m      Physical Exam    GENERAL APPEARANCE: healthy, alert and no distress     EYES: EOMI, PERRL     HENT: ear canals and TM's normal and nose and mouth without ulcers or lesions     NECK: no adenopathy, no asymmetry, masses, or scars and thyroid normal to palpation     RESP: lungs clear to auscultation - no rales, rhonchi or wheezes     CV: regular rates and rhythm, normal S1 S2, no S3 or S4 and no murmur, click or rub     ABDOMEN:  soft, nontender, no HSM or masses and bowel sounds normal     MS: extremities normal- no gross deformities noted, no evidence of inflammation in joints, FROM in all extremities.     SKIN: no suspicious lesions or rashes     NEURO: Normal strength and tone, sensory exam grossly normal, mentation intact and speech normal     PSYCH: mentation appears normal. and affect normal/bright     LYMPHATICS: No cervical adenopathy    Recent Labs   Lab Test 07/15/21  1217 02/28/21  1306 01/12/21  1159   HGB 12.0 12.6 12.3    324 307    138 139   POTASSIUM 4.9 3.6 4.7   CR 0.99 0.73 0.84   A1C 6.7*  --  6.4*        Diagnostics:  Recent Results (from the past 24 hour(s))   Basic metabolic panel    Collection Time: 11/30/21  2:08 PM   Result Value Ref Range    Sodium 136 133 - 144 mmol/L    Potassium 4.7 3.4 - 5.3 mmol/L    Chloride 105 94 - 109 mmol/L    Carbon Dioxide " (CO2) 28 20 - 32 mmol/L    Anion Gap 3 3 - 14 mmol/L    Urea Nitrogen 16 7 - 30 mg/dL    Creatinine 0.82 0.52 - 1.04 mg/dL    Calcium 9.6 8.5 - 10.1 mg/dL    Glucose 204 (H) 70 - 99 mg/dL    GFR Estimate 70 >60 mL/min/1.73m2   CBC with platelets    Collection Time: 11/30/21  2:08 PM   Result Value Ref Range    WBC Count 7.9 4.0 - 11.0 10e3/uL    RBC Count 4.67 3.80 - 5.20 10e6/uL    Hemoglobin 11.9 11.7 - 15.7 g/dL    Hematocrit 39.0 35.0 - 47.0 %    MCV 84 78 - 100 fL    MCH 25.5 (L) 26.5 - 33.0 pg    MCHC 30.5 (L) 31.5 - 36.5 g/dL    RDW 14.3 10.0 - 15.0 %    Platelet Count 327 150 - 450 10e3/uL      No EKG this visit, completed in the last 90 days.    Revised Cardiac Risk Index (RCRI):  The patient has the following serious cardiovascular risks for perioperative complications:   - Cerebrovascular Disease (TIA or CVA) = 1 point     RCRI Interpretation: 1 point: Class II (low risk - 0.9% complication rate)           Signed Electronically by: Lissette Razo MD  Copy of this evaluation report is provided to requesting physician.

## 2021-11-30 NOTE — NURSING NOTE
"Chief Complaint   Patient presents with     Pre-Op Exam       Initial /84 (BP Location: Right arm, Patient Position: Chair, Cuff Size: Adult Regular)   Pulse 66   Temp 97.2  F (36.2  C) (Tympanic)   Ht 1.689 m (5' 6.5\")   Wt 111.1 kg (245 lb)   SpO2 98%   BMI 38.95 kg/m   Estimated body mass index is 38.95 kg/m  as calculated from the following:    Height as of this encounter: 1.689 m (5' 6.5\").    Weight as of this encounter: 111.1 kg (245 lb).  Medication Reconciliation: complete  CHERELLE JACKSON LPN  "

## 2021-12-02 ENCOUNTER — HOSPITAL ENCOUNTER (OUTPATIENT)
Facility: HOSPITAL | Age: 76
Discharge: HOME | End: 2021-12-02
Attending: OPHTHALMOLOGY | Admitting: OPHTHALMOLOGY
Payer: MEDICARE

## 2021-12-02 ENCOUNTER — ANESTHESIA (OUTPATIENT)
Dept: SURGERY | Facility: HOSPITAL | Age: 76
End: 2021-12-02
Payer: MEDICARE

## 2021-12-02 VITALS
DIASTOLIC BLOOD PRESSURE: 103 MMHG | BODY MASS INDEX: 39.37 KG/M2 | SYSTOLIC BLOOD PRESSURE: 199 MMHG | TEMPERATURE: 98.1 F | OXYGEN SATURATION: 95 % | HEART RATE: 74 BPM | RESPIRATION RATE: 16 BRPM | HEIGHT: 66 IN | WEIGHT: 245 LBS

## 2021-12-02 PROCEDURE — V2632 POST CHMBR INTRAOCULAR LENS: HCPCS | Performed by: OPHTHALMOLOGY

## 2021-12-02 PROCEDURE — 710N000012 HC RECOVERY PHASE 2, PER MINUTE: Performed by: OPHTHALMOLOGY

## 2021-12-02 PROCEDURE — 999N000141 HC STATISTIC PRE-PROCEDURE NURSING ASSESSMENT: Performed by: OPHTHALMOLOGY

## 2021-12-02 PROCEDURE — 66984 XCAPSL CTRC RMVL W/O ECP: CPT | Performed by: NURSE ANESTHETIST, CERTIFIED REGISTERED

## 2021-12-02 PROCEDURE — 370N000017 HC ANESTHESIA TECHNICAL FEE, PER MIN: Performed by: OPHTHALMOLOGY

## 2021-12-02 PROCEDURE — 272N000001 HC OR GENERAL SUPPLY STERILE: Performed by: OPHTHALMOLOGY

## 2021-12-02 PROCEDURE — 99100 ANES PT EXTEME AGE<1 YR&>70: CPT | Performed by: NURSE ANESTHETIST, CERTIFIED REGISTERED

## 2021-12-02 PROCEDURE — 360N000076 HC SURGERY LEVEL 3, PER MIN: Performed by: OPHTHALMOLOGY

## 2021-12-02 PROCEDURE — 250N000009 HC RX 250: Performed by: OPHTHALMOLOGY

## 2021-12-02 PROCEDURE — 250N000011 HC RX IP 250 OP 636: Performed by: OPHTHALMOLOGY

## 2021-12-02 DEVICE — IMPLANTABLE DEVICE: Type: IMPLANTABLE DEVICE | Site: EYE | Status: FUNCTIONAL

## 2021-12-02 RX ORDER — CYCLOPENTOLATE HYDROCHLORIDE 10 MG/ML
1 SOLUTION/ DROPS OPHTHALMIC
Status: COMPLETED | OUTPATIENT
Start: 2021-12-02 | End: 2021-12-02

## 2021-12-02 RX ORDER — LIDOCAINE 40 MG/G
CREAM TOPICAL
Status: DISCONTINUED | OUTPATIENT
Start: 2021-12-02 | End: 2021-12-02 | Stop reason: HOSPADM

## 2021-12-02 RX ORDER — MOXIFLOXACIN/BAL.SALT SOLN/PF 1 MG/ML
SYRINGE (ML) INTRAOCULAR PRN
Status: DISCONTINUED | OUTPATIENT
Start: 2021-12-02 | End: 2021-12-02 | Stop reason: HOSPADM

## 2021-12-02 RX ORDER — PROPARACAINE HYDROCHLORIDE 5 MG/ML
1 SOLUTION/ DROPS OPHTHALMIC ONCE
Status: COMPLETED | OUTPATIENT
Start: 2021-12-02 | End: 2021-12-02

## 2021-12-02 RX ORDER — PREDNISOLONE ACETATE 10 MG/ML
SUSPENSION/ DROPS OPHTHALMIC PRN
Status: DISCONTINUED | OUTPATIENT
Start: 2021-12-02 | End: 2021-12-02 | Stop reason: HOSPADM

## 2021-12-02 RX ORDER — PHENYLEPHRINE HYDROCHLORIDE 25 MG/ML
1 SOLUTION/ DROPS OPHTHALMIC
Status: COMPLETED | OUTPATIENT
Start: 2021-12-02 | End: 2021-12-02

## 2021-12-02 RX ORDER — OFLOXACIN 3 MG/ML
1 SOLUTION/ DROPS OPHTHALMIC ONCE
Status: COMPLETED | OUTPATIENT
Start: 2021-12-02 | End: 2021-12-02

## 2021-12-02 RX ORDER — TETRACAINE HYDROCHLORIDE 5 MG/ML
SOLUTION OPHTHALMIC PRN
Status: DISCONTINUED | OUTPATIENT
Start: 2021-12-02 | End: 2021-12-02 | Stop reason: HOSPADM

## 2021-12-02 RX ORDER — OFLOXACIN 3 MG/ML
SOLUTION/ DROPS OPHTHALMIC PRN
Status: DISCONTINUED | OUTPATIENT
Start: 2021-12-02 | End: 2021-12-02 | Stop reason: HOSPADM

## 2021-12-02 RX ADMIN — CYCLOPENTOLATE HYDROCHLORIDE 1 DROP: 10 SOLUTION/ DROPS OPHTHALMIC at 10:49

## 2021-12-02 RX ADMIN — PROPARACAINE HYDROCHLORIDE 1 DROP: 5 SOLUTION/ DROPS OPHTHALMIC at 10:42

## 2021-12-02 RX ADMIN — PHENYLEPHRINE HYDROCHLORIDE 1 DROP: 25 SOLUTION/ DROPS OPHTHALMIC at 10:43

## 2021-12-02 RX ADMIN — CYCLOPENTOLATE HYDROCHLORIDE 1 DROP: 10 SOLUTION/ DROPS OPHTHALMIC at 10:42

## 2021-12-02 RX ADMIN — PHENYLEPHRINE HYDROCHLORIDE 1 DROP: 25 SOLUTION/ DROPS OPHTHALMIC at 10:49

## 2021-12-02 RX ADMIN — OFLOXACIN 1 DROP: 3 SOLUTION/ DROPS OPHTHALMIC at 10:41

## 2021-12-02 RX ADMIN — FLURBIPROFEN SODIUM 0.5 ML: 0.3 SOLUTION/ DROPS OPHTHALMIC at 10:49

## 2021-12-02 ASSESSMENT — MIFFLIN-ST. JEOR: SCORE: 1618.06

## 2021-12-02 NOTE — OP NOTE
DATE OF SERVICE: 12/2/21        PREOPERATIVE DIAGNOSIS:     Cataract, left eye.       POSTOPERATIVE DIAGNOSIS:  Cataract, left eye.       PROCEDURE:  Phacoemulsification with intraocular lens implant, Model DCB00, 17.0 diopter power.       ANESTHESIA:  Topical with IV sedation.         DESCRIPTION OF PROCEDURE:   Operative risks, benefits and alternatives to the procedure were discussed at length with the patient including loss of vision, loss of the eye.  Patient voiced understanding and informed consent was signed.  The patient was taken to the operating suite, where an appropriate level of anesthesia was given.  Attention was placed to the left eye.  The patient was then prepped and draped in the usual sterile ophthalmic manner.  A lid speculum was placed in the eye to provide exposure and MVR blade was used to make a paracentesis.  Once this was performed, Shugarcaine was then placed intracamerally.  This was then followed by intracameral Viscoat.  A 2.75 mm blade was then used to make a biplanar temporal clear corneal incision.  A cystotome and uttrata were used to make a continuous curvilinear capsulorrhexis.  BSS on Leija cannula was then used to hydrodissect the lens.  Phacoemulsification was then performed in a divide-and-conquer technique to remove all pieces of the nucleus.  Irrigation aspiration was then used to remove all remaining cortical material and debris.  Amvisc was then used to fill the capsular bag.  A DCB00, 17.0 diopter lens was then injected into the capsular bag using the injector.  Once this was performed, a Goldberg secondary instrument was used to rotate the lens into proper position.  Irrigation aspiration was then used to remove all remaining viscoelastic material and center the lens appropriately.  BSS on 30 gauge cannula was then used to hydrate both wounds.  A Weck-Tonya was used to assess intraocular IOP.  Once this was stable and the lens was found to be in good position, the  patient was undraped.  The patient was brought to the recovery area in stable condition.  Family was made aware of the patient's condition and the patient will follow up with us later this afternoon.         COMPLICATIONS:  No complications.         Leo Hernandes MD

## 2021-12-02 NOTE — ANESTHESIA POSTPROCEDURE EVALUATION
Patient: Rosalie Seals    Procedure: Procedure(s):  PHACOEMULSIFICATION, CATARACT, WITH STANDARD INTRAOCULAR LENS IMPLANT INSERTION       Diagnosis:Age-related nuclear cataract, bilateral [H25.13]  Diagnosis Additional Information: No value filed.    Anesthesia Type:  MAC    Note:  Disposition: Outpatient   Postop Pain Control: Uneventful            Sign Out: Well controlled pain   PONV: No   Neuro/Psych: Uneventful            Sign Out: Acceptable/Baseline neuro status   Airway/Respiratory: Uneventful            Sign Out: Acceptable/Baseline resp. status   CV/Hemodynamics: Uneventful            Sign Out: Acceptable CV status; No obvious hypovolemia; No obvious fluid overload   Other NRE: NONE   DID A NON-ROUTINE EVENT OCCUR? No           Last vitals:  Vitals Value Taken Time   /103 12/02/21 1215   Temp     Pulse 74 12/02/21 1215   Resp     SpO2 95 % 12/02/21 1217   Vitals shown include unvalidated device data.    Electronically Signed By: ADALBERTO Pozo CRNA  December 2, 2021  12:19 PM

## 2021-12-02 NOTE — ANESTHESIA CARE TRANSFER NOTE
Patient: Rosalie Seals    Procedure: Procedure(s):  PHACOEMULSIFICATION, CATARACT, WITH STANDARD INTRAOCULAR LENS IMPLANT INSERTION       Diagnosis: Age-related nuclear cataract, bilateral [H25.13]  Diagnosis Additional Information: No value filed.    Anesthesia Type:   MAC     Note:      Level of Consciousness: awake  Oxygen Supplementation: room air    Independent Airway: airway patency satisfactory and stable  Dentition: dentition unchanged  Vital Signs Stable: post-procedure vital signs reviewed and stable  Report to RN Given: handoff report given  Patient transferred to: Phase II    Handoff Report: Identifed the Patient, Identified the Reponsible Provider, Reviewed the pertinent medical history, Discussed the surgical course, Reviewed Intra-OP anesthesia mangement and issues during anesthesia, Set expectations for post-procedure period and Allowed opportunity for questions and acknowledgement of understanding      Vitals:  Vitals Value Taken Time   BP     Temp     Pulse     Resp     SpO2         Electronically Signed By: ADALBERTO Desir CRNA  December 2, 2021  12:05 PM

## 2021-12-02 NOTE — OR NURSING
Patient and responsible adult given discharge instructions with no questions regarding instructions. Herman score 20/20. Pain level 0/10, tolerating PO diet.  Discharged from unit via personal wheelchair. Patient discharged to home w/dtr to follow up w/Dr Hernandes at 1300 today.

## 2021-12-02 NOTE — DISCHARGE INSTRUCTIONS
AFTER CATARACT SURGERY RESTRICTIONS    SHIELD: WEAR OVER SURGICAL EYE AT NIGHT FOR 1 WEEK    ACTIVITY/LIFTING: Avoid activities, which increase abdominal/head pressure such as pulling on a starter cord, heavy lifting (over 15-20 lbs) or bending with the head below the waist, for 1 week. Avoid sudden jerky movements (chopping, shoveling) for 1 week. Waking and lower body exercise such as biking is okay.     WATER: Showering/washing hair is okay the day after surgery, but avoid excess water, soap, or shampoo in your eyes. Clean eyelids gently with a clean, wet washcloth as needed. Avoid pressure on the eye.     SUNLIGHT: If the eye is light sensitive after surgery, dark glasses may be worn.     DIET/MEDICATIONS: Resume your usual diet and medications your family doctor has prescribed. Continue to use/follow the instructions for your eye drops.     DRIVING: Avoid driving with a patch. Resume driving when you feel safe, but don't drive on the day of surgery.    PAIN: Minor pain and itching is normal during healing. DO NOT RUB THE EYE! Report any unusual swelling, increased discharge or pain that is not relieved by Tylenol (or equivalent). If sudden drop/change in vision call immediately. [facility] [phone]    CONTINUE TO USE ALL THE EYE DROPS PER THE INSTRUCTIONS ORDERED BY DR. BELCH[employer]S [employer]    FOR EMERGENCY DR. GARCIA: [phone]    FOLLOW EYE DROP INSTRUCTIONS GIVEN BY 's OFFICE      Post-Anesthesia Patient Instructions    IMMEDIATELY FOLLOWING SURGERY:  Do not drive or operate machinery for the first twenty four hours after surgery.  Do not make any important decisions for twenty four hours after surgery or while taking narcotic pain medications or sedatives.  If you develop intractable nausea and vomiting or a severe headache please notify your doctor immediately.    FOLLOW-UP:  Please make an appointment with your surgeon as instructed. You do not need to follow up with anesthesia  unless specifically instructed to do so.    WOUND CARE INSTRUCTIONS (if applicable):  Keep a dry clean dressing on the anesthesia/puncture wound site if there is drainage.  Once the wound has quit draining you may leave it open to air.  Generally you should leave the bandage intact for twenty four hours unless there is drainage.  If the epidural site drains for more than 36-48 hours please call the anesthesia department.    QUESTIONS?:  Please feel free to call your physician or the hospital  if you have any questions, and they will be happy to assist you.

## 2021-12-07 ENCOUNTER — TELEPHONE (OUTPATIENT)
Dept: INTERNAL MEDICINE | Facility: OTHER | Age: 76
End: 2021-12-07
Payer: MEDICARE

## 2021-12-07 DIAGNOSIS — R05.9 COUGH: Primary | ICD-10-CM

## 2021-12-07 RX ORDER — AZITHROMYCIN 250 MG/1
TABLET, FILM COATED ORAL
Qty: 11 TABLET | Refills: 0 | Status: SHIPPED | OUTPATIENT
Start: 2021-12-07 | End: 2021-12-17

## 2021-12-07 NOTE — TELEPHONE ENCOUNTER
Cough, denies fevers and lost of taste or smell.  States she has been taking and old antibiotic- doxy.  Please advise   CHERELLE JACKSON LPN

## 2021-12-07 NOTE — TELEPHONE ENCOUNTER
Received a voicemail from Daughter. Requesting antibiotic for cold/sinus infection.  Please advise   CHERELLE JACKSON LPN

## 2021-12-08 ENCOUNTER — TRANSFERRED RECORDS (OUTPATIENT)
Dept: HEALTH INFORMATION MANAGEMENT | Facility: HOSPITAL | Age: 76
End: 2021-12-08
Payer: MEDICARE

## 2021-12-10 ENCOUNTER — HOSPITAL ENCOUNTER (EMERGENCY)
Facility: HOSPITAL | Age: 76
Discharge: HOME | End: 2021-12-10
Attending: INTERNAL MEDICINE | Admitting: INTERNAL MEDICINE
Payer: MEDICARE

## 2021-12-10 VITALS
BODY MASS INDEX: 39.37 KG/M2 | RESPIRATION RATE: 17 BRPM | DIASTOLIC BLOOD PRESSURE: 61 MMHG | WEIGHT: 245 LBS | OXYGEN SATURATION: 98 % | TEMPERATURE: 98.1 F | SYSTOLIC BLOOD PRESSURE: 128 MMHG | HEIGHT: 66 IN | HEART RATE: 79 BPM

## 2021-12-10 DIAGNOSIS — T78.40XA ALLERGIC REACTION, INITIAL ENCOUNTER: ICD-10-CM

## 2021-12-10 PROCEDURE — 250N000012 HC RX MED GY IP 250 OP 636 PS 637: Mod: GY | Performed by: INTERNAL MEDICINE

## 2021-12-10 PROCEDURE — 99282 EMERGENCY DEPT VISIT SF MDM: CPT | Performed by: INTERNAL MEDICINE

## 2021-12-10 PROCEDURE — 99283 EMERGENCY DEPT VISIT LOW MDM: CPT

## 2021-12-10 PROCEDURE — 250N000013 HC RX MED GY IP 250 OP 250 PS 637: Performed by: INTERNAL MEDICINE

## 2021-12-10 RX ORDER — DIPHENHYDRAMINE HCL 25 MG
25 CAPSULE ORAL ONCE
Status: COMPLETED | OUTPATIENT
Start: 2021-12-10 | End: 2021-12-10

## 2021-12-10 RX ORDER — FAMOTIDINE 20 MG/1
20 TABLET, FILM COATED ORAL ONCE
Status: COMPLETED | OUTPATIENT
Start: 2021-12-10 | End: 2021-12-10

## 2021-12-10 RX ORDER — PREDNISONE 20 MG/1
60 TABLET ORAL ONCE
Status: COMPLETED | OUTPATIENT
Start: 2021-12-10 | End: 2021-12-10

## 2021-12-10 RX ADMIN — PREDNISONE 60 MG: 20 TABLET ORAL at 02:06

## 2021-12-10 RX ADMIN — DIPHENHYDRAMINE HYDROCHLORIDE 25 MG: 25 CAPSULE ORAL at 02:06

## 2021-12-10 RX ADMIN — FAMOTIDINE 20 MG: 20 TABLET, FILM COATED ORAL at 02:06

## 2021-12-10 ASSESSMENT — ENCOUNTER SYMPTOMS
DIFFICULTY BREATHING: 0
EYE REDNESS: 0
WHEEZING: 0
FEVER: 0
ALLERGIC REACTION: 1
NECK STIFFNESS: 0
TROUBLE SWALLOWING: 0
HEADACHES: 0
ARTHRALGIAS: 0
ABDOMINAL PAIN: 0
SHORTNESS OF BREATH: 0
COLOR CHANGE: 0
DIFFICULTY URINATING: 0
CONFUSION: 0

## 2021-12-10 ASSESSMENT — MIFFLIN-ST. JEOR: SCORE: 1618.06

## 2021-12-10 NOTE — ED TRIAGE NOTES
Patient presents with a rash on her face and some facial swelling from lips to cheek and up slightly to the eye on left side of face. Patient started an antibiotic (Azithromycin) for an upper respiratory infection/sinusitis. No reports of difficulty breathing.

## 2021-12-10 NOTE — ED PROVIDER NOTES
History     Chief Complaint   Patient presents with     Allergic Reaction     rash and swelling of the face     The history is provided by the patient.   Allergic Reaction  Presenting symptoms: swelling    Presenting symptoms: no difficulty breathing, no difficulty swallowing, no itching, no rash and no wheezing    Severity:  Mild  Context: medications      Allergies:  Allergies   Allergen Reactions     Pcn [Penicillins]      Phenylephrine      Head aches     Tropicamide      Head aches         Problem List:    Patient Active Problem List    Diagnosis Date Noted     History of CVA (cerebrovascular accident) 03/02/2019     Priority: High     5/6/17 sudden onset right vision loss and left side weakness, which resolved by the time of arrival to ED in [place]. CT of head and CT angiogram showed R ICA occlusion. L distal ICA, and R MCA 8mm 6mm aneurysms. Neurology was consulted and pt was transferred to [facility] for further management.  mg and lipitor 80 mg was started. Pt was found elevated HgA1c at 10. Imaging showed R temporal lobe infarct on CT and Angio showed R GOPI occlusion with no intracranial occlusion and L ICA and R MCA aneurysms. Strong family hx of ruptured aneurysms.  Seen at [facility] for an incidental finding of bilat ICA aneurysms. She had diagnostic cerebral angiogram with endovascular coil embolization of the larger left ICA aneurysm. The right ICA aneurysm will be monitored with serial imaging.     2/15/2018 acute onset weakness of her left upper extremity. MR angiogram of the head and neck and MR of the brain, which revealed an acute lacunar infarct in the right centrum semiovale region in an area of old ischemia. Multiple atherosclerotic lesions with complete occlusion of her right internal carotid and other more distal lesions as well. (clopidogrel was added)       Hyperlipidemia LDL goal <100 11/30/2021     Priority: Medium     Type 2 diabetes mellitus with hyperglycemia, without  long-term current use of insulin (H) 11/30/2021     Priority: Medium     resolved       Hyperlipidemia 03/02/2019     Priority: Medium     Cerebral aneurysm 03/02/2019     Priority: Medium     Cerebral aneurysm without rupture, left ICA, s/p endovascular coiling 05/2017       PVD (peripheral vascular disease) (H) 03/02/2019     Priority: Medium     ankle-brachial index 2018 1.02 on the right and 0.52 on the left, consistent with moderate ischemia of the left lower extremity.        Emphysema lung (H) 03/02/2019     Priority: Medium     PFTS 5/2018 - FEV1- 2.13 (74%), ratio 0.67, 20% change with bronchodilators,  TLC 99%, %, DLCO 60%        Obesity (BMI 35.0-39.9) with comorbidity (H) 01/03/2019     Priority: Medium     Benign essential hypertension 04/12/2018     Priority: Medium     Type 2 diabetes mellitus with neurologic complication, without long-term current use of insulin (H) 04/12/2018     Priority: Medium     Nonruptured cerebral aneurysm 02/18/2018     Priority: Medium     s/p cerebral angiogram, [facility]       ACP (advance care planning) 05/09/2017     Priority: Medium     Advance Care Planning 5/9/2017: ACP Review of Chart / Resources Provided:  Reviewed chart for advance care plan.  Rosalie JANE Friend has been provided information and resources to begin or update their advance care plan.  Added by Kalee Busby             Gastroesophageal reflux disease without esophagitis 03/02/2019     Priority: Low        Past Medical History:    Past Medical History:   Diagnosis Date     Cerebral infarction (H) 05/06/2017     Concussion with loss of consciousness of 30 minutes or less 1950     Emphysema lung (H)      Gastroesophageal reflux disease without esophagitis      Hyperlipidemia LDL goal <100      Left hemiparesis (H) 2017     Morbid obesity (H)      Nonruptured cerebral aneurysm 02/18/2018     Peripheral vascular disease (H)      Tobacco abuse      Type 2 diabetes mellitus with  hyperglycemia, without long-term current use of insulin (H)        Past Surgical History:    Past Surgical History:   Procedure Laterality Date     ABDOMEN SURGERY  1997    bladder repair     CEREBRAL ANGIOGRAM  2018     GYN SURGERY  1974    hysterectomy      PHACOEMULSIFICATION WITH STANDARD INTRAOCULAR LENS IMPLANT Left 2021    Procedure: PHACOEMULSIFICATION, CATARACT, WITH STANDARD INTRAOCULAR LENS IMPLANT INSERTION;  Surgeon: Leo Hernandes MD;  Location: [facility] OR       Family History:    Family History   Problem Relation Age of Onset     Aneurysm Mother 44     Other Cancer Father      Other Cancer Sister 59        female     Aneurysm Sister 72     Aneurysm Sister 23     Diabetes Brother      Hypertension Brother      Other Cancer Brother         unknown     Sudden Infant Death Syndrome Brother         influenza     Family History Negative Brother      Family History Negative Brother      Family History Negative Brother      Family History Negative Brother      Family History Negative Brother      Unknown/Adopted Maternal Grandmother      Unknown/Adopted Maternal Grandfather      Unknown/Adopted Paternal Grandmother      Unknown/Adopted Paternal Grandfather        Social History:  Marital Status:   [4]  Social History     Tobacco Use     Smoking status: Current Some Day Smoker     Types: Cigarettes     Start date: 1956     Last attempt to quit: 2017     Years since quittin.6     Smokeless tobacco: Never Used     Tobacco comment: limits self to about 4-5 daily , she is quitting on her own   Vaping Use     Vaping Use: Never used   Substance Use Topics     Alcohol use: No     Drug use: No        Medications:    albuterol (PROAIR HFA/PROVENTIL HFA/VENTOLIN HFA) 108 (90 Base) MCG/ACT inhaler  albuterol (VENTOLIN HFA) 108 (90 Base) MCG/ACT inhaler  aspirin 325 MG tablet  atorvastatin (LIPITOR) 80 MG tablet  azithromycin (ZITHROMAX) 250 MG tablet  blood glucose monitoring (NO BRAND  "SPECIFIED) meter device kit  blood glucose monitoring (ONE TOUCH DELICA) lancets  cetirizine (ZYRTEC) 10 MG tablet  clopidogrel (PLAVIX) 75 MG tablet  lisinopril (ZESTRIL) 10 MG tablet  lisinopril (ZESTRIL) 20 MG tablet  metFORMIN (GLUCOPHAGE-XR) 500 MG 24 hr tablet  metoprolol tartrate (LOPRESSOR) 50 MG tablet  ONETOUCH VERIO IQ test strip  order for DME  order for DME  pantoprazole (PROTONIX) 40 MG EC tablet          Review of Systems   Constitutional: Negative for fever.   HENT: Negative for congestion and trouble swallowing.    Eyes: Negative for redness.   Respiratory: Negative for shortness of breath and wheezing.    Cardiovascular: Negative for chest pain.   Gastrointestinal: Negative for abdominal pain.   Genitourinary: Negative for difficulty urinating.   Musculoskeletal: Negative for arthralgias and neck stiffness.   Skin: Negative for color change, itching and rash.   Neurological: Negative for headaches.   Psychiatric/Behavioral: Negative for confusion.   All other systems reviewed and are negative.      Physical Exam   BP: 150/81  Pulse: 79  Temp: 98.1  F (36.7  C)  Resp: 18  Height: 167.6 cm (5' 6\")  Weight: 111.1 kg (245 lb)  SpO2: 98 %      Physical Exam  Constitutional:       General: She is not in acute distress.     Appearance: She is not diaphoretic.   HENT:      Head: Atraumatic.        Comments: Left half upper lip swelling extending to left cheek     Mouth/Throat:      Mouth: Mucous membranes are moist.      Dentition: No gingival swelling.      Tongue: Tongue does not deviate from midline.      Palate: No lesions.      Pharynx: Oropharynx is clear. Uvula midline. No pharyngeal swelling, oropharyngeal exudate or uvula swelling.   Eyes:      General: No scleral icterus.     Pupils: Pupils are equal, round, and reactive to light.   Cardiovascular:      Heart sounds: Normal heart sounds.   Pulmonary:      Effort: No respiratory distress.      Breath sounds: Normal breath sounds.   Abdominal:      " General: Bowel sounds are normal.      Palpations: Abdomen is soft.      Tenderness: There is no abdominal tenderness.   Musculoskeletal:         General: No tenderness.   Skin:     General: Skin is warm.      Findings: No rash.         ED Course                 Procedures                No results found for this or any previous visit (from the past 24 hour(s)).    Medications   predniSONE (DELTASONE) tablet 60 mg (60 mg Oral Given 12/10/21 0206)   diphenhydrAMINE (BENADRYL) capsule 25 mg (25 mg Oral Given 12/10/21 0206)   famotidine (PEPCID) tablet 20 mg (20 mg Oral Given 12/10/21 0206)       Assessments & Plan (with Medical Decision Making)   Left upper lip / cheek swelling  No SOB, wheezing, throat pressure. No tongues swelling  Pt looks comfortable.    Mild angioedema  Likely related zithromax , also can be related to lisinopril  I advised discontinue Zithromax, also hold on lisinopril , follow-up with PCP in 2 days for adjusting medication   She and her daughter understood and agreed.   I have reviewed the nursing notes.    I have reviewed the findings, diagnosis, plan and need for follow up with the patient.      Discharge Medication List as of 12/10/2021  2:10 AM          Final diagnoses:   Allergic reaction, initial encounter       12/10/2021   [facility]     Ollie Dela Cruz MD  12/10/21 1275

## 2021-12-29 ENCOUNTER — ANESTHESIA EVENT (OUTPATIENT)
Dept: SURGERY | Facility: HOSPITAL | Age: 76
End: 2021-12-29
Payer: MEDICARE

## 2021-12-29 RX ORDER — ONDANSETRON 2 MG/ML
4 INJECTION INTRAMUSCULAR; INTRAVENOUS EVERY 30 MIN PRN
Status: CANCELLED | OUTPATIENT
Start: 2021-12-29

## 2021-12-29 RX ORDER — ONDANSETRON 4 MG/1
4 TABLET, ORALLY DISINTEGRATING ORAL EVERY 30 MIN PRN
Status: CANCELLED | OUTPATIENT
Start: 2021-12-29

## 2021-12-29 RX ORDER — HYDRALAZINE HYDROCHLORIDE 20 MG/ML
2.5-5 INJECTION INTRAMUSCULAR; INTRAVENOUS EVERY 10 MIN PRN
Status: CANCELLED | OUTPATIENT
Start: 2021-12-29

## 2021-12-29 ASSESSMENT — LIFESTYLE VARIABLES: TOBACCO_USE: 1

## 2021-12-29 ASSESSMENT — COPD QUESTIONNAIRES: COPD: 1

## 2021-12-29 NOTE — ANESTHESIA PREPROCEDURE EVALUATION
Anesthesia Pre-Procedure Evaluation    Patient: Rosalie Seals   MRN: [id number] : 1945        Preoperative Diagnosis: Age-related nuclear cataract of both eyes [H25.13]    Procedure : Procedure(s):  RIGHT EYE CATARACT EXTRACTION WITH INTRAOCULAR LENS IMPLANT          Past Medical History:   Diagnosis Date     Cerebral infarction (H) 2017     Concussion with loss of consciousness of 30 minutes or less 1950    fell off the swings     Emphysema lung (H)      Gastroesophageal reflux disease without esophagitis      Hyperlipidemia LDL goal <100      Left hemiparesis (H) 2017    second to right CVA     Morbid obesity (H)      Nonruptured cerebral aneurysm 2018    s/p cerebral angiogram, [facility]     Peripheral vascular disease (H)      Tobacco abuse      Type 2 diabetes mellitus with hyperglycemia, without long-term current use of insulin (H)     resolved      Past Surgical History:   Procedure Laterality Date     ABDOMEN SURGERY  1997    bladder repair     CEREBRAL ANGIOGRAM  2018     GYN SURGERY  1974    hysterectomy      PHACOEMULSIFICATION WITH STANDARD INTRAOCULAR LENS IMPLANT Left 2021    Procedure: PHACOEMULSIFICATION, CATARACT, WITH STANDARD INTRAOCULAR LENS IMPLANT INSERTION;  Surgeon: Leo Hernandes MD;  Location: [facility] OR      Allergies   Allergen Reactions     Pcn [Penicillins]      Phenylephrine      Head aches     Tropicamide      Head aches        Social History     Tobacco Use     Smoking status: Current Some Day Smoker     Types: Cigarettes     Start date: 1956     Last attempt to quit: 2017     Years since quittin.6     Smokeless tobacco: Never Used     Tobacco comment: limits self to about 4-5 daily , she is quitting on her own   Substance Use Topics     Alcohol use: No      Wt Readings from Last 1 Encounters:   12/10/21 111.1 kg (245 lb)        Anesthesia Evaluation   Pt has had prior anesthetic. Type: General.    No history of anesthetic  complications       ROS/MED HX  ENT/Pulmonary: Comment: Smokes not everyday    (+) SOFIE risk factors, hypertension, obese, daytime somnolence, tobacco use, Current use, COPD,     Neurologic: Comment: Cerebral aneurysm s/p coil 5/2017    (+) CVA (R ICA occlusion, multiple aneurysms), date: 5/6/2017, with deficits, - weakness left side.     Cardiovascular:     (+) Dyslipidemia hypertension-range: rx metoprolol/ Peripheral Vascular Disease (left lower extremity)-- Other. ---Taking blood thinners Instructions Given to patient: plavix, ASA 325mg.     METS/Exercise Tolerance: 1 - Eating, dressing    Hematologic:  - neg hematologic  ROS     Musculoskeletal:  - neg musculoskeletal ROS     GI/Hepatic:     (+) GERD, Asymptomatic on medication,     Renal/Genitourinary:  - neg Renal ROS     Endo:     (+) type II DM, Not using insulin, Obesity,     Psychiatric/Substance Use:  - neg psychiatric ROS     Infectious Disease:  - neg infectious disease ROS     Malignancy:  - neg malignancy ROS     Other:  - neg other ROS          Physical Exam    Airway        Mallampati: III   TM distance: > 3 FB   Neck ROM: limited   Mouth opening: < 3 cm    Respiratory Devices and Support         Dental       (+) upper dentures      Cardiovascular   cardiovascular exam normal       Rhythm and rate: regular and normal     Pulmonary           (+) decreased breath sounds and wheezes           OUTSIDE LABS:  CBC:   Lab Results   Component Value Date    WBC 7.9 11/30/2021    WBC 6.0 07/15/2021    HGB 11.9 11/30/2021    HGB 12.0 07/15/2021    HCT 39.0 11/30/2021    HCT 37.9 07/15/2021     11/30/2021     07/15/2021     BMP:   Lab Results   Component Value Date     11/30/2021     07/15/2021    POTASSIUM 4.7 11/30/2021    POTASSIUM 4.9 07/15/2021    CHLORIDE 105 11/30/2021    CHLORIDE 104 07/15/2021    CO2 28 11/30/2021    CO2 25 07/15/2021    BUN 16 11/30/2021    BUN 18 07/15/2021    CR 0.82 11/30/2021    CR 0.99 07/15/2021    GLC  204 (H) 11/30/2021     (H) 07/15/2021     COAGS:   Lab Results   Component Value Date    PTT 27 02/17/2018    INR 0.97 04/06/2018     POC:   Lab Results   Component Value Date     (H) 11/08/2019     HEPATIC:   Lab Results   Component Value Date    ALBUMIN 3.5 07/15/2021    PROTTOTAL 7.2 07/15/2021    ALT 19 07/15/2021    AST 13 07/15/2021    ALKPHOS 79 07/15/2021    BILITOTAL 0.3 07/15/2021     OTHER:   Lab Results   Component Value Date    A1C 6.6 (H) 11/30/2021    VIOLA 9.6 11/30/2021    TSH 0.85 01/12/2021    CRP <2.9 04/06/2018    SED 4 05/05/2017       Anesthesia Plan    ASA Status:  3   NPO Status:  NPO Appropriate    Anesthesia Type: MAC.     - Reason for MAC: chronic cardiopulmonary disease, straight local not clinically adequate      Maintenance: Balanced.        Consents    Anesthesia Plan(s) and associated risks, benefits, and realistic alternatives discussed. Questions answered and patient/representative(s) expressed understanding.     - Discussed: Risks, Benefits and Alternatives for BOTH SEDATION and the PROCEDURE were discussed     - Discussed with:  Patient      - Extended Intubation/Ventilatory Support Discussed: No.      - Patient is DNR/DNI Status: No    Use of blood products discussed: No .     Postoperative Care            Comments:    Other Comments: H and P date 12/30/21            ADALBERTO Vilchis CRNA

## 2021-12-30 ENCOUNTER — OFFICE VISIT (OUTPATIENT)
Dept: FAMILY MEDICINE | Facility: OTHER | Age: 76
End: 2021-12-30
Attending: INTERNAL MEDICINE
Payer: MEDICARE

## 2021-12-30 VITALS
OXYGEN SATURATION: 99 % | TEMPERATURE: 97.9 F | HEART RATE: 80 BPM | DIASTOLIC BLOOD PRESSURE: 66 MMHG | SYSTOLIC BLOOD PRESSURE: 136 MMHG

## 2021-12-30 DIAGNOSIS — H26.9 CATARACT OF RIGHT EYE, UNSPECIFIED CATARACT TYPE: ICD-10-CM

## 2021-12-30 DIAGNOSIS — Z01.818 PREOP GENERAL PHYSICAL EXAM: Primary | ICD-10-CM

## 2021-12-30 PROCEDURE — G0463 HOSPITAL OUTPT CLINIC VISIT: HCPCS | Mod: 25

## 2021-12-30 PROCEDURE — G0463 HOSPITAL OUTPT CLINIC VISIT: HCPCS

## 2021-12-30 PROCEDURE — 99213 OFFICE O/P EST LOW 20 MIN: CPT | Performed by: NURSE PRACTITIONER

## 2021-12-30 RX ORDER — PREDNISOLONE ACETATE 10 MG/ML
SUSPENSION/ DROPS OPHTHALMIC
COMMUNITY
Start: 2021-10-22 | End: 2022-02-02

## 2021-12-30 RX ORDER — OFLOXACIN 3 MG/ML
SOLUTION/ DROPS OPHTHALMIC
COMMUNITY
Start: 2021-10-22 | End: 2022-02-02

## 2021-12-30 RX ORDER — KETOROLAC TROMETHAMINE 5 MG/ML
SOLUTION OPHTHALMIC
COMMUNITY
Start: 2021-10-22 | End: 2022-02-02

## 2021-12-30 ASSESSMENT — PAIN SCALES - GENERAL: PAINLEVEL: NO PAIN (0)

## 2021-12-30 NOTE — H&P (VIEW-ONLY)
[facility] - [place]  [address]  [place] [address]  Phone: [phone]  Primary Provider: Yash Cuellar  Pre-op Performing Provider: TALA BARTHOLOMEW      PREOPERATIVE EVALUATION:  Today's date: 12/30/2021    Rosalie Seals is a 76 year old female who presents for a preoperative evaluation.    Surgical Information:  Surgery/Procedure: cataract surgery  [place] [facility] [place]: Dr. Hernandes  Surgery Date: 1/6/22  Time of Surgery: TBD  Where patient plans to recover: At home with family  Fax number for surgical facility: Note does not need to be faxed, will be available electronically in Epic.    Type of Anesthesia Anticipated: to be determined    Assessment & Plan     The proposed surgical procedure is considered LOW risk.    Preop general physical exam  Cataract of right eye, unspecified cataract type  Cleared for cataract surgery            Risks and Recommendations:  The patient has the following additional risks and recommendations for perioperative complications:   - Morbid obesity (BMI >40)  Diabetes:  - Patient is not on insulin therapy: regular NPO guidelines can be followed.   Pulmonary:    - Incentive spirometry post-op   - Active nicotine user, advised smoking cessation    Medication Instructions:  Patient is to take all scheduled medications on the day of surgery EXCEPT for modifications listed below:  hold plavix day of surgery   - ACE/ARB: HOLD due to exceptional risk of hypotension during surgery.    - Beta Blockers: Continue taking on the day of surgery.   - Statins: Continue taking on the day of surgery.    - metformin: HOLD day of surgery.    RECOMMENDATION:  APPROVAL GIVEN to proceed with proposed procedure, without further diagnostic evaluation.        Subjective     HPI related to upcoming procedure: Cataract surgery right eye       Preop Questions 12/30/2021   1. Have you ever had a heart attack or stroke? YES - 5 STROKES   2. Have you ever had  surgery on your heart or blood vessels, such as a stent placement, a coronary artery bypass, or surgery on an artery in your head, neck, heart, or legs? YES - COIL IN HEAD   3. Do you have chest pain with activity? No   4. Do you have a history of  heart failure? No   5. Do you currently have a cold, bronchitis or symptoms of other infection? No   6. Do you have a cough, shortness of breath, or wheezing? No   7. Do you or anyone in your family have previous history of blood clots? No   8. Do you or does anyone in your family have a serious bleeding problem such as prolonged bleeding following surgeries or cuts? No   9. Have you ever had problems with anemia or been told to take iron pills? No   10. Have you had any abnormal blood loss such as black, tarry or bloody stools, or abnormal vaginal bleeding? No   11. Have you ever had a blood transfusion? UNKNOWN -    11a. Have you ever had a transfusion reaction? -   12. Are you willing to have a blood transfusion if it is medically needed before, during, or after your surgery? Yes   13. Have you or any of your relatives ever had problems with anesthesia? No   14. Do you have sleep apnea, excessive snoring or daytime drowsiness? No   14a. Do you have a CPAP machine? -   15. Do you have any artifical heart valves or other implanted medical devices like a pacemaker, defibrillator, or continuous glucose monitor? No   16. Do you have artificial joints? No   17. Are you allergic to latex? No     Health Care Directive:  Patient does not have a Health Care Directive or Living Will: Discussed advance care planning with patient; however, patient declined at this time.    Preoperative Review of :   reviewed - short term treatment from ER only      Status of Chronic Conditions:  See problem list for active medical problems.  Problems all longstanding and stable, except as noted/documented.  See ROS for pertinent symptoms related to these conditions.    COPD - Patient has a  longstanding history of moderate-severe COPD . Patient has been doing well overall noting NO SYMPTOMS and continues on medication regimen consisting of albuterol inhaler as needed without adverse reactions or side effects.    DIABETES - Patient has a longstanding history of DiabetesType Type II . Patient is being treated with diet and oral agents and denies significant side effects. Control has been good. Complicating factors include but are not limited to: hypertension, hyperlipidemia and morbid obesity .     HYPERLIPIDEMIA - Patient has a long history of significant Hyperlipidemia requiring medication for treatment with recent good control. Patient reports no problems or side effects with the medication.     HYPERTENSION - Patient has longstanding history of HTN , currently denies any symptoms referable to elevated blood pressure. Specifically denies chest pain, palpitations, dyspnea, orthopnea, PND or peripheral edema. Blood pressure readings have been in normal range. Current medication regimen is as listed below. Patient denies any side effects of medication.       Review of Systems  CONSTITUTIONAL:NEGATIVE for fever, chills, change in weight  INTEGUMENTARY/SKIN: NEGATIVE for worrisome rashes, moles or lesions  EYES: NEGATIVE for vision changes or irritation  ENT/MOUTH: NEGATIVE for ear, mouth and throat problems  RESP: NEGATIVE for significant cough or SOB  CV: NEGATIVE for chest pain, palpitations or peripheral edema  GI: NEGATIVE for nausea, abdominal pain, heartburn, or change in bowel habits  : NEGATIVE for frequency, dysuria, or hematuria  MUSCULOSKELETAL:wheel chair bound  NEURO: wheel chair bound   ENDOCRINE: NEGATIVE for temperature intolerance, skin/hair changes  HEME: NEGATIVE for bleeding problems  PSYCHIATRIC: NEGATIVE for changes in mood or affect    Patient Active Problem List    Diagnosis Date Noted     History of CVA (cerebrovascular accident) 03/02/2019     Priority: High     5/6/17 sudden  onset right vision loss and left side weakness, which resolved by the time of arrival to ED in [place]. CT of head and CT angiogram showed R ICA occlusion. L distal ICA, and R MCA 8mm 6mm aneurysms. Neurology was consulted and pt was transferred to [facility] for further management.  mg and lipitor 80 mg was started. Pt was found elevated HgA1c at 10. Imaging showed R temporal lobe infarct on CT and Angio showed R GOPI occlusion with no intracranial occlusion and L ICA and R MCA aneurysms. Strong family hx of ruptured aneurysms.  Seen at [facility] for an incidental finding of bilat ICA aneurysms. She had diagnostic cerebral angiogram with endovascular coil embolization of the larger left ICA aneurysm. The right ICA aneurysm will be monitored with serial imaging.     2/15/2018 acute onset weakness of her left upper extremity. MR angiogram of the head and neck and MR of the brain, which revealed an acute lacunar infarct in the right centrum semiovale region in an area of old ischemia. Multiple atherosclerotic lesions with complete occlusion of her right internal carotid and other more distal lesions as well. (clopidogrel was added)       Hyperlipidemia LDL goal <100 11/30/2021     Priority: Medium     Type 2 diabetes mellitus with hyperglycemia, without long-term current use of insulin (H) 11/30/2021     Priority: Medium     resolved       Hyperlipidemia 03/02/2019     Priority: Medium     Cerebral aneurysm 03/02/2019     Priority: Medium     Cerebral aneurysm without rupture, left ICA, s/p endovascular coiling 05/2017       PVD (peripheral vascular disease) (H) 03/02/2019     Priority: Medium     ankle-brachial index 2018 1.02 on the right and 0.52 on the left, consistent with moderate ischemia of the left lower extremity.        Emphysema lung (H) 03/02/2019     Priority: Medium     PFTS 5/2018 - FEV1- 2.13 (74%), ratio 0.67, 20% change with bronchodilators,  TLC 99%, %, DLCO 60%        Obesity (BMI  35.0-39.9) with comorbidity (H) 01/03/2019     Priority: Medium     Benign essential hypertension 04/12/2018     Priority: Medium     Type 2 diabetes mellitus with neurologic complication, without long-term current use of insulin (H) 04/12/2018     Priority: Medium     Nonruptured cerebral aneurysm 02/18/2018     Priority: Medium     s/p cerebral angiogram, [facility]       ACP (advance care planning) 05/09/2017     Priority: Medium     Advance Care Planning 5/9/2017: ACP Review of Chart / Resources Provided:  Reviewed chart for advance care plan.  Rosalie Seals has been provided information and resources to begin or update their advance care plan.  Added by Kalee Busby             Gastroesophageal reflux disease without esophagitis 03/02/2019     Priority: Low      Past Medical History:   Diagnosis Date     Cerebral infarction (H) 05/06/2017     Concussion with loss of consciousness of 30 minutes or less 1950    fell off the swings     Emphysema lung (H)      Gastroesophageal reflux disease without esophagitis      Hyperlipidemia LDL goal <100      Left hemiparesis (H) 2017    second to right CVA     Morbid obesity (H)      Nonruptured cerebral aneurysm 02/18/2018    s/p cerebral angiogram, [facility]     Peripheral vascular disease (H)      Tobacco abuse      Type 2 diabetes mellitus with hyperglycemia, without long-term current use of insulin (H)     resolved     Past Surgical History:   Procedure Laterality Date     ABDOMEN SURGERY  04/1997    bladder repair     CEREBRAL ANGIOGRAM  2018     GYN SURGERY  04/1974    hysterectomy      PHACOEMULSIFICATION WITH STANDARD INTRAOCULAR LENS IMPLANT Left 12/2/2021    Procedure: PHACOEMULSIFICATION, CATARACT, WITH STANDARD INTRAOCULAR LENS IMPLANT INSERTION;  Surgeon: Leo Hernandes MD;  Location: [facility] OR     Current Outpatient Medications   Medication Sig Dispense Refill     albuterol (PROAIR HFA/PROVENTIL HFA/VENTOLIN HFA) 108 (90 Base) MCG/ACT  inhaler Inhale 2 puffs into the lungs every 6 hours as needed for shortness of breath / dyspnea or wheezing 18 g 1     albuterol (VENTOLIN HFA) 108 (90 Base) MCG/ACT inhaler USE 1 TO 2 INHALATIONS EVERY 4 HOURS AS NEEDED FOR SHORTNESS OF BREATH, DIFFICULTY BREATHING,  OR WHEEZING 18 g 11     aspirin 325 MG tablet Take 325 mg by mouth       atorvastatin (LIPITOR) 80 MG tablet TAKE 1 TABLET DAILY 90 tablet 3     blood glucose monitoring (NO BRAND SPECIFIED) meter device kit Use to test blood sugar 4 times daily or as directed. 1 kit 0     blood glucose monitoring (ONE TOUCH DELICA) lancets Use to test blood sugar 3 times daily. 360 each 3     cetirizine (ZYRTEC) 10 MG tablet Take 1 tablet (10 mg) by mouth daily 30 tablet 1     clopidogrel (PLAVIX) 75 MG tablet TAKE 1 TABLET DAILY 90 tablet 3     lisinopril (ZESTRIL) 10 MG tablet TAKE 2 TABLETS DAILY (Patient taking differently: 10 mg ) 180 tablet 3     metFORMIN (GLUCOPHAGE-XR) 500 MG 24 hr tablet TAKE 2 TABLETS BY MOUTH TWICE DAILY WITH MEALS 80 tablet 0     metoprolol tartrate (LOPRESSOR) 50 MG tablet TAKE 1/2 TABLET TWICE A DAY 90 tablet 3     ONETOUCH VERIO IQ test strip USE FOUR TIMES A  each 3     order for DME Equipment being ordered: Wheelchair with foot rests 1 Device 0     order for DME Equipment being ordered: Glucometer and test strips 1 Device 0     pantoprazole (PROTONIX) 40 MG EC tablet TAKE 1 TABLET DAILY 30 TO  60 MINUTES BEFORE A MEAL 90 tablet 2     ketorolac (ACULAR) 0.5 % ophthalmic solution        lisinopril (ZESTRIL) 20 MG tablet Take 1 tablet (20 mg) by mouth daily (Patient not taking: Reported on 12/30/2021) 90 tablet 1     ofloxacin (OCUFLOX) 0.3 % ophthalmic solution        prednisoLONE acetate (PRED FORTE) 1 % ophthalmic suspension          Allergies   Allergen Reactions     Azithromycin Swelling     Face swelling     Pcn [Penicillins]      Phenylephrine      Head aches     Tropicamide      Head aches          Social History      Tobacco Use     Smoking status: Current Some Day Smoker     Types: Cigarettes     Start date: 1956     Last attempt to quit: 2017     Years since quittin.6     Smokeless tobacco: Never Used     Tobacco comment: limits self to about 4-5 daily , she is quitting on her own   Substance Use Topics     Alcohol use: No     Family History   Problem Relation Age of Onset     Aneurysm Mother 44     Other Cancer Father      Other Cancer Sister 59        female     Aneurysm Sister 72     Aneurysm Sister 23     Diabetes Brother      Hypertension Brother      Other Cancer Brother         unknown     Sudden Infant Death Syndrome Brother         influenza     Family History Negative Brother      Family History Negative Brother      Family History Negative Brother      Family History Negative Brother      Family History Negative Brother      Unknown/Adopted Maternal Grandmother      Unknown/Adopted Maternal Grandfather      Unknown/Adopted Paternal Grandmother      Unknown/Adopted Paternal Grandfather      History   Drug Use No         Objective     Temp 97.9  F (36.6  C) (Tympanic)     Physical Exam    GENERAL APPEARANCE: alert, no distress and cooperative     EYES: EOMI, PERRL     HENT: ear canals and TM's normal and nose and mouth without ulcers or lesions     NECK: no adenopathy, no asymmetry, masses, or scars and thyroid normal to palpation     RESP: lungs clear to auscultation - no rales, rhonchi or wheezes     CV: regular rates and rhythm, normal S1 S2, no S3 or S4 and no murmur, click or rub     ABDOMEN:  soft, nontender, no HSM or masses and bowel sounds normal     MS: wheel chair bound     SKIN: no suspicious lesions or rashes     NEURO: Normal strength and tone, sensory exam grossly normal, mentation intact and speech normal     PSYCH: mentation appears normal. and affect normal/bright     LYMPHATICS: No cervical adenopathy    Recent Labs   Lab Test 21  1408 07/15/21  1217   HGB 11.9 12.0     272    135   POTASSIUM 4.7 4.9   CR 0.82 0.99   A1C 6.6* 6.7*        Diagnostics:  No labs were ordered during this visit.   No EKG required for low risk surgery (cataract, skin procedure, breast biopsy, etc).    Revised Cardiac Risk Index (RCRI):  The patient has the following serious cardiovascular risks for perioperative complications:  History of CVA     RCRI Interpretation: 1 point: Class II (low risk - 0.9% complication rate)           Signed Electronically by: ADALBERTO Mccormick CNP  Copy of this evaluation report is provided to requesting physician.

## 2021-12-30 NOTE — NURSING NOTE
"Chief Complaint   Patient presents with     Pre-Op Exam       Initial Temp 97.9  F (36.6  C) (Tympanic)  Estimated body mass index is 39.54 kg/m  as calculated from the following:    Height as of 12/10/21: 1.676 m (5' 6\").    Weight as of 12/10/21: 111.1 kg (245 lb).  Medication Reconciliation: complete  Sesar Caraballo LPN  "

## 2021-12-30 NOTE — PATIENT INSTRUCTIONS
Preparing for Your Surgery  Getting started  A nurse will call you to review your health history and instructions. They will give you an arrival time based on your scheduled surgery time. Please be ready to share:    Your doctor's clinic name and phone number    Your medical, surgical and anesthesia history    A list of allergies and sensitivities    A list of medicines, including herbal treatments and over-the-counter drugs    Whether the patient has a legal guardian (ask how to send us the papers in advance)  Please tell us if you're pregnant--or if there's any chance you might be pregnant. Some surgeries may injure a fetus (unborn baby), so they require a pregnancy test. Surgeries that are safe for a fetus don't always need a test, and you can choose whether to have one.   If you have a child who's having surgery, please ask for a copy of Preparing for Your Child's Surgery.    Preparing for surgery    Within 30 days of surgery: Have a pre-op exam (sometimes called an H&P, or History and Physical). This can be done at a clinic or pre-operative center.  ? If you're having a , you may not need this exam. Talk to your care team.    At your pre-op exam, talk to your care team about all medicines you take. If you need to stop any medicines before surgery, ask when to start taking them again.  ? We do this for your safety. Many medicines can make you bleed too much during surgery. Some change how well surgery (anesthesia) drugs work.    Call your insurance company to let them know you're having surgery. (If you don't have insurance, call [phone].)    Call your clinic if there's any change in your health. This includes signs of a cold or flu (sore throat, runny nose, cough, rash, fever). It also includes a scrape or scratch near the surgery site.    If you have questions on the day of surgery, call your hospital or surgery center.  COVID testing  You may need to be tested for COVID-19 before having  surgery. If so, we will give you instructions.  Eating and drinking guidelines  For your safety: Unless your surgeon tells you otherwise, follow the guidelines below.    Eat and drink as usual until 8 hours before surgery. After that, no food or milk.    Drink clear liquids until 2 hours before surgery. These are liquids you can see through, like water, Gatorade and Propel Water. You may also have black coffee and tea (no cream or milk).    Nothing by mouth within 2 hours of surgery. This includes gum, candy and breath mints.    If you drink alcohol: Stop drinking it the night before surgery.    If your care team tells you to take medicine on the morning of surgery, it's okay to take it with a sip of water.  Preventing infection    Shower or bathe the night before and morning of your surgery. Follow the instructions your clinic gave you. (If no instructions, use regular soap.)    Don't shave or clip hair near your surgery site. We'll remove the hair if needed.    Don't smoke or vape the morning of surgery. You may chew nicotine gum up to 2 hours before surgery. A nicotine patch is okay.  ? Note: Some surgeries require you to completely quit smoking and nicotine. Check with your surgeon.    Your care team will make every effort to keep you safe from infection. We will:  ? Clean our hands often with soap and water (or an alcohol-based hand rub).  ? Clean the skin at your surgery site with a special soap that kills germs.  ? Give you a special gown to keep you warm. (Cold raises the risk of infection.)  ? Wear special hair covers, masks, gowns and gloves during surgery.  ? Give antibiotic medicine, if prescribed. Not all surgeries need antibiotics.  What to bring on the day of surgery    Photo ID and insurance card    Copy of your health care directive, if you have one    Glasses and hearing aides (bring cases)  ? You can't wear contacts during surgery    Inhaler and eye drops, if you use them (tell us about these when  you arrive)    CPAP machine or breathing device, if you use them    A few personal items, if spending the night    If you have . . .  ? A pacemaker, ICD (cardiac defibrillator) or other implant: Bring the ID card.  ? An implanted stimulator: Bring the remote control.  ? A legal guardian: Bring a copy of the certified (court-stamped) guardianship papers.  Please remove any jewelry, including body piercings. Leave jewelry and other valuables at home.  If you're going home the day of surgery    You must have a responsible adult drive you home. They should stay with you overnight as well.    If you don't have someone to stay with you, and you aren't safe to go home alone, we may keep you overnight. Insurance often won't pay for this.  After surgery  If it's hard to control your pain or you need more pain medicine, please call your surgeon's office.  Questions?   If you have any questions for your care team, list them here: _________________________________________________________________________________________________________________________________________________________________________ ____________________________________ ____________________________________ ____________________________________  For informational purposes only. Not to replace the advice of your health care provider. Copyright   2003, 2019 [facility]. All rights reserved. Clinically reviewed by Milena Gutierrez MD. [employer] [id number] - REV 07/21.    Preparing for Your Surgery  Getting started  A nurse will call you to review your health history and instructions. They will give you an arrival time based on your scheduled surgery time. Please be ready to share:    Your doctor's clinic name and phone number    Your medical, surgical and anesthesia history    A list of allergies and sensitivities    A list of medicines, including herbal treatments and over-the-counter drugs    Whether the patient has a legal guardian (ask how to send us the  papers in advance)  Please tell us if you're pregnant--or if there's any chance you might be pregnant. Some surgeries may injure a fetus (unborn baby), so they require a pregnancy test. Surgeries that are safe for a fetus don't always need a test, and you can choose whether to have one.   If you have a child who's having surgery, please ask for a copy of Preparing for Your Child's Surgery.    Preparing for surgery    Within 30 days of surgery: Have a pre-op exam (sometimes called an H&P, or History and Physical). This can be done at a clinic or pre-operative center.  ? If you're having a , you may not need this exam. Talk to your care team.    At your pre-op exam, talk to your care team about all medicines you take. If you need to stop any medicines before surgery, ask when to start taking them again.  ? We do this for your safety. Many medicines can make you bleed too much during surgery. Some change how well surgery (anesthesia) drugs work.    Call your insurance company to let them know you're having surgery. (If you don't have insurance, call [phone].)    Call your clinic if there's any change in your health. This includes signs of a cold or flu (sore throat, runny nose, cough, rash, fever). It also includes a scrape or scratch near the surgery site.    If you have questions on the day of surgery, call your hospital or surgery center.  COVID testing  You may need to be tested for COVID-19 before having surgery. If so, we will give you instructions.  Eating and drinking guidelines  For your safety: Unless your surgeon tells you otherwise, follow the guidelines below.    Eat and drink as usual until 8 hours before surgery. After that, no food or milk.    Drink clear liquids until 2 hours before surgery. These are liquids you can see through, like water, Gatorade and Propel Water. You may also have black coffee and tea (no cream or milk).    Nothing by mouth within 2 hours of surgery. This includes  gum, candy and breath mints.    If you drink alcohol: Stop drinking it the night before surgery.    If your care team tells you to take medicine on the morning of surgery, it's okay to take it with a sip of water.  Preventing infection    Shower or bathe the night before and morning of your surgery. Follow the instructions your clinic gave you. (If no instructions, use regular soap.)    Don't shave or clip hair near your surgery site. We'll remove the hair if needed.    Don't smoke or vape the morning of surgery. You may chew nicotine gum up to 2 hours before surgery. A nicotine patch is okay.  ? Note: Some surgeries require you to completely quit smoking and nicotine. Check with your surgeon.    Your care team will make every effort to keep you safe from infection. We will:  ? Clean our hands often with soap and water (or an alcohol-based hand rub).  ? Clean the skin at your surgery site with a special soap that kills germs.  ? Give you a special gown to keep you warm. (Cold raises the risk of infection.)  ? Wear special hair covers, masks, gowns and gloves during surgery.  ? Give antibiotic medicine, if prescribed. Not all surgeries need antibiotics.  What to bring on the day of surgery    Photo ID and insurance card    Copy of your health care directive, if you have one    Glasses and hearing aides (bring cases)  ? You can't wear contacts during surgery    Inhaler and eye drops, if you use them (tell us about these when you arrive)    CPAP machine or breathing device, if you use them    A few personal items, if spending the night    If you have . . .  ? A pacemaker, ICD (cardiac defibrillator) or other implant: Bring the ID card.  ? An implanted stimulator: Bring the remote control.  ? A legal guardian: Bring a copy of the certified (court-stamped) guardianship papers.  Please remove any jewelry, including body piercings. Leave jewelry and other valuables at home.  If you're going home the day of surgery    You  must have a responsible adult drive you home. They should stay with you overnight as well.    If you don't have someone to stay with you, and you aren't safe to go home alone, we may keep you overnight. Insurance often won't pay for this.  After surgery  If it's hard to control your pain or you need more pain medicine, please call your surgeon's office.  Questions?   If you have any questions for your care team, list them here: _________________________________________________________________________________________________________________________________________________________________________ ____________________________________ ____________________________________ ____________________________________  For informational purposes only. Not to replace the advice of your health care provider. Copyright   2003, 2019 [facility]. All rights reserved. Clinically reviewed by Milena Gutierrez MD. [employer] [id number] - REV 07/21.    Preparing for Your Surgery  Getting started  A nurse will call you to review your health history and instructions. They will give you an arrival time based on your scheduled surgery time. Please be ready to share:    Your doctor's clinic name and phone number    Your medical, surgical and anesthesia history    A list of allergies and sensitivities    A list of medicines, including herbal treatments and over-the-counter drugs    Whether the patient has a legal guardian (ask how to send us the papers in advance)  Please tell us if you're pregnant--or if there's any chance you might be pregnant. Some surgeries may injure a fetus (unborn baby), so they require a pregnancy test. Surgeries that are safe for a fetus don't always need a test, and you can choose whether to have one.   If you have a child who's having surgery, please ask for a copy of Preparing for Your Child's Surgery.    Preparing for surgery    Within 30 days of surgery: Have a pre-op exam (sometimes called an H&P, or History  and Physical). This can be done at a clinic or pre-operative center.  ? If you're having a , you may not need this exam. Talk to your care team.    At your pre-op exam, talk to your care team about all medicines you take. If you need to stop any medicines before surgery, ask when to start taking them again.  ? We do this for your safety. Many medicines can make you bleed too much during surgery. Some change how well surgery (anesthesia) drugs work.    Call your insurance company to let them know you're having surgery. (If you don't have insurance, call [phone].)    Call your clinic if there's any change in your health. This includes signs of a cold or flu (sore throat, runny nose, cough, rash, fever). It also includes a scrape or scratch near the surgery site.    If you have questions on the day of surgery, call your hospital or surgery center.  COVID testing  You may need to be tested for COVID-19 before having surgery. If so, we will give you instructions.  Eating and drinking guidelines  For your safety: Unless your surgeon tells you otherwise, follow the guidelines below.    Eat and drink as usual until 8 hours before surgery. After that, no food or milk.    Drink clear liquids until 2 hours before surgery. These are liquids you can see through, like water, Gatorade and Propel Water. You may also have black coffee and tea (no cream or milk).    Nothing by mouth within 2 hours of surgery. This includes gum, candy and breath mints.    If you drink alcohol: Stop drinking it the night before surgery.    If your care team tells you to take medicine on the morning of surgery, it's okay to take it with a sip of water.  Preventing infection    Shower or bathe the night before and morning of your surgery. Follow the instructions your clinic gave you. (If no instructions, use regular soap.)    Don't shave or clip hair near your surgery site. We'll remove the hair if needed.    Don't smoke or vape the morning  of surgery. You may chew nicotine gum up to 2 hours before surgery. A nicotine patch is okay.  ? Note: Some surgeries require you to completely quit smoking and nicotine. Check with your surgeon.    Your care team will make every effort to keep you safe from infection. We will:  ? Clean our hands often with soap and water (or an alcohol-based hand rub).  ? Clean the skin at your surgery site with a special soap that kills germs.  ? Give you a special gown to keep you warm. (Cold raises the risk of infection.)  ? Wear special hair covers, masks, gowns and gloves during surgery.  ? Give antibiotic medicine, if prescribed. Not all surgeries need antibiotics.  What to bring on the day of surgery    Photo ID and insurance card    Copy of your health care directive, if you have one    Glasses and hearing aides (bring cases)  ? You can't wear contacts during surgery    Inhaler and eye drops, if you use them (tell us about these when you arrive)    CPAP machine or breathing device, if you use them    A few personal items, if spending the night    If you have . . .  ? A pacemaker, ICD (cardiac defibrillator) or other implant: Bring the ID card.  ? An implanted stimulator: Bring the remote control.  ? A legal guardian: Bring a copy of the certified (court-stamped) guardianship papers.  Please remove any jewelry, including body piercings. Leave jewelry and other valuables at home.  If you're going home the day of surgery    You must have a responsible adult drive you home. They should stay with you overnight as well.    If you don't have someone to stay with you, and you aren't safe to go home alone, we may keep you overnight. Insurance often won't pay for this.  After surgery  If it's hard to control your pain or you need more pain medicine, please call your surgeon's office.  Questions?   If you have any questions for your care team, list them here:  _________________________________________________________________________________________________________________________________________________________________________ ____________________________________ ____________________________________ ____________________________________  For informational purposes only. Not to replace the advice of your health care provider. Copyright   2003, 2019 [facility]. All rights reserved. Clinically reviewed by Milena Gutierrez MD. [employer] [id number] - REV 07/21.

## 2021-12-30 NOTE — PROGRESS NOTES
[facility] - [place]  [address]  [place] [address]  Phone: [phone]  Primary Provider: Yash Cuellar  Pre-op Performing Provider: TALA BARTHOLOMEW      PREOPERATIVE EVALUATION:  Today's date: 12/30/2021    Rosalie Seals is a 76 year old female who presents for a preoperative evaluation.    Surgical Information:  Surgery/Procedure: cataract surgery  Surgery Location: [facility] [place]  Surgeon: Dr. Hernandes  Surgery Date: 1/6/22  Time of Surgery: TBD  Where patient plans to recover: At home with family  Fax number for surgical facility: Note does not need to be faxed, will be available electronically in Epic.    Type of Anesthesia Anticipated: to be determined    Assessment & Plan     The proposed surgical procedure is considered LOW risk.    Preop general physical exam  Cataract of right eye, unspecified cataract type  Cleared for cataract surgery            Risks and Recommendations:  The patient has the following additional risks and recommendations for perioperative complications:   - Morbid obesity (BMI >40)  Diabetes:  - Patient is not on insulin therapy: regular NPO guidelines can be followed.   Pulmonary:    - Incentive spirometry post-op   - Active nicotine user, advised smoking cessation    Medication Instructions:  Patient is to take all scheduled medications on the day of surgery EXCEPT for modifications listed below:  hold plavix day of surgery   - ACE/ARB: HOLD due to exceptional risk of hypotension during surgery.    - Beta Blockers: Continue taking on the day of surgery.   - Statins: Continue taking on the day of surgery.    - metformin: HOLD day of surgery.    RECOMMENDATION:  APPROVAL GIVEN to proceed with proposed procedure, without further diagnostic evaluation.        Subjective     HPI related to upcoming procedure: Cataract surgery right eye       Preop Questions 12/30/2021   1. Have you ever had a heart attack or stroke? YES - 5 STROKES   2. Have you ever had  surgery on your heart or blood vessels, such as a stent placement, a coronary artery bypass, or surgery on an artery in your head, neck, heart, or legs? YES - COIL IN HEAD   3. Do you have chest pain with activity? No   4. Do you have a history of  heart failure? No   5. Do you currently have a cold, bronchitis or symptoms of other infection? No   6. Do you have a cough, shortness of breath, or wheezing? No   7. Do you or anyone in your family have previous history of blood clots? No   8. Do you or does anyone in your family have a serious bleeding problem such as prolonged bleeding following surgeries or cuts? No   9. Have you ever had problems with anemia or been told to take iron pills? No   10. Have you had any abnormal blood loss such as black, tarry or bloody stools, or abnormal vaginal bleeding? No   11. Have you ever had a blood transfusion? UNKNOWN -    11a. Have you ever had a transfusion reaction? -   12. Are you willing to have a blood transfusion if it is medically needed before, during, or after your surgery? Yes   13. Have you or any of your relatives ever had problems with anesthesia? No   14. Do you have sleep apnea, excessive snoring or daytime drowsiness? No   14a. Do you have a CPAP machine? -   15. Do you have any artifical heart valves or other implanted medical devices like a pacemaker, defibrillator, or continuous glucose monitor? No   16. Do you have artificial joints? No   17. Are you allergic to latex? No     Health Care Directive:  Patient does not have a Health Care Directive or Living Will: Discussed advance care planning with patient; however, patient declined at this time.    Preoperative Review of :   reviewed - short term treatment from ER only      Status of Chronic Conditions:  See problem list for active medical problems.  Problems all longstanding and stable, except as noted/documented.  See ROS for pertinent symptoms related to these conditions.    COPD - Patient has a  longstanding history of moderate-severe COPD . Patient has been doing well overall noting NO SYMPTOMS and continues on medication regimen consisting of albuterol inhaler as needed without adverse reactions or side effects.    DIABETES - Patient has a longstanding history of DiabetesType Type II . Patient is being treated with diet and oral agents and denies significant side effects. Control has been good. Complicating factors include but are not limited to: hypertension, hyperlipidemia and morbid obesity .     HYPERLIPIDEMIA - Patient has a long history of significant Hyperlipidemia requiring medication for treatment with recent good control. Patient reports no problems or side effects with the medication.     HYPERTENSION - Patient has longstanding history of HTN , currently denies any symptoms referable to elevated blood pressure. Specifically denies chest pain, palpitations, dyspnea, orthopnea, PND or peripheral edema. Blood pressure readings have been in normal range. Current medication regimen is as listed below. Patient denies any side effects of medication.       Review of Systems  CONSTITUTIONAL:NEGATIVE for fever, chills, change in weight  INTEGUMENTARY/SKIN: NEGATIVE for worrisome rashes, moles or lesions  EYES: NEGATIVE for vision changes or irritation  ENT/MOUTH: NEGATIVE for ear, mouth and throat problems  RESP: NEGATIVE for significant cough or SOB  CV: NEGATIVE for chest pain, palpitations or peripheral edema  GI: NEGATIVE for nausea, abdominal pain, heartburn, or change in bowel habits  : NEGATIVE for frequency, dysuria, or hematuria  MUSCULOSKELETAL:wheel chair bound  NEURO: wheel chair bound   ENDOCRINE: NEGATIVE for temperature intolerance, skin/hair changes  HEME: NEGATIVE for bleeding problems  PSYCHIATRIC: NEGATIVE for changes in mood or affect    Patient Active Problem List    Diagnosis Date Noted     History of CVA (cerebrovascular accident) 03/02/2019     Priority: High     5/6/17 sudden  onset right vision loss and left side weakness, which resolved by the time of arrival to ED in [place]. CT of head and CT angiogram showed R ICA occlusion. L distal ICA, and R MCA 8mm 6mm aneurysms. Neurology was consulted and pt was transferred to [facility] for further management.  mg and lipitor 80 mg was started. Pt was found elevated HgA1c at 10. Imaging showed R temporal lobe infarct on CT and Angio showed R GOPI occlusion with no intracranial occlusion and L ICA and R MCA aneurysms. Strong family hx of ruptured aneurysms.  Seen at [facility] for an incidental finding of bilat ICA aneurysms. She had diagnostic cerebral angiogram with endovascular coil embolization of the larger left ICA aneurysm. The right ICA aneurysm will be monitored with serial imaging.     2/15/2018 acute onset weakness of her left upper extremity. MR angiogram of the head and neck and MR of the brain, which revealed an acute lacunar infarct in the right centrum semiovale region in an area of old ischemia. Multiple atherosclerotic lesions with complete occlusion of her right internal carotid and other more distal lesions as well. (clopidogrel was added)       Hyperlipidemia LDL goal <100 11/30/2021     Priority: Medium     Type 2 diabetes mellitus with hyperglycemia, without long-term current use of insulin (H) 11/30/2021     Priority: Medium     resolved       Hyperlipidemia 03/02/2019     Priority: Medium     Cerebral aneurysm 03/02/2019     Priority: Medium     Cerebral aneurysm without rupture, left ICA, s/p endovascular coiling 05/2017       PVD (peripheral vascular disease) (H) 03/02/2019     Priority: Medium     ankle-brachial index 2018 1.02 on the right and 0.52 on the left, consistent with moderate ischemia of the left lower extremity.        Emphysema lung (H) 03/02/2019     Priority: Medium     PFTS 5/2018 - FEV1- 2.13 (74%), ratio 0.67, 20% change with bronchodilators,  TLC 99%, %, DLCO 60%        Obesity (BMI  35.0-39.9) with comorbidity (H) 01/03/2019     Priority: Medium     Benign essential hypertension 04/12/2018     Priority: Medium     Type 2 diabetes mellitus with neurologic complication, without long-term current use of insulin (H) 04/12/2018     Priority: Medium     Nonruptured cerebral aneurysm 02/18/2018     Priority: Medium     s/p cerebral angiogram, [facility]       ACP (advance care planning) 05/09/2017     Priority: Medium     Advance Care Planning 5/9/2017: ACP Review of Chart / Resources Provided:  Reviewed chart for advance care plan.  Rosalie Seals has been provided information and resources to begin or update their advance care plan.  Added by Kalee Busby             Gastroesophageal reflux disease without esophagitis 03/02/2019     Priority: Low      Past Medical History:   Diagnosis Date     Cerebral infarction (H) 05/06/2017     Concussion with loss of consciousness of 30 minutes or less 1950    fell off the swings     Emphysema lung (H)      Gastroesophageal reflux disease without esophagitis      Hyperlipidemia LDL goal <100      Left hemiparesis (H) 2017    second to right CVA     Morbid obesity (H)      Nonruptured cerebral aneurysm 02/18/2018    s/p cerebral angiogram, [facility]     Peripheral vascular disease (H)      Tobacco abuse      Type 2 diabetes mellitus with hyperglycemia, without long-term current use of insulin (H)     resolved     Past Surgical History:   Procedure Laterality Date     ABDOMEN SURGERY  04/1997    bladder repair     CEREBRAL ANGIOGRAM  2018     GYN SURGERY  04/1974    hysterectomy      PHACOEMULSIFICATION WITH STANDARD INTRAOCULAR LENS IMPLANT Left 12/2/2021    Procedure: PHACOEMULSIFICATION, CATARACT, WITH STANDARD INTRAOCULAR LENS IMPLANT INSERTION;  Surgeon: Leo Hernandes MD;  Location: [facility] OR     Current Outpatient Medications   Medication Sig Dispense Refill     albuterol (PROAIR HFA/PROVENTIL HFA/VENTOLIN HFA) 108 (90 Base) MCG/ACT  inhaler Inhale 2 puffs into the lungs every 6 hours as needed for shortness of breath / dyspnea or wheezing 18 g 1     albuterol (VENTOLIN HFA) 108 (90 Base) MCG/ACT inhaler USE 1 TO 2 INHALATIONS EVERY 4 HOURS AS NEEDED FOR SHORTNESS OF BREATH, DIFFICULTY BREATHING,  OR WHEEZING 18 g 11     aspirin 325 MG tablet Take 325 mg by mouth       atorvastatin (LIPITOR) 80 MG tablet TAKE 1 TABLET DAILY 90 tablet 3     blood glucose monitoring (NO BRAND SPECIFIED) meter device kit Use to test blood sugar 4 times daily or as directed. 1 kit 0     blood glucose monitoring (ONE TOUCH DELICA) lancets Use to test blood sugar 3 times daily. 360 each 3     cetirizine (ZYRTEC) 10 MG tablet Take 1 tablet (10 mg) by mouth daily 30 tablet 1     clopidogrel (PLAVIX) 75 MG tablet TAKE 1 TABLET DAILY 90 tablet 3     lisinopril (ZESTRIL) 10 MG tablet TAKE 2 TABLETS DAILY (Patient taking differently: 10 mg ) 180 tablet 3     metFORMIN (GLUCOPHAGE-XR) 500 MG 24 hr tablet TAKE 2 TABLETS BY MOUTH TWICE DAILY WITH MEALS 80 tablet 0     metoprolol tartrate (LOPRESSOR) 50 MG tablet TAKE 1/2 TABLET TWICE A DAY 90 tablet 3     ONETOUCH VERIO IQ test strip USE FOUR TIMES A  each 3     order for DME Equipment being ordered: Wheelchair with foot rests 1 Device 0     order for DME Equipment being ordered: Glucometer and test strips 1 Device 0     pantoprazole (PROTONIX) 40 MG EC tablet TAKE 1 TABLET DAILY 30 TO  60 MINUTES BEFORE A MEAL 90 tablet 2     ketorolac (ACULAR) 0.5 % ophthalmic solution        lisinopril (ZESTRIL) 20 MG tablet Take 1 tablet (20 mg) by mouth daily (Patient not taking: Reported on 12/30/2021) 90 tablet 1     ofloxacin (OCUFLOX) 0.3 % ophthalmic solution        prednisoLONE acetate (PRED FORTE) 1 % ophthalmic suspension          Allergies   Allergen Reactions     Azithromycin Swelling     Face swelling     Pcn [Penicillins]      Phenylephrine      Head aches     Tropicamide      Head aches          Social History      Tobacco Use     Smoking status: Current Some Day Smoker     Types: Cigarettes     Start date: 1956     Last attempt to quit: 2017     Years since quittin.6     Smokeless tobacco: Never Used     Tobacco comment: limits self to about 4-5 daily , she is quitting on her own   Substance Use Topics     Alcohol use: No     Family History   Problem Relation Age of Onset     Aneurysm Mother 44     Other Cancer Father      Other Cancer Sister 59        female     Aneurysm Sister 72     Aneurysm Sister 23     Diabetes Brother      Hypertension Brother      Other Cancer Brother         unknown     Sudden Infant Death Syndrome Brother         influenza     Family History Negative Brother      Family History Negative Brother      Family History Negative Brother      Family History Negative Brother      Family History Negative Brother      Unknown/Adopted Maternal Grandmother      Unknown/Adopted Maternal Grandfather      Unknown/Adopted Paternal Grandmother      Unknown/Adopted Paternal Grandfather      History   Drug Use No         Objective     Temp 97.9  F (36.6  C) (Tympanic)     Physical Exam    GENERAL APPEARANCE: alert, no distress and cooperative     EYES: EOMI, PERRL     HENT: ear canals and TM's normal and nose and mouth without ulcers or lesions     NECK: no adenopathy, no asymmetry, masses, or scars and thyroid normal to palpation     RESP: lungs clear to auscultation - no rales, rhonchi or wheezes     CV: regular rates and rhythm, normal S1 S2, no S3 or S4 and no murmur, click or rub     ABDOMEN:  soft, nontender, no HSM or masses and bowel sounds normal     MS: wheel chair bound     SKIN: no suspicious lesions or rashes     NEURO: Normal strength and tone, sensory exam grossly normal, mentation intact and speech normal     PSYCH: mentation appears normal. and affect normal/bright     LYMPHATICS: No cervical adenopathy    Recent Labs   Lab Test 21  1408 07/15/21  1217   HGB 11.9 12.0     272    135   POTASSIUM 4.7 4.9   CR 0.82 0.99   A1C 6.6* 6.7*        Diagnostics:  No labs were ordered during this visit.   No EKG required for low risk surgery (cataract, skin procedure, breast biopsy, etc).    Revised Cardiac Risk Index (RCRI):  The patient has the following serious cardiovascular risks for perioperative complications:  History of CVA     RCRI Interpretation: 1 point: Class II (low risk - 0.9% complication rate)           Signed Electronically by: ADALBERTO Mccormick CNP  Copy of this evaluation report is provided to requesting physician.

## 2022-01-03 ENCOUNTER — OFFICE VISIT (OUTPATIENT)
Dept: FAMILY MEDICINE | Facility: OTHER | Age: 77
End: 2022-01-03
Attending: INTERNAL MEDICINE
Payer: MEDICARE

## 2022-01-03 DIAGNOSIS — Z01.818 PREOP GENERAL PHYSICAL EXAM: Primary | ICD-10-CM

## 2022-01-03 PROCEDURE — U0003 INFECTIOUS AGENT DETECTION BY NUCLEIC ACID (DNA OR RNA); SEVERE ACUTE RESPIRATORY SYNDROME CORONAVIRUS 2 (SARS-COV-2) (CORONAVIRUS DISEASE [COVID-19]), AMPLIFIED PROBE TECHNIQUE, MAKING USE OF HIGH THROUGHPUT TECHNOLOGIES AS DESCRIBED BY CMS-2020-01-R: HCPCS | Mod: ZL

## 2022-01-05 LAB — SARS-COV-2 RNA RESP QL NAA+PROBE: NEGATIVE

## 2022-01-06 ENCOUNTER — ANESTHESIA (OUTPATIENT)
Dept: SURGERY | Facility: HOSPITAL | Age: 77
End: 2022-01-06
Payer: MEDICARE

## 2022-01-06 ENCOUNTER — HOSPITAL ENCOUNTER (OUTPATIENT)
Facility: HOSPITAL | Age: 77
Discharge: HOME | End: 2022-01-06
Attending: OPHTHALMOLOGY | Admitting: OPHTHALMOLOGY
Payer: MEDICARE

## 2022-01-06 VITALS
RESPIRATION RATE: 16 BRPM | OXYGEN SATURATION: 96 % | BODY MASS INDEX: 39.37 KG/M2 | TEMPERATURE: 97.7 F | HEIGHT: 66 IN | WEIGHT: 245 LBS | DIASTOLIC BLOOD PRESSURE: 69 MMHG | HEART RATE: 78 BPM | SYSTOLIC BLOOD PRESSURE: 160 MMHG

## 2022-01-06 PROCEDURE — 272N000001 HC OR GENERAL SUPPLY STERILE: Performed by: OPHTHALMOLOGY

## 2022-01-06 PROCEDURE — 250N000011 HC RX IP 250 OP 636: Performed by: NURSE ANESTHETIST, CERTIFIED REGISTERED

## 2022-01-06 PROCEDURE — 250N000011 HC RX IP 250 OP 636: Performed by: OPHTHALMOLOGY

## 2022-01-06 PROCEDURE — 99100 ANES PT EXTEME AGE<1 YR&>70: CPT | Performed by: NURSE ANESTHETIST, CERTIFIED REGISTERED

## 2022-01-06 PROCEDURE — 370N000017 HC ANESTHESIA TECHNICAL FEE, PER MIN: Performed by: OPHTHALMOLOGY

## 2022-01-06 PROCEDURE — V2632 POST CHMBR INTRAOCULAR LENS: HCPCS | Performed by: OPHTHALMOLOGY

## 2022-01-06 PROCEDURE — 999N000141 HC STATISTIC PRE-PROCEDURE NURSING ASSESSMENT: Performed by: OPHTHALMOLOGY

## 2022-01-06 PROCEDURE — 710N000012 HC RECOVERY PHASE 2, PER MINUTE: Performed by: OPHTHALMOLOGY

## 2022-01-06 PROCEDURE — 360N000076 HC SURGERY LEVEL 3, PER MIN: Performed by: OPHTHALMOLOGY

## 2022-01-06 PROCEDURE — 250N000009 HC RX 250: Performed by: OPHTHALMOLOGY

## 2022-01-06 PROCEDURE — 94640 AIRWAY INHALATION TREATMENT: CPT

## 2022-01-06 PROCEDURE — 999N000157 HC STATISTIC RCP TIME EA 10 MIN

## 2022-01-06 PROCEDURE — 66984 XCAPSL CTRC RMVL W/O ECP: CPT | Performed by: NURSE ANESTHETIST, CERTIFIED REGISTERED

## 2022-01-06 PROCEDURE — 250N000009 HC RX 250: Performed by: NURSE ANESTHETIST, CERTIFIED REGISTERED

## 2022-01-06 DEVICE — IMPLANTABLE DEVICE: Type: IMPLANTABLE DEVICE | Site: EYE | Status: FUNCTIONAL

## 2022-01-06 RX ORDER — OFLOXACIN 3 MG/ML
1 SOLUTION/ DROPS OPHTHALMIC ONCE
Status: COMPLETED | OUTPATIENT
Start: 2022-01-06 | End: 2022-01-06

## 2022-01-06 RX ORDER — PROPARACAINE HYDROCHLORIDE 5 MG/ML
1 SOLUTION/ DROPS OPHTHALMIC ONCE
Status: DISCONTINUED | OUTPATIENT
Start: 2022-01-06 | End: 2022-01-06

## 2022-01-06 RX ORDER — TETRACAINE HYDROCHLORIDE 5 MG/ML
SOLUTION OPHTHALMIC PRN
Status: DISCONTINUED | OUTPATIENT
Start: 2022-01-06 | End: 2022-01-06 | Stop reason: HOSPADM

## 2022-01-06 RX ORDER — LIDOCAINE 40 MG/G
CREAM TOPICAL
Status: DISCONTINUED | OUTPATIENT
Start: 2022-01-06 | End: 2022-01-06 | Stop reason: HOSPADM

## 2022-01-06 RX ORDER — ALBUTEROL SULFATE 0.83 MG/ML
2.5 SOLUTION RESPIRATORY (INHALATION) ONCE
Status: COMPLETED | OUTPATIENT
Start: 2022-01-06 | End: 2022-01-06

## 2022-01-06 RX ORDER — CYCLOPENTOLATE HYDROCHLORIDE 10 MG/ML
1 SOLUTION/ DROPS OPHTHALMIC
Status: COMPLETED | OUTPATIENT
Start: 2022-01-06 | End: 2022-01-06

## 2022-01-06 RX ORDER — PREDNISOLONE ACETATE 10 MG/ML
SUSPENSION/ DROPS OPHTHALMIC PRN
Status: DISCONTINUED | OUTPATIENT
Start: 2022-01-06 | End: 2022-01-06 | Stop reason: HOSPADM

## 2022-01-06 RX ORDER — OFLOXACIN 3 MG/ML
SOLUTION/ DROPS OPHTHALMIC PRN
Status: DISCONTINUED | OUTPATIENT
Start: 2022-01-06 | End: 2022-01-06 | Stop reason: HOSPADM

## 2022-01-06 RX ORDER — HYDRALAZINE HYDROCHLORIDE 20 MG/ML
10 INJECTION INTRAMUSCULAR; INTRAVENOUS ONCE
Status: COMPLETED | OUTPATIENT
Start: 2022-01-06 | End: 2022-01-06

## 2022-01-06 RX ORDER — LABETALOL 20 MG/4 ML (5 MG/ML) INTRAVENOUS SYRINGE
10 ONCE
Status: COMPLETED | OUTPATIENT
Start: 2022-01-06 | End: 2022-01-06

## 2022-01-06 RX ORDER — PHENYLEPHRINE HYDROCHLORIDE 25 MG/ML
1 SOLUTION/ DROPS OPHTHALMIC
Status: COMPLETED | OUTPATIENT
Start: 2022-01-06 | End: 2022-01-06

## 2022-01-06 RX ORDER — MOXIFLOXACIN/BAL.SALT SOLN/PF 1 MG/ML
SYRINGE (ML) INTRAOCULAR PRN
Status: DISCONTINUED | OUTPATIENT
Start: 2022-01-06 | End: 2022-01-06 | Stop reason: HOSPADM

## 2022-01-06 RX ORDER — PROPARACAINE HYDROCHLORIDE 5 MG/ML
1 SOLUTION/ DROPS OPHTHALMIC ONCE
Status: COMPLETED | OUTPATIENT
Start: 2022-01-06 | End: 2022-01-06

## 2022-01-06 RX ORDER — SODIUM CHLORIDE, SODIUM LACTATE, POTASSIUM CHLORIDE, CALCIUM CHLORIDE 600; 310; 30; 20 MG/100ML; MG/100ML; MG/100ML; MG/100ML
INJECTION, SOLUTION INTRAVENOUS CONTINUOUS
Status: DISCONTINUED | OUTPATIENT
Start: 2022-01-06 | End: 2022-01-06 | Stop reason: HOSPADM

## 2022-01-06 RX ADMIN — MIDAZOLAM 1 MG: 1 INJECTION INTRAMUSCULAR; INTRAVENOUS at 09:53

## 2022-01-06 RX ADMIN — PHENYLEPHRINE HYDROCHLORIDE 1 DROP: 25 SOLUTION/ DROPS OPHTHALMIC at 08:18

## 2022-01-06 RX ADMIN — CYCLOPENTOLATE HYDROCHLORIDE 1 DROP: 10 SOLUTION OPHTHALMIC at 08:17

## 2022-01-06 RX ADMIN — HYDRALAZINE HYDROCHLORIDE 10 MG: 20 INJECTION INTRAMUSCULAR; INTRAVENOUS at 08:57

## 2022-01-06 RX ADMIN — CYCLOPENTOLATE HYDROCHLORIDE 1 DROP: 10 SOLUTION OPHTHALMIC at 08:24

## 2022-01-06 RX ADMIN — ALBUTEROL SULFATE 2.5 MG: 2.5 SOLUTION RESPIRATORY (INHALATION) at 08:20

## 2022-01-06 RX ADMIN — FLURBIPROFEN SODIUM 0.5 ML: 0.3 SOLUTION/ DROPS OPHTHALMIC at 08:25

## 2022-01-06 RX ADMIN — LABETALOL 20 MG/4 ML (5 MG/ML) INTRAVENOUS SYRINGE 5 MG: at 09:55

## 2022-01-06 RX ADMIN — OFLOXACIN 1 DROP: 3 SOLUTION/ DROPS OPHTHALMIC at 08:18

## 2022-01-06 RX ADMIN — PHENYLEPHRINE HYDROCHLORIDE 1 DROP: 25 SOLUTION/ DROPS OPHTHALMIC at 08:25

## 2022-01-06 RX ADMIN — PROPARACAINE HYDROCHLORIDE 1 DROP: 5 SOLUTION/ DROPS OPHTHALMIC at 08:16

## 2022-01-06 RX ADMIN — LABETALOL 20 MG/4 ML (5 MG/ML) INTRAVENOUS SYRINGE 5 MG: at 10:03

## 2022-01-06 ASSESSMENT — MIFFLIN-ST. JEOR: SCORE: 1618.06

## 2022-01-06 NOTE — LETTER
Rosalie M Friend  [address]  [facility] [address]      SURGERY PACKET            Your surgery is scheduled:    Date: 01/06/22  ________________________________    Time: We will call you on 01/05/22 afternoon with your admit time  ________________________________    Please arrive at:  [facility] Admitting [facility] Entrance  ______________________    Surgeon's Name:     Dr. Esposito         Pre-Op Physical Fax Numbers:          Pre-Admissions  [phone]        Your surgery is located at:  [address] [facility] [address]         Before Your Surgery  For Patients and Visitors at [facility]    Welcome  As you get ready for surgery, you may have a lot of questions.  This brochure will help you know what to expect before and after surgery.  You and your family are the most important members of your health care team.  You will need to take an active role in your care.    Be sure to ask questions and learn all that you can about your surgery.  If you have any safety concerns, we urge you to tell a nurse as soon as possible.   This brochure is for information only.  It does not replace the advice of your doctor.  Always follow your doctor's advice.    Please tell us if you need a .    GETTING READY FOR SURGERY  Always follow your surgeon's instructions.  If you don't, your surgery could be canceled.  Please use the following checklist.    Within 30 Days of Surgery:    Have a pre-surgery physical exam with your family doctor or partner.    If you use a [facility], all of your information from the pre-op physical will be in the [employer] computer system.    Ask the doctor to send all of your results to the hospital before the surgery.  The doctor also may ask you to bring the results with you on the day of surgery or you can fax them to [phone].  Tell the doctor if:    You are allergic to latex or rubber  (Latex and rubber gloves are often  used in medical care).    You are taking any medicines (including aspirin), vitamins (Vitamin E, Fish Oil, Omegas) or herbal products.  You will need to stop taking some medicines before surgery.    You have any medical problems (allergies, diabetes or heart disease, for example).    You have a pacemaker or an AICD (automatic implanted cardiac defibrillator).  If you do, please bring the ID card with you on the day of surgery.    You are a smoker.  People who smoke have a higher risk of infection after surgery.  Ask your doctor how you can quit smoking.  Within 7 days of Surgery:  Prior to your surgical procedure, a nurse will be contacting you to obtain a health history. A nurse will call you within a few days of surgery to go over these and other instructions.  If you do not hear from them, please call them at [phone].        Call your insurance company.  Ask if you need pre-approval for your surgery.  If you do not have insurance, please let us know.      Arrange for someone to drive you home after surgery.  If you will have same-day surgery, you may not drive or take public transportation home by yourself.    Arrange for someone to stay with you for 24 hours after you go home.  This person must be a responsible adult (ie- Family member or friend).  The Day Before Surgery:     Call your surgeon if there are any changes in your health.  This includes signs of a cold or flu (such as a sore throat, runny nose, cough, rash or fever).    Do not smoke, drink alcohol or take over-the-counter medicine (unless your surgeon tells you to) for 24 hours before and after surgery.    If you take prescribed drugs:  You may need to stop them until after the surgery.  Follow your doctor's orders.  You may resume Aspirin and/or blood thinners after your surgery as directed by your physician/surgeon.    NO FOOD OR DRINK AFTER MIDNIGHT.  Follow your surgeon's orders for eating and drinking.  You need to have an empty stomach  before surgery.  This will make the surgery as safe as possible.  If you don't follow your doctor's orders, your surgery could be changed to another date.    The Day of Surgery:    Take a shower or bath the night before and the morning of surgery.  Use regular soap.  Gently clean the skin.     Please remove deodorant, cologne, scented lotion, makeup, nail polish and jewelry (including rings and body piercings).  If you wear artificial nails, please remove at least one nail before coming to the hospital.    Wear clean, loose clothing to the hospital.    Bring these items to the hospital:  1. Your insurance card.  2. A list of all the medicines you take.  Include vitamins, minerals, herbs and over-the-counter drugs.  Note any drug allergies.  3. A copy of your advance health care directive, if you have one.  This tells us what treatment you would want -- and who would make health care decisions -- if you could no longer speak for yourself.  You may request this form in advance or download it from [email or web address].  4. A case for any glasses, contact lenses, hearing aids or dentures.  5. Your inhaler or CPAP machine, if you use these at home.  Leave extra cash, jewelry and other valuables at home.    When You Arrive:  When you get to the hospital, you will:    Check in.  If you are under age 18, you must be with a parent or legal guardian.    Electronically sign consent forms, if you haven't already.  These electronic forms state that you know the risks and benefits of surgery.  When you sign the forms, you give us permission to do the surgery.  Do not sign them unless you understand what will happen during and after your surgery.  If you have any questions about your surgery, ask to speak with your doctor before you sign the forms.  If you don't understand the answers, ask again.    Receive a copy of the Patients Bill of Rights.  If you do not receive a copy, please ask for one.    Change into hospital  "clothes.  Your belongings will be placed in a bag.  We will return them to you after surgery.    Meet with the anesthesia provider.  He or she will tell you what kind of anesthesia (medicine) will be used to keep you comfortable during surgery.  Remember: It's okay to remind doctors and nurses to wash their hands before touching you.   In most cases, your surgeon will use a marker to write his or her initials on the surgery site.  This ensures that the exact site is operated on.  For safety reasons, we will ask you the same questions many times.  For example, we may ask your name and birth date over and over again.  Friends and family can stay with you until it's time for surgery.  While you're in surgery, they will be in the waiting area.  Please note that cell phones are not allowed in some patient care areas.  If you have questions about what will happen in the operating room, talk to your care team.  After Surgery:    We will move you to a recovery room where we will watch you closely.  If you have any pain or discomfort, tell your nurse.  He or she will try to make you comfortable.      If you are staying overnight we will move you to your hospital room after you are awake.    If you are going home we will move you to another room.  Friends and family may be able to join you.  The length of time you spend in recovery depends on the type of medicine you received, your medical condition, and the type of surgery you had.  Dealing with pain:  A nurse will check your comfort level often during your stay.  He or she will work with you to manage your pain.  Remember:    All pain is real.  There are many ways to control pain.  We can help you decide what works best for you.    Ask for pain medicine when you need it.  Don't try to \"tough it out\" -- this can make you feel worse.  Always take your medicine as ordered.    Medicine doesn't work the same for everyone.  If your medicine isn't working tell your nurse.  There " may be other medicines or treatments we can try.  Going Home:  We will let you know when you're ready to leave the hospital.  Before you leave, we will tell you how to care for yourself at home and prevent infections.  If you do not understand something, please say so.  We will answer any questions you have.  We will then help you get ready to leave.  You must have an adult with you for the first 24 hours after you leave the hospital. Take it easy when you get home.  You will need some time to recover -- you may be more tired than you realize at first.  Rest and relax for at least the first 24 hours at home.  You'll feel better and heal faster if you take good care of yourself.                      Thank you for following these important instructions.      You have been scheduled for surgery and we would like to give you some information that will assist in helping get the best possible outcome.      Before Surgery:   If for any reason you decide not to have the surgery, please contact our office.  We can easily cancel or reschedule the procedure. Please call the  at [phone] or after hours [phone]      Any pain related to the surgery that occurs before the surgery needs to be reported and managed by your primary care or referring doctor.      Please keep in mind that the time of surgery is subject to change.  Make sure you have nothing to eat or drink after midnight.  If your surgery is later in the afternoon, this recommendation might change, but not until the day before surgery after the actual time of the surgery has been established.    After Surgery:  When you are discharged from the recovery room, the nurses will review instructions with you and your caregiver.      Please wash your hands every time you touch the wound or change bandages or dressings.      Do not submerge the wound in water.  You may not use a bathtub or hot tub until the wound is closed.  The wait time frame is generally  2-3 weeks but any open area can be a source of incoming bacteria, so it is better to be on the safe side and avoid the tub until your wound is fully healed.      You may take a shower 24 hours after surgery.  Double check with your surgeon if it is ok for water to run over a wound, whether it has been sutured, stapled, glued or is open.  You may gently wash the wound using the antiseptic soap provided for your pre-surgery showering (do not use a washcloth).  Any mild soap will work as well.      Many surgical wounds will have small white strips of tape on them called Steri Strips.  Do not remove these.  The edges will curl and fall off within 7-10 days with normal showering.      If you are going home with sutures (stitches) or staples, you must return to the clinic to have them taken out, usually within 1-2 weeks.      Signs and symptoms of infection include:  1. Fever, temperature over 101.5 ' F  2. Redness  3. Swelling  4. Increasing pain  5. Green or yellow drainage which may or may not have a foul odor.    Symptoms of infection need to be reported to your surgery office. Please call your Surgeon.      If you or your  are deaf or hard of hearing, or prefer a language other than English, please let us know.  We have many free services, including interpreters and other aids to help you communicate. You may ask for help  through any member of your care team or by calling Language Services at [phone], option 2.

## 2022-01-06 NOTE — OP NOTE
DATE OF SERVICE: 1/6/22.       PREOPERATIVE DIAGNOSIS:     Cataract, Right eye.       POSTOPERATIVE DIAGNOSIS:  Cataract, Right eye.       PROCEDURE:  Phacoemulsification with intraocular lens implant, Model DCB00, 16.0 diopter power.       ANESTHESIA:  Topical with IV sedation.         DESCRIPTION OF PROCEDURE:   Operative risks, benefits and alternatives to the procedure were discussed at length with the patient including loss of vision, loss of the eye.  Patient voiced understanding and informed consent was signed.  The patient was taken to the operating suite, where an appropriate level of anesthesia was given.  Attention was placed to the right eye.  The patient was then prepped and draped in the usual sterile ophthalmic manner.  A lid speculum was placed in the eye to provide exposure and MVR blade was used to make a paracentesis.  Once this was performed, Shugarcaine was then placed intracamerally.  This was then followed by intracameral Viscoat.  A 2.75 mm blade was then used to make a biplanar temporal clear corneal incision.  A cystotome and uttrata were used to make a continuous curvilinear capsulorrhexis.  BSS on Leija cannula was then used to hydrodissect the lens.  Phacoemulsification was then performed in a divide-and-conquer technique to remove all pieces of the nucleus.  Irrigation aspiration was then used to remove all remaining cortical material and debris.  Amvisc was then used to fill the capsular bag.  A DCB00 16.0 diopter lens was then injected into the capsular bag using the injector.  Once this was performed, a Goldberg secondary instrument was used to rotate the lens into proper position.  Irrigation aspiration was then used to remove all remaining viscoelastic material and center the lens appropriately.  BSS on 30 gauge cannula was then used to hydrate both wounds.  A Weck-Tonya was used to assess intraocular IOP.  Once this was stable and the lens was found to be in good position, the  patient was undraped.  The patient was brought to the recovery area in stable condition.  Family was made aware of the patient's condition and the patient will follow up with us later this afternoon.  No complications.           Leo Hernandes MD

## 2022-01-06 NOTE — ANESTHESIA POSTPROCEDURE EVALUATION
Patient: Rosalie Seals    Procedure: Procedure(s):  RIGHT EYE CATARACT EXTRACTION WITH INTRAOCULAR LENS IMPLANT       Diagnosis:Age-related nuclear cataract of both eyes [H25.13]  Diagnosis Additional Information: No value filed.    Anesthesia Type:  MAC    Note:  Disposition: Outpatient   Postop Pain Control: Uneventful            Sign Out: Well controlled pain   PONV: No   Neuro/Psych: Uneventful            Sign Out: Acceptable/Baseline neuro status   Airway/Respiratory: Uneventful            Sign Out: Acceptable/Baseline resp. status   CV/Hemodynamics: Uneventful            Sign Out: Acceptable CV status; No obvious hypovolemia; No obvious fluid overload   Other NRE: NONE   DID A NON-ROUTINE EVENT OCCUR? No           Last vitals:  Vitals Value Taken Time   /69 01/06/22 1020   Temp     Pulse 78 01/06/22 1020   Resp 16 01/06/22 1020   SpO2 96 % 01/06/22 1043       Electronically Signed By: ADALBERTO Mendoza CRNA  January 6, 2022  10:45 AM

## 2022-01-06 NOTE — DISCHARGE INSTRUCTIONS
AFTER CATARACT SURGERY RESTRICTIONS    SHIELD: WEAR OVER SURGICAL EYE AT NIGHT FOR 1 WEEK    ACTIVITY/LIFTING: Avoid activities, which increase abdominal/head pressure such as pulling on a starter cord, heavy lifting (over 15-20 lbs) or bending with the head below the waist, for 1 week. Avoid sudden jerky movements (chopping, shoveling) for 1 week. Waking and lower body exercise such as biking is okay.     WATER: Showering/washing hair is okay the day after surgery, but avoid excess water, soap, or shampoo in your eyes. Clean eyelids gently with a clean, wet washcloth as needed. Avoid pressure on the eye.     SUNLIGHT: If the eye is light sensitive after surgery, dark glasses may be worn.     DIET/MEDICATIONS: Resume your usual diet and medications your family doctor has prescribed. Continue to use/follow the instructions for your eye drops.     DRIVING: Avoid driving with a patch. Resume driving when you feel safe, but don't drive on the day of surgery.    PAIN: Minor pain and itching is normal during healing. DO NOT RUB THE EYE! Report any unusual swelling, increased discharge or pain that is not relieved by Tylenol (or equivalent). If sudden drop/change in vision call immediately. [facility] [phone]    CONTINUE TO USE ALL THE EYE DROPS PER THE INSTRUCTIONS ORDERED BY DR. BELCHER'S TECHNICIANS    FOR EMERGENCY DR. GARCIA: [phone]    FOLLOW EYE DROP INSTRUCTIONS GIVEN BY 's OFFICE      Post-Anesthesia Patient Instructions    IMMEDIATELY FOLLOWING SURGERY:  Do not drive or operate machinery for the first twenty four hours after surgery.  Do not make any important decisions for twenty four hours after surgery or while taking narcotic pain medications or sedatives.  If you develop intractable nausea and vomiting or a severe headache please notify your doctor immediately.    FOLLOW-UP:  Please make an appointment with your surgeon as instructed. You do not need to follow up with anesthesia  unless specifically instructed to do so.    WOUND CARE INSTRUCTIONS (if applicable):  Keep a dry clean dressing on the anesthesia/puncture wound site if there is drainage.  Once the wound has quit draining you may leave it open to air.  Generally you should leave the bandage intact for twenty four hours unless there is drainage.  If the epidural site drains for more than 36-48 hours please call the anesthesia department.    QUESTIONS?:  Please feel free to call your physician or the hospital  if you have any questions, and they will be happy to assist you.

## 2022-01-06 NOTE — ANESTHESIA CARE TRANSFER NOTE
Patient: Rosalie Seals    Procedure: Procedure(s):  RIGHT EYE CATARACT EXTRACTION WITH INTRAOCULAR LENS IMPLANT       Diagnosis: Age-related nuclear cataract of both eyes [H25.13]  Diagnosis Additional Information: No value filed.    Anesthesia Type:   MAC     Note:    Oropharynx: oropharynx clear of all foreign objects  Level of Consciousness: awake  Oxygen Supplementation: room air    Independent Airway: airway patency satisfactory and stable  Dentition: dentition unchanged  Vital Signs Stable: post-procedure vital signs reviewed and stable  Report to RN Given: handoff report given  Patient transferred to: Phase II    Handoff Report: Identifed the Patient, Identified the Reponsible Provider, Reviewed the pertinent medical history, Discussed the surgical course, Reviewed Intra-OP anesthesia mangement and issues during anesthesia, Set expectations for post-procedure period and Allowed opportunity for questions and acknowledgement of understanding      Vitals:  Vitals Value Taken Time   BP     Temp     Pulse     Resp     SpO2         Electronically Signed By: ADALBERTO Pringle CRNA  January 6, 2022  10:09 AM

## 2022-01-06 NOTE — OR NURSING
Patient and responsible adult given discharge instructions with no questions regarding instructions. Herman score 18. Pain level 0/10.  Discharged from unit via wheel chair. Patient discharged to home.

## 2022-01-06 NOTE — OR NURSING
Blood pressure elevated, notified anesthesia- Galina, medicated with hydralazine IV.  Pressures remain elevated, Dr. Hernandes and anesthesia notified.  No new orders.  BP from ED visit in December 150/81- updated anesthesia.

## 2022-01-08 PROCEDURE — 99282 EMERGENCY DEPT VISIT SF MDM: CPT | Performed by: INTERNAL MEDICINE

## 2022-01-08 PROCEDURE — 99283 EMERGENCY DEPT VISIT LOW MDM: CPT

## 2022-01-09 ENCOUNTER — HOSPITAL ENCOUNTER (EMERGENCY)
Facility: HOSPITAL | Age: 77
Discharge: HOME | End: 2022-01-09
Attending: INTERNAL MEDICINE | Admitting: INTERNAL MEDICINE
Payer: MEDICARE

## 2022-01-09 VITALS
SYSTOLIC BLOOD PRESSURE: 150 MMHG | RESPIRATION RATE: 16 BRPM | WEIGHT: 245 LBS | DIASTOLIC BLOOD PRESSURE: 67 MMHG | TEMPERATURE: 97 F | HEART RATE: 65 BPM | OXYGEN SATURATION: 97 % | BODY MASS INDEX: 39.54 KG/M2

## 2022-01-09 DIAGNOSIS — T78.3XXA ANGIOEDEMA, INITIAL ENCOUNTER: ICD-10-CM

## 2022-01-09 DIAGNOSIS — T78.40XA ALLERGIC REACTION, INITIAL ENCOUNTER: ICD-10-CM

## 2022-01-09 PROCEDURE — 250N000012 HC RX MED GY IP 250 OP 636 PS 637: Mod: GY | Performed by: INTERNAL MEDICINE

## 2022-01-09 RX ORDER — PREDNISONE 20 MG/1
20 TABLET ORAL ONCE
Status: COMPLETED | OUTPATIENT
Start: 2022-01-09 | End: 2022-01-09

## 2022-01-09 RX ADMIN — PREDNISONE 20 MG: 20 TABLET ORAL at 00:24

## 2022-01-09 NOTE — DISCHARGE INSTRUCTIONS
Please hold on Lisinorpil, also hold on ofloaxcin and toradol eye drops, contact your PCP and eye doctor on Monday for continuation of above medication

## 2022-01-09 NOTE — ED NOTES
Presents with reports of lip swelling, which started today. Patient states she had cataract surgery on Thursday and had similar symptoms after her last eye was completed. Denies shortness of breath or oral swelling. Last took benadryl at 2000.

## 2022-01-11 ASSESSMENT — ENCOUNTER SYMPTOMS
COLOR CHANGE: 0
FEVER: 0
ABDOMINAL PAIN: 0
CHOKING: 0
ARTHRALGIAS: 0
FACIAL SWELLING: 1
WHEEZING: 0
HEADACHES: 0
CONFUSION: 0
SINUS PRESSURE: 0
NECK STIFFNESS: 0
VOICE CHANGE: 0
EYE REDNESS: 0
COUGH: 0
DIFFICULTY URINATING: 0
SORE THROAT: 0
SHORTNESS OF BREATH: 0
TROUBLE SWALLOWING: 0

## 2022-01-11 NOTE — ED PROVIDER NOTES
History     Chief Complaint   Patient presents with     Facial Swelling     The history is provided by the patient.     Rosalie JANE Friend is a 76 year old female who came for swelling of left half of upper lip. She had recent eye surgery and on ofloxacin and toradol eye drop.  drop. Denies SOB, wheezing, throat pressure or swelling.   Allergies:  Allergies   Allergen Reactions     Azithromycin Swelling     Face swelling     Pcn [Penicillins]        Problem List:    Patient Active Problem List    Diagnosis Date Noted     History of CVA (cerebrovascular accident) 03/02/2019     Priority: High     5/6/17 sudden onset right vision loss and left side weakness, which resolved by the time of arrival to ED in [place]. CT of head and CT angiogram showed R ICA occlusion. L distal ICA, and R MCA 8mm 6mm aneurysms. Neurology was consulted and pt was transferred to [facility] for further management.  mg and lipitor 80 mg was started. Pt was found elevated HgA1c at 10. Imaging showed R temporal lobe infarct on CT and Angio showed R GOPI occlusion with no intracranial occlusion and L ICA and R MCA aneurysms. Strong family hx of ruptured aneurysms.  Seen at [facility] for an incidental finding of bilat ICA aneurysms. She had diagnostic cerebral angiogram with endovascular coil embolization of the larger left ICA aneurysm. The right ICA aneurysm will be monitored with serial imaging.     2/15/2018 acute onset weakness of her left upper extremity. MR angiogram of the head and neck and MR of the brain, which revealed an acute lacunar infarct in the right centrum semiovale region in an area of old ischemia. Multiple atherosclerotic lesions with complete occlusion of her right internal carotid and other more distal lesions as well. (clopidogrel was added)       Hyperlipidemia LDL goal <100 11/30/2021     Priority: Medium     Type 2 diabetes mellitus with hyperglycemia, without long-term current use of insulin (H) 11/30/2021      Priority: Medium     resolved       Hyperlipidemia 03/02/2019     Priority: Medium     Cerebral aneurysm 03/02/2019     Priority: Medium     Cerebral aneurysm without rupture, left ICA, s/p endovascular coiling 05/2017       PVD (peripheral vascular disease) (H) 03/02/2019     Priority: Medium     ankle-brachial index 2018 1.02 on the right and 0.52 on the left, consistent with moderate ischemia of the left lower extremity.        Emphysema lung (H) 03/02/2019     Priority: Medium     PFTS 5/2018 - FEV1- 2.13 (74%), ratio 0.67, 20% change with bronchodilators,  TLC 99%, %, DLCO 60%        Obesity (BMI 35.0-39.9) with comorbidity (H) 01/03/2019     Priority: Medium     Benign essential hypertension 04/12/2018     Priority: Medium     Type 2 diabetes mellitus with neurologic complication, without long-term current use of insulin (H) 04/12/2018     Priority: Medium     Nonruptured cerebral aneurysm 02/18/2018     Priority: Medium     s/p cerebral angiogram, [facility]       ACP (advance care planning) 05/09/2017     Priority: Medium     Advance Care Planning 5/9/2017: ACP Review of Chart / Resources Provided:  Reviewed chart for advance care plan.  Rosalie JANE Friend has been provided information and resources to begin or update their advance care plan.  Added by Kalee Busby             Gastroesophageal reflux disease without esophagitis 03/02/2019     Priority: Low        Past Medical History:    Past Medical History:   Diagnosis Date     Cerebral infarction (H) 05/06/2017     Concussion with loss of consciousness of 30 minutes or less 1950     Emphysema lung (H)      Gastroesophageal reflux disease without esophagitis      Hyperlipidemia LDL goal <100      Left hemiparesis (H) 2017     Morbid obesity (H)      Nonruptured cerebral aneurysm 02/18/2018     Peripheral vascular disease (H)      Tobacco abuse      Type 2 diabetes mellitus with hyperglycemia, without long-term current use of insulin (H)         Past Surgical History:    Past Surgical History:   Procedure Laterality Date     ABDOMEN SURGERY  1997    bladder repair     CEREBRAL ANGIOGRAM  2018     GYN SURGERY  1974    hysterectomy      PHACOEMULSIFICATION WITH STANDARD INTRAOCULAR LENS IMPLANT Left 2021    Procedure: PHACOEMULSIFICATION, CATARACT, WITH STANDARD INTRAOCULAR LENS IMPLANT INSERTION;  Surgeon: Leo Hernandes MD;  Location: [facility]     PHACOEMULSIFICATION WITH STANDARD INTRAOCULAR LENS IMPLANT Right 2022    Procedure: RIGHT EYE CATARACT EXTRACTION WITH INTRAOCULAR LENS IMPLANT;  Surgeon: Leo Hernandes MD;  Location: [facility] OR       Family History:    Family History   Problem Relation Age of Onset     Aneurysm Mother 44     Other Cancer Father      Other Cancer Sister 59        female     Aneurysm Sister 72     Aneurysm Sister 23     Diabetes Brother      Hypertension Brother      Other Cancer Brother         unknown     Sudden Infant Death Syndrome Brother         influenza     Family History Negative Brother      Family History Negative Brother      Family History Negative Brother      Family History Negative Brother      Family History Negative Brother      Unknown/Adopted Maternal Grandmother      Unknown/Adopted Maternal Grandfather      Unknown/Adopted Paternal Grandmother      Unknown/Adopted Paternal Grandfather        Social History:  Marital Status:   [4]  Social History     Tobacco Use     Smoking status: Current Some Day Smoker     Types: Cigarettes     Start date: 1956     Last attempt to quit: 2017     Years since quittin.6     Smokeless tobacco: Never Used     Tobacco comment: limits self to about 4-5 daily , she is quitting on her own   Vaping Use     Vaping Use: Never used   Substance Use Topics     Alcohol use: No     Drug use: No        Medications:    albuterol (PROAIR HFA/PROVENTIL HFA/VENTOLIN HFA) 108 (90 Base) MCG/ACT inhaler  albuterol (VENTOLIN HFA) 108 (90 Base) MCG/ACT  inhaler  aspirin 325 MG tablet  atorvastatin (LIPITOR) 80 MG tablet  blood glucose monitoring (NO BRAND SPECIFIED) meter device kit  blood glucose monitoring (ONE TOUCH DELICA) lancets  cetirizine (ZYRTEC) 10 MG tablet  clopidogrel (PLAVIX) 75 MG tablet  ketorolac (ACULAR) 0.5 % ophthalmic solution  lisinopril (ZESTRIL) 10 MG tablet  metFORMIN (GLUCOPHAGE-XR) 500 MG 24 hr tablet  metoprolol tartrate (LOPRESSOR) 50 MG tablet  ofloxacin (OCUFLOX) 0.3 % ophthalmic solution  ONETOUCH VERIO IQ test strip  order for DME  order for DME  pantoprazole (PROTONIX) 40 MG EC tablet  prednisoLONE acetate (PRED FORTE) 1 % ophthalmic suspension          Review of Systems   Constitutional: Negative for fever.   HENT: Positive for facial swelling. Negative for congestion, drooling, sinus pressure, sore throat, trouble swallowing and voice change.    Eyes: Negative for redness.   Respiratory: Negative for cough, choking, shortness of breath and wheezing.    Cardiovascular: Negative for chest pain.   Gastrointestinal: Negative for abdominal pain.   Genitourinary: Negative for difficulty urinating.   Musculoskeletal: Negative for arthralgias and neck stiffness.   Skin: Negative for color change.   Neurological: Negative for headaches.   Psychiatric/Behavioral: Negative for confusion.       Physical Exam   BP: 150/67  Pulse: 65  Temp: 97  F (36.1  C)  Resp: 16  Weight: 111.1 kg (245 lb)  SpO2: 97 %      Physical Exam  Vitals and nursing note reviewed.   Constitutional:       Appearance: She is well-developed.   HENT:      Head: Normocephalic and atraumatic.      Mouth/Throat:      Pharynx: No oropharyngeal exudate.   Eyes:      Conjunctiva/sclera: Conjunctivae normal.      Pupils: Pupils are equal, round, and reactive to light.   Neck:      Thyroid: No thyromegaly.      Vascular: No JVD.      Trachea: No tracheal deviation.   Cardiovascular:      Rate and Rhythm: Normal rate and regular rhythm.      Heart sounds: Normal heart sounds. No  murmur heard.  No friction rub. No gallop.    Pulmonary:      Effort: Pulmonary effort is normal. No respiratory distress.      Breath sounds: Normal breath sounds. No stridor. No wheezing or rales.   Chest:      Chest wall: No tenderness.   Abdominal:      General: Bowel sounds are normal. There is no distension.      Palpations: Abdomen is soft. There is no mass.      Tenderness: There is no abdominal tenderness. There is no guarding or rebound.   Musculoskeletal:         General: No tenderness. Normal range of motion.      Cervical back: Normal range of motion and neck supple.   Lymphadenopathy:      Cervical: No cervical adenopathy.   Skin:     General: Skin is warm and dry.      Coloration: Skin is not pale.      Findings: No erythema or rash.   Neurological:      Mental Status: She is alert and oriented to person, place, and time.   Psychiatric:         Behavior: Behavior normal.         ED Course                 Procedures                  No results found for this or any previous visit (from the past 24 hour(s)).    Medications   predniSONE (DELTASONE) tablet 20 mg (20 mg Oral Given 1/9/22 0024)       Assessments & Plan (with Medical Decision Making)   Mild angioedema , likely related antibiotic or Toradol eye drop  Also pt on lisinopril  20 mg prednisone given , I advised hold the above drops and also lisinpril for now, call her eye doctor on Monday for continuation of drop    She and her daughter understood and agreed  I have reviewed the nursing notes.    I have reviewed the findings, diagnosis, plan and need for follow up with the patient.      Discharge Medication List as of 1/9/2022 12:26 AM          Final diagnoses:   Allergic reaction, initial encounter   Angioedema, initial encounter       1/8/2022   [facility]     Ollie Dela Cruz MD  01/11/22 0008

## 2022-01-24 ENCOUNTER — TELEPHONE (OUTPATIENT)
Dept: INTERNAL MEDICINE | Facility: OTHER | Age: 77
End: 2022-01-24
Payer: MEDICARE

## 2022-01-24 DIAGNOSIS — I67.1 CEREBRAL ANEURYSM: Primary | ICD-10-CM

## 2022-01-24 NOTE — TELEPHONE ENCOUNTER
Received a voicemail from daughter Galina. Patient will be due for her Brain MRA in March. Requesting order to be placed  Order pending   CHERELLE JACKSON LPN

## 2022-01-25 ENCOUNTER — HOSPITAL ENCOUNTER (OUTPATIENT)
Dept: MRI IMAGING | Facility: HOSPITAL | Age: 77
Discharge: HOME | End: 2022-01-25
Attending: INTERNAL MEDICINE | Admitting: INTERNAL MEDICINE
Payer: MEDICARE

## 2022-01-25 DIAGNOSIS — I67.1 CEREBRAL ANEURYSM: ICD-10-CM

## 2022-01-25 PROCEDURE — 70544 MR ANGIOGRAPHY HEAD W/O DYE: CPT | Mod: ME

## 2022-02-02 ENCOUNTER — NURSE TRIAGE (OUTPATIENT)
Dept: INTERNAL MEDICINE | Facility: OTHER | Age: 77
End: 2022-02-02

## 2022-02-02 ENCOUNTER — HOSPITAL ENCOUNTER (EMERGENCY)
Facility: HOSPITAL | Age: 77
Discharge: HOME | End: 2022-02-02
Attending: NURSE PRACTITIONER | Admitting: NURSE PRACTITIONER
Payer: MEDICARE

## 2022-02-02 ENCOUNTER — APPOINTMENT (OUTPATIENT)
Dept: ULTRASOUND IMAGING | Facility: HOSPITAL | Age: 77
End: 2022-02-02
Attending: NURSE PRACTITIONER
Payer: MEDICARE

## 2022-02-02 VITALS
SYSTOLIC BLOOD PRESSURE: 180 MMHG | HEART RATE: 86 BPM | OXYGEN SATURATION: 97 % | RESPIRATION RATE: 16 BRPM | DIASTOLIC BLOOD PRESSURE: 73 MMHG | TEMPERATURE: 97.9 F

## 2022-02-02 DIAGNOSIS — L03.116 CELLULITIS OF LEFT FOOT: ICD-10-CM

## 2022-02-02 DIAGNOSIS — D23.30 DERMOID CYST OF FACE: ICD-10-CM

## 2022-02-02 LAB
BASOPHILS # BLD AUTO: 0 10E3/UL (ref 0–0.2)
BASOPHILS NFR BLD AUTO: 0 %
CRP SERPL-MCNC: 3.2 MG/L (ref 0–8)
EOSINOPHIL # BLD AUTO: 0.2 10E3/UL (ref 0–0.7)
EOSINOPHIL NFR BLD AUTO: 3 %
ERYTHROCYTE [DISTWIDTH] IN BLOOD BY AUTOMATED COUNT: 14.8 % (ref 10–15)
HCT VFR BLD AUTO: 39.1 % (ref 35–47)
HGB BLD-MCNC: 11.9 G/DL (ref 11.7–15.7)
IMM GRANULOCYTES # BLD: 0 10E3/UL
IMM GRANULOCYTES NFR BLD: 0 %
LYMPHOCYTES # BLD AUTO: 1 10E3/UL (ref 0.8–5.3)
LYMPHOCYTES NFR BLD AUTO: 14 %
MCH RBC QN AUTO: 25.2 PG (ref 26.5–33)
MCHC RBC AUTO-ENTMCNC: 30.4 G/DL (ref 31.5–36.5)
MCV RBC AUTO: 83 FL (ref 78–100)
MONOCYTES # BLD AUTO: 0.5 10E3/UL (ref 0–1.3)
MONOCYTES NFR BLD AUTO: 7 %
NEUTROPHILS # BLD AUTO: 5.3 10E3/UL (ref 1.6–8.3)
NEUTROPHILS NFR BLD AUTO: 76 %
NRBC # BLD AUTO: 0 10E3/UL
NRBC BLD AUTO-RTO: 0 /100
PLATELET # BLD AUTO: 292 10E3/UL (ref 150–450)
RBC # BLD AUTO: 4.72 10E6/UL (ref 3.8–5.2)
WBC # BLD AUTO: 7 10E3/UL (ref 4–11)

## 2022-02-02 PROCEDURE — 93971 EXTREMITY STUDY: CPT | Mod: LT

## 2022-02-02 PROCEDURE — 85025 COMPLETE CBC W/AUTO DIFF WBC: CPT | Performed by: NURSE PRACTITIONER

## 2022-02-02 PROCEDURE — G0463 HOSPITAL OUTPT CLINIC VISIT: HCPCS | Mod: 25

## 2022-02-02 PROCEDURE — 99213 OFFICE O/P EST LOW 20 MIN: CPT | Performed by: NURSE PRACTITIONER

## 2022-02-02 PROCEDURE — 86140 C-REACTIVE PROTEIN: CPT | Performed by: NURSE PRACTITIONER

## 2022-02-02 PROCEDURE — 36415 COLL VENOUS BLD VENIPUNCTURE: CPT | Performed by: NURSE PRACTITIONER

## 2022-02-02 RX ORDER — SULFAMETHOXAZOLE/TRIMETHOPRIM 800-160 MG
1 TABLET ORAL 2 TIMES DAILY
Qty: 14 TABLET | Refills: 0 | Status: SHIPPED | OUTPATIENT
Start: 2022-02-02 | End: 2022-03-04

## 2022-02-02 ASSESSMENT — ENCOUNTER SYMPTOMS
VOMITING: 0
RESPIRATORY NEGATIVE: 1
DIZZINESS: 0
NAUSEA: 0
HEADACHES: 0
CHILLS: 0
FATIGUE: 0
EYES NEGATIVE: 1
FEVER: 0
SPEECH DIFFICULTY: 0

## 2022-02-02 NOTE — ED PROVIDER NOTES
History     Chief Complaint   Patient presents with     Leg Swelling     leg     HPI  Rosalie JANE Friend is a 76 year old female who is here with c/o left foot/leg swelling. Leg has been swollen x 4 days with no prior history. Denies history of CHF. Denies injury to leg, pain, fevers. Has never had this happen in the past.   History of aneurysm. Had an MRA last week and was told it was normal.   Does have left-sided hemiparesis due to this, with no reported changes.  Denies fever, nausea, vomiting.  Does not report any change in physical activity.    Also c/o of a lump over her left temple. Just noticed it today. No pain at area. Denies injury to area. Vision recently improved after cataract surgery in the last month, denies any recent change, denies headache, nausea, vomiting, new onset of numbness/tingling in extremities, change in speech/mentation.         Allergies:  Allergies   Allergen Reactions     Azithromycin Swelling     Face swelling     Pcn [Penicillins]        Problem List:    Patient Active Problem List    Diagnosis Date Noted     History of CVA (cerebrovascular accident) 03/02/2019     Priority: High     5/6/17 sudden onset right vision loss and left side weakness, which resolved by the time of arrival to ED in [place]. CT of head and CT angiogram showed R ICA occlusion. L distal ICA, and R MCA 8mm 6mm aneurysms. Neurology was consulted and pt was transferred to [facility] for further management.  mg and lipitor 80 mg was started. Pt was found elevated HgA1c at 10. Imaging showed R temporal lobe infarct on CT and Angio showed R GOPI occlusion with no intracranial occlusion and L ICA and R MCA aneurysms. Strong family hx of ruptured aneurysms.  Seen at [facility] for an incidental finding of bilat ICA aneurysms. She had diagnostic cerebral angiogram with endovascular coil embolization of the larger left ICA aneurysm. The right ICA aneurysm will be monitored with serial imaging.     2/15/2018  acute onset weakness of her left upper extremity. MR angiogram of the head and neck and MR of the brain, which revealed an acute lacunar infarct in the right centrum semiovale region in an area of old ischemia. Multiple atherosclerotic lesions with complete occlusion of her right internal carotid and other more distal lesions as well. (clopidogrel was added)       Hyperlipidemia LDL goal <100 11/30/2021     Priority: Medium     Type 2 diabetes mellitus with hyperglycemia, without long-term current use of insulin (H) 11/30/2021     Priority: Medium     resolved       Hyperlipidemia 03/02/2019     Priority: Medium     Cerebral aneurysm 03/02/2019     Priority: Medium     Cerebral aneurysm without rupture, left ICA, s/p endovascular coiling 05/2017       PVD (peripheral vascular disease) (H) 03/02/2019     Priority: Medium     ankle-brachial index 2018 1.02 on the right and 0.52 on the left, consistent with moderate ischemia of the left lower extremity.        Emphysema lung (H) 03/02/2019     Priority: Medium     PFTS 5/2018 - FEV1- 2.13 (74%), ratio 0.67, 20% change with bronchodilators,  TLC 99%, %, DLCO 60%        Obesity (BMI 35.0-39.9) with comorbidity (H) 01/03/2019     Priority: Medium     Benign essential hypertension 04/12/2018     Priority: Medium     Type 2 diabetes mellitus with neurologic complication, without long-term current use of insulin (H) 04/12/2018     Priority: Medium     Nonruptured cerebral aneurysm 02/18/2018     Priority: Medium     s/p cerebral angiogram, [facility]       ACP (advance care planning) 05/09/2017     Priority: Medium     Advance Care Planning 5/9/2017: ACP Review of Chart / Resources Provided:  Reviewed chart for advance care plan.  Rosalie JANE Friend has been provided information and resources to begin or update their advance care plan.  Added by Kalee Busby             Gastroesophageal reflux disease without esophagitis 03/02/2019     Priority: Low         Past Medical History:    Past Medical History:   Diagnosis Date     Cerebral infarction (H) 05/06/2017     Concussion with loss of consciousness of 30 minutes or less 1950     Emphysema lung (H)      Gastroesophageal reflux disease without esophagitis      Hyperlipidemia LDL goal <100      Left hemiparesis (H) 2017     Morbid obesity (H)      Nonruptured cerebral aneurysm 02/18/2018     Peripheral vascular disease (H)      Tobacco abuse      Type 2 diabetes mellitus with hyperglycemia, without long-term current use of insulin (H)        Past Surgical History:    Past Surgical History:   Procedure Laterality Date     ABDOMEN SURGERY  04/1997    bladder repair     CEREBRAL ANGIOGRAM  2018     GYN SURGERY  04/1974    hysterectomy      PHACOEMULSIFICATION WITH STANDARD INTRAOCULAR LENS IMPLANT Left 12/2/2021    Procedure: PHACOEMULSIFICATION, CATARACT, WITH STANDARD INTRAOCULAR LENS IMPLANT INSERTION;  Surgeon: Leo Hernandes MD;  Location: [facility]     PHACOEMULSIFICATION WITH STANDARD INTRAOCULAR LENS IMPLANT Right 1/6/2022    Procedure: RIGHT EYE CATARACT EXTRACTION WITH INTRAOCULAR LENS IMPLANT;  Surgeon: Leo Hernandes MD;  Location: [facility] OR       Family History:    Family History   Problem Relation Age of Onset     Aneurysm Mother 44     Other Cancer Father      Other Cancer Sister 59        female     Aneurysm Sister 72     Aneurysm Sister 23     Diabetes Brother      Hypertension Brother      Other Cancer Brother         unknown     Sudden Infant Death Syndrome Brother         influenza     Family History Negative Brother      Family History Negative Brother      Family History Negative Brother      Family History Negative Brother      Family History Negative Brother      Unknown/Adopted Maternal Grandmother      Unknown/Adopted Maternal Grandfather      Unknown/Adopted Paternal Grandmother      Unknown/Adopted Paternal Grandfather        Social History:  Marital Status:   [4]  Social History      Tobacco Use     Smoking status: Current Some Day Smoker     Types: Cigarettes     Start date: 1956     Last attempt to quit: 2017     Years since quittin.7     Smokeless tobacco: Never Used     Tobacco comment: limits self to about 4-5 daily , she is quitting on her own   Vaping Use     Vaping Use: Never used   Substance Use Topics     Alcohol use: No     Drug use: No        Medications:    albuterol (PROAIR HFA/PROVENTIL HFA/VENTOLIN HFA) 108 (90 Base) MCG/ACT inhaler  albuterol (VENTOLIN HFA) 108 (90 Base) MCG/ACT inhaler  aspirin 325 MG tablet  atorvastatin (LIPITOR) 80 MG tablet  blood glucose monitoring (NO BRAND SPECIFIED) meter device kit  blood glucose monitoring (ONE TOUCH DELICA) lancets  clopidogrel (PLAVIX) 75 MG tablet  lisinopril (ZESTRIL) 10 MG tablet  metFORMIN (GLUCOPHAGE-XR) 500 MG 24 hr tablet  metoprolol tartrate (LOPRESSOR) 50 MG tablet  ONETOUCH VERIO IQ test strip  order for DME  order for DME  pantoprazole (PROTONIX) 40 MG EC tablet  sulfamethoxazole-trimethoprim (BACTRIM DS) 800-160 MG tablet          Review of Systems   Constitutional: Negative for chills, fatigue and fever.   HENT: Negative.    Eyes: Negative.    Respiratory: Negative.    Cardiovascular: Positive for chest pain.   Gastrointestinal: Negative for nausea and vomiting.   Musculoskeletal:        Swelling left lower leg x 4 days.    Skin:        Bump left temple area.    Neurological: Negative for dizziness, speech difficulty and headaches.        No vision changes, new onset of numbness/weakness.        Physical Exam   BP: 180/73  Pulse: 86  Temp: 97.9  F (36.6  C)  Resp: 16  SpO2: 97 %      Physical Exam  Vitals and nursing note reviewed.   Constitutional:       General: She is not in acute distress.     Appearance: She is obese.   HENT:      Head: Normocephalic and atraumatic.        Comments: Area marked has a small, soft, movable dermal cyst like lump.  There is no erythema, pain, warmth.  No central  opening.  Eyes:      Extraocular Movements: Extraocular movements intact.      Conjunctiva/sclera: Conjunctivae normal.      Pupils: Pupils are equal, round, and reactive to light.   Cardiovascular:      Rate and Rhythm: Normal rate and regular rhythm.   Pulmonary:      Effort: Pulmonary effort is normal.      Breath sounds: Normal breath sounds.   Musculoskeletal:      Cervical back: Normal range of motion and neck supple. No rigidity or tenderness.      Comments: Left extremity hemiparesis.   Left lower extremity with 2-3+ pitting edema from the toes to just below the knee.  There is erythema and warmth at all toes, in which she states her toes are typically red like this in turn almost blue when she is cold.  Denies any past history of peripheral vascular disease.  There is no pain with palpation to extremity, states her sensation to my touch is within her norm.  No isolated calf pain.  CMS otherwise intact.  Right foot without swelling, her toes do appear to have a faint red color to them but cooler to touch, CMS intact.   Skin:     Capillary Refill: Capillary refill takes less than 2 seconds.   Neurological:      General: No focal deficit present.      Mental Status: She is alert and oriented to person, place, and time.   Psychiatric:         Mood and Affect: Mood normal.         Behavior: Behavior normal.         ED Course          Results for orders placed or performed during the hospital encounter of 02/02/22 (from the past 24 hour(s))   CBC with platelets differential    Narrative    The following orders were created for panel order CBC with platelets differential.  Procedure                               Abnormality         Status                     ---------                               -----------         ------                     CBC with platelets and d...[[id number]]  Abnormal            Final result                 Please view results for these tests on the individual orders.   CRP inflammation    Result Value Ref Range    CRP Inflammation 3.2 0.0 - 8.0 mg/L   CBC with platelets and differential   Result Value Ref Range    WBC Count 7.0 4.0 - 11.0 10e3/uL    RBC Count 4.72 3.80 - 5.20 10e6/uL    Hemoglobin 11.9 11.7 - 15.7 g/dL    Hematocrit 39.1 35.0 - 47.0 %    MCV 83 78 - 100 fL    MCH 25.2 (L) 26.5 - 33.0 pg    MCHC 30.4 (L) 31.5 - 36.5 g/dL    RDW 14.8 10.0 - 15.0 %    Platelet Count 292 150 - 450 10e3/uL    % Neutrophils 76 %    % Lymphocytes 14 %    % Monocytes 7 %    % Eosinophils 3 %    % Basophils 0 %    % Immature Granulocytes 0 %    NRBCs per 100 WBC 0 <1 /100    Absolute Neutrophils 5.3 1.6 - 8.3 10e3/uL    Absolute Lymphocytes 1.0 0.8 - 5.3 10e3/uL    Absolute Monocytes 0.5 0.0 - 1.3 10e3/uL    Absolute Eosinophils 0.2 0.0 - 0.7 10e3/uL    Absolute Basophils 0.0 0.0 - 0.2 10e3/uL    Absolute Immature Granulocytes 0.0 <=0.4 10e3/uL    Absolute NRBCs 0.0 10e3/uL   US Lower Extremity Venous Duplex Left    Narrative    Exam: US LOWER EXTREMITY VENOUS DUPLEX LEFT    Exam reason: 4 days of lower extremity swelling  knee to toes, r/o DVT    Comparison: None.    TECHNIQUE: Deep veins were evaluated using B-mode, color flow Doppler  and pulse wave spectral Doppler.      FINDINGS:    The common femoral, saphenofemoral junction, femoral and popliteal  veins are patent. There is no evidence of venous thrombus. These  vessels exhibited normal compressibility.    The posterior tibial and peroneal veins not well visualized.       Impression    IMPRESSION:    No evidence of acute deep venous thrombosis.         MARITZA MORENO MD         SYSTEM ID:  [id number]       Medications - No data to display    Assessments & Plan (with Medical Decision Making)     I have reviewed the nursing notes.  I have reviewed the findings, diagnosis, plan and need for follow up with the patient.    ICD-10-CM    1. Cellulitis of left foot  L03.116    2. Dermoid cyst of face  D23.30     left temple area     Newly noted lump to  left temple without any pain, drainage, known injury.  Upon assessment does appear consistent with a dermoid cyst like lesion, not infected and not with any central opening.  Discussed my findings, encouraging to monitor it, apply warm compress 3-4 times a day for 10 to 20 minutes at a time, and if it gets any worse or any new concerning symptoms, seek immediate medical care.    Her 4-day history of left lower extremity swelling is new for her.  States she has a chronic red-bluish tinge to her toes, depending on the temperature.  She does not have any pain to the area or any reported fevers.  Doppler ultrasound is negative for DVT.  CRP within normal range no white blood cell elevation.  Given the swelling, along with the redness and warmth at the distal foot, will treat as a cellulitis.  Bactrim sent in and she is directed on how to take.  Discussed seek immediate medical care if there is any new concerning or worsening symptoms, if her symptoms do not start improving within the next 24 to 48 hours.  Otherwise encouraged her to call tomorrow for clinic follow-up with her PCP.    She agrees with plan of care and verbalized understanding.  Her daughter is here and lives with her upon discharge.  Discharge Medication List as of 2/2/2022  7:13 PM      START taking these medications    Details   sulfamethoxazole-trimethoprim (BACTRIM DS) 800-160 MG tablet Take 1 tablet by mouth 2 times daily, Disp-14 tablet, R-0, [facility]             Final diagnoses:   Cellulitis of left foot       2/2/2022   [facility]     Danielle Joseph APRN CNP  02/02/22 1928

## 2022-02-02 NOTE — TELEPHONE ENCOUNTER
Pt daughter called and made an appt for a lump on patients temple. She does not know how to describe it and she found it a little while ago.Daughter is concerned because pt has a history of stroke.Daughter wondering if she needs to be seen sooner? Started to triage,daughter unable to answer and asks that writer called patient. Called pt.Pt has dime size lump on left temple that feel like a cyst.No pain. She cannot see what it looks like. Denies HA,blurred vision, weakness or numbness on one side of body.Denies SOB or chest pain. She does have new swelling to left leg and foot. She uses a WC but states she is able to bear wt on it. Per care advice should be seen in four hour or UC/ER.Advised pt will reach out to PCP, PCP out of office. Spoke with covering provider Zaina.Pt should go to ED. Advised pt to go to ED now and she verbalized understanding.She states she will call her daughter now and go.    Zeinab Summers RN      Reason for Disposition    [1] Small swelling or lump AND [2] unexplained AND [3] present < 1 week    [1] Thigh, calf, or ankle swelling AND [2] only 1 side    Additional Information    Negative: Sounds like a life-threatening emergency to the triager    Negative: Small growth, spot, bump, or pigmented area of skin (e.g., moles, skin tags, wart, melanoma, skin cancer)    Negative: Inguinal hernia previously diagnosed by a physician    Negative: Followed a skin injury    Negative: Follows an insect bite    Negative: Swelling of lymph node suspected    Negative: Swelling of vaccination site    Negative: Swelling of tongue    Negative: Swelling of lip    Negative: Swelling of eye    Negative: Swelling of entire face    Negative: Swelling of scrotum    Negative: Swelling of labia    Negative: Swelling of surgical incision    Negative: Swelling of ankle joint    Negative: Swelling of elbow joint    Negative: Swelling of knee joint    Negative: Swelling with a skin rash    Negative: Patient sounds very  sick or weak to the triager    Negative: SEVERE pain (e.g., excruciating)    Negative: [1] Swelling is painful to touch AND [2] fever    Negative: [1] Swelling is red AND [2] fever    Negative: [1] Swelling is red AND [2] size > 2 inches (5.0 cm) (Exception: itchy area of skin)    Negative: [1] Swelling of groin (inguinal area) AND [2] painful    Negative: [1] Swelling is painful to touch AND [2] no fever    Negative: Looks like a boil, infected sore, deep ulcer or other infected rash    Negative: [1] Small swelling or lump AND [2] unexplained AND [3] present > 1 week    Negative: [1] New-onset hernia suspected (reducible bulge in groin or abdomen; non-tender) AND [2] NO pain or vomiting    Negative: Severe difficulty breathing (e.g., struggling for each breath, speaks in single words)    Negative: Looks like a broken bone or dislocated joint (e.g., crooked or deformed)    Negative: Sounds like a life-threatening emergency to the triager    Negative: Chest pain    Negative: Followed a leg injury    Negative: [1] Small area of swelling AND [2] followed an insect bite to the area    Negative: Swelling of one ankle joint    Negative: Swelling of knee is main symptom    Negative: Pregnant    Negative: Postpartum (from 0 to 6 weeks after delivery)    Negative: Difficulty breathing at rest    Negative: Entire foot is cool or blue in comparison to other side    Negative: [1] Can't walk or can barely walk AND [2] new onset    Negative: [1] Difficulty breathing with exertion (e.g., walking) AND [2] new onset or worsening    Negative: [1] Red area or streak AND [2] fever    Negative: [1] Swelling is painful to touch AND [2] fever    Negative: [1] Cast on leg or ankle AND [2] now increased pain    Negative: Patient sounds very sick or weak to the triager    Negative: SEVERE leg swelling (e.g., swelling extends above knee, entire leg is swollen, weeping fluid)    Negative: [1] Red area or streak [2] large (> 2 in. or 5 cm)     "Negative: [1] Thigh or calf pain AND [2] only 1 side AND [3] present > 1 hour    Answer Assessment - Initial Assessment Questions  1. APPEARANCE of SWELLING: \"What does it look like?\" (e.g., lymph node, insect bite, mole)      Feels like a cyst about an hour ago, left temple-history of stroke, no weakness on one of body and then she states she has swelling left leg for four days  2. SIZE: \"How large is the swelling?\" (inches, cm or compare to coins)      dime  3. LOCATION: \"Where is the swelling located?\"      Under the skin  4. ONSET: \"When did the swelling start?\"        5. PAIN: \"Is it painful?\" If so, ask: \"How much?\"      no  6. ITCH: \"Does it itch?\" If so, ask: \"How much?\"     Little bit  7. CAUSE: \"What do you think caused the swelling?\"      Cant see it  8. OTHER SYMPTOMS: \"Do you have any other symptoms?\" (e.g., fever)      no    Answer Assessment - Initial Assessment Questions  1. ONSET: \"When did the swelling start?\" (e.g., minutes, hours, days)      Four days ago  2. LOCATION: \"What part of the leg is swollen?\"  \"Are both legs swollen or just one leg?\"      Left leg  3. SEVERITY: \"How bad is the swelling?\" (e.g., localized; mild, moderate, severe)   - Localized - small area of swelling localized to one leg   - MILD pedal edema - swelling limited to foot and ankle, pitting edema < 1/4 inch (6 mm) deep, rest and elevation eliminate most or all swelling   - MODERATE edema - swelling of lower leg to knee, pitting edema > 1/4 inch (6 mm) deep, rest and elevation only partially reduce swelling   - SEVERE edema - swelling extends above knee, facial or hand swelling present       Moderate below knee and foot  4. REDNESS: \"Does the swelling look red or infected?\"      no  5. PAIN: \"Is the swelling painful to touch?\" If so, ask: \"How painful is it?\"   (Scale 1-10; mild, moderate or severe)      no  6. FEVER: \"Do you have a fever?\" If so, ask: \"What is it, how was it measured, and when did it start?\"       no  7. " "CAUSE: \"What do you think is causing the leg swelling?\"      havent a clue  8. MEDICAL HISTORY: \"Do you have a history of heart failure, kidney disease, liver failure, or cancer?\"      no  9. RECURRENT SYMPTOM: \"Have you had leg swelling before?\" If so, ask: \"When was the last time?\" \"What happened that time?\"      no  10. OTHER SYMPTOMS: \"Do you have any other symptoms?\" (e.g., chest pain, difficulty breathing)     COPD  11. PREGNANCY: \"Is there any chance you are pregnant?\" \"When was your last menstrual period?\"        na    Protocols used: SKIN LUMP OR LOCALIZED SWELLING-A-AH, LEG SWELLING AND EDEMA-A-AH      "

## 2022-02-02 NOTE — ED TRIAGE NOTES
Patient presents to urgent care for swelling in her left foot/leg and not going down. Patient called her PCP and she was told to go to ER because she has history of strokes and aneurysms. Patient also found a small lump on her left temporal.

## 2022-02-02 NOTE — ED TRIAGE NOTES
Patients feet are swelling and not going down. Has a small lump on the left temporal area. Has been happening over the last 4 days

## 2022-02-03 NOTE — DISCHARGE INSTRUCTIONS
Start antibiotics tonight and complete as directed.  Eat daily yogurt and/or probiotic to prevent any stomach upset from antibiotic use.  Increase water intake.  Elevate affected extremity every few hours above level of heart to help with the swelling.    Seek immediate medical care if there is any new or worsening symptoms, or if you do not start to notice improvement in the next 24 to 48 hours.

## 2022-02-13 ENCOUNTER — HOSPITAL ENCOUNTER (EMERGENCY)
Facility: HOSPITAL | Age: 77
Discharge: HOME | End: 2022-02-13
Attending: EMERGENCY MEDICINE | Admitting: EMERGENCY MEDICINE
Payer: MEDICARE

## 2022-02-13 VITALS
RESPIRATION RATE: 20 BRPM | TEMPERATURE: 97.6 F | OXYGEN SATURATION: 96 % | SYSTOLIC BLOOD PRESSURE: 198 MMHG | WEIGHT: 250 LBS | BODY MASS INDEX: 40.35 KG/M2 | HEART RATE: 70 BPM | DIASTOLIC BLOOD PRESSURE: 84 MMHG

## 2022-02-13 DIAGNOSIS — H57.89 EYE SWELLING, RIGHT: ICD-10-CM

## 2022-02-13 DIAGNOSIS — T78.40XA ALLERGIC REACTION, INITIAL ENCOUNTER: ICD-10-CM

## 2022-02-13 LAB
ANION GAP SERPL CALCULATED.3IONS-SCNC: 4 MMOL/L (ref 3–14)
BASOPHILS # BLD AUTO: 0.1 10E3/UL (ref 0–0.2)
BASOPHILS NFR BLD AUTO: 1 %
BUN SERPL-MCNC: 27 MG/DL (ref 7–30)
CALCIUM SERPL-MCNC: 9.7 MG/DL (ref 8.5–10.1)
CHLORIDE BLD-SCNC: 107 MMOL/L (ref 94–109)
CO2 SERPL-SCNC: 26 MMOL/L (ref 20–32)
CREAT SERPL-MCNC: 1.28 MG/DL (ref 0.52–1.04)
EOSINOPHIL # BLD AUTO: 0.2 10E3/UL (ref 0–0.7)
EOSINOPHIL NFR BLD AUTO: 4 %
ERYTHROCYTE [DISTWIDTH] IN BLOOD BY AUTOMATED COUNT: 14.6 % (ref 10–15)
GFR SERPL CREATININE-BSD FRML MDRD: 43 ML/MIN/1.73M2
GLUCOSE BLD-MCNC: 130 MG/DL (ref 70–99)
HCT VFR BLD AUTO: 40.5 % (ref 35–47)
HGB BLD-MCNC: 12.3 G/DL (ref 11.7–15.7)
IMM GRANULOCYTES # BLD: 0 10E3/UL
IMM GRANULOCYTES NFR BLD: 0 %
LYMPHOCYTES # BLD AUTO: 1.5 10E3/UL (ref 0.8–5.3)
LYMPHOCYTES NFR BLD AUTO: 24 %
MCH RBC QN AUTO: 25.3 PG (ref 26.5–33)
MCHC RBC AUTO-ENTMCNC: 30.4 G/DL (ref 31.5–36.5)
MCV RBC AUTO: 83 FL (ref 78–100)
MONOCYTES # BLD AUTO: 0.5 10E3/UL (ref 0–1.3)
MONOCYTES NFR BLD AUTO: 8 %
NEUTROPHILS # BLD AUTO: 3.8 10E3/UL (ref 1.6–8.3)
NEUTROPHILS NFR BLD AUTO: 63 %
NRBC # BLD AUTO: 0 10E3/UL
NRBC BLD AUTO-RTO: 0 /100
PLATELET # BLD AUTO: 326 10E3/UL (ref 150–450)
POTASSIUM BLD-SCNC: 5.4 MMOL/L (ref 3.4–5.3)
RBC # BLD AUTO: 4.87 10E6/UL (ref 3.8–5.2)
SODIUM SERPL-SCNC: 137 MMOL/L (ref 133–144)
WBC # BLD AUTO: 6.1 10E3/UL (ref 4–11)

## 2022-02-13 PROCEDURE — 99283 EMERGENCY DEPT VISIT LOW MDM: CPT | Performed by: EMERGENCY MEDICINE

## 2022-02-13 PROCEDURE — 36415 COLL VENOUS BLD VENIPUNCTURE: CPT | Performed by: EMERGENCY MEDICINE

## 2022-02-13 PROCEDURE — 80048 BASIC METABOLIC PNL TOTAL CA: CPT | Performed by: EMERGENCY MEDICINE

## 2022-02-13 PROCEDURE — 250N000009 HC RX 250: Performed by: EMERGENCY MEDICINE

## 2022-02-13 PROCEDURE — 85025 COMPLETE CBC W/AUTO DIFF WBC: CPT | Performed by: EMERGENCY MEDICINE

## 2022-02-13 PROCEDURE — 250N000011 HC RX IP 250 OP 636: Performed by: EMERGENCY MEDICINE

## 2022-02-13 PROCEDURE — 96374 THER/PROPH/DIAG INJ IV PUSH: CPT

## 2022-02-13 PROCEDURE — 96375 TX/PRO/DX INJ NEW DRUG ADDON: CPT

## 2022-02-13 PROCEDURE — 99284 EMERGENCY DEPT VISIT MOD MDM: CPT | Mod: 25

## 2022-02-13 RX ORDER — DIPHENHYDRAMINE HYDROCHLORIDE 50 MG/ML
50 INJECTION INTRAMUSCULAR; INTRAVENOUS ONCE
Status: COMPLETED | OUTPATIENT
Start: 2022-02-13 | End: 2022-02-13

## 2022-02-13 RX ORDER — DEXAMETHASONE SODIUM PHOSPHATE 10 MG/ML
10 INJECTION, SOLUTION INTRAMUSCULAR; INTRAVENOUS ONCE
Status: COMPLETED | OUTPATIENT
Start: 2022-02-13 | End: 2022-02-13

## 2022-02-13 RX ORDER — PREDNISONE 10 MG/1
40 TABLET ORAL DAILY
Qty: 30 TABLET | Refills: 0 | Status: SHIPPED | OUTPATIENT
Start: 2022-02-13 | End: 2022-02-18

## 2022-02-13 RX ADMIN — DIPHENHYDRAMINE HYDROCHLORIDE 50 MG: 50 INJECTION INTRAMUSCULAR; INTRAVENOUS at 04:10

## 2022-02-13 RX ADMIN — DEXAMETHASONE SODIUM PHOSPHATE 10 MG: 10 INJECTION, SOLUTION INTRAMUSCULAR; INTRAVENOUS at 04:09

## 2022-02-13 RX ADMIN — FAMOTIDINE 20 MG: 10 INJECTION, SOLUTION INTRAVENOUS at 04:09

## 2022-02-13 NOTE — ED TRIAGE NOTES
Pt presents to the ED with complaints of right eye swelling.  She reports that she went to bed around 9 pm and she woke up about an hour ago with the swollen eye.  Pt states she hasn't taken any new medications and denies hx.  Pt finish bactrim 2 days ago. Pt denies vision changes and states she can see when she's able to get her eye open far enough.

## 2022-02-15 ENCOUNTER — TELEPHONE (OUTPATIENT)
Dept: INTERNAL MEDICINE | Facility: OTHER | Age: 77
End: 2022-02-15
Payer: MEDICARE

## 2022-02-15 DIAGNOSIS — Z86.73 HISTORY OF CVA (CEREBROVASCULAR ACCIDENT): Primary | ICD-10-CM

## 2022-02-15 NOTE — TELEPHONE ENCOUNTER
Received a voicemail from Daughter Galina. Patient would like referral to [facility].     CHERELLE JACKSON LPN

## 2022-02-15 NOTE — TELEPHONE ENCOUNTER
signed   80 year old female with PMH HTN, DM2, dyslipidemia presented to ED with abdominal pain and found to have a colonic stricture resolved spontanously.  Colonoscopy negative.

## 2022-02-16 ENCOUNTER — NURSE TRIAGE (OUTPATIENT)
Dept: INTERNAL MEDICINE | Facility: OTHER | Age: 77
End: 2022-02-16
Payer: MEDICARE

## 2022-02-16 NOTE — TELEPHONE ENCOUNTER
That's fine -- doesn't need physical though -- insurance won't cover it  Just fatigue for symptom is fine

## 2022-02-16 NOTE — TELEPHONE ENCOUNTER
Called and scheduled 3.4.2022.Confirmed no new worsening fatigue.Same fatigue for years. Nothing has worsened.    Advised if s/s worsen go to ED.She verbalized understanding.    Zeinab Summers RN

## 2022-02-16 NOTE — TELEPHONE ENCOUNTER
"Daughter Renetta calling and wants a physical for pt with . She is always tired and complains of fatigue. No new weakness per daughter.Pt is tired of being tired and daughter would like this checked to see what is going on. She just wants her to have a complete physical.This has been going on for years.    She states  has seen pt in the past. Per care advice should be seen in two weeks.    Please advise. Provider has openings 3.4.2022.    OK to schedule physical with you then?     Advised if s/s worsen go to ED.She verbalized understanding    Call back daughter at [phone]      Zeinab Summers RN    Reason for Disposition    Fatigue is a chronic symptom (recurrent or ongoing AND present > 4 weeks)    Additional Information    Negative: Severe difficulty breathing (e.g., struggling for each breath, speaks in single words)    Negative: Shock suspected (e.g., cold/pale/clammy skin, too weak to stand, low BP, rapid pulse)    Negative: Difficult to awaken or acting confused (e.g., disoriented, slurred speech)    Negative: [1] Fainted > 15 minutes ago AND [2] still feels too weak or dizzy to stand    Negative: [1] SEVERE weakness (i.e., unable to walk or barely able to walk, requires support) AND [2] new onset or worsening    Negative: Sounds like a life-threatening emergency to the triager    Negative: Weakness of the face, arm or leg on one side of the body    Negative: [1] Has diabetes (diabetes mellitus) AND [2] weakness from low blood sugar (i.e., < 60 mg/dl or 3.5 mmol/l)    Negative: Heat exhaustion suspected (i.e., dehydration from heat exposure)    Negative: Chest pain    Negative: Vomiting is main symptom    Negative: Diarrhea is main symptom    Negative: Difficulty breathing    Negative: Heart beating < 50 beats per minute OR > 140 beats per minute    Negative: Extra heart beats OR irregular heart beating   (i.e., \"palpitations\")    Negative: Follows bleeding (e.g., from vomiting, rectum, " "vagina; Exception: small brief weakness from sight of a small amount blood)    Negative: Black or tarry bowel movements    Negative: [1] Drinking very little AND [2] dehydration suspected (e.g., no urine > 12 hours, very dry mouth, very lightheaded)    Negative: Patient sounds very sick or weak to the triager    Negative: [1] MODERATE weakness (i.e., interferes with work, school, normal activities) AND [2] cause unknown  (Exceptions: weakness with acute minor illness, or weakness from poor fluid intake)    Negative: [1] MODERATE weakness AND [2] from poor fluid intake AND [3] no improvement after 2 hours of rest and fluids    Negative: [1] Fever > 103 F (39.4 C) AND [2] not able to get the fever down using Fever Care Advice    Negative: [1] Fever > 101 F (38.3 C) AND [2] age > 60    Negative: [1] Fever > 100.0 F (37.8 C) AND [2] bedridden (e.g., nursing home patient, CVA, chronic illness, recovering from surgery)    Negative: [1] Fever > 100.0 F (37.8 C) AND [2] diabetes mellitus or weak immune system (e.g., HIV positive, cancer chemo, splenectomy, organ transplant, chronic steroids)    Negative: Pale skin (pallor)    Negative: [1] MODERATE weakness (i.e., interferes with work, school, normal activities) AND [2] persists > 3 days    Negative: Taking a medicine that could cause weakness (e.g., blood pressure medications, diuretics)    Negative: [1] MILD weakness (i.e., does not interfere with ability to work, go to school, normal activities) AND [2] persists > 1 week    Negative: [1] Fatigue (i.e., tires easily, decreased energy) AND [2] persists > 1 week    Negative: Weakness is a chronic symptom (recurrent or ongoing AND present > 4 weeks)    Answer Assessment - Initial Assessment Questions  1. DESCRIPTION: \"Describe how you are feeling.\"      fatigue  2. SEVERITY: \"How bad is it?\"  \"Can you stand and walk?\"    - MILD - Feels weak or tired, but does not interfere with work, school or normal activities    - MODERATE " "- Able to stand and walk; weakness interferes with work, school, or normal activities    - SEVERE - Unable to stand or walk      In a WC can still do her activites  3. ONSET:  \"When did the weakness begin?\"      Couple years-no increased weakness  4. CAUSE: \"What do you think is causing the weakness?\"      na  5. MEDICINES: \"Have you recently started a new medicine or had a change in the amount of a medicine?\"      no  6. OTHER SYMPTOMS: \"Do you have any other symptoms?\" (e.g., chest pain, fever, cough, SOB, vomiting, diarrhea, bleeding, other areas of pain)      no  7. PREGNANCY: \"Is there any chance you are pregnant?\" \"When was your last menstrual period?\"      na    Protocols used: WEAKNESS (GENERALIZED) AND FATIGUE-A-AH      "

## 2022-02-18 ASSESSMENT — ENCOUNTER SYMPTOMS
COUGH: 0
FEVER: 0
SHORTNESS OF BREATH: 0
CHILLS: 0

## 2022-02-18 NOTE — ED PROVIDER NOTES
History     Chief Complaint   Patient presents with     Facial Swelling     HPI  Rosalie JANE Friend is a 76 year old female who is here with right-sided facial swelling. Primarily to her right orbit and eyelid. Cannot identify any new medications foods lotions perfumes etc. Difficulty fully opening her right eye. Does not recall getting stung or bitten by any animals or insects. No history of similar in the past. No difficulty breathing, no nausea no vomiting no diarrhea no rash.    Allergies:  Allergies   Allergen Reactions     Azithromycin Swelling     Face swelling     Pcn [Penicillins]        Problem List:    Patient Active Problem List    Diagnosis Date Noted     History of CVA (cerebrovascular accident) 03/02/2019     Priority: High     5/6/17 sudden onset right vision loss and left side weakness, which resolved by the time of arrival to ED in [place]. CT of head and CT angiogram showed R ICA occlusion. L distal ICA, and R MCA 8mm 6mm aneurysms. Neurology was consulted and pt was [facility] to [facility] for further management.  mg and lipitor 80 mg was started. Pt was found elevated HgA1c at 10. Imaging showed R temporal lobe infarct on CT and Angio showed R GOPI occlusion with no intracranial occlusion and L ICA and R MCA aneurysms. Strong family hx of ruptured aneurysms.  Seen at [facility] for an incidental finding of bilat ICA aneurysms. She had diagnostic cerebral angiogram with endovascular coil embolization of the larger left ICA aneurysm. The right ICA aneurysm will be monitored with serial imaging.     2/15/2018 acute onset weakness of her left upper extremity. MR angiogram of the head and neck and MR of the brain, which revealed an acute lacunar infarct in the right centrum semiovale region in an area of old ischemia. Multiple atherosclerotic lesions with complete occlusion of her right internal carotid and other more distal lesions as well. (clopidogrel was added)       Hyperlipidemia LDL  goal <100 11/30/2021     Priority: Medium     Type 2 diabetes mellitus with hyperglycemia, without long-term current use of insulin (H) 11/30/2021     Priority: Medium     resolved       Hyperlipidemia 03/02/2019     Priority: Medium     Cerebral aneurysm 03/02/2019     Priority: Medium     Cerebral aneurysm without rupture, left ICA, s/p endovascular coiling 05/2017       PVD (peripheral vascular disease) (H) 03/02/2019     Priority: Medium     ankle-brachial index 2018 1.02 on the right and 0.52 on the left, consistent with moderate ischemia of the left lower extremity.        Emphysema lung (H) 03/02/2019     Priority: Medium     PFTS 5/2018 - FEV1- 2.13 (74%), ratio 0.67, 20% change with bronchodilators,  TLC 99%, %, DLCO 60%        Obesity (BMI 35.0-39.9) with comorbidity (H) 01/03/2019     Priority: Medium     Benign essential hypertension 04/12/2018     Priority: Medium     Type 2 diabetes mellitus with neurologic complication, without long-term current use of insulin (H) 04/12/2018     Priority: Medium     Nonruptured cerebral aneurysm 02/18/2018     Priority: Medium     s/p cerebral angiogram, [facility]       ACP (advance care planning) 05/09/2017     Priority: Medium     Advance Care Planning 5/9/2017: ACP Review of Chart / Resources Provided:  Reviewed chart for advance care plan.  Rosalie JANE Friend has been provided information and resources to begin or update their advance care plan.  Added by Kalee Busby             Gastroesophageal reflux disease without esophagitis 03/02/2019     Priority: Low        Past Medical History:    Past Medical History:   Diagnosis Date     Cerebral infarction (H) 05/06/2017     Concussion with loss of consciousness of 30 minutes or less 1950     Emphysema lung (H)      Gastroesophageal reflux disease without esophagitis      Hyperlipidemia LDL goal <100      Left hemiparesis (H) 2017     Morbid obesity (H)      Nonruptured cerebral aneurysm 02/18/2018      Peripheral vascular disease (H)      Tobacco abuse      Type 2 diabetes mellitus with hyperglycemia, without long-term current use of insulin (H)        Past Surgical History:    Past Surgical History:   Procedure Laterality Date     ABDOMEN SURGERY  1997    bladder repair     CEREBRAL ANGIOGRAM  2018     GYN SURGERY  1974    hysterectomy      PHACOEMULSIFICATION WITH STANDARD INTRAOCULAR LENS IMPLANT Left 2021    Procedure: PHACOEMULSIFICATION, CATARACT, WITH STANDARD INTRAOCULAR LENS IMPLANT INSERTION;  Surgeon: Leo Hernandes MD;  Location: [facility] OR     PHACOEMULSIFICATION WITH STANDARD INTRAOCULAR LENS IMPLANT Right 2022    Procedure: RIGHT EYE CATARACT EXTRACTION WITH INTRAOCULAR LENS IMPLANT;  Surgeon: Leo Hernandes MD;  Location: [facility] OR       Family History:    Family History   Problem Relation Age of Onset     Aneurysm Mother 44     Other Cancer Father      Other Cancer Sister 59        female     Aneurysm Sister 72     Aneurysm Sister 23     Diabetes Brother      Hypertension Brother      Other Cancer Brother         unknown     Sudden Infant Death Syndrome Brother         influenza     Family History Negative Brother      Family History Negative Brother      Family History Negative Brother      Family History Negative Brother      Family History Negative Brother      Unknown/Adopted Maternal Grandmother      Unknown/Adopted Maternal Grandfather      Unknown/Adopted Paternal Grandmother      Unknown/Adopted Paternal Grandfather        Social History:  Marital Status:   [4]  Social History     Tobacco Use     Smoking status: Current Some Day Smoker     Types: Cigarettes     Start date: 1956     Last attempt to quit: 2017     Years since quittin.7     Smokeless tobacco: Never Used     Tobacco comment: limits self to about 4-5 daily , she is quitting on her own   Vaping Use     Vaping Use: Never used   Substance Use Topics     Alcohol use: No     Drug use: No         Medications:    albuterol (PROAIR HFA/PROVENTIL HFA/VENTOLIN HFA) 108 (90 Base) MCG/ACT inhaler  aspirin 325 MG tablet  atorvastatin (LIPITOR) 80 MG tablet  clopidogrel (PLAVIX) 75 MG tablet  lisinopril (ZESTRIL) 10 MG tablet  metFORMIN (GLUCOPHAGE-XR) 500 MG 24 hr tablet  metoprolol tartrate (LOPRESSOR) 50 MG tablet  pantoprazole (PROTONIX) 40 MG EC tablet  predniSONE (DELTASONE) 10 MG tablet  albuterol (VENTOLIN HFA) 108 (90 Base) MCG/ACT inhaler  blood glucose monitoring (NO BRAND SPECIFIED) meter device kit  blood glucose monitoring (ONE TOUCH DELICA) lancets  ONETOUCH VERIO IQ test strip  order for DME  order for DME  sulfamethoxazole-trimethoprim (BACTRIM DS) 800-160 MG tablet          Review of Systems   Constitutional: Negative for chills and fever.   Respiratory: Negative for cough and shortness of breath.    All other systems reviewed and are negative.      Physical Exam   BP: (!) 205/89  Pulse: 75  Temp: 97.6  F (36.4  C)  Resp: 24  Weight: 113.4 kg (250 lb)  SpO2: 96 %      Physical Exam  Constitutional:       General: She is not in acute distress.     Appearance: She is not diaphoretic.   HENT:      Head: Atraumatic.      Comments: Significant swelling over right orbit and right eyelid, able to open eye, EOM intact     Right Ear: External ear normal.      Left Ear: External ear normal.      Nose: No congestion or rhinorrhea.      Mouth/Throat:      Pharynx: Oropharynx is clear. No oropharyngeal exudate.   Eyes:      General: No scleral icterus.     Pupils: Pupils are equal, round, and reactive to light.   Cardiovascular:      Rate and Rhythm: Normal rate and regular rhythm.      Heart sounds: Normal heart sounds.   Pulmonary:      Effort: No respiratory distress.      Breath sounds: Normal breath sounds.   Abdominal:      General: Bowel sounds are normal.      Palpations: Abdomen is soft.      Tenderness: There is no abdominal tenderness.   Musculoskeletal:         General: No tenderness.       Cervical back: Normal range of motion and neck supple.      Right lower leg: No edema.      Left lower leg: No edema.   Skin:     General: Skin is warm.      Capillary Refill: Capillary refill takes less than 2 seconds.      Findings: No rash.   Neurological:      Mental Status: Mental status is at baseline.      Cranial Nerves: No cranial nerve deficit.   Psychiatric:         Mood and Affect: Mood normal.         Behavior: Behavior normal.         ED Course                 Procedures             Critical Care time:               No results found for this or any previous visit (from the past 24 hour(s)).    Medications   dexamethasone PF (DECADRON) injection 10 mg (10 mg Intravenous Given 2/13/22 7831)   diphenhydrAMINE (BENADRYL) injection 50 mg (50 mg Intravenous Given 2/13/22 0419)       Assessments & Plan (with Medical Decision Making)     I have reviewed the nursing notes.    I have reviewed the findings, diagnosis, plan and need for follow up with the patient.  76-year-old female here with allergic reaction, likely contact, no suspicion for anaphylaxis as unilateral not bilateral. Symptoms improved after Decadron Pepcid Benadryl. Need steroids and primary follow-up, stable for discharge home. No concern over airway.    Discharge Medication List as of 2/13/2022  4:50 AM      START taking these medications    Details   predniSONE (DELTASONE) 10 MG tablet Take 4 tablets (40 mg) by mouth daily for 5 days, Disp-30 tablet, R-0, [facility]             Final diagnoses:   Eye swelling, right   Allergic reaction, initial encounter       2/13/2022   [facility]     Gregg Jackson MD  02/18/22 0028

## 2022-03-02 NOTE — PROGRESS NOTES
"  Assessment & Plan     Fatigue, unspecified type  Poor water intake -- drinks about 30 oz daily but drinks 12 c of coffee  Discussed need to increase  Iron slight low -- sound like she is eating a fair amount of pasta, advised increase protein, good fats  Sugar is higher which surprised her -- cut back on carbs  - Comprehensive metabolic panel; Future  - Vitamin B12; Future  - TSH with free T4 reflex; Future  - Iron and iron binding capacity; Future  - Ferritin; Future  - CBC with platelets; Future    Type 2 diabetes mellitus with hyperglycemia, without long-term current use of insulin (H)  Follow-up with primary 3 mos  - Hemoglobin A1c; Future  - [facility] FOOT EXAM  NO CHARGE    Hypovitaminosis D  Labs pending, she started taking 1000 international unit(s) daily  - Vitamin D Deficiency; Future    Abnormal finding of blood chemistry, unspecified   Start iron daily for 2 mos, then recheck with primary, denies melena or hematochezia, no hemoptysis  - Iron and iron binding capacity; Future  - Ferritin; Future    Benign essential hypertension  stable  - Albumin Random Urine Quantitative with Creat Ratio; Future    Iron deficiency    - ferrous sulfate (FEROSUL) 325 (65 Fe) MG tablet; Take 1 tablet (325 mg) by mouth daily (with breakfast)    Provider  Link to [facility] Help Grid :[id number]}     Tobacco Cessation:   reports that she has been smoking cigarettes. She started smoking about 66 years ago. She has never used smokeless tobacco.  Tobacco Cessation Action Plan: Information offered: Patient not interested at this time    BMI:   Estimated body mass index is 40.35 kg/m  as calculated from the following:    Height as of 1/6/22: 1.676 m (5' 6\").    Weight as of this encounter: 113.4 kg (250 lb).   Weight management plan: Discussed healthy diet and exercise guidelines    Patient was agreeable to this plan and had no further questions.  There are no Patient Instructions on file for this visit.    No follow-ups on file.    Lissette" MD Danni  [facility] - [place]    Subjective   Rosalie is a 76 year old who presents for the following health issues     HPI   Leonie presents with her daughter. Voiced concerns about tired and dry eyes. Decrease energy level. Wanted to have her Vit. D levels checked today. States has tried Restasis eye drops with no change.    Had Cataract surgery on both eyes within the last couple months.  Concern - Fatigue  Onset: 1-2 years  Description: Frequent naps during the day   Intensity: severe  Progression of Symptoms:  same  Accompanying Signs & Symptoms: Low energy   Previous history of similar problem: none  Precipitating factors:        Worsened by: Not sleeping well, getting up frequently to go to the bathroom, pt says it is getting better.   Alleviating factors:        Improved by: None- Large amounts of caffeine during the day with no relief.   Therapies tried and outcome: Cataract surgery, pt stated she thought this was related to her eyes. Pt started taking Vitamin D 1000IU last week.     Diabetes Follow-up      How often are you checking your blood sugar? Not at all    What concerns do you have today about your diabetes? None     Do you have any of these symptoms? (Select all that apply)  Blurry vision              Hyperlipidemia Follow-Up      Are you regularly taking any medication or supplement to lower your cholesterol?   Yes- .    Are you having muscle aches or other side effects that you think could be caused by your cholesterol lowering medication?  No    Hypertension Follow-up      Do you check your blood pressure regularly outside of the clinic? No     Are you following a low salt diet? No    Are your blood pressures ever more than 140 on the top number (systolic) OR more   than 90 on the bottom number (diastolic), for example 140/90? No    BP Readings from Last 2 Encounters:   03/04/22 138/70   02/13/22 (!) 198/84     Hemoglobin A1C POCT (%)   Date Value   01/12/2021 6.4 (H)    03/03/2020 7.1 (H)     Hemoglobin A1C (%)   Date Value   03/04/2022 7.4 (H)   11/30/2021 6.6 (H)     LDL Cholesterol Calculated (mg/dL)   Date Value   07/15/2021 48   01/12/2021 45   03/03/2020 44         How many servings of fruits and vegetables do you eat daily?  2-3    On average, how many sweetened beverages do you drink each day (Examples: soda, juice, sweet tea, etc.  Do NOT count diet or artificially sweetened beverages)?   0    How many days per week do you exercise enough to make your heart beat faster? 3 or less    How many minutes a day do you exercise enough to make your heart beat faster? 9 or less    How many days per week do you miss taking your medication? 0      Review of Systems   Constitutional, HEENT, cardiovascular, pulmonary, gi and gu systems are negative, except as otherwise noted.      Objective    /70 (BP Location: Right arm)   Pulse 65   Temp 97.8  F (36.6  C) (Tympanic)   Wt 113.4 kg (250 lb)   SpO2 100%   BMI 40.35 kg/m    Body mass index is 40.35 kg/m .  Physical Exam   GENERAL: healthy, alert and no distress  NECK: no adenopathy, no asymmetry, masses, or scars and thyroid normal to palpation  RESP: lungs clear to auscultation - no rales, rhonchi or wheezes  CV: regular rate and rhythm, normal S1 S2, no S3 or S4, no murmur, click or rub, no peripheral edema and peripheral pulses strong  ABDOMEN: soft, nontender, no hepatosplenomegaly, no masses and bowel sounds normal  MS: no gross musculoskeletal defects noted, no edema  SKIN: no suspicious lesions or rashes  PSYCH: mentation appears normal, affect normal/bright  Diabetic foot exam: normal DP and PT pulses, no trophic changes or ulcerative lesions, normal sensory exam and normal monofilament exam    Results for orders placed or performed in visit on 03/04/22   Comprehensive metabolic panel     Status: Abnormal   Result Value Ref Range    Sodium 137 133 - 144 mmol/L    Potassium 4.3 3.4 - 5.3 mmol/L    Chloride 105 94 -  109 mmol/L    Carbon Dioxide (CO2) 29 20 - 32 mmol/L    Anion Gap 3 3 - 14 mmol/L    Urea Nitrogen 18 7 - 30 mg/dL    Creatinine 0.88 0.52 - 1.04 mg/dL    Calcium 9.7 8.5 - 10.1 mg/dL    Glucose 159 (H) 70 - 99 mg/dL    Alkaline Phosphatase 80 40 - 150 U/L    AST 9 0 - 45 U/L    ALT 20 0 - 50 U/L    Protein Total 6.9 6.8 - 8.8 g/dL    Albumin 3.2 (L) 3.4 - 5.0 g/dL    Bilirubin Total 0.3 0.2 - 1.3 mg/dL    GFR Estimate 68 >60 mL/min/1.73m2   TSH with free T4 reflex     Status: Normal   Result Value Ref Range    TSH 1.46 0.40 - 4.00 mU/L   Iron and iron binding capacity     Status: Abnormal   Result Value Ref Range    Iron 45 35 - 180 ug/dL    Iron Binding Capacity 360 240 - 430 ug/dL    Iron Sat Index 13 (L) 15 - 46 %   Ferritin     Status: Normal   Result Value Ref Range    Ferritin 35 8 - 252 ng/mL   CBC with platelets     Status: Abnormal   Result Value Ref Range    WBC Count 8.5 4.0 - 11.0 10e3/uL    RBC Count 4.44 3.80 - 5.20 10e6/uL    Hemoglobin 11.2 (L) 11.7 - 15.7 g/dL    Hematocrit 36.9 35.0 - 47.0 %    MCV 83 78 - 100 fL    MCH 25.2 (L) 26.5 - 33.0 pg    MCHC 30.4 (L) 31.5 - 36.5 g/dL    RDW 15.0 10.0 - 15.0 %    Platelet Count 300 150 - 450 10e3/uL   Hemoglobin A1c     Status: Abnormal   Result Value Ref Range    Estimated Average Glucose 166 mg/dL    Hemoglobin A1C 7.4 (H) 0.0 - 5.6 %

## 2022-03-04 ENCOUNTER — OFFICE VISIT (OUTPATIENT)
Dept: FAMILY MEDICINE | Facility: OTHER | Age: 77
End: 2022-03-04
Attending: FAMILY MEDICINE
Payer: MEDICARE

## 2022-03-04 ENCOUNTER — LAB (OUTPATIENT)
Dept: LAB | Facility: OTHER | Age: 77
End: 2022-03-04
Attending: FAMILY MEDICINE
Payer: MEDICARE

## 2022-03-04 VITALS
OXYGEN SATURATION: 100 % | WEIGHT: 250 LBS | SYSTOLIC BLOOD PRESSURE: 138 MMHG | HEART RATE: 65 BPM | BODY MASS INDEX: 40.35 KG/M2 | TEMPERATURE: 97.8 F | DIASTOLIC BLOOD PRESSURE: 70 MMHG

## 2022-03-04 DIAGNOSIS — E61.1 IRON DEFICIENCY: ICD-10-CM

## 2022-03-04 DIAGNOSIS — E55.9 HYPOVITAMINOSIS D: ICD-10-CM

## 2022-03-04 DIAGNOSIS — E11.65 TYPE 2 DIABETES MELLITUS WITH HYPERGLYCEMIA, WITHOUT LONG-TERM CURRENT USE OF INSULIN (H): ICD-10-CM

## 2022-03-04 DIAGNOSIS — R79.9 ABNORMAL FINDING OF BLOOD CHEMISTRY, UNSPECIFIED: ICD-10-CM

## 2022-03-04 DIAGNOSIS — I10 BENIGN ESSENTIAL HYPERTENSION: ICD-10-CM

## 2022-03-04 DIAGNOSIS — R53.83 FATIGUE, UNSPECIFIED TYPE: Primary | ICD-10-CM

## 2022-03-04 DIAGNOSIS — R53.83 FATIGUE, UNSPECIFIED TYPE: ICD-10-CM

## 2022-03-04 LAB
ALBUMIN SERPL-MCNC: 3.2 G/DL (ref 3.4–5)
ALP SERPL-CCNC: 80 U/L (ref 40–150)
ALT SERPL W P-5'-P-CCNC: 20 U/L (ref 0–50)
ANION GAP SERPL CALCULATED.3IONS-SCNC: 3 MMOL/L (ref 3–14)
AST SERPL W P-5'-P-CCNC: 9 U/L (ref 0–45)
BILIRUB SERPL-MCNC: 0.3 MG/DL (ref 0.2–1.3)
BUN SERPL-MCNC: 18 MG/DL (ref 7–30)
CALCIUM SERPL-MCNC: 9.7 MG/DL (ref 8.5–10.1)
CHLORIDE BLD-SCNC: 105 MMOL/L (ref 94–109)
CO2 SERPL-SCNC: 29 MMOL/L (ref 20–32)
CREAT SERPL-MCNC: 0.88 MG/DL (ref 0.52–1.04)
CREAT UR-MCNC: 100 MG/DL
ERYTHROCYTE [DISTWIDTH] IN BLOOD BY AUTOMATED COUNT: 15 % (ref 10–15)
EST. AVERAGE GLUCOSE BLD GHB EST-MCNC: 166 MG/DL
FERRITIN SERPL-MCNC: 35 NG/ML (ref 8–252)
GFR SERPL CREATININE-BSD FRML MDRD: 68 ML/MIN/1.73M2
GLUCOSE BLD-MCNC: 159 MG/DL (ref 70–99)
HBA1C MFR BLD: 7.4 % (ref 0–5.6)
HCT VFR BLD AUTO: 36.9 % (ref 35–47)
HGB BLD-MCNC: 11.2 G/DL (ref 11.7–15.7)
IRON SATN MFR SERPL: 13 % (ref 15–46)
IRON SERPL-MCNC: 45 UG/DL (ref 35–180)
MCH RBC QN AUTO: 25.2 PG (ref 26.5–33)
MCHC RBC AUTO-ENTMCNC: 30.4 G/DL (ref 31.5–36.5)
MCV RBC AUTO: 83 FL (ref 78–100)
MICROALBUMIN UR-MCNC: 16 MG/L
MICROALBUMIN/CREAT UR: 16 MG/G CR (ref 0–25)
PLATELET # BLD AUTO: 300 10E3/UL (ref 150–450)
POTASSIUM BLD-SCNC: 4.3 MMOL/L (ref 3.4–5.3)
PROT SERPL-MCNC: 6.9 G/DL (ref 6.8–8.8)
RBC # BLD AUTO: 4.44 10E6/UL (ref 3.8–5.2)
SODIUM SERPL-SCNC: 137 MMOL/L (ref 133–144)
TIBC SERPL-MCNC: 360 UG/DL (ref 240–430)
TSH SERPL DL<=0.005 MIU/L-ACNC: 1.46 MU/L (ref 0.4–4)
VIT B12 SERPL-MCNC: 215 PG/ML (ref 193–986)
WBC # BLD AUTO: 8.5 10E3/UL (ref 4–11)

## 2022-03-04 PROCEDURE — 83036 HEMOGLOBIN GLYCOSYLATED A1C: CPT | Mod: ZL

## 2022-03-04 PROCEDURE — 82306 VITAMIN D 25 HYDROXY: CPT | Mod: ZL

## 2022-03-04 PROCEDURE — 36415 COLL VENOUS BLD VENIPUNCTURE: CPT | Mod: ZL

## 2022-03-04 PROCEDURE — 82043 UR ALBUMIN QUANTITATIVE: CPT | Mod: ZL

## 2022-03-04 PROCEDURE — 80053 COMPREHEN METABOLIC PANEL: CPT | Mod: ZL

## 2022-03-04 PROCEDURE — 84443 ASSAY THYROID STIM HORMONE: CPT | Mod: ZL

## 2022-03-04 PROCEDURE — 85027 COMPLETE CBC AUTOMATED: CPT | Mod: ZL

## 2022-03-04 PROCEDURE — 83550 IRON BINDING TEST: CPT | Mod: ZL

## 2022-03-04 PROCEDURE — 99214 OFFICE O/P EST MOD 30 MIN: CPT | Performed by: FAMILY MEDICINE

## 2022-03-04 PROCEDURE — 82728 ASSAY OF FERRITIN: CPT | Mod: ZL

## 2022-03-04 PROCEDURE — 82607 VITAMIN B-12: CPT | Mod: ZL

## 2022-03-04 PROCEDURE — G0463 HOSPITAL OUTPT CLINIC VISIT: HCPCS

## 2022-03-04 RX ORDER — FAMOTIDINE 20 MG
1 TABLET ORAL DAILY
COMMUNITY

## 2022-03-04 RX ORDER — FERROUS SULFATE 325(65) MG
325 TABLET ORAL
Qty: 60 TABLET | Refills: 1 | Status: SHIPPED | OUTPATIENT
Start: 2022-03-04 | End: 2022-06-07

## 2022-03-04 ASSESSMENT — PAIN SCALES - GENERAL: PAINLEVEL: NO PAIN (0)

## 2022-03-04 NOTE — NURSING NOTE
"Chief Complaint   Patient presents with     Fatigue       Initial /70 (BP Location: Right arm)   Pulse 65   Temp 97.8  F (36.6  C) (Tympanic)   Wt 113.4 kg (250 lb)   SpO2 100%   BMI 40.35 kg/m   Estimated body mass index is 40.35 kg/m  as calculated from the following:    Height as of 1/6/22: 1.676 m (5' 6\").    Weight as of this encounter: 113.4 kg (250 lb).  Medication Reconciliation: complete  Nancy Phillips LPN  "

## 2022-03-06 LAB — DEPRECATED CALCIDIOL+CALCIFEROL SERPL-MC: 22 UG/L (ref 20–75)

## 2022-03-07 DIAGNOSIS — K21.9 GASTROESOPHAGEAL REFLUX DISEASE WITHOUT ESOPHAGITIS: ICD-10-CM

## 2022-03-07 DIAGNOSIS — E11.49 TYPE 2 DIABETES MELLITUS WITH OTHER NEUROLOGIC COMPLICATION, WITHOUT LONG-TERM CURRENT USE OF INSULIN (H): Chronic | ICD-10-CM

## 2022-03-07 RX ORDER — METFORMIN HCL 500 MG
TABLET, EXTENDED RELEASE 24 HR ORAL
Qty: 360 TABLET | Refills: 3 | Status: SHIPPED | OUTPATIENT
Start: 2022-03-07 | End: 2023-04-19

## 2022-03-07 RX ORDER — PANTOPRAZOLE SODIUM 40 MG/1
TABLET, DELAYED RELEASE ORAL
Qty: 90 TABLET | Refills: 1 | Status: SHIPPED | OUTPATIENT
Start: 2022-03-07 | End: 2022-09-08

## 2022-03-07 NOTE — TELEPHONE ENCOUNTER
Metformin- protocol failed due to no office visit with PCP within the past 6 months    Please advise. Thank you!

## 2022-06-06 ENCOUNTER — OFFICE VISIT (OUTPATIENT)
Dept: INTERNAL MEDICINE | Facility: OTHER | Age: 77
End: 2022-06-06
Attending: INTERNAL MEDICINE
Payer: MEDICARE

## 2022-06-06 VITALS
BODY MASS INDEX: 40.35 KG/M2 | TEMPERATURE: 98 F | SYSTOLIC BLOOD PRESSURE: 130 MMHG | DIASTOLIC BLOOD PRESSURE: 80 MMHG | WEIGHT: 250 LBS | OXYGEN SATURATION: 97 % | HEART RATE: 64 BPM

## 2022-06-06 DIAGNOSIS — R05.9 COUGH: ICD-10-CM

## 2022-06-06 DIAGNOSIS — E61.1 IRON DEFICIENCY: ICD-10-CM

## 2022-06-06 DIAGNOSIS — J20.9 ACUTE BRONCHITIS, UNSPECIFIED ORGANISM: ICD-10-CM

## 2022-06-06 DIAGNOSIS — R53.83 OTHER FATIGUE: Primary | ICD-10-CM

## 2022-06-06 LAB
IRON SATN MFR SERPL: 10 % (ref 15–46)
IRON SERPL-MCNC: 41 UG/DL (ref 35–180)
TIBC SERPL-MCNC: 410 UG/DL (ref 240–430)

## 2022-06-06 PROCEDURE — 99214 OFFICE O/P EST MOD 30 MIN: CPT | Performed by: INTERNAL MEDICINE

## 2022-06-06 PROCEDURE — G0463 HOSPITAL OUTPT CLINIC VISIT: HCPCS

## 2022-06-06 PROCEDURE — 36415 COLL VENOUS BLD VENIPUNCTURE: CPT | Mod: ZL | Performed by: INTERNAL MEDICINE

## 2022-06-06 PROCEDURE — 83550 IRON BINDING TEST: CPT | Mod: ZL | Performed by: INTERNAL MEDICINE

## 2022-06-06 RX ORDER — ALBUTEROL SULFATE 90 UG/1
AEROSOL, METERED RESPIRATORY (INHALATION)
Qty: 18 G | Refills: 11 | Status: SHIPPED | OUTPATIENT
Start: 2022-06-06 | End: 2022-06-24

## 2022-06-06 ASSESSMENT — PAIN SCALES - GENERAL: PAINLEVEL: NO PAIN (0)

## 2022-06-06 NOTE — NURSING NOTE
"Chief Complaint   Patient presents with     Hypertension     Lipids     Diabetes       Initial /80 (BP Location: Left arm, Patient Position: Chair, Cuff Size: Adult Regular)   Pulse 64   Temp 98  F (36.7  C) (Tympanic)   Wt 113.4 kg (250 lb)   SpO2 97%   BMI 40.35 kg/m   Estimated body mass index is 40.35 kg/m  as calculated from the following:    Height as of 1/6/22: 1.676 m (5' 6\").    Weight as of this encounter: 113.4 kg (250 lb).  Medication Reconciliation: complete  CHERELLE JACKSON LPN    "

## 2022-06-06 NOTE — PROGRESS NOTES
Assessment & Plan   Problem List Items Addressed This Visit        Digestive    Iron deficiency    Relevant Orders    Iron and iron binding capacity      Other Visit Diagnoses     Other fatigue    -  Primary    Relevant Orders    Iron and iron binding capacity    Adult Sleep Eval & Management  Referral    Cough        Relevant Medications    albuterol (VENTOLIN HFA) 108 (90 Base) MCG/ACT inhaler    Acute bronchitis, unspecified organism        Relevant Medications    albuterol (VENTOLIN HFA) 108 (90 Base) MCG/ACT inhaler             25 minutes spent on the date of the encounter doing chart review, review of test results, interpretation of tests, patient visit and documentation            No follow-ups on file.    Yash Cuellar, DO  [facility]    Subjective   Rosalie is a 77 year old who presents for the following health issues     HPI       Rosalie presents today for follow up.  She has a history of CVA and left sided deficits.   She reports ongoing fatigue and daytime sleepiness.  She has borderline lower iron.  No history of a sleep study.    Diabetes Follow-up      How often are you checking your blood sugar? Not at all    What concerns do you have today about your diabetes? None     Do you have any of these symptoms? (Select all that apply)  No numbness or tingling in feet.  No redness, sores or blisters on feet.  No complaints of excessive thirst.  No reports of blurry vision.  No significant changes to weight.      BP Readings from Last 2 Encounters:   06/06/22 130/80   03/04/22 138/70     Hemoglobin A1C POCT (%)   Date Value   01/12/2021 6.4 (H)   03/03/2020 7.1 (H)     Hemoglobin A1C (%)   Date Value   03/04/2022 7.4 (H)   11/30/2021 6.6 (H)     LDL Cholesterol Calculated (mg/dL)   Date Value   07/15/2021 48   01/12/2021 45   03/03/2020 44               Hyperlipidemia Follow-Up      Are you regularly taking any medication or supplement to lower your cholesterol?    Yes- Lipitor    Are you having muscle aches or other side effects that you think could be caused by your cholesterol lowering medication?  No    Hypertension Follow-up      Do you check your blood pressure regularly outside of the clinic? No     Are you following a low salt diet? No    Are your blood pressures ever more than 140 on the top number (systolic) OR more   than 90 on the bottom number (diastolic), for example 140/90? No    Vascular Disease Follow-up      How often do you take nitroglycerin? Never    Do you take an aspirin every day? Yes        Review of Systems   Constitutional, HEENT, cardiovascular, pulmonary, gi and gu systems are negative, except as otherwise noted.      Objective    /80 (BP Location: Left arm, Patient Position: Chair, Cuff Size: Adult Regular)   Pulse 64   Temp 98  F (36.7  C) (Tympanic)   Wt 113.4 kg (250 lb)   SpO2 97%   BMI 40.35 kg/m    Body mass index is 40.35 kg/m .  Physical Exam   GENERAL: alert and no distress  RESP: lungs clear to auscultation - no rales, rhonchi or wheezes  CV: regular rate and rhythm, normal S1 S2, no S3 or S4, no murmur, click or rub, no peripheral edema and peripheral pulses strong  ABDOMEN: soft, nontender, no hepatosplenomegaly, no masses and bowel sounds normal  MS: +1 LE edema and left sided weakness.    SKIN: no suspicious lesions or rashes   NEURO: Left sided weakness  PSYCH: mentation appears normal, affect normal/bright    Lab on 03/04/2022   Component Date Value Ref Range Status     Sodium 03/04/2022 137  133 - 144 mmol/L Final     Potassium 03/04/2022 4.3  3.4 - 5.3 mmol/L Final     Chloride 03/04/2022 105  94 - 109 mmol/L Final     Carbon Dioxide (CO2) 03/04/2022 29  20 - 32 mmol/L Final     Anion Gap 03/04/2022 3  3 - 14 mmol/L Final     Urea Nitrogen 03/04/2022 18  7 - 30 mg/dL Final     Creatinine 03/04/2022 0.88  0.52 - 1.04 mg/dL Final     Calcium 03/04/2022 9.7  8.5 - 10.1 mg/dL Final     Glucose 03/04/2022 159 (A) 70 - 99  mg/dL Final     Alkaline Phosphatase 03/04/2022 80  40 - 150 U/L Final     AST 03/04/2022 9  0 - 45 U/L Final     ALT 03/04/2022 20  0 - 50 U/L Final     Protein Total 03/04/2022 6.9  6.8 - 8.8 g/dL Final     Albumin 03/04/2022 3.2 (A) 3.4 - 5.0 g/dL Final     Bilirubin Total 03/04/2022 0.3  0.2 - 1.3 mg/dL Final     GFR Estimate 03/04/2022 68  >60 mL/min/1.73m2 Final    Effective December 21, 2021 eGFRcr in adults is calculated using the 2021 CKD-EPI creatinine equation which includes age and gender (Gail et al., [place], DOI: 10.1056/TPWTui1073569)     Vitamin D, Total (25-Hydroxy) 03/04/2022 22  20 - 75 ug/L Final     Vitamin B12 03/04/2022 215  193 - 986 pg/mL Final     TSH 03/04/2022 1.46  0.40 - 4.00 mU/L Final     Iron 03/04/2022 45  35 - 180 ug/dL Final     Iron Binding Capacity 03/04/2022 360  240 - 430 ug/dL Final     Iron Sat Index 03/04/2022 13 (A) 15 - 46 % Final     Ferritin 03/04/2022 35  8 - 252 ng/mL Final     WBC Count 03/04/2022 8.5  4.0 - 11.0 10e3/uL Final     RBC Count 03/04/2022 4.44  3.80 - 5.20 10e6/uL Final     Hemoglobin 03/04/2022 11.2 (A) 11.7 - 15.7 g/dL Final     Hematocrit 03/04/2022 36.9  35.0 - 47.0 % Final     MCV 03/04/2022 83  78 - 100 fL Final     MCH 03/04/2022 25.2 (A) 26.5 - 33.0 pg Final     MCHC 03/04/2022 30.4 (A) 31.5 - 36.5 g/dL Final     RDW 03/04/2022 15.0  10.0 - 15.0 % Final     Platelet Count 03/04/2022 300  150 - 450 10e3/uL Final     Creatinine Urine mg/dL 03/04/2022 100  mg/dL Final     Albumin Urine mg/L 03/04/2022 16  mg/L Final     Albumin Urine mg/g Cr 03/04/2022 16.00  0.00 - 25.00 mg/g Cr Final     Estimated Average Glucose 03/04/2022 166  mg/dL Final     Hemoglobin A1C 03/04/2022 7.4 (A) 0.0 - 5.6 % Final    Normal <5.7%   Prediabetes 5.7-6.4%    Diabetes 6.5% or higher     Note: Adopted from ADA consensus guidelines.     No results found for any visits on 06/06/22.  No results found for this or any previous visit (from the past 24 hour(s)).

## 2022-06-07 DIAGNOSIS — E61.1 IRON DEFICIENCY: ICD-10-CM

## 2022-06-07 RX ORDER — FERROUS SULFATE 325(65) MG
325 TABLET ORAL
Qty: 90 TABLET | Refills: 1 | Status: SHIPPED | OUTPATIENT
Start: 2022-06-07

## 2022-06-13 ENCOUNTER — HOSPITAL ENCOUNTER (EMERGENCY)
Facility: HOSPITAL | Age: 77
Discharge: HOME | End: 2022-06-13
Attending: EMERGENCY MEDICINE | Admitting: EMERGENCY MEDICINE
Payer: MEDICARE

## 2022-06-13 ENCOUNTER — APPOINTMENT (OUTPATIENT)
Dept: GENERAL RADIOLOGY | Facility: HOSPITAL | Age: 77
End: 2022-06-13
Attending: EMERGENCY MEDICINE
Payer: MEDICARE

## 2022-06-13 VITALS
HEART RATE: 79 BPM | DIASTOLIC BLOOD PRESSURE: 91 MMHG | SYSTOLIC BLOOD PRESSURE: 203 MMHG | BODY MASS INDEX: 40.18 KG/M2 | WEIGHT: 250 LBS | OXYGEN SATURATION: 97 % | RESPIRATION RATE: 14 BRPM | HEIGHT: 66 IN | TEMPERATURE: 98.1 F

## 2022-06-13 DIAGNOSIS — T78.40XA ALLERGIC REACTION, INITIAL ENCOUNTER: ICD-10-CM

## 2022-06-13 DIAGNOSIS — J44.1 COPD EXACERBATION (H): ICD-10-CM

## 2022-06-13 LAB
FLUAV RNA SPEC QL NAA+PROBE: NEGATIVE
FLUBV RNA RESP QL NAA+PROBE: NEGATIVE
RSV RNA SPEC NAA+PROBE: NEGATIVE
SARS-COV-2 RNA RESP QL NAA+PROBE: NEGATIVE

## 2022-06-13 PROCEDURE — 250N000009 HC RX 250: Performed by: EMERGENCY MEDICINE

## 2022-06-13 PROCEDURE — 87637 SARSCOV2&INF A&B&RSV AMP PRB: CPT | Performed by: EMERGENCY MEDICINE

## 2022-06-13 PROCEDURE — C9803 HOPD COVID-19 SPEC COLLECT: HCPCS

## 2022-06-13 PROCEDURE — 99284 EMERGENCY DEPT VISIT MOD MDM: CPT | Mod: 25

## 2022-06-13 PROCEDURE — 99284 EMERGENCY DEPT VISIT MOD MDM: CPT | Performed by: EMERGENCY MEDICINE

## 2022-06-13 PROCEDURE — 71045 X-RAY EXAM CHEST 1 VIEW: CPT

## 2022-06-13 PROCEDURE — 250N000013 HC RX MED GY IP 250 OP 250 PS 637: Performed by: EMERGENCY MEDICINE

## 2022-06-13 RX ORDER — DOXYCYCLINE 100 MG/1
100 CAPSULE ORAL 2 TIMES DAILY
Qty: 14 CAPSULE | Refills: 0 | Status: SHIPPED | OUTPATIENT
Start: 2022-06-13 | End: 2022-06-20

## 2022-06-13 RX ORDER — PREDNISONE 20 MG/1
60 TABLET ORAL DAILY
Qty: 12 TABLET | Refills: 0 | Status: SHIPPED | OUTPATIENT
Start: 2022-06-13 | End: 2022-06-17

## 2022-06-13 RX ORDER — DIPHENHYDRAMINE HCL 25 MG
50 CAPSULE ORAL ONCE
Status: COMPLETED | OUTPATIENT
Start: 2022-06-13 | End: 2022-06-13

## 2022-06-13 RX ORDER — DEXAMETHASONE SODIUM PHOSPHATE 10 MG/ML
10 INJECTION INTRAMUSCULAR; INTRAVENOUS ONCE
Status: COMPLETED | OUTPATIENT
Start: 2022-06-13 | End: 2022-06-13

## 2022-06-13 RX ORDER — DOXYCYCLINE 100 MG/1
100 CAPSULE ORAL ONCE
Status: COMPLETED | OUTPATIENT
Start: 2022-06-13 | End: 2022-06-13

## 2022-06-13 RX ADMIN — DIPHENHYDRAMINE HYDROCHLORIDE 50 MG: 25 CAPSULE ORAL at 05:30

## 2022-06-13 RX ADMIN — DEXAMETHASONE SODIUM PHOSPHATE 10 MG: 10 INJECTION INTRAMUSCULAR; INTRAVENOUS at 05:28

## 2022-06-13 RX ADMIN — DOXYCYCLINE HYCLATE 100 MG: 100 CAPSULE ORAL at 05:30

## 2022-06-13 NOTE — ED TRIAGE NOTES
"Pt reports left eye swelling that she awoke with. Pt noticed before bed last night that she had what appeared to be a bug bite on her forehead that was \"itchy\" before bed and when she awoke, her eye was swollen shut. Pt reports a recent cough that she would like to be assessed as well.       "

## 2022-06-16 ASSESSMENT — ENCOUNTER SYMPTOMS
COUGH: 1
SHORTNESS OF BREATH: 0
CHILLS: 0
FEVER: 0

## 2022-06-16 NOTE — ED PROVIDER NOTES
History     Chief Complaint   Patient presents with     Facial Swelling     Cough     HPI  Rosalie JANE Friend is a 77 year old female who is here with swelling to left eye.  Occurred overnight.  History of similar in the past.  Does not recall being stung by anything.  Denies new medications creams or other products applied to left eye.  No nausea no vomiting no shortness of breath no rash no other symptoms.    Allergies:  Allergies   Allergen Reactions     Azithromycin Swelling     Face swelling     Pcn [Penicillins]        Problem List:    Patient Active Problem List    Diagnosis Date Noted     History of CVA (cerebrovascular accident) 03/02/2019     Priority: High     5/6/17 sudden onset right vision loss and left side weakness, which resolved by the time of arrival to ED in [place]. CT of head and CT angiogram showed R ICA occlusion. L distal ICA, and R MCA 8mm 6mm aneurysms. Neurology was consulted and pt was transferred to [facility] for further management.  mg and lipitor 80 mg was started. Pt was found elevated HgA1c at 10. Imaging showed R temporal lobe infarct on CT and Angio showed R GOPI occlusion with no intracranial occlusion and L ICA and R MCA aneurysms. Strong family hx of ruptured aneurysms.  Seen at [facility] for an incidental finding of bilat ICA aneurysms. She had diagnostic cerebral angiogram with endovascular coil embolization of the larger left ICA aneurysm. The right ICA aneurysm will be monitored with serial imaging.     2/15/2018 acute onset weakness of her left upper extremity. MR angiogram of the head and neck and MR of the brain, which revealed an acute lacunar infarct in the right centrum semiovale region in an area of old ischemia. Multiple atherosclerotic lesions with complete occlusion of her right internal carotid and other more distal lesions as well. (clopidogrel was added)       Hypovitaminosis D 03/04/2022     Priority: Medium     Fatigue, unspecified type 03/04/2022      Priority: Medium     Abnormal finding of blood chemistry, unspecified  03/04/2022     Priority: Medium     Iron deficiency 03/04/2022     Priority: Medium     Hyperlipidemia LDL goal <100 11/30/2021     Priority: Medium     Type 2 diabetes mellitus with hyperglycemia, without long-term current use of insulin (H) 11/30/2021     Priority: Medium     resolved       Hyperlipidemia 03/02/2019     Priority: Medium     Cerebral aneurysm 03/02/2019     Priority: Medium     Cerebral aneurysm without rupture, left ICA, s/p endovascular coiling 05/2017       PVD (peripheral vascular disease) (H) 03/02/2019     Priority: Medium     ankle-brachial index 2018 1.02 on the right and 0.52 on the left, consistent with moderate ischemia of the left lower extremity.        Emphysema lung (H) 03/02/2019     Priority: Medium     PFTS 5/2018 - FEV1- 2.13 (74%), ratio 0.67, 20% change with bronchodilators,  TLC 99%, %, DLCO 60%        Obesity (BMI 35.0-39.9) with comorbidity (H) 01/03/2019     Priority: Medium     Type 2 diabetes mellitus with neurologic complication, without long-term current use of insulin (H) 04/12/2018     Priority: Medium     Nonruptured cerebral aneurysm 02/18/2018     Priority: Medium     s/p cerebral angiogram, [facility]       ACP (advance care planning) 05/09/2017     Priority: Medium     Advance Care Planning 5/9/2017: ACP Review of Chart / Resources Provided:  Reviewed chart for advance care plan.  Rosalie JANE Friend has been provided information and resources to begin or update their advance care plan.  Added by Kalee Busby             Pain in joint, lower leg 04/09/2007     Priority: Medium     Formatting of this note might be different from the original.  IMO Update 10/11       Blood glucose abnormal 10/11/2006     Priority: Medium     Formatting of this note might be different from the original.  IMO Update 10/11       Essential hypertension 09/27/2006     Priority: Medium     Formatting  of this note might be different from the original.  IMO Update       Gastroesophageal reflux disease without esophagitis 03/02/2019     Priority: Low        Past Medical History:    Past Medical History:   Diagnosis Date     Cerebral infarction (H) 05/06/2017     Concussion with loss of consciousness of 30 minutes or less 1950     Emphysema lung (H)      Gastroesophageal reflux disease without esophagitis      Hyperlipidemia LDL goal <100      Left hemiparesis (H) 2017     Morbid obesity (H)      Nonruptured cerebral aneurysm 02/18/2018     Peripheral vascular disease (H)      Tobacco abuse      Type 2 diabetes mellitus with hyperglycemia, without long-term current use of insulin (H)        Past Surgical History:    Past Surgical History:   Procedure Laterality Date     ABDOMEN SURGERY  04/1997    bladder repair     CEREBRAL ANGIOGRAM  2018     GYN SURGERY  04/1974    hysterectomy      PHACOEMULSIFICATION WITH STANDARD INTRAOCULAR LENS IMPLANT Left 12/2/2021    Procedure: PHACOEMULSIFICATION, CATARACT, WITH STANDARD INTRAOCULAR LENS IMPLANT INSERTION;  Surgeon: Leo Hernandes MD;  Location: [facility]     PHACOEMULSIFICATION WITH STANDARD INTRAOCULAR LENS IMPLANT Right 1/6/2022    Procedure: RIGHT EYE CATARACT EXTRACTION WITH INTRAOCULAR LENS IMPLANT;  Surgeon: Leo Hernandes MD;  Location: [facility] OR       Family History:    Family History   Problem Relation Age of Onset     Aneurysm Mother 44     Other Cancer Father      Other Cancer Sister 59        female     Aneurysm Sister 72     Aneurysm Sister 23     Diabetes Brother      Hypertension Brother      Other Cancer Brother         unknown     Sudden Infant Death Syndrome Brother         influenza     Family History Negative Brother      Family History Negative Brother      Family History Negative Brother      Family History Negative Brother      Family History Negative Brother      Unknown/Adopted Maternal Grandmother      Unknown/Adopted Maternal Grandfather       "Unknown/Adopted Paternal Grandmother      Unknown/Adopted Paternal Grandfather        Social History:  Marital Status:   [4]  Social History     Tobacco Use     Smoking status: Current Some Day Smoker     Types: Cigarettes     Start date: 1956     Last attempt to quit: 2017     Years since quittin.1     Smokeless tobacco: Never Used     Tobacco comment: limits self to about 4-5 daily , she is quitting on her own   Vaping Use     Vaping Use: Never used   Substance Use Topics     Alcohol use: No     Drug use: No        Medications:    doxycycline hyclate (VIBRAMYCIN) 100 MG capsule  predniSONE (DELTASONE) 20 MG tablet  albuterol (PROAIR HFA/PROVENTIL HFA/VENTOLIN HFA) 108 (90 Base) MCG/ACT inhaler  albuterol (VENTOLIN HFA) 108 (90 Base) MCG/ACT inhaler  aspirin 325 MG tablet  atorvastatin (LIPITOR) 80 MG tablet  blood glucose monitoring (NO BRAND SPECIFIED) meter device kit  blood glucose monitoring (ONE TOUCH DELICA) lancets  clopidogrel (PLAVIX) 75 MG tablet  Cyanocobalamin (VITAMIN B-12 PO)  ferrous sulfate (FEROSUL) 325 (65 Fe) MG tablet  lisinopril (ZESTRIL) 10 MG tablet  metFORMIN (GLUCOPHAGE-XR) 500 MG 24 hr tablet  metoprolol tartrate (LOPRESSOR) 50 MG tablet  ONETOUCH VERIO IQ test strip  order for DME  order for DME  pantoprazole (PROTONIX) 40 MG EC tablet  Vitamin D, Cholecalciferol, 25 MCG (1000 UT) CAPS          Review of Systems   Constitutional: Negative for chills and fever.   Respiratory: Positive for cough. Negative for shortness of breath.    All other systems reviewed and are negative.      Physical Exam   BP: (!) 186/102  Pulse: 80  Temp: 98.1  F (36.7  C)  Resp: 14  Height: 167.6 cm (5' 6\")  Weight: 113.4 kg (250 lb)  SpO2: 97 %      Physical Exam  Constitutional:       General: She is not in acute distress.     Appearance: She is not diaphoretic.   HENT:      Head: Normocephalic and atraumatic.      Right Ear: External ear normal.      Left Ear: External ear normal.      " Nose: No congestion or rhinorrhea.      Mouth/Throat:      Pharynx: Oropharynx is clear. No oropharyngeal exudate.   Eyes:      General: No scleral icterus.     Pupils: Pupils are equal, round, and reactive to light.      Comments: Left leg swollen shot without erythema or increase in warmth   Cardiovascular:      Rate and Rhythm: Normal rate and regular rhythm.      Heart sounds: Normal heart sounds.   Pulmonary:      Effort: No respiratory distress.      Breath sounds: Normal breath sounds.   Abdominal:      General: Bowel sounds are normal.      Palpations: Abdomen is soft.      Tenderness: There is no abdominal tenderness.   Musculoskeletal:         General: No tenderness.      Cervical back: Normal range of motion and neck supple.      Right lower leg: No edema.      Left lower leg: No edema.   Skin:     General: Skin is warm.      Capillary Refill: Capillary refill takes less than 2 seconds.      Findings: No rash.   Neurological:      Mental Status: Mental status is at baseline.      Cranial Nerves: No cranial nerve deficit.   Psychiatric:         Mood and Affect: Mood normal.         Behavior: Behavior normal.         ED Course                 Procedures             Critical Care time:               No results found for this or any previous visit (from the past 24 hour(s)).    Medications   diphenhydrAMINE (BENADRYL) capsule 50 mg (50 mg Oral Given 6/13/22 0530)   dexamethasone (DECADRON) injectable solution used ORALLY 10 mg (10 mg Oral Given 6/13/22 0528)   doxycycline hyclate (VIBRAMYCIN) capsule 100 mg (100 mg Oral Given 6/13/22 0530)       Assessments & Plan (with Medical Decision Making)     I have reviewed the nursing notes.    I have reviewed the findings, diagnosis, plan and need for follow up with the patient.  37-year-old female here with probable allergic reaction.  No obvious cause.  Decadron given for swelling of left eye.  Also Benadryl.  On ROS patient has cough, has wheezing on exam,  improved after steroids.  Has allergy to azithromycin so we will put her on doxycycline.  Return precautions provided.  Prednisone for COPD exacerbation will also cover her allergic reaction.    Discharge Medication List as of 6/13/2022  6:12 AM      START taking these medications    Details   doxycycline hyclate (VIBRAMYCIN) 100 MG capsule Take 1 capsule (100 mg) by mouth 2 times daily for 7 days, Disp-14 capsule, R-0, E-Prescribe      predniSONE (DELTASONE) 20 MG tablet Take 3 tablets (60 mg) by mouth daily for 4 days Take two tablets (= 40mg) each day for 5 (five) days, Disp-12 tablet, R-0, E-Prescribe             Final diagnoses:   Allergic reaction, initial encounter   COPD exacerbation (H)       6/13/2022   [facility]     Gregg Jackson MD  06/16/22 [id number]

## 2022-06-24 DIAGNOSIS — J20.9 ACUTE BRONCHITIS, UNSPECIFIED ORGANISM: ICD-10-CM

## 2022-06-24 DIAGNOSIS — R05.9 COUGH: ICD-10-CM

## 2022-06-24 RX ORDER — ALBUTEROL SULFATE 90 UG/1
AEROSOL, METERED RESPIRATORY (INHALATION)
Qty: 18 G | Refills: 3 | Status: SHIPPED | OUTPATIENT
Start: 2022-06-24 | End: 2023-08-25

## 2022-06-27 ENCOUNTER — DOCUMENTATION ONLY (OUTPATIENT)
Dept: SLEEP MEDICINE | Facility: HOSPITAL | Age: 77
End: 2022-06-27

## 2022-06-28 DIAGNOSIS — G47.33 OSA (OBSTRUCTIVE SLEEP APNEA): Primary | ICD-10-CM

## 2022-06-28 NOTE — PROGRESS NOTES
SLEEP HISTORY QUESTIONNAIRE    Please describe the main reason for your sleep appointment? Feeling tired all the time    How long has this been a problem? 4 years    Have you been diagnosed with a sleep problem in the past? NO    If so, what? n/a    What treatment was recommended? n/a    Have you had a sleep study in the past? NO    If yes, where and when? n/a    Sleep Habits:   Do you read in bed? No  Do you eat in bed? No  Do you watch TV in bed? Yes  Do you work in bed? Yes  Do you use a phone or computer in bed? Yes    Is you sleep disturbed by:   Bed partner: No  Children: No  Noise: No   Pets: No  Other: NO      On two or more nights per week, do you drink alcohol to help you fall asleep?NO    On two or more nights per week, do you take melatonin to help you fall asleep? NO    On two or more nights per week, do you take over the counter medicine to fall asleep?  NO    Do you take drinks with caffeine (coffee, tea, soda, energy drinks)? YES    Do you have 3 or more caffeine drinks in a day? YES    Do you have caffeine drinks within 6 hours of bedtime? YES    Do you smoke or use tobacco? YES    Do you exercise? NO    Sleep Routine:   Using a 24 Hour Clock    What time do you usually get into bed on workdays? 9 pm    Weekend/non work days? ?    What time do you get out of bed on workdays? 5 am      Weekend/non work days??    Do you work the evening or night shift or do your shifts rotate? NO    How long does it usually take to fall to sleep? varies    How many times do you wake during the night? varies    How much time do you feel that you are awake during the entire night? varies    How long does it take for you to fall back to sleep after you wake up? varies    Why do you think you wake up? Go pee    What do you do when you wake up? Lay there    How much sleep do you think you get on work nights? 3 hours    How much sleep do you think you get on weekends/non work days? ?    How much sleep do you think you need  to feel your best? ?    How many days during a week do you take a nap on average? daily    What is the average length of your naps? One hour    Do you feel better after taking a nap? NO    If you could chose the best sleep schedule for you, what time would you go to bed? 9 pm  What time would you get up? 7 am    Do you read in bed? NO    Do you eat in bed? NO    Do you watch TV in bed? YES    Do you do work in bed? NO    Do you use a computer or phone in bed? NO    Sleep Disruptions?   Leg movements:  Do you ever have restless, crawling, aching or other unusual feelings in your legs? NO    Do you ever wake yourself by kicking your legs during the night? NO    Are the sheets and blankets messed up or tossed about when you get up? NO    Night-time behaviors:   Do you have nightmares or night terrors? NO   How often? n/a    Have you had times when you were sleep walking? NO    Have you been seen doing anything unusual while you sleep at nights? NO  What? n/a  How often? n/a    Have you ever hurt yourself or someone else while you were sleeping? NO  Please describe: n/a    Do you clench or grind your teeth during the night? NO    Sleep Apnea (pauses in breathing during sleep):  Do you wake with a headache in the morning? NO  How often? n/a    Does your bed partner, family or friends ever say that you snore? NO  How many nights per week do you snore? n/a  Can snoring be heard outside the bedroom? n/a    Do you ever wake yourself up from snoring, gasping or choking? NO    Have you ever been told that you stop breathing or have pauses in your breathing? NO    Do you wake in the morning with a dry throat or mouth? YES    Do you have trouble breathing through your nose? NO    Do you have problems with heartburn, reflux or a hiatal hernia? NO    Which positions do you usually sleep in? (stomach, back, sides, all) back    Do you use oxygen or any other medical equipment when you sleep? NO    Do members of your family (related  by blood) snore? YES    Have any members of your family been diagnosed with with sleep apnea? YES    Do other members of your family have restless leg? NO    Do other members of your family have sleep walking? NO    Have you ever had an accident, or near accident due to sleepiness while driving? NO    Does your sleepiness affect your work on the job or at school? NO    Do you ever fall asleep by accident while doing a task? NO    Have you had sudden muscle weakness when laughing, angry or surprised? NO    Have you ever been unable to move your body when falling asleep or waking up? NO    Do you ever have trouble  your dreams from real life events? NO  Please describe: n/a    Physical Health: (including illness and injury): During the past 30 days, on how many days was your physical health not good? ?/30 days     Mental Health: (including stress, depression, and problems with emotions): During the last 30 days, how may days was your mental health not good? ?/30 days.     During the past 30 days, on how many days did poor physical or mental health keep you from doing your usual activities? This might be self-care, work, or play? ?/30 days.     Social History:   Marital status:     Who lives in your home with you? self    Mother (alive or dead)? dead If has , from what? aneurysm  Father (alive or dead)? dead If has , from what? cancer    Siblings: YES  Have any ? YES  If so, from what? Aneurysm and cancer    Currently working? NO  If yes, work: n/a  Former jobs:      Sleepiness Scale:   Sitting and reading 2   Watching TV 2   Sitting in a public place 2   Riding in a car 1   Lying down to rest in the afternoon 1   Sitting and talking to someone 0   Sitting quietly after a lunch without alcohol 0   In a car, stopping for a few minutes in traffic 0       Surgical History:   Past Surgical History:   Procedure Laterality Date     ABDOMEN SURGERY  1997    bladder repair     CEREBRAL  ANGIOGRAM  2018     GYN SURGERY  04/1974    hysterectomy      PHACOEMULSIFICATION WITH STANDARD INTRAOCULAR LENS IMPLANT Left 12/2/2021    Procedure: PHACOEMULSIFICATION, CATARACT, WITH STANDARD INTRAOCULAR LENS IMPLANT INSERTION;  Surgeon: Leo Hernandes MD;  Location: [facility] OR     PHACOEMULSIFICATION WITH STANDARD INTRAOCULAR LENS IMPLANT Right 1/6/2022    Procedure: RIGHT EYE CATARACT EXTRACTION WITH INTRAOCULAR LENS IMPLANT;  Surgeon: Leo Hernandes MD;  Location: [facility] OR       Medical Conditions:   Past Medical History:   Diagnosis Date     Cerebral infarction (H) 05/06/2017     Concussion with loss of consciousness of 30 minutes or less 1950    fell off the swings     Emphysema lung (H)      Gastroesophageal reflux disease without esophagitis      Hyperlipidemia LDL goal <100      Left hemiparesis (H) 2017    second to right CVA     Morbid obesity (H)      Nonruptured cerebral aneurysm 02/18/2018    s/p cerebral angiogram, Dr [facility]     Peripheral vascular disease (H)      Tobacco abuse      Type 2 diabetes mellitus with hyperglycemia, without long-term current use of insulin (H)     resolved       Medications:   Current Outpatient Medications   Medication Sig     albuterol (PROAIR HFA/PROVENTIL HFA/VENTOLIN HFA) 108 (90 Base) MCG/ACT inhaler Inhale 2 puffs into the lungs every 6 hours as needed for shortness of breath / dyspnea or wheezing     albuterol (VENTOLIN HFA) 108 (90 Base) MCG/ACT inhaler USE 1 TO 2 INHALATIONS EVERY 4 HOURS AS NEEDED FOR SHORTNESS OF BREATH, DIFFICULTY BREATHING,  OR WHEEZING     aspirin 325 MG tablet Take 325 mg by mouth     atorvastatin (LIPITOR) 80 MG tablet TAKE 1 TABLET DAILY     blood glucose monitoring (NO BRAND SPECIFIED) meter device kit Use to test blood sugar 4 times daily or as directed.     blood glucose monitoring (ONE TOUCH DELICA) lancets Use to test blood sugar 3 times daily.     clopidogrel (PLAVIX) 75 MG tablet TAKE 1 TABLET DAILY     Cyanocobalamin  (VITAMIN B-12 PO) Take 2,500 mcg by mouth daily     ferrous sulfate (FEROSUL) 325 (65 Fe) MG tablet Take 1 tablet (325 mg) by mouth daily (with breakfast)     lisinopril (ZESTRIL) 10 MG tablet TAKE 2 TABLETS DAILY (Patient taking differently: 10 mg)     metFORMIN (GLUCOPHAGE-XR) 500 MG 24 hr tablet TAKE 2 TABLETS 2 TIMES DAILY WITH MEALS     metoprolol tartrate (LOPRESSOR) 50 MG tablet TAKE 1/2 TABLET TWICE A DAY     ONETOUCH VERIO IQ test strip USE FOUR TIMES A DAY     order for DME Equipment being ordered: Wheelchair with foot rests     order for DME Equipment being ordered: Glucometer and test strips     pantoprazole (PROTONIX) 40 MG EC tablet TAKE 1 TABLET DAILY 30 TO  60 MINUTES BEFORE A MEAL     Vitamin D, Cholecalciferol, 25 MCG (1000 UT) CAPS Take 1 capsule by mouth daily     No current facility-administered medications for this visit.       Are you currently having any of the following symptoms?   General:   Obvious weight gain or loss NO  Fever, chills or sweats NO  Drug allergies: NO    Eyes:   Changes in vision YES  Blind spots NO  Double vision NO  Other tired    Ear, Nose and Throat:   Ear pain NO  Sore throat NO  Sinus pain NO  Post-nasal drip NO  Runny nose NO  Bloody nose NO    Heart:   Rapid or irregular heart beat NO  Chest pain or pressure NO  Out of breath when lying down NO  Swelling in feet or legs YES  High blood pressure YES  Heart disease NO    Nervous system   Headaches NO  Weakness in arms or legs NO  Numbness in arms of legs NO  Other: NO    Skin  Rashes NO  New moles or skin changes NO  Other NO    Lungs  Shortness of breath at rest NO  Shortness of breath with activity NO  Dry cough NO  Coughing up mucous or phlegm NO  Coughing up blood NO  Wheezing when breathing YES    Lymph System  Swollen lymph nodes NO  New lumps or bumps NO  Changes in breasts or discharge NO    Digestive System   Nausea or vomiting NO  Loose or watery stools NO  Hard, dry stools (constipation) NO  Fat or grease  in stools NO  Blood in stools NO  Stools are black or bloody NO  Abdominal (belly) pain NO    Urinary Tract   Pain when you urinate (pee) NO  Blood in your urine NO  Urinate (pee) more than normal YES  Irregular periods NO    Muscles and bones   Muscle pain NO  Joint or bone pain NO  Swollen joints NO  Other NO    Glands  Increased thirst or urination NO  Diabetes YES  Morning glucose: ?  Afternoon glucose: ?    Mental Health  Depression NO  Anxiety NO  Other mental health issues: NO

## 2022-06-28 NOTE — PROGRESS NOTES
FRANK MARCELINA       Name: Rosalie JANE Friend MRN# [id number]   Age: 77 year old YOB: 1945     Stop Bang questionnaire completed with a score of >3 to allow for HST     Have you been told you snore loudly (louder than talking or loud enough to be heard through doors)? NO    Do you often feel tired, fatigued, or sleepy during the daytime? YES    Has anyone observed you stop breathing during your sleep? NO    Do you have or are you being treated for high blood pressure? YES    Is your BMI greater than 35? YES    Is your neck size circumference 16 inches or greater? YES    Are you over 50 years old? YES    Stop Bang Score (# of yes): 5

## 2022-06-28 NOTE — PROGRESS NOTES
Chart review prior to sleep testing.    Patient Summary:  77 year old yo female who is referred for chronic fatigue.    Patient Active Problem List    Diagnosis Date Noted     History of CVA (cerebrovascular accident) 03/02/2019     Priority: High     5/6/17 sudden onset right vision loss and left side weakness, which resolved by the time of arrival to ED in [place]. CT of head and CT angiogram showed R ICA occlusion. L distal ICA, and R MCA 8mm 6mm aneurysms. Neurology was consulted and pt was transferred to [facility] for further management.  mg and lipitor 80 mg was started. Pt was found elevated HgA1c at 10. Imaging showed R temporal lobe infarct on CT and Angio showed R GOPI occlusion with no intracranial occlusion and L ICA and R MCA aneurysms. Strong family hx of ruptured aneurysms.  Seen at [place] [facility] for an incidental finding of bilat ICA aneurysms. She had diagnostic cerebral angiogram with endovascular coil embolization of the larger left ICA aneurysm. The right ICA aneurysm will be monitored with serial imaging.     2/15/2018 acute onset weakness of her left upper extremity. MR angiogram of the head and neck and MR of the brain, which revealed an acute lacunar infarct in the right centrum semiovale region in an area of old ischemia. Multiple atherosclerotic lesions with complete occlusion of her right internal carotid and other more distal lesions as well. (clopidogrel was added)       Hypovitaminosis D 03/04/2022     Priority: Medium     Fatigue, unspecified type 03/04/2022     Priority: Medium     Abnormal finding of blood chemistry, unspecified  03/04/2022     Priority: Medium     Iron deficiency 03/04/2022     Priority: Medium     Hyperlipidemia LDL goal <100 11/30/2021     Priority: Medium     Type 2 diabetes mellitus with hyperglycemia, without long-term current use of insulin (H) 11/30/2021     Priority: Medium     resolved       Hyperlipidemia 03/02/2019     Priority: Medium     Cerebral  aneurysm 03/02/2019     Priority: Medium     Cerebral aneurysm without rupture, left ICA, s/p endovascular coiling 05/2017       PVD (peripheral vascular disease) (H) 03/02/2019     Priority: Medium     ankle-brachial index 2018 1.02 on the right and 0.52 on the left, consistent with moderate ischemia of the left lower extremity.        Emphysema lung (H) 03/02/2019     Priority: Medium     PFTS 5/2018 - FEV1- 2.13 (74%), ratio 0.67, 20% change with bronchodilators,  TLC 99%, %, DLCO 60%        Obesity (BMI 35.0-39.9) with comorbidity (H) 01/03/2019     Priority: Medium     Type 2 diabetes mellitus with neurologic complication, without long-term current use of insulin (H) 04/12/2018     Priority: Medium     Nonruptured cerebral aneurysm 02/18/2018     Priority: Medium     s/p cerebral angiogram, [facility]       ACP (advance care planning) 05/09/2017     Priority: Medium     Advance Care Planning 5/9/2017: ACP Review of Chart / Resources Provided:  Reviewed chart for advance care plan.  Rosalie Seals has been provided information and resources to begin or update their advance care plan.  Added by Kalee Busby             Pain in joint, lower leg 04/09/2007     Priority: Medium     Formatting of this note might be different from the original.  IMO Update 10/11       Blood glucose abnormal 10/11/2006     Priority: Medium     Formatting of this note might be different from the original.  IMO Update 10/11       Essential hypertension 09/27/2006     Priority: Medium     Formatting of this note might be different from the original.  IMO Update       Gastroesophageal reflux disease without esophagitis 03/02/2019     Priority: Low       Current Outpatient Medications   Medication     albuterol (PROAIR HFA/PROVENTIL HFA/VENTOLIN HFA) 108 (90 Base) MCG/ACT inhaler     albuterol (VENTOLIN HFA) 108 (90 Base) MCG/ACT inhaler     aspirin 325 MG tablet     atorvastatin (LIPITOR) 80 MG tablet     blood glucose  "monitoring (NO BRAND SPECIFIED) meter device kit     blood glucose monitoring (ONE TOUCH DELICA) lancets     clopidogrel (PLAVIX) 75 MG tablet     Cyanocobalamin (VITAMIN B-12 PO)     ferrous sulfate (FEROSUL) 325 (65 Fe) MG tablet     lisinopril (ZESTRIL) 10 MG tablet     metFORMIN (GLUCOPHAGE-XR) 500 MG 24 hr tablet     metoprolol tartrate (LOPRESSOR) 50 MG tablet     ONETOUCH VERIO IQ test strip     order for DME     order for DME     pantoprazole (PROTONIX) 40 MG EC tablet     Vitamin D, Cholecalciferol, 25 MCG (1000 UT) CAPS     No current facility-administered medications for this visit.       Pertinent PMHx of cerebral infarction with left hemiparesis, DM II, pulmonary emphysema, obesity, iron deficiency, peripheral vascular disease, HTN, fatigue.    Most recent PFT's performed 4/24/2018 with weight 211 lbs.  Noted for airway obstruction and decreased diffusion, did not see overinflation.    STOP-BANG score of 5, with unknown neck circumference.  [place] score of 8.  BMI of Estimated body mass index is 40.35 kg/m  as calculated from the following:    Height as of 6/13/22: 1.676 m (5' 6\").    Weight as of 6/13/22: 113.4 kg (250 lb).     Per questionnaire: \"Feeling tired all the time\"    Sxs for 4 years.  No prior sleep testing.    Caffeine use:  Yes for 3+ per day.  Yes for within 6 hours of bed.    Tobacco use: Yes    Sleep pattern:  9pm - 5am, total sleep time ? hours.  Time to fall asleep: ~\"varies\" minutes.  Awakenings: \"varies\" times per night, \"varies\" minutes to return to sleep, awake for total of \"varies\" hours per night.  Napping.  7 days per week, 1 hours per nap.    No for RLS screen.  No for sleep walking.  No for dream enactment behavior.  No for bruxism.    No for morning headaches.  No for snoring.  No for observed apnea.  Yes for FHx of SOFIE.    SHx:  , lives alone.  Previously employed as .    A/P:    1.)  High likelihood of SOFIE with STOP-BANG score of 5.   - Would appear to be " candidate for either home sleep testing or in-lab PSG, though would prefer in-lab PSG given significant vascular history and pulmonary emphysema.    ---  This note was written with the assistance of the Dragon voice-dictation technology software. The final document, although reviewed, may contain errors. For corrections, please contact the office.    Jeff Carter MD    Sleep Medicine  [place], [place]  ([phone])  [facility] [place], [place]  ([phone])  [facility] [place], [place] ([phone])

## 2022-07-12 ENCOUNTER — OFFICE VISIT (OUTPATIENT)
Dept: SLEEP MEDICINE | Facility: HOSPITAL | Age: 77
End: 2022-07-12
Attending: FAMILY MEDICINE
Payer: MEDICARE

## 2022-07-12 DIAGNOSIS — G47.33 OSA (OBSTRUCTIVE SLEEP APNEA): ICD-10-CM

## 2022-07-12 PROCEDURE — 95800 SLP STDY UNATTENDED: CPT

## 2022-07-12 PROCEDURE — 95800 SLP STDY UNATTENDED: CPT | Mod: 26

## 2022-07-12 NOTE — PROGRESS NOTES
Patient was provided both verbal and written education and instructions on use of Watch PAT device. Watch PAT device has been registered and shipped via [employer] on 7/12/2022. Patient was notified that package was mailed out. Watch [employer] serial number: [id number].    Tracking #[id number]

## 2022-07-24 NOTE — PROGRESS NOTES
Rosalie Seals is a 77 year old female who is being evaluated via a billable telephone visit.       What phone number would you like to be contacted at?@ [phone]  Home Phone [phone]   Work Phone Not on file.   Mobile [phone]       How would you like to obtain your AVS? [employer]on[employer]       Telephone Virtual Visit Details     Type of service:  Telephone Virtual Visit     Start Time: 1:55pm  End Time: 2:15 PM    Virtual visit for review of home sleep testing results.     Assessment:  -While technically challenging interpretation given periods of excluded data, primarily due to artifact and oximeter, it does not appear to show any significant sleep disordered breathing and overall SPO2 appears to be 90% or greater on average.    Plan:  -Technically, I would recommend in lab PSG given difficulties with consistent data collection with home sleep testing, but she has not open to this at this time.  - In general, my concerns are relatively low, though if there is evidence of worsening lung function or other evidence of hypoventilation or nocturnal hypoxemia, then we may need to readdress and try to compromise on some form of monitored sleep testing.    SUBJECTIVE:  Rosalie Seals is a 77 year old female.    Comorbid Diagnoses:  - cerebral infarction with left hemiparesis  - DM II  - pulmonary emphysema  - obesity  - iron deficiency  - peripheral vascular disease  - HTN  - Chronic fatigue    Today-we reviewed her watch pat home sleep testing.  The initial watch pat failed with no oximetry data collected.  On the second study, there was a significant amount of excluded data due to periods of inconsistent oximetry, but there was at least 3-4 hours for interpretation.  In general, we did not see evidence of obstructive sleep apnea and in general SPO2 appeared to be greater than 90%.    We reviewed her sleep history as outlined in her questionnaire, she largely denies any specific concerns today.  She also notes  "that she would be unlikely to be open to any further sleep testing.  We did offer her an previously recommended in lab PSG, but she did not feel comfortable with this due to COVID.    We discussed her continuation of smoking, she has smoked since the age of 11 years old and is not interested in stopping at this time.    Most recent PFT's performed 4/24/2018 with weight 211 lbs.  Noted for airway obstruction and decreased diffusion, did not see overinflation.     STOP-BANG score of 5, with unknown neck circumference.  [place] score of 8.  BMI of Estimated body mass index is 40.35 kg/m  as calculated from the following:    Height as of 6/13/22: 1.676 m (5' 6\").    Weight as of 6/13/22: 113.4 kg (250 lb).      Per questionnaire: \"Feeling tired all the time\"     Sxs for 4 years.  No prior sleep testing.     Caffeine use:  Yes for 3+ per day.  Yes for within 6 hours of bed.     Tobacco use: Yes     Sleep pattern:  9pm - 5am, total sleep time ? hours.  Time to fall asleep: ~\"varies\" minutes.  Awakenings: \"varies\" times per night, \"varies\" minutes to return to sleep, awake for total of \"varies\" hours per night.  Napping.  7 days per week, 1 hours per nap.     No for RLS screen.  No for sleep walking.  No for dream enactment behavior.  No for bruxism.     No for morning headaches.  No for snoring.  No for observed apnea.  Yes for FHx of SOFIE.     SHx:  , lives alone.  Previously employed as .    Past medical history:    Patient Active Problem List    Diagnosis Date Noted     History of CVA (cerebrovascular accident) 03/02/2019     Priority: High     5/6/17 sudden onset right vision loss and left side weakness, which resolved by the time of arrival to ED in [place]. CT of head and CT angiogram showed R ICA occlusion. L distal ICA, and R MCA 8mm 6mm aneurysms. Neurology was consulted and pt was transferred to [facility] for [facility] management.  mg and lipitor 80 mg was started. Pt was found elevated HgA1c at 10. " Imaging showed R temporal lobe infarct on CT and Angio showed R GOPI occlusion with no intracranial occlusion and L ICA and R MCA aneurysms. Strong family hx of ruptured aneurysms.  Seen at [facility] for an incidental finding of bilat ICA aneurysms. She had diagnostic cerebral angiogram with endovascular coil embolization of the larger left ICA aneurysm. The right ICA aneurysm will be monitored with serial imaging.     2/15/2018 acute onset weakness of her left upper extremity. MR angiogram of the head and neck and MR of the brain, which revealed an acute lacunar infarct in the right centrum semiovale region in an area of old ischemia. Multiple atherosclerotic lesions with complete occlusion of her right internal carotid and other more distal lesions as well. (clopidogrel was added)       Hypovitaminosis D 03/04/2022     Priority: Medium     Fatigue, unspecified type 03/04/2022     Priority: Medium     Abnormal finding of blood chemistry, unspecified  03/04/2022     Priority: Medium     Iron deficiency 03/04/2022     Priority: Medium     Hyperlipidemia LDL goal <100 11/30/2021     Priority: Medium     Type 2 diabetes mellitus with hyperglycemia, without long-term current use of insulin (H) 11/30/2021     Priority: Medium     resolved       Hyperlipidemia 03/02/2019     Priority: Medium     Cerebral aneurysm 03/02/2019     Priority: Medium     Cerebral aneurysm without rupture, left ICA, s/p endovascular coiling 05/2017       PVD (peripheral vascular disease) (H) 03/02/2019     Priority: Medium     ankle-brachial index 2018 1.02 on the right and 0.52 on the left, consistent with moderate ischemia of the left lower extremity.        Emphysema lung (H) 03/02/2019     Priority: Medium     PFTS 5/2018 - FEV1- 2.13 (74%), ratio 0.67, 20% change with bronchodilators,  TLC 99%, %, DLCO 60%        Obesity (BMI 35.0-39.9) with comorbidity (H) 01/03/2019     Priority: Medium     Type 2 diabetes mellitus with  neurologic complication, without long-term current use of insulin (H) 04/12/2018     Priority: Medium     Nonruptured cerebral aneurysm 02/18/2018     Priority: Medium     s/p cerebral angiogram, [facility]       ACP (advance care planning) 05/09/2017     Priority: Medium     Advance Care Planning 5/9/2017: ACP Review of Chart / Resources Provided:  Reviewed chart for advance care plan.  Rosalie JANE Friend has been provided information and resources to begin or update their advance care plan.  Added by Kalee Busby             Pain in joint, lower leg 04/09/2007     Priority: Medium     Formatting of this note might be different from the original.  IMO Update 10/11       Blood glucose abnormal 10/11/2006     Priority: Medium     Formatting of this note might be different from the original.  IMO Update 10/11       Essential hypertension 09/27/2006     Priority: Medium     Formatting of this note might be different from the original.  IMO Update       Gastroesophageal reflux disease without esophagitis 03/02/2019     Priority: Low       10 point ROS of systems including Constitutional, Eyes, Respiratory, Cardiovascular, Gastroenterology, Genitourinary, Integumentary, Muscularskeletal, Psychiatric were all negative except for pertinent positives noted in my HPI.    Current Outpatient Medications   Medication Sig Dispense Refill     albuterol (PROAIR HFA/PROVENTIL HFA/VENTOLIN HFA) 108 (90 Base) MCG/ACT inhaler Inhale 2 puffs into the lungs every 6 hours as needed for shortness of breath / dyspnea or wheezing 18 g 1     albuterol (VENTOLIN HFA) 108 (90 Base) MCG/ACT inhaler USE 1 TO 2 INHALATIONS EVERY 4 HOURS AS NEEDED FOR SHORTNESS OF BREATH, DIFFICULTY BREATHING,  OR WHEEZING 18 g 3     aspirin 325 MG tablet Take 325 mg by mouth       atorvastatin (LIPITOR) 80 MG tablet TAKE 1 TABLET DAILY 90 tablet 3     blood glucose monitoring (NO BRAND SPECIFIED) meter device kit Use to test blood sugar 4 times daily or  "as directed. 1 kit 0     blood glucose monitoring (ONE TOUCH DELICA) lancets Use to test blood sugar 3 times daily. 360 each 3     clopidogrel (PLAVIX) 75 MG tablet TAKE 1 TABLET DAILY 90 tablet 3     ferrous sulfate (FEROSUL) 325 (65 Fe) MG tablet Take 1 tablet (325 mg) by mouth daily (with breakfast) 90 tablet 1     lisinopril (ZESTRIL) 10 MG tablet TAKE 2 TABLETS DAILY (Patient taking differently: 10 mg) 180 tablet 3     metFORMIN (GLUCOPHAGE-XR) 500 MG 24 hr tablet TAKE 2 TABLETS 2 TIMES DAILY WITH MEALS 360 tablet 3     metoprolol tartrate (LOPRESSOR) 50 MG tablet TAKE 1/2 TABLET TWICE A DAY 90 tablet 3     ONETOUCH VERIO IQ test strip USE FOUR TIMES A  each 3     order for DME Equipment being ordered: Wheelchair with foot rests 1 Device 0     order for DME Equipment being ordered: Glucometer and test strips 1 Device 0     pantoprazole (PROTONIX) 40 MG EC tablet TAKE 1 TABLET DAILY 30 TO  60 MINUTES BEFORE A MEAL 90 tablet 1     Cyanocobalamin (VITAMIN B-12 PO) Take 2,500 mcg by mouth daily (Patient not taking: Reported on 8/8/2022)       Vitamin D, Cholecalciferol, 25 MCG (1000 UT) CAPS Take 1 capsule by mouth daily (Patient not taking: Reported on 8/8/2022)         OBJECTIVE:  Ht 1.651 m (5' 5\")   Wt 113.4 kg (250 lb)   BMI 41.60 kg/m      Physical Exam:  healthy, alert and no distress  PSYCH: Alert and oriented times 3; coherent speech, normal   rate and volume, able to articulate logical thoughts, able   to abstract reason, no tangential thoughts, no hallucinations   or delusions  His affect is normal  RESP: No cough, no audible wheezing, able to talk in full sentences  Remainder of exam unable to be completed due to telephone visits    ---  This note was written with the assistance of the Dragon voice-dictation technology software. The final document, although reviewed, may contain errors. For corrections, please contact the office.    Jeff Carter MD    Sleep Medicine  [facility] " [facility]  - [place], [place]  ([phone])  [facility] [place], [place]  ([phone])  [facility], [place], [place] ([phone])    Time spent on the date of service:  20 minutes.

## 2022-07-25 NOTE — PROGRESS NOTES
WatchPAT data has been received and has been scored using rule 1B, 4%. Patient to follow up with provider to determine appropriate therapy.    Pat AHI: unable to get     Ordering Provider: Dr Jeff Carter      Sleep Technician/Technologist: Carole BENNETT

## 2022-07-27 ENCOUNTER — OFFICE VISIT (OUTPATIENT)
Dept: SLEEP MEDICINE | Facility: HOSPITAL | Age: 77
End: 2022-07-27
Attending: FAMILY MEDICINE
Payer: MEDICARE

## 2022-07-27 DIAGNOSIS — G47.33 OSA (OBSTRUCTIVE SLEEP APNEA): Primary | ICD-10-CM

## 2022-07-27 NOTE — PROGRESS NOTES
Patient was provided both verbal and written education and instructions on use of Watch PAT device. Watch PAT device has been registered and shipped via [employer] on 7/27/2022. Patient was notified that package was mailed out. Watch [employer] serial number: [id number].    Tracking# [id number]

## 2022-08-08 ENCOUNTER — VIRTUAL VISIT (OUTPATIENT)
Dept: PULMONOLOGY | Facility: OTHER | Age: 77
End: 2022-08-08
Attending: FAMILY MEDICINE
Payer: MEDICARE

## 2022-08-08 VITALS — BODY MASS INDEX: 41.65 KG/M2 | WEIGHT: 250 LBS | HEIGHT: 65 IN

## 2022-08-08 DIAGNOSIS — J43.8 OTHER EMPHYSEMA (H): ICD-10-CM

## 2022-08-08 DIAGNOSIS — I10 ESSENTIAL HYPERTENSION: ICD-10-CM

## 2022-08-08 DIAGNOSIS — E11.65 TYPE 2 DIABETES MELLITUS WITH HYPERGLYCEMIA, WITHOUT LONG-TERM CURRENT USE OF INSULIN (H): Primary | ICD-10-CM

## 2022-08-08 PROCEDURE — 99442 PR PHYSICIAN TELEPHONE EVALUATION 11-20 MIN: CPT | Mod: 95 | Performed by: FAMILY MEDICINE

## 2022-08-08 NOTE — PROGRESS NOTES
"Rosalie JANE Friend is a 77 year old female being evaluated via a billable telephone visit.     \"This telephone visit will be conducted via a call between you and your physician/provider. We have found that certain health care needs can be provided without the need for an in-person visit or physical exam.  This service lets us provide the care you need with a telephone conversation.  If a prescription is necessary we can send it directly to your pharmacy.  If lab work is needed we can place an order for that and you can then stop by our lab to have the test done at a later time.\"    Telephone visits are billed at different rates depending on your insurance coverage.  Please reach out to your insurance provider with any questions.    Patient has given verbal consent for  a Telephone visit? Yes    What telephone number would you like your provider to contact at at:  [phone]    How would you like to obtain your AVS? Deanna Powers    Telephone Visit Details:     Telephone Visit Start Time: {telephone visit start/end time for provider to select:253872}    Telephone Visit End Time:{telephone visit start/end time for provider to select:155936}      "

## 2022-08-31 ENCOUNTER — TRANSFERRED RECORDS (OUTPATIENT)
Dept: HEALTH INFORMATION MANAGEMENT | Facility: CLINIC | Age: 77
End: 2022-08-31

## 2022-09-04 ENCOUNTER — HOSPITAL ENCOUNTER (EMERGENCY)
Facility: HOSPITAL | Age: 77
Discharge: HOME | End: 2022-09-04
Attending: NURSE PRACTITIONER | Admitting: NURSE PRACTITIONER
Payer: MEDICARE

## 2022-09-04 ENCOUNTER — HEALTH MAINTENANCE LETTER (OUTPATIENT)
Age: 77
End: 2022-09-04

## 2022-09-04 VITALS
OXYGEN SATURATION: 95 % | RESPIRATION RATE: 18 BRPM | DIASTOLIC BLOOD PRESSURE: 97 MMHG | SYSTOLIC BLOOD PRESSURE: 194 MMHG | HEART RATE: 65 BPM | TEMPERATURE: 97.8 F

## 2022-09-04 DIAGNOSIS — S61.216A LACERATION OF RIGHT LITTLE FINGER: Primary | ICD-10-CM

## 2022-09-04 DIAGNOSIS — S61.216A LACERATION OF RIGHT LITTLE FINGER WITHOUT FOREIGN BODY WITHOUT DAMAGE TO NAIL, INITIAL ENCOUNTER: ICD-10-CM

## 2022-09-04 PROCEDURE — 12002 RPR S/N/AX/GEN/TRNK2.6-7.5CM: CPT | Performed by: NURSE PRACTITIONER

## 2022-09-04 PROCEDURE — 999N000104 HC STATISTIC NO CHARGE

## 2022-09-04 PROCEDURE — 250N000009 HC RX 250: Performed by: NURSE PRACTITIONER

## 2022-09-04 PROCEDURE — 12002 RPR S/N/AX/GEN/TRNK2.6-7.5CM: CPT

## 2022-09-04 RX ORDER — LIDOCAINE 40 MG/G
CREAM TOPICAL ONCE
Status: COMPLETED | OUTPATIENT
Start: 2022-09-04 | End: 2022-09-04

## 2022-09-04 RX ADMIN — LIDOCAINE: 40 CREAM TOPICAL at 16:23

## 2022-09-04 ASSESSMENT — ACTIVITIES OF DAILY LIVING (ADL)
ADLS_ACUITY_SCORE: 35
ADLS_ACUITY_SCORE: 35

## 2022-09-04 ASSESSMENT — ENCOUNTER SYMPTOMS
MYALGIAS: 1
WOUND: 1

## 2022-09-04 NOTE — ED TRIAGE NOTES
Patient presents with complaints of getting her pinky caught in the door and states she pulled back.

## 2022-09-04 NOTE — DISCHARGE INSTRUCTIONS
Keep the dressing in place for the rest of the day and avoid getting the wound wet.  You can change the dressing daily, sparingly apply triple antibiotic over the area and continue to use the splint.    Go to your doctor or return here in 8 to 10 days to get the stitches removed.    Observe for signs of infection such as redness, abnormal discharge, increased pain or swelling and return here for reevaluation.

## 2022-09-04 NOTE — ED PROVIDER NOTES
History     Chief Complaint   Patient presents with     Laceration     HPI  Rosalie JANE Friend is a 77 year old female who presents with her daughter for evaluation of a laceration.  About 2 hours prior to this arrival patient tells me that she had her right hand in on the car door when her grandson went to close the door and she pulled her hand back fast resulting in a laceration to her right little finger.  She is still able to move her finger with minimal difficulty.  She is slowly oozing blood to this finger.  Patient notes that she is on Plavix.  Last Tdap unknown.  Patient tells me that she has not had tetanus vaccinations for several years because she does not want it.  She declines tetanus vaccination today.    Allergies:  Allergies   Allergen Reactions     Azithromycin Swelling     Face swelling     Pcn [Penicillins]        Problem List:    Patient Active Problem List    Diagnosis Date Noted     History of CVA (cerebrovascular accident) 03/02/2019     Priority: High     5/6/17 sudden onset right vision loss and left side weakness, which resolved by the time of arrival to ED in [place]. CT of head and CT angiogram showed R ICA occlusion. L distal ICA, and R MCA 8mm 6mm aneurysms. Neurology was consulted and pt was transferred to [facility] for further management.  mg and lipitor 80 mg was started. Pt was found elevated HgA1c at 10. Imaging showed R temporal lobe infarct on CT and Angio showed R GOPI occlusion with no intracranial occlusion and L ICA and R MCA aneurysms. Strong family hx of ruptured aneurysms.  Seen at [facility] for an incidental finding of bilat ICA aneurysms. She had diagnostic cerebral angiogram with endovascular coil embolization of the larger left ICA aneurysm. The right ICA aneurysm will be monitored with serial imaging.     2/15/2018 acute onset weakness of her left upper extremity. MR angiogram of the head and neck and MR of the brain, which revealed an acute lacunar infarct  in the right centrum semiovale region in an area of old ischemia. Multiple atherosclerotic lesions with complete occlusion of her right internal carotid and other more distal lesions as well. (clopidogrel was added)       Hypovitaminosis D 03/04/2022     Priority: Medium     Fatigue, unspecified type 03/04/2022     Priority: Medium     Abnormal finding of blood chemistry, unspecified  03/04/2022     Priority: Medium     Iron deficiency 03/04/2022     Priority: Medium     Hyperlipidemia LDL goal <100 11/30/2021     Priority: Medium     Type 2 diabetes mellitus with hyperglycemia, without long-term current use of insulin (H) 11/30/2021     Priority: Medium     resolved       Hyperlipidemia 03/02/2019     Priority: Medium     Cerebral aneurysm 03/02/2019     Priority: Medium     Cerebral aneurysm without rupture, left ICA, s/p endovascular coiling 05/2017       PVD (peripheral vascular disease) (H) 03/02/2019     Priority: Medium     ankle-brachial index 2018 1.02 on the right and 0.52 on the left, consistent with moderate ischemia of the left lower extremity.        Emphysema lung (H) 03/02/2019     Priority: Medium     PFTS 5/2018 - FEV1- 2.13 (74%), ratio 0.67, 20% change with bronchodilators,  TLC 99%, %, DLCO 60%        Obesity (BMI 35.0-39.9) with comorbidity (H) 01/03/2019     Priority: Medium     Type 2 diabetes mellitus with neurologic complication, without long-term current use of insulin (H) 04/12/2018     Priority: Medium     Nonruptured cerebral aneurysm 02/18/2018     Priority: Medium     s/p cerebral angiogram, [facility]       ACP (advance care planning) 05/09/2017     Priority: Medium     Advance Care Planning 5/9/2017: ACP Review of Chart / Resources Provided:  Reviewed chart for advance care plan.  Rosalie JANE Friend has been provided information and resources to begin or update their advance care plan.  Added by Kalee Busby             Pain in joint, lower leg 04/09/2007      Priority: Medium     Formatting of this note might be different from the original.  IMO Update 10/11       Blood glucose abnormal 10/11/2006     Priority: Medium     Formatting of this note might be different from the original.  IMO Update 10/11       Essential hypertension 09/27/2006     Priority: Medium     Formatting of this note might be different from the original.  IMO Update       Gastroesophageal reflux disease without esophagitis 03/02/2019     Priority: Low        Past Medical History:    Past Medical History:   Diagnosis Date     Cerebral infarction (H) 05/06/2017     Concussion with loss of consciousness of 30 minutes or less 1950     Emphysema lung (H)      Gastroesophageal reflux disease without esophagitis      Hyperlipidemia LDL goal <100      Left hemiparesis (H) 2017     Morbid obesity (H)      Nonruptured cerebral aneurysm 02/18/2018     Peripheral vascular disease (H)      Tobacco abuse      Type 2 diabetes mellitus with hyperglycemia, without long-term current use of insulin (H)        Past Surgical History:    Past Surgical History:   Procedure Laterality Date     ABDOMEN SURGERY  04/1997    bladder repair     CEREBRAL ANGIOGRAM  2018     GYN SURGERY  04/1974    hysterectomy      PHACOEMULSIFICATION WITH STANDARD INTRAOCULAR LENS IMPLANT Left 12/2/2021    Procedure: PHACOEMULSIFICATION, CATARACT, WITH STANDARD INTRAOCULAR LENS IMPLANT INSERTION;  Surgeon: Leo Hernandes MD;  Location: [facility] OR     PHACOEMULSIFICATION WITH STANDARD INTRAOCULAR LENS IMPLANT Right 1/6/2022    Procedure: RIGHT EYE CATARACT EXTRACTION WITH INTRAOCULAR LENS IMPLANT;  Surgeon: Leo Hernandes MD;  Location: [facility] OR       Family History:    Family History   Problem Relation Age of Onset     Aneurysm Mother 44     Other Cancer Father      Other Cancer Sister 59        female     Aneurysm Sister 72     Aneurysm Sister 23     Diabetes Brother      Hypertension Brother      Other Cancer Brother         unknown     Sudden  Infant Death Syndrome Brother         influenza     Family History Negative Brother      Family History Negative Brother      Family History Negative Brother      Family History Negative Brother      Family History Negative Brother      Unknown/Adopted Maternal Grandmother      Unknown/Adopted Maternal Grandfather      Unknown/Adopted Paternal Grandmother      Unknown/Adopted Paternal Grandfather        Social History:  Marital Status:   [4]  Social History     Tobacco Use     Smoking status: Current Some Day Smoker     Types: Cigarettes     Start date: 1956     Last attempt to quit: 2017     Years since quittin.3     Smokeless tobacco: Never Used     Tobacco comment: limits self to about 4-5 daily , she is quitting on her own   Vaping Use     Vaping Use: Never used   Substance Use Topics     Alcohol use: No     Drug use: No        Medications:    albuterol (PROAIR HFA/PROVENTIL HFA/VENTOLIN HFA) 108 (90 Base) MCG/ACT inhaler  aspirin 325 MG tablet  atorvastatin (LIPITOR) 80 MG tablet  blood glucose monitoring (NO BRAND SPECIFIED) meter device kit  blood glucose monitoring (ONE TOUCH DELICA) lancets  clopidogrel (PLAVIX) 75 MG tablet  ferrous sulfate (FEROSUL) 325 (65 Fe) MG tablet  lisinopril (ZESTRIL) 10 MG tablet  metFORMIN (GLUCOPHAGE-XR) 500 MG 24 hr tablet  metoprolol tartrate (LOPRESSOR) 50 MG tablet  ONETOUCH VERIO IQ test strip  order for DME  order for DME  pantoprazole (PROTONIX) 40 MG EC tablet  albuterol (VENTOLIN HFA) 108 (90 Base) MCG/ACT inhaler  Cyanocobalamin (VITAMIN B-12 PO)  Vitamin D, Cholecalciferol, 25 MCG (1000 UT) CAPS          Review of Systems   Musculoskeletal: Positive for myalgias.   Skin: Positive for wound.   All other systems reviewed and are negative.      Physical Exam   BP: (!) 194/97  Pulse: 65  Temp: 97.8  F (36.6  C)  Resp: 18  SpO2: 95 %      Physical Exam  Vitals and nursing note reviewed.   Constitutional:       Appearance: Normal appearance. She is not  ill-appearing or toxic-appearing.   HENT:      Head: Atraumatic.   Eyes:      Pupils: Pupils are equal, round, and reactive to light.   Cardiovascular:      Rate and Rhythm: Normal rate.   Pulmonary:      Effort: Pulmonary effort is normal.   Musculoskeletal:         General: Swelling, tenderness and signs of injury present.        Hands:       Cervical back: Neck supple.      Comments: Approximately 4.5 cm laceration to ulnar aspect of right middle finger.  Slow bleeding oozing blood.  Full range of motion to left finger.   Skin:     General: Skin is warm and dry.      Capillary Refill: Capillary refill takes less than 2 seconds.   Neurological:      Mental Status: She is alert and oriented to person, place, and time.         ED Course                 [facility]    -Laceration Repair    Date/Time: 9/4/2022 4:45 PM  Performed by: Isis Grigsby CNP  Authorized by: Isis Grigsby CNP     Risks, benefits and alternatives discussed.      ANESTHESIA (see MAR for exact dosages):     Anesthesia method:  Nerve block and topical application    Block needle gauge:  27 G    Block anesthetic:  Lidocaine 1% w/o epi    Block injection procedure:  Anatomic landmarks identified, anatomic landmarks palpated, introduced needle, negative aspiration for blood and incremental injection    Block outcome:  Incomplete block      LACERATION DETAILS     Location:  Finger    Finger location:  R small finger    Length (cm):  4.5    REPAIR TYPE:     Repair type:  Simple      EXPLORATION:     Hemostasis achieved with:  Direct pressure and LET    Wound exploration: wound explored through full range of motion and entire depth of wound probed and visualized      Wound extent: muscle damage      Wound extent: fascia not violated, no foreign body, no nerve damage and no tendon damage      Contaminated: no      TREATMENT:     Area cleansed with:  Saline    Amount of cleaning:  Standard    Irrigation solution:  Sterile saline     Irrigation volume:  250    Visualized foreign bodies/material removed: no      SKIN REPAIR     Repair method:  Sutures, Steri-Strips and tissue adhesive    Suture size:  3-0    Suture technique:  Simple interrupted    Number of sutures:  3    Number of Steri-Strips:  3    APPROXIMATION     Approximation:  Close    POST-PROCEDURE DETAILS     Dressing:  Antibiotic ointment, tube gauze and splint for protection        PROCEDURE  Describe Procedure: Digital nerve block was done to the right little finger.  3 sutures were applied.  Patient had an area of to her little finger that was not adequately anesthetized.  Attempted to do local infiltration to this area, used topical LMX and had patient soak her finger in LET.  Patient continued to state that the area was not adequately anesthetized and was unable to tolerate any further sutures being applied.  Patient would move her hand away when this writer was attempting to apply suture.  Declined any further sutures to this area.  Patient requested that Steri-Strips and glue be applied to the rest of her wound.  3 Steri-Strips as well as tissue adhesive was applied.  Tube gauze and splint applied over the finger.  Patient tolerated well  Patient Tolerance:  Patient tolerated the procedure well with no immediate complications                No results found for this or any previous visit (from the past 24 hour(s)).    Medications   lidocaine (LMX4) cream ( Topical Given 9/4/22 7920)       Assessments & Plan (with Medical Decision Making)     I have reviewed the nursing notes.    77-year-old female that presented with a laceration to her right little finger following an injury that occurred approximately 2 hours prior to this arrival.  Full range of motion to the little finger.  Slowly oozing blood which was controlled with direct pressure as well as let.  Laceration repair was done with sutures, Steri-Strips and tissue adhesive.  Patient was not able to tolerate sutures being  applied to the entire laceration and requested that Steri-Strips and glue be applied.  Her Tdap is not up-to-date.  She has not had this for several years and continues to decline updating it today.  Splint was applied to her finger.  Advised her to keep the wound clean and dry.  Observe for signs of infection and return here for reevaluation.  Go to PCP or return here in 8 to 10 days for suture removal.    I have reviewed the findings, diagnosis, plan and need for follow up with the patient.  This document was prepared using a combination of typing and voice generated software.  While every attempt was made for accuracy, spelling and grammatical errors may exist.    New Prescriptions    No medications on file       Final diagnoses:   Laceration of right little finger       9/4/2022   [facility]     Mpofu, Shruthince, CNP  09/04/22 2778

## 2022-09-04 NOTE — ED TRIAGE NOTES
Pt presents with c/o laceration right small finger. Reports that she got her finger in a door and pulled back. Incident happened about 2 hours ago. Pt is on Plavix and aspirin. Denies hx of clotting disorders. Pt has not had a tdap in a long time. Pt would not like to update tdap at this time. Pt also reports swelling in her left foot. Denies numbness/tingling in foot. CMS intact. ROM present.

## 2022-09-07 ENCOUNTER — APPOINTMENT (OUTPATIENT)
Dept: GENERAL RADIOLOGY | Facility: HOSPITAL | Age: 77
End: 2022-09-07
Attending: PHYSICIAN ASSISTANT
Payer: MEDICARE

## 2022-09-07 ENCOUNTER — HOSPITAL ENCOUNTER (EMERGENCY)
Facility: HOSPITAL | Age: 77
Discharge: HOME | End: 2022-09-07
Attending: PHYSICIAN ASSISTANT | Admitting: PHYSICIAN ASSISTANT
Payer: MEDICARE

## 2022-09-07 VITALS
DIASTOLIC BLOOD PRESSURE: 98 MMHG | RESPIRATION RATE: 24 BRPM | SYSTOLIC BLOOD PRESSURE: 198 MMHG | HEART RATE: 100 BPM | TEMPERATURE: 99 F | OXYGEN SATURATION: 94 %

## 2022-09-07 DIAGNOSIS — U07.1 INFECTION DUE TO 2019 NOVEL CORONAVIRUS: ICD-10-CM

## 2022-09-07 DIAGNOSIS — I10 HTN (HYPERTENSION): ICD-10-CM

## 2022-09-07 LAB
ANION GAP SERPL CALCULATED.3IONS-SCNC: 8 MMOL/L (ref 3–14)
BASOPHILS # BLD AUTO: 0 10E3/UL (ref 0–0.2)
BASOPHILS NFR BLD AUTO: 1 %
BUN SERPL-MCNC: 16 MG/DL (ref 7–30)
CALCIUM SERPL-MCNC: 9.1 MG/DL (ref 8.5–10.1)
CHLORIDE BLD-SCNC: 102 MMOL/L (ref 94–109)
CO2 SERPL-SCNC: 24 MMOL/L (ref 20–32)
CREAT SERPL-MCNC: 0.78 MG/DL (ref 0.52–1.04)
EOSINOPHIL # BLD AUTO: 0 10E3/UL (ref 0–0.7)
EOSINOPHIL NFR BLD AUTO: 0 %
ERYTHROCYTE [DISTWIDTH] IN BLOOD BY AUTOMATED COUNT: 14.9 % (ref 10–15)
FLUAV RNA SPEC QL NAA+PROBE: NEGATIVE
FLUBV RNA RESP QL NAA+PROBE: NEGATIVE
GFR SERPL CREATININE-BSD FRML MDRD: 78 ML/MIN/1.73M2
GLUCOSE BLD-MCNC: 144 MG/DL (ref 70–99)
HCT VFR BLD AUTO: 39.9 % (ref 35–47)
HGB BLD-MCNC: 12.6 G/DL (ref 11.7–15.7)
HOLD SPECIMEN: NORMAL
IMM GRANULOCYTES # BLD: 0 10E3/UL
IMM GRANULOCYTES NFR BLD: 0 %
LACTATE SERPL-SCNC: 1.1 MMOL/L (ref 0.7–2)
LYMPHOCYTES # BLD AUTO: 0.2 10E3/UL (ref 0.8–5.3)
LYMPHOCYTES NFR BLD AUTO: 4 %
MCH RBC QN AUTO: 26 PG (ref 26.5–33)
MCHC RBC AUTO-ENTMCNC: 31.6 G/DL (ref 31.5–36.5)
MCV RBC AUTO: 82 FL (ref 78–100)
MONOCYTES # BLD AUTO: 0.6 10E3/UL (ref 0–1.3)
MONOCYTES NFR BLD AUTO: 12 %
NEUTROPHILS # BLD AUTO: 4.5 10E3/UL (ref 1.6–8.3)
NEUTROPHILS NFR BLD AUTO: 83 %
NRBC # BLD AUTO: 0 10E3/UL
NRBC BLD AUTO-RTO: 0 /100
PLATELET # BLD AUTO: 235 10E3/UL (ref 150–450)
POTASSIUM BLD-SCNC: 4.1 MMOL/L (ref 3.4–5.3)
PROCALCITONIN SERPL-MCNC: <0.05 NG/ML
RBC # BLD AUTO: 4.84 10E6/UL (ref 3.8–5.2)
RSV RNA SPEC NAA+PROBE: NEGATIVE
SARS-COV-2 RNA RESP QL NAA+PROBE: POSITIVE
SODIUM SERPL-SCNC: 134 MMOL/L (ref 133–144)
TROPONIN I SERPL HS-MCNC: 9 NG/L
WBC # BLD AUTO: 5.4 10E3/UL (ref 4–11)

## 2022-09-07 PROCEDURE — 36415 COLL VENOUS BLD VENIPUNCTURE: CPT | Performed by: PHYSICIAN ASSISTANT

## 2022-09-07 PROCEDURE — 71045 X-RAY EXAM CHEST 1 VIEW: CPT

## 2022-09-07 PROCEDURE — 84145 PROCALCITONIN (PCT): CPT | Performed by: PHYSICIAN ASSISTANT

## 2022-09-07 PROCEDURE — C9803 HOPD COVID-19 SPEC COLLECT: HCPCS

## 2022-09-07 PROCEDURE — 80048 BASIC METABOLIC PNL TOTAL CA: CPT | Performed by: PHYSICIAN ASSISTANT

## 2022-09-07 PROCEDURE — 99284 EMERGENCY DEPT VISIT MOD MDM: CPT | Mod: 25

## 2022-09-07 PROCEDURE — 83605 ASSAY OF LACTIC ACID: CPT | Performed by: PHYSICIAN ASSISTANT

## 2022-09-07 PROCEDURE — 84484 ASSAY OF TROPONIN QUANT: CPT | Performed by: PHYSICIAN ASSISTANT

## 2022-09-07 PROCEDURE — 99284 EMERGENCY DEPT VISIT MOD MDM: CPT | Performed by: PHYSICIAN ASSISTANT

## 2022-09-07 PROCEDURE — 87637 SARSCOV2&INF A&B&RSV AMP PRB: CPT | Performed by: PHYSICIAN ASSISTANT

## 2022-09-07 PROCEDURE — 85025 COMPLETE CBC W/AUTO DIFF WBC: CPT | Performed by: PHYSICIAN ASSISTANT

## 2022-09-07 ASSESSMENT — ENCOUNTER SYMPTOMS
SHORTNESS OF BREATH: 0
PHOTOPHOBIA: 0
PALPITATIONS: 0
NAUSEA: 1
DIARRHEA: 0
DIZZINESS: 0
BRUISES/BLEEDS EASILY: 1
HEADACHES: 0
LIGHT-HEADEDNESS: 0
ABDOMINAL PAIN: 0
WEAKNESS: 1
FEVER: 0
FATIGUE: 1
COUGH: 0
CHILLS: 0
VOMITING: 0
BACK PAIN: 0
ACTIVITY CHANGE: 1
APPETITE CHANGE: 1

## 2022-09-07 ASSESSMENT — ACTIVITIES OF DAILY LIVING (ADL): ADLS_ACUITY_SCORE: 35

## 2022-09-07 NOTE — ED NOTES
Pt presenting with daughter for tooth pain, headache, nausea, fatigue with a close covid exposure. Pt reporting 5/10 pain in R lower jaw that radiates to head. Pt currently in motorized chair, refusing to get into hospital bed. Left sided weakness from prev stroke. Denies SOB, cough, chest pain.

## 2022-09-07 NOTE — ED TRIAGE NOTES
Pt presents with a positive covid exposure. Pt reports fatigue, headache and nausea.     Jovanny Post, MSN, RN on 9/7/2022 at 6:09 PM

## 2022-09-08 DIAGNOSIS — K21.9 GASTROESOPHAGEAL REFLUX DISEASE WITHOUT ESOPHAGITIS: ICD-10-CM

## 2022-09-08 RX ORDER — PANTOPRAZOLE SODIUM 40 MG/1
40 TABLET, DELAYED RELEASE ORAL DAILY
Qty: 90 TABLET | Refills: 1 | Status: SHIPPED | OUTPATIENT
Start: 2022-09-08 | End: 2023-05-05

## 2022-09-08 NOTE — ED NOTES
Discharge instructions gone over with patient and she states understanding. Discharged in stable condition, per own wheelchair, with daughter.

## 2022-09-08 NOTE — ED PROVIDER NOTES
History     Chief Complaint   Patient presents with     Fatigue     Covid Concern     Dental Pain     The history is provided by the patient.     Rosalie JANE Friend is a 77 year old female who presented to the emergency department via wheelchair for evaluation of a 1 day history of fatigue and a right sided dental pain.  Patient reports that she has been more fatigued today.  Denies any fevers or chills.  Does endorse a recent close contact with a COVID-positive patient.  Denies any significant cough or shortness of breath.  Denies any chest discomfort.  Denies any abdominal pain.  She does not report some nausea without significant vomiting.  Denies back discomfort.  Denies any lower urinary tract symptoms.  The patient is refusing to eat out of her wheelchair.  She also was refusing imaging.    Allergies:  Allergies   Allergen Reactions     Azithromycin Swelling     Face swelling     Pcn [Penicillins]        Problem List:    Patient Active Problem List    Diagnosis Date Noted     History of CVA (cerebrovascular accident) 03/02/2019     Priority: High     5/6/17 sudden onset right vision loss and left side weakness, which resolved by the time of arrival to ED in [place]. CT of head and CT angiogram showed R ICA occlusion. L distal ICA, and R MCA 8mm 6mm aneurysms. Neurology was consulted and pt was transferred to [facility] for further management.  mg and lipitor 80 mg was started. Pt was found elevated HgA1c at 10. Imaging showed R temporal lobe infarct on CT and Angio showed R GOPI occlusion with no intracranial occlusion and L ICA and R MCA aneurysms. Strong family hx of ruptured aneurysms.  Seen at [facility] for an incidental finding of bilat ICA aneurysms. She had diagnostic cerebral angiogram with endovascular coil embolization of the larger left ICA aneurysm. The right ICA aneurysm will be monitored with serial imaging.     2/15/2018 acute onset weakness of her left upper extremity. MR angiogram of  the head and neck and MR of the brain, which revealed an acute lacunar infarct in the right centrum semiovale region in an area of old ischemia. Multiple atherosclerotic lesions with complete occlusion of her right internal carotid and other more distal lesions as well. (clopidogrel was added)       Hypovitaminosis D 03/04/2022     Priority: Medium     Fatigue, unspecified type 03/04/2022     Priority: Medium     Abnormal finding of blood chemistry, unspecified  03/04/2022     Priority: Medium     Iron deficiency 03/04/2022     Priority: Medium     Hyperlipidemia LDL goal <100 11/30/2021     Priority: Medium     Type 2 diabetes mellitus with hyperglycemia, without long-term current use of insulin (H) 11/30/2021     Priority: Medium     resolved       Hyperlipidemia 03/02/2019     Priority: Medium     Cerebral aneurysm 03/02/2019     Priority: Medium     Cerebral aneurysm without rupture, left ICA, s/p endovascular coiling 05/2017       PVD (peripheral vascular disease) (H) 03/02/2019     Priority: Medium     ankle-brachial index 2018 1.02 on the right and 0.52 on the left, consistent with moderate ischemia of the left lower extremity.        Emphysema lung (H) 03/02/2019     Priority: Medium     PFTS 5/2018 - FEV1- 2.13 (74%), ratio 0.67, 20% change with bronchodilators,  TLC 99%, %, DLCO 60%        Obesity (BMI 35.0-39.9) with comorbidity (H) 01/03/2019     Priority: Medium     Type 2 diabetes mellitus with neurologic complication, without long-term current use of insulin (H) 04/12/2018     Priority: Medium     Nonruptured cerebral aneurysm 02/18/2018     Priority: Medium     s/p cerebral angiogram, [facility]       ACP (advance care planning) 05/09/2017     Priority: Medium     Advance Care Planning 5/9/2017: ACP Review of Chart / Resources Provided:  Reviewed chart for advance care plan.  Rosalie JANE Friend has been provided information and resources to begin or update their advance care plan.  Added  by Kalee Busby             Pain in joint, lower leg 04/09/2007     Priority: Medium     Formatting of this note might be different from the original.  IMO Update 10/11       Blood glucose abnormal 10/11/2006     Priority: Medium     Formatting of this note might be different from the original.  IMO Update 10/11       Essential hypertension 09/27/2006     Priority: Medium     Formatting of this note might be different from the original.  IMO Update       Gastroesophageal reflux disease without esophagitis 03/02/2019     Priority: Low        Past Medical History:    Past Medical History:   Diagnosis Date     Cerebral infarction (H) 05/06/2017     Concussion with loss of consciousness of 30 minutes or less 1950     Emphysema lung (H)      Gastroesophageal reflux disease without esophagitis      Hyperlipidemia LDL goal <100      Left hemiparesis (H) 2017     Morbid obesity (H)      Nonruptured cerebral aneurysm 02/18/2018     Peripheral vascular disease (H)      Tobacco abuse      Type 2 diabetes mellitus with hyperglycemia, without long-term current use of insulin (H)        Past Surgical History:    Past Surgical History:   Procedure Laterality Date     ABDOMEN SURGERY  04/1997    bladder repair     CEREBRAL ANGIOGRAM  2018     GYN SURGERY  04/1974    hysterectomy      PHACOEMULSIFICATION WITH STANDARD INTRAOCULAR LENS IMPLANT Left 12/2/2021    Procedure: PHACOEMULSIFICATION, CATARACT, WITH STANDARD INTRAOCULAR LENS IMPLANT INSERTION;  Surgeon: Leo Hernandes MD;  Location: [facility] OR     PHACOEMULSIFICATION WITH STANDARD INTRAOCULAR LENS IMPLANT Right 1/6/2022    Procedure: RIGHT EYE CATARACT EXTRACTION WITH INTRAOCULAR LENS IMPLANT;  Surgeon: Leo Hernandes MD;  Location: [facility] OR       Family History:    Family History   Problem Relation Age of Onset     Aneurysm Mother 44     Other Cancer Father      Other Cancer Sister 59        female     Aneurysm Sister 72     Aneurysm Sister 23     Diabetes Brother       Hypertension Brother      Other Cancer Brother         unknown     Sudden Infant Death Syndrome Brother         influenza     Family History Negative Brother      Family History Negative Brother      Family History Negative Brother      Family History Negative Brother      Family History Negative Brother      Unknown/Adopted Maternal Grandmother      Unknown/Adopted Maternal Grandfather      Unknown/Adopted Paternal Grandmother      Unknown/Adopted Paternal Grandfather        Social History:  Marital Status:   [4]  Social History     Tobacco Use     Smoking status: Current Some Day Smoker     Packs/day: 0.25     Types: Cigarettes     Start date: 1/1/1956     Smokeless tobacco: Never Used     Tobacco comment: limits self to about 4-5 daily , she is quitting on her own   Vaping Use     Vaping Use: Never used   Substance Use Topics     Alcohol use: No     Drug use: No        Medications:    nirmatrelvir and ritonavir (PAXLOVID) therapy pack  albuterol (PROAIR HFA/PROVENTIL HFA/VENTOLIN HFA) 108 (90 Base) MCG/ACT inhaler  albuterol (VENTOLIN HFA) 108 (90 Base) MCG/ACT inhaler  aspirin 325 MG tablet  atorvastatin (LIPITOR) 80 MG tablet  blood glucose monitoring (NO BRAND SPECIFIED) meter device kit  blood glucose monitoring (ONE TOUCH DELICA) lancets  clopidogrel (PLAVIX) 75 MG tablet  Cyanocobalamin (VITAMIN B-12 PO)  ferrous sulfate (FEROSUL) 325 (65 Fe) MG tablet  lisinopril (ZESTRIL) 10 MG tablet  metFORMIN (GLUCOPHAGE-XR) 500 MG 24 hr tablet  metoprolol tartrate (LOPRESSOR) 50 MG tablet  ONETOUCH VERIO IQ test strip  order for DME  order for DME  pantoprazole (PROTONIX) 40 MG EC tablet  Vitamin D, Cholecalciferol, 25 MCG (1000 UT) CAPS          Review of Systems   Constitutional: Positive for activity change, appetite change and fatigue. Negative for chills and fever.   HENT: Negative.    Eyes: Negative for photophobia and visual disturbance.   Respiratory: Negative for cough and shortness of breath.     Cardiovascular: Negative for chest pain and palpitations.   Gastrointestinal: Positive for nausea. Negative for abdominal pain, diarrhea and vomiting.   Genitourinary: Negative.    Musculoskeletal: Negative for back pain.   Skin: Negative.    Neurological: Positive for weakness. Negative for dizziness, light-headedness and headaches.   Hematological: Bruises/bleeds easily.        Plavix       Physical Exam   BP: (!) 226/105  Pulse: 105  Temp: 99  F (37.2  C)  Resp: 24  SpO2: (!) 91 %      Physical Exam  Vitals and nursing note reviewed.   Constitutional:       General: She is not in acute distress.     Appearance: Normal appearance. She is not ill-appearing, toxic-appearing or diaphoretic.   HENT:      Mouth/Throat:      Comments: Generalized poor dentition.  No evidence of dental abscess.  Eyes:      Extraocular Movements: Extraocular movements intact.      Conjunctiva/sclera: Conjunctivae normal.      Pupils: Pupils are equal, round, and reactive to light.   Cardiovascular:      Rate and Rhythm: Regular rhythm.      Pulses: Normal pulses.   Pulmonary:      Effort: Pulmonary effort is normal. No respiratory distress.      Breath sounds: Normal breath sounds. No wheezing.   Abdominal:      General: There is no distension.      Palpations: Abdomen is soft.      Tenderness: There is no abdominal tenderness. There is no guarding.   Musculoskeletal:      Cervical back: Normal range of motion and neck supple.   Skin:     General: Skin is warm and dry.      Capillary Refill: Capillary refill takes less than 2 seconds.   Neurological:      General: No focal deficit present.      Mental Status: She is alert and oriented to person, place, and time.   Psychiatric:         Mood and Affect: Mood normal.         Behavior: Behavior normal.         ED Course                 Procedures              Critical Care time:  none               Results for orders placed or performed during the hospital encounter of 09/07/22 (from the  past 24 hour(s))   Symptomatic; Yes; 9/6/2022 Influenza A/B & SARS-CoV2 (COVID-19) Virus PCR Multiplex Nasopharyngeal    Specimen: Nasopharyngeal; Swab   Result Value Ref Range    Influenza A PCR Negative Negative    Influenza B PCR Negative Negative    RSV PCR Negative Negative    SARS CoV2 PCR Positive (A) Negative    Narrative    Testing was performed using the Xpert Xpress CoV2/Flu/RSV Assay on the [employer] GeneXpert Instrument. This test should be ordered for the detection of SARS-CoV-2 and influenza viruses in individuals who meet clinical and/or epidemiological criteria. Test performance is unknown in asymptomatic patients. This test is for in vitro diagnostic use under the FDA EUA for laboratories certified under CLIA to perform high or moderate complexity testing. This test has not been FDA cleared or approved. A negative result does not rule out the presence of PCR inhibitors in the specimen or target RNA in concentration below the limit of detection for the assay. If only one viral target is positive but coinfection with multiple targets is suspected, the sample should be re-tested with another FDA cleared, approved, or authorized test, if coinfection would change clinical management. This test was validated by the [email or web address]. These laboratories are certified under the Clinical  Laboratory Improvement Amendments of 1988 (CLIA-88) as qualified to perform high complexity laboratory testing.   XR Chest Port 1 View    Narrative    Exam:  XR CHEST PORT 1 VIEW    HISTORY: hypoxia.    COMPARISON:  6/13/2022, 3/13/2018    FINDINGS:     The cardiomediastinal contours are normal.      Minimal streaky bibasilar opacities are likely atelectasis or  scarring. No pleural effusion or pneumothorax.      No acute osseous abnormality.       Impression    IMPRESSION:      No acute cardiopulmonary process.      MARITZA MORENO MD         SYSTEM ID:  [id number]   CBC with platelets differential    Narrative     The following orders were created for panel order CBC with platelets differential.  Procedure                               Abnormality         Status                     ---------                               -----------         ------                     CBC with platelets and d...[[id number]]  Abnormal            Final result                 Please view results for these tests on the individual orders.   Basic metabolic panel   Result Value Ref Range    Sodium 134 133 - 144 mmol/L    Potassium 4.1 3.4 - 5.3 mmol/L    Chloride 102 94 - 109 mmol/L    Carbon Dioxide (CO2) 24 20 - 32 mmol/L    Anion Gap 8 3 - 14 mmol/L    Urea Nitrogen 16 7 - 30 mg/dL    Creatinine 0.78 0.52 - 1.04 mg/dL    Calcium 9.1 8.5 - 10.1 mg/dL    Glucose 144 (H) 70 - 99 mg/dL    GFR Estimate 78 >60 mL/min/1.73m2   CBC with platelets and differential   Result Value Ref Range    WBC Count 5.4 4.0 - 11.0 10e3/uL    RBC Count 4.84 3.80 - 5.20 10e6/uL    Hemoglobin 12.6 11.7 - 15.7 g/dL    Hematocrit 39.9 35.0 - 47.0 %    MCV 82 78 - 100 fL    MCH 26.0 (L) 26.5 - 33.0 pg    MCHC 31.6 31.5 - 36.5 g/dL    RDW 14.9 10.0 - 15.0 %    Platelet Count 235 150 - 450 10e3/uL    % Neutrophils 83 %    % Lymphocytes 4 %    % Monocytes 12 %    % Eosinophils 0 %    % Basophils 1 %    % Immature Granulocytes 0 %    NRBCs per 100 WBC 0 <1 /100    Absolute Neutrophils 4.5 1.6 - 8.3 10e3/uL    Absolute Lymphocytes 0.2 (L) 0.8 - 5.3 10e3/uL    Absolute Monocytes 0.6 0.0 - 1.3 10e3/uL    Absolute Eosinophils 0.0 0.0 - 0.7 10e3/uL    Absolute Basophils 0.0 0.0 - 0.2 10e3/uL    Absolute Immature Granulocytes 0.0 <=0.4 10e3/uL    Absolute NRBCs 0.0 10e3/uL   [place] Draw    Narrative    The following orders were created for panel order [place] Draw.  Procedure                               Abnormality         Status                     ---------                               -----------         ------                     Extra Blue Top Tube[[id number]]                               In process                 Extra Red Top Tube[[id number]]                               In process                 Extra Green Top (Lithium...[[id number]]                      In process                   Please view results for these tests on the individual orders.   Procalcitonin   Result Value Ref Range    Procalcitonin <0.05 <0.05 ng/mL   Troponin I   Result Value Ref Range    Troponin I High Sensitivity 9 <54 ng/L   Lactic acid whole blood   Result Value Ref Range    Lactic Acid 1.1 0.7 - 2.0 mmol/L       Medications - No data to display    Assessments & Plan (with Medical Decision Making)   Work-up as above.  Patient is COVID-positive.  Oxygen saturation is 95% on room air.  Chest x-ray shows no focal consolidation.  She has no evidence of focal weakness or other concerns.  Refused further work-up or imaging. Paxlovid will be prescribed.  Discharge instructions provided.  Return precautions given.  See discharge instructions.  Return here for any new or worsening symptoms.  The patient voiced understanding was agreeable.    This document was prepared using a combination of typing and voice generated software.  While every attempt was made for accuracy, spelling and grammatical errors may exist.    I have reviewed the nursing notes.    I have reviewed the findings, diagnosis, plan and need for follow up with the patient.          New Prescriptions    NIRMATRELVIR AND RITONAVIR (PAXLOVID) THERAPY PACK    Take 3 tablets by mouth 2 times daily for 5 days       Final diagnoses:   Infection due to 2019 novel coronavirus   HTN (hypertension)       9/7/2022   [facility]     Kay Mcmahon PA-C  09/07/22 2002       Kay Mcmahon PA-C  09/07/22 2004

## 2022-09-08 NOTE — DISCHARGE INSTRUCTIONS
You are positive for COVID-19.  This is the reason for your symptoms.    Your room air oxygen saturations were 95% in the emergency room.  These can be monitored and you can return to the ER with saturations of less than 90% at rest.    Stay hydrated.    I am going to start you on the COVID medication called Paxlovid.    This can be started tomorrow morning.    Return to this emergency department for any new or worsening symptoms.

## 2022-09-13 ENCOUNTER — OFFICE VISIT (OUTPATIENT)
Dept: INTERNAL MEDICINE | Facility: OTHER | Age: 77
End: 2022-09-13
Attending: INTERNAL MEDICINE
Payer: MEDICARE

## 2022-09-13 VITALS
TEMPERATURE: 97.1 F | BODY MASS INDEX: 41.6 KG/M2 | SYSTOLIC BLOOD PRESSURE: 154 MMHG | OXYGEN SATURATION: 92 % | DIASTOLIC BLOOD PRESSURE: 78 MMHG | RESPIRATION RATE: 18 BRPM | HEART RATE: 59 BPM | HEIGHT: 65 IN

## 2022-09-13 DIAGNOSIS — I10 HYPERTENSION, UNSPECIFIED TYPE: ICD-10-CM

## 2022-09-13 DIAGNOSIS — E11.9 TYPE 2 DIABETES, HBA1C GOAL < 7% (H): ICD-10-CM

## 2022-09-13 DIAGNOSIS — E61.1 IRON DEFICIENCY: Primary | ICD-10-CM

## 2022-09-13 LAB
EST. AVERAGE GLUCOSE BLD GHB EST-MCNC: 157 MG/DL
HBA1C MFR BLD: 7.1 % (ref 0–5.6)
IRON SATN MFR SERPL: 14 % (ref 15–46)
IRON SERPL-MCNC: 54 UG/DL (ref 35–180)
TIBC SERPL-MCNC: 395 UG/DL (ref 240–430)

## 2022-09-13 PROCEDURE — 36415 COLL VENOUS BLD VENIPUNCTURE: CPT | Mod: ZL | Performed by: INTERNAL MEDICINE

## 2022-09-13 PROCEDURE — 83036 HEMOGLOBIN GLYCOSYLATED A1C: CPT | Mod: ZL | Performed by: INTERNAL MEDICINE

## 2022-09-13 PROCEDURE — 99214 OFFICE O/P EST MOD 30 MIN: CPT | Performed by: INTERNAL MEDICINE

## 2022-09-13 PROCEDURE — 83550 IRON BINDING TEST: CPT | Mod: ZL | Performed by: INTERNAL MEDICINE

## 2022-09-13 PROCEDURE — G0463 HOSPITAL OUTPT CLINIC VISIT: HCPCS

## 2022-09-13 RX ORDER — LISINOPRIL 40 MG/1
40 TABLET ORAL DAILY
Qty: 90 TABLET | Refills: 3 | Status: SHIPPED | OUTPATIENT
Start: 2022-09-13 | End: 2022-09-23

## 2022-09-13 ASSESSMENT — PAIN SCALES - GENERAL: PAINLEVEL: NO PAIN (0)

## 2022-09-13 NOTE — NURSING NOTE
"Chief Complaint   Patient presents with     ER F/U       Initial BP (!) 154/78 (BP Location: Right arm, Patient Position: Chair, Cuff Size: Adult Large)   Pulse 59   Temp 97.1  F (36.2  C) (Tympanic)   Resp 18   Ht 1.651 m (5' 5\")   SpO2 92%   BMI 41.60 kg/m   Estimated body mass index is 41.6 kg/m  as calculated from the following:    Height as of this encounter: 1.651 m (5' 5\").    Weight as of 8/8/22: 113.4 kg (250 lb).  Medication Reconciliation: complete  Pamela M. Lechevalier, HELIO    Rosalie Seals presents to the clinic for removal of sutures. The patient has had sutures in place for 13 days. There has been no patient reported signs or symptoms of infection or drainage. 3  sutures are seen and located on the right 5th digit lateral side. Tetanus status is notup to date patient refused in urgent care  All sutures were easily removed today. Routine wound care discussed by the RN or provider. The patient will follow up as needed.    Pamela M Lechevalier LPN      "

## 2022-09-13 NOTE — PROGRESS NOTES
"  Assessment & Plan   Problem List Items Addressed This Visit        Digestive    Iron deficiency - Primary    Relevant Orders    Iron and iron binding capacity      Other Visit Diagnoses     Type 2 diabetes, HbA1c goal < 7% (H)        Relevant Orders    Hemoglobin A1c    Hypertension, unspecified type        Relevant Medications    lisinopril (ZESTRIL) 40 MG tablet-Increased from 20 mg PO daily            3 sutures removed by nursing staff.      30 minutes spent on the date of the encounter doing chart review, review of test results, interpretation of tests, patient visit and documentation        BMI:   Estimated body mass index is 41.6 kg/m  as calculated from the following:    Height as of this encounter: 1.651 m (5' 5\").    Weight as of 8/8/22: 113.4 kg (250 lb).           No follow-ups on file.    Yash Cuellar, [facility]    Jalyn Wiggins is a 77 year old accompanied by her daughter and grand daughter, presenting for the following health issues:  ER F/U      HPI     ED/UC Followup:    [place]  [facility] [place]: 9/4/22 and 9/7/22   Reason for visit: Covid and laceration to right finger   Current Status: still feels short of breath. Today at home was 89 her in clinic 92  Still has cough production of clear phlegm noted.   Rosalie presents today for follow up.  She was recently seen in ER on 9/4/22 for a right pinky laceration requiring 3 stitches.  Then she was seen in ER for dental pain and incidentally tested positive for Covid due to an exposure.  Her O2 saturations were 95% at that time and she did not endorse any symptoms.  Currently reports a subtle cough with clear sputum or non productive.  No fevers.    She is due for routine labs and would like her iron also checked.         Review of Systems   Constitutional, HEENT, cardiovascular, gi and gu systems are negative, except as otherwise noted. Pulmonary as above.        Objective    BP (!) 154/78 (BP " "Location: Right arm, Patient Position: Chair, Cuff Size: Adult Large)   Pulse 59   Temp 97.1  F (36.2  C) (Tympanic)   Resp 18   Ht 1.651 m (5' 5\")   SpO2 92%   BMI 41.60 kg/m    Body mass index is 41.6 kg/m .  Physical Exam   GENERAL: alert and no distress  RESP: Course breath sounds throughout  CV: regular rate and rhythm, normal S1 S2, no S3 or S4, no murmurs  MS: +2 LLE edema   SKIN: no suspicious lesions or rashes  NEURO: Left sided paralysis, mentation intact and speech normal  PSYCH: mentation appears normal, affect normal/bright    Admission on 09/07/2022, Discharged on 09/07/2022   Component Date Value Ref Range Status     Influenza A PCR 09/07/2022 Negative  Negative Final     Influenza B PCR 09/07/2022 Negative  Negative Final     RSV PCR 09/07/2022 Negative  Negative Final     SARS CoV2 PCR 09/07/2022 Positive (A) Negative Final    POSITIVE: SARS-CoV-2 (COVID-19) RNA detected, presumed positive.     Sodium 09/07/2022 134  133 - 144 mmol/L Final     Potassium 09/07/2022 4.1  3.4 - 5.3 mmol/L Final     Chloride 09/07/2022 102  94 - 109 mmol/L Final     Carbon Dioxide (CO2) 09/07/2022 24  20 - 32 mmol/L Final     Anion Gap 09/07/2022 8  3 - 14 mmol/L Final     Urea Nitrogen 09/07/2022 16  7 - 30 mg/dL Final     Creatinine 09/07/2022 0.78  0.52 - 1.04 mg/dL Final     Calcium 09/07/2022 9.1  8.5 - 10.1 mg/dL Final     Glucose 09/07/2022 144 (A) 70 - 99 mg/dL Final     GFR Estimate 09/07/2022 78  >60 mL/min/1.73m2 Final    Effective December 21, 2021 eGFRcr in adults is calculated using the 2021 CKD-EPI creatinine equation which includes age and gender (Gail et al., NE, DOI: 10.1056/YQISoj8425469)     WBC Count 09/07/2022 5.4  4.0 - 11.0 10e3/uL Final     RBC Count 09/07/2022 4.84  3.80 - 5.20 10e6/uL Final     Hemoglobin 09/07/2022 12.6  11.7 - 15.7 g/dL Final     Hematocrit 09/07/2022 39.9  35.0 - 47.0 % Final     MCV 09/07/2022 82  78 - 100 fL Final     MCH 09/07/2022 26.0 (A) 26.5 - 33.0 pg Final "     MCHC 09/07/2022 31.6  31.5 - 36.5 g/dL Final     RDW 09/07/2022 14.9  10.0 - 15.0 % Final     Platelet Count 09/07/2022 235  150 - 450 10e3/uL Final     % Neutrophils 09/07/2022 83  % Final     % Lymphocytes 09/07/2022 4  % Final     % Monocytes 09/07/2022 12  % Final     % Eosinophils 09/07/2022 0  % Final     % Basophils 09/07/2022 1  % Final     % Immature Granulocytes 09/07/2022 0  % Final     NRBCs per 100 WBC 09/07/2022 0  <1 /100 Final     Absolute Neutrophils 09/07/2022 4.5  1.6 - 8.3 10e3/uL Final     Absolute Lymphocytes 09/07/2022 0.2 (A) 0.8 - 5.3 10e3/uL Final     Absolute Monocytes 09/07/2022 0.6  0.0 - 1.3 10e3/uL Final     Absolute Eosinophils 09/07/2022 0.0  0.0 - 0.7 10e3/uL Final     Absolute Basophils 09/07/2022 0.0  0.0 - 0.2 10e3/uL Final     Absolute Immature Granulocytes 09/07/2022 0.0  <=0.4 10e3/uL Final     Absolute NRBCs 09/07/2022 0.0  10e3/uL Final     Hold Specimen 09/07/2022 [facility]   Final     Hold Specimen 09/07/2022 [facility]   Final     Hold Specimen 09/07/2022 [facility]   Final     Lactic Acid 09/07/2022 1.1  0.7 - 2.0 mmol/L Final     Procalcitonin 09/07/2022 <0.05  <0.05 ng/mL Final    Comment: Interpretation and Recommendations   <0.05 ng/mL Normal   Very low risk of bacterial infection.   Strongly discourage antibiotics.     0.05-0.24 ng/mL Low risk of systemic infection. Local bacterial infection possible.   Assess other clinical features of infection.   Discourage antibiotics.     0.25-0.49 ng/mL Possible early systemic infection or localized infection   Encourage antibiotics only in correct clinical context.   Consider obtaining blood cultures or other relevant cultures.   Recheck PCT in 6-12 hours to ensure baseline low level.   If repeat PCT is rising, consider early systemic infection and consider starting antibiotics.     0.50-1.99 ng/mL Moderate risk of systemic infection.   Recommend antibiotics.   Evaluate culture results and clinical features to target antibacterial therapy.    Obtain blood cultures and other relevant cultures if not done.     If empiric antibiotics were started, recheck PCT in:        2 days to guide antibiotic de-escalation.        Discontinue                            or de-escalate antibiotics when PCT concentration is <80% of peak or abs PCT <0.5.     If empiric antibiotics were NOT started, recheck PCT in:        6-24 hours to re-evaluate need for antibiotics.       2.00-9.99 ng/mL High risk for progression to severe sepsis.   Strongly recommend initiating or continuing antibiotics.   Evaluate culture results and clinical features to target antibacterial therapy.   Obtain blood cultures and other relevant cultures if not done.   Repeat PCT in 2 days to guide antibiotic de-escalation.   Consider de-escalating antibiotics when PCT concentration is <80% of peak or abs PCT < 0.5.     Greater than or equal to 10 ng/mL Very high likelihood of severe sepsis or septic shock.   Strongly recommend initiating or continuing antibiotics.   Evaluate culture results and clinical features to target antibacterial therapy.   Obtain blood cultures and other relevant cultures if not done.   Repeat PCT in 2 days to guide antibiotic de-escalation.   Consider                            de-escalating antibiotics when PCT concentration is <80% of peak or abs PCT < 1.0.           Troponin I High Sensitivity 09/07/2022 9  <54 ng/L Final    This Troponin-I result was obtained using a Siemens Dimension Vista High Sensitivity Troponin-I assay (TNIH). Effective 11/23/21, nine labs/sites in the [facility] switched from a Siemens [place] Contemporary Troponin I assay (CTNI) to a Siemens [place] High-Sensitivity Troponin I assay (TNIH).     No results found for any visits on 09/13/22.  No results found for this or any previous visit (from the past 24 hour(s)).

## 2022-09-14 ENCOUNTER — TELEPHONE (OUTPATIENT)
Dept: INTERNAL MEDICINE | Facility: OTHER | Age: 77
End: 2022-09-14

## 2022-09-14 NOTE — TELEPHONE ENCOUNTER
Pt daughter and pt calling. They got a new RX for Lisinopril 40 mg yesterday because they thought she was taking 20 mg.    They got home and looked at the bottle and she has only been taking Lisinopril 10 mg po daily.    So they are wondering if the new RX should be something different like 20 mg?    Please advise.    Call back [phone] or daughter [phone]    Use [facility] and can only do 30 day there and then needs to go to [employer] mailorder.    Do you want her to take more today?    Zeinab Summers RN

## 2022-09-14 NOTE — TELEPHONE ENCOUNTER
Pt and daughter Galina updated on below.They will  new RX.They verbalized understanding.    Zeinab Summers RN

## 2022-09-22 ENCOUNTER — HOSPITAL ENCOUNTER (EMERGENCY)
Facility: HOSPITAL | Age: 77
Discharge: HOME | End: 2022-09-22
Attending: EMERGENCY MEDICINE | Admitting: EMERGENCY MEDICINE
Payer: MEDICARE

## 2022-09-22 VITALS
TEMPERATURE: 97.4 F | BODY MASS INDEX: 40.18 KG/M2 | WEIGHT: 250 LBS | SYSTOLIC BLOOD PRESSURE: 199 MMHG | HEIGHT: 66 IN | RESPIRATION RATE: 16 BRPM | OXYGEN SATURATION: 95 % | DIASTOLIC BLOOD PRESSURE: 93 MMHG | HEART RATE: 67 BPM

## 2022-09-22 DIAGNOSIS — T78.3XXA ANGIOEDEMA, INITIAL ENCOUNTER: ICD-10-CM

## 2022-09-22 LAB
ABO/RH(D): NORMAL
ANION GAP SERPL CALCULATED.3IONS-SCNC: 3 MMOL/L (ref 3–14)
ANTIBODY SCREEN: NEGATIVE
BASOPHILS # BLD AUTO: 0 10E3/UL (ref 0–0.2)
BASOPHILS NFR BLD AUTO: 1 %
BLD PROD TYP BPU: NORMAL
BLOOD COMPONENT TYPE: NORMAL
BUN SERPL-MCNC: 20 MG/DL (ref 7–30)
CALCIUM SERPL-MCNC: 9.6 MG/DL (ref 8.5–10.1)
CHLORIDE BLD-SCNC: 106 MMOL/L (ref 94–109)
CO2 SERPL-SCNC: 28 MMOL/L (ref 20–32)
CODING SYSTEM: NORMAL
CREAT SERPL-MCNC: 0.82 MG/DL (ref 0.52–1.04)
EOSINOPHIL # BLD AUTO: 0.2 10E3/UL (ref 0–0.7)
EOSINOPHIL NFR BLD AUTO: 2 %
ERYTHROCYTE [DISTWIDTH] IN BLOOD BY AUTOMATED COUNT: 14.6 % (ref 10–15)
GFR SERPL CREATININE-BSD FRML MDRD: 73 ML/MIN/1.73M2
GLUCOSE BLD-MCNC: 99 MG/DL (ref 70–99)
HCT VFR BLD AUTO: 40.3 % (ref 35–47)
HGB BLD-MCNC: 12.7 G/DL (ref 11.7–15.7)
HOLD SPECIMEN: NORMAL
IMM GRANULOCYTES # BLD: 0 10E3/UL
IMM GRANULOCYTES NFR BLD: 0 %
ISSUE DATE AND TIME: NORMAL
LYMPHOCYTES # BLD AUTO: 1.5 10E3/UL (ref 0.8–5.3)
LYMPHOCYTES NFR BLD AUTO: 20 %
MCH RBC QN AUTO: 26.3 PG (ref 26.5–33)
MCHC RBC AUTO-ENTMCNC: 31.5 G/DL (ref 31.5–36.5)
MCV RBC AUTO: 84 FL (ref 78–100)
MONOCYTES # BLD AUTO: 0.7 10E3/UL (ref 0–1.3)
MONOCYTES NFR BLD AUTO: 10 %
NEUTROPHILS # BLD AUTO: 5.1 10E3/UL (ref 1.6–8.3)
NEUTROPHILS NFR BLD AUTO: 67 %
NRBC # BLD AUTO: 0 10E3/UL
NRBC BLD AUTO-RTO: 0 /100
PLATELET # BLD AUTO: 298 10E3/UL (ref 150–450)
POTASSIUM BLD-SCNC: 4.9 MMOL/L (ref 3.4–5.3)
RBC # BLD AUTO: 4.82 10E6/UL (ref 3.8–5.2)
SODIUM SERPL-SCNC: 137 MMOL/L (ref 133–144)
SPECIMEN EXPIRATION DATE: NORMAL
UNIT ABO/RH: NORMAL
UNIT NUMBER: NORMAL
UNIT STATUS: NORMAL
UNIT TYPE ISBT: 6200
WBC # BLD AUTO: 7.5 10E3/UL (ref 4–11)

## 2022-09-22 PROCEDURE — 36415 COLL VENOUS BLD VENIPUNCTURE: CPT | Performed by: EMERGENCY MEDICINE

## 2022-09-22 PROCEDURE — 250N000011 HC RX IP 250 OP 636: Performed by: EMERGENCY MEDICINE

## 2022-09-22 PROCEDURE — 96375 TX/PRO/DX INJ NEW DRUG ADDON: CPT

## 2022-09-22 PROCEDURE — 250N000009 HC RX 250: Performed by: EMERGENCY MEDICINE

## 2022-09-22 PROCEDURE — P9059 PLASMA, FRZ BETWEEN 8-24HOUR: HCPCS | Performed by: EMERGENCY MEDICINE

## 2022-09-22 PROCEDURE — 82310 ASSAY OF CALCIUM: CPT | Performed by: EMERGENCY MEDICINE

## 2022-09-22 PROCEDURE — 99285 EMERGENCY DEPT VISIT HI MDM: CPT | Mod: 25

## 2022-09-22 PROCEDURE — 85025 COMPLETE CBC W/AUTO DIFF WBC: CPT | Performed by: EMERGENCY MEDICINE

## 2022-09-22 PROCEDURE — 96365 THER/PROPH/DIAG IV INF INIT: CPT

## 2022-09-22 PROCEDURE — 36430 TRANSFUSION BLD/BLD COMPNT: CPT

## 2022-09-22 PROCEDURE — 99291 CRITICAL CARE FIRST HOUR: CPT | Performed by: EMERGENCY MEDICINE

## 2022-09-22 PROCEDURE — 86901 BLOOD TYPING SEROLOGIC RH(D): CPT | Performed by: EMERGENCY MEDICINE

## 2022-09-22 RX ORDER — DIPHENHYDRAMINE HYDROCHLORIDE 50 MG/ML
50 INJECTION INTRAMUSCULAR; INTRAVENOUS ONCE
Status: COMPLETED | OUTPATIENT
Start: 2022-09-22 | End: 2022-09-22

## 2022-09-22 RX ORDER — TRANEXAMIC ACID 10 MG/ML
1 INJECTION, SOLUTION INTRAVENOUS ONCE
Status: COMPLETED | OUTPATIENT
Start: 2022-09-22 | End: 2022-09-22

## 2022-09-22 RX ORDER — DEXAMETHASONE SODIUM PHOSPHATE 10 MG/ML
10 INJECTION, SOLUTION INTRAMUSCULAR; INTRAVENOUS ONCE
Status: COMPLETED | OUTPATIENT
Start: 2022-09-22 | End: 2022-09-22

## 2022-09-22 RX ADMIN — DIPHENHYDRAMINE HYDROCHLORIDE 50 MG: 50 INJECTION, SOLUTION INTRAMUSCULAR; INTRAVENOUS at 20:38

## 2022-09-22 RX ADMIN — FAMOTIDINE 20 MG: 10 INJECTION INTRAVENOUS at 20:41

## 2022-09-22 RX ADMIN — TRANEXAMIC ACID 1 G: 10 INJECTION, SOLUTION INTRAVENOUS at 20:48

## 2022-09-22 RX ADMIN — DEXAMETHASONE SODIUM PHOSPHATE 10 MG: 10 INJECTION, SOLUTION INTRAMUSCULAR; INTRAVENOUS at 20:43

## 2022-09-22 ASSESSMENT — ACTIVITIES OF DAILY LIVING (ADL)
ADLS_ACUITY_SCORE: 35
ADLS_ACUITY_SCORE: 35

## 2022-09-23 ENCOUNTER — TELEPHONE (OUTPATIENT)
Dept: INTERNAL MEDICINE | Facility: OTHER | Age: 77
End: 2022-09-23

## 2022-09-23 DIAGNOSIS — I10 ESSENTIAL HYPERTENSION: Primary | ICD-10-CM

## 2022-09-23 RX ORDER — AMLODIPINE BESYLATE 10 MG/1
10 TABLET ORAL DAILY
Qty: 30 TABLET | Refills: 2 | Status: SHIPPED | OUTPATIENT
Start: 2022-09-23 | End: 2022-10-05

## 2022-09-23 NOTE — ED TRIAGE NOTES
Pt present to ED after having left sided tongue swelling which started about 90 mins. Pt denies tingling in tongue. Denies SOB, or itching. PT reports only eating cottage cheese in which she is not allergic to. H/o stroke /83    Tomasa Meyer RN on 9/22/2022 at 8:12 PM       Triage Assessment     Row Name 09/22/22 2010       Triage Assessment (Adult)    Airway WDL X  swollen tongue    Additional Documentation Breath Sounds (Group)       Respiratory WDL    Respiratory WDL WDL       Breath Sounds    Breath Sounds All Fields    All Lung Fields Breath Sounds Anterior:;clear;equal bilaterally       Orange Coma Scale    Best Eye Response 4-->(E4) spontaneous    Best Motor Response 6-->(M6) obeys commands    Best Verbal Response 5-->(V5) oriented    N[place] Coma Scale Score 15

## 2022-09-23 NOTE — ED NOTES
Pt states her tongue and throat still feels swollen. To nurse and her daughter tongue is less swollen. Pt tried to drink water and stated difficult to swallow at this time and spit water out.

## 2022-09-23 NOTE — ED NOTES
Pt with slurred speech. Tongue swollen and deviates to right. Pt with history of stroke and currently has left sided weakness. Daughter and pt states left sided weakness at baseline for pt. Pt states tongue swelling started 1.5 hours ago. States feels like throat is swelling. No respiratory distress noted. Pt states Lisinopril dose was increased last week.

## 2022-09-23 NOTE — TELEPHONE ENCOUNTER
Emergency Department and Urgent Care Follow-up      Reason for ER/UC visit:   o Date seen: 9/22/22      New or Worsening symptoms:  No        Prescription Received/Picked up from Pharmacy?: NA    o Medications started?   o Any questions or issues regarding your prescription?:       Follow-up Results or Labs that are pending: no       Questions or concerns?: lisinopril ordered to be discontinued until follow up with Dr. Cuellar.       ER Recommends Follow-up by: no time frame provided by ED provider       RN Recommendations:   o Appointment scheduled:   o   Daughter reports ED provider was sending a message to Dr. Cuellar.     Patients Galina Corley can be reached at [phone]    Oral swelling - ED provider discontinued Lisinopril due to symptoms-reaction-tongue swelling. Daughter reports patient has been on lisinopril x 5 years. Inquiring if a new BP medication should be started or hold off until 9/26/22 when Dr. Cuellar returns.       Spoke with Dr. Navarrete, see separate Encounter for Orders Only.     Call returned to patients Galina corley notified Rx has been sent to pharmacy for patient to start on.     Advised to purchase home BP monitor to monitor BP over the weekend. May go to ED for any concerning symptoms.     Daughter verbalized understanding.     See Below:    If you start feeling worse, or have any further questions, please feel free to contact Nurse Triage at [phone].  If needing immediate medical attention at any time please call 911/Go to the ER.

## 2022-09-23 NOTE — ED NOTES
"Pt states she feels like her tongue is less swollen, daughter states tongue also looks less swollen to her. Pt resting again before nurse could assess tongue at this time. No signs of transfusion reaction. Pt stated she was feeling \"fine.\"  "

## 2022-09-24 ASSESSMENT — ENCOUNTER SYMPTOMS
CHILLS: 0
COUGH: 0
FEVER: 0
SHORTNESS OF BREATH: 0

## 2022-09-24 NOTE — ED PROVIDER NOTES
History     Chief Complaint   Patient presents with     Oral Swelling     HPI  Rosalie JANE Friend is a 77 year old female who is here with tongue swelling.  Occurred 90 minutes ago.  No difficulty breathing, no nausea no vomiting.  Has happened other times with other portions of her face but never her tongue directly.  No new foods.  No new medications.  Does take lisinopril.    Allergies:  Allergies   Allergen Reactions     Azithromycin Swelling     Face swelling     Pcn [Penicillins]        Problem List:    Patient Active Problem List    Diagnosis Date Noted     History of CVA (cerebrovascular accident) 03/02/2019     Priority: High     5/6/17 sudden onset right vision loss and left side weakness, which resolved by the time of arrival to ED in [place]. CT of head and CT angiogram showed R ICA occlusion. L distal ICA, and R MCA 8mm 6mm aneurysms. Neurology was consulted and pt was transferred to [facility] for further management.  mg and lipitor 80 mg was started. Pt was found elevated HgA1c at 10. Imaging showed R temporal lobe infarct on CT and Angio showed R GOPI occlusion with no intracranial occlusion and L ICA and R MCA aneurysms. Strong family hx of ruptured aneurysms.  Seen at [facility] for an incidental finding of bilat ICA aneurysms. She had diagnostic cerebral angiogram with endovascular coil embolization of the larger left ICA aneurysm. The right ICA aneurysm will be monitored with serial imaging.     2/15/2018 acute onset weakness of her left upper extremity. MR angiogram of the head and neck and MR of the brain, which revealed an acute lacunar infarct in the right centrum semiovale region in an area of old ischemia. Multiple atherosclerotic lesions with complete occlusion of her right internal carotid and other more distal lesions as well. (clopidogrel was added)       Hypovitaminosis D 03/04/2022     Priority: Medium     Fatigue, unspecified type 03/04/2022     Priority: Medium     Abnormal  finding of blood chemistry, unspecified  03/04/2022     Priority: Medium     Iron deficiency 03/04/2022     Priority: Medium     Hyperlipidemia LDL goal <100 11/30/2021     Priority: Medium     Type 2 diabetes mellitus with hyperglycemia, without long-term current use of insulin (H) 11/30/2021     Priority: Medium     resolved       Hyperlipidemia 03/02/2019     Priority: Medium     Cerebral aneurysm 03/02/2019     Priority: Medium     Cerebral aneurysm without rupture, left ICA, s/p endovascular coiling 05/2017       PVD (peripheral vascular disease) (H) 03/02/2019     Priority: Medium     ankle-brachial index 2018 1.02 on the right and 0.52 on the left, consistent with moderate ischemia of the left lower extremity.        Emphysema lung (H) 03/02/2019     Priority: Medium     PFTS 5/2018 - FEV1- 2.13 (74%), ratio 0.67, 20% change with bronchodilators,  TLC 99%, %, DLCO 60%        Obesity (BMI 35.0-39.9) with comorbidity (H) 01/03/2019     Priority: Medium     Type 2 diabetes mellitus with neurologic complication, without long-term current use of insulin (H) 04/12/2018     Priority: Medium     Nonruptured cerebral aneurysm 02/18/2018     Priority: Medium     s/p cerebral angiogram, [facility]       ACP (advance care planning) 05/09/2017     Priority: Medium     Advance Care Planning 5/9/2017: ACP Review of Chart / Resources Provided:  Reviewed chart for advance care plan.  Rosalie JANE Friend has been provided information and resources to begin or update their advance care plan.  Added by Kalee Busby             Pain in joint, lower leg 04/09/2007     Priority: Medium     Formatting of this note might be different from the original.  IMO Update 10/11       Blood glucose abnormal 10/11/2006     Priority: Medium     Formatting of this note might be different from the original.  IMO Update 10/11       Essential hypertension 09/27/2006     Priority: Medium     Formatting of this note might be different  from the original.  IMO Update       Gastroesophageal reflux disease without esophagitis 03/02/2019     Priority: Low        Past Medical History:    Past Medical History:   Diagnosis Date     Cerebral infarction (H) 05/06/2017     Concussion with loss of consciousness of 30 minutes or less 1950     Emphysema lung (H)      Gastroesophageal reflux disease without esophagitis      Hyperlipidemia LDL goal <100      Left hemiparesis (H) 2017     Morbid obesity (H)      Nonruptured cerebral aneurysm 02/18/2018     Peripheral vascular disease (H)      Tobacco abuse      Type 2 diabetes mellitus with hyperglycemia, without long-term current use of insulin (H)        Past Surgical History:    Past Surgical History:   Procedure Laterality Date     ABDOMEN SURGERY  04/1997    bladder repair     CEREBRAL ANGIOGRAM  2018     GYN SURGERY  04/1974    hysterectomy      PHACOEMULSIFICATION WITH STANDARD INTRAOCULAR LENS IMPLANT Left 12/2/2021    Procedure: PHACOEMULSIFICATION, CATARACT, WITH STANDARD INTRAOCULAR LENS IMPLANT INSERTION;  Surgeon: Leo Hernandes MD;  Location: [facility] OR     PHACOEMULSIFICATION WITH STANDARD INTRAOCULAR LENS IMPLANT Right 1/6/2022    Procedure: RIGHT EYE CATARACT EXTRACTION WITH INTRAOCULAR LENS IMPLANT;  Surgeon: Leo Hernandes MD;  Location: [facility] OR       Family History:    Family History   Problem Relation Age of Onset     Aneurysm Mother 44     Other Cancer Father      Other Cancer Sister 59        female     Aneurysm Sister 72     Aneurysm Sister 23     Diabetes Brother      Hypertension Brother      Other Cancer Brother         unknown     Sudden Infant Death Syndrome Brother         influenza     Family History Negative Brother      Family History Negative Brother      Family History Negative Brother      Family History Negative Brother      Family History Negative Brother      Unknown/Adopted Maternal Grandmother      Unknown/Adopted Maternal Grandfather      Unknown/Adopted Paternal  "Grandmother      Unknown/Adopted Paternal Grandfather        Social History:  Marital Status:   [4]  Social History     Tobacco Use     Smoking status: Former Smoker     Packs/day: 0.25     Types: Cigarettes     Start date: 1956     Quit date: 2022     Years since quittin.0     Smokeless tobacco: Never Used     Tobacco comment: limits self to about 4-5 daily , she is quitting on her own   Vaping Use     Vaping Use: Never used   Substance Use Topics     Alcohol use: No     Drug use: No        Medications:    albuterol (PROAIR HFA/PROVENTIL HFA/VENTOLIN HFA) 108 (90 Base) MCG/ACT inhaler  albuterol (VENTOLIN HFA) 108 (90 Base) MCG/ACT inhaler  aspirin 325 MG tablet  atorvastatin (LIPITOR) 80 MG tablet  blood glucose monitoring (NO BRAND SPECIFIED) meter device kit  blood glucose monitoring (ONE TOUCH DELICA) lancets  clopidogrel (PLAVIX) 75 MG tablet  Cyanocobalamin (VITAMIN B-12 PO)  ferrous sulfate (FEROSUL) 325 (65 Fe) MG tablet  metFORMIN (GLUCOPHAGE-XR) 500 MG 24 hr tablet  metoprolol tartrate (LOPRESSOR) 50 MG tablet  ONETOUCH VERIO IQ test strip  order for DME  order for DME  pantoprazole (PROTONIX) 40 MG EC tablet  Vitamin D, Cholecalciferol, 25 MCG (1000 UT) CAPS  amLODIPine (NORVASC) 10 MG tablet          Review of Systems   Constitutional: Negative for chills and fever.   Respiratory: Negative for cough and shortness of breath.    All other systems reviewed and are negative.      Physical Exam   BP: (!) 219/83  Pulse: 59  Temp: 97.8  F (36.6  C)  Resp: 16  Height: 167.6 cm (5' 6\")  Weight: 113.4 kg (250 lb)  SpO2: 95 %      Physical Exam  Constitutional:       General: She is not in acute distress.     Appearance: She is not diaphoretic.   HENT:      Head: Normocephalic and atraumatic.      Right Ear: External ear normal.      Left Ear: External ear normal.      Nose: No congestion or rhinorrhea.      Mouth/Throat:      Pharynx: Oropharynx is clear. No oropharyngeal exudate.      " Comments: Left side of tongue swollen, uvula without edema  Eyes:      General: No scleral icterus.     Pupils: Pupils are equal, round, and reactive to light.   Neck:      Comments: No stridor  Cardiovascular:      Rate and Rhythm: Normal rate and regular rhythm.      Heart sounds: Normal heart sounds.   Pulmonary:      Effort: No respiratory distress.      Breath sounds: Normal breath sounds.   Abdominal:      General: Bowel sounds are normal.      Palpations: Abdomen is soft.      Tenderness: There is no abdominal tenderness.   Musculoskeletal:         General: No tenderness.      Cervical back: Normal range of motion and neck supple.      Right lower leg: No edema.      Left lower leg: No edema.   Skin:     General: Skin is warm.      Capillary Refill: Capillary refill takes less than 2 seconds.      Findings: No rash.   Neurological:      Mental Status: Mental status is at baseline.      Cranial Nerves: No cranial nerve deficit.   Psychiatric:         Mood and Affect: Mood normal.         Behavior: Behavior normal.         ED Course                 Procedures             Critical Care time:               No results found for this or any previous visit (from the past 24 hour(s)).    Medications   famotidine (PEPCID) injection 20 mg (20 mg Intravenous Given 9/22/22 2041)   diphenhydrAMINE (BENADRYL) injection 50 mg (50 mg Intravenous Given 9/22/22 2038)   dexamethasone PF (DECADRON) injection 10 mg (10 mg Intravenous Given 9/22/22 2043)   tranexamic acid 1 g in 100 mL NS IV bag (premix) (1 g Intravenous Given 9/22/22 2048)       Assessments & Plan (with Medical Decision Making)     I have reviewed the nursing notes.    I have reviewed the findings, diagnosis, plan and need for follow up with the patient.  Critical Care Addendum    My initial assessment, based on my review of nursing observations, review of vital signs, focused history, physical exam and review of cardiac rhythm monitor, established that Rosalie JANE  Friend has respiratory insufficiency, which requires immediate intervention, and therefore she is critically ill.     After the initial assessment, the care team initiated multiple lab tests, initiated IV fluid administration and initiated medication therapy with ffp, txa, decadron, benadryl, pepcid to provide stabilization care. Due to the critical nature of this patient, I reassessed nursing observations, vital signs, physical exam, review of cardiac rhythm monitor, mental status, neurologic status and respiratory status multiple times prior to her disposition.     Time also spent performing documentation, reviewing test results and coordination of care.     Critical care time (excluding teaching time and procedures): 69 minutes.     Discharge Medication List as of 9/22/2022 11:35 PM        77-year-old female here with critical angioedema.  Dexamethasone Benadryl Pepcid did not improve symptoms.  Therefore administer TXA and FFP as early studies have shown them to be beneficial with angioedema.  Symptoms markedly improved.  At time of discharge I told patient to discontinue her ACE inhibitor and follow-up with primary to replace blood pressure agent.    Final diagnoses:   Angioedema, initial encounter       9/22/2022   [facility]     Gregg Jackson MD  09/24/22 0517

## 2022-10-04 DIAGNOSIS — I10 ESSENTIAL HYPERTENSION: ICD-10-CM

## 2022-10-04 NOTE — TELEPHONE ENCOUNTER
Medication was changed when in hospital and daughter has questions.  She wants a refill sent to [facility] for Amlodipine 10 mg if she is to continue taking this med.  Please call [facility] [phone] with any questions.

## 2022-10-05 RX ORDER — AMLODIPINE BESYLATE 10 MG/1
10 TABLET ORAL DAILY
Qty: 30 TABLET | Refills: 2 | Status: SHIPPED | OUTPATIENT
Start: 2022-10-05 | End: 2022-10-11

## 2022-10-11 DIAGNOSIS — I10 ESSENTIAL HYPERTENSION: ICD-10-CM

## 2022-10-11 RX ORDER — AMLODIPINE BESYLATE 10 MG/1
10 TABLET ORAL DAILY
Qty: 30 TABLET | Refills: 2 | Status: SHIPPED | OUTPATIENT
Start: 2022-10-11 | End: 2022-10-13

## 2022-10-13 RX ORDER — AMLODIPINE BESYLATE 10 MG/1
10 TABLET ORAL DAILY
Qty: 90 TABLET | Refills: 0 | Status: SHIPPED | OUTPATIENT
Start: 2022-10-13 | End: 2023-03-07

## 2022-11-25 DIAGNOSIS — E55.9 VITAMIN D DEFICIENCY: ICD-10-CM

## 2022-11-25 DIAGNOSIS — I63.9 ACUTE ISCHEMIC STROKE (H): ICD-10-CM

## 2022-11-25 DIAGNOSIS — E78.5 HYPERLIPIDEMIA LDL GOAL <100: ICD-10-CM

## 2022-11-25 DIAGNOSIS — I73.9 PVD (PERIPHERAL VASCULAR DISEASE) (H): ICD-10-CM

## 2022-11-28 RX ORDER — CLOPIDOGREL BISULFATE 75 MG/1
TABLET ORAL
Qty: 90 TABLET | Refills: 2 | Status: SHIPPED | OUTPATIENT
Start: 2022-11-28 | End: 2023-08-25

## 2022-11-28 RX ORDER — METOPROLOL TARTRATE 50 MG
TABLET ORAL
Qty: 90 TABLET | Refills: 3 | Status: SHIPPED | OUTPATIENT
Start: 2022-11-28 | End: 2023-11-28

## 2022-11-28 RX ORDER — ATORVASTATIN CALCIUM 80 MG/1
TABLET, FILM COATED ORAL
Qty: 90 TABLET | Refills: 3 | Status: SHIPPED | OUTPATIENT
Start: 2022-11-28 | End: 2023-11-28

## 2022-11-28 NOTE — TELEPHONE ENCOUNTER
METOPROL TAR TAB 50MG        Last Written Prescription Date:  11/30/21  Last Fill Quantity: 90,   # refills: 3  Last Office Visit: 9/13/22  Future Office visit:       Routing refill request to provider for review/approval because:    Beta-Blockers Protocol Failed 11/25/2022 01:16 AM   Protocol Details  Blood pressure under 140/90 in past 12 months     BP Readings from Last 3 Encounters:   09/22/22 (!) 199/93   09/13/22 (!) 154/78   09/07/22 (!) 198/98         ATORVASTATIN TAB 80MG        Last Written Prescription Date:  11/30/21  Last Fill Quantity: 90,   # refills: 3  Last Office Visit: 9/13/22  Future Office visit:       Routing refill request to provider for review/approval because:    Statins Protocol Failed 11/25/2022 01:16 AM   Protocol Details  LDL on file in past 12 months     LDL Cholesterol Calculated   Date Value Ref Range Status   07/15/2021 48 <=100 mg/dL Final     Comment:     Age 0-19 years:  Desirable: 0-110 mg/dL   Borderline high: 110-129 mg/dL   High: >= 130 mg/dL    Age 20 years and older:  Desirable: <100mg/dL  Above desirable: 100-129 mg/dL   Borderline high: 130-159 mg/dL   High: 160-189 mg/dL   Very high: >= 190 mg/dL   01/12/2021 45 <100 mg/dL Final     Comment:     Desirable:       <100 mg/dl

## 2023-03-02 ENCOUNTER — TELEPHONE (OUTPATIENT)
Dept: INTERNAL MEDICINE | Facility: OTHER | Age: 78
End: 2023-03-02

## 2023-03-02 DIAGNOSIS — Z86.79 HISTORY OF CEREBRAL ANEURYSM: Primary | ICD-10-CM

## 2023-03-02 DIAGNOSIS — I67.1 CEREBRAL ANEURYSM, NONRUPTURED: ICD-10-CM

## 2023-03-02 NOTE — TELEPHONE ENCOUNTER
10:14 AM    Reason for Call: Phone Call    Description: Patients daughter Renetta called and states that its time for Rosalie to have her yearly MRA test and is wondering if Dr Cuellar can get that order placed. She says we can reach out to her or patient Rosalie regarding this    Was an appointment offered for this call? No    Preferred method for responding to this message: Telephone Call  What is your phone number ?[phone] - Rosalie     If we cannot reach you directly, may we leave a detailed response at the number you provided? Yes    Can this message wait until your PCP/provider returns, if available today? Not applicable    Arlen Dumont

## 2023-03-07 DIAGNOSIS — I10 ESSENTIAL HYPERTENSION: ICD-10-CM

## 2023-03-07 RX ORDER — AMLODIPINE BESYLATE 10 MG/1
10 TABLET ORAL DAILY
Qty: 90 TABLET | Refills: 0 | Status: SHIPPED | OUTPATIENT
Start: 2023-03-07 | End: 2023-06-06

## 2023-03-07 NOTE — TELEPHONE ENCOUNTER
Pt of     Amlodipine      Last Written Prescription Date:  10.13.2022  Last Fill Quantity: 90,   # refills: 0  Last Office Visit: 9.13.2022  Future Office visit:       Routing refill request to provider for review/approval because:  Calcium Channel Blockers Protocol  Failed 03/07/2023 03:49 PM   Protocol Details  Blood pressure under 140/90 in past 12 months     BP Readings from Last 3 Encounters:   09/22/22 (!) 199/93   09/13/22 (!) 154/78   09/07/22 (!) 198/98     Zeinab Summers RN

## 2023-03-27 ENCOUNTER — HOSPITAL ENCOUNTER (OUTPATIENT)
Dept: MRI IMAGING | Facility: HOSPITAL | Age: 78
Discharge: HOME | End: 2023-03-27
Attending: INTERNAL MEDICINE | Admitting: INTERNAL MEDICINE
Payer: COMMERCIAL

## 2023-03-27 DIAGNOSIS — Z86.79 HISTORY OF CEREBRAL ANEURYSM: ICD-10-CM

## 2023-03-27 DIAGNOSIS — I67.1 CEREBRAL ANEURYSM, NONRUPTURED: ICD-10-CM

## 2023-03-27 PROCEDURE — 70544 MR ANGIOGRAPHY HEAD W/O DYE: CPT

## 2023-04-06 NOTE — PROGRESS NOTES
"  Assessment & Plan   Problem List Items Addressed This Visit    None  Visit Diagnoses     Type 2 diabetes, HbA1c goal < 7% (H)    -  Primary    Relevant Orders    ZZC FOOT EXAM  NO CHARGE (Completed)    Albumin Random Urine Quantitative with Creat Ratio    Comprehensive metabolic panel    Hemoglobin A1c    NC FOOT EXAM NO CHARGE (Completed)    Routine history and physical examination of adult        Relevant Orders    CBC with Platelets & Differential    Hyperlipidemia LDL goal <130        Relevant Orders    Lipid Profile    Localized swelling of left lower extremity        Relevant Orders    US Lower Extremity Venous Duplex Left    Primary hypertension        Relevant Medications    lisinopril (ZESTRIL) 10 MG tablet    Other Relevant Orders    Basic metabolic panel-in 1-2 weeks.               25 minutes spent by me on the date of the encounter doing chart review, review of test results, interpretation of tests, patient visit and documentation        BMI:   Estimated body mass index is 40.35 kg/m  as calculated from the following:    Height as of 9/22/22: 1.676 m (5' 6\").    Weight as of this encounter: 113.4 kg (250 lb).           No follow-ups on file.    Yash Cuellar, DO  [facility]   Rosalie is a 77 year old, presenting for the following health issues:  Follow Up         View : No data to display.              SULLY Wiggins presents today for follow up.  She has not had labs in awhile.  BP remains elevated today.  She also has LLE swelling which has been noted before and US was negative for DVT.  She is due for her diabetic labs.  She did strike her left foot on her kitchen counter-she gets around with a motorized wheelchair due to her CVA history and immobility of her left side.      Diabetes Follow-up      How often are you checking your blood sugar? Not at all    What concerns do you have today about your diabetes? None     Do you have any of these symptoms? " (Select all that apply)  No numbness or tingling in feet.  No redness, sores or blisters on feet.  No complaints of excessive thirst.  No reports of blurry vision.  No significant changes to weight.    Have you had a diabetic eye exam in the last 12 months? Yes- Date of last eye exam: 1/2023,  Location: [facility]    Diabetic Eye and Foot Exam     1. foot deformity   Yes  2. Current or previous foot ulceration     Yes  3. Current or previous pre-ulcerative calluses     No  4. previous partial amputation of one or both feet or complete amputation of one foot     No  5. peripheral neuropathy with evidence of callus formation     No  6. poor circulation     No        BP Readings from Last 2 Encounters:   04/10/23 (!) 160/68   09/22/22 (!) 199/93     Hemoglobin A1C (%)   Date Value   09/13/2022 7.1 (H)   03/04/2022 7.4 (H)   01/12/2021 6.4 (H)   03/03/2020 7.1 (H)     LDL Cholesterol Calculated (mg/dL)   Date Value   07/15/2021 48   01/12/2021 45   03/03/2020 44         Hypertension Follow-up      Do you check your blood pressure regularly outside of the clinic? No     Are you following a low salt diet? Yes    Are your blood pressures ever more than 140 on the top number (systolic) OR more   than 90 on the bottom number (diastolic), for example 140/90? Yes    Iron Deficiency        Review of Systems   Constitutional, HEENT, cardiovascular, pulmonary, gi and gu systems are negative, except as otherwise noted.      Objective    BP (!) 160/68 (BP Location: Left arm, Patient Position: Sitting, Cuff Size: Adult Large)   Pulse 64   Temp 97.6  F (36.4  C) (Tympanic)   Resp 20   Wt 113.4 kg (250 lb)   SpO2 96%   BMI 40.35 kg/m    Body mass index is 40.35 kg/m .  Physical Exam   GENERAL: alert and no distress  EYES: Left eye ptosis   RESP: lungs clear to auscultation - no rales, rhonchi or wheezes  CV: regular rate and rhythm, normal S1 S2, no S3 or S4, no murmur, click or rub, no peripheral edema and peripheral pulses  strong  MS: {+1 RLE edema.  +2 LLE edema    SKIN: Long toenails.  Abrasion of several of the left toes with scabbing.    NEURO: Unable to move left upper and lower extremities.  Monofilament testing reveals sensation intact in both feet.    PSYCH: mentation appears normal, affect normal/bright    Admission on 09/22/2022, Discharged on 09/22/2022   Component Date Value Ref Range Status     Sodium 09/22/2022 137  133 - 144 mmol/L Final     Potassium 09/22/2022 4.9  3.4 - 5.3 mmol/L Final     Chloride 09/22/2022 106  94 - 109 mmol/L Final     Carbon Dioxide (CO2) 09/22/2022 28  20 - 32 mmol/L Final     Anion Gap 09/22/2022 3  3 - 14 mmol/L Final     Urea Nitrogen 09/22/2022 20  7 - 30 mg/dL Final     Creatinine 09/22/2022 0.82  0.52 - 1.04 mg/dL Final     Calcium 09/22/2022 9.6  8.5 - 10.1 mg/dL Final     Glucose 09/22/2022 99  70 - 99 mg/dL Final     GFR Estimate 09/22/2022 73  >60 mL/min/1.73m2 Final    Effective December 21, 2021 eGFRcr in adults is calculated using the 2021 CKD-EPI creatinine equation which includes age and gender (Gail et al., NE, DOI: 10.1056/JLZDrf3593473)     ABO/RH(D) 09/22/2022 A POS   Final     Antibody Screen 09/22/2022 Negative  Negative Final     SPECIMEN EXPIRATION DATE 09/22/2022 [id number]   Final     WBC Count 09/22/2022 7.5  4.0 - 11.0 10e3/uL Final     RBC Count 09/22/2022 4.82  3.80 - 5.20 10e6/uL Final     Hemoglobin 09/22/2022 12.7  11.7 - 15.7 g/dL Final     Hematocrit 09/22/2022 40.3  35.0 - 47.0 % Final     MCV 09/22/2022 84  78 - 100 fL Final     MCH 09/22/2022 26.3 (L)  26.5 - 33.0 pg Final     MCHC 09/22/2022 31.5  31.5 - 36.5 g/dL Final     RDW 09/22/2022 14.6  10.0 - 15.0 % Final     Platelet Count 09/22/2022 298  150 - 450 10e3/uL Final     % Neutrophils 09/22/2022 67  % Final     % Lymphocytes 09/22/2022 20  % Final     % Monocytes 09/22/2022 10  % Final     % Eosinophils 09/22/2022 2  % Final     % Basophils 09/22/2022 1  % Final     % Immature Granulocytes  09/22/2022 0  % Final     NRBCs per 100 WBC 09/22/2022 0  <1 /100 Final     Absolute Neutrophils 09/22/2022 5.1  1.6 - 8.3 10e3/uL Final     Absolute Lymphocytes 09/22/2022 1.5  0.8 - 5.3 10e3/uL Final     Absolute Monocytes 09/22/2022 0.7  0.0 - 1.3 10e3/uL Final     Absolute Eosinophils 09/22/2022 0.2  0.0 - 0.7 10e3/uL Final     Absolute Basophils 09/22/2022 0.0  0.0 - 0.2 10e3/uL Final     Absolute Immature Granulocytes 09/22/2022 0.0  <=0.4 10e3/uL Final     Absolute NRBCs 09/22/2022 0.0  10e3/uL Final     Hold Specimen 09/22/2022 JIC   Final     Hold Specimen 09/22/2022 JIC   Final     Hold Specimen 09/22/2022 JIC   Final     Blood Component Type 09/22/2022 Plasma   Final     Product Code 09/22/2022 S0572A51   Final     Unit Status 09/22/2022 Transfused   Final     Unit Number 09/22/2022 [id number]   Final     CODING SYSTEM 09/22/2022 [id number]   Final     ISSUE DATE AND TIME 09/22/2022 [id number]   Final     UNIT ABO/RH 09/22/2022 A+   Final     UNIT TYPE ISBT 09/22/2022 6200   Final     Results for orders placed or performed in visit on 04/10/23   CBC with Platelets & Differential     Status: None (In process)    Narrative    The following orders were created for panel order CBC with Platelets & Differential.  Procedure                               Abnormality         Status                     ---------                               -----------         ------                     CBC with platelets and d...[[id number]]                      In process                   Please view results for these tests on the individual orders.     Results for orders placed or performed in visit on 04/10/23 (from the past 24 hour(s))   CBC with Platelets & Differential    Narrative    The following orders were created for panel order CBC with Platelets & Differential.  Procedure                               Abnormality         Status                     ---------                               -----------          ------                     CBC with platelets and d...[[id number]]                      In process                   Please view results for these tests on the individual orders.

## 2023-04-10 ENCOUNTER — OFFICE VISIT (OUTPATIENT)
Dept: INTERNAL MEDICINE | Facility: OTHER | Age: 78
End: 2023-04-10
Attending: INTERNAL MEDICINE
Payer: COMMERCIAL

## 2023-04-10 ENCOUNTER — HOSPITAL ENCOUNTER (OUTPATIENT)
Dept: ULTRASOUND IMAGING | Facility: HOSPITAL | Age: 78
Discharge: HOME | End: 2023-04-10
Attending: INTERNAL MEDICINE
Payer: COMMERCIAL

## 2023-04-10 VITALS
SYSTOLIC BLOOD PRESSURE: 160 MMHG | BODY MASS INDEX: 40.35 KG/M2 | OXYGEN SATURATION: 96 % | WEIGHT: 250 LBS | RESPIRATION RATE: 20 BRPM | DIASTOLIC BLOOD PRESSURE: 68 MMHG | HEART RATE: 64 BPM | TEMPERATURE: 97.6 F

## 2023-04-10 DIAGNOSIS — E78.5 HYPERLIPIDEMIA LDL GOAL <130: ICD-10-CM

## 2023-04-10 DIAGNOSIS — R22.42 LOCALIZED SWELLING OF LEFT LOWER EXTREMITY: ICD-10-CM

## 2023-04-10 DIAGNOSIS — E11.9 TYPE 2 DIABETES, HBA1C GOAL < 7% (H): Primary | ICD-10-CM

## 2023-04-10 DIAGNOSIS — I10 PRIMARY HYPERTENSION: ICD-10-CM

## 2023-04-10 DIAGNOSIS — Z00.00 ROUTINE HISTORY AND PHYSICAL EXAMINATION OF ADULT: ICD-10-CM

## 2023-04-10 LAB
ALBUMIN SERPL BCG-MCNC: 4 G/DL (ref 3.5–5.2)
ALP SERPL-CCNC: 81 U/L (ref 35–104)
ALT SERPL W P-5'-P-CCNC: 15 U/L (ref 10–35)
ANION GAP SERPL CALCULATED.3IONS-SCNC: 11 MMOL/L (ref 7–15)
AST SERPL W P-5'-P-CCNC: 14 U/L (ref 10–35)
BASOPHILS # BLD AUTO: 0 10E3/UL (ref 0–0.2)
BASOPHILS NFR BLD AUTO: 0 %
BILIRUB SERPL-MCNC: 0.3 MG/DL
BUN SERPL-MCNC: 16 MG/DL (ref 8–23)
CALCIUM SERPL-MCNC: 10.1 MG/DL (ref 8.8–10.2)
CHLORIDE SERPL-SCNC: 102 MMOL/L (ref 98–107)
CHOLEST SERPL-MCNC: 136 MG/DL
CREAT SERPL-MCNC: 0.96 MG/DL (ref 0.51–0.95)
CREAT UR-MCNC: 111.3 MG/DL
DEPRECATED HCO3 PLAS-SCNC: 25 MMOL/L (ref 22–29)
EOSINOPHIL # BLD AUTO: 0.2 10E3/UL (ref 0–0.7)
EOSINOPHIL NFR BLD AUTO: 3 %
ERYTHROCYTE [DISTWIDTH] IN BLOOD BY AUTOMATED COUNT: 14.5 % (ref 10–15)
EST. AVERAGE GLUCOSE BLD GHB EST-MCNC: 183 MG/DL
GFR SERPL CREATININE-BSD FRML MDRD: 61 ML/MIN/1.73M2
GLUCOSE SERPL-MCNC: 171 MG/DL (ref 70–99)
HBA1C MFR BLD: 8 %
HCT VFR BLD AUTO: 40.4 % (ref 35–47)
HDLC SERPL-MCNC: 65 MG/DL
HGB BLD-MCNC: 12.6 G/DL (ref 11.7–15.7)
LDLC SERPL CALC-MCNC: 53 MG/DL
LYMPHOCYTES # BLD AUTO: 1 10E3/UL (ref 0.8–5.3)
LYMPHOCYTES NFR BLD AUTO: 14 %
MCH RBC QN AUTO: 26.6 PG (ref 26.5–33)
MCHC RBC AUTO-ENTMCNC: 31.2 G/DL (ref 31.5–36.5)
MCV RBC AUTO: 85 FL (ref 78–100)
MICROALBUMIN UR-MCNC: <12 MG/L
MICROALBUMIN/CREAT UR: NORMAL MG/G{CREAT}
MONOCYTES # BLD AUTO: 0.6 10E3/UL (ref 0–1.3)
MONOCYTES NFR BLD AUTO: 8 %
NEUTROPHILS # BLD AUTO: 5.1 10E3/UL (ref 1.6–8.3)
NEUTROPHILS NFR BLD AUTO: 74 %
NONHDLC SERPL-MCNC: 71 MG/DL
PLATELET # BLD AUTO: 277 10E3/UL (ref 150–450)
POTASSIUM SERPL-SCNC: 4.6 MMOL/L (ref 3.4–5.3)
PROT SERPL-MCNC: 6.9 G/DL (ref 6.4–8.3)
RBC # BLD AUTO: 4.74 10E6/UL (ref 3.8–5.2)
SODIUM SERPL-SCNC: 138 MMOL/L (ref 136–145)
TRIGL SERPL-MCNC: 90 MG/DL
WBC # BLD AUTO: 6.9 10E3/UL (ref 4–11)

## 2023-04-10 PROCEDURE — 36415 COLL VENOUS BLD VENIPUNCTURE: CPT | Mod: ZL | Performed by: INTERNAL MEDICINE

## 2023-04-10 PROCEDURE — 99214 OFFICE O/P EST MOD 30 MIN: CPT | Performed by: INTERNAL MEDICINE

## 2023-04-10 PROCEDURE — 93971 EXTREMITY STUDY: CPT | Mod: LT

## 2023-04-10 PROCEDURE — 85004 AUTOMATED DIFF WBC COUNT: CPT | Mod: ZL | Performed by: INTERNAL MEDICINE

## 2023-04-10 PROCEDURE — G0463 HOSPITAL OUTPT CLINIC VISIT: HCPCS

## 2023-04-10 PROCEDURE — 82570 ASSAY OF URINE CREATININE: CPT | Mod: ZL | Performed by: INTERNAL MEDICINE

## 2023-04-10 PROCEDURE — 80053 COMPREHEN METABOLIC PANEL: CPT | Mod: ZL | Performed by: INTERNAL MEDICINE

## 2023-04-10 PROCEDURE — 80061 LIPID PANEL: CPT | Mod: ZL | Performed by: INTERNAL MEDICINE

## 2023-04-10 PROCEDURE — 83036 HEMOGLOBIN GLYCOSYLATED A1C: CPT | Mod: ZL | Performed by: INTERNAL MEDICINE

## 2023-04-10 RX ORDER — LISINOPRIL 10 MG/1
10 TABLET ORAL DAILY
Qty: 30 TABLET | Refills: 1 | Status: SHIPPED | OUTPATIENT
Start: 2023-04-10 | End: 2023-06-05

## 2023-04-10 ASSESSMENT — PAIN SCALES - GENERAL: PAINLEVEL: NO PAIN (0)

## 2023-04-19 DIAGNOSIS — E11.49 TYPE 2 DIABETES MELLITUS WITH OTHER NEUROLOGIC COMPLICATION, WITHOUT LONG-TERM CURRENT USE OF INSULIN (H): Chronic | ICD-10-CM

## 2023-04-19 RX ORDER — METFORMIN HCL 500 MG
TABLET, EXTENDED RELEASE 24 HR ORAL
Qty: 360 TABLET | Refills: 3 | Status: SHIPPED | OUTPATIENT
Start: 2023-04-19 | End: 2023-05-05

## 2023-04-19 NOTE — TELEPHONE ENCOUNTER
metFORMIN (GLUCOPHAGE-XR) 500 MG 24 hr tablet        Last Written Prescription Date:  3/7/22  Last Fill Quantity: 360,   # refills: 3  Last Office Visit: 4/10/23  Future Office visit:       Routing refill request to provider for review/approval because:    Biguanide Agents Failed 04/19/2023 01:11 PM   Protocol Details  Recent (6 mo) or future (30 days) visit within the authorizing provider's specialty     Patient has had an Office Visit within the last 6 months. Protocol should have passed.

## 2023-05-01 ENCOUNTER — TRANSFERRED RECORDS (OUTPATIENT)
Dept: MULTI SPECIALTY CLINIC | Facility: CLINIC | Age: 78
End: 2023-05-01

## 2023-05-01 LAB — RETINOPATHY: NORMAL

## 2023-05-05 DIAGNOSIS — E11.49 TYPE 2 DIABETES MELLITUS WITH OTHER NEUROLOGIC COMPLICATION, WITHOUT LONG-TERM CURRENT USE OF INSULIN (H): Chronic | ICD-10-CM

## 2023-05-05 DIAGNOSIS — K21.9 GASTROESOPHAGEAL REFLUX DISEASE WITHOUT ESOPHAGITIS: ICD-10-CM

## 2023-05-05 RX ORDER — PANTOPRAZOLE SODIUM 40 MG/1
40 TABLET, DELAYED RELEASE ORAL DAILY
Qty: 90 TABLET | Refills: 0 | Status: SHIPPED | OUTPATIENT
Start: 2023-05-05 | End: 2023-11-22

## 2023-05-05 RX ORDER — METFORMIN HCL 500 MG
TABLET, EXTENDED RELEASE 24 HR ORAL
Qty: 360 TABLET | Refills: 0 | Status: SHIPPED | OUTPATIENT
Start: 2023-05-05 | End: 2023-12-12

## 2023-06-05 DIAGNOSIS — I10 ESSENTIAL HYPERTENSION: ICD-10-CM

## 2023-06-05 DIAGNOSIS — I10 PRIMARY HYPERTENSION: ICD-10-CM

## 2023-06-06 RX ORDER — LISINOPRIL 10 MG/1
10 TABLET ORAL DAILY
Qty: 90 TABLET | Refills: 3 | Status: SHIPPED | OUTPATIENT
Start: 2023-06-06 | End: 2024-04-10

## 2023-06-06 RX ORDER — AMLODIPINE BESYLATE 10 MG/1
10 TABLET ORAL DAILY
Qty: 90 TABLET | Refills: 3 | Status: SHIPPED | OUTPATIENT
Start: 2023-06-06

## 2023-06-06 NOTE — TELEPHONE ENCOUNTER
amLODIPine (NORVASC) 10 MG tablet        Last Written Prescription Date:  3/7/23  Last Fill Quantity: 90,   # refills: 0  Last Office Visit: 4/10/23  Future Office visit:       Routing refill request to provider for review/approval because:    Calcium Channel Blockers Protocol  Failed 06/05/2023 04:31 PM   Protocol Details  Blood pressure under 140/90 in past 12 months    Normal serum creatinine on file in past 12 months     BP Readings from Last 3 Encounters:   04/10/23 (!) 160/68   09/22/22 (!) 199/93   09/13/22 (!) 154/78     Creatinine   Date Value Ref Range Status   04/10/2023 0.96 (H) 0.51 - 0.95 mg/dL Final   02/28/2021 0.73 0.52 - 1.04 mg/dL Final         lisinopril (ZESTRIL) 10 MG tablet        Last Written Prescription Date:  4/10/23  Last Fill Quantity: 30,   # refills: 1  Last Office Visit: 4/10/23  Future Office visit:       Routing refill request to provider for review/approval because:    No protocol provided

## 2023-06-07 ENCOUNTER — TELEPHONE (OUTPATIENT)
Dept: FAMILY MEDICINE | Facility: OTHER | Age: 78
End: 2023-06-07

## 2023-06-07 NOTE — TELEPHONE ENCOUNTER
Call from patient reporting bilateral ankles swelling and feeling fatigued- tired all the time since started taking amlodipine 10 mg daily. Patient inquiring if she should decrease to 5 mg daily or inquiring on alternative.     Patient reports BP has been within normal limits according to checks with BP Machine at home, however patient is unable to recall the BP Numbers. According to the memory on her home BP monitor-  during telephone call patient reports /63 on 6/6/23.     Notified will discuss with Dr. Cuellar and return call to patient. Patient may need to come in for a visit with Dr. Cuellar.     Patient verbalized understanding.

## 2023-06-07 NOTE — TELEPHONE ENCOUNTER
Call placed to patient notified ok to decrease to 5 mg daily, if no improvement after a week or so stop per Dr. Cuellar.     Patient verbalized understanding, will follow up with any questions/concerns.

## 2023-08-25 DIAGNOSIS — J20.9 ACUTE BRONCHITIS, UNSPECIFIED ORGANISM: ICD-10-CM

## 2023-08-25 DIAGNOSIS — R05.9 COUGH: ICD-10-CM

## 2023-08-25 DIAGNOSIS — I63.9 ACUTE ISCHEMIC STROKE (H): ICD-10-CM

## 2023-08-25 DIAGNOSIS — I73.9 PVD (PERIPHERAL VASCULAR DISEASE) (H): ICD-10-CM

## 2023-08-28 RX ORDER — ALBUTEROL SULFATE 90 UG/1
AEROSOL, METERED RESPIRATORY (INHALATION)
Qty: 18 G | Refills: 3 | Status: SHIPPED | OUTPATIENT
Start: 2023-08-28 | End: 2024-04-10

## 2023-08-28 RX ORDER — CLOPIDOGREL BISULFATE 75 MG/1
75 TABLET ORAL DAILY
Qty: 90 TABLET | Refills: 1 | Status: SHIPPED | OUTPATIENT
Start: 2023-08-28 | End: 2024-05-10

## 2023-10-01 ENCOUNTER — HEALTH MAINTENANCE LETTER (OUTPATIENT)
Age: 78
End: 2023-10-01

## 2023-10-13 NOTE — PROGRESS NOTES
Assessment & Plan   Problem List Items Addressed This Visit          Circulatory    Essential hypertension - Primary    Relevant Medications    amLODIPine (NORVASC) 5 MG tablet    metoprolol tartrate (LOPRESSOR) 25 MG tablet    Other Relevant Orders    Lipid Profile     Other Visit Diagnoses       Vitamin D deficiency        Type 2 diabetes, HbA1c goal < 7% (H)        Relevant Orders    Lipid Profile    Albumin Random Urine Quantitative with Creat Ratio    CBC with Platelets & Differential    Comprehensive metabolic panel    Hemoglobin A1c    TSH with free T4 reflex    Dry eyes        Relevant Medications    polyethylene glycol-propylene glycol (SYSTANE) 0.4-0.3 % SOLN ophthalmic solution    Primary hypertension                   25 minutes spent by me on the date of the encounter doing chart review, review of test results, interpretation of tests, patient visit, and documentation        Nicotine/Tobacco Cessation:  She reports that she has been smoking cigarettes. She started smoking about 67 years ago. She has been smoking an average of .25 packs per day. She has never used smokeless tobacco.          Yash Cuellar, [facility]   Rosalie is a 78 year old, presenting for the following health issues:  Diabetes, Hypertension, and Lipids      HPI     Rosalie  Diabetes Follow-up     Rosalie presents to clinic today for routine follow-up.  Her history is remarkable for a cerebral aneurysm/CVA resulting in chronic left-sided deficits in both her upper and lower extremities.  She also has a history of type 2 diabetes mellitus hypertension hyperlipidemia.  She complains about ongoing fatigue and feeling tired along with dry eye.  She has been seen by ophthalmology however various eyedrops have been used without any improvement.  She otherwise has no additional complaints today and is due for routine labs.    How often are you checking your blood sugar? Not at all  What  concerns do you have today about your diabetes? None   Do you have any of these symptoms? (Select all that apply)  No numbness or tingling in feet.  No redness, sores or blisters on feet.  No complaints of excessive thirst.  No reports of blurry vision.  No significant changes to weight.  Have you had a diabetic eye exam in the last 12 months? Yes- Date of last eye exam: 5/2023,  [facility]             Hyperlipidemia Follow-Up    Are you regularly taking any medication or supplement to lower your cholesterol?   Yes- atorvastatin   Are you having muscle aches or other side effects that you think could be caused by your cholesterol lowering medication?  No    Hypertension Follow-up    Do you check your blood pressure regularly outside of the clinic? No   Are you following a low salt diet? Yes  Are your blood pressures ever more than 140 on the top number (systolic) OR more   than 90 on the bottom number (diastolic), for example 140/90? Yes    BP Readings from Last 2 Encounters:   10/16/23 (!) 156/68   04/10/23 (!) 160/68     Hemoglobin A1C (%)   Date Value   04/10/2023 8.0 (H)   09/13/2022 7.1 (H)   01/12/2021 6.4 (H)   03/03/2020 7.1 (H)     LDL Cholesterol Calculated (mg/dL)   Date Value   04/10/2023 53   07/15/2021 48   01/12/2021 45   03/03/2020 44         COPD Follow-Up  Overall, how are your COPD symptoms since your last clinic visit?  No change  How much fatigue or shortness of breath do you have when you are walking?  Same as usual  How much shortness of breath do you have when you are resting?  Same as usual  How often do you cough? Rarely  Have you noticed any change in your sputum/phlegm?  No  Have you experienced a recent fever? No  Please describe how far you can walk without stopping to rest:  Less than 10 feet  How many flights of stairs are you able to walk up without stopping?  None  Have you had any Emergency Room Visits, Urgent Care Visits, or Hospital  "Admissions because of your COPD since your last office visit?  No    History   Smoking Status    Every Day    Packs/day: 0.25    Types: Cigarettes    Start date: 1[birth year]1/1956    Last attempt to quit: 9/5/2022   Smokeless Tobacco    Never     No results found for: \"FEV1\", \"[id number]\"          Review of Systems   Constitutional, HEENT, cardiovascular, pulmonary, gi and gu systems are negative, except as otherwise noted.      Objective    BP (!) 156/68 (BP Location: Right arm, Patient Position: Sitting, Cuff Size: Adult Large)   Pulse 63   Temp 98  F (36.7  C)   Resp 20   SpO2 97%   There is no height or weight on file to calculate BMI.  Physical Exam   GENERAL: alert and no distress  EYES: Exotropia on and ptosis of the left eye.  RESP: lungs clear to auscultation - no rales, rhonchi or wheezes  CV: regular rate and rhythm, normal S1 S2, no S3 or S4, no murmur, click or rub, no peripheral edema and peripheral pulses strong  MS: +1 to +2 lower extremity edema bilaterally.  SKIN: no suspicious lesions or rashes  NEURO: Left sided upper and lower extremity paralysis.  PSYCH: mentation appears normal, affect normal/bright    Office Visit on 04/10/2023   Component Date Value Ref Range Status    Estimated Average Glucose 04/10/2023 183  mg/dL Final    Hemoglobin A1C 04/10/2023 8.0 (H)  <5.7 % Final    Normal <5.7%   Prediabetes 5.7-6.4%    Diabetes 6.5% or higher     Note: Adopted from ADA consensus guidelines.    Sodium 04/10/2023 138  136 - 145 mmol/L Final    Potassium 04/10/2023 4.6  3.4 - 5.3 mmol/L Final    Chloride 04/10/2023 102  98 - 107 mmol/L Final    Carbon Dioxide (CO2) 04/10/2023 25  22 - 29 mmol/L Final    Anion Gap 04/10/2023 11  7 - 15 mmol/L Final    Urea Nitrogen 04/10/2023 16.0  8.0 - 23.0 mg/dL Final    Creatinine 04/10/2023 0.96 (H)  0.51 - 0.95 mg/dL Final    Calcium 04/10/2023 10.1  8.8 - 10.2 mg/dL Final    Glucose 04/10/2023 171 (H)  70 - 99 mg/dL Final    Alkaline Phosphatase 04/10/2023 81  35 - 104 " U/L Final    AST 04/10/2023 14  10 - 35 U/L Final    ALT 04/10/2023 15  10 - 35 U/L Final    Protein Total 04/10/2023 6.9  6.4 - 8.3 g/dL Final    Albumin 04/10/2023 4.0  3.5 - 5.2 g/dL Final    Bilirubin Total 04/10/2023 0.3  <=1.2 mg/dL Final    GFR Estimate 04/10/2023 61  >60 mL/min/1.73m2 Final    eGFR calculated using 2021 CKD-EPI equation.    Creatinine Urine mg/dL 04/10/2023 111.3  mg/dL Final    The reference ranges have not been established in urine creatinine. The results should be integrated into the clinical context for interpretation.    Albumin Urine mg/L 04/10/2023 <12.0  mg/L Final    The reference ranges have not been established in urine albumin. The results should be integrated into the clinical context for interpretation.    Albumin Urine mg/g Cr 04/10/2023    Final    Unable to calculate, urine albumin and/or urine creatinine is outside detectable limits.  Microalbuminuria is defined as an albumin:creatinine ratio of 17 to 299 for males and 25 to 299 for females. A ratio of albumin:creatinine of 300 or higher is indicative of overt proteinuria.  Due to biologic variability, positive results should be confirmed by a second, first-morning random or 24-hour timed urine specimen. If there is discrepancy, a third specimen is recommended. When 2 out of 3 results are in the microalbuminuria range, this is evidence for incipient nephropathy and warrants increased efforts at glucose control, blood pressure control, and institution of therapy with an angiotensin-converting-enzyme (ACE) inhibitor (if the patient can tolerate it).      Cholesterol 04/10/2023 136  <200 mg/dL Final    Triglycerides 04/10/2023 90  <150 mg/dL Final    Direct Measure HDL 04/10/2023 65  >=50 mg/dL Final    LDL Cholesterol Calculated 04/10/2023 53  <=100 mg/dL Final    Non HDL Cholesterol 04/10/2023 71  <130 mg/dL Final    WBC Count 04/10/2023 6.9  4.0 - 11.0 10e3/uL Final    RBC Count 04/10/2023 4.74  3.80 - 5.20 10e6/uL Final     Hemoglobin 04/10/2023 12.6  11.7 - 15.7 g/dL Final    Hematocrit 04/10/2023 40.4  35.0 - 47.0 % Final    MCV 04/10/2023 85  78 - 100 fL Final    MCH 04/10/2023 26.6  26.5 - 33.0 pg Final    MCHC 04/10/2023 31.2 (L)  31.5 - 36.5 g/dL Final    RDW 04/10/2023 14.5  10.0 - 15.0 % Final    Platelet Count 04/10/2023 277  150 - 450 10e3/uL Final    % Neutrophils 04/10/2023 74  % Final    % Lymphocytes 04/10/2023 14  % Final    % Monocytes 04/10/2023 8  % Final    % Eosinophils 04/10/2023 3  % Final    % Basophils 04/10/2023 0  % Final    Absolute Neutrophils 04/10/2023 5.1  1.6 - 8.3 10e3/uL Final    Absolute Lymphocytes 04/10/2023 1.0  0.8 - 5.3 10e3/uL Final    Absolute Monocytes 04/10/2023 0.6  0.0 - 1.3 10e3/uL Final    Absolute Eosinophils 04/10/2023 0.2  0.0 - 0.7 10e3/uL Final    Absolute Basophils 04/10/2023 0.0  0.0 - 0.2 10e3/uL Final     Results for orders placed or performed in visit on 10/16/23   CBC with Platelets & Differential     Status: None (In process)    Narrative    The following orders were created for panel order CBC with Platelets & Differential.  Procedure                               Abnormality         Status                     ---------                               -----------         ------                     CBC with platelets and d...[[id number]]                      In process                   Please view results for these tests on the individual orders.     Results for orders placed or performed in visit on 10/16/23 (from the past 24 hour(s))   CBC with Platelets & Differential    Narrative    The following orders were created for panel order CBC with Platelets & Differential.  Procedure                               Abnormality         Status                     ---------                               -----------         ------                     CBC with platelets and d...[[id number]]                      In process                   Please view results for these tests on the  individual orders.

## 2023-10-16 ENCOUNTER — OFFICE VISIT (OUTPATIENT)
Dept: INTERNAL MEDICINE | Facility: OTHER | Age: 78
End: 2023-10-16
Attending: INTERNAL MEDICINE
Payer: COMMERCIAL

## 2023-10-16 VITALS
SYSTOLIC BLOOD PRESSURE: 156 MMHG | HEART RATE: 63 BPM | TEMPERATURE: 98 F | RESPIRATION RATE: 20 BRPM | DIASTOLIC BLOOD PRESSURE: 68 MMHG | OXYGEN SATURATION: 97 %

## 2023-10-16 DIAGNOSIS — E11.9 TYPE 2 DIABETES, HBA1C GOAL < 7% (H): ICD-10-CM

## 2023-10-16 DIAGNOSIS — E55.9 VITAMIN D DEFICIENCY: ICD-10-CM

## 2023-10-16 DIAGNOSIS — I10 ESSENTIAL HYPERTENSION: Primary | ICD-10-CM

## 2023-10-16 DIAGNOSIS — I10 PRIMARY HYPERTENSION: ICD-10-CM

## 2023-10-16 DIAGNOSIS — H04.123 DRY EYES: ICD-10-CM

## 2023-10-16 LAB
ALBUMIN SERPL BCG-MCNC: 4 G/DL (ref 3.5–5.2)
ALP SERPL-CCNC: 83 U/L (ref 35–104)
ALT SERPL W P-5'-P-CCNC: 12 U/L (ref 0–50)
ANION GAP SERPL CALCULATED.3IONS-SCNC: 11 MMOL/L (ref 7–15)
AST SERPL W P-5'-P-CCNC: 14 U/L (ref 0–45)
BASO+EOS+MONOS # BLD AUTO: ABNORMAL 10*3/UL
BASO+EOS+MONOS NFR BLD AUTO: ABNORMAL %
BASOPHILS # BLD AUTO: 0 10E3/UL (ref 0–0.2)
BASOPHILS NFR BLD AUTO: 0 %
BILIRUB SERPL-MCNC: 0.3 MG/DL
BUN SERPL-MCNC: 13.8 MG/DL (ref 8–23)
CALCIUM SERPL-MCNC: 9.8 MG/DL (ref 8.8–10.2)
CHLORIDE SERPL-SCNC: 102 MMOL/L (ref 98–107)
CHOLEST SERPL-MCNC: 127 MG/DL
CREAT SERPL-MCNC: 0.95 MG/DL (ref 0.51–0.95)
CREAT UR-MCNC: 112.6 MG/DL
DEPRECATED HCO3 PLAS-SCNC: 24 MMOL/L (ref 22–29)
EGFRCR SERPLBLD CKD-EPI 2021: 61 ML/MIN/1.73M2
EOSINOPHIL # BLD AUTO: 0.2 10E3/UL (ref 0–0.7)
EOSINOPHIL NFR BLD AUTO: 3 %
ERYTHROCYTE [DISTWIDTH] IN BLOOD BY AUTOMATED COUNT: 14.4 % (ref 10–15)
EST. AVERAGE GLUCOSE BLD GHB EST-MCNC: 148 MG/DL
GLUCOSE SERPL-MCNC: 137 MG/DL (ref 70–99)
HBA1C MFR BLD: 6.8 %
HCT VFR BLD AUTO: 39 % (ref 35–47)
HDLC SERPL-MCNC: 61 MG/DL
HGB BLD-MCNC: 12.2 G/DL (ref 11.7–15.7)
IMM GRANULOCYTES # BLD: 0 10E3/UL
IMM GRANULOCYTES NFR BLD: 0 %
LDLC SERPL CALC-MCNC: 50 MG/DL
LYMPHOCYTES # BLD AUTO: 1 10E3/UL (ref 0.8–5.3)
LYMPHOCYTES NFR BLD AUTO: 14 %
MCH RBC QN AUTO: 25.5 PG (ref 26.5–33)
MCHC RBC AUTO-ENTMCNC: 31.3 G/DL (ref 31.5–36.5)
MCV RBC AUTO: 82 FL (ref 78–100)
MICROALBUMIN UR-MCNC: <12 MG/L
MICROALBUMIN/CREAT UR: NORMAL MG/G{CREAT}
MONOCYTES # BLD AUTO: 0.5 10E3/UL (ref 0–1.3)
MONOCYTES NFR BLD AUTO: 7 %
NEUTROPHILS # BLD AUTO: 5.1 10E3/UL (ref 1.6–8.3)
NEUTROPHILS NFR BLD AUTO: 76 %
NONHDLC SERPL-MCNC: 66 MG/DL
PLATELET # BLD AUTO: 289 10E3/UL (ref 150–450)
POTASSIUM SERPL-SCNC: 4.9 MMOL/L (ref 3.4–5.3)
PROT SERPL-MCNC: 6.8 G/DL (ref 6.4–8.3)
RBC # BLD AUTO: 4.78 10E6/UL (ref 3.8–5.2)
SODIUM SERPL-SCNC: 137 MMOL/L (ref 135–145)
TRIGL SERPL-MCNC: 80 MG/DL
TSH SERPL DL<=0.005 MIU/L-ACNC: 1.2 UIU/ML (ref 0.3–4.2)
WBC # BLD AUTO: 6.7 10E3/UL (ref 4–11)

## 2023-10-16 PROCEDURE — 82570 ASSAY OF URINE CREATININE: CPT | Mod: ZL | Performed by: INTERNAL MEDICINE

## 2023-10-16 PROCEDURE — 83036 HEMOGLOBIN GLYCOSYLATED A1C: CPT | Mod: ZL | Performed by: INTERNAL MEDICINE

## 2023-10-16 PROCEDURE — 80061 LIPID PANEL: CPT | Mod: ZL | Performed by: INTERNAL MEDICINE

## 2023-10-16 PROCEDURE — 84443 ASSAY THYROID STIM HORMONE: CPT | Mod: ZL | Performed by: INTERNAL MEDICINE

## 2023-10-16 PROCEDURE — 85004 AUTOMATED DIFF WBC COUNT: CPT | Mod: ZL | Performed by: INTERNAL MEDICINE

## 2023-10-16 PROCEDURE — 36415 COLL VENOUS BLD VENIPUNCTURE: CPT | Mod: ZL | Performed by: INTERNAL MEDICINE

## 2023-10-16 PROCEDURE — G0463 HOSPITAL OUTPT CLINIC VISIT: HCPCS

## 2023-10-16 PROCEDURE — 80053 COMPREHEN METABOLIC PANEL: CPT | Mod: ZL | Performed by: INTERNAL MEDICINE

## 2023-10-16 PROCEDURE — 99214 OFFICE O/P EST MOD 30 MIN: CPT | Performed by: INTERNAL MEDICINE

## 2023-10-16 RX ORDER — AMLODIPINE BESYLATE 5 MG/1
5 TABLET ORAL 2 TIMES DAILY
Qty: 180 TABLET | Refills: 3 | Status: SHIPPED | OUTPATIENT
Start: 2023-10-16 | End: 2024-04-10

## 2023-10-16 RX ORDER — METOPROLOL TARTRATE 25 MG/1
25 TABLET, FILM COATED ORAL 2 TIMES DAILY
Qty: 180 TABLET | Refills: 3 | Status: SHIPPED | OUTPATIENT
Start: 2023-10-16 | End: 2024-08-22

## 2023-10-16 RX ORDER — POLYETHYLENE GLYCOL 400 AND PROPYLENE GLYCOL 4; 3 MG/ML; MG/ML
1 SOLUTION/ DROPS OPHTHALMIC
Qty: 30 ML | Refills: 1 | Status: SHIPPED | OUTPATIENT
Start: 2023-10-16 | End: 2024-09-12

## 2023-10-16 ASSESSMENT — PAIN SCALES - GENERAL: PAINLEVEL: NO PAIN (0)

## 2023-10-23 ENCOUNTER — TELEPHONE (OUTPATIENT)
Dept: FAMILY MEDICINE | Facility: OTHER | Age: 78
End: 2023-10-23

## 2023-10-23 NOTE — TELEPHONE ENCOUNTER
Call from patients daughter inquiring on lab results from 10/16/23.    Notified an update will be provided after review is completed by Dr. Cuellar.     Lakeisha can be reached at [phone].

## 2023-11-01 ENCOUNTER — TELEPHONE (OUTPATIENT)
Dept: INTERNAL MEDICINE | Facility: OTHER | Age: 78
End: 2023-11-01

## 2023-11-01 NOTE — TELEPHONE ENCOUNTER
Pt called she is wondering if Danni will take her on as a new pt, if so can you please give her a call [phone]

## 2023-11-02 NOTE — TELEPHONE ENCOUNTER
"Writer relayed Providers note below  Offered to set established care appointment with alternate Provider in [place]  Patient states \"I will tell my daughter. She does all this stuff for me\"  No further concerns at calls end.  Phone call ended pleasantly.   "

## 2023-11-13 ENCOUNTER — TELEPHONE (OUTPATIENT)
Dept: INTERNAL MEDICINE | Facility: OTHER | Age: 78
End: 2023-11-13

## 2023-11-13 DIAGNOSIS — J43.1 PANLOBULAR EMPHYSEMA (H): ICD-10-CM

## 2023-11-13 RX ORDER — ALBUTEROL SULFATE 90 UG/1
2 AEROSOL, METERED RESPIRATORY (INHALATION) EVERY 6 HOURS PRN
Qty: 18 G | Refills: 1 | Status: SHIPPED | OUTPATIENT
Start: 2023-11-13 | End: 2024-04-10

## 2023-11-13 NOTE — TELEPHONE ENCOUNTER
Call returned to patient and daughter, update provided inhaler has been sent to [facility] per request.

## 2023-11-13 NOTE — TELEPHONE ENCOUNTER
12:03 PM    Reason for Call: Phone Call    Description: Patient's daughter called in stating that the inhaler that she's gotten from mail order does not mechanically work. Patient's daughter is asking that a prescription for her inhaler be sent to [facility] in [place] as she is completely out. Please call daughter back with any questions.     Was an appointment offered for this call? No  If yes : Appointment type              Date    Preferred method for responding to this message: Telephone Call  What is your phone number ? [phone]     If we cannot reach you directly, may we leave a detailed response at the number you provided? Yes    Can this message wait until your PCP/provider returns, if available today?   Laura Jasso

## 2023-11-15 ENCOUNTER — TRANSFERRED RECORDS (OUTPATIENT)
Dept: HEALTH INFORMATION MANAGEMENT | Facility: CLINIC | Age: 78
End: 2023-11-15

## 2023-11-15 LAB — RETINOPATHY: NEGATIVE

## 2023-11-22 DIAGNOSIS — K21.9 GASTROESOPHAGEAL REFLUX DISEASE WITHOUT ESOPHAGITIS: ICD-10-CM

## 2023-11-22 RX ORDER — PANTOPRAZOLE SODIUM 40 MG/1
40 TABLET, DELAYED RELEASE ORAL DAILY
Qty: 90 TABLET | Refills: 0 | Status: SHIPPED | OUTPATIENT
Start: 2023-11-22 | End: 2023-12-12

## 2023-11-22 NOTE — TELEPHONE ENCOUNTER
pantoprazole (PROTONIX) 40 MG EC tablet         Last Written Prescription Date:  5/5/23  Last Fill Quantity: 90,   # refills: 0  Last Office Visit: 10/16/23  Future Office visit:       Routing refill request to provider for review/approval because:

## 2023-11-28 DIAGNOSIS — E55.9 VITAMIN D DEFICIENCY: ICD-10-CM

## 2023-11-28 DIAGNOSIS — E78.5 HYPERLIPIDEMIA LDL GOAL <100: ICD-10-CM

## 2023-11-29 NOTE — TELEPHONE ENCOUNTER
Atorvastatin       Last Written Prescription Date:  11/28/2023  Last Fill Quantity: 90,   # refills: 3  Last Office Visit: 10/16/2023  Future Office visit:       Routing refill request to provider for review/approval because:            Metoprolol Tartrate      Last Written Prescription Date:  11/28/2023  Last Fill Quantity: 90,   # refills: 3  Last Office Visit: 10/16/2023  Future Office visit:       Routing refill request to provider for review/approval because:

## 2023-12-01 RX ORDER — METOPROLOL TARTRATE 50 MG
TABLET ORAL
Qty: 90 TABLET | Refills: 0 | Status: SHIPPED | OUTPATIENT
Start: 2023-12-01 | End: 2024-04-10

## 2023-12-01 RX ORDER — ATORVASTATIN CALCIUM 80 MG/1
80 TABLET, FILM COATED ORAL DAILY
Qty: 90 TABLET | Refills: 3 | Status: SHIPPED | OUTPATIENT
Start: 2023-12-01

## 2023-12-12 DIAGNOSIS — E11.49 TYPE 2 DIABETES MELLITUS WITH OTHER NEUROLOGIC COMPLICATION, WITHOUT LONG-TERM CURRENT USE OF INSULIN (H): Chronic | ICD-10-CM

## 2023-12-12 DIAGNOSIS — K21.9 GASTROESOPHAGEAL REFLUX DISEASE WITHOUT ESOPHAGITIS: ICD-10-CM

## 2023-12-12 RX ORDER — PANTOPRAZOLE SODIUM 40 MG/1
40 TABLET, DELAYED RELEASE ORAL DAILY
Qty: 90 TABLET | Refills: 2 | Status: SHIPPED | OUTPATIENT
Start: 2023-12-12 | End: 2024-08-15

## 2023-12-12 RX ORDER — METFORMIN HCL 500 MG
TABLET, EXTENDED RELEASE 24 HR ORAL
Qty: 360 TABLET | Refills: 2 | Status: SHIPPED | OUTPATIENT
Start: 2023-12-12 | End: 2024-07-30

## 2023-12-12 NOTE — TELEPHONE ENCOUNTER
Metformin      Last Written Prescription Date:  05/05/2023  Last Fill Quantity: 360 ,   # refills: 0  Last Office Visit: 10/16/2023  Future Office visit:   01/09/2024    pantoprazole      Last Written Prescription Date:  11/22/2032  Last Fill Quantity: 90,   # refills: 0  Last Office Visit: 10/16/2023  Future Office visit:   01/09/2024    Routing refill request to provider for review/approval because:

## 2024-02-17 NOTE — RESULT ENCOUNTER NOTE
"Encounter Date: 2024       History     Chief Complaint   Patient presents with    Chest Pain     Pt arrives to ED via POV with "pinching" 9/10 chest pain that began this morning. Denies SOB, unilateral numbness/weakness.     30-year-old female with a past medical history of GERD, irritable bowel syndrome and palpitations presents complaining of "pinching" in her substernal area intermittently since 10:00 a.m. this morning.  Patient reports the pain is worse when she moves, but not made worse with deep inspiration or exertion.  Patient notes tenderness near her sternum.  Patient denies any fevers chills, nausea, vomiting or diarrhea    The history is provided by the patient and the spouse.     Review of patient's allergies indicates:  No Known Allergies  Past Medical History:   Diagnosis Date    Anemia     Chronic bilateral low back pain     GERD (gastroesophageal reflux disease)     HA (headache)     IBS (irritable bowel syndrome)     Palpitations     Recurrent umbilical hernia      Past Surgical History:   Procedure Laterality Date     SECTION, CLASSIC      ESOPHAGOGASTRODUODENOSCOPY N/A 2019    Procedure: EGD (ESOPHAGOGASTRODUODENOSCOPY);  Surgeon: Naseem Hope MD;  [facility]: [facility];  Service: Endoscopy;  Laterality: N/A;    UMBILICAL HERNIA REPAIR       Family History   Problem Relation Age of Onset    Diabetes Mellitus Maternal Aunt      Social History     Tobacco Use    Smoking status: Never   Substance Use Topics    Alcohol use: No    Drug use: Never     Review of Systems   All other systems reviewed and are negative.      Physical Exam     Initial Vitals [24 1331]   BP Pulse Resp Temp SpO2   (!) 111/56 76 16 97.9 °F (36.6 °C) 100 %      MAP       --         Physical Exam    Nursing note and vitals reviewed.  Constitutional: She appears well-developed and well-nourished. She is not diaphoretic. No distress.   HENT:   Head: Normocephalic and atraumatic.   Right Ear: " Pt notified of results  Henny Vincent External ear normal.   Left Ear: External ear normal.   Eyes: Conjunctivae and EOM are normal. Pupils are equal, round, and reactive to light.   Neck: Neck supple.   Normal range of motion.  Cardiovascular:  Normal rate, regular rhythm, normal heart sounds and intact distal pulses.           Pulmonary/Chest: Breath sounds normal. No respiratory distress. She has no wheezes. She has no rhonchi. She has no rales. She exhibits tenderness (bilateral costosternal junctions, completely reproduces pain).   Abdominal: Abdomen is soft. Bowel sounds are normal. She exhibits no distension and no mass. There is no abdominal tenderness. There is no rebound and no guarding.   Musculoskeletal:         General: No edema. Normal range of motion.      Cervical back: Normal range of motion and neck supple.     Neurological: She is alert and oriented to person, place, and time. She has normal strength.   Skin: Skin is warm. Capillary refill takes less than 2 seconds.   Psychiatric: She has a normal mood and affect. Thought content normal.         ED Course   Procedures  Labs Reviewed - No data to display  EKG Readings: (Independently Interpreted)   Initial Reading: No STEMI. Heart Rate: 79.   Normal p.r. interval  Normal sinus rhythm       Imaging Results              X-Ray Chest PA And Lateral (Final result)  Result time 02/17/24 15:54:34      Final result by Jacky Hdz MD (02/17/24 15:54:34)                   Impression:      No acute process.      Electronically signed by: Jacky Hdz MD  Date:    02/17/2024  Time:    15:54               Narrative:    EXAMINATION:  XR CHEST PA AND LATERAL    CLINICAL HISTORY:  Chest pain, unspecified    TECHNIQUE:  PA and lateral views of the chest were performed.    COMPARISON:  12/09/2010.    FINDINGS:  The trachea is unremarkable.  The cardiomediastinal silhouette is within normal limits.  The hilar structures are unremarkable.  There is no evidence of free air beneath the hemidiaphragms.   There are no pleural effusions.  There is no evidence of a pneumothorax.  There is no evidence of pneumomediastinum.  No airspace opacity is present.  The osseous structures are unremarkable.                                    X-Rays:   Independently Interpreted Readings:   Chest X-Ray: Normal heart size.  No infiltrates.  No acute abnormalities.     Medications   ketorolac tablet 10 mg (10 mg Oral Given 2/17/24 8125)     Medical Decision Making  30-year-old female with chest pain.  Differential diagnosis includes pericarditis, costochondritis, slipping rib    Amount and/or Complexity of Data Reviewed  Radiology: ordered and independent interpretation performed.  ECG/medicine tests: ordered and independent interpretation performed.  Discussion of management or test interpretation with external provider(s): Patient with chest wall pain that is reproducible and has no associated red flags.  Patient discharged home in stable condition. Diagnosis and treatment plan explained to patient. No further workup indicated based on their complaints or examination today. Discussed results with the patient. I educated the patient/guardian on the warning signs and symptoms for which they must seek immediate medical attention. All questions addressed and patient/guardian were given discharge instructions and followup information.     Risk  Prescription drug management.                                      Clinical Impression:  Final diagnoses:  [R07.9] Chest pain  [R07.89] Atypical chest pain (Primary)  [M94.0] Costochondritis, acute          ED Disposition Condition    Discharge Stable          ED Prescriptions       Medication Sig Dispense Start Date End Date Auth. Provider    naproxen (NAPROSYN) 500 MG tablet Take 1 tablet (500 mg total) by mouth 2 (two) times daily with meals. For pain 20 tablet 2/17/2024 -- Rojelio Thurston MD          Follow-up Information       Follow up With Specialties Details Why Contact Info    Green,  Tyron BLANTON MD Internal Medicine Schedule an appointment as soon as possible for a visit in 3 days For follow-up and re-evaluation [address] [facility] [address]  [phone]      [facility] Emergency Medicine  As needed, for any new or worsening symptoms [address] [facility] [address]  [phone]             Rojelio Thurston MD  02/17/24 1600

## 2024-03-26 DIAGNOSIS — R93.0 ABNORMAL FINDINGS ON DIAGNOSTIC IMAGING OF SKULL AND HEAD, NOT ELSEWHERE CLASSIFIED: ICD-10-CM

## 2024-03-26 DIAGNOSIS — Z86.79 HISTORY OF ANEURYSM: Primary | ICD-10-CM

## 2024-03-26 DIAGNOSIS — Z86.73 HISTORY OF CVA (CEREBROVASCULAR ACCIDENT): ICD-10-CM

## 2024-04-05 ENCOUNTER — HOSPITAL ENCOUNTER (OUTPATIENT)
Dept: MRI IMAGING | Facility: HOSPITAL | Age: 79
Discharge: HOME | End: 2024-04-05
Attending: INTERNAL MEDICINE | Admitting: INTERNAL MEDICINE
Payer: COMMERCIAL

## 2024-04-05 DIAGNOSIS — Z86.73 HISTORY OF CVA (CEREBROVASCULAR ACCIDENT): ICD-10-CM

## 2024-04-05 DIAGNOSIS — Z86.79 HISTORY OF ANEURYSM: ICD-10-CM

## 2024-04-05 DIAGNOSIS — R93.0 ABNORMAL FINDINGS ON DIAGNOSTIC IMAGING OF SKULL AND HEAD, NOT ELSEWHERE CLASSIFIED: ICD-10-CM

## 2024-04-05 PROCEDURE — 70544 MR ANGIOGRAPHY HEAD W/O DYE: CPT

## 2024-04-10 ENCOUNTER — OFFICE VISIT (OUTPATIENT)
Dept: INTERNAL MEDICINE | Facility: OTHER | Age: 79
End: 2024-04-10
Attending: INTERNAL MEDICINE
Payer: COMMERCIAL

## 2024-04-10 VITALS
SYSTOLIC BLOOD PRESSURE: 160 MMHG | DIASTOLIC BLOOD PRESSURE: 74 MMHG | OXYGEN SATURATION: 91 % | HEART RATE: 70 BPM | TEMPERATURE: 97.9 F | RESPIRATION RATE: 22 BRPM

## 2024-04-10 DIAGNOSIS — Z86.73 HISTORY OF CVA (CEREBROVASCULAR ACCIDENT): Chronic | ICD-10-CM

## 2024-04-10 DIAGNOSIS — J43.1 PANLOBULAR EMPHYSEMA (H): ICD-10-CM

## 2024-04-10 DIAGNOSIS — I67.1 CEREBRAL ANEURYSM: Chronic | ICD-10-CM

## 2024-04-10 DIAGNOSIS — Z13.820 SCREENING FOR OSTEOPOROSIS: ICD-10-CM

## 2024-04-10 DIAGNOSIS — I10 PRIMARY HYPERTENSION: ICD-10-CM

## 2024-04-10 DIAGNOSIS — E11.40 TYPE 2 DIABETES MELLITUS WITH DIABETIC NEUROPATHY, WITHOUT LONG-TERM CURRENT USE OF INSULIN (H): Primary | Chronic | ICD-10-CM

## 2024-04-10 DIAGNOSIS — I73.9 PVD (PERIPHERAL VASCULAR DISEASE) (H): Chronic | ICD-10-CM

## 2024-04-10 LAB
EST. AVERAGE GLUCOSE BLD GHB EST-MCNC: 171 MG/DL
HBA1C MFR BLD: 7.6 %

## 2024-04-10 PROCEDURE — 83036 HEMOGLOBIN GLYCOSYLATED A1C: CPT | Mod: ZL | Performed by: INTERNAL MEDICINE

## 2024-04-10 PROCEDURE — G0463 HOSPITAL OUTPT CLINIC VISIT: HCPCS

## 2024-04-10 PROCEDURE — 99214 OFFICE O/P EST MOD 30 MIN: CPT | Performed by: INTERNAL MEDICINE

## 2024-04-10 PROCEDURE — 36415 COLL VENOUS BLD VENIPUNCTURE: CPT | Mod: ZL | Performed by: INTERNAL MEDICINE

## 2024-04-10 RX ORDER — LISINOPRIL 20 MG/1
20 TABLET ORAL DAILY
Qty: 90 TABLET | Refills: 0 | Status: SHIPPED | OUTPATIENT
Start: 2024-04-10 | End: 2024-07-03

## 2024-04-10 RX ORDER — ALBUTEROL SULFATE 90 UG/1
2 AEROSOL, METERED RESPIRATORY (INHALATION) EVERY 6 HOURS PRN
Qty: 36 G | Refills: 5 | Status: SHIPPED | OUTPATIENT
Start: 2024-04-10 | End: 2024-09-12

## 2024-04-10 ASSESSMENT — PAIN SCALES - GENERAL: PAINLEVEL: NO PAIN (0)

## 2024-04-10 NOTE — PATIENT INSTRUCTIONS
Lisinopril increase to 20 mg Daily   Continue Lopressor 25 mg twice daily  A1c today  PT assessment  DME-Motorized wheelchair.

## 2024-04-10 NOTE — PROGRESS NOTES
Assessment & Plan   Problem List Items Addressed This Visit          Nervous and Auditory    Type 2 diabetes mellitus with neurologic complication, without long-term current use of insulin (H) - Primary (Chronic)    Relevant Orders    Hemoglobin A1c       Respiratory    Emphysema lung (H)    Relevant Medications    albuterol (PROAIR HFA/PROVENTIL HFA/VENTOLIN HFA) 108 (90 Base) MCG/ACT inhaler    Other Relevant Orders    Miscellaneous Order for DME - (Use only if a more specific DME order does not already exist    Physical Therapy  Referral       Circulatory    Cerebral aneurysm (Chronic)    Relevant Orders    Miscellaneous Order for DME - (Use only if a more specific DME order does not already exist    Physical Therapy  Referral    PVD (peripheral vascular disease) (H24) (Chronic)    Relevant Orders    Miscellaneous Order for DME - (Use only if a more specific DME order does not already exist    Physical Therapy  Referral       Other    History of CVA (cerebrovascular accident) (Chronic)    Relevant Orders    Miscellaneous Order for DME - (Use only if a more specific DME order does not already exist    Physical Therapy  Referral     Other Visit Diagnoses       Screening for osteoporosis        Primary hypertension        Relevant Medications    lisinopril (ZESTRIL) 20 MG tablet               30 minutes spent by me on the date of the encounter doing chart review, review of test results, interpretation of tests, patient visit, and documentation          No follow-ups on file.      Jalyn Wiggnis is a 78 year old, presenting for the following health issues:  Clinic Care Coordination - Face To Face    History of Present Illness       Reason for visit:  Wheelchair assessment    She eats 2-3 servings of fruits and vegetables daily.She consumes 0 sweetened beverage(s) daily.She exercises with enough effort to increase her heart rate 9 or less minutes per day.  She exercises with  enough effort to increase her heart rate 3 or less days per week.   She is taking medications regularly.     Rosalie is a very pleasant 78-year-old female with a history of CVA secondary to cerebral aneurysm leaving her with significant left-sided deficits.  Most recently had a repeat MRI which revealed chronic right ICA occlusion.  There is poor visualization of the right A1 segment.  Right posterior communicating artery was said to be small.  Slightly diminished distal right MCA enhancement as compared to the left.  Overall there was no significant change in the appearance of the 3.7 mm right MCA bifurcation aneurysm.  He has had no recurrent symptoms.    Unfortunately due to her aneurysmal stroke she was left with significant left-sided deficits left-sided weakness in her left upper and lower extremities.  She is not able to ambulate however has been able to use her motorized wheelchair.  Of note she did purchase this motorized wheelchair out-of-pocket which includes a headrest foot rest armrests and right hand remote.  Unfortunately she has experienced difficulty with this machine as of late and states that the right wheel is coming off.  She would like assessment for a new wheelchair through physical therapy and request the DME today.    Upon her physical limitations and the fact that this motorized wheelchair provides her much improved quality of life along with mobility I am in the 100% agreement that a new motorized wheelchair with armrests head rests reclining back and elevating foot rests along with a right hand remote is quite reasonable.    Patient is noted to have elevated blood pressure today at 160/174.  She is currently on Lopressor 25 mg twice daily.  We did discuss increasing her lisinopril which she is agreeable to.  Patient will increase her lisinopril to 20 mg p.o. daily after today's visit.  She does reports she had gone without that medication for some time as her medication home delivery  pharmacy had not filled it for her in a timely manner.    Regard to her type 2 diabetes she is due for an A1c today which we will obtain.  FACE TO FACE - DME- WHEELCHAIR     MRA results     Diabetic Foot Exam     Diabetes Follow-up      BP Readings from Last 2 Encounters:   04/10/24 (!) 160/74   10/16/23 (!) 156/68     Hemoglobin A1C (%)   Date Value   10/16/2023 6.8 (H)   04/10/2023 8.0 (H)   01/12/2021 6.4 (H)   03/03/2020 7.1 (H)     LDL Cholesterol Calculated (mg/dL)   Date Value   10/16/2023 50   04/10/2023 53   01/12/2021 45   03/03/2020 44               Review of Systems  Constitutional, HEENT, cardiovascular, pulmonary, gi and gu systems are negative, except as otherwise noted.      Objective    BP (!) 160/74 (BP Location: Right arm, Patient Position: Sitting, Cuff Size: Adult Large)   Pulse 70   Temp 97.9  F (36.6  C) (Tympanic)   Resp 22   SpO2 91%   There is no height or weight on file to calculate BMI.  Physical Exam   GENERAL: alert and no distress  EYES: Eyes grossly normal to inspection, PERRL and conjunctivae and sclerae normal  RESP: lungs clear to auscultation - no rales, rhonchi or wheezes  CV: regular rate and rhythm, normal S1 S2, no S3 or S4, no murmur, click or rub, no peripheral edema  MS: +1 to +2 lower extremity edema bilaterally.  SKIN: no suspicious lesions or rashes  NEURO: Monofilament testing did reveal sensation in both feet bilaterally.  She does have significant paralysis however in both her left upper and left lower extremity, with only about 1 out of 5 strength in both these extremities.  PSYCH: mentation appears normal, affect normal/bright    Transferred Records on 11/15/2023   Component Date Value Ref Range Status    RETINOPATHY 11/15/2023 NEGATIVE   Final     No results found for any visits on 04/10/24.  No results found for this or any previous visit (from the past 24 hour(s)).        Signed Electronically by: Yash Cuellar DO

## 2024-04-21 DIAGNOSIS — J43.1 PANLOBULAR EMPHYSEMA (H): ICD-10-CM

## 2024-04-22 RX ORDER — ALBUTEROL SULFATE 90 UG/1
AEROSOL, METERED RESPIRATORY (INHALATION)
Qty: 18 G | Refills: 1 | OUTPATIENT
Start: 2024-04-22

## 2024-05-10 ENCOUNTER — TELEPHONE (OUTPATIENT)
Dept: INTERNAL MEDICINE | Facility: OTHER | Age: 79
End: 2024-05-10

## 2024-05-10 DIAGNOSIS — I73.9 PVD (PERIPHERAL VASCULAR DISEASE) (H): ICD-10-CM

## 2024-05-10 DIAGNOSIS — I63.9 ACUTE ISCHEMIC STROKE (H): ICD-10-CM

## 2024-05-10 RX ORDER — CLOPIDOGREL BISULFATE 75 MG/1
75 TABLET ORAL DAILY
Qty: 90 TABLET | Refills: 2 | Status: SHIPPED | OUTPATIENT
Start: 2024-05-10 | End: 2024-05-13

## 2024-05-10 NOTE — TELEPHONE ENCOUNTER
Reason for call:  Medication      Have you contacted your pharmacy? Yes   If patient has contacted Pharmacy and it has been over 72hrs, continue to #2  Medication Clopidogrel 75 Mg  What Pharmacy do you use? [facility] in [place].       (Please note that the turn-around-time for prescriptions is 72 business hours; I am sending your request at this time. SEND TO appropriate Care Team Pool )

## 2024-05-13 DIAGNOSIS — I63.9 ACUTE ISCHEMIC STROKE (H): ICD-10-CM

## 2024-05-13 DIAGNOSIS — I73.9 PVD (PERIPHERAL VASCULAR DISEASE) (H): ICD-10-CM

## 2024-05-13 RX ORDER — CLOPIDOGREL BISULFATE 75 MG/1
75 TABLET ORAL DAILY
Qty: 30 TABLET | Refills: 0 | Status: SHIPPED | OUTPATIENT
Start: 2024-05-13 | End: 2024-07-05

## 2024-05-13 NOTE — TELEPHONE ENCOUNTER
Patient out of medication, refill sent to [facility] on 5/10/24 -patient will not receive for a week, in need of a one month supply to go to [facility] [place].    LOV 4/10/24- Dr. Cuellar.

## 2024-05-13 NOTE — TELEPHONE ENCOUNTER
Patient called regarding refill request below. She states she needs it sent to [facility] in [place] and not [facility] because she is out completely.

## 2024-05-20 ENCOUNTER — TELEPHONE (OUTPATIENT)
Dept: FAMILY MEDICINE | Facility: OTHER | Age: 79
End: 2024-05-20

## 2024-05-20 DIAGNOSIS — I10 ESSENTIAL HYPERTENSION: Primary | ICD-10-CM

## 2024-05-20 RX ORDER — LISINOPRIL 10 MG/1
10 TABLET ORAL DAILY
Qty: 90 TABLET | Refills: 3 | OUTPATIENT
Start: 2024-05-20

## 2024-05-20 NOTE — TELEPHONE ENCOUNTER
Telephone message received from patients daughter.  States she called to [facility] regarding electric wheelchair and they do not an order. Fax number provided: [phone].    Order from Dr. Cuellar on 4/10/24 faxed to [facility].

## 2024-07-02 DIAGNOSIS — I10 PRIMARY HYPERTENSION: ICD-10-CM

## 2024-07-02 DIAGNOSIS — I63.9 ACUTE ISCHEMIC STROKE (H): ICD-10-CM

## 2024-07-02 DIAGNOSIS — I73.9 PVD (PERIPHERAL VASCULAR DISEASE) (H): ICD-10-CM

## 2024-07-02 NOTE — TELEPHONE ENCOUNTER
Patients daughter is calling back regarding the electric wheelchair. Patient states she has been for months for this. Advised it was faxed on 04/10/24. Please call [facility] to check on it and please call daughter back also. [phone]

## 2024-07-02 NOTE — TELEPHONE ENCOUNTER
Lisinopril      Last Written Prescription Date:  04/10/24  Last Fill Quantity: 90,   # refills: 0  Last Office Visit: 04/10/24  Future Office visit:

## 2024-07-02 NOTE — TELEPHONE ENCOUNTER
Call returned to patients daughter, no answer. Upon review unable to locate a Consent to Communicate with patients daughter.     A call has been placed to patient, daughter, Renetta was with patient at the time of call and patient requesting RN to speak with Renetta.     Update provided the order for the wheelchair has been faxed to [facility] on 4/10/24 and by Giovanna KERN RN on 5/20/24.    [facility] and will provide an update when Renetta receives a return call as to the status of the wheelchair order faxed.

## 2024-07-03 RX ORDER — LISINOPRIL 20 MG/1
20 TABLET ORAL DAILY
Qty: 90 TABLET | Refills: 2 | Status: SHIPPED | OUTPATIENT
Start: 2024-07-03 | End: 2024-07-05

## 2024-07-03 NOTE — TELEPHONE ENCOUNTER
Returned call to patient as voicemail message was retrieved from 7/2/24 after RN CC was out of the office.     No answer on return call, voicemail message left, patient may return call to the clinic. Update provided due to holiday on 7/4/24, the clinic will be closed tomorrow.

## 2024-07-03 NOTE — TELEPHONE ENCOUNTER
ACE Inhibitors (Including Combos) Protocol Uqfqfw1007/02/2024 03:39 PM   Protocol Details Blood pressure under 140/90 in past 12 months- Clinicial or Patient Reported       BP Readings from Last 3 Encounters:   04/10/24 (!) 160/74   10/16/23 (!) 156/68   04/10/23 (!) 160/68

## 2024-07-05 DIAGNOSIS — I63.9 ACUTE ISCHEMIC STROKE (H): ICD-10-CM

## 2024-07-05 DIAGNOSIS — I10 PRIMARY HYPERTENSION: ICD-10-CM

## 2024-07-05 DIAGNOSIS — I73.9 PVD (PERIPHERAL VASCULAR DISEASE) (H): ICD-10-CM

## 2024-07-05 RX ORDER — CLOPIDOGREL BISULFATE 75 MG/1
75 TABLET ORAL DAILY
Qty: 30 TABLET | Refills: 0 | Status: SHIPPED | OUTPATIENT
Start: 2024-07-05 | End: 2024-07-05

## 2024-07-05 RX ORDER — LISINOPRIL 20 MG/1
20 TABLET ORAL DAILY
Qty: 30 TABLET | Refills: 0 | Status: SHIPPED | OUTPATIENT
Start: 2024-07-05 | End: 2024-09-12

## 2024-07-05 NOTE — TELEPHONE ENCOUNTER
30 day supply sent to [facility] 7/5/24.    90 day supply needed to be sent to [facility].    Plavix 75mg     Last Written Prescription Date:  7/5/24  Last Fill Quantity: 30,   # refills: 0  Last Office Visit: 4/10/24  Future Office visit:        Routing refill request to provider for review/approval because:     Plavix Sytbjd8307/05/2024 12:01 PM   Protocol Details Recent (12 mo) or future (90 days) visit within the authorizing provider's specialty

## 2024-07-05 NOTE — TELEPHONE ENCOUNTER
Patient called to clinic and reports she is out of Lisinopril.  She reports that the pharmacy told her that her dose was increased to 20mg.  LOV 4/10/24 per Dr. Cuellar- increase lisinopril from 10mg to 20mg.  Also, reports that [facility] 5/30/24.  Medication is on current medication list with last refill on 5/13/24 to [facility] with 0 refills.    Patient requests medication refill 30 day sent to [facility] because she is completely out of these medications.    Plavix 75mg     Last Written Prescription Date:  5/13/24  Last Fill Quantity: 30,   # refills: 0  Last Office Visit: 4/10/24  Future Office visit:       Routing refill request to provider for review/approval because:    Plavix Kydsel1807/05/2024 12:01 PM   Protocol Details Recent (12 mo) or future (90 days) visit within the authorizing provider's specialty          Lisinopril 20mg      Last Written Prescription Date:  7/3/24 ([facility])  Last Fill Quantity: 90,   # refills: 2  Last Office Visit: 4/10/24  Future Office visit:       Routing refill request to provider for review/approval because:    ACE Inhibitors (Including Combos) Protocol Zdxpqn9407/05/2024 12:01 PM   Protocol Details Blood pressure under 140/90 in past 12 months- Clinicial or Patient Reported    Recent (12 mo) or future (90 days) visit within the authorizing provider's specialty

## 2024-07-08 RX ORDER — CLOPIDOGREL BISULFATE 75 MG/1
75 TABLET ORAL DAILY
Qty: 90 TABLET | Refills: 0 | Status: SHIPPED | OUTPATIENT
Start: 2024-07-08 | End: 2024-09-12

## 2024-07-10 ENCOUNTER — TELEPHONE (OUTPATIENT)
Dept: INTERNAL MEDICINE | Facility: OTHER | Age: 79
End: 2024-07-10

## 2024-07-10 NOTE — TELEPHONE ENCOUNTER
Call placed to patient and daughter, Galina.     Update provided order has been faxed again to [facility] today.     Please return call if any further assistance is needed with the order.

## 2024-07-10 NOTE — TELEPHONE ENCOUNTER
11:53 AM    Reason for Call: Phone Call    Description: Patient daughter Renetta calling regarding wheelchair they have dealing with this since April and Ana Cristina haven't received any of the faxes that Dr Cuellar office has sent and here is a different fax number to try again    Fax: [phone] / Attn: [facility] as this to the cover sheet when faxing.     Was an appointment offered for this call? No  If yes : Appointment type              Date    Preferred method for responding to this message: Telephone Call  What is your phone number ? Call Rosalie at [phone] and daughter Renetta [phone]    If we cannot reach you directly, may we leave a detailed response at the number you provided? No    Can this message wait until your PCP/provider returns, if available today? YES, No     Addended by: KAIA HUFFMAN on: 8/4/2021 11:48 AM     Modules accepted: Orders

## 2024-07-26 ENCOUNTER — TRANSFERRED RECORDS (OUTPATIENT)
Dept: HEALTH INFORMATION MANAGEMENT | Facility: CLINIC | Age: 79
End: 2024-07-26

## 2024-07-30 DIAGNOSIS — E11.49 TYPE 2 DIABETES MELLITUS WITH OTHER NEUROLOGIC COMPLICATION, WITHOUT LONG-TERM CURRENT USE OF INSULIN (H): Chronic | ICD-10-CM

## 2024-07-30 RX ORDER — METFORMIN HCL 500 MG
TABLET, EXTENDED RELEASE 24 HR ORAL
Qty: 360 TABLET | Refills: 0 | Status: SHIPPED | OUTPATIENT
Start: 2024-07-30 | End: 2024-09-12

## 2024-08-13 ENCOUNTER — MEDICAL CORRESPONDENCE (OUTPATIENT)
Dept: EMERGENCY MEDICINE | Facility: HOSPITAL | Age: 79
End: 2024-08-13

## 2024-08-13 ENCOUNTER — TRANSFERRED RECORDS (OUTPATIENT)
Dept: HEALTH INFORMATION MANAGEMENT | Facility: HOSPITAL | Age: 79
End: 2024-08-13

## 2024-08-15 DIAGNOSIS — K21.9 GASTROESOPHAGEAL REFLUX DISEASE WITHOUT ESOPHAGITIS: ICD-10-CM

## 2024-08-15 RX ORDER — PANTOPRAZOLE SODIUM 40 MG/1
TABLET, DELAYED RELEASE ORAL
Qty: 90 TABLET | Refills: 2 | Status: SHIPPED | OUTPATIENT
Start: 2024-08-15

## 2024-08-21 DIAGNOSIS — I10 ESSENTIAL HYPERTENSION: ICD-10-CM

## 2024-08-22 RX ORDER — AMLODIPINE BESYLATE 5 MG/1
5 TABLET ORAL 2 TIMES DAILY
Qty: 180 TABLET | Refills: 2 | Status: SHIPPED | OUTPATIENT
Start: 2024-08-22

## 2024-08-22 RX ORDER — METOPROLOL TARTRATE 25 MG/1
25 TABLET, FILM COATED ORAL 2 TIMES DAILY
Qty: 180 TABLET | Refills: 2 | Status: SHIPPED | OUTPATIENT
Start: 2024-08-22

## 2024-08-22 NOTE — TELEPHONE ENCOUNTER
Amlodipine      Last Written Prescription Date:  6/6/23  Last Fill Quantity: 90,   # refills: 3  Last Office Visit: 4/10/24  Future Office visit:       Routing refill request to provider for review/approval because:      Lopressor      Last Written Prescription Date:  10/16/23  Last Fill Quantity: 180,   # refills: 3  Last Office Visit: 4/10/24  Future Office visit:       Routing refill request to provider for review/approval because:        
Amlodipine Besylate Tablet 5 Mg     Calcium Channel Blockers Protocol  Tgwxlo4608/21/2024 03:51 AM   Protocol Details Blood pressure under 140/90 in past 12 months     BP Readings from Last 3 Encounters:   04/10/24 (!) 160/74   10/16/23 (!) 156/68   04/10/23 (!) 160/68     METOPROLOL TARTRATE 25MG TABS     Beta-Blockers Protocol Npwxjn3908/21/2024 03:51 AM   Protocol Details Blood pressure under 140/90 in past 12 months     See above   
No

## 2024-09-12 ENCOUNTER — OFFICE VISIT (OUTPATIENT)
Dept: INTERNAL MEDICINE | Facility: OTHER | Age: 79
End: 2024-09-12
Attending: INTERNAL MEDICINE
Payer: COMMERCIAL

## 2024-09-12 VITALS
SYSTOLIC BLOOD PRESSURE: 158 MMHG | HEART RATE: 69 BPM | OXYGEN SATURATION: 91 % | RESPIRATION RATE: 20 BRPM | DIASTOLIC BLOOD PRESSURE: 71 MMHG | TEMPERATURE: 98.3 F

## 2024-09-12 DIAGNOSIS — H04.123 DRY EYES: ICD-10-CM

## 2024-09-12 DIAGNOSIS — J43.1 PANLOBULAR EMPHYSEMA (H): ICD-10-CM

## 2024-09-12 DIAGNOSIS — M79.89 LEFT LEG SWELLING: ICD-10-CM

## 2024-09-12 DIAGNOSIS — L03.116 CELLULITIS OF LEFT LOWER EXTREMITY: ICD-10-CM

## 2024-09-12 DIAGNOSIS — E78.5 HYPERLIPIDEMIA LDL GOAL <130: ICD-10-CM

## 2024-09-12 DIAGNOSIS — I63.9 ACUTE ISCHEMIC STROKE (H): ICD-10-CM

## 2024-09-12 DIAGNOSIS — N39.46 MIXED INCONTINENCE: ICD-10-CM

## 2024-09-12 DIAGNOSIS — I73.9 PVD (PERIPHERAL VASCULAR DISEASE) (H): ICD-10-CM

## 2024-09-12 DIAGNOSIS — L89.301 PRESSURE INJURY OF BUTTOCK, STAGE 1, UNSPECIFIED LATERALITY: ICD-10-CM

## 2024-09-12 DIAGNOSIS — E11.49 TYPE 2 DIABETES MELLITUS WITH OTHER NEUROLOGIC COMPLICATION, WITHOUT LONG-TERM CURRENT USE OF INSULIN (H): Chronic | ICD-10-CM

## 2024-09-12 DIAGNOSIS — E11.40 TYPE 2 DIABETES MELLITUS WITH DIABETIC NEUROPATHY, WITHOUT LONG-TERM CURRENT USE OF INSULIN (H): Primary | Chronic | ICD-10-CM

## 2024-09-12 DIAGNOSIS — I10 PRIMARY HYPERTENSION: ICD-10-CM

## 2024-09-12 PROCEDURE — G0463 HOSPITAL OUTPT CLINIC VISIT: HCPCS

## 2024-09-12 PROCEDURE — G0439 PPPS, SUBSEQ VISIT: HCPCS | Performed by: INTERNAL MEDICINE

## 2024-09-12 PROCEDURE — 99213 OFFICE O/P EST LOW 20 MIN: CPT | Mod: 25 | Performed by: INTERNAL MEDICINE

## 2024-09-12 RX ORDER — METFORMIN HCL 500 MG
TABLET, EXTENDED RELEASE 24 HR ORAL
Qty: 360 TABLET | Refills: 3 | Status: SHIPPED | OUTPATIENT
Start: 2024-09-12

## 2024-09-12 RX ORDER — CEPHALEXIN 500 MG/1
500 CAPSULE ORAL 2 TIMES DAILY
Qty: 14 CAPSULE | Refills: 0 | Status: SHIPPED | OUTPATIENT
Start: 2024-09-12

## 2024-09-12 RX ORDER — ALBUTEROL SULFATE 90 UG/1
2 AEROSOL, METERED RESPIRATORY (INHALATION) EVERY 6 HOURS PRN
Qty: 36 G | Refills: 5 | Status: SHIPPED | OUTPATIENT
Start: 2024-09-12

## 2024-09-12 RX ORDER — POLYETHYLENE GLYCOL 400 AND PROPYLENE GLYCOL 4; 3 MG/ML; MG/ML
1 SOLUTION/ DROPS OPHTHALMIC
Qty: 30 ML | Refills: 3 | Status: SHIPPED | OUTPATIENT
Start: 2024-09-12

## 2024-09-12 RX ORDER — LISINOPRIL 20 MG/1
20 TABLET ORAL DAILY
Qty: 30 TABLET | Refills: 3 | Status: SHIPPED | OUTPATIENT
Start: 2024-09-12

## 2024-09-12 RX ORDER — CLOPIDOGREL BISULFATE 75 MG/1
75 TABLET ORAL DAILY
Qty: 90 TABLET | Refills: 3 | Status: SHIPPED | OUTPATIENT
Start: 2024-09-12

## 2024-09-12 ASSESSMENT — PAIN SCALES - GENERAL: PAINLEVEL: NO PAIN (0)

## 2024-09-12 NOTE — PROGRESS NOTES
Preventive Care Visit  [facility]  Yash MICHELLE Cuellar DO, Internal Medicine  Sep 12, 2024      Assessment & Plan   Problem List Items Addressed This Visit          Nervous and Auditory    Type 2 diabetes mellitus with neurologic complication, without long-term current use of insulin (H) - Primary (Chronic)    Relevant Medications    metFORMIN (GLUCOPHAGE XR) 500 MG 24 hr tablet    Other Relevant Orders    Hemoglobin A1c    TSH with free T4 reflex       Respiratory    Emphysema lung (H)    Relevant Medications    albuterol (PROAIR HFA/PROVENTIL HFA/VENTOLIN HFA) 108 (90 Base) MCG/ACT inhaler    Other Relevant Orders    Miscellaneous DME Supply Order (Use only if a more specific DME order does not already exist)       Circulatory    PVD (peripheral vascular disease) (H24) (Chronic)    Relevant Medications    clopidogrel (PLAVIX) 75 MG tablet     Other Visit Diagnoses       Acute ischemic stroke (H)        Relevant Medications    clopidogrel (PLAVIX) 75 MG tablet    Primary hypertension        Relevant Medications    lisinopril (ZESTRIL) 20 MG tablet    Dry eyes        Relevant Medications    polyethylene glycol-propylene glycol (SYSTANE) 0.4-0.3 % SOLN ophthalmic solution    Hyperlipidemia LDL goal <130        Relevant Orders    Comprehensive metabolic panel (BMP + Alb, Alk Phos, ALT, AST, Total. Bili, TP)    CBC with platelets and differential    Lipid Profile (Chol, Trig, HDL, LDL calc)    Pressure injury of buttock, stage 1, unspecified laterality        Relevant Medications    albuterol (PROAIR HFA/PROVENTIL HFA/VENTOLIN HFA) 108 (90 Base) MCG/ACT inhaler    Other Relevant Orders    Miscellaneous DME Supply Order (Use only if a more specific DME order does not already exist)    Mixed incontinence        Relevant Orders    Miscellaneous DME Supply Order (Use only if a more specific DME order does not already exist)    Miscellaneous DME Supply Order (Use only if a more specific DME order does not already  exist)    Miscellaneous DME Supply Order (Use only if a more specific DME order does not already exist)    Left leg swelling        Relevant Orders    US Lower Extremity Venous Duplex Left    Cellulitis of left lower extremity        Relevant Medications    cephALEXin (KEFLEX) 500 MG capsule             Patient has been advised of split billing requirements and indicates understanding: Yes       Nicotine/Tobacco Cessation  She reports that she has been smoking cigarettes. She started smoking about 68 years ago. She has a 16.7 pack-year smoking history. She has been exposed to tobacco smoke. She has never used smokeless tobacco.  Nicotine/Tobacco Cessation Plan  Information offered: Patient not interested at this time      Counseling  Appropriate preventive services were addressed with this patient via screening, questionnaire, or discussion as appropriate for fall prevention, nutrition, physical activity, Tobacco-use cessation, social engagement, weight loss and cognition.  Checklist reviewing preventive services available has been given to the patient.  Reviewed patient's diet, addressing concerns and/or questions.   The patient was instructed to see the dentist every 6 months.   Discussed possible causes of fatigue. Updated plan of care.  Patient reported difficulty with activities of daily living were addressed today.Information on urinary incontinence and treatment options given to patient.         No follow-ups on file.      Jalyn Wiggins is a 79 year old, presenting for the following:  Physical      Health Care Directive  Patient does not have a Health Care Directive or Living Will: Discussed advance care planning with patient; however, patient declined at this time.    SULLY Wiggins is a 79-year-old female with a history of CVA secondary to cerebral aneurysm leaving her with significant left-sided deficits and mobility restricted to a motorized wheelchair.  She did have a repeat MRI which revealed  chronic right ICA occlusion in the months past.  Overall there was no significant change from previous imaging however.    Patient presents to clinic today for routine annual physical.  Today she is reporting some issues with a small decubitus ulcer.  In addition she reports incontinence.  Hence she would like DME for dressings for her decubitus ulcer along with incontinence pads, wipes and hygiene soap wipes.    She has also had difficulty with her inhalers stating that they plugged.  She is requesting a device called [employer] device.  DME for that was given today.    Patient has ongoing left lower extremity swelling and callus on the lateral aspect of her left foot.  This callus is likely the result of her positioning on the foot rest of her wheelchair.  Lower extremity edema on the left side has been evaluated in the past with ultrasounds to rule out the possibility of a DVT.  Last time this was done was about a year and a half ago.  She does have significant lower extremity edema in the left lower extremity as compared to the right and does appear to have some slight erythema here today.    Patient otherwise is doing reasonably well.  Will be due for fasting lab work however will not be due until after October 16, 2024.              9/12/2024   General Health   How would you rate your overall physical health? Good   Feel stress (tense, anxious, or unable to sleep) Not at all            9/12/2024   Nutrition   Diet: Regular (no restrictions)            9/12/2024   Exercise   Days per week of moderate/strenous exercise 0 days      (!) EXERCISE CONCERN      9/12/2024   Social Factors   Frequency of gathering with friends or relatives More than three times a week   Worry food won't last until get money to buy more No   Food not last or not have enough money for food? No   Do you have housing? (Housing is defined as stable permanent housing and does not include staying ouside in a car, in a tent, in an  abandoned building, in an overnight shelter, or couch-surfing.) Yes   Are you worried about losing your housing? No   Lack of transportation? No   Unable to get utilities (heat,electricity)? Yes   Want help with housing or utility concern? No      (!) FINANCIAL RESOURCE STRAIN CONCERN      9/12/2024   Fall Risk   Fallen 2 or more times in the past year? No   Trouble with walking or balance? Yes   Reason Gait Speed Test Not Completed Patient verbalizes unable to perform test             9/12/2024   Activities of Daily Living- Home Safety   Needs help with the following daily activites Bathing   Safety concerns in the home None of the above            9/12/2024   Dental   Dentist two times every year? (!) NO            9/12/2024   Hearing Screening   Hearing concerns? None of the above            9/12/2024   Driving Risk Screening   Patient/family members have concerns about driving No            9/12/2024   General Alertness/Fatigue Screening   Have you been more tired than usual lately? (!) YES            9/12/2024   Urinary Incontinence Screening   Bothered by leaking urine in past 6 months Yes            9/12/2024   TB Screening   Were you born outside of the [place]? No              Today's PHQ-2 Score:       4/10/2024    10:54 AM   PHQ-2 ( 1999 [employer])   Q1: Little interest or pleasure in doing things 0   Q2: Feeling down, depressed or hopeless 0   PHQ-2 Score 0   Q1: Little interest or pleasure in doing things Not at all   Q2: Feeling down, depressed or hopeless Not at all   PHQ-2 Score 0         9/12/2024   Substance Use   Alcohol more than 3/day or more than 7/wk Not Applicable   Do you have a current opioid prescription? No   How severe/bad is pain from 1 to 10? 0/10 (No Pain)   Do you use any other substances recreationally? No        Social History     Tobacco Use    Smoking status: Every Day     Current packs/day: 0.00     Average packs/day: 0.3 packs/day for 66.7 years (16.7 ttl pk-yrs)     Types: Cigarettes      Start date: 1956     Last attempt to quit: 2022     Years since quittin.0     Passive exposure: Current    Smokeless tobacco: Never    Tobacco comments:     limits self to about 4-5 daily , she is quitting on her own   Vaping Use    Vaping status: Never Used   Substance Use Topics    Alcohol use: No    Drug use: No          Mammogram Screening - After age 74- determine frequency with patient based on health status, life expectancy and patient goals    ASCVD Risk   The ASCVD Risk score (Kiana PORTILLO, et al., 2019) failed to calculate for the following reasons:    The patient has a prior MI or stroke diagnosis    Fracture Risk Assessment Tool  Link to Frax Calculator  Use the information below to complete the Frax calculator  : 1945  Sex: female  Weight (kg): Patient weight not available.  Height (cm): 0 cm  Previous Fragility Fracture:  No  History of parent with fractured hip:  No  Current Smoking:  Yes  Patient has been on glucocorticoids for more than 3 months (5mg/day or more): No  Rheumatoid Arthritis on Problem List:  No  Secondary Osteoporosis on Problem List:  No  Consumes 3 or more units of alcohol per day: No  Femoral Neck BMD (g/cm2)            Reviewed and updated as needed this visit by Provider                    Past Medical History:   Diagnosis Date    Cerebral infarction (H) 2017    Concussion with loss of consciousness of 30 minutes or less 1950    fell off the swings    Emphysema lung (H)     Gastroesophageal reflux disease without esophagitis     Hyperlipidemia LDL goal <100     Left hemiparesis (H) 2017    second to right CVA    Morbid obesity (H)     Nonruptured cerebral aneurysm 2018    s/p cerebral angiogram, [facility]    Peripheral vascular disease (H24)     Tobacco abuse     Type 2 diabetes mellitus with hyperglycemia, without long-term current use of insulin (H)     resolved     Past Surgical History:   Procedure Laterality Date    ABDOMEN  SURGERY  04/1997    bladder repair    CEREBRAL ANGIOGRAM  2018    GYN SURGERY  04/1974    hysterectomy     PHACOEMULSIFICATION WITH STANDARD INTRAOCULAR LENS IMPLANT Left 12/2/2021    Procedure: PHACOEMULSIFICATION, CATARACT, WITH STANDARD INTRAOCULAR LENS IMPLANT INSERTION;  Surgeon: Leo Hernandes MD;  Location: [facility]    PHACOEMULSIFICATION WITH STANDARD INTRAOCULAR LENS IMPLANT Right 1/6/2022    Procedure: RIGHT EYE CATARACT EXTRACTION WITH INTRAOCULAR LENS IMPLANT;  Surgeon: Leo Hernandes MD;  Location: [facility] OR     Lab work is in process  Labs reviewed in EPIC  BP Readings from Last 3 Encounters:   09/12/24 (!) 158/71   04/10/24 (!) 160/74   10/16/23 (!) 156/68    Wt Readings from Last 3 Encounters:   04/10/23 113.4 kg (250 lb)   09/22/22 113.4 kg (250 lb)   08/08/22 113.4 kg (250 lb)                  Patient Active Problem List   Diagnosis    ACP (advance care planning)    Essential hypertension    Type 2 diabetes mellitus with neurologic complication, without long-term current use of insulin (H)    Obesity (BMI 35.0-39.9) with comorbidity (H)    History of CVA (cerebrovascular accident)    Hyperlipidemia    Gastroesophageal reflux disease without esophagitis    Cerebral aneurysm    PVD (peripheral vascular disease) (H24)    Emphysema lung (H)    Hyperlipidemia LDL goal <100    Type 2 diabetes mellitus with hyperglycemia, without long-term current use of insulin (H)    Nonruptured cerebral aneurysm    Hypovitaminosis D    Fatigue, unspecified type    Abnormal finding of blood chemistry, unspecified     Iron deficiency    Blood glucose abnormal    Pain in joint, lower leg     Past Surgical History:   Procedure Laterality Date    ABDOMEN SURGERY  04/1997    bladder repair    CEREBRAL ANGIOGRAM  2018    GYN SURGERY  04/1974    hysterectomy     PHACOEMULSIFICATION WITH STANDARD INTRAOCULAR LENS IMPLANT Left 12/2/2021    Procedure: PHACOEMULSIFICATION, CATARACT, WITH STANDARD INTRAOCULAR LENS IMPLANT  INSERTION;  Surgeon: Leo Hernandes MD;  Location: [facility]    PHACOEMULSIFICATION WITH STANDARD INTRAOCULAR LENS IMPLANT Right 2022    Procedure: RIGHT EYE CATARACT EXTRACTION WITH INTRAOCULAR LENS IMPLANT;  Surgeon: Leo Hernandes MD;  Location: [facility]       Social History     Tobacco Use    Smoking status: Every Day     Current packs/day: 0.00     Average packs/day: 0.3 packs/day for 66.7 years (16.7 ttl pk-yrs)     Types: Cigarettes     Start date: 1956     Last attempt to quit: 2022     Years since quittin.0     Passive exposure: Current    Smokeless tobacco: Never    Tobacco comments:     limits self to about 4-5 daily , she is quitting on her own   Substance Use Topics    Alcohol use: No     Family History   Problem Relation Age of Onset    Aneurysm Mother 44    Other Cancer Father     Other Cancer Sister 59        female    Aneurysm Sister 72    Aneurysm Sister 23    Diabetes Brother     Hypertension Brother     Other Cancer Brother         unknown    Sudden Infant Death Syndrome Brother         influenza    Family History Negative Brother     Family History Negative Brother     Family History Negative Brother     Family History Negative Brother     Family History Negative Brother     Unknown/Adopted Maternal Grandmother     Unknown/Adopted Maternal Grandfather     Unknown/Adopted Paternal Grandmother     Unknown/Adopted Paternal Grandfather          Current Outpatient Medications   Medication Sig Dispense Refill    albuterol (PROAIR HFA/PROVENTIL HFA/VENTOLIN HFA) 108 (90 Base) MCG/ACT inhaler Inhale 2 puffs into the lungs every 6 hours as needed for shortness of breath or wheezing. 36 g 5    amLODIPine (NORVASC) 10 MG tablet Take 1 tablet (10 mg) by mouth daily 90 tablet 3    amLODIPine (NORVASC) 5 MG tablet TAKE 1 TABLET TWICE A  tablet 2    aspirin 325 MG tablet Take 325 mg by mouth      atorvastatin (LIPITOR) 80 MG tablet Take 1 tablet (80 mg) by mouth daily 90 tablet 3     blood glucose monitoring (NO BRAND SPECIFIED) meter device kit Use to test blood sugar 4 times daily or as directed. 1 kit 0    blood glucose monitoring (ONE TOUCH DELICA) lancets Use to test blood sugar 3 times daily. 360 each 3    cephALEXin (KEFLEX) 500 MG capsule Take 1 capsule (500 mg) by mouth 2 times daily. 14 capsule 0    clopidogrel (PLAVIX) 75 MG tablet Take 1 tablet (75 mg) by mouth daily. 90 tablet 3    Cyanocobalamin (VITAMIN B-12 PO) Take 2,500 mcg by mouth daily      ferrous sulfate (FEROSUL) 325 (65 Fe) MG tablet Take 1 tablet (325 mg) by mouth daily (with breakfast) 90 tablet 1    lisinopril (ZESTRIL) 20 MG tablet Take 1 tablet (20 mg) by mouth daily. 30 tablet 3    metFORMIN (GLUCOPHAGE XR) 500 MG 24 hr tablet TAKE 2 TABLETS 2 TIMES DAILY WITH MEALS 360 tablet 3    metoprolol tartrate (LOPRESSOR) 25 MG tablet TAKE 1 TABLET TWICE A  tablet 2    ONETOUCH VERIO IQ test strip USE FOUR TIMES A  each 3    order for DME Equipment being ordered: Wheelchair with foot rests 1 Device 0    order for DME Equipment being ordered: Glucometer and test strips 1 Device 0    pantoprazole (PROTONIX) 40 MG EC tablet TAKE 1 TABLET DAILY 30 TO  60 MINUTES BEFORE A MEAL 90 tablet 2    polyethylene glycol-propylene glycol (SYSTANE) 0.4-0.3 % SOLN ophthalmic solution Place 1 drop into both eyes every hour as needed for dry eyes. 30 mL 3    Vitamin D, Cholecalciferol, 25 MCG (1000 UT) CAPS Take 1 capsule by mouth daily       Allergies   Allergen Reactions    Azithromycin Swelling     Face swelling    Pcn [Penicillins]      Recent Labs   Lab Test 04/10/24  1202 10/16/23  1147 04/10/23  1142 09/07/22  1914 03/04/22  1025 11/30/21  1408 07/15/21  1217 02/28/21  1306 01/12/21  1159   0000   A1C 7.6* 6.8* 8.0*   < > 7.4*   < > 6.7*  --  6.4*  --    LDL  --  50 53  --   --   --  48  --  45  --    HDL  --  61 65  --   --   --  60  --  61  --    TRIG  --  80 90  --   --   --  83  --  95  --    ALT  --  12 15  --  20   --  19  --  18   < >   CR  --  0.95 0.96*   < > 0.88   < > 0.99 0.73 0.84  --    GFRESTIMATED  --  61 61   < > 68   < > 56* 80 67  --    GFRESTBLACK  --   --   --   --   --   --   --  >90 78  --    POTASSIUM  --  4.9 4.6   < > 4.3   < > 4.9 3.6 4.7  --    TSH  --  1.20  --   --  1.46  --   --   --  0.85  --     < > = values in this interval not displayed.      Current providers sharing in care for this patient include:  Patient Care Team:  Yash Cuellar DO as PCP - General (Internal Medicine)  Yash Cuellar DO as Assigned PCP    The following health maintenance items are reviewed in Epic and correct as of today:  Health Maintenance   Topic Date Due    COPD ACTION PLAN  Never done    Pneumococcal Vaccine: 65+ Years (1 of 2 - PCV) Never done    ZOSTER IMMUNIZATION (1 of 2) Never done    DTAP/TDAP/TD IMMUNIZATION (1 - Tdap) 05/29/1999    MEDICARE ANNUAL WELLNESS VISIT  Never done    RSV VACCINE (1 - 1-dose 75+ series) Never done    LUNG CANCER SCREENING  02/28/2022    INFLUENZA VACCINE (1) Never done    COVID-19 Vaccine (1 - 2023-24 season) Never done    A1C  10/10/2024    NICOTINE/TOBACCO CESSATION COUNSELING Q 1 YR  10/16/2024    BMP  10/16/2024    LIPID  10/16/2024    MICROALBUMIN  10/16/2024    EYE EXAM  11/15/2024    DIABETIC FOOT EXAM  04/10/2025    FALL RISK ASSESSMENT  09/12/2025    ADVANCE CARE PLANNING  12/30/2026    SPIROMETRY  Completed    HEPATITIS C SCREENING  Completed    PHQ-2 (once per calendar year)  Completed    HPV IMMUNIZATION  Aged Out    MENINGITIS IMMUNIZATION  Aged Out    RSV MONOCLONAL ANTIBODY  Aged Out    DEXA  Discontinued    MAMMO SCREENING  Discontinued    COLORECTAL CANCER SCREENING  Discontinued         Review of Systems  Constitutional, neuro, ENT, endocrine, pulmonary, cardiac, gastrointestinal, genitourinary, musculoskeletal, integument and psychiatric systems are negative, except as otherwise noted.     Objective    Exam  BP (!) 158/71 (BP Location: Right arm,  "Patient Position: Sitting, Cuff Size: Adult Large)   Pulse 69   Temp 98.3  F (36.8  C) (Tympanic)   Resp 20   SpO2 91%   Breastfeeding No    Estimated body mass index is 40.35 kg/m  as calculated from the following:    Height as of 9/22/22: 1.676 m (5' 6\").    Weight as of 4/10/23: 113.4 kg (250 lb).    Physical Exam  GENERAL: alert and no distress  EYES: extropion on of the left eye with ptosis.  RESP: lungs clear to auscultation - no rales, rhonchi or wheezes  CV: regular rate and rhythm, normal S1 S2, no S3 or S4, no murmur, click or rub, no peripheral edema  MS: Less 3 lower extremity edema on the left and trace to +1 lower extremity edema on the right.  SKIN: Lightly the erythema of the left lower extremity and callus on the lateral aspect of the left foot.  NEURO: Limited function in her left extremities.  PSYCH: mentation appears normal, affect normal/bright        9/12/2024   Mini Cog   Clock Draw Score 2 Normal   3 Item Recall 1 object recalled   Mini Cog Total Score 3                 Signed Electronically by: Yash Cuellar,     "

## 2024-09-16 ENCOUNTER — TELEPHONE (OUTPATIENT)
Dept: INTERNAL MEDICINE | Facility: OTHER | Age: 79
End: 2024-09-16

## 2024-09-16 ENCOUNTER — HOSPITAL ENCOUNTER (OUTPATIENT)
Dept: ULTRASOUND IMAGING | Facility: HOSPITAL | Age: 79
Discharge: HOME | End: 2024-09-16
Attending: INTERNAL MEDICINE | Admitting: INTERNAL MEDICINE
Payer: COMMERCIAL

## 2024-09-16 DIAGNOSIS — M79.89 LEFT LEG SWELLING: ICD-10-CM

## 2024-09-16 PROCEDURE — 93971 EXTREMITY STUDY: CPT | Mod: LT

## 2024-09-16 NOTE — TELEPHONE ENCOUNTER
2:19 PM    Reason for Call: Phone Call    Description: dtr would like to know where dme orders were sent so she can  supplies    Was an appointment offered for this call? No  If yes : Appointment type              Date    Preferred method for responding to this message: Telephone Call  What is your phone number ?[email or web address]/[phone]     If we cannot reach you directly, may we leave a detailed response at the number you provided? Yes    Can this message wait until your PCP/provider returns, if available today? Not applicable    Dione Jordan

## 2024-09-17 NOTE — TELEPHONE ENCOUNTER
Attempt # 1  Outcome: Left Message   Comment: DME sent to [facility] if patient would like DME faxed elsewhere please route call or message to [facility]

## 2024-09-17 NOTE — TELEPHONE ENCOUNTER
Spoke with patient on 9/17/24- DME orders have been printed and faxed to [facility]- [place]. Patient will follow up with any further questions/concerns.

## 2024-09-18 ENCOUNTER — TELEPHONE (OUTPATIENT)
Dept: INTERNAL MEDICINE | Facility: OTHER | Age: 79
End: 2024-09-18

## 2024-09-18 NOTE — TELEPHONE ENCOUNTER
Received a DENIAL from [place] for polyethylene glycol-propylene glycol (SYSTANE) 0.4-0.3 % SOLN ophthalmic solution. Scanned in Epic.

## 2024-09-18 NOTE — TELEPHONE ENCOUNTER
Received a PA request from [facility] for polyethylene glycol-propylene glycol (SYSTANE) 0.4-0.3 % SOLN ophthalmic solution. Submitted on CMM. Waiting for a response.

## 2024-09-20 NOTE — TELEPHONE ENCOUNTER
Call placed to patient, update provided on PA denial received on Systane Eye Drops:      Yash Cuellar, DO  You; Giovanna Joyce, RN2 days ago     AB  Needs to discuss with her eye doctor     Patient verbalized understanding, stating she will just pay out of pocket for the drops.

## 2024-10-18 ENCOUNTER — LAB (OUTPATIENT)
Dept: LAB | Facility: OTHER | Age: 79
End: 2024-10-18
Payer: COMMERCIAL

## 2024-10-18 DIAGNOSIS — E78.5 HYPERLIPIDEMIA LDL GOAL <130: ICD-10-CM

## 2024-10-18 DIAGNOSIS — E11.40 TYPE 2 DIABETES MELLITUS WITH DIABETIC NEUROPATHY, WITHOUT LONG-TERM CURRENT USE OF INSULIN (H): Chronic | ICD-10-CM

## 2024-10-18 LAB
ALBUMIN SERPL BCG-MCNC: 4 G/DL (ref 3.5–5.2)
ALP SERPL-CCNC: 78 U/L (ref 40–150)
ALT SERPL W P-5'-P-CCNC: 12 U/L (ref 0–50)
ANION GAP SERPL CALCULATED.3IONS-SCNC: 9 MMOL/L (ref 7–15)
AST SERPL W P-5'-P-CCNC: 13 U/L (ref 0–45)
BASOPHILS # BLD AUTO: 0.1 10E3/UL (ref 0–0.2)
BASOPHILS NFR BLD AUTO: 1 %
BILIRUB SERPL-MCNC: 0.2 MG/DL
BUN SERPL-MCNC: 20.3 MG/DL (ref 8–23)
CALCIUM SERPL-MCNC: 9.8 MG/DL (ref 8.8–10.4)
CHLORIDE SERPL-SCNC: 104 MMOL/L (ref 98–107)
CHOLEST SERPL-MCNC: 128 MG/DL
CREAT SERPL-MCNC: 1.05 MG/DL (ref 0.51–0.95)
EGFRCR SERPLBLD CKD-EPI 2021: 54 ML/MIN/1.73M2
EOSINOPHIL # BLD AUTO: 0.2 10E3/UL (ref 0–0.7)
EOSINOPHIL NFR BLD AUTO: 3 %
ERYTHROCYTE [DISTWIDTH] IN BLOOD BY AUTOMATED COUNT: 16.1 % (ref 10–15)
EST. AVERAGE GLUCOSE BLD GHB EST-MCNC: 169 MG/DL
FASTING STATUS PATIENT QL REPORTED: YES
FASTING STATUS PATIENT QL REPORTED: YES
GLUCOSE SERPL-MCNC: 147 MG/DL (ref 70–99)
HBA1C MFR BLD: 7.5 %
HCO3 SERPL-SCNC: 26 MMOL/L (ref 22–29)
HCT VFR BLD AUTO: 36.1 % (ref 35–47)
HDLC SERPL-MCNC: 64 MG/DL
HGB BLD-MCNC: 10.5 G/DL (ref 11.7–15.7)
IMM GRANULOCYTES # BLD: 0 10E3/UL
IMM GRANULOCYTES NFR BLD: 0 %
LDLC SERPL CALC-MCNC: 50 MG/DL
LYMPHOCYTES # BLD AUTO: 1 10E3/UL (ref 0.8–5.3)
LYMPHOCYTES NFR BLD AUTO: 16 %
MCH RBC QN AUTO: 22.4 PG (ref 26.5–33)
MCHC RBC AUTO-ENTMCNC: 29.1 G/DL (ref 31.5–36.5)
MCV RBC AUTO: 77 FL (ref 78–100)
MONOCYTES # BLD AUTO: 0.4 10E3/UL (ref 0–1.3)
MONOCYTES NFR BLD AUTO: 7 %
NEUTROPHILS # BLD AUTO: 4.6 10E3/UL (ref 1.6–8.3)
NEUTROPHILS NFR BLD AUTO: 74 %
NONHDLC SERPL-MCNC: 64 MG/DL
NRBC # BLD AUTO: 0 10E3/UL
NRBC BLD AUTO-RTO: 0 /100
PLATELET # BLD AUTO: 319 10E3/UL (ref 150–450)
POTASSIUM SERPL-SCNC: 4.6 MMOL/L (ref 3.4–5.3)
PROT SERPL-MCNC: 6.6 G/DL (ref 6.4–8.3)
RBC # BLD AUTO: 4.68 10E6/UL (ref 3.8–5.2)
SODIUM SERPL-SCNC: 139 MMOL/L (ref 135–145)
TRIGL SERPL-MCNC: 71 MG/DL
TSH SERPL DL<=0.005 MIU/L-ACNC: 1.1 UIU/ML (ref 0.3–4.2)
WBC # BLD AUTO: 6.2 10E3/UL (ref 4–11)

## 2024-10-18 PROCEDURE — 85004 AUTOMATED DIFF WBC COUNT: CPT | Mod: ZL

## 2024-10-18 PROCEDURE — 83036 HEMOGLOBIN GLYCOSYLATED A1C: CPT | Mod: ZL

## 2024-10-18 PROCEDURE — 80061 LIPID PANEL: CPT | Mod: ZL

## 2024-10-18 PROCEDURE — 84443 ASSAY THYROID STIM HORMONE: CPT | Mod: ZL

## 2024-10-18 PROCEDURE — 82947 ASSAY GLUCOSE BLOOD QUANT: CPT | Mod: ZL

## 2024-10-18 PROCEDURE — 36415 COLL VENOUS BLD VENIPUNCTURE: CPT | Mod: ZL

## 2024-10-25 DIAGNOSIS — D50.0 IRON DEFICIENCY ANEMIA DUE TO CHRONIC BLOOD LOSS: Primary | ICD-10-CM

## 2024-11-05 ENCOUNTER — LAB (OUTPATIENT)
Dept: LAB | Facility: OTHER | Age: 79
End: 2024-11-05
Payer: COMMERCIAL

## 2024-11-05 DIAGNOSIS — D50.0 IRON DEFICIENCY ANEMIA DUE TO CHRONIC BLOOD LOSS: ICD-10-CM

## 2024-11-05 LAB
BASOPHILS # BLD AUTO: 0.1 10E3/UL (ref 0–0.2)
BASOPHILS NFR BLD AUTO: 1 %
EOSINOPHIL # BLD AUTO: 0.2 10E3/UL (ref 0–0.7)
EOSINOPHIL NFR BLD AUTO: 3 %
ERYTHROCYTE [DISTWIDTH] IN BLOOD BY AUTOMATED COUNT: 15.9 % (ref 10–15)
FERRITIN SERPL-MCNC: 14 NG/ML (ref 11–328)
HCT VFR BLD AUTO: 34.8 % (ref 35–47)
HGB BLD-MCNC: 10.3 G/DL (ref 11.7–15.7)
IMM GRANULOCYTES # BLD: 0 10E3/UL
IMM GRANULOCYTES NFR BLD: 0 %
IRON BINDING CAPACITY (ROCHE): 427 UG/DL (ref 240–430)
IRON SATN MFR SERPL: 6 % (ref 15–46)
IRON SERPL-MCNC: 24 UG/DL (ref 37–145)
LYMPHOCYTES # BLD AUTO: 0.9 10E3/UL (ref 0.8–5.3)
LYMPHOCYTES NFR BLD AUTO: 10 %
MCH RBC QN AUTO: 22.8 PG (ref 26.5–33)
MCHC RBC AUTO-ENTMCNC: 29.6 G/DL (ref 31.5–36.5)
MCV RBC AUTO: 77 FL (ref 78–100)
MONOCYTES # BLD AUTO: 0.6 10E3/UL (ref 0–1.3)
MONOCYTES NFR BLD AUTO: 7 %
NEUTROPHILS # BLD AUTO: 7.1 10E3/UL (ref 1.6–8.3)
NEUTROPHILS NFR BLD AUTO: 80 %
NRBC # BLD AUTO: 0 10E3/UL
NRBC BLD AUTO-RTO: 0 /100
PLATELET # BLD AUTO: 314 10E3/UL (ref 150–450)
RBC # BLD AUTO: 4.52 10E6/UL (ref 3.8–5.2)
RETICS # AUTO: 0.05 10E6/UL (ref 0.03–0.1)
RETICS/RBC NFR AUTO: 1.2 % (ref 0.5–2)
WBC # BLD AUTO: 8.9 10E3/UL (ref 4–11)

## 2024-11-05 PROCEDURE — 82728 ASSAY OF FERRITIN: CPT | Mod: ZL

## 2024-11-05 PROCEDURE — 36415 COLL VENOUS BLD VENIPUNCTURE: CPT | Mod: ZL

## 2024-11-05 PROCEDURE — 85004 AUTOMATED DIFF WBC COUNT: CPT | Mod: ZL

## 2024-11-05 PROCEDURE — 85045 AUTOMATED RETICULOCYTE COUNT: CPT | Mod: ZL

## 2024-11-05 PROCEDURE — 85060 BLOOD SMEAR INTERPRETATION: CPT | Performed by: PATHOLOGY

## 2024-11-05 PROCEDURE — 83550 IRON BINDING TEST: CPT | Mod: ZL

## 2024-11-06 ENCOUNTER — TELEPHONE (OUTPATIENT)
Dept: INTERNAL MEDICINE | Facility: OTHER | Age: 79
End: 2024-11-06

## 2024-11-06 ENCOUNTER — APPOINTMENT (OUTPATIENT)
Dept: GENERAL RADIOLOGY | Facility: HOSPITAL | Age: 79
End: 2024-11-06
Attending: NURSE PRACTITIONER
Payer: COMMERCIAL

## 2024-11-06 ENCOUNTER — HOSPITAL ENCOUNTER (EMERGENCY)
Facility: HOSPITAL | Age: 79
Discharge: HOME | End: 2024-11-06
Attending: NURSE PRACTITIONER | Admitting: NURSE PRACTITIONER
Payer: COMMERCIAL

## 2024-11-06 VITALS
DIASTOLIC BLOOD PRESSURE: 68 MMHG | BODY MASS INDEX: 40.18 KG/M2 | RESPIRATION RATE: 20 BRPM | WEIGHT: 250 LBS | HEIGHT: 66 IN | TEMPERATURE: 97.7 F | HEART RATE: 77 BPM | OXYGEN SATURATION: 93 % | SYSTOLIC BLOOD PRESSURE: 183 MMHG

## 2024-11-06 DIAGNOSIS — J44.1 COPD WITH ACUTE EXACERBATION (H): Primary | ICD-10-CM

## 2024-11-06 PROCEDURE — G0463 HOSPITAL OUTPT CLINIC VISIT: HCPCS | Mod: 25

## 2024-11-06 PROCEDURE — 71046 X-RAY EXAM CHEST 2 VIEWS: CPT

## 2024-11-06 PROCEDURE — 99213 OFFICE O/P EST LOW 20 MIN: CPT | Performed by: NURSE PRACTITIONER

## 2024-11-06 PROCEDURE — 87637 SARSCOV2&INF A&B&RSV AMP PRB: CPT | Performed by: NURSE PRACTITIONER

## 2024-11-06 RX ORDER — INHALER, ASSIST DEVICES
SPACER (EA) MISCELLANEOUS
Qty: 1 EACH | Refills: 0 | Status: SHIPPED | OUTPATIENT
Start: 2024-11-06

## 2024-11-06 RX ORDER — PREDNISONE 20 MG/1
TABLET ORAL
Qty: 10 TABLET | Refills: 0 | Status: SHIPPED | OUTPATIENT
Start: 2024-11-06 | End: 2024-11-15

## 2024-11-06 RX ORDER — CEFDINIR 300 MG/1
300 CAPSULE ORAL 2 TIMES DAILY
Qty: 14 CAPSULE | Refills: 0 | Status: SHIPPED | OUTPATIENT
Start: 2024-11-06 | End: 2024-11-13

## 2024-11-06 ASSESSMENT — ENCOUNTER SYMPTOMS
SORE THROAT: 0
COUGH: 1
NECK STIFFNESS: 0
RHINORRHEA: 1
CHILLS: 0
EYE REDNESS: 0
FEVER: 0
NAUSEA: 0
VOMITING: 0
HEADACHES: 0
ABDOMINAL PAIN: 0
TROUBLE SWALLOWING: 0
SHORTNESS OF BREATH: 1
EYE DISCHARGE: 0
DIARRHEA: 0
PSYCHIATRIC NEGATIVE: 1
NECK PAIN: 0

## 2024-11-06 ASSESSMENT — ACTIVITIES OF DAILY LIVING (ADL)
ADLS_ACUITY_SCORE: 0
ADLS_ACUITY_SCORE: 0

## 2024-11-06 NOTE — ED TRIAGE NOTES
Pt presents with cough and congestion x3 days. Pt denies fever, sore throat, ear pain, nausea, vomiting and diarrhea. No sick exposures. Pt has not been taking any OTC medications.

## 2024-11-06 NOTE — DISCHARGE INSTRUCTIONS
Cefdinir as ordered  - Take entire course of antibiotic even if you start to feel better.  - Antibiotics can cause stomach upset including nausea and diarrhea. Read your bottle or ask the pharmacist if antibiotic can be taken with food to help prevent nausea. If you have symptoms of diarrhea you can take an over-the-counter probiotic and/or increase foods with probiotics such as yogurt, [place], sauerkraut.    Prednisone as ordered    Follow up with primary care provider or return to urgent care/ED with any worsening in condition or additional concerns.

## 2024-11-06 NOTE — ED PROVIDER NOTES
History     Chief Complaint   Patient presents with    Cough    Nasal Congestion     HPI  Rosalie JANE Friend is a 79 year old female who presents to urgent care today via wheelchair with complaints of nasal congestion, coughing and some shortness of breath.  Patient states she has COPD, current smoker, takes albuterol as needed.  Staying hydrated, no vomiting or diarrhea.  No abdominal pain.  No rashes.  No OTC meds.  No other concerns    Allergies:  Allergies   Allergen Reactions    Azithromycin Swelling     Face swelling    Pcn [Penicillins]        Problem List:    Patient Active Problem List    Diagnosis Date Noted    History of CVA (cerebrovascular accident) 03/02/2019     Priority: High     5/6/17 sudden onset right vision loss and left side weakness, which resolved by the time of arrival to ED in [place]. CT of head and CT angiogram showed R ICA occlusion. L distal ICA, and R MCA 8mm 6mm aneurysms. Neurology was consulted and pt was transferred to [facility] for further management.  mg and lipitor 80 mg was started. Pt was found elevated HgA1c at 10. Imaging showed R temporal lobe infarct on CT and Angio showed R GOPI occlusion with no intracranial occlusion and L ICA and R MCA aneurysms. Strong family hx of ruptured aneurysms.  Seen at [facility] for an incidental finding of bilat ICA aneurysms. She had diagnostic cerebral angiogram with endovascular coil embolization of the larger left ICA aneurysm. The right ICA aneurysm will be monitored with serial imaging.     2/15/2018 acute onset weakness of her left upper extremity. MR angiogram of the head and neck and MR of the brain, which revealed an acute lacunar infarct in the right centrum semiovale region in an area of old ischemia. Multiple atherosclerotic lesions with complete occlusion of her right internal carotid and other more distal lesions as well. (clopidogrel was added)      Hypovitaminosis D 03/04/2022     Priority: Medium    Fatigue,  unspecified type 03/04/2022     Priority: Medium    Abnormal finding of blood chemistry, unspecified  03/04/2022     Priority: Medium    Iron deficiency 03/04/2022     Priority: Medium    Hyperlipidemia LDL goal <100 11/30/2021     Priority: Medium    Type 2 diabetes mellitus with hyperglycemia, without long-term current use of insulin (H) 11/30/2021     Priority: Medium     resolved      Hyperlipidemia 03/02/2019     Priority: Medium    Cerebral aneurysm 03/02/2019     Priority: Medium     Cerebral aneurysm without rupture, left ICA, s/p endovascular coiling 05/2017      PVD (peripheral vascular disease) (H) 03/02/2019     Priority: Medium     ankle-brachial index 2018 1.02 on the right and 0.52 on the left, consistent with moderate ischemia of the left lower extremity.       Emphysema lung (H) 03/02/2019     Priority: Medium     PFTS 5/2018 - FEV1- 2.13 (74%), ratio 0.67, 20% change with bronchodilators,  TLC 99%, %, DLCO 60%       Obesity (BMI 35.0-39.9) with comorbidity (H) 01/03/2019     Priority: Medium    Type 2 diabetes mellitus with neurologic complication, without long-term current use of insulin (H) 04/12/2018     Priority: Medium    Nonruptured cerebral aneurysm 02/18/2018     Priority: Medium     s/p cerebral angiogram, [facility]      ACP (advance care planning) 05/09/2017     Priority: Medium     Advance Care Planning 5/9/2017: ACP Review of Chart / Resources Provided:  Reviewed chart for advance care plan.  Rosalie JANE Friend has been provided information and resources to begin or update their advance care plan.  Added by Kalee Busby            Pain in joint, lower leg 04/09/2007     Priority: Medium     Formatting of this note might be different from the original.  IMO Update 10/11      Blood glucose abnormal 10/11/2006     Priority: Medium     Formatting of this note might be different from the original.  IMO Update 10/11      Essential hypertension 09/27/2006     Priority: Medium      Formatting of this note might be different from the original.  IMO Update      Gastroesophageal reflux disease without esophagitis 03/02/2019     Priority: Low        Past Medical History:    Past Medical History:   Diagnosis Date    Cerebral infarction (H) 05/06/2017    Concussion with loss of consciousness of 30 minutes or less 1950    Emphysema lung (H)     Gastroesophageal reflux disease without esophagitis     Hyperlipidemia LDL goal <100     Left hemiparesis (H) 2017    Morbid obesity (H)     Nonruptured cerebral aneurysm 02/18/2018    Peripheral vascular disease (H)     Tobacco abuse     Type 2 diabetes mellitus with hyperglycemia, without long-term current use of insulin (H)        Past Surgical History:    Past Surgical History:   Procedure Laterality Date    ABDOMEN SURGERY  04/1997    bladder repair    CEREBRAL ANGIOGRAM  2018    GYN SURGERY  04/1974    hysterectomy     PHACOEMULSIFICATION WITH STANDARD INTRAOCULAR LENS IMPLANT Left 12/2/2021    Procedure: PHACOEMULSIFICATION, CATARACT, WITH STANDARD INTRAOCULAR LENS IMPLANT INSERTION;  Surgeon: Leo Hernandes MD;  Location: [facility]    PHACOEMULSIFICATION WITH STANDARD INTRAOCULAR LENS IMPLANT Right 1/6/2022    Procedure: RIGHT EYE CATARACT EXTRACTION WITH INTRAOCULAR LENS IMPLANT;  Surgeon: Leo Hernandes MD;  Location: [facility] OR       Family History:    Family History   Problem Relation Age of Onset    Aneurysm Mother 44    Other Cancer Father     Other Cancer Sister 59        female    Aneurysm Sister 72    Aneurysm Sister 23    Diabetes Brother     Hypertension Brother     Other Cancer Brother         unknown    Sudden Infant Death Syndrome Brother         influenza    Family History Negative Brother     Family History Negative Brother     Family History Negative Brother     Family History Negative Brother     Family History Negative Brother     Unknown/Adopted Maternal Grandmother     Unknown/Adopted Maternal Grandfather     Unknown/Adopted  Paternal Grandmother     Unknown/Adopted Paternal Grandfather        Social History:  Marital Status:   [4]  Social History     Tobacco Use    Smoking status: Every Day     Current packs/day: 0.00     Average packs/day: 0.3 packs/day for 66.7 years (16.7 ttl pk-yrs)     Types: Cigarettes     Start date: 1956     Last attempt to quit: 2022     Years since quittin.1     Passive exposure: Current    Smokeless tobacco: Never    Tobacco comments:     limits self to about 4-5 daily , she is quitting on her own   Vaping Use    Vaping status: Never Used   Substance Use Topics    Alcohol use: No    Drug use: No        Medications:    albuterol (PROAIR HFA/PROVENTIL HFA/VENTOLIN HFA) 108 (90 Base) MCG/ACT inhaler  amLODIPine (NORVASC) 10 MG tablet  amLODIPine (NORVASC) 5 MG tablet  aspirin 325 MG tablet  atorvastatin (LIPITOR) 80 MG tablet  blood glucose monitoring (NO BRAND SPECIFIED) meter device kit  blood glucose monitoring (ONE TOUCH DELICA) lancets  cefdinir (OMNICEF) 300 MG capsule  clopidogrel (PLAVIX) 75 MG tablet  ferrous sulfate (FEROSUL) 325 (65 Fe) MG tablet  lisinopril (ZESTRIL) 20 MG tablet  metFORMIN (GLUCOPHAGE XR) 500 MG 24 hr tablet  metoprolol tartrate (LOPRESSOR) 25 MG tablet  ONETOUCH VERIO IQ test strip  order for DME  pantoprazole (PROTONIX) 40 MG EC tablet  predniSONE (DELTASONE) 20 MG tablet  spacer (OPTICHAMBER AMAYA) holding chamber  cephALEXin (KEFLEX) 500 MG capsule  Cyanocobalamin (VITAMIN B-12 PO)  order for DME  polyethylene glycol-propylene glycol (SYSTANE) 0.4-0.3 % SOLN ophthalmic solution  Vitamin D, Cholecalciferol, 25 MCG (1000 UT) CAPS      Review of Systems   Constitutional:  Negative for chills and fever.   HENT:  Positive for congestion and rhinorrhea. Negative for ear pain, sore throat and trouble swallowing.    Eyes:  Negative for discharge and redness.   Respiratory:  Positive for cough and shortness of breath.    Cardiovascular:  Negative for chest pain.  "  Gastrointestinal:  Negative for abdominal pain, diarrhea, nausea and vomiting.   Genitourinary:  Negative for decreased urine volume.   Musculoskeletal:  Positive for gait problem (Wheelchair). Negative for neck pain and neck stiffness.   Skin:  Negative for rash.   Neurological:  Negative for headaches.   Psychiatric/Behavioral: Negative.       Physical Exam   BP: (!) 183/68  Pulse: 77  Temp: 97.6  F (36.4  C)  Resp: 18  Height: 167.6 cm (5' 6\")  Weight: 113.4 kg (250 lb)  SpO2: 94 %    Physical Exam  Vitals and nursing note reviewed.   Constitutional:       General: She is not in acute distress.     Appearance: Normal appearance. She is not ill-appearing or toxic-appearing.   HENT:      Right Ear: Tympanic membrane, ear canal and external ear normal.      Left Ear: Tympanic membrane, ear canal and external ear normal.      Nose: Congestion and rhinorrhea present.      Mouth/Throat:      Mouth: Mucous membranes are moist.      Pharynx: Oropharynx is clear. No oropharyngeal exudate or posterior oropharyngeal erythema.   Cardiovascular:      Rate and Rhythm: Normal rate and regular rhythm.      Pulses: Normal pulses.      Heart sounds: Normal heart sounds.   Pulmonary:      Effort: Pulmonary effort is normal.      Breath sounds: Decreased breath sounds and wheezing present.   Abdominal:      General: Bowel sounds are normal.      Palpations: Abdomen is soft.   Musculoskeletal:      Cervical back: Normal range of motion and neck supple. No rigidity or tenderness.   Lymphadenopathy:      Cervical: No cervical adenopathy.   Neurological:      Mental Status: She is alert.       ED Course     Procedures    Results for orders placed or performed during the hospital encounter of 11/06/24 (from the past 24 hours)   Symptomatic Influenza A/B, RSV, & SARS-CoV2 PCR (COVID-19) Nasopharyngeal    Specimen: Nasopharyngeal; Swab   Result Value Ref Range    Influenza A PCR Negative Negative    Influenza B PCR Negative Negative    " RSV PCR Negative Negative    SARS CoV2 PCR Negative Negative    Narrative    Testing was performed using the Xpert Xpress CoV2/Flu/RSV Assay on the [employer] GeneXpert Instrument. This test should be ordered for the detection of SARS-CoV2, influenza, and RSV viruses in individuals with signs and symptoms of respiratory tract infection. This test is for in vitro diagnostic use under the [place] FDA for laboratories certified under CLIA to perform high or moderate complexity testing. This test has been [place] FDA cleared. A negative result does not rule out the presence of PCR inhibitors in the specimen or target RNA in concentration below the limit of detection for the assay. If only one viral target is positive but coinfection with multiple targets is suspected, the sample should be re-tested with another FDA cleared, approved, or authorized test, if coninfection would change clinical management. This test was validated by the [facility]. These laboratories are certified under the Clinical Laboratory Improvement Amendments of 1988 (CLIA-88) as qualified to perfom high complexity laboratory testing.   Chest XR,  PA & LAT    Narrative    PROCEDURE:  XR CHEST 2 VIEWS    HISTORY:  cough sob.     COMPARISON:  None.    FINDINGS:   The cardiac silhouette is normal in size. The pulmonary vasculature is  normal.  The lungs are clear. No pleural effusion or pneumothorax.      Impression    IMPRESSION:  No acute cardiopulmonary disease.      ISH KILLIAN MD         SYSTEM ID:  [id number]       Medications - No data to display    Assessments & Plan (with Medical Decision Making)     I have reviewed the nursing notes.    I have reviewed the findings, diagnosis, plan and need for follow up with the patient.  (J44.1) COPD with acute exacerbation (H)  (primary encounter diagnosis)  Plan:   Patient arrived via wheelchair, nontoxic appearance.  Patient has some decreased air movement with expiratory wheezes.  Patient  states she has COPD, currently smokes and uses albuterol as needed.  Chest x-ray shows no acute cardiopulmonary disease.  No signs of otitis media or strep.  Moderate congestion.  Staying hydrated.  No rashes.  COVID, influenza and RSV test negative.  Will start patient on cefdinir and prednisone for COPD exacerbation.  Continue albuterol as needed, spacer was ordered to use with albuterol inhaler.  Follow-up with primary care provider or return to urgent care/ED with any worsening in condition or additional concerns.  Patient in agreement treatment plan.    New Prescriptions    CEFDINIR (OMNICEF) 300 MG CAPSULE    Take 1 capsule (300 mg) by mouth 2 times daily for 7 days.    PREDNISONE (DELTASONE) 20 MG TABLET    Take two tablets (= 40mg) each day for 5 (five) days    SPACER (OPTICHAMBER AMAYA) HOLDING CHAMBER    Use with albuterol inhaler       Final diagnoses:   COPD with acute exacerbation (H)     11/6/2024   [facility]       Gaby Hernandez NP  11/06/24 0625

## 2024-11-06 NOTE — TELEPHONE ENCOUNTER
Call from patient reporting symptoms of a cold. Patient is requesting an antibiotic.     Notified patient Dr. Cuellar is out of the office this week and patient would need to be evaluated by a provider to determine if an antibiotic is required as the symptoms may viral.    RN CC offered to Triage symptoms and schedule with a covering provider, patient choosing to go to  to be evaluated.

## 2024-11-06 NOTE — ED TRIAGE NOTES
BRADY Hernandez CNP assessed patient in triage and determined patient Urgent Care appropriate. Will be seen in Urgent Care.

## 2024-11-07 LAB
PATH REPORT.COMMENTS IMP SPEC: NORMAL
PATH REPORT.FINAL DX SPEC: NORMAL
PATH REPORT.MICROSCOPIC SPEC OTHER STN: NORMAL
PATH REPORT.MICROSCOPIC SPEC OTHER STN: NORMAL

## 2024-11-15 ENCOUNTER — OFFICE VISIT (OUTPATIENT)
Dept: FAMILY MEDICINE | Facility: OTHER | Age: 79
End: 2024-11-15
Attending: STUDENT IN AN ORGANIZED HEALTH CARE EDUCATION/TRAINING PROGRAM
Payer: COMMERCIAL

## 2024-11-15 VITALS
HEIGHT: 66 IN | TEMPERATURE: 97.3 F | WEIGHT: 250 LBS | RESPIRATION RATE: 18 BRPM | SYSTOLIC BLOOD PRESSURE: 155 MMHG | DIASTOLIC BLOOD PRESSURE: 58 MMHG | BODY MASS INDEX: 40.18 KG/M2 | OXYGEN SATURATION: 93 % | HEART RATE: 60 BPM

## 2024-11-15 DIAGNOSIS — D50.9 IRON DEFICIENCY ANEMIA, UNSPECIFIED IRON DEFICIENCY ANEMIA TYPE: ICD-10-CM

## 2024-11-15 DIAGNOSIS — E11.69 HYPERLIPIDEMIA ASSOCIATED WITH TYPE 2 DIABETES MELLITUS (H): ICD-10-CM

## 2024-11-15 DIAGNOSIS — I67.1 CEREBRAL ANEURYSM: Chronic | ICD-10-CM

## 2024-11-15 DIAGNOSIS — E11.40 TYPE 2 DIABETES MELLITUS WITH DIABETIC NEUROPATHY, WITHOUT LONG-TERM CURRENT USE OF INSULIN (H): Chronic | ICD-10-CM

## 2024-11-15 DIAGNOSIS — I15.2 HYPERTENSION ASSOCIATED WITH TYPE 2 DIABETES MELLITUS (H): ICD-10-CM

## 2024-11-15 DIAGNOSIS — J43.8 OTHER EMPHYSEMA (H): Chronic | ICD-10-CM

## 2024-11-15 DIAGNOSIS — R05.1 ACUTE COUGH: ICD-10-CM

## 2024-11-15 DIAGNOSIS — J44.1 COPD EXACERBATION (H): ICD-10-CM

## 2024-11-15 DIAGNOSIS — E11.59 HYPERTENSION ASSOCIATED WITH TYPE 2 DIABETES MELLITUS (H): ICD-10-CM

## 2024-11-15 DIAGNOSIS — G81.94 LEFT HEMIPARESIS (H): ICD-10-CM

## 2024-11-15 DIAGNOSIS — R60.0 BILATERAL LOWER EXTREMITY EDEMA: ICD-10-CM

## 2024-11-15 DIAGNOSIS — Z76.89 ENCOUNTER TO ESTABLISH CARE: Primary | ICD-10-CM

## 2024-11-15 DIAGNOSIS — Z86.73 HISTORY OF CVA (CEREBROVASCULAR ACCIDENT): Chronic | ICD-10-CM

## 2024-11-15 DIAGNOSIS — E78.5 HYPERLIPIDEMIA ASSOCIATED WITH TYPE 2 DIABETES MELLITUS (H): ICD-10-CM

## 2024-11-15 PROBLEM — J43.9 EMPHYSEMA LUNG (H): Chronic | Status: ACTIVE | Noted: 2019-03-02

## 2024-11-15 LAB
BASOPHILS # BLD AUTO: 0 10E3/UL (ref 0–0.2)
BASOPHILS NFR BLD AUTO: 0 %
EOSINOPHIL # BLD AUTO: 0.3 10E3/UL (ref 0–0.7)
EOSINOPHIL NFR BLD AUTO: 3 %
ERYTHROCYTE [DISTWIDTH] IN BLOOD BY AUTOMATED COUNT: 17.7 % (ref 10–15)
FERRITIN SERPL-MCNC: 21 NG/ML (ref 11–328)
HCT VFR BLD AUTO: 36 % (ref 35–47)
HGB BLD-MCNC: 10.8 G/DL (ref 11.7–15.7)
IMM GRANULOCYTES # BLD: 0.1 10E3/UL
IMM GRANULOCYTES NFR BLD: 1 %
LYMPHOCYTES # BLD AUTO: 1.1 10E3/UL (ref 0.8–5.3)
LYMPHOCYTES NFR BLD AUTO: 11 %
MCH RBC QN AUTO: 23.4 PG (ref 26.5–33)
MCHC RBC AUTO-ENTMCNC: 30 G/DL (ref 31.5–36.5)
MCV RBC AUTO: 78 FL (ref 78–100)
MONOCYTES # BLD AUTO: 0.6 10E3/UL (ref 0–1.3)
MONOCYTES NFR BLD AUTO: 6 %
NEUTROPHILS # BLD AUTO: 8 10E3/UL (ref 1.6–8.3)
NEUTROPHILS NFR BLD AUTO: 79 %
NRBC # BLD AUTO: 0 10E3/UL
NRBC BLD AUTO-RTO: 0 /100
PLATELET # BLD AUTO: 320 10E3/UL (ref 150–450)
RBC # BLD AUTO: 4.62 10E6/UL (ref 3.8–5.2)
WBC # BLD AUTO: 10.1 10E3/UL (ref 4–11)

## 2024-11-15 PROCEDURE — 36415 COLL VENOUS BLD VENIPUNCTURE: CPT | Mod: ZL | Performed by: STUDENT IN AN ORGANIZED HEALTH CARE EDUCATION/TRAINING PROGRAM

## 2024-11-15 PROCEDURE — 82728 ASSAY OF FERRITIN: CPT | Mod: ZL | Performed by: STUDENT IN AN ORGANIZED HEALTH CARE EDUCATION/TRAINING PROGRAM

## 2024-11-15 PROCEDURE — G0463 HOSPITAL OUTPT CLINIC VISIT: HCPCS

## 2024-11-15 PROCEDURE — 87798 DETECT AGENT NOS DNA AMP: CPT | Mod: ZL | Performed by: STUDENT IN AN ORGANIZED HEALTH CARE EDUCATION/TRAINING PROGRAM

## 2024-11-15 PROCEDURE — 85004 AUTOMATED DIFF WBC COUNT: CPT | Mod: ZL | Performed by: STUDENT IN AN ORGANIZED HEALTH CARE EDUCATION/TRAINING PROGRAM

## 2024-11-15 RX ORDER — GUAIFENESIN 600 MG/1
600 TABLET, EXTENDED RELEASE ORAL 2 TIMES DAILY PRN
Qty: 20 TABLET | Refills: 0 | Status: SHIPPED | OUTPATIENT
Start: 2024-11-15

## 2024-11-15 RX ORDER — LISINOPRIL 40 MG/1
40 TABLET ORAL DAILY
Qty: 90 TABLET | Refills: 3 | Status: SHIPPED | OUTPATIENT
Start: 2024-11-15

## 2024-11-15 RX ORDER — IPRATROPIUM BROMIDE AND ALBUTEROL SULFATE 2.5; .5 MG/3ML; MG/3ML
1 SOLUTION RESPIRATORY (INHALATION) EVERY 6 HOURS PRN
Qty: 90 ML | Refills: 2 | Status: SHIPPED | OUTPATIENT
Start: 2024-11-15

## 2024-11-15 RX ORDER — PREDNISONE 20 MG/1
TABLET ORAL
Qty: 18 TABLET | Refills: 0 | Status: SHIPPED | OUTPATIENT
Start: 2024-11-15

## 2024-11-15 ASSESSMENT — ANXIETY QUESTIONNAIRES
7. FEELING AFRAID AS IF SOMETHING AWFUL MIGHT HAPPEN: NOT AT ALL
6. BECOMING EASILY ANNOYED OR IRRITABLE: NOT AT ALL
GAD7 TOTAL SCORE: 0
5. BEING SO RESTLESS THAT IT IS HARD TO SIT STILL: NOT AT ALL
2. NOT BEING ABLE TO STOP OR CONTROL WORRYING: NOT AT ALL
7. FEELING AFRAID AS IF SOMETHING AWFUL MIGHT HAPPEN: NOT AT ALL
8. IF YOU CHECKED OFF ANY PROBLEMS, HOW DIFFICULT HAVE THESE MADE IT FOR YOU TO DO YOUR WORK, TAKE CARE OF THINGS AT HOME, OR GET ALONG WITH OTHER PEOPLE?: NOT DIFFICULT AT ALL
1. FEELING NERVOUS, ANXIOUS, OR ON EDGE: NOT AT ALL
GAD7 TOTAL SCORE: 0
IF YOU CHECKED OFF ANY PROBLEMS ON THIS QUESTIONNAIRE, HOW DIFFICULT HAVE THESE PROBLEMS MADE IT FOR YOU TO DO YOUR WORK, TAKE CARE OF THINGS AT HOME, OR GET ALONG WITH OTHER PEOPLE: NOT DIFFICULT AT ALL
4. TROUBLE RELAXING: NOT AT ALL
GAD7 TOTAL SCORE: 0
3. WORRYING TOO MUCH ABOUT DIFFERENT THINGS: NOT AT ALL

## 2024-11-15 ASSESSMENT — PATIENT HEALTH QUESTIONNAIRE - PHQ9
10. IF YOU CHECKED OFF ANY PROBLEMS, HOW DIFFICULT HAVE THESE PROBLEMS MADE IT FOR YOU TO DO YOUR WORK, TAKE CARE OF THINGS AT HOME, OR GET ALONG WITH OTHER PEOPLE: NOT DIFFICULT AT ALL
SUM OF ALL RESPONSES TO PHQ QUESTIONS 1-9: 5
SUM OF ALL RESPONSES TO PHQ QUESTIONS 1-9: 5

## 2024-11-15 ASSESSMENT — PAIN SCALES - GENERAL: PAINLEVEL_OUTOF10: NO PAIN (0)

## 2024-11-15 NOTE — RESULT ENCOUNTER NOTE
Please call and notify patient that her hemoglobin is still low at 10.8, although slightly better.  Ferritin also remains low at 21.  Should continue with the iron and see GI.  Also, can you clarify if she has ever had a colonoscopy or upper endoscopy called EGD?  I do not see either of these on her chart.

## 2024-11-15 NOTE — PATIENT INSTRUCTIONS
Increase lisinopril to 40mg once daily. Okay to take 2 20mg tablets until you run out.   Follow up with eye doctor - have them send us your eye exam.   Recheck iron today.   Do neb treatments 3-4 times per day if wheezing or shortness of breath.   Prednisone for 9 days     Thank you for choosing [facility].   I have office hours 8:00 am to 4:30 pm on Mondays, Tuesdays, Thursdays and Fridays. I am out of the office most Wednesdays, although I do occasionally work one Wednesday per month.   Following your visit, if you had labs and diagnostic testing, once they have returned, I will review them and you will be contacted by myself or my nurse if there are concerns. You can also view the labs on [employer].   For refills, notify your pharmacy regarding what you need and the pharmacy will generate a refill request. Please plan ahead and allow 3-5 days for refill requests.  If you have an urgent/same day appointment need, please request an urgent visit when you call and our support staff will send me an Overbook request to try to accommodate you for the urgent visit. I like to fit in and see my own patients and hopefully save you time too!   You can also now schedule appointments on the [employer] genna.   In the event that you need to be seen for urgent concerns and I am out of office, please see one of my colleagues for acute concerns or you may also present to Urgent Care or the ER.  I appreciate the opportunity to serve you and look forward to supporting your healthcare needs in the future.

## 2024-11-15 NOTE — LETTER
My COPD Action Plan     Name: Rosalie Seals    YOB: 1945   Date: 11/15/2024    My doctor: Teqiula Beatty MD   My clinic: [facility] [place]    [facility] [address]  [phone]  My Controller Medicine: duoneb twice daily.    Dose: 1 vial twice daily via neb      My Rescue Medicine: Albuterol (Proair/Ventolin/Proventil) inhaler   Dose: 1-2 puff every six hours      My Flare Up Medicine: Prednisone and Doxycycline (Doryx, Monodox, Vibramycin)        My COPD Severity: Moderate = FeV1 < 79% -50%      Use of Oxygen: Oxygen Not Prescribed      Make sure you've had your pneumonia   vaccines.          GREEN ZONE       Doing well today    Usual level of activity and exercise  Usual amount of cough and mucus  No shortness of breath  Usual level of health (thinking clearly, sleeping well, feel like eating) Actions:    Take daily medicines  Use oxygen as prescribed  Follow regular exercise and diet plan  Avoid cigarette smoke and other irritants that harm the lungs           YELLOW ZONE          Having a bad day or flare up    Short of breath more than usual  A lot more sputum (mucus) than usual  Sputum looks yellow, green, tan, brown or bloody  More coughing or wheezing  Fever or chills  Less energy; trouble completing activities  Trouble thinking or focusing  Using quick relief inhaler or nebulizer more often  Poor sleep; symptoms wake me up  Do not feel like eating Actions:    Get plenty of rest  Take daily medicines  Use quick relief inhaler every 4-6 hours  If you use oxygen, call you doctor to see if you should adjust your oxygen  Do breathing exercises or other things to help you relax  Let a loved one, friend or neighbor know you are feeling worse  Call your care team if you have 2 or more symptoms.  Start taking steroids or antibiotics if directed by your care team           RED ZONE       Need medical care now    Severe shortness of breath (feel you can't  breathe)  Fever, chills  Not enough breath to do any activity  Trouble coughing up mucus, walking or talking  Blood in mucus  Frequent coughing Rescue medicines are not working  Not able to sleep because of breathing  Feel confused or drowsy  Chest pain    Actions:    Call your health care team.  If you cannot reach your care team, call 911 or go to the emergency room.        Annual Reminders:  Meet with Care Team, Flu Shot every Fall  [facility] [address]

## 2024-11-15 NOTE — PROGRESS NOTES
Assessment & Plan     Encounter to establish care  Medical, surgical, family, and social histories discussed updated. Reviewed active healthcare problems. Medications reviewed.    Hyperlipidemia associated with type 2 diabetes mellitus (H)  On lipitor 80mg with CVA history.   LDL 50 10/18/24  Monitor annually     Type 2 diabetes mellitus with diabetic neuropathy, without long-term current use of insulin (H)  A1c 7.5 - ideally will have better control although may be falsely elevated with new iron deficiency  Consider GLP at follow up - benefit with diabetes mellitus and CVA history  Encouraged to update eye exam  Will get microalbumin at follow up.     Other emphysema (H)  PFT 4/2018 with obstruction - FEV1/FVC 85%, FEV1 65, strong albuterol response   consider updating. Will discuss at next follow up.   Hard to use daily inhaler with L arm weakness.   Will get neb machine with duonebs. Recommend using 2x daily, more if symptoms or exacerbation.   - Nebulizer and Supplies Order for DME - ONLY FOR DME  - ipratropium - albuterol 0.5 mg/2.5 mg/3 mL (DUONEB) 0.5-2.5 (3) MG/3ML neb solution; Take 1 vial (3 mLs) by nebulization every 6 hours as needed for shortness of breath, wheezing or cough.    History of CVA (cerebrovascular accident) / Left hemiparesis (H)  May 2017. On aspirin. Recurrent CVA 2/018, plavix added. On high intensity statin. Last neuro evaluation 2/22/2018.   Chronic left sided deficits.   With anemia now, may want to reduce ASA to 81mg but will await GI workup    Cerebral aneurysm  3.7 mm right MCA bifurcation aneurysm. Monitor annually.   S/p left ICA with endovascular coiling 5/2017    Iron deficiency anemia, unspecified iron deficiency anemia type  New 10/18/24 without signs of blood loss.   On iron - slight improvement. Is on ASA full dose and plavix.   Recommend keeping Gi appointment next week. Will need to consider likely EGD and colonoscopy.   Recheck at follow up.   - CBC with Platelets &  Differential; Future  - Ferritin; Future  - CBC with Platelets & Differential  - Ferritin    Bilateral lower extremity edema  Long history of this. Suspect some lymphedema after her CVA, much worse on left leg. Leg /stair stepper has been very helpful.   Consider therapy for lymphedema in the future.     Acute cough / COPD exacerbation (H)  Ongoing 10 to 12 days.  Seen in ED 10/7/2024.  Chest x-ray negative.  Negative COVID, flu, RSV.  Minimal change with prednisone and cefdinir for 1 week.  Difficult to use albuterol inhaler at home, left-sided hemiparesis.  Some rhonchi on lung exam today.  O2 stable.  No purulent sputum or focal abnormalities on lung exam.  Consider pertussis with spasm-like cough.  Will swab since prevalent in the community right now.  Nebulizer machine given for home.  Need to DuoNebs 2-3 times per day.  Will repeat prednisone course since there was some benefit.  Will start with higher dose.  Has received higher dose in the past.  Add mucinex to improve clearance of phlegmn.  Hold off with additional antibiotics for now.  Monitor closely at home.  Reach out next week if worsening.   Concerning signs and symptoms reviewed that would indicate need for urgent re-evaluation. Patient stated understanding and agreement with the plan.  -The benefits and risks of prednisone treatment, including increased energy, increased irritability or mood changes, decreased sleep, weight changes, increased glucose and increased appetite were discussed with the patient. The patient stated understanding and agreed to proceed with treatment.  - ipratropium - albuterol 0.5 mg/2.5 mg/3 mL (DUONEB) 0.5-2.5 (3) MG/3ML neb solution; Take 1 vial (3 mLs) by nebulization every 6 hours as needed for shortness of breath, wheezing or cough.  - predniSONE (DELTASONE) 20 MG tablet; Take 3 tabs by mouth daily x 3 days, then 2 tabs daily x 3 days, then 1 tab daily x 3 days  - B. pertussis/parapertussis PCR-NP  - guaiFENesin  "(MUCINEX) 600 MG 12 hr tablet; Take 1 tablet (600 mg) by mouth 2 times daily as needed for congestion.    Hypertension associated with type 2 diabetes   Uncontrolled.  Long history of elevation on many office visits.  Increase lisinopril to 40 mg once daily.  Update renal function at follow-up.  Monitor blood pressure daily at home and record.  Follow-up 1 month.  - lisinopril (ZESTRIL) 40 MG tablet; Take 1 tablet (40 mg) by mouth daily.        BMI  Estimated body mass index is 40.35 kg/m  as calculated from the following:    Height as of this encounter: 1.676 m (5' 6\").    Weight as of this encounter: 113.4 kg (250 lb).   Weight management plan: Discussed healthy diet and exercise guidelines      The longitudinal plan of care for the diagnosis(es)/condition(s) as documented were addressed during this visit. Due to the added complexity in care, I will continue to support Rosalie in the subsequent management and with ongoing continuity of care.    Subjective   Rosalie is a 79 year old, presenting for the following health issues:  Establish Care, Diabetes, Hypertension, and Lipids        11/15/2024     9:07 AM   Additional Questions   Roomed by Tati FAJARDO   Accompanied by Daughter         11/15/2024     9:08 AM   Patient Reported Additional Medications   Patient reports taking the following new medications prednisone and iron     History of Present Illness       Reason for visit:  Meet    She is taking medications regularly.       Establish Care    Continues with cold symptoms. Emergency Department 11/6. Neg covid/flu/rsv. Prednisone 40mg x 5 days. Did 7 days of cefdinir. No change in symptoms. Still coughing a lot. Rarely get sick. Coughing up phlegm. No fevers. Feels good overall. Cough almost takes breath away though at times. No shortness of breath right now. Daughter does hear her wheezing. No orthopnea. No history of pneumonia. Did get some improvement on prednisone. No neb at home. Ongoing 10 days. Negative " CXR in Emergency Department.     Anemia. Ongoing issue. No melena or hematochezia. No signs of blood loss. Taking iron and tolerating. Has GI appointment next week, although uncertain if she'd like to go.     Lymphedema: bilateral lower extremities, worse on left which was affected by her stroke in 2018. Using the machine to move her legs 20min a day. Making a difference for swelling. Much less red. Has had DVT ultrasounds in the past.       Diabetes Follow-up    How often are you checking your blood sugar? Not at all  What concerns do you have today about your diabetes? None   Do you have any of these symptoms? (Select all that apply)  No numbness or tingling in feet.  No redness, sores or blisters on feet.  No complaints of excessive thirst.  No reports of blurry vision.  No significant changes to weight.    Borderline control. Too hard to utilize glucometer with one hand at home (left sized weakness).   Tolerates metformin.   Would be open to ozempic in the future. No men2, medullary thyroid cancer or pancreatitis history.     Estimated Creatinine Clearance: 55.5 mL/min (A) (based on SCr of 1.05 mg/dL (H)).      Hyperlipidemia Follow-Up    Are you regularly taking any medication or supplement to lower your cholesterol?   Yes- Lipitor 80 mg  Are you having muscle aches or other side effects that you think could be caused by your cholesterol lowering medication?  No    Hypertension Follow-up    Do you check your blood pressure regularly outside of the clinic? No   Are you following a low salt diet? Yes does not like salt added to food  Are your blood pressures ever more than 140 on the top number (systolic) OR more   than 90 on the bottom number (diastolic), for example 140/90? Yes    Has been elevated on near all office visits.   Has a home cuff to monitor.     BP Readings from Last 2 Encounters:   11/15/24 (!) 155/58   11/06/24 (!) 183/68     Hemoglobin A1C (%)   Date Value   10/18/2024 7.5 (H)   04/10/2024 7.6  "(H)   01/12/2021 6.4 (H)   03/03/2020 7.1 (H)     LDL Cholesterol Calculated (mg/dL)   Date Value   10/18/2024 50   10/16/2023 50   01/12/2021 45   03/03/2020 44     Vascular Disease Follow-up    How often do you take nitroglycerin? Never  Do you take an aspirin every day? Yes          Objective    BP (!) 155/58   Pulse 60   Temp 97.3  F (36.3  C) (Tympanic)   Resp 18   Ht 1.676 m (5' 6\")   Wt 113.4 kg (250 lb)   SpO2 93%   BMI 40.35 kg/m    Body mass index is 40.35 kg/m .    Physical Exam  Constitutional:       General: She is not in acute distress.     Appearance: Normal appearance. She is not ill-appearing.      Comments: In electric wheelchair/scooter   Cardiovascular:      Rate and Rhythm: Normal rate and regular rhythm.      Heart sounds: No murmur heard.  Pulmonary:      Effort: Pulmonary effort is normal.      Breath sounds: Wheezing and rhonchi present. No rales.      Comments: Bilateral lungs with rhonchi diffusely, less pronouced in upper lungs   Talking in complete sentences. No increased work of breathing.   No productive cough at visit   Musculoskeletal:      Right lower leg: Edema present.      Left lower leg: Edema present.      Comments: Left LE worse than right. Some pitting. Left leg appears similar to lymphedema. No erythema or warmth overlying skin.    Neurological:      General: No focal deficit present.      Mental Status: She is alert and oriented to person, place, and time.   Psychiatric:         Mood and Affect: Mood normal.         Behavior: Behavior normal.      Comments: Chronic weakness of left arm. Minimal use.                Results for orders placed or performed in visit on 11/15/24   Ferritin     Status: Normal   Result Value Ref Range    Ferritin 21 11 - 328 ng/mL   CBC with platelets and differential     Status: Abnormal   Result Value Ref Range    WBC Count 10.1 4.0 - 11.0 10e3/uL    RBC Count 4.62 3.80 - 5.20 10e6/uL    Hemoglobin 10.8 (L) 11.7 - 15.7 g/dL    Hematocrit " 36.0 35.0 - 47.0 %    MCV 78 78 - 100 fL    MCH 23.4 (L) 26.5 - 33.0 pg    MCHC 30.0 (L) 31.5 - 36.5 g/dL    RDW 17.7 (H) 10.0 - 15.0 %    Platelet Count 320 150 - 450 10e3/uL    % Neutrophils 79 %    % Lymphocytes 11 %    % Monocytes 6 %    % Eosinophils 3 %    % Basophils 0 %    % Immature Granulocytes 1 %    NRBCs per 100 WBC 0 <1 /100    Absolute Neutrophils 8.0 1.6 - 8.3 10e3/uL    Absolute Lymphocytes 1.1 0.8 - 5.3 10e3/uL    Absolute Monocytes 0.6 0.0 - 1.3 10e3/uL    Absolute Eosinophils 0.3 0.0 - 0.7 10e3/uL    Absolute Basophils 0.0 0.0 - 0.2 10e3/uL    Absolute Immature Granulocytes 0.1 <=0.4 10e3/uL    Absolute NRBCs 0.0 10e3/uL   CBC with Platelets & Differential     Status: Abnormal    Narrative    The following orders were created for panel order CBC with Platelets & Differential.  Procedure                               Abnormality         Status                     ---------                               -----------         ------                     CBC with platelets and d...[[id number]]  Abnormal            Final result                 Please view results for these tests on the individual orders.         Signed Electronically by: Tequila Beatty MD

## 2024-11-16 LAB
B PARAPERT DNA SPEC QL NAA+PROBE: NOT DETECTED
B PERT DNA SPEC QL NAA+PROBE: NOT DETECTED

## 2024-12-09 ENCOUNTER — APPOINTMENT (OUTPATIENT)
Dept: GENERAL RADIOLOGY | Facility: HOSPITAL | Age: 79
End: 2024-12-09
Attending: INTERNAL MEDICINE
Payer: COMMERCIAL

## 2024-12-09 ENCOUNTER — HOSPITAL ENCOUNTER (EMERGENCY)
Facility: HOSPITAL | Age: 79
Discharge: HOME | End: 2024-12-09
Attending: INTERNAL MEDICINE
Payer: COMMERCIAL

## 2024-12-09 VITALS
RESPIRATION RATE: 13 BRPM | HEART RATE: 96 BPM | SYSTOLIC BLOOD PRESSURE: 146 MMHG | TEMPERATURE: 97.9 F | OXYGEN SATURATION: 93 % | DIASTOLIC BLOOD PRESSURE: 110 MMHG

## 2024-12-09 DIAGNOSIS — J40 BRONCHITIS: ICD-10-CM

## 2024-12-09 DIAGNOSIS — J44.1 COPD EXACERBATION (H): ICD-10-CM

## 2024-12-09 LAB
ALBUMIN UR-MCNC: NEGATIVE MG/DL
ANION GAP SERPL CALCULATED.3IONS-SCNC: 12 MMOL/L (ref 7–15)
APPEARANCE UR: CLEAR
BASOPHILS # BLD AUTO: 0 10E3/UL (ref 0–0.2)
BASOPHILS NFR BLD AUTO: 0 %
BILIRUB UR QL STRIP: NEGATIVE
BUN SERPL-MCNC: 14.7 MG/DL (ref 8–23)
CALCIUM SERPL-MCNC: 9.6 MG/DL (ref 8.8–10.4)
CHLORIDE SERPL-SCNC: 106 MMOL/L (ref 98–107)
COLOR UR AUTO: NORMAL
CREAT SERPL-MCNC: 0.99 MG/DL (ref 0.51–0.95)
EGFRCR SERPLBLD CKD-EPI 2021: 58 ML/MIN/1.73M2
EOSINOPHIL # BLD AUTO: 0.2 10E3/UL (ref 0–0.7)
EOSINOPHIL NFR BLD AUTO: 3 %
ERYTHROCYTE [DISTWIDTH] IN BLOOD BY AUTOMATED COUNT: 20.9 % (ref 10–15)
FLUAV RNA SPEC QL NAA+PROBE: NEGATIVE
FLUBV RNA RESP QL NAA+PROBE: NEGATIVE
GLUCOSE SERPL-MCNC: 173 MG/DL (ref 70–99)
GLUCOSE UR STRIP-MCNC: NEGATIVE MG/DL
HCO3 SERPL-SCNC: 24 MMOL/L (ref 22–29)
HCT VFR BLD AUTO: 37.7 % (ref 35–47)
HGB BLD-MCNC: 11.3 G/DL (ref 11.7–15.7)
HGB UR QL STRIP: NEGATIVE
HOLD SPECIMEN: NORMAL
IMM GRANULOCYTES # BLD: 0 10E3/UL
IMM GRANULOCYTES NFR BLD: 0 %
KETONES UR STRIP-MCNC: NEGATIVE MG/DL
LEUKOCYTE ESTERASE UR QL STRIP: NEGATIVE
LYMPHOCYTES # BLD AUTO: 1.1 10E3/UL (ref 0.8–5.3)
LYMPHOCYTES NFR BLD AUTO: 18 %
MCH RBC QN AUTO: 24.6 PG (ref 26.5–33)
MCHC RBC AUTO-ENTMCNC: 30 G/DL (ref 31.5–36.5)
MCV RBC AUTO: 82 FL (ref 78–100)
MONOCYTES # BLD AUTO: 0.4 10E3/UL (ref 0–1.3)
MONOCYTES NFR BLD AUTO: 7 %
NEUTROPHILS # BLD AUTO: 4.2 10E3/UL (ref 1.6–8.3)
NEUTROPHILS NFR BLD AUTO: 71 %
NITRATE UR QL: NEGATIVE
NRBC # BLD AUTO: 0 10E3/UL
NRBC BLD AUTO-RTO: 0 /100
PH UR STRIP: 6.5 [PH] (ref 4.7–8)
PLATELET # BLD AUTO: 252 10E3/UL (ref 150–450)
POTASSIUM SERPL-SCNC: 4.1 MMOL/L (ref 3.4–5.3)
RBC # BLD AUTO: 4.59 10E6/UL (ref 3.8–5.2)
RBC URINE: <1 /HPF
RSV RNA SPEC NAA+PROBE: NEGATIVE
SARS-COV-2 RNA RESP QL NAA+PROBE: NEGATIVE
SODIUM SERPL-SCNC: 142 MMOL/L (ref 135–145)
SP GR UR STRIP: 1 (ref 1–1.03)
SQUAMOUS EPITHELIAL: 0 /HPF
TROPONIN T SERPL HS-MCNC: 18 NG/L
UROBILINOGEN UR STRIP-MCNC: NORMAL MG/DL
WBC # BLD AUTO: 5.9 10E3/UL (ref 4–11)
WBC URINE: 0 /HPF

## 2024-12-09 PROCEDURE — 96374 THER/PROPH/DIAG INJ IV PUSH: CPT

## 2024-12-09 PROCEDURE — 82435 ASSAY OF BLOOD CHLORIDE: CPT | Performed by: INTERNAL MEDICINE

## 2024-12-09 PROCEDURE — 71045 X-RAY EXAM CHEST 1 VIEW: CPT

## 2024-12-09 PROCEDURE — 250N000011 HC RX IP 250 OP 636: Performed by: INTERNAL MEDICINE

## 2024-12-09 PROCEDURE — 87637 SARSCOV2&INF A&B&RSV AMP PRB: CPT | Performed by: INTERNAL MEDICINE

## 2024-12-09 PROCEDURE — 93010 ELECTROCARDIOGRAM REPORT: CPT | Performed by: INTERNAL MEDICINE

## 2024-12-09 PROCEDURE — 999N000157 HC STATISTIC RCP TIME EA 10 MIN

## 2024-12-09 PROCEDURE — 93005 ELECTROCARDIOGRAM TRACING: CPT

## 2024-12-09 PROCEDURE — 99284 EMERGENCY DEPT VISIT MOD MDM: CPT | Performed by: INTERNAL MEDICINE

## 2024-12-09 PROCEDURE — 250N000009 HC RX 250: Performed by: INTERNAL MEDICINE

## 2024-12-09 PROCEDURE — 99285 EMERGENCY DEPT VISIT HI MDM: CPT | Mod: 25

## 2024-12-09 PROCEDURE — 94640 AIRWAY INHALATION TREATMENT: CPT

## 2024-12-09 PROCEDURE — 36415 COLL VENOUS BLD VENIPUNCTURE: CPT | Performed by: INTERNAL MEDICINE

## 2024-12-09 PROCEDURE — 80048 BASIC METABOLIC PNL TOTAL CA: CPT | Performed by: INTERNAL MEDICINE

## 2024-12-09 PROCEDURE — 81003 URINALYSIS AUTO W/O SCOPE: CPT | Performed by: INTERNAL MEDICINE

## 2024-12-09 PROCEDURE — 84484 ASSAY OF TROPONIN QUANT: CPT | Performed by: INTERNAL MEDICINE

## 2024-12-09 PROCEDURE — 85025 COMPLETE CBC W/AUTO DIFF WBC: CPT | Performed by: INTERNAL MEDICINE

## 2024-12-09 RX ORDER — METHYLPREDNISOLONE SODIUM SUCCINATE 125 MG/2ML
125 INJECTION INTRAMUSCULAR; INTRAVENOUS ONCE
Status: COMPLETED | OUTPATIENT
Start: 2024-12-09 | End: 2024-12-09

## 2024-12-09 RX ORDER — IPRATROPIUM BROMIDE AND ALBUTEROL SULFATE 2.5; .5 MG/3ML; MG/3ML
3 SOLUTION RESPIRATORY (INHALATION) ONCE
Status: COMPLETED | OUTPATIENT
Start: 2024-12-09 | End: 2024-12-09

## 2024-12-09 RX ORDER — PREDNISONE 20 MG/1
TABLET ORAL
Qty: 5 TABLET | Refills: 0 | Status: SHIPPED | OUTPATIENT
Start: 2024-12-09 | End: 2024-12-16

## 2024-12-09 RX ORDER — ALBUTEROL SULFATE 90 UG/1
2 INHALANT RESPIRATORY (INHALATION) EVERY 6 HOURS PRN
Qty: 18 G | Refills: 0 | Status: SHIPPED | OUTPATIENT
Start: 2024-12-09

## 2024-12-09 RX ORDER — LEVOFLOXACIN 500 MG/1
500 TABLET, FILM COATED ORAL DAILY
Qty: 5 TABLET | Refills: 0 | Status: SHIPPED | OUTPATIENT
Start: 2024-12-09 | End: 2024-12-14

## 2024-12-09 RX ADMIN — IPRATROPIUM BROMIDE AND ALBUTEROL SULFATE 3 ML: .5; 3 SOLUTION RESPIRATORY (INHALATION) at 04:59

## 2024-12-09 RX ADMIN — IPRATROPIUM BROMIDE AND ALBUTEROL SULFATE 3 ML: .5; 3 SOLUTION RESPIRATORY (INHALATION) at 04:13

## 2024-12-09 RX ADMIN — METHYLPREDNISOLONE SODIUM SUCCINATE 125 MG: 125 INJECTION, POWDER, FOR SOLUTION INTRAMUSCULAR; INTRAVENOUS at 04:54

## 2024-12-09 ASSESSMENT — ACTIVITIES OF DAILY LIVING (ADL)
ADLS_ACUITY_SCORE: 44

## 2024-12-09 ASSESSMENT — ENCOUNTER SYMPTOMS
FLANK PAIN: 0
BACK PAIN: 0
HEADACHES: 0
COUGH: 1
HEMATURIA: 0
ABDOMINAL DISTENTION: 0
LIGHT-HEADEDNESS: 0
BLOOD IN STOOL: 0
SHORTNESS OF BREATH: 1
NAUSEA: 0
DYSURIA: 0
COLOR CHANGE: 0
NECK PAIN: 0
PALPITATIONS: 0
WOUND: 0
ARTHRALGIAS: 0
VOMITING: 0
ANAL BLEEDING: 0
DIZZINESS: 0
FEVER: 0
ABDOMINAL PAIN: 0
MYALGIAS: 0
CONFUSION: 0
NECK STIFFNESS: 0
DIAPHORESIS: 0
CHEST TIGHTNESS: 0
CHILLS: 0
WHEEZING: 0
VOICE CHANGE: 0
NUMBNESS: 0

## 2024-12-09 ASSESSMENT — COLUMBIA-SUICIDE SEVERITY RATING SCALE - C-SSRS
2. HAVE YOU ACTUALLY HAD ANY THOUGHTS OF KILLING YOURSELF IN THE PAST MONTH?: NO
6. HAVE YOU EVER DONE ANYTHING, STARTED TO DO ANYTHING, OR PREPARED TO DO ANYTHING TO END YOUR LIFE?: NO
1. IN THE PAST MONTH, HAVE YOU WISHED YOU WERE DEAD OR WISHED YOU COULD GO TO SLEEP AND NOT WAKE UP?: NO

## 2024-12-09 NOTE — ED PROVIDER NOTES
History     Chief Complaint   Patient presents with    Shortness of Breath     The history is provided by the patient.   Shortness of Breath  Severity:  Moderate  Onset quality:  Gradual  Duration:  1 day  Timing:  Constant  Chronicity:  Recurrent  Associated symptoms: cough    Associated symptoms: no abdominal pain, no chest pain, no diaphoresis, no fever, no headaches, no neck pain, no rash, no vomiting and no wheezing        Allergies:  Allergies   Allergen Reactions    Azithromycin Swelling     Face swelling    Pcn [Penicillins]        Problem List:    Patient Active Problem List    Diagnosis Date Noted    History of CVA (cerebrovascular accident) 03/02/2019     Priority: High     5/6/17 sudden onset right vision loss and left side weakness, which resolved by the time of arrival to ED in [place]. CT of head and CT angiogram showed R ICA occlusion. L distal ICA, and R MCA 8mm 6mm aneurysms. Neurology was consulted and pt was transferred to [facility] for further management.  mg and lipitor 80 mg was started. Pt was found elevated HgA1c at 10. Imaging showed R temporal lobe infarct on CT and Angio showed R GOPI occlusion with no intracranial occlusion and L ICA and R MCA aneurysms. Strong family hx of ruptured aneurysms.  Seen at [facility] for an incidental finding of bilat ICA aneurysms. She had diagnostic cerebral angiogram with endovascular coil embolization of the larger left ICA aneurysm. The right ICA aneurysm will be monitored with serial imaging.     2/15/2018 acute onset weakness of her left upper extremity. MR angiogram of the head and neck and MR of the brain, which revealed an acute lacunar infarct in the right centrum semiovale region in an area of old ischemia. Multiple atherosclerotic lesions with complete occlusion of her right internal carotid and other more distal lesions as well. (clopidogrel was added)      Hyperlipidemia associated with type 2 diabetes mellitus (H) 11/15/2024      Priority: Medium    Iron deficiency anemia, unspecified iron deficiency anemia type 11/15/2024     Priority: Medium    Bilateral lower extremity edema 11/15/2024     Priority: Medium    Hypovitaminosis D 03/04/2022     Priority: Medium    Fatigue, unspecified type 03/04/2022     Priority: Medium    Cerebral aneurysm 03/02/2019     Priority: Medium     Cerebral aneurysm without rupture, left ICA, s/p endovascular coiling 05/2017      PVD (peripheral vascular disease) (H) 03/02/2019     Priority: Medium     ankle-brachial index 2018 1.02 on the right and 0.52 on the left, consistent with moderate ischemia of the left lower extremity.       Emphysema lung (H) 03/02/2019     Priority: Medium     PFTS 5/2018 - FEV1- 2.13 (74%), ratio 0.67, 20% change with bronchodilators,  TLC 99%, %, DLCO 60%       Type 2 diabetes mellitus with neurologic complication, without long-term current use of insulin (H) 04/12/2018     Priority: Medium    Left hemiparesis (H) 2017     Priority: Medium     second to right CVA      Hypertension associated with type 2 diabetes mellitus (H) 09/27/2006     Priority: Medium     Formatting of this note might be different from the original.  IMO Update      Gastroesophageal reflux disease without esophagitis 03/02/2019     Priority: Low        Past Medical History:    Past Medical History:   Diagnosis Date    Cerebral infarction (H) 05/06/2017    Concussion with loss of consciousness of 30 minutes or less 1950    Emphysema lung (H)     Gastroesophageal reflux disease without esophagitis     Hyperlipidemia LDL goal <100     Left hemiparesis (H) 2017    Morbid obesity (H)     Nonruptured cerebral aneurysm 02/18/2018    Peripheral vascular disease (H)     Tobacco abuse     Type 2 diabetes mellitus with hyperglycemia, without long-term current use of insulin (H)        Past Surgical History:    Past Surgical History:   Procedure Laterality Date    ABDOMEN SURGERY  04/1997    bladder repair     CEREBRAL ANGIOGRAM  2018    GYN SURGERY  1974    hysterectomy     PHACOEMULSIFICATION WITH STANDARD INTRAOCULAR LENS IMPLANT Left 2021    Procedure: PHACOEMULSIFICATION, CATARACT, WITH STANDARD INTRAOCULAR LENS IMPLANT INSERTION;  Surgeon: Leo Hernandes MD;  Location: [facility] OR    PHACOEMULSIFICATION WITH STANDARD INTRAOCULAR LENS IMPLANT Right 2022    Procedure: RIGHT EYE CATARACT EXTRACTION WITH INTRAOCULAR LENS IMPLANT;  Surgeon: Leo Hernandes MD;  Location: [facility] OR       Family History:    Family History   Problem Relation Age of Onset    Aneurysm Mother 44    Other Cancer Father     Other Cancer Sister 59        female    Aneurysm Sister 72    Aneurysm Sister 23    Diabetes Brother     Hypertension Brother     Other Cancer Brother         unknown    Sudden Infant Death Syndrome Brother         influenza    Family History Negative Brother     Family History Negative Brother     Family History Negative Brother     Family History Negative Brother     Family History Negative Brother     Unknown/Adopted Maternal Grandmother     Unknown/Adopted Maternal Grandfather     Unknown/Adopted Paternal Grandmother     Unknown/Adopted Paternal Grandfather        Social History:  Marital Status:   [4]  Social History     Tobacco Use    Smoking status: Every Day     Current packs/day: 0.00     Average packs/day: 0.3 packs/day for 66.7 years (16.7 ttl pk-yrs)     Types: Cigarettes     Start date: 1956     Last attempt to quit: 2022     Years since quittin.2     Passive exposure: Current    Smokeless tobacco: Never    Tobacco comments:     limits self to about 4-5 daily , she is quitting on her own   Vaping Use    Vaping status: Never Used   Substance Use Topics    Alcohol use: No    Drug use: No        Medications:    albuterol (PROAIR HFA/PROVENTIL HFA/VENTOLIN HFA) 108 (90 Base) MCG/ACT inhaler  levofloxacin (LEVAQUIN) 500 MG tablet  predniSONE (DELTASONE) 20 MG tablet  albuterol (PROAIR  HFA/PROVENTIL HFA/VENTOLIN HFA) 108 (90 Base) MCG/ACT inhaler  amLODIPine (NORVASC) 5 MG tablet  aspirin 325 MG tablet  atorvastatin (LIPITOR) 80 MG tablet  blood glucose monitoring (NO BRAND SPECIFIED) meter device kit  blood glucose monitoring (ONE TOUCH DELICA) lancets  clopidogrel (PLAVIX) 75 MG tablet  ferrous sulfate (FEROSUL) 325 (65 Fe) MG tablet  guaiFENesin (MUCINEX) 600 MG 12 hr tablet  ipratropium - albuterol 0.5 mg/2.5 mg/3 mL (DUONEB) 0.5-2.5 (3) MG/3ML neb solution  lisinopril (ZESTRIL) 40 MG tablet  metFORMIN (GLUCOPHAGE XR) 500 MG 24 hr tablet  metoprolol tartrate (LOPRESSOR) 25 MG tablet  ONETOUCH VERIO IQ test strip  order for DME  order for DME  pantoprazole (PROTONIX) 40 MG EC tablet  predniSONE (DELTASONE) 20 MG tablet  spacer (OPTICHAMBER AMAYA) holding chamber          Review of Systems   Constitutional:  Negative for chills, diaphoresis and fever.   HENT:  Negative for voice change.    Eyes:  Negative for visual disturbance.   Respiratory:  Positive for cough and shortness of breath. Negative for chest tightness and wheezing.    Cardiovascular:  Negative for chest pain, palpitations and leg swelling.   Gastrointestinal:  Negative for abdominal distention, abdominal pain, anal bleeding, blood in stool, nausea and vomiting.   Genitourinary:  Negative for decreased urine volume, dysuria, flank pain and hematuria.   Musculoskeletal:  Negative for arthralgias, back pain, gait problem, myalgias, neck pain and neck stiffness.   Skin:  Negative for color change, pallor, rash and wound.   Neurological:  Negative for dizziness, syncope, light-headedness, numbness and headaches.   Psychiatric/Behavioral:  Negative for confusion and suicidal ideas.        Physical Exam   BP: (!) 194/92  Pulse: 85  Temp: 97.9  F (36.6  C)  Resp: 20  SpO2: 92 %      Physical Exam  Vitals and nursing note reviewed.   Constitutional:       Appearance: She is well-developed.   HENT:      Head: Normocephalic and atraumatic.       Mouth/Throat:      Pharynx: No oropharyngeal exudate.   Eyes:      Conjunctiva/sclera: Conjunctivae normal.      Pupils: Pupils are equal, round, and reactive to light.   Neck:      Thyroid: No thyromegaly.      Vascular: No JVD.      Trachea: No tracheal deviation.   Cardiovascular:      Rate and Rhythm: Normal rate and regular rhythm.      Heart sounds: Normal heart sounds. No murmur heard.     No friction rub. No gallop.   Pulmonary:      Effort: Pulmonary effort is normal. No respiratory distress.      Breath sounds: Normal breath sounds. No stridor. No wheezing or rales.   Chest:      Chest wall: No tenderness.   Abdominal:      General: Bowel sounds are normal. There is no distension.      Palpations: Abdomen is soft. There is no mass.      Tenderness: There is no abdominal tenderness. There is no guarding or rebound.   Musculoskeletal:         General: No tenderness. Normal range of motion.      Cervical back: Normal range of motion and neck supple.   Lymphadenopathy:      Cervical: No cervical adenopathy.   Skin:     General: Skin is warm and dry.      Coloration: Skin is not pale.      Findings: No erythema or rash.   Neurological:      Mental Status: She is alert and oriented to person, place, and time.   Psychiatric:         Behavior: Behavior normal.         ED Course        Procedures              Results for orders placed or performed during the hospital encounter of 12/09/24 (from the past 24 hours)   Influenza A/B, RSV and SARS-CoV2 PCR (COVID-19) Nasopharyngeal    Specimen: Nasopharyngeal; Swab   Result Value Ref Range    Influenza A PCR Negative Negative    Influenza B PCR Negative Negative    RSV PCR Negative Negative    SARS CoV2 PCR Negative Negative    Narrative    Testing was performed using the Xpert Xpress CoV2/Flu/RSV Assay on the [employer] GeneXpert Instrument. This test should be ordered for the detection of SARS-CoV2, influenza, and RSV viruses in individuals with signs and symptoms  of respiratory tract infection. This test is for in vitro diagnostic use under the [place] FDA for laboratories certified under CLIA to perform high or moderate complexity testing. This test has been [place] FDA cleared. A negative result does not rule out the presence of PCR inhibitors in the specimen or target RNA in concentration below the limit of detection for the assay. If only one viral target is positive but coinfection with multiple targets is suspected, the sample should be re-tested with another FDA cleared, approved, or authorized test, if coninfection would change clinical management. This test was validated by the [facility]. These laboratories are certified under the Clinical Laboratory Improvement Amendments of 1988 (CLIA-88) as qualified to perfom high complexity laboratory testing.   [place] Draw    Narrative    The following orders were created for panel order [place] Draw.  Procedure                               Abnormality         Status                     ---------                               -----------         ------                     Extra Blue Top Tube[[id number]]                              Final result               Extra Red Top Tube[[id number]]                               Final result               Extra Green Top (Lithium...[[id number]]                      Final result               Extra Purple Top Tube[[id number]]                                                         Please view results for these tests on the individual orders.   Basic metabolic panel   Result Value Ref Range    Sodium 142 135 - 145 mmol/L    Potassium 4.1 3.4 - 5.3 mmol/L    Chloride 106 98 - 107 mmol/L    Carbon Dioxide (CO2) 24 22 - 29 mmol/L    Anion Gap 12 7 - 15 mmol/L    Urea Nitrogen 14.7 8.0 - 23.0 mg/dL    Creatinine 0.99 (H) 0.51 - 0.95 mg/dL    GFR Estimate 58 (L) >60 mL/min/1.73m2    Calcium 9.6 8.8 - 10.4 mg/dL    Glucose 173 (H) 70 - 99 mg/dL   Troponin T, High Sensitivity   Result  Value Ref Range    Troponin T, High Sensitivity 18 (H) <=14 ng/L   Extra Blue Top Tube   Result Value Ref Range    Hold Specimen JIC    Extra Red Top Tube   Result Value Ref Range    Hold Specimen JIC    Extra Green Top (Lithium Heparin) Tube   Result Value Ref Range    Hold Specimen JIC    CBC with Platelets & Differential    Narrative    The following orders were created for panel order CBC with Platelets & Differential.  Procedure                               Abnormality         Status                     ---------                               -----------         ------                     CBC with platelets and d...[[id number]]  Abnormal            Final result                 Please view results for these tests on the individual orders.   CBC with platelets and differential   Result Value Ref Range    WBC Count 5.9 4.0 - 11.0 10e3/uL    RBC Count 4.59 3.80 - 5.20 10e6/uL    Hemoglobin 11.3 (L) 11.7 - 15.7 g/dL    Hematocrit 37.7 35.0 - 47.0 %    MCV 82 78 - 100 fL    MCH 24.6 (L) 26.5 - 33.0 pg    MCHC 30.0 (L) 31.5 - 36.5 g/dL    RDW 20.9 (H) 10.0 - 15.0 %    Platelet Count 252 150 - 450 10e3/uL    % Neutrophils 71 %    % Lymphocytes 18 %    % Monocytes 7 %    % Eosinophils 3 %    % Basophils 0 %    % Immature Granulocytes 0 %    NRBCs per 100 WBC 0 <1 /100    Absolute Neutrophils 4.2 1.6 - 8.3 10e3/uL    Absolute Lymphocytes 1.1 0.8 - 5.3 10e3/uL    Absolute Monocytes 0.4 0.0 - 1.3 10e3/uL    Absolute Eosinophils 0.2 0.0 - 0.7 10e3/uL    Absolute Basophils 0.0 0.0 - 0.2 10e3/uL    Absolute Immature Granulocytes 0.0 <=0.4 10e3/uL    Absolute NRBCs 0.0 10e3/uL   UA Macroscopic with reflex to Microscopic and Culture    Specimen: Urine, Midstream   Result Value Ref Range    Color Urine Straw Colorless, Straw, Light Yellow, Yellow    Appearance Urine Clear Clear    Glucose Urine Negative Negative mg/dL    Bilirubin Urine Negative Negative    Ketones Urine Negative Negative mg/dL    Specific Gravity Urine 1.005  1.003 - 1.035    Blood Urine Negative Negative    pH Urine 6.5 4.7 - 8.0    Protein Albumin Urine Negative Negative mg/dL    Urobilinogen Urine Normal Normal, 2.0 mg/dL    Nitrite Urine Negative Negative    Leukocyte Esterase Urine Negative Negative    RBC Urine <1 <=2 /HPF    WBC Urine 0 <=5 /HPF    Squamous Epithelials Urine 0 <=1 /HPF    Narrative    Microscopic not indicated  Urine Culture not indicated       Medications   ipratropium - albuterol 0.5 mg/2.5 mg/3 mL (DUONEB) neb solution 3 mL (3 mLs Nebulization $Given 12/9/24 8595)   ipratropium - albuterol 0.5 mg/2.5 mg/3 mL (DUONEB) neb solution 3 mL (3 mLs Nebulization $Given 12/9/24 5584)   methylPREDNISolone Na Suc (solu-MEDROL) injection 125 mg (125 mg Intravenous $Given 12/9/24 1415)       Assessments & Plan (with Medical Decision Making)   Cough, SOB, hx of COPD  Spo2 88 on RA at home        EKG : NSR with occasional PVC  CXR: no obvious infiltration  Labs reviewed    After neb treatment felt better  Spo2 on 90 on RA, and pt looks comfortable  Levaquin + prednisone taper started  D C home  Pt already had neb machine and medication at home, agreed to use neb at home every 4 hrs as needed.    I have reviewed the nursing notes.    I have reviewed the findings, diagnosis, plan and need for follow up with the patient.        New Prescriptions    ALBUTEROL (PROAIR HFA/PROVENTIL HFA/VENTOLIN HFA) 108 (90 BASE) MCG/ACT INHALER    Inhale 2 puffs into the lungs every 6 hours as needed for shortness of breath, wheezing or cough.    LEVOFLOXACIN (LEVAQUIN) 500 MG TABLET    Take 1 tablet (500 mg) by mouth daily for 5 days.    PREDNISONE (DELTASONE) 20 MG TABLET    1 tab daily for 3 days, then 1/2 tab daily for 4 days       Final diagnoses:   COPD exacerbation (H)   Bronchitis       12/9/2024   [facility]       Ollie Dela Cruz MD  12/09/24 0605

## 2024-12-09 NOTE — ED TRIAGE NOTES
Pt is here vie [place] [facility] with c/o SOB over the last couple hours with no relief from at home inhaler, EMS did not give neb d/t pt reports having coughing fit from neb making it harder to breath. Pt reports O2 normally 97 on RA.           normal...

## 2024-12-10 LAB
ATRIAL RATE - MUSE: 90 BPM
DIASTOLIC BLOOD PRESSURE - MUSE: NORMAL MMHG
INTERPRETATION ECG - MUSE: NORMAL
P AXIS - MUSE: 38 DEGREES
PR INTERVAL - MUSE: 206 MS
QRS DURATION - MUSE: 82 MS
QT - MUSE: 354 MS
QTC - MUSE: 433 MS
R AXIS - MUSE: 18 DEGREES
SYSTOLIC BLOOD PRESSURE - MUSE: NORMAL MMHG
T AXIS - MUSE: 52 DEGREES
VENTRICULAR RATE- MUSE: 90 BPM

## 2024-12-13 PROBLEM — G81.94 LEFT HEMIPARESIS (H): Chronic | Status: ACTIVE | Noted: 2017-01-01

## 2025-04-01 NOTE — TELEPHONE ENCOUNTER
Rosalie called and would like you to call her back regarding a rx.  I asked if she called the pharmacy and she screamed at me stating Kalee will take care of it and that she did not call the pharmacy.  Please call her at [phone].   Continue Regimen: betamethasone dipropionate 0.05 % topical cream Detail Level: Zone Render In Strict Bullet Format?: No

## 2025-04-22 DIAGNOSIS — K21.9 GASTROESOPHAGEAL REFLUX DISEASE WITHOUT ESOPHAGITIS: ICD-10-CM

## 2025-04-22 RX ORDER — PANTOPRAZOLE SODIUM 40 MG/1
TABLET, DELAYED RELEASE ORAL
Qty: 90 TABLET | Refills: 0 | Status: SHIPPED | OUTPATIENT
Start: 2025-04-22

## 2025-04-22 NOTE — TELEPHONE ENCOUNTER
pantoprazole (PROTONIX) 40 MG EC tablet       Last Written Prescription Date:  8/15/24  Last Fill Quantity: 90,   # refills: 2  Last Office Visit: 11/15/24  Future Office visit:       Routing refill request to provider for review/approval because:  Medication last signed by Dr. Cuellar

## 2025-04-28 DIAGNOSIS — I10 ESSENTIAL HYPERTENSION: ICD-10-CM

## 2025-04-28 NOTE — TELEPHONE ENCOUNTER
Amlodipine      Last Written Prescription Date:  8/22/24  Last Fill Quantity: 180,   # refills: 2  Last Office Visit: 11/15/24  Future Office visit:       Routing refill request to provider for review/approval because:      Lopressor      Last Written Prescription Date:  8/22/24  Last Fill Quantity: 180,   # refills: 2  Last Office Visit: 11/15/24  Future Office visit:       Routing refill request to provider for review/approval because:

## 2025-04-29 RX ORDER — METOPROLOL TARTRATE 25 MG/1
25 TABLET, FILM COATED ORAL 2 TIMES DAILY
Qty: 180 TABLET | Refills: 0 | Status: SHIPPED | OUTPATIENT
Start: 2025-04-29

## 2025-04-29 RX ORDER — AMLODIPINE BESYLATE 5 MG/1
5 TABLET ORAL 2 TIMES DAILY
Qty: 180 TABLET | Refills: 0 | Status: SHIPPED | OUTPATIENT
Start: 2025-04-29

## 2025-05-05 DIAGNOSIS — I73.9 PVD (PERIPHERAL VASCULAR DISEASE): ICD-10-CM

## 2025-05-05 DIAGNOSIS — E78.5 HYPERLIPIDEMIA LDL GOAL <100: ICD-10-CM

## 2025-05-05 DIAGNOSIS — I63.9 ACUTE ISCHEMIC STROKE (H): ICD-10-CM

## 2025-05-05 NOTE — TELEPHONE ENCOUNTER
Reason for call:  Medication      Have you contacted your pharmacy? Yes   If patient has contacted Pharmacy and it has been over 72hrs, continue to #2  Medication atorvastatin (LIPITOR) 80 MG tablet ,clopidogrel (PLAVIX) 75 MG tablet   What Pharmacy do you use? [facility]  Pt has establish with dr conrad      (Please note that the turn-around-time for prescriptions is 72 business hours; I am sending your request at this time. SEND TO appropriate Care Team Pool )

## 2025-05-07 NOTE — TELEPHONE ENCOUNTER
Lipitor  Last Written Prescription Date:  8.15.24  Last Fill Quantity: #90,   # refills: 0  Last Office Visit: 11.15.24  Future Office visit:       Routing refill request to provider for review/approval because:

## 2025-05-08 RX ORDER — ATORVASTATIN CALCIUM 80 MG/1
80 TABLET, FILM COATED ORAL DAILY
Qty: 90 TABLET | Refills: 0 | Status: SHIPPED | OUTPATIENT
Start: 2025-05-08

## 2025-06-17 ENCOUNTER — OFFICE VISIT (OUTPATIENT)
Dept: FAMILY MEDICINE | Facility: OTHER | Age: 80
End: 2025-06-17
Attending: FAMILY MEDICINE
Payer: COMMERCIAL

## 2025-06-17 VITALS
DIASTOLIC BLOOD PRESSURE: 60 MMHG | SYSTOLIC BLOOD PRESSURE: 130 MMHG | OXYGEN SATURATION: 93 % | RESPIRATION RATE: 16 BRPM | TEMPERATURE: 97.3 F | HEART RATE: 63 BPM

## 2025-06-17 DIAGNOSIS — E11.40 TYPE 2 DIABETES MELLITUS WITH DIABETIC NEUROPATHY, WITHOUT LONG-TERM CURRENT USE OF INSULIN (H): Chronic | ICD-10-CM

## 2025-06-17 DIAGNOSIS — K21.9 GASTROESOPHAGEAL REFLUX DISEASE WITHOUT ESOPHAGITIS: ICD-10-CM

## 2025-06-17 DIAGNOSIS — E11.49 TYPE 2 DIABETES MELLITUS WITH OTHER NEUROLOGIC COMPLICATION, WITHOUT LONG-TERM CURRENT USE OF INSULIN (H): Chronic | ICD-10-CM

## 2025-06-17 DIAGNOSIS — I15.2 HYPERTENSION ASSOCIATED WITH TYPE 2 DIABETES MELLITUS (H): Chronic | ICD-10-CM

## 2025-06-17 DIAGNOSIS — I10 ESSENTIAL HYPERTENSION: ICD-10-CM

## 2025-06-17 DIAGNOSIS — D64.9 ANEMIA, UNSPECIFIED TYPE: Primary | ICD-10-CM

## 2025-06-17 DIAGNOSIS — E66.01 MORBID OBESITY (H): ICD-10-CM

## 2025-06-17 DIAGNOSIS — I63.9 ACUTE ISCHEMIC STROKE (H): ICD-10-CM

## 2025-06-17 DIAGNOSIS — E78.5 HYPERLIPIDEMIA LDL GOAL <100: ICD-10-CM

## 2025-06-17 DIAGNOSIS — E11.59 HYPERTENSION ASSOCIATED WITH TYPE 2 DIABETES MELLITUS (H): Chronic | ICD-10-CM

## 2025-06-17 DIAGNOSIS — J43.1 PANLOBULAR EMPHYSEMA (H): ICD-10-CM

## 2025-06-17 DIAGNOSIS — I73.9 PVD (PERIPHERAL VASCULAR DISEASE): Chronic | ICD-10-CM

## 2025-06-17 DIAGNOSIS — Z86.73 HISTORY OF CVA (CEREBROVASCULAR ACCIDENT): ICD-10-CM

## 2025-06-17 LAB
ALBUMIN SERPL BCG-MCNC: 4.2 G/DL (ref 3.5–5.2)
ALP SERPL-CCNC: 75 U/L (ref 40–150)
ALT SERPL W P-5'-P-CCNC: 12 U/L (ref 0–50)
ANION GAP SERPL CALCULATED.3IONS-SCNC: 11 MMOL/L (ref 7–15)
AST SERPL W P-5'-P-CCNC: 13 U/L (ref 0–45)
BASOPHILS # BLD AUTO: 0.1 10E3/UL (ref 0–0.2)
BASOPHILS NFR BLD AUTO: 1 %
BILIRUB SERPL-MCNC: 0.3 MG/DL
BUN SERPL-MCNC: 17.6 MG/DL (ref 8–23)
CALCIUM SERPL-MCNC: 10.2 MG/DL (ref 8.8–10.4)
CHLORIDE SERPL-SCNC: 102 MMOL/L (ref 98–107)
CREAT SERPL-MCNC: 0.94 MG/DL (ref 0.51–0.95)
EGFRCR SERPLBLD CKD-EPI 2021: 61 ML/MIN/1.73M2
EOSINOPHIL # BLD AUTO: 0.2 10E3/UL (ref 0–0.7)
EOSINOPHIL NFR BLD AUTO: 2 %
ERYTHROCYTE [DISTWIDTH] IN BLOOD BY AUTOMATED COUNT: 14.2 % (ref 10–15)
EST. AVERAGE GLUCOSE BLD GHB EST-MCNC: 189 MG/DL
FERRITIN SERPL-MCNC: 19 NG/ML (ref 11–328)
FOLATE SERPL-MCNC: 14.5 NG/ML (ref 4.6–34.8)
GLUCOSE SERPL-MCNC: 156 MG/DL (ref 70–99)
HBA1C MFR BLD: 8.2 %
HCO3 SERPL-SCNC: 25 MMOL/L (ref 22–29)
HCT VFR BLD AUTO: 40.5 % (ref 35–47)
HGB BLD-MCNC: 12.6 G/DL (ref 11.7–15.7)
IMM GRANULOCYTES # BLD: 0 10E3/UL
IMM GRANULOCYTES NFR BLD: 1 %
IRON BINDING CAPACITY (ROCHE): 427 UG/DL (ref 240–430)
IRON SATN MFR SERPL: 15 % (ref 15–46)
IRON SERPL-MCNC: 64 UG/DL (ref 37–145)
LYMPHOCYTES # BLD AUTO: 1 10E3/UL (ref 0.8–5.3)
LYMPHOCYTES NFR BLD AUTO: 14 %
MAGNESIUM SERPL-MCNC: 1.4 MG/DL (ref 1.7–2.3)
MCH RBC QN AUTO: 26.2 PG (ref 26.5–33)
MCHC RBC AUTO-ENTMCNC: 31.1 G/DL (ref 31.5–36.5)
MCV RBC AUTO: 84 FL (ref 78–100)
MONOCYTES # BLD AUTO: 0.5 10E3/UL (ref 0–1.3)
MONOCYTES NFR BLD AUTO: 7 %
NEUTROPHILS # BLD AUTO: 5.3 10E3/UL (ref 1.6–8.3)
NEUTROPHILS NFR BLD AUTO: 76 %
NRBC # BLD AUTO: 0 10E3/UL
NRBC BLD AUTO-RTO: 0 /100
PLATELET # BLD AUTO: 282 10E3/UL (ref 150–450)
POTASSIUM SERPL-SCNC: 4.4 MMOL/L (ref 3.4–5.3)
PROT SERPL-MCNC: 7 G/DL (ref 6.4–8.3)
RBC # BLD AUTO: 4.81 10E6/UL (ref 3.8–5.2)
SODIUM SERPL-SCNC: 138 MMOL/L (ref 135–145)
TSH SERPL DL<=0.005 MIU/L-ACNC: 1.11 UIU/ML (ref 0.3–4.2)
VIT B12 SERPL-MCNC: 254 PG/ML (ref 232–1245)
VIT D+METAB SERPL-MCNC: 36 NG/ML (ref 20–50)
WBC # BLD AUTO: 7.1 10E3/UL (ref 4–11)

## 2025-06-17 PROCEDURE — 84443 ASSAY THYROID STIM HORMONE: CPT | Mod: ZL | Performed by: FAMILY MEDICINE

## 2025-06-17 PROCEDURE — 82728 ASSAY OF FERRITIN: CPT | Mod: ZL | Performed by: FAMILY MEDICINE

## 2025-06-17 PROCEDURE — 82746 ASSAY OF FOLIC ACID SERUM: CPT | Mod: ZL | Performed by: FAMILY MEDICINE

## 2025-06-17 PROCEDURE — 83735 ASSAY OF MAGNESIUM: CPT | Mod: ZL | Performed by: FAMILY MEDICINE

## 2025-06-17 PROCEDURE — 82607 VITAMIN B-12: CPT | Mod: ZL | Performed by: FAMILY MEDICINE

## 2025-06-17 PROCEDURE — G0463 HOSPITAL OUTPT CLINIC VISIT: HCPCS

## 2025-06-17 PROCEDURE — 83036 HEMOGLOBIN GLYCOSYLATED A1C: CPT | Mod: ZL | Performed by: FAMILY MEDICINE

## 2025-06-17 PROCEDURE — 99214 OFFICE O/P EST MOD 30 MIN: CPT | Performed by: FAMILY MEDICINE

## 2025-06-17 PROCEDURE — 84155 ASSAY OF PROTEIN SERUM: CPT | Mod: ZL | Performed by: FAMILY MEDICINE

## 2025-06-17 PROCEDURE — 83550 IRON BINDING TEST: CPT | Mod: ZL | Performed by: FAMILY MEDICINE

## 2025-06-17 PROCEDURE — 3075F SYST BP GE 130 - 139MM HG: CPT | Performed by: FAMILY MEDICINE

## 2025-06-17 PROCEDURE — 3078F DIAST BP <80 MM HG: CPT | Performed by: FAMILY MEDICINE

## 2025-06-17 PROCEDURE — 82306 VITAMIN D 25 HYDROXY: CPT | Mod: ZL | Performed by: FAMILY MEDICINE

## 2025-06-17 PROCEDURE — 85004 AUTOMATED DIFF WBC COUNT: CPT | Mod: ZL | Performed by: FAMILY MEDICINE

## 2025-06-17 PROCEDURE — 1126F AMNT PAIN NOTED NONE PRSNT: CPT | Performed by: FAMILY MEDICINE

## 2025-06-17 PROCEDURE — 36415 COLL VENOUS BLD VENIPUNCTURE: CPT | Mod: ZL | Performed by: FAMILY MEDICINE

## 2025-06-17 RX ORDER — METFORMIN HYDROCHLORIDE 500 MG/1
TABLET, EXTENDED RELEASE ORAL
Qty: 360 TABLET | Refills: 1 | Status: SHIPPED | OUTPATIENT
Start: 2025-06-17

## 2025-06-17 RX ORDER — PANTOPRAZOLE SODIUM 40 MG/1
40 TABLET, DELAYED RELEASE ORAL DAILY
Qty: 90 TABLET | Refills: 1 | Status: SHIPPED | OUTPATIENT
Start: 2025-06-17

## 2025-06-17 RX ORDER — DOXYCYCLINE HYCLATE 100 MG
100 TABLET ORAL 2 TIMES DAILY
Qty: 14 TABLET | Refills: 0 | Status: SHIPPED | OUTPATIENT
Start: 2025-06-17 | End: 2025-06-24

## 2025-06-17 RX ORDER — METOPROLOL TARTRATE 25 MG/1
25 TABLET, FILM COATED ORAL 2 TIMES DAILY
Qty: 180 TABLET | Refills: 1 | Status: SHIPPED | OUTPATIENT
Start: 2025-06-17

## 2025-06-17 RX ORDER — FLUTICASONE PROPIONATE AND SALMETEROL 250; 50 UG/1; UG/1
1 POWDER RESPIRATORY (INHALATION) EVERY 12 HOURS
Qty: 60 EACH | Refills: 0 | Status: SHIPPED | OUTPATIENT
Start: 2025-06-17

## 2025-06-17 RX ORDER — AMLODIPINE BESYLATE 5 MG/1
5 TABLET ORAL 2 TIMES DAILY
Qty: 180 TABLET | Refills: 1 | Status: SHIPPED | OUTPATIENT
Start: 2025-06-17

## 2025-06-17 RX ORDER — FLUTICASONE PROPIONATE AND SALMETEROL 250; 50 UG/1; UG/1
1 POWDER RESPIRATORY (INHALATION) EVERY 12 HOURS
Qty: 60 EACH | Refills: 6 | Status: SHIPPED | OUTPATIENT
Start: 2025-06-17

## 2025-06-17 RX ORDER — ALBUTEROL SULFATE 90 UG/1
2 INHALANT RESPIRATORY (INHALATION) EVERY 6 HOURS PRN
Qty: 36 G | Refills: 5 | Status: SHIPPED | OUTPATIENT
Start: 2025-06-17

## 2025-06-17 RX ORDER — LISINOPRIL 40 MG/1
40 TABLET ORAL DAILY
Qty: 90 TABLET | Refills: 1 | Status: SHIPPED | OUTPATIENT
Start: 2025-06-17

## 2025-06-17 RX ORDER — ATORVASTATIN CALCIUM 80 MG/1
80 TABLET, FILM COATED ORAL DAILY
Qty: 90 TABLET | Refills: 1 | Status: SHIPPED | OUTPATIENT
Start: 2025-06-17

## 2025-06-17 RX ORDER — CLOPIDOGREL BISULFATE 75 MG/1
75 TABLET ORAL DAILY
Qty: 90 TABLET | Refills: 1 | Status: SHIPPED | OUTPATIENT
Start: 2025-06-17

## 2025-06-17 ASSESSMENT — PAIN SCALES - GENERAL: PAINLEVEL_OUTOF10: NO PAIN (0)

## 2025-06-19 ENCOUNTER — RESULTS FOLLOW-UP (OUTPATIENT)
Dept: FAMILY MEDICINE | Facility: OTHER | Age: 80
End: 2025-06-19

## 2025-06-19 DIAGNOSIS — D64.9 ANEMIA, UNSPECIFIED TYPE: Primary | ICD-10-CM

## 2025-06-19 DIAGNOSIS — E83.42 HYPOMAGNESEMIA: ICD-10-CM

## 2025-06-19 DIAGNOSIS — E53.8 B12 DEFICIENCY: ICD-10-CM

## 2025-06-19 RX ORDER — MAGNESIUM OXIDE 400 MG/1
400 TABLET ORAL DAILY
Qty: 90 TABLET | Refills: 0 | Status: SHIPPED | OUTPATIENT
Start: 2025-06-19

## 2025-06-19 RX ORDER — MULTIVITAMIN WITH IRON
500 TABLET ORAL DAILY
Qty: 90 TABLET | Refills: 3 | Status: SHIPPED | OUTPATIENT
Start: 2025-06-19

## 2025-06-19 RX ORDER — FERROUS SULFATE 325(65) MG
325 TABLET ORAL
Qty: 60 TABLET | Refills: 0 | Status: SHIPPED | OUTPATIENT
Start: 2025-06-19

## 2025-06-19 NOTE — TELEPHONE ENCOUNTER
Yolanda, from [facility] calling to report Blood Occult Lab cancelled as it was in the wrong container. She said it had to be in a urine cup. RN spoke with lab to inform them. RN then called patient to have her come in to  another kit at registration. Patient said she would send her daughter to .     Repeat Lab pended for provider to sign.

## 2025-06-29 PROCEDURE — 82274 ASSAY TEST FOR BLOOD FECAL: CPT | Mod: ZL

## 2025-06-30 ENCOUNTER — TELEPHONE (OUTPATIENT)
Dept: EDUCATION SERVICES | Facility: HOSPITAL | Age: 80
End: 2025-06-30

## 2025-06-30 ENCOUNTER — RESULTS FOLLOW-UP (OUTPATIENT)
Dept: FAMILY MEDICINE | Facility: OTHER | Age: 80
End: 2025-06-30

## 2025-06-30 ENCOUNTER — LAB (OUTPATIENT)
Dept: LAB | Facility: OTHER | Age: 80
End: 2025-06-30
Payer: COMMERCIAL

## 2025-06-30 DIAGNOSIS — E11.40 TYPE 2 DIABETES MELLITUS WITH DIABETIC NEUROPATHY, WITHOUT LONG-TERM CURRENT USE OF INSULIN (H): Primary | Chronic | ICD-10-CM

## 2025-06-30 DIAGNOSIS — D64.9 ANEMIA, UNSPECIFIED TYPE: ICD-10-CM

## 2025-06-30 LAB — HEMOCCULT SP1 STL QL: NEGATIVE

## 2025-06-30 NOTE — TELEPHONE ENCOUNTER
"Received note via lab result. PCP would like [facility] to work with patient.     \"A1c went up from 7.5 to 8.2. Will have diabetic education work with you. \"   Referral sent to PC for review, signing.     Afia Cerda RN [facility]   [phone]  6/30/2025 at 3:38 PM    "

## 2025-07-02 ENCOUNTER — TRANSFERRED RECORDS (OUTPATIENT)
Dept: MULTI SPECIALTY CLINIC | Facility: CLINIC | Age: 80
End: 2025-07-02

## 2025-07-02 LAB — RETINOPATHY: NORMAL

## 2025-07-18 ENCOUNTER — OFFICE VISIT (OUTPATIENT)
Dept: FAMILY MEDICINE | Facility: OTHER | Age: 80
End: 2025-07-18
Attending: FAMILY MEDICINE
Payer: COMMERCIAL

## 2025-07-18 VITALS
HEART RATE: 63 BPM | RESPIRATION RATE: 20 BRPM | DIASTOLIC BLOOD PRESSURE: 62 MMHG | TEMPERATURE: 98 F | SYSTOLIC BLOOD PRESSURE: 126 MMHG | OXYGEN SATURATION: 91 %

## 2025-07-18 DIAGNOSIS — E11.40 TYPE 2 DIABETES MELLITUS WITH DIABETIC NEUROPATHY, WITHOUT LONG-TERM CURRENT USE OF INSULIN (H): Chronic | ICD-10-CM

## 2025-07-18 DIAGNOSIS — D64.9 ANEMIA, UNSPECIFIED TYPE: ICD-10-CM

## 2025-07-18 DIAGNOSIS — T14.8XXA BLOOD BLISTER: ICD-10-CM

## 2025-07-18 DIAGNOSIS — Z86.73 HISTORY OF CVA (CEREBROVASCULAR ACCIDENT): ICD-10-CM

## 2025-07-18 DIAGNOSIS — J43.8 OTHER EMPHYSEMA (H): Primary | Chronic | ICD-10-CM

## 2025-07-18 DIAGNOSIS — E83.42 HYPOMAGNESEMIA: ICD-10-CM

## 2025-07-18 LAB
ANION GAP SERPL CALCULATED.3IONS-SCNC: 10 MMOL/L (ref 7–15)
BASOPHILS # BLD AUTO: 0.1 10E3/UL (ref 0–0.2)
BASOPHILS NFR BLD AUTO: 1 %
BUN SERPL-MCNC: 12.9 MG/DL (ref 8–23)
CALCIUM SERPL-MCNC: 10.2 MG/DL (ref 8.8–10.4)
CHLORIDE SERPL-SCNC: 102 MMOL/L (ref 98–107)
CREAT SERPL-MCNC: 1.01 MG/DL (ref 0.51–0.95)
EGFRCR SERPLBLD CKD-EPI 2021: 56 ML/MIN/1.73M2
EOSINOPHIL # BLD AUTO: 0.2 10E3/UL (ref 0–0.7)
EOSINOPHIL NFR BLD AUTO: 3 %
ERYTHROCYTE [DISTWIDTH] IN BLOOD BY AUTOMATED COUNT: 14.2 % (ref 10–15)
GLUCOSE SERPL-MCNC: 165 MG/DL (ref 70–99)
HCO3 SERPL-SCNC: 26 MMOL/L (ref 22–29)
HCT VFR BLD AUTO: 39.4 % (ref 35–47)
HGB BLD-MCNC: 12.4 G/DL (ref 11.7–15.7)
IMM GRANULOCYTES # BLD: 0 10E3/UL
IMM GRANULOCYTES NFR BLD: 0 %
LYMPHOCYTES # BLD AUTO: 1 10E3/UL (ref 0.8–5.3)
LYMPHOCYTES NFR BLD AUTO: 11 %
MAGNESIUM SERPL-MCNC: 1.9 MG/DL (ref 1.7–2.3)
MCH RBC QN AUTO: 26.5 PG (ref 26.5–33)
MCHC RBC AUTO-ENTMCNC: 31.5 G/DL (ref 31.5–36.5)
MCV RBC AUTO: 84 FL (ref 78–100)
MONOCYTES # BLD AUTO: 0.6 10E3/UL (ref 0–1.3)
MONOCYTES NFR BLD AUTO: 7 %
NEUTROPHILS # BLD AUTO: 6.6 10E3/UL (ref 1.6–8.3)
NEUTROPHILS NFR BLD AUTO: 78 %
NRBC # BLD AUTO: 0 10E3/UL
NRBC BLD AUTO-RTO: 0 /100
PLATELET # BLD AUTO: 290 10E3/UL (ref 150–450)
POTASSIUM SERPL-SCNC: 5.4 MMOL/L (ref 3.4–5.3)
RBC # BLD AUTO: 4.68 10E6/UL (ref 3.8–5.2)
SODIUM SERPL-SCNC: 138 MMOL/L (ref 135–145)
WBC # BLD AUTO: 8.4 10E3/UL (ref 4–11)

## 2025-07-18 PROCEDURE — G0463 HOSPITAL OUTPT CLINIC VISIT: HCPCS

## 2025-07-18 PROCEDURE — 3074F SYST BP LT 130 MM HG: CPT | Performed by: FAMILY MEDICINE

## 2025-07-18 PROCEDURE — 85014 HEMATOCRIT: CPT | Mod: ZL | Performed by: FAMILY MEDICINE

## 2025-07-18 PROCEDURE — 1126F AMNT PAIN NOTED NONE PRSNT: CPT | Performed by: FAMILY MEDICINE

## 2025-07-18 PROCEDURE — 80048 BASIC METABOLIC PNL TOTAL CA: CPT | Mod: ZL | Performed by: FAMILY MEDICINE

## 2025-07-18 PROCEDURE — 83735 ASSAY OF MAGNESIUM: CPT | Mod: ZL | Performed by: FAMILY MEDICINE

## 2025-07-18 PROCEDURE — 99214 OFFICE O/P EST MOD 30 MIN: CPT | Performed by: FAMILY MEDICINE

## 2025-07-18 PROCEDURE — 3078F DIAST BP <80 MM HG: CPT | Performed by: FAMILY MEDICINE

## 2025-07-18 PROCEDURE — 36415 COLL VENOUS BLD VENIPUNCTURE: CPT | Mod: ZL | Performed by: FAMILY MEDICINE

## 2025-07-18 RX ORDER — FLUTICASONE PROPIONATE AND SALMETEROL XINAFOATE 115; 21 UG/1; UG/1
2 AEROSOL, METERED RESPIRATORY (INHALATION) 2 TIMES DAILY
Qty: 36 G | Refills: 3 | Status: SHIPPED | OUTPATIENT
Start: 2025-07-18 | End: 2025-07-28

## 2025-07-18 ASSESSMENT — PAIN SCALES - GENERAL: PAINLEVEL_OUTOF10: NO PAIN (0)

## 2025-07-18 NOTE — PROGRESS NOTES
Assessment & Plan     Other emphysema (H)  Wants HFA instead of previous Diskus.  - fluticasone-salmeterol (ADVAIR HFA) 115-21 MCG/ACT inhaler; Inhale 2 puffs into the lungs 2 times daily.    Hypomagnesemia  - Magnesium  Improving on supplement.  Continue.    Anemia, unspecified type  - CBC with Platelets & Differential  Stable.  On B12 and Iron.  Iron up slightly (month ago) compared to November.    Type 2 diabetes mellitus with diabetic neuropathy, without long-term current use of insulin (H)  - Basic metabolic panel  Had declined to see Diabetic Education when scheduling called.  Discussed today, now willing to schedule.    History of CVA (cerebrovascular accident)    Blood blister  - Orthopedic  Referral; Future  After she hit her foot on furniture.  Present 2 weeks.  Will need wound care when it ruptures.      Subjective   Rosalie is a 80 year old, presenting for the following health issues:  Follow Up        7/18/2025     9:52 AM   Additional Questions   Roomed by Talib lpn   Accompanied by Daughter     History of Present Illness       COPD:  She presents for follow up of COPD.  Overall, COPD symptoms are stable since last visit.  She has same as usual fatigue or shortness of breath with exertion and same as usual shortness of breath at rest.  She sometimes coughs and does not have change in sputum. No recent fever. She can walk less than 10 feet without stopping to rest. She can not walk a flight of stairs without resting. The patient has had no ED, urgent care, or hospital admissions because of COPD since the last visit.     Diabetes:   She presents for follow up of diabetes.    She is not checking blood glucose.         She has no concerns regarding her diabetes at this time.   She is not experiencing numbness or burning in feet, excessive thirst, blurry vision, weight changes or redness, sores or blisters on feet. The patient has had a diabetic eye exam in the last 12 months. Eye exam  performed on july 2. Location of last eye exam [place]        She eats 2-3 servings of fruits and vegetables daily.She consumes 1 sweetened beverage(s) daily.She exercises with enough effort to increase her heart rate 9 or less minutes per day.  She exercises with enough effort to increase her heart rate 3 or less days per week.   She is taking medications regularly.        Diabetes Follow-up    How often are you checking your blood sugar? Not at all  What concerns do you have today about your diabetes? None   Do you have any of these symptoms? (Select all that apply)  No numbness or tingling in feet.  No redness, sores or blisters on feet.  No complaints of excessive thirst.  No reports of blurry vision.  No significant changes to weight.      BP Readings from Last 2 Encounters:   07/18/25 126/62   06/17/25 130/60     Hemoglobin A1C (%)   Date Value   06/17/2025 8.2 (H)   10/18/2024 7.5 (H)   01/12/2021 6.4 (H)   03/03/2020 7.1 (H)     LDL Cholesterol Calculated (mg/dL)   Date Value   10/18/2024 50   10/16/2023 50   01/12/2021 45   03/03/2020 44             COPD Follow-Up  Overall, how are your COPD symptoms since your last clinic visit?  No change  How much fatigue or shortness of breath do you have when you are walking?  N/A   How much shortness of breath do you have when you are resting?  None  How often do you cough? Sometimes  Have you noticed any change in your sputum/phlegm?  No  Have you experienced a recent fever? No  Please describe how far you can walk without stopping to rest:  Less than 10 feet  How many flights of stairs are you able to walk up without stopping?  None  Have you had any Emergency Room Visits, Urgent Care Visits, or Hospital Admissions because of your COPD since your last office visit?  No    History   Smoking Status    Every Day    Types: Cigarettes   Smokeless Tobacco    Never           Objective    /62 (BP Location: Right arm, Patient Position: Sitting, Cuff Size: Adult  Large)   Pulse 63   Temp 98  F (36.7  C) (Tympanic)   Resp 20   SpO2 (!) 91%   There is no height or weight on file to calculate BMI.  Physical Exam  Constitutional:       General: She is not in acute distress.     Appearance: Normal appearance.   Cardiovascular:      Rate and Rhythm: Normal rate and regular rhythm.   Pulmonary:      Effort: Pulmonary effort is normal.      Breath sounds: Normal breath sounds.   Skin:     Comments: Est 1.5 x 2 cm blood blister distal left dorsal foot/toes   Neurological:      Mental Status: She is alert and oriented to person, place, and time.                    Signed Electronically by: Loy Trejo DO

## 2025-07-22 ENCOUNTER — PATIENT OUTREACH (OUTPATIENT)
Dept: EDUCATION SERVICES | Facility: HOSPITAL | Age: 80
End: 2025-07-22

## 2025-07-22 NOTE — PROGRESS NOTES
Diabetes Self-Management Education & Support    Received message, patient requesting appointment with DM ed.   Message to [facility] for assistance with appointment.   Referral on file.     Afai Cerda RN [facility]   [phone]  7/22/2025 at 8:58 AM    
Skin normal color for race, warm, dry and intact. No evidence of rash.

## 2025-07-25 ENCOUNTER — HOSPITAL ENCOUNTER (OUTPATIENT)
Dept: EDUCATION SERVICES | Facility: HOSPITAL | Age: 80
Discharge: HOME | End: 2025-07-25
Attending: FAMILY MEDICINE | Admitting: FAMILY MEDICINE
Payer: COMMERCIAL

## 2025-07-25 PROCEDURE — G0108 DIAB MANAGE TRN  PER INDIV: HCPCS

## 2025-07-25 ASSESSMENT — PAIN SCALES - GENERAL: PAINLEVEL_OUTOF10: NO PAIN (0)

## 2025-07-25 NOTE — PROGRESS NOTES
Diabetes Self-Management Education & Support    Presents for: Individual review    Type of Service: In Person Visit    Assessment  Shonda, 80 year old, referred to [facility] for cgm consideration. Accompanied with: daughter.      Wheelchair.   Usually lives alone, daughter is temporarily staying with Shonda.  Shonda does her Laundry, cooking.   [employer]- delivery   Dtr drives.     Stand/pivot-   Walk in tub.     Has lashay. In case of fall.     Not interested in GLP or other meds at this time.   Metformin currently.   Insulin in the past.     CVA, left hemiparesis. Cerebral aneurysm. GERD. HTN. Hyperlipidemia. PVD. Emphysema.     Dm sx: Tired, feels in eyes all the time.   Has seen eye doc- nothing has helped.   Cataract - good vision otherwise.     Yeast- sometimes.   Foot doc- blood blister.     Doesnt add salt.   Doesn't go out to eat too often- expensive  Occasion- goes to other guys.      Two meals.   Lunch-usually first meal of the day- english muffin. Black coffee.   Dinner- meat. Mashed potatoes/gravy. Chili. Burger and fries.      A1C:  8.2 6/17/2025    Target A1C: <8.0%  Previous A1C: 7.5 October 2024.      BG Targets:  FBG <150    PP  <180   Meter Report: needs meter one hand.      CGM: message to ADS rep- inquire    Doesn't currently meet criteria. Has used insulin in the past.     Wt Readings from Last 10 Encounters:   11/15/24 113.4 kg (250 lb)   11/06/24 113.4 kg (250 lb)   04/10/23 113.4 kg (250 lb)   09/22/22 113.4 kg (250 lb)   08/08/22 113.4 kg (250 lb)   06/13/22 113.4 kg (250 lb)   06/06/22 113.4 kg (250 lb)   03/04/22 113.4 kg (250 lb)   02/13/22 113.4 kg (250 lb)   01/09/22 111.1 kg (245 lb)     Not meeting ADA target guideline for BP.    Statin use    ASA use    Tobacco use- yes.    Foot exam current, denies new concerns.  Eye exam- current Location Dr Hernandes- July.   PCP: Dr Trejo  PCP follow up-  scheduled Insurance Coverage: Medicare/[employer].        Allergies   Allergen Reactions    Azithromycin Swelling      Face swelling    Pcn [Penicillins]       Patient's most recent   Lab Results   Component Value Date    A1C 8.2 06/17/2025    A1C 6.4 01/12/2021     is not meeting goal of <8.0    Diabetes knowledge and skills assessment:   Patient is knowledgeable in diabetes management concepts related to: Healthy Eating and Taking Medication    Based on learning assessment above, most appropriate setting for further diabetes education would be: Individual setting.    Care Plan and Education Provided:  Diabetes Pathophysiology  Healthy Eating: Balanced meals and protein, fiber.  Being Active: Finding a physical activity routine that works for you, Precautions to take with exercise, and Relationship of activity to glucose  Monitoring: Blood glucose versus Continuous Glucose Monitoring  Taking Medication: Action of prescribed medication(s) and Side effects of prescribed medication(s)  Problem Solving: When to call a health care provider  Reducing Risks: Complications of diabetes    Patient verbalized understanding of diabetes self-management education concepts discussed, opportunities for ongoing education and support, and recommendations provided today.    Plan    CGM- currently doesn't meet criteria for Medicare coverage. - [facility] will look into options.   ?one handed meter- will look into.   Provided contact card- [facility] - will follow up- next week with update.    Shonda declines adding GLP or other medication at this time.     See Care Plan for co-developed, patient-state behavior change goals.    Education Materials Provided:  No new materials provided today    Subjective/Objective  Rosalie is an 80 year old, presenting for the following diabetes education related to: Individual review  Accompanied by: Self  Diabetes education in the past 24mo: No  Focus of Visit: Monitoring  Diabetes type: Type 2  Disease course: Getting harder to manage  How confident are you filling out medical forms by yourself:: Not at all  Diabetes management related  "comments/concerns: needs meter, limited ROM, one hand.  Transportation concerns: No (daughter drives.)  Difficulty affording diabetes medication?: No  Difficulty affording diabetes testing supplies?:  (needs meter)  Other concerns: None  Cultural Influences/Ethnic Background:  Not  or     Diabetes Symptoms & Complications:  Diabetes Related Symptoms: Fatigue, Yeast infection  Weight trend: Other (see Comments)  Symptom course: Stable  Disease course: Getting harder to manage  Complications assessed today?: Yes  Autonomic neuropathy: No  CVA: Yes  Heart disease: Other (HTN)  Nephropathy: Yes  Peripheral neuropathy: No  Peripheral Vascular Disease: Yes    Patient Problem List and Family Medical History reviewed for relevant medical history, current medical status, and diabetes risk factors.    Vitals:  BP (!) 158/56   Pulse 73   Resp 16   SpO2 94%   Estimated body mass index is 40.35 kg/m  as calculated from the following:    Height as of 11/15/24: 1.676 m (5' 6\").    Weight as of 11/15/24: 113.4 kg (250 lb).   Last 3 BP:   BP Readings from Last 3 Encounters:   07/28/25 (!) 170/67   07/25/25 (!) 158/56   07/18/25 126/62       History   Smoking Status    Every Day    Types: Cigarettes   Smokeless Tobacco    Never       Labs:  Lab Results   Component Value Date    A1C 8.2 06/17/2025    A1C 6.4 01/12/2021     Lab Results   Component Value Date     07/18/2025    GLC 99 09/22/2022     02/28/2021     Lab Results   Component Value Date    LDL 50 10/18/2024    LDL 45 01/12/2021     HDL Cholesterol   Date Value Ref Range Status   01/12/2021 61 >49 mg/dL Final     Direct Measure HDL   Date Value Ref Range Status   10/18/2024 64 >=50 mg/dL Final     GFR Estimate   Date Value Ref Range Status   07/18/2025 56 (L) >60 mL/min/1.73m2 Final     Comment:     eGFR calculated using 2021 CKD-EPI equation.   02/28/2021 80 >60 mL/min/[1.73_m2] Final     Comment:     Non  GFR Calc  Starting " 12/18/2018, serum creatinine based estimated GFR (eGFR) will be   calculated using the Chronic Kidney Disease Epidemiology Collaboration   (CKD-EPI) equation.       GFR Estimate If Black   Date Value Ref Range Status   02/28/2021 >90 >60 mL/min/[1.73_m2] Final     Comment:      GFR Calc  Starting 12/18/2018, serum creatinine based estimated GFR (eGFR) will be   calculated using the Chronic Kidney Disease Epidemiology Collaboration   (CKD-EPI) equation.       Lab Results   Component Value Date    CR 1.01 07/18/2025    CR 0.73 02/28/2021     Lab Results   Component Value Date    MICROL <12.0 10/16/2023    UMALCR  10/16/2023      Comment:      Unable to calculate, urine albumin and/or urine creatinine is outside detectable limits.  Microalbuminuria is defined as an albumin:creatinine ratio of 17 to 299 for males and 25 to 299 for females. A ratio of albumin:creatinine of 300 or higher is indicative of overt proteinuria.  Due to biologic variability, positive results should be confirmed by a second, first-morning random or 24-hour timed urine specimen. If there is discrepancy, a third specimen is recommended. When 2 out of 3 results are in the microalbuminuria range, this is evidence for incipient nephropathy and warrants increased efforts at glucose control, blood pressure control, and institution of therapy with an angiotensin-converting-enzyme (ACE) inhibitor (if the patient can tolerate it).      [facility] 112.6 10/16/2023           7/25/2025   Healthy Eating   Healthy Eating Assessed Today Yes   Cultural/Protestant diet restrictions? No   Do you have any food allergies or intolerances? No   Meal planning/habits None       doesn't add   Who cooks/prepares meals for you? Self   Who purchases food in  your home? Self   How many times a week on average do you eat food made away from home (restaurant/take-out)? 0   Meals include Lunch;Dinner   Lunch toast- around 11-12. E.M. butter. coffee. black   Dinner meat  and mashed potatoes, gravry. chili. burger and fries.   Beverages Water;Coffee   Has patient met with a dietitian in the past? Yes         7/25/2025   Being Active   Being Active Assessed Today Yes   Exercise: Unable to exercise   Barrier to exercise Physical limitation;Safety;Access         7/25/2025   Monitoring   Monitoring Assessed Today Yes   Did patient bring glucose meter to appointment?  No       needs meter.   Times checking blood sugar at home (number) Never     Diabetes Medication(s)       Biguanides       metFORMIN (GLUCOPHAGE XR) 500 MG 24 hr tablet TAKE 2 TABLETS 2 TIMES DAILY WITH MEALS              7/25/2025   Taking Medications   Taking Medication Assessed Today Yes   Current Treatments Diet;Oral Medication (taken by mouth)   Problems taking diabetes medications regularly? No   Diabetes medication side effects? No         7/25/2025   Problem Solving   Problem Solving Assessed Today Yes   Is the patient at risk for hypoglycemia? No   Is the patient at risk for DKA? No           7/25/2025   Reducing Risks   Reducing Risks Assessed Today Yes   Diabetes Risks Age over 45 years;Sedentary Lifestyle;Hyperlipidemia   CAD Risks Diabetes Mellitus;Dyslipidemia;Hypertension;Obesity;Post-menopausal;Sedentary lifestyle;Tobacco exposure   Has dilated eye exam at least once a year? Yes   Feet checked by healthcare provider in the last year? Yes         7/25/2025   Healthy Coping: Diabetes Distress Assessment   Healthy Coping Assessed Today Yes   I feel burned out by all of the attention and effort that diabetes demands of me. 1 - Not a Problem   It bothers me that diabetes seems to control my life. 1 - Not a Problem   I am frustrated that even when I do what I am supposed to for my diabetes, it doesn't seem to make a difference. 1 - Not a Problem   No matter how hard I try with my diabetes, it feels like it will never be good enough. 1 - Not a Problem   I am so tired of having to worry about diabetes all the time.  1 - Not a Problem   When it comes to my diabetes, I often feel like a failure. 1 - Not a Problem   It depresses me when I realize that my diabetes will likely never go away. 1 - Not a Problem   Living with diabetes is overwhelming for me. 1 - Not a Problem   T2 DDAS Total Score (0 - 1.9 Little or no DD, 2.0 - 2.9 Moderate DD,  3.0+ High DD) 1   Informal Support system: Lui Cerda RN [facility]  Time Spent: 30 minutes  Encounter Type: Individual    Any diabetes medication dose changes were made via the [facility] Standing Orders under the patient's referring provider.

## 2025-07-28 ENCOUNTER — OFFICE VISIT (OUTPATIENT)
Dept: PODIATRY | Facility: OTHER | Age: 80
End: 2025-07-28
Attending: PODIATRIST
Payer: COMMERCIAL

## 2025-07-28 VITALS
HEART RATE: 61 BPM | DIASTOLIC BLOOD PRESSURE: 67 MMHG | SYSTOLIC BLOOD PRESSURE: 170 MMHG | OXYGEN SATURATION: 91 % | RESPIRATION RATE: 18 BRPM

## 2025-07-28 DIAGNOSIS — F17.210 CIGARETTE NICOTINE DEPENDENCE WITHOUT COMPLICATION: ICD-10-CM

## 2025-07-28 DIAGNOSIS — L60.3 ONYCHODYSTROPHY: ICD-10-CM

## 2025-07-28 DIAGNOSIS — E11.9 DIABETES MELLITUS TYPE 2, NONINSULIN DEPENDENT (H): Primary | ICD-10-CM

## 2025-07-28 DIAGNOSIS — Z13.89 SCREENING FOR DIABETIC PERIPHERAL NEUROPATHY: ICD-10-CM

## 2025-07-28 DIAGNOSIS — E11.621 DIABETIC ULCER OF TOE OF LEFT FOOT ASSOCIATED WITH TYPE 2 DIABETES MELLITUS, WITH FAT LAYER EXPOSED (H): ICD-10-CM

## 2025-07-28 DIAGNOSIS — Z71.6 TOBACCO ABUSE COUNSELING: ICD-10-CM

## 2025-07-28 DIAGNOSIS — L97.522 DIABETIC ULCER OF TOE OF LEFT FOOT ASSOCIATED WITH TYPE 2 DIABETES MELLITUS, WITH FAT LAYER EXPOSED (H): ICD-10-CM

## 2025-07-28 PROBLEM — H53.009 AMBLYOPIA: Status: ACTIVE | Noted: 2025-07-02

## 2025-07-28 PROBLEM — H35.363 DRUSEN (DEGENERATIVE) OF MACULA, BILATERAL: Status: ACTIVE | Noted: 2025-07-02

## 2025-07-28 PROBLEM — H52.4 MYOPIA WITH PRESBYOPIA OF BOTH EYES: Status: ACTIVE | Noted: 2025-07-02

## 2025-07-28 PROBLEM — Z96.1 PSEUDOPHAKIA OF BOTH EYES: Status: ACTIVE | Noted: 2025-07-02

## 2025-07-28 PROBLEM — H40.003 PREGLAUCOMA OF BOTH EYES: Status: ACTIVE | Noted: 2025-07-02

## 2025-07-28 PROBLEM — H26.493 OTHER SECONDARY CATARACT, BILATERAL: Status: ACTIVE | Noted: 2025-07-02

## 2025-07-28 PROBLEM — H04.123 DRY EYE SYNDROME OF BOTH EYES: Status: ACTIVE | Noted: 2025-07-02

## 2025-07-28 PROBLEM — H52.13 MYOPIA WITH PRESBYOPIA OF BOTH EYES: Status: ACTIVE | Noted: 2025-07-02

## 2025-07-28 PROBLEM — H18.519 CORNEAL GUTTATA: Status: ACTIVE | Noted: 2025-07-02

## 2025-07-28 PROCEDURE — 3078F DIAST BP <80 MM HG: CPT | Performed by: PODIATRIST

## 2025-07-28 PROCEDURE — G0463 HOSPITAL OUTPT CLINIC VISIT: HCPCS

## 2025-07-28 PROCEDURE — 99203 OFFICE O/P NEW LOW 30 MIN: CPT | Mod: 25 | Performed by: PODIATRIST

## 2025-07-28 PROCEDURE — 99207 PR FOOT EXAM NO CHARGE: CPT | Performed by: PODIATRIST

## 2025-07-28 PROCEDURE — 3077F SYST BP >= 140 MM HG: CPT | Performed by: PODIATRIST

## 2025-07-28 PROCEDURE — G0463 HOSPITAL OUTPT CLINIC VISIT: HCPCS | Mod: 25

## 2025-07-28 PROCEDURE — 1126F AMNT PAIN NOTED NONE PRSNT: CPT | Performed by: PODIATRIST

## 2025-07-28 PROCEDURE — 11721 DEBRIDE NAIL 6 OR MORE: CPT | Performed by: PODIATRIST

## 2025-07-28 ASSESSMENT — PAIN SCALES - GENERAL: PAINLEVEL_OUTOF10: NO PAIN (0)

## 2025-07-28 NOTE — PROGRESS NOTES
Chief complaint: Patient presents with:  Musculoskeletal Problem: Left foot blood blister      History of Present Illness: This 80 year old NIDDM II female is seen at the request of No ref. provider found for evaluation and suggestions of management of a blister on the LEFT dorsal fourth toe. Two weeks ago, she was in her electric wheelchair and the toe hit the edge of her desk. A large blister developed. She was placed on antibiotics and she completed the course. It is not painful and it is not drainage.    She has a lot of difficulty debriding her toenails. Her daughter normally trims them but accidentally clips her skin. She would like them trimmed today.    She denies burning, tingling, and numbness in her feet.    No further pedal complaints today.     Patient smokes 1/2 ppd and she does not want to quit or the quit plan referral today.       BP (!) 170/67 (BP Location: Right arm, Cuff Size: Adult Regular)   Pulse 61   Resp 18   SpO2 (!) 91%     Patient Active Problem List   Diagnosis    Hypertension associated with type 2 diabetes mellitus (H)    Type 2 diabetes mellitus with neurologic complication, without long-term current use of insulin (H)    History of CVA (cerebrovascular accident)    Gastroesophageal reflux disease without esophagitis    Cerebral aneurysm    PVD (peripheral vascular disease)    Emphysema lung (H)    Hypovitaminosis D    Fatigue, unspecified type    Hyperlipidemia associated with type 2 diabetes mellitus (H)    Iron deficiency anemia, unspecified iron deficiency anemia type    Bilateral lower extremity edema    Left hemiparesis (H)    Amblyopia    Corneal guttata    Diabetes mellitus without complication (H)    Drusen (degenerative) of macula, bilateral    Dry eye syndrome of both eyes    Myopia with presbyopia of both eyes    Other secondary cataract, bilateral    Preglaucoma of both eyes    Pseudophakia of both eyes       Past Surgical History:   Procedure Laterality Date     ABDOMEN SURGERY  04/1997    bladder repair    CEREBRAL ANGIOGRAM  2018    GYN SURGERY  04/1974    hysterectomy     PHACOEMULSIFICATION WITH STANDARD INTRAOCULAR LENS IMPLANT Left 12/2/2021    Procedure: PHACOEMULSIFICATION, CATARACT, WITH STANDARD INTRAOCULAR LENS IMPLANT INSERTION;  Surgeon: Leo Hernandes MD;  Location: [facility] OR    PHACOEMULSIFICATION WITH STANDARD INTRAOCULAR LENS IMPLANT Right 1/6/2022    Procedure: RIGHT EYE CATARACT EXTRACTION WITH INTRAOCULAR LENS IMPLANT;  Surgeon: Leo Hernandes MD;  Location: [facility] OR       Current Outpatient Medications   Medication Sig Dispense Refill    albuterol (PROAIR HFA/PROVENTIL HFA/VENTOLIN HFA) 108 (90 Base) MCG/ACT inhaler Inhale 2 puffs into the lungs every 6 hours as needed for shortness of breath or wheezing. 36 g 5    amLODIPine (NORVASC) 5 MG tablet Take 1 tablet (5 mg) by mouth 2 times daily. 180 tablet 1    aspirin 325 MG tablet Take 325 mg by mouth      atorvastatin (LIPITOR) 80 MG tablet Take 1 tablet (80 mg) by mouth daily. 90 tablet 1    blood glucose monitoring (NO BRAND SPECIFIED) meter device kit Use to test blood sugar 4 times daily or as directed. 1 kit 0    blood glucose monitoring (ONE TOUCH DELICA) lancets Use to test blood sugar 3 times daily. 360 each 3    clopidogrel (PLAVIX) 75 MG tablet Take 1 tablet (75 mg) by mouth daily. 90 tablet 1    cyanocobalamin (VITAMIN B-12) 500 MCG tablet Take 1 tablet (500 mcg) by mouth daily. 90 tablet 3    ferrous sulfate (FEROSUL) 325 (65 Fe) MG tablet Take 1 tablet (325 mg) by mouth daily (with breakfast). 60 tablet 0    fluticasone-salmeterol (ADVAIR) 250-50 MCG/ACT inhaler Inhale 1 puff into the lungs every 12 hours. 60 each 0    guaiFENesin (MUCINEX) 600 MG 12 hr tablet Take 1 tablet (600 mg) by mouth 2 times daily as needed for congestion. 20 tablet 0    ipratropium - albuterol 0.5 mg/2.5 mg/3 mL (DUONEB) 0.5-2.5 (3) MG/3ML neb solution Take 1 vial (3 mLs) by nebulization every 6 hours as needed for  shortness of breath, wheezing or cough. 90 mL 2    lisinopril (ZESTRIL) 40 MG tablet Take 1 tablet (40 mg) by mouth daily. 90 tablet 1    magnesium oxide (MAG-OX) 400 MG tablet Take 1 tablet (400 mg) by mouth daily. 90 tablet 0    metFORMIN (GLUCOPHAGE XR) 500 MG 24 hr tablet TAKE 2 TABLETS 2 TIMES DAILY WITH MEALS 360 tablet 1    metoprolol tartrate (LOPRESSOR) 25 MG tablet Take 1 tablet (25 mg) by mouth 2 times daily. 180 tablet 1    ONETOUCH VERIO IQ test strip USE FOUR TIMES A  each 3    order for DME Equipment being ordered: Wheelchair with foot rests 1 Device 0    order for DME Equipment being ordered: Glucometer and test strips 1 Device 0    pantoprazole (PROTONIX) 40 MG EC tablet Take 1 tablet (40 mg) by mouth daily. 90 tablet 1    spacer (OPTICHAMBER AMAYA) holding chamber Use with albuterol inhaler 1 each 0     No current facility-administered medications for this visit.          Allergies   Allergen Reactions    Azithromycin Swelling     Face swelling    Pcn [Penicillins]        Family History   Problem Relation Age of Onset    Aneurysm Mother 44    Other Cancer Father     Other Cancer Sister 59        female    Aneurysm Sister 72    Aneurysm Sister 23    Diabetes Brother     Hypertension Brother     Other Cancer Brother         unknown    Sudden Infant Death Syndrome Brother         influenza    Family History Negative Brother     Family History Negative Brother     Family History Negative Brother     Family History Negative Brother     Family History Negative Brother     Unknown/Adopted Maternal Grandmother     Unknown/Adopted Maternal Grandfather     Unknown/Adopted Paternal Grandmother     Unknown/Adopted Paternal Grandfather        Social History     Socioeconomic History    Marital status:      Spouse name: None    Number of children: 1    Years of education: 12    Highest education level: GED or equivalent   Occupational History    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      Employer: [employer]   Tobacco Use    Smoking status: Every Day     Current packs/day: 0.00     Average packs/day: 0.3 packs/day for 66.7 years (16.7 ttl pk-yrs)     Types: Cigarettes     Start date: 1956     Last attempt to quit: 2022     Years since quittin.8     Passive exposure: Current    Smokeless tobacco: Never    Tobacco comments:     limits self to about 4-5 daily , she is quitting on her own   Vaping Use    Vaping status: Never Used   Substance and Sexual Activity    Alcohol use: No    Drug use: No    Sexual activity: Not Currently     Partners: Male     Birth control/protection: None   Social History Narrative     -- n/a    Caffeine -- 1 pot/day    Concussion -- yes     Social Drivers of Health     Financial Resource Strain: Low Risk  (2025)    Financial Resource Strain     Within the past 12 months, have you or your family members you live with been unable to get utilities (heat, electricity) when it was really needed?: No   Food Insecurity: Low Risk  (2025)    Food Insecurity     Within the past 12 months, did you worry that your food would run out before you got money to buy more?: No     Within the past 12 months, did the food you bought just not last and you didn t have money to get more?: No   Transportation Needs: Low Risk  (2025)    Transportation Needs     Within the past 12 months, has lack of transportation kept you from medical appointments, getting your medicines, non-medical meetings or appointments, work, or from getting things that you need?: No   Physical Activity: Unknown (2024)    Exercise Vital Sign     Days of Exercise per Week: 0 days   Stress: No Stress Concern Present (2024)    [employer] - Occupational Stress Questionnaire     Feeling of Stress : Not at all   Social Connections: Unknown (2024)    Social Connection and Isolation Panel [NHANES]     Frequency of Social Gatherings with Friends and  Family: More than three times a week   Interpersonal Safety: Not At Risk (11/30/2021)    Humiliation, Afraid, Rape, and Kick questionnaire     Fear of Current or Ex-Partner: No     Emotionally Abused: No     Physically Abused: No     Sexually Abused: No   Housing Stability: Low Risk  (6/17/2025)    Housing Stability     Do you have housing? : Yes     Are you worried about losing your housing?: No       ROS: 10 point ROS neg other than the symptoms noted above in the HPI.  EXAM  Constitutional: healthy, alert, and no distress    Psychiatric: mentation appears normal and affect normal/bright    VASCULAR:  -Dorsalis pedis pulse +2/4 b/l  -Posterior tibial pulse +2/4 b/l  -Capillary refill time < 3 seconds to b/l hallux  -Hair growth Absent to b/l anterior legs and ankles  NEURO:  -Light touch sensation intact to b/l plantar forefoot  DERM:  -Skin temperature, texture and turgor WNL b/l  -Toenails elongated, thickened, dystrophic and discolored x 10  Wound Location:  LEFT dorsal fourth toe  07/28/2025  Measurement:  0.5cm x 0.5cm x 0.1cm to subcutaneous tissue  ---Cherie-wound loose eschar with healed skin beneath eschar measuring a total area of 1.5cm x 1.5cm pre-removal of loose eschar  Drainage:  Mild serous  Odor:  None  Undermining:  None  Edges:  Intact  Base: Viable  Surrounding Skin: Intact  No severe erythema, no ascending erythema, no calor, no purulence, no malodor, no other signs of infection.     MSK:  -No pain on palpation to the LEFT dorsal fourth toe  -Muscle strength of ankles +5/5 for dorsiflexion, plantarflexion, ABDUction and ADDuction b/l    -Ankle joint passive ROM within normal limits except for dorsiflexion:    Dorsiflexion, RIGHT Straight knee 0 degrees    Dorsiflexion, LEFT Straight knee 0 degrees    ============================================================    ASSESSMENT:    (E11.9) Diabetes mellitus type 2, noninsulin dependent (H)  (primary encounter diagnosis)    (E11.621,  L97522)  Diabetic ulcer of toe of left foot associated with type 2 diabetes mellitus, with fat layer exposed (H)    (L60.3) Onychodystrophy    (F17.210) Cigarette nicotine dependence without complication    (Z71.6) Tobacco abuse counseling    (Z13.89) Screening for diabetic peripheral neuropathy        PLAN:  -Patient evaluated and examined. Treatment options discussed with no educational barriers noted.    Diabetic foot ulcer of the LEFT dorsal fourth toe:  -Outer callus cared for and necrotic tissue addressed with a tissue nipper. Wound probes to subcutaneous tissue level.   -Applied a dressing to the wound with Xeroform, gauze and tape.  -Patient to change the dressing every two days.  -Patient was instructed to look for signs of infection (redness, swelling, pain, purulence, fever, chills, nausea, vomiting) and to return to podiatry or the emergency department immediately if there are any signs of infection. If the wound is not healed within two weeks, then the patient is advised to call the clinic. She is in agreement with this plan.  -This is an acute, uncomplicated illness/injury with OTC treatment options reviewed.     ---------------------------------------------------    -High risk toenail debridement x 10 toenails without incident    -Diabetic Foot Education provided. This included checking the feet daily looking for new new blisters or wounds, wearing shoes at all times when walking including around the house, and avoiding lotion application between the toes. If there are any signs of infection, the patient should present to the ED as soon as possible. Infections of the foot can be life threatening or lead to amputations of the foot or leg.    -Tobacco cessation discussed with patient including the benefits of quitting and the risks of not quitting. The Quit Plan referral was offered and the patient is not interested in the referral today. Patient is not interested in quitting today.    -Patient in agreement with  the above treatment plan and all of patient's questions were answered.      Return to clinic 63+ days for a diabetic foot exam and high risk nail debridement         Diana Brush DPM

## 2025-07-29 VITALS
RESPIRATION RATE: 16 BRPM | HEART RATE: 73 BPM | SYSTOLIC BLOOD PRESSURE: 158 MMHG | DIASTOLIC BLOOD PRESSURE: 56 MMHG | OXYGEN SATURATION: 94 %

## 2025-08-08 ENCOUNTER — PATIENT OUTREACH (OUTPATIENT)
Dept: EDUCATION SERVICES | Facility: HOSPITAL | Age: 80
End: 2025-08-08

## 2025-08-08 DIAGNOSIS — E11.40 TYPE 2 DIABETES MELLITUS WITH DIABETIC NEUROPATHY, WITHOUT LONG-TERM CURRENT USE OF INSULIN (H): Primary | Chronic | ICD-10-CM

## 2025-08-08 RX ORDER — LANCETS
1 EACH MISCELLANEOUS DAILY
Qty: 100 EACH | Refills: 1 | Status: SHIPPED | OUTPATIENT
Start: 2025-08-08 | End: 2025-08-14

## 2025-08-08 RX ORDER — BLOOD-GLUCOSE METER
EACH MISCELLANEOUS DAILY
Qty: 1 KIT | Refills: 0 | Status: SHIPPED | OUTPATIENT
Start: 2025-08-08 | End: 2025-08-14

## (undated) DEVICE — KNIFE-MVR 20GA/45 DEGREE ANGLED

## (undated) DEVICE — BETADINE 5% STERILE OPHTHALMIC SOLUTION 1 OZ.

## (undated) DEVICE — CANNULA-VISCOFLOW 25G 9MM 45 DEGREE ANGLE

## (undated) DEVICE — PHACO NEEDLE-MICS 2.0 STRAIGHT

## (undated) DEVICE — CYSTOTOME-IRRIGATING  25G

## (undated) DEVICE — BIN-LENS IMPLANT CART

## (undated) DEVICE — CANNULA-VISCOFLOW 30G 9MM BEND

## (undated) DEVICE — BIN-TECNIS DCB00 LENSES

## (undated) DEVICE — GLV-7.0 PROTEXIS PI CLASSIC LF/PF

## (undated) DEVICE — CHANG NUCLEUS HYDRODISSECTOR BEVEL ANGLE 47 DEGREE .40 X 18MM

## (undated) DEVICE — PACK-PHACO STELLARIS

## (undated) DEVICE — INSTRUMENT WIPE-VISIWIPE

## (undated) DEVICE — GLV-7.5 PROTEXIS PI CLASSIC LF/PF

## (undated) DEVICE — PACK-EYE-CUSTOM

## (undated) DEVICE — DRSG-TEGADERM MEDIUM #1626

## (undated) DEVICE — HANDPIECE-CAPSULEGUARD I/A STELLARIS

## (undated) DEVICE — BIN-CATARACT BIN

## (undated) DEVICE — KNIFE-X STAR SLIT 2.4MM

## (undated) DEVICE — PHACO ACCESSORY PACK 2.2MM

## (undated) RX ORDER — LABETALOL 20 MG/4 ML (5 MG/ML) INTRAVENOUS SYRINGE
Status: DISPENSED
Start: 2022-01-06